# Patient Record
Sex: MALE | Race: WHITE | NOT HISPANIC OR LATINO | URBAN - METROPOLITAN AREA
[De-identification: names, ages, dates, MRNs, and addresses within clinical notes are randomized per-mention and may not be internally consistent; named-entity substitution may affect disease eponyms.]

---

## 2017-06-15 ENCOUNTER — OUTPATIENT (OUTPATIENT)
Dept: OUTPATIENT SERVICES | Facility: HOSPITAL | Age: 67
LOS: 1 days | Discharge: HOME | End: 2017-06-15

## 2017-06-28 DIAGNOSIS — Z79.01 LONG TERM (CURRENT) USE OF ANTICOAGULANTS: ICD-10-CM

## 2017-06-28 DIAGNOSIS — I48.91 UNSPECIFIED ATRIAL FIBRILLATION: ICD-10-CM

## 2017-07-06 ENCOUNTER — OUTPATIENT (OUTPATIENT)
Dept: OUTPATIENT SERVICES | Facility: HOSPITAL | Age: 67
LOS: 1 days | Discharge: HOME | End: 2017-07-06

## 2017-07-06 DIAGNOSIS — I48.91 UNSPECIFIED ATRIAL FIBRILLATION: ICD-10-CM

## 2017-07-06 DIAGNOSIS — Z79.01 LONG TERM (CURRENT) USE OF ANTICOAGULANTS: ICD-10-CM

## 2017-07-26 ENCOUNTER — OUTPATIENT (OUTPATIENT)
Dept: OUTPATIENT SERVICES | Facility: HOSPITAL | Age: 67
LOS: 1 days | Discharge: HOME | End: 2017-07-26

## 2017-07-26 DIAGNOSIS — K74.0 HEPATIC FIBROSIS: ICD-10-CM

## 2017-07-27 ENCOUNTER — OUTPATIENT (OUTPATIENT)
Dept: OUTPATIENT SERVICES | Facility: HOSPITAL | Age: 67
LOS: 1 days | Discharge: HOME | End: 2017-07-27

## 2017-07-27 DIAGNOSIS — Z79.01 LONG TERM (CURRENT) USE OF ANTICOAGULANTS: ICD-10-CM

## 2017-07-27 DIAGNOSIS — I48.91 UNSPECIFIED ATRIAL FIBRILLATION: ICD-10-CM

## 2017-08-11 ENCOUNTER — OUTPATIENT (OUTPATIENT)
Dept: OUTPATIENT SERVICES | Facility: HOSPITAL | Age: 67
LOS: 1 days | Discharge: HOME | End: 2017-08-11

## 2017-08-11 DIAGNOSIS — Z79.01 LONG TERM (CURRENT) USE OF ANTICOAGULANTS: ICD-10-CM

## 2017-08-11 DIAGNOSIS — I48.91 UNSPECIFIED ATRIAL FIBRILLATION: ICD-10-CM

## 2017-08-22 ENCOUNTER — OUTPATIENT (OUTPATIENT)
Dept: OUTPATIENT SERVICES | Facility: HOSPITAL | Age: 67
LOS: 1 days | Discharge: HOME | End: 2017-08-22

## 2017-08-22 DIAGNOSIS — Z79.01 LONG TERM (CURRENT) USE OF ANTICOAGULANTS: ICD-10-CM

## 2017-08-22 DIAGNOSIS — I48.91 UNSPECIFIED ATRIAL FIBRILLATION: ICD-10-CM

## 2017-08-31 ENCOUNTER — OUTPATIENT (OUTPATIENT)
Dept: OUTPATIENT SERVICES | Facility: HOSPITAL | Age: 67
LOS: 1 days | Discharge: HOME | End: 2017-08-31

## 2017-08-31 DIAGNOSIS — Z79.01 LONG TERM (CURRENT) USE OF ANTICOAGULANTS: ICD-10-CM

## 2017-08-31 DIAGNOSIS — I48.91 UNSPECIFIED ATRIAL FIBRILLATION: ICD-10-CM

## 2017-09-07 ENCOUNTER — OUTPATIENT (OUTPATIENT)
Dept: OUTPATIENT SERVICES | Facility: HOSPITAL | Age: 67
LOS: 1 days | Discharge: HOME | End: 2017-09-07

## 2017-09-07 DIAGNOSIS — I48.91 UNSPECIFIED ATRIAL FIBRILLATION: ICD-10-CM

## 2017-09-07 DIAGNOSIS — Z79.01 LONG TERM (CURRENT) USE OF ANTICOAGULANTS: ICD-10-CM

## 2017-09-11 ENCOUNTER — OUTPATIENT (OUTPATIENT)
Dept: OUTPATIENT SERVICES | Facility: HOSPITAL | Age: 67
LOS: 1 days | Discharge: HOME | End: 2017-09-11

## 2017-09-11 DIAGNOSIS — I48.91 UNSPECIFIED ATRIAL FIBRILLATION: ICD-10-CM

## 2017-09-11 DIAGNOSIS — Z79.01 LONG TERM (CURRENT) USE OF ANTICOAGULANTS: ICD-10-CM

## 2017-09-12 ENCOUNTER — OUTPATIENT (OUTPATIENT)
Dept: OUTPATIENT SERVICES | Facility: HOSPITAL | Age: 67
LOS: 1 days | Discharge: HOME | End: 2017-09-12

## 2017-09-14 DIAGNOSIS — Z12.11 ENCOUNTER FOR SCREENING FOR MALIGNANT NEOPLASM OF COLON: ICD-10-CM

## 2017-09-14 DIAGNOSIS — K76.6 PORTAL HYPERTENSION: ICD-10-CM

## 2017-09-14 DIAGNOSIS — K31.819 ANGIODYSPLASIA OF STOMACH AND DUODENUM WITHOUT BLEEDING: ICD-10-CM

## 2017-09-14 DIAGNOSIS — E78.00 PURE HYPERCHOLESTEROLEMIA, UNSPECIFIED: ICD-10-CM

## 2017-09-14 DIAGNOSIS — K64.8 OTHER HEMORRHOIDS: ICD-10-CM

## 2017-09-14 DIAGNOSIS — K29.70 GASTRITIS, UNSPECIFIED, WITHOUT BLEEDING: ICD-10-CM

## 2017-09-14 DIAGNOSIS — D12.0 BENIGN NEOPLASM OF CECUM: ICD-10-CM

## 2017-09-14 DIAGNOSIS — D12.8 BENIGN NEOPLASM OF RECTUM: ICD-10-CM

## 2017-09-14 DIAGNOSIS — K31.89 OTHER DISEASES OF STOMACH AND DUODENUM: ICD-10-CM

## 2017-09-14 DIAGNOSIS — K57.30 DIVERTICULOSIS OF LARGE INTESTINE WITHOUT PERFORATION OR ABSCESS WITHOUT BLEEDING: ICD-10-CM

## 2017-09-14 DIAGNOSIS — D12.5 BENIGN NEOPLASM OF SIGMOID COLON: ICD-10-CM

## 2017-09-25 ENCOUNTER — OUTPATIENT (OUTPATIENT)
Dept: OUTPATIENT SERVICES | Facility: HOSPITAL | Age: 67
LOS: 1 days | Discharge: HOME | End: 2017-09-25

## 2017-09-25 DIAGNOSIS — Z79.01 LONG TERM (CURRENT) USE OF ANTICOAGULANTS: ICD-10-CM

## 2017-09-25 DIAGNOSIS — I48.91 UNSPECIFIED ATRIAL FIBRILLATION: ICD-10-CM

## 2017-10-10 ENCOUNTER — OUTPATIENT (OUTPATIENT)
Dept: OUTPATIENT SERVICES | Facility: HOSPITAL | Age: 67
LOS: 1 days | Discharge: HOME | End: 2017-10-10

## 2017-10-10 DIAGNOSIS — I48.91 UNSPECIFIED ATRIAL FIBRILLATION: ICD-10-CM

## 2017-10-10 DIAGNOSIS — Z79.01 LONG TERM (CURRENT) USE OF ANTICOAGULANTS: ICD-10-CM

## 2017-10-16 ENCOUNTER — OUTPATIENT (OUTPATIENT)
Dept: OUTPATIENT SERVICES | Facility: HOSPITAL | Age: 67
LOS: 1 days | Discharge: HOME | End: 2017-10-16

## 2017-10-16 DIAGNOSIS — Z79.01 LONG TERM (CURRENT) USE OF ANTICOAGULANTS: ICD-10-CM

## 2017-10-16 DIAGNOSIS — I48.91 UNSPECIFIED ATRIAL FIBRILLATION: ICD-10-CM

## 2017-10-17 ENCOUNTER — EMERGENCY (EMERGENCY)
Facility: HOSPITAL | Age: 67
LOS: 0 days | Discharge: HOME | End: 2017-10-17

## 2017-10-17 DIAGNOSIS — I10 ESSENTIAL (PRIMARY) HYPERTENSION: ICD-10-CM

## 2017-10-17 DIAGNOSIS — Z79.01 LONG TERM (CURRENT) USE OF ANTICOAGULANTS: ICD-10-CM

## 2017-10-17 DIAGNOSIS — Z79.899 OTHER LONG TERM (CURRENT) DRUG THERAPY: ICD-10-CM

## 2017-10-17 DIAGNOSIS — K74.60 UNSPECIFIED CIRRHOSIS OF LIVER: ICD-10-CM

## 2017-10-17 DIAGNOSIS — N20.0 CALCULUS OF KIDNEY: ICD-10-CM

## 2017-10-17 DIAGNOSIS — E11.9 TYPE 2 DIABETES MELLITUS WITHOUT COMPLICATIONS: ICD-10-CM

## 2017-10-17 DIAGNOSIS — Z95.5 PRESENCE OF CORONARY ANGIOPLASTY IMPLANT AND GRAFT: ICD-10-CM

## 2017-10-17 DIAGNOSIS — R10.32 LEFT LOWER QUADRANT PAIN: ICD-10-CM

## 2017-10-27 ENCOUNTER — OUTPATIENT (OUTPATIENT)
Dept: OUTPATIENT SERVICES | Facility: HOSPITAL | Age: 67
LOS: 1 days | Discharge: HOME | End: 2017-10-27

## 2017-10-27 DIAGNOSIS — I48.91 UNSPECIFIED ATRIAL FIBRILLATION: ICD-10-CM

## 2017-10-27 DIAGNOSIS — Z79.01 LONG TERM (CURRENT) USE OF ANTICOAGULANTS: ICD-10-CM

## 2017-10-31 ENCOUNTER — OUTPATIENT (OUTPATIENT)
Dept: OUTPATIENT SERVICES | Facility: HOSPITAL | Age: 67
LOS: 1 days | Discharge: HOME | End: 2017-10-31

## 2017-10-31 DIAGNOSIS — D64.9 ANEMIA, UNSPECIFIED: ICD-10-CM

## 2017-10-31 DIAGNOSIS — D69.6 THROMBOCYTOPENIA, UNSPECIFIED: ICD-10-CM

## 2017-11-09 ENCOUNTER — OUTPATIENT (OUTPATIENT)
Dept: OUTPATIENT SERVICES | Facility: HOSPITAL | Age: 67
LOS: 1 days | Discharge: HOME | End: 2017-11-09

## 2017-11-09 DIAGNOSIS — I48.91 UNSPECIFIED ATRIAL FIBRILLATION: ICD-10-CM

## 2017-11-09 DIAGNOSIS — Z79.01 LONG TERM (CURRENT) USE OF ANTICOAGULANTS: ICD-10-CM

## 2017-11-16 ENCOUNTER — OUTPATIENT (OUTPATIENT)
Dept: OUTPATIENT SERVICES | Facility: HOSPITAL | Age: 67
LOS: 1 days | Discharge: HOME | End: 2017-11-16

## 2017-11-16 DIAGNOSIS — Z79.01 LONG TERM (CURRENT) USE OF ANTICOAGULANTS: ICD-10-CM

## 2017-11-16 DIAGNOSIS — I48.91 UNSPECIFIED ATRIAL FIBRILLATION: ICD-10-CM

## 2017-12-01 ENCOUNTER — OUTPATIENT (OUTPATIENT)
Dept: OUTPATIENT SERVICES | Facility: HOSPITAL | Age: 67
LOS: 1 days | Discharge: HOME | End: 2017-12-01

## 2017-12-01 DIAGNOSIS — I48.91 UNSPECIFIED ATRIAL FIBRILLATION: ICD-10-CM

## 2017-12-01 DIAGNOSIS — Z79.01 LONG TERM (CURRENT) USE OF ANTICOAGULANTS: ICD-10-CM

## 2017-12-19 ENCOUNTER — OUTPATIENT (OUTPATIENT)
Dept: OUTPATIENT SERVICES | Facility: HOSPITAL | Age: 67
LOS: 1 days | Discharge: HOME | End: 2017-12-19

## 2017-12-19 DIAGNOSIS — Z79.01 LONG TERM (CURRENT) USE OF ANTICOAGULANTS: ICD-10-CM

## 2017-12-19 DIAGNOSIS — I48.91 UNSPECIFIED ATRIAL FIBRILLATION: ICD-10-CM

## 2017-12-28 ENCOUNTER — OUTPATIENT (OUTPATIENT)
Dept: OUTPATIENT SERVICES | Facility: HOSPITAL | Age: 67
LOS: 1 days | Discharge: HOME | End: 2017-12-28

## 2017-12-28 DIAGNOSIS — I48.91 UNSPECIFIED ATRIAL FIBRILLATION: ICD-10-CM

## 2017-12-28 DIAGNOSIS — Z79.01 LONG TERM (CURRENT) USE OF ANTICOAGULANTS: ICD-10-CM

## 2018-01-04 ENCOUNTER — OUTPATIENT (OUTPATIENT)
Dept: OUTPATIENT SERVICES | Facility: HOSPITAL | Age: 68
LOS: 1 days | Discharge: HOME | End: 2018-01-04

## 2018-01-04 DIAGNOSIS — I48.91 UNSPECIFIED ATRIAL FIBRILLATION: ICD-10-CM

## 2018-01-04 DIAGNOSIS — Z79.01 LONG TERM (CURRENT) USE OF ANTICOAGULANTS: ICD-10-CM

## 2018-01-11 ENCOUNTER — OUTPATIENT (OUTPATIENT)
Dept: OUTPATIENT SERVICES | Facility: HOSPITAL | Age: 68
LOS: 1 days | Discharge: HOME | End: 2018-01-11

## 2018-01-11 DIAGNOSIS — I48.91 UNSPECIFIED ATRIAL FIBRILLATION: ICD-10-CM

## 2018-01-11 DIAGNOSIS — Z79.01 LONG TERM (CURRENT) USE OF ANTICOAGULANTS: ICD-10-CM

## 2018-01-18 ENCOUNTER — OUTPATIENT (OUTPATIENT)
Dept: OUTPATIENT SERVICES | Facility: HOSPITAL | Age: 68
LOS: 1 days | Discharge: HOME | End: 2018-01-18

## 2018-01-18 DIAGNOSIS — Z79.01 LONG TERM (CURRENT) USE OF ANTICOAGULANTS: ICD-10-CM

## 2018-01-18 DIAGNOSIS — I48.91 UNSPECIFIED ATRIAL FIBRILLATION: ICD-10-CM

## 2018-02-01 ENCOUNTER — OUTPATIENT (OUTPATIENT)
Dept: OUTPATIENT SERVICES | Facility: HOSPITAL | Age: 68
LOS: 1 days | Discharge: HOME | End: 2018-02-01

## 2018-02-01 DIAGNOSIS — Z79.01 LONG TERM (CURRENT) USE OF ANTICOAGULANTS: ICD-10-CM

## 2018-02-01 DIAGNOSIS — I48.91 UNSPECIFIED ATRIAL FIBRILLATION: ICD-10-CM

## 2018-03-12 ENCOUNTER — OUTPATIENT (OUTPATIENT)
Dept: OUTPATIENT SERVICES | Facility: HOSPITAL | Age: 68
LOS: 1 days | Discharge: HOME | End: 2018-03-12

## 2018-03-14 ENCOUNTER — OUTPATIENT (OUTPATIENT)
Dept: OUTPATIENT SERVICES | Facility: HOSPITAL | Age: 68
LOS: 1 days | Discharge: HOME | End: 2018-03-14

## 2018-03-14 DIAGNOSIS — Z79.01 LONG TERM (CURRENT) USE OF ANTICOAGULANTS: ICD-10-CM

## 2018-03-14 DIAGNOSIS — I48.91 UNSPECIFIED ATRIAL FIBRILLATION: ICD-10-CM

## 2018-03-19 ENCOUNTER — OUTPATIENT (OUTPATIENT)
Dept: OUTPATIENT SERVICES | Facility: HOSPITAL | Age: 68
LOS: 1 days | Discharge: HOME | End: 2018-03-19

## 2018-03-19 DIAGNOSIS — Z79.01 LONG TERM (CURRENT) USE OF ANTICOAGULANTS: ICD-10-CM

## 2018-03-19 DIAGNOSIS — I48.91 UNSPECIFIED ATRIAL FIBRILLATION: ICD-10-CM

## 2018-03-26 ENCOUNTER — OUTPATIENT (OUTPATIENT)
Dept: OUTPATIENT SERVICES | Facility: HOSPITAL | Age: 68
LOS: 1 days | Discharge: HOME | End: 2018-03-26

## 2018-03-26 DIAGNOSIS — Z79.01 LONG TERM (CURRENT) USE OF ANTICOAGULANTS: ICD-10-CM

## 2018-03-26 DIAGNOSIS — I48.91 UNSPECIFIED ATRIAL FIBRILLATION: ICD-10-CM

## 2018-04-13 ENCOUNTER — OUTPATIENT (OUTPATIENT)
Dept: OUTPATIENT SERVICES | Facility: HOSPITAL | Age: 68
LOS: 1 days | Discharge: HOME | End: 2018-04-13

## 2018-04-13 DIAGNOSIS — Z79.01 LONG TERM (CURRENT) USE OF ANTICOAGULANTS: ICD-10-CM

## 2018-04-13 DIAGNOSIS — I48.91 UNSPECIFIED ATRIAL FIBRILLATION: ICD-10-CM

## 2018-04-24 ENCOUNTER — OUTPATIENT (OUTPATIENT)
Dept: OUTPATIENT SERVICES | Facility: HOSPITAL | Age: 68
LOS: 1 days | Discharge: HOME | End: 2018-04-24

## 2018-04-24 VITALS
RESPIRATION RATE: 20 BRPM | HEART RATE: 85 BPM | SYSTOLIC BLOOD PRESSURE: 156 MMHG | DIASTOLIC BLOOD PRESSURE: 79 MMHG | OXYGEN SATURATION: 98 %

## 2018-04-24 VITALS
TEMPERATURE: 98 F | DIASTOLIC BLOOD PRESSURE: 92 MMHG | HEIGHT: 70 IN | WEIGHT: 235.01 LBS | HEART RATE: 61 BPM | SYSTOLIC BLOOD PRESSURE: 163 MMHG | RESPIRATION RATE: 20 BRPM

## 2018-04-24 DIAGNOSIS — Z96.653 PRESENCE OF ARTIFICIAL KNEE JOINT, BILATERAL: Chronic | ICD-10-CM

## 2018-04-24 DIAGNOSIS — Z12.11 ENCOUNTER FOR SCREENING FOR MALIGNANT NEOPLASM OF COLON: Chronic | ICD-10-CM

## 2018-04-24 DIAGNOSIS — Z95.9 PRESENCE OF CARDIAC AND VASCULAR IMPLANT AND GRAFT, UNSPECIFIED: Chronic | ICD-10-CM

## 2018-04-24 NOTE — PRE-ANESTHESIA EVALUATION ADULT - NSANTHAPLANRD_GEN_ALL_CORE
Elva CROW, RN, CHFN  [phone]  [facility]      
monitored anesthesia care (MAC)
monitored anesthesia care (MAC)

## 2018-04-24 NOTE — ASU PATIENT PROFILE, ADULT - PMH
Afib    Anemia    Diabetes    High cholesterol    HTN (hypertension) Afib    Anemia    Cirrhosis of liver    Diabetes    High cholesterol    HTN (hypertension)

## 2018-04-24 NOTE — ASU PATIENT PROFILE, ADULT - PSH
Encounter for screening colonoscopy  1 year ago  Presence of bilateral total knee joint prostheses    S/P angioplasty with stent  2 cardiac stents

## 2018-04-26 ENCOUNTER — OUTPATIENT (OUTPATIENT)
Dept: OUTPATIENT SERVICES | Facility: HOSPITAL | Age: 68
LOS: 1 days | Discharge: HOME | End: 2018-04-26

## 2018-04-26 DIAGNOSIS — I48.91 UNSPECIFIED ATRIAL FIBRILLATION: ICD-10-CM

## 2018-04-26 DIAGNOSIS — Z96.653 PRESENCE OF ARTIFICIAL KNEE JOINT, BILATERAL: Chronic | ICD-10-CM

## 2018-04-26 DIAGNOSIS — Z79.01 LONG TERM (CURRENT) USE OF ANTICOAGULANTS: ICD-10-CM

## 2018-04-26 DIAGNOSIS — Z95.9 PRESENCE OF CARDIAC AND VASCULAR IMPLANT AND GRAFT, UNSPECIFIED: Chronic | ICD-10-CM

## 2018-04-26 DIAGNOSIS — Z12.11 ENCOUNTER FOR SCREENING FOR MALIGNANT NEOPLASM OF COLON: Chronic | ICD-10-CM

## 2018-05-04 DIAGNOSIS — K31.819 ANGIODYSPLASIA OF STOMACH AND DUODENUM WITHOUT BLEEDING: ICD-10-CM

## 2018-05-04 DIAGNOSIS — E78.00 PURE HYPERCHOLESTEROLEMIA, UNSPECIFIED: ICD-10-CM

## 2018-05-04 DIAGNOSIS — E11.9 TYPE 2 DIABETES MELLITUS WITHOUT COMPLICATIONS: ICD-10-CM

## 2018-05-04 DIAGNOSIS — K76.6 PORTAL HYPERTENSION: ICD-10-CM

## 2018-05-04 DIAGNOSIS — K31.89 OTHER DISEASES OF STOMACH AND DUODENUM: ICD-10-CM

## 2018-05-11 ENCOUNTER — OUTPATIENT (OUTPATIENT)
Dept: OUTPATIENT SERVICES | Facility: HOSPITAL | Age: 68
LOS: 1 days | Discharge: HOME | End: 2018-05-11

## 2018-05-11 DIAGNOSIS — Z12.11 ENCOUNTER FOR SCREENING FOR MALIGNANT NEOPLASM OF COLON: Chronic | ICD-10-CM

## 2018-05-11 DIAGNOSIS — Z95.9 PRESENCE OF CARDIAC AND VASCULAR IMPLANT AND GRAFT, UNSPECIFIED: Chronic | ICD-10-CM

## 2018-05-11 DIAGNOSIS — Z79.01 LONG TERM (CURRENT) USE OF ANTICOAGULANTS: ICD-10-CM

## 2018-05-11 DIAGNOSIS — Z96.653 PRESENCE OF ARTIFICIAL KNEE JOINT, BILATERAL: Chronic | ICD-10-CM

## 2018-05-11 DIAGNOSIS — I48.91 UNSPECIFIED ATRIAL FIBRILLATION: ICD-10-CM

## 2018-05-21 ENCOUNTER — OUTPATIENT (OUTPATIENT)
Dept: OUTPATIENT SERVICES | Facility: HOSPITAL | Age: 68
LOS: 1 days | Discharge: HOME | End: 2018-05-21

## 2018-05-21 DIAGNOSIS — Z96.653 PRESENCE OF ARTIFICIAL KNEE JOINT, BILATERAL: Chronic | ICD-10-CM

## 2018-05-21 DIAGNOSIS — Z79.01 LONG TERM (CURRENT) USE OF ANTICOAGULANTS: ICD-10-CM

## 2018-05-21 DIAGNOSIS — I48.91 UNSPECIFIED ATRIAL FIBRILLATION: ICD-10-CM

## 2018-05-21 DIAGNOSIS — Z95.9 PRESENCE OF CARDIAC AND VASCULAR IMPLANT AND GRAFT, UNSPECIFIED: Chronic | ICD-10-CM

## 2018-05-21 DIAGNOSIS — Z12.11 ENCOUNTER FOR SCREENING FOR MALIGNANT NEOPLASM OF COLON: Chronic | ICD-10-CM

## 2018-06-29 ENCOUNTER — OUTPATIENT (OUTPATIENT)
Dept: OUTPATIENT SERVICES | Facility: HOSPITAL | Age: 68
LOS: 1 days | Discharge: HOME | End: 2018-06-29

## 2018-06-29 DIAGNOSIS — Z95.9 PRESENCE OF CARDIAC AND VASCULAR IMPLANT AND GRAFT, UNSPECIFIED: Chronic | ICD-10-CM

## 2018-06-29 DIAGNOSIS — Z79.01 LONG TERM (CURRENT) USE OF ANTICOAGULANTS: ICD-10-CM

## 2018-06-29 DIAGNOSIS — Z96.653 PRESENCE OF ARTIFICIAL KNEE JOINT, BILATERAL: Chronic | ICD-10-CM

## 2018-06-29 DIAGNOSIS — Z12.11 ENCOUNTER FOR SCREENING FOR MALIGNANT NEOPLASM OF COLON: Chronic | ICD-10-CM

## 2018-06-29 DIAGNOSIS — I48.91 UNSPECIFIED ATRIAL FIBRILLATION: ICD-10-CM

## 2018-07-16 ENCOUNTER — OUTPATIENT (OUTPATIENT)
Dept: OUTPATIENT SERVICES | Facility: HOSPITAL | Age: 68
LOS: 1 days | Discharge: HOME | End: 2018-07-16

## 2018-07-16 DIAGNOSIS — Z12.11 ENCOUNTER FOR SCREENING FOR MALIGNANT NEOPLASM OF COLON: Chronic | ICD-10-CM

## 2018-07-16 DIAGNOSIS — I48.91 UNSPECIFIED ATRIAL FIBRILLATION: ICD-10-CM

## 2018-07-16 DIAGNOSIS — Z95.9 PRESENCE OF CARDIAC AND VASCULAR IMPLANT AND GRAFT, UNSPECIFIED: Chronic | ICD-10-CM

## 2018-07-16 DIAGNOSIS — Z96.653 PRESENCE OF ARTIFICIAL KNEE JOINT, BILATERAL: Chronic | ICD-10-CM

## 2018-07-16 DIAGNOSIS — Z79.01 LONG TERM (CURRENT) USE OF ANTICOAGULANTS: ICD-10-CM

## 2018-07-23 ENCOUNTER — OUTPATIENT (OUTPATIENT)
Dept: OUTPATIENT SERVICES | Facility: HOSPITAL | Age: 68
LOS: 1 days | Discharge: HOME | End: 2018-07-23

## 2018-07-23 DIAGNOSIS — Z79.01 LONG TERM (CURRENT) USE OF ANTICOAGULANTS: ICD-10-CM

## 2018-07-23 DIAGNOSIS — Z95.9 PRESENCE OF CARDIAC AND VASCULAR IMPLANT AND GRAFT, UNSPECIFIED: Chronic | ICD-10-CM

## 2018-07-23 DIAGNOSIS — Z12.11 ENCOUNTER FOR SCREENING FOR MALIGNANT NEOPLASM OF COLON: Chronic | ICD-10-CM

## 2018-07-23 DIAGNOSIS — I48.91 UNSPECIFIED ATRIAL FIBRILLATION: ICD-10-CM

## 2018-07-23 DIAGNOSIS — Z96.653 PRESENCE OF ARTIFICIAL KNEE JOINT, BILATERAL: Chronic | ICD-10-CM

## 2018-07-23 PROBLEM — I10 ESSENTIAL (PRIMARY) HYPERTENSION: Chronic | Status: ACTIVE | Noted: 2018-04-24

## 2018-07-23 PROBLEM — D64.9 ANEMIA, UNSPECIFIED: Chronic | Status: ACTIVE | Noted: 2018-04-24

## 2018-07-23 PROBLEM — E78.00 PURE HYPERCHOLESTEROLEMIA, UNSPECIFIED: Chronic | Status: ACTIVE | Noted: 2018-04-24

## 2018-07-23 PROBLEM — E11.9 TYPE 2 DIABETES MELLITUS WITHOUT COMPLICATIONS: Chronic | Status: ACTIVE | Noted: 2018-04-24

## 2018-08-02 ENCOUNTER — OUTPATIENT (OUTPATIENT)
Dept: OUTPATIENT SERVICES | Facility: HOSPITAL | Age: 68
LOS: 1 days | Discharge: HOME | End: 2018-08-02

## 2018-08-02 DIAGNOSIS — Z96.653 PRESENCE OF ARTIFICIAL KNEE JOINT, BILATERAL: Chronic | ICD-10-CM

## 2018-08-02 DIAGNOSIS — I48.91 UNSPECIFIED ATRIAL FIBRILLATION: ICD-10-CM

## 2018-08-02 DIAGNOSIS — Z79.01 LONG TERM (CURRENT) USE OF ANTICOAGULANTS: ICD-10-CM

## 2018-08-02 DIAGNOSIS — Z12.11 ENCOUNTER FOR SCREENING FOR MALIGNANT NEOPLASM OF COLON: Chronic | ICD-10-CM

## 2018-08-02 DIAGNOSIS — Z95.9 PRESENCE OF CARDIAC AND VASCULAR IMPLANT AND GRAFT, UNSPECIFIED: Chronic | ICD-10-CM

## 2018-08-16 ENCOUNTER — OUTPATIENT (OUTPATIENT)
Dept: OUTPATIENT SERVICES | Facility: HOSPITAL | Age: 68
LOS: 1 days | Discharge: HOME | End: 2018-08-16

## 2018-08-16 DIAGNOSIS — Z96.653 PRESENCE OF ARTIFICIAL KNEE JOINT, BILATERAL: Chronic | ICD-10-CM

## 2018-08-16 DIAGNOSIS — Z12.11 ENCOUNTER FOR SCREENING FOR MALIGNANT NEOPLASM OF COLON: Chronic | ICD-10-CM

## 2018-08-16 DIAGNOSIS — Z79.01 LONG TERM (CURRENT) USE OF ANTICOAGULANTS: ICD-10-CM

## 2018-08-16 DIAGNOSIS — I48.91 UNSPECIFIED ATRIAL FIBRILLATION: ICD-10-CM

## 2018-08-16 DIAGNOSIS — Z95.9 PRESENCE OF CARDIAC AND VASCULAR IMPLANT AND GRAFT, UNSPECIFIED: Chronic | ICD-10-CM

## 2018-10-03 ENCOUNTER — OUTPATIENT (OUTPATIENT)
Dept: OUTPATIENT SERVICES | Facility: HOSPITAL | Age: 68
LOS: 1 days | Discharge: HOME | End: 2018-10-03

## 2018-10-03 DIAGNOSIS — I48.91 UNSPECIFIED ATRIAL FIBRILLATION: ICD-10-CM

## 2018-10-03 DIAGNOSIS — Z95.9 PRESENCE OF CARDIAC AND VASCULAR IMPLANT AND GRAFT, UNSPECIFIED: Chronic | ICD-10-CM

## 2018-10-03 DIAGNOSIS — Z79.01 LONG TERM (CURRENT) USE OF ANTICOAGULANTS: ICD-10-CM

## 2018-10-03 DIAGNOSIS — Z12.11 ENCOUNTER FOR SCREENING FOR MALIGNANT NEOPLASM OF COLON: Chronic | ICD-10-CM

## 2018-10-03 DIAGNOSIS — Z96.653 PRESENCE OF ARTIFICIAL KNEE JOINT, BILATERAL: Chronic | ICD-10-CM

## 2018-10-10 ENCOUNTER — OUTPATIENT (OUTPATIENT)
Dept: OUTPATIENT SERVICES | Facility: HOSPITAL | Age: 68
LOS: 1 days | Discharge: HOME | End: 2018-10-10

## 2018-10-10 DIAGNOSIS — Z79.01 LONG TERM (CURRENT) USE OF ANTICOAGULANTS: ICD-10-CM

## 2018-10-10 DIAGNOSIS — Z12.11 ENCOUNTER FOR SCREENING FOR MALIGNANT NEOPLASM OF COLON: Chronic | ICD-10-CM

## 2018-10-10 DIAGNOSIS — Z96.653 PRESENCE OF ARTIFICIAL KNEE JOINT, BILATERAL: Chronic | ICD-10-CM

## 2018-10-10 DIAGNOSIS — Z95.9 PRESENCE OF CARDIAC AND VASCULAR IMPLANT AND GRAFT, UNSPECIFIED: Chronic | ICD-10-CM

## 2018-10-10 DIAGNOSIS — I48.91 UNSPECIFIED ATRIAL FIBRILLATION: ICD-10-CM

## 2018-10-10 LAB
POCT INR: 2 RATIO — HIGH (ref 0.9–1.2)
POCT PT: 24.4 SEC — HIGH (ref 10–13.4)

## 2018-10-17 ENCOUNTER — OUTPATIENT (OUTPATIENT)
Dept: OUTPATIENT SERVICES | Facility: HOSPITAL | Age: 68
LOS: 1 days | Discharge: HOME | End: 2018-10-17

## 2018-10-17 DIAGNOSIS — Z79.01 LONG TERM (CURRENT) USE OF ANTICOAGULANTS: ICD-10-CM

## 2018-10-17 DIAGNOSIS — I48.91 UNSPECIFIED ATRIAL FIBRILLATION: ICD-10-CM

## 2018-10-17 DIAGNOSIS — Z12.11 ENCOUNTER FOR SCREENING FOR MALIGNANT NEOPLASM OF COLON: Chronic | ICD-10-CM

## 2018-10-17 DIAGNOSIS — Z95.9 PRESENCE OF CARDIAC AND VASCULAR IMPLANT AND GRAFT, UNSPECIFIED: Chronic | ICD-10-CM

## 2018-10-17 DIAGNOSIS — Z96.653 PRESENCE OF ARTIFICIAL KNEE JOINT, BILATERAL: Chronic | ICD-10-CM

## 2018-10-31 ENCOUNTER — OUTPATIENT (OUTPATIENT)
Dept: OUTPATIENT SERVICES | Facility: HOSPITAL | Age: 68
LOS: 1 days | Discharge: HOME | End: 2018-10-31

## 2018-10-31 DIAGNOSIS — Z12.11 ENCOUNTER FOR SCREENING FOR MALIGNANT NEOPLASM OF COLON: Chronic | ICD-10-CM

## 2018-10-31 DIAGNOSIS — I48.91 UNSPECIFIED ATRIAL FIBRILLATION: ICD-10-CM

## 2018-10-31 DIAGNOSIS — Z95.9 PRESENCE OF CARDIAC AND VASCULAR IMPLANT AND GRAFT, UNSPECIFIED: Chronic | ICD-10-CM

## 2018-10-31 DIAGNOSIS — Z79.01 LONG TERM (CURRENT) USE OF ANTICOAGULANTS: ICD-10-CM

## 2018-10-31 DIAGNOSIS — Z96.653 PRESENCE OF ARTIFICIAL KNEE JOINT, BILATERAL: Chronic | ICD-10-CM

## 2018-11-21 ENCOUNTER — EMERGENCY (EMERGENCY)
Facility: HOSPITAL | Age: 68
LOS: 0 days | Discharge: HOME | End: 2018-11-21
Attending: EMERGENCY MEDICINE | Admitting: EMERGENCY MEDICINE

## 2018-11-21 VITALS
HEART RATE: 86 BPM | RESPIRATION RATE: 20 BRPM | DIASTOLIC BLOOD PRESSURE: 77 MMHG | OXYGEN SATURATION: 100 % | SYSTOLIC BLOOD PRESSURE: 119 MMHG | TEMPERATURE: 97 F

## 2018-11-21 DIAGNOSIS — E78.00 PURE HYPERCHOLESTEROLEMIA, UNSPECIFIED: ICD-10-CM

## 2018-11-21 DIAGNOSIS — E11.9 TYPE 2 DIABETES MELLITUS WITHOUT COMPLICATIONS: ICD-10-CM

## 2018-11-21 DIAGNOSIS — R19.7 DIARRHEA, UNSPECIFIED: ICD-10-CM

## 2018-11-21 DIAGNOSIS — I25.10 ATHEROSCLEROTIC HEART DISEASE OF NATIVE CORONARY ARTERY WITHOUT ANGINA PECTORIS: ICD-10-CM

## 2018-11-21 DIAGNOSIS — Z95.9 PRESENCE OF CARDIAC AND VASCULAR IMPLANT AND GRAFT, UNSPECIFIED: Chronic | ICD-10-CM

## 2018-11-21 DIAGNOSIS — Z96.653 PRESENCE OF ARTIFICIAL KNEE JOINT, BILATERAL: Chronic | ICD-10-CM

## 2018-11-21 DIAGNOSIS — Z79.84 LONG TERM (CURRENT) USE OF ORAL HYPOGLYCEMIC DRUGS: ICD-10-CM

## 2018-11-21 DIAGNOSIS — Z12.11 ENCOUNTER FOR SCREENING FOR MALIGNANT NEOPLASM OF COLON: Chronic | ICD-10-CM

## 2018-11-21 DIAGNOSIS — Z96.653 PRESENCE OF ARTIFICIAL KNEE JOINT, BILATERAL: ICD-10-CM

## 2018-11-21 DIAGNOSIS — Z79.01 LONG TERM (CURRENT) USE OF ANTICOAGULANTS: ICD-10-CM

## 2018-11-21 DIAGNOSIS — I10 ESSENTIAL (PRIMARY) HYPERTENSION: ICD-10-CM

## 2018-11-21 DIAGNOSIS — D69.6 THROMBOCYTOPENIA, UNSPECIFIED: ICD-10-CM

## 2018-11-21 DIAGNOSIS — R00.2 PALPITATIONS: ICD-10-CM

## 2018-11-21 DIAGNOSIS — R79.89 OTHER SPECIFIED ABNORMAL FINDINGS OF BLOOD CHEMISTRY: ICD-10-CM

## 2018-11-21 DIAGNOSIS — Z95.5 PRESENCE OF CORONARY ANGIOPLASTY IMPLANT AND GRAFT: ICD-10-CM

## 2018-11-21 DIAGNOSIS — I48.91 UNSPECIFIED ATRIAL FIBRILLATION: ICD-10-CM

## 2018-11-21 LAB
ALBUMIN SERPL ELPH-MCNC: 2.6 G/DL — LOW (ref 3.5–5.2)
ALP SERPL-CCNC: 240 U/L — HIGH (ref 30–115)
ALT FLD-CCNC: 70 U/L — HIGH (ref 0–41)
ANION GAP SERPL CALC-SCNC: 12 MMOL/L — SIGNIFICANT CHANGE UP (ref 7–14)
APTT BLD: 50.6 SEC — HIGH (ref 27–39.2)
AST SERPL-CCNC: 222 U/L — HIGH (ref 0–41)
BASE EXCESS BLDV CALC-SCNC: -0.4 MMOL/L — SIGNIFICANT CHANGE UP (ref -2–2)
BASOPHILS # BLD AUTO: 0.04 K/UL — SIGNIFICANT CHANGE UP (ref 0–0.2)
BASOPHILS NFR BLD AUTO: 0.9 % — SIGNIFICANT CHANGE UP (ref 0–1)
BILIRUB DIRECT SERPL-MCNC: 0.5 MG/DL — HIGH (ref 0–0.2)
BILIRUB INDIRECT FLD-MCNC: 1.8 MG/DL — HIGH (ref 0.2–1.2)
BILIRUB SERPL-MCNC: 2.3 MG/DL — HIGH (ref 0.2–1.2)
BUN SERPL-MCNC: 15 MG/DL — SIGNIFICANT CHANGE UP (ref 10–20)
CA-I SERPL-SCNC: 1.18 MMOL/L — SIGNIFICANT CHANGE UP (ref 1.12–1.3)
CALCIUM SERPL-MCNC: 8.7 MG/DL — SIGNIFICANT CHANGE UP (ref 8.5–10.1)
CHLORIDE SERPL-SCNC: 105 MMOL/L — SIGNIFICANT CHANGE UP (ref 98–110)
CO2 SERPL-SCNC: 20 MMOL/L — SIGNIFICANT CHANGE UP (ref 17–32)
CREAT SERPL-MCNC: 1.2 MG/DL — SIGNIFICANT CHANGE UP (ref 0.7–1.5)
EOSINOPHIL # BLD AUTO: 0.15 K/UL — SIGNIFICANT CHANGE UP (ref 0–0.7)
EOSINOPHIL NFR BLD AUTO: 3.5 % — SIGNIFICANT CHANGE UP (ref 0–8)
GAS PNL BLDV: 141 MMOL/L — SIGNIFICANT CHANGE UP (ref 136–145)
GAS PNL BLDV: SIGNIFICANT CHANGE UP
GLUCOSE SERPL-MCNC: 143 MG/DL — HIGH (ref 70–99)
HCO3 BLDV-SCNC: 25 MMOL/L — SIGNIFICANT CHANGE UP (ref 22–29)
HCT VFR BLD CALC: 36.5 % — LOW (ref 42–52)
HCT VFR BLDA CALC: 40.8 % — SIGNIFICANT CHANGE UP (ref 34–44)
HGB BLD CALC-MCNC: 13.3 G/DL — LOW (ref 14–18)
HGB BLD-MCNC: 12.2 G/DL — LOW (ref 14–18)
IMM GRANULOCYTES NFR BLD AUTO: 0.2 % — SIGNIFICANT CHANGE UP (ref 0.1–0.3)
INR BLD: 3.8 RATIO — HIGH (ref 0.65–1.3)
LACTATE BLDV-MCNC: 1.9 MMOL/L — HIGH (ref 0.5–1.6)
LIDOCAIN IGE QN: 71 U/L — HIGH (ref 7–60)
LYMPHOCYTES # BLD AUTO: 0.82 K/UL — LOW (ref 1.2–3.4)
LYMPHOCYTES # BLD AUTO: 19 % — LOW (ref 20.5–51.1)
MCHC RBC-ENTMCNC: 30.9 PG — SIGNIFICANT CHANGE UP (ref 27–31)
MCHC RBC-ENTMCNC: 33.4 G/DL — SIGNIFICANT CHANGE UP (ref 32–37)
MCV RBC AUTO: 92.4 FL — SIGNIFICANT CHANGE UP (ref 80–94)
MONOCYTES # BLD AUTO: 0.55 K/UL — SIGNIFICANT CHANGE UP (ref 0.1–0.6)
MONOCYTES NFR BLD AUTO: 12.7 % — HIGH (ref 1.7–9.3)
NEUTROPHILS # BLD AUTO: 2.75 K/UL — SIGNIFICANT CHANGE UP (ref 1.4–6.5)
NEUTROPHILS NFR BLD AUTO: 63.7 % — SIGNIFICANT CHANGE UP (ref 42.2–75.2)
NRBC # BLD: 0 /100 WBCS — SIGNIFICANT CHANGE UP (ref 0–0)
NT-PROBNP SERPL-SCNC: 388 PG/ML — HIGH (ref 0–300)
PCO2 BLDV: 42 MMHG — SIGNIFICANT CHANGE UP (ref 41–51)
PH BLDV: 7.38 — SIGNIFICANT CHANGE UP (ref 7.26–7.43)
PLATELET # BLD AUTO: 86 K/UL — LOW (ref 130–400)
PO2 BLDV: 34 MMHG — SIGNIFICANT CHANGE UP (ref 20–40)
POTASSIUM BLDV-SCNC: 4.1 MMOL/L — SIGNIFICANT CHANGE UP (ref 3.3–5.6)
POTASSIUM SERPL-MCNC: 6.5 MMOL/L — CRITICAL HIGH (ref 3.5–5)
POTASSIUM SERPL-SCNC: 6.5 MMOL/L — CRITICAL HIGH (ref 3.5–5)
PROT SERPL-MCNC: 7.8 G/DL — SIGNIFICANT CHANGE UP (ref 6–8)
PROTHROM AB SERPL-ACNC: >40 SEC — HIGH (ref 9.95–12.87)
RBC # BLD: 3.95 M/UL — LOW (ref 4.7–6.1)
RBC # FLD: 18.8 % — HIGH (ref 11.5–14.5)
SAO2 % BLDV: 58 % — SIGNIFICANT CHANGE UP
SODIUM SERPL-SCNC: 137 MMOL/L — SIGNIFICANT CHANGE UP (ref 135–146)
TROPONIN T SERPL-MCNC: <0.01 NG/ML — SIGNIFICANT CHANGE UP
WBC # BLD: 4.32 K/UL — LOW (ref 4.8–10.8)
WBC # FLD AUTO: 4.32 K/UL — LOW (ref 4.8–10.8)

## 2018-11-21 RX ORDER — SODIUM CHLORIDE 9 MG/ML
500 INJECTION INTRAMUSCULAR; INTRAVENOUS; SUBCUTANEOUS ONCE
Qty: 0 | Refills: 0 | Status: COMPLETED | OUTPATIENT
Start: 2018-11-21 | End: 2018-11-21

## 2018-11-21 RX ADMIN — SODIUM CHLORIDE 500 MILLILITER(S): 9 INJECTION INTRAMUSCULAR; INTRAVENOUS; SUBCUTANEOUS at 15:32

## 2018-11-21 NOTE — ED PROVIDER NOTE - PHYSICAL EXAMINATION
GEN: Alert & Oriented x 3, No acute distress. Calm, appropriate.  Eyes: PERRL. No conjunctival injection. No scleral icterus.   RESP: Lungs clear to auscult bilat. no wheezes, rhonchi or rales. No retractions. Equal air entry.  CARDIO: regular rate and irregular rhythm, no murmurs, rubs or gallops. Normal S1, S2. Radial pulses 2+ bilaterally. 1+ pitting edema mid-shin.   ABD: Soft, Nondistended. No rebound tenderness/guarding. No pulsatile mass. No tenderness with light and deep palpation.  MS: Full ROM of extremities.  SKIN: no rashes/lesions, no petechiae, no ecchymosis.  NEURO: CN II-XII grossly intact. Speech and cognition normal. GEN: Alert & Oriented x 3, No acute distress. Calm, appropriate.  Eyes: PERRL. No conjunctival injection. No scleral icterus.   RESP: Lungs clear to auscult bilat. no wheezes, rhonchi or rales. No retractions. Equal air entry.  CARDIO: irregularly irregular rhythm. Radial pulses 2+ bilaterally. 1+ pitting edema mid-shin.   ABD: Soft, Nondistended. No rebound tenderness/guarding. No pulsatile mass. No tenderness with light and deep palpation.  MS: Full ROM of extremities.  SKIN: no rashes/lesions, no petechiae, no ecchymosis.  NEURO: CN II-XII grossly intact. Speech and cognition normal. GEN: Alert & Oriented x 3, No acute distress. Calm, appropriate.  Eyes: PERRL. No conjunctival injection. No scleral icterus.   RESP: Lungs clear to auscult bilat. no wheezes, rhonchi or rales. No retractions. Equal air entry.  CARDIO: irregularly irregular rhythm. Radial pulses 2+ bilaterally. 1+ pitting edema mid-shin.   ABD: Soft, Nondistended. No rebound tenderness/guarding. No pulsatile mass. No tenderness with light and deep palpation.  MS: Full ROM of extremities.  SKIN: no rashes/lesions, no petechiae, no ecchymosis.  NEURO: CN II-XII grossly intact. Speech and cognition normal.  Neck: supple, full rom

## 2018-11-21 NOTE — ED PROVIDER NOTE - ATTENDING CONTRIBUTION TO CARE
67 yo m with pmh of paroxysmal afib, on coumadin, presents with a few days of malaise, geronimo, generalized fatigue.  pt seen by pmd today who sent him to the ED for evaluation.  no chest pain, no back pain, no nausea, no vomiting, no fevers, no chills, no leg pain.  pt also reported mild diarrhea and chills.  pt had recent normal brain mri as per family  awake, alert.  neck supple.  abd soft, nontender.  LUngs clear.    p: labs, ekg, cxr, reassess. 69 yo m with pmh of paroxysmal afib, on coumadin, presents with a few days of malaise, feeling tired, generalized fatigue.  pt seen by pmd today who sent him to the ED for evaluation.  no chest pain, no back pain, no nausea, no vomiting, no fevers, no chills, no leg pain.  Pt denies any active sob, but states he just felt fatigued and tired over past few days with the chills.  pt also reported mild diarrhea and chills.  pt had recent normal brain mri as per family  awake, alert.  neck supple.  abd soft, nontender.  LUngs clear.    p: labs, ekg, cxr, reassess.

## 2018-11-21 NOTE — ED PROVIDER NOTE - OBJECTIVE STATEMENT
The patient is a 68y Male with PMH A-fib, DM, anemia and CAD s/p stent placement 20yrs ago presenting to ED for irregular heartbeat. Patient states over the last 2 days he has been tired with some chills and diarrhea. He presented to his PMD and was found to be in a-fib. Patient states he has been controlled on solatol and is currently on coumadin. Dr. De Souza was consulted by PMD and patient was told to come to ED. Patient denies any chest pain, fever, abdominal pain, increased leg swelling, urinary changes, melena or blood in stool, recent travel, and recent abx use.

## 2018-11-21 NOTE — ED PROVIDER NOTE - PROGRESS NOTE DETAILS
platelets slightly decreased from previous I agree with evaluation and treatment of this patient with BROOKLYNN Nunn. Patient feels better, will follow-up with Cardiologist DR De Souza on Monday. Will return for worsening symptoms. pt feeling better.  Patient feeling better.  Pt dc with outpatient follow up.  Pt understands importance of outpatient follow up.  Pt given strict return precautions.  pt had normal nuclear stress test 3 months ago.  no chest pain.  no abd pain.  pt likely with viral syndrome.  pt also reports uri symptoms.  no fevers.  pt nontowic, well appearing.  pt dc with outpatient follow up.  pt is seeing his cardiologist on MOnday.  pt given strict return precautions pt with mild elevation in LFTS.  no abd pain.  abd soft, nontender.  no vomiting.  pt nontoxic, well appearing.  LFTs also hemolyzed.  I called pt at home and informed him of mild elevated LFTs.  pt is aware.  pt understands to follow up with his doctor for this.  pt has appointment in 4 days with his doctor.  pt aware to get repeat labs incluidng lfts.  pt given strict return precuations and follow up instructions.  pt understands and will make sure to follow up for this. pt with mild elevation in LFTS.  no abd pain.  abd soft, nontender.  no vomiting.  pt nontoxic, well appearing.  LFTs also hemolyzed.  I called pt at home and informed him of mild elevated LFTs.  pt is aware.  pt understands to follow up with his doctor for this.  pt has appointment in 4 days with his doctor.  pt aware to get repeat labs incluidng lfts.  pt given strict return precuations and follow up instructions.  pt understands and will make sure to follow up for this.  as per previous chart, pt with elevated LFTS in 10/2017

## 2018-11-21 NOTE — ED ADULT TRIAGE NOTE - CHIEF COMPLAINT QUOTE
Pt c/o feeling tired, lethargic past couple of days, SOB when walking, saw MD today and sent in for afib

## 2018-11-21 NOTE — ED PROVIDER NOTE - CARE PROVIDER_API CALL
Sung De Souza), Cardiovascular Disease; Internal Medicine  [address] [place] [address]  Phone: [phone]  Fax: [phone]

## 2018-11-21 NOTE — ED PROVIDER NOTE - MEDICAL DECISION MAKING DETAILS
pt with afib.  pt already on coumadin.  pt with chills, mild diarrhea, malaise.  no chest pain.  work up negative for anything acute.  pt with recent normal nuclear stress test.  Patient feeling better.  Pt dc with outpatient follow up.  Pt understands importance of outpatient follow up.  Pt given strict return precautions. pt has appotinmtnet already with his cardiologist scheduled.  pt understands importance of follow up.  I also informed pt of decreased platelets.  pt given copy of results.  pt understands importance follow up with pmd for platelets and to have repeat testing and possible heme follow up

## 2018-11-21 NOTE — ED PROVIDER NOTE - NS ED ROS FT
GEN: (-) fever, (+) chills, (+) malaise  HEENT: (-) vision changes, (-) HA, (-) sore throat, (-) ear pain, (-) epistaxis , (-) tinnitus   CV: (-) chest pain, (-) palpitations, (-) edema  PULM: (-) cough, (-) wheezing, (-) dyspnea, (-) orthopnea, (-) hemoptysis   GI: (-) abdominal pain,(-) Nausea, (-) Vomiting, (-) Diarrhea, (-) Melena  NEURO: (-) weakness, (-) paresthesias, (-) syncope, (-) seizure  : (-) dysuria, (-) frequency, (-) urgency  MS: (-) back pain, (-) joint pain, (-)myalgias, (-) swelling  SKIN: (-) rashes, (-) new lesions, (-) pruritus, (-) jaundice  HEME: (-) bleeding, (-) ecchymosis

## 2018-11-21 NOTE — ED PROVIDER NOTE - NSFOLLOWUPINSTRUCTIONS_ED_ALL_ED_FT
Get rest.  Drink Plenty of fluids.  Return to ED for worsening symptoms- fever, chest pain, SOB, vomiting

## 2018-11-23 NOTE — ED POST DISCHARGE NOTE - ADDITIONAL DOCUMENTATION
I called pt to follow up.  pt reports he is doing well and is feeling good.  pt states he does not feel tired and is doing well.  pt will follow up with his doctor on MOnday.  pt understands importance of repeat blood work and follow up.

## 2018-11-24 ENCOUNTER — EMERGENCY (EMERGENCY)
Facility: HOSPITAL | Age: 68
LOS: 0 days | Discharge: LEFT BEFORE DISCHARGE | End: 2018-11-24
Attending: EMERGENCY MEDICINE

## 2018-11-24 VITALS
RESPIRATION RATE: 20 BRPM | DIASTOLIC BLOOD PRESSURE: 72 MMHG | SYSTOLIC BLOOD PRESSURE: 159 MMHG | HEART RATE: 58 BPM | OXYGEN SATURATION: 98 % | TEMPERATURE: 98 F

## 2018-11-24 VITALS
OXYGEN SATURATION: 98 % | DIASTOLIC BLOOD PRESSURE: 46 MMHG | HEART RATE: 54 BPM | SYSTOLIC BLOOD PRESSURE: 146 MMHG | RESPIRATION RATE: 18 BRPM

## 2018-11-24 DIAGNOSIS — Z12.11 ENCOUNTER FOR SCREENING FOR MALIGNANT NEOPLASM OF COLON: Chronic | ICD-10-CM

## 2018-11-24 DIAGNOSIS — Z02.9 ENCOUNTER FOR ADMINISTRATIVE EXAMINATIONS, UNSPECIFIED: ICD-10-CM

## 2018-11-24 DIAGNOSIS — Z96.653 PRESENCE OF ARTIFICIAL KNEE JOINT, BILATERAL: Chronic | ICD-10-CM

## 2018-11-24 DIAGNOSIS — Z95.9 PRESENCE OF CARDIAC AND VASCULAR IMPLANT AND GRAFT, UNSPECIFIED: Chronic | ICD-10-CM

## 2018-11-24 LAB
ALBUMIN SERPL ELPH-MCNC: 2.6 G/DL — LOW (ref 3.5–5.2)
ALP SERPL-CCNC: 282 U/L — HIGH (ref 30–115)
ALT FLD-CCNC: 116 U/L — HIGH (ref 0–41)
ANION GAP SERPL CALC-SCNC: 15 MMOL/L — HIGH (ref 7–14)
ANISOCYTOSIS BLD QL: SLIGHT — SIGNIFICANT CHANGE UP
APTT BLD: 59.9 SEC — HIGH (ref 27–39.2)
AST SERPL-CCNC: 323 U/L — HIGH (ref 0–41)
BASOPHILS # BLD AUTO: 0.07 K/UL — SIGNIFICANT CHANGE UP (ref 0–0.2)
BASOPHILS NFR BLD AUTO: 1.7 % — HIGH (ref 0–1)
BILIRUB SERPL-MCNC: 2.5 MG/DL — HIGH (ref 0.2–1.2)
BUN SERPL-MCNC: 16 MG/DL — SIGNIFICANT CHANGE UP (ref 10–20)
CALCIUM SERPL-MCNC: 8.7 MG/DL — SIGNIFICANT CHANGE UP (ref 8.5–10.1)
CHLORIDE SERPL-SCNC: 104 MMOL/L — SIGNIFICANT CHANGE UP (ref 98–110)
CO2 SERPL-SCNC: 21 MMOL/L — SIGNIFICANT CHANGE UP (ref 17–32)
CREAT SERPL-MCNC: 1 MG/DL — SIGNIFICANT CHANGE UP (ref 0.7–1.5)
EOSINOPHIL # BLD AUTO: 0.18 K/UL — SIGNIFICANT CHANGE UP (ref 0–0.7)
EOSINOPHIL NFR BLD AUTO: 4.4 % — SIGNIFICANT CHANGE UP (ref 0–8)
GAS PNL BLDV: SIGNIFICANT CHANGE UP
GIANT PLATELETS BLD QL SMEAR: PRESENT — SIGNIFICANT CHANGE UP
GLUCOSE SERPL-MCNC: 80 MG/DL — SIGNIFICANT CHANGE UP (ref 70–99)
HCT VFR BLD CALC: 36.7 % — LOW (ref 42–52)
HGB BLD-MCNC: 12.3 G/DL — LOW (ref 14–18)
INR BLD: 4.59 RATIO — HIGH (ref 0.65–1.3)
LIDOCAIN IGE QN: 79 U/L — HIGH (ref 7–60)
LYMPHOCYTES # BLD AUTO: 0.56 K/UL — LOW (ref 1.2–3.4)
LYMPHOCYTES # BLD AUTO: 14 % — LOW (ref 20.5–51.1)
MACROCYTES BLD QL: SLIGHT — SIGNIFICANT CHANGE UP
MANUAL SMEAR VERIFICATION: SIGNIFICANT CHANGE UP
MCHC RBC-ENTMCNC: 31.1 PG — HIGH (ref 27–31)
MCHC RBC-ENTMCNC: 33.5 G/DL — SIGNIFICANT CHANGE UP (ref 32–37)
MCV RBC AUTO: 92.7 FL — SIGNIFICANT CHANGE UP (ref 80–94)
MONOCYTES # BLD AUTO: 0.49 K/UL — SIGNIFICANT CHANGE UP (ref 0.1–0.6)
MONOCYTES NFR BLD AUTO: 12.3 % — HIGH (ref 1.7–9.3)
NEUTROPHILS # BLD AUTO: 2.7 K/UL — SIGNIFICANT CHANGE UP (ref 1.4–6.5)
NEUTROPHILS NFR BLD AUTO: 66.7 % — SIGNIFICANT CHANGE UP (ref 42.2–75.2)
NEUTS BAND # BLD: 0.9 % — SIGNIFICANT CHANGE UP (ref 0–6)
NRBC # BLD: 0 /100 WBCS — SIGNIFICANT CHANGE UP (ref 0–0)
NT-PROBNP SERPL-SCNC: 207 PG/ML — SIGNIFICANT CHANGE UP (ref 0–300)
PLAT MORPH BLD: NORMAL — SIGNIFICANT CHANGE UP
PLATELET # BLD AUTO: 62 K/UL — LOW (ref 130–400)
POLYCHROMASIA BLD QL SMEAR: SLIGHT — SIGNIFICANT CHANGE UP
POTASSIUM SERPL-MCNC: 4.5 MMOL/L — SIGNIFICANT CHANGE UP (ref 3.5–5)
POTASSIUM SERPL-SCNC: 4.5 MMOL/L — SIGNIFICANT CHANGE UP (ref 3.5–5)
PROT SERPL-MCNC: 7 G/DL — SIGNIFICANT CHANGE UP (ref 6–8)
PROTHROM AB SERPL-ACNC: >40 SEC — HIGH (ref 9.95–12.87)
RBC # BLD: 3.96 M/UL — LOW (ref 4.7–6.1)
RBC # FLD: 19.2 % — HIGH (ref 11.5–14.5)
RBC BLD AUTO: ABNORMAL
SMUDGE CELLS # BLD: PRESENT — SIGNIFICANT CHANGE UP
SODIUM SERPL-SCNC: 140 MMOL/L — SIGNIFICANT CHANGE UP (ref 135–146)
TROPONIN T SERPL-MCNC: <0.01 NG/ML — SIGNIFICANT CHANGE UP
WBC # BLD: 3.99 K/UL — LOW (ref 4.8–10.8)
WBC # FLD AUTO: 3.99 K/UL — LOW (ref 4.8–10.8)

## 2018-11-24 RX ORDER — WARFARIN SODIUM 2.5 MG/1
0 TABLET ORAL
Qty: 0 | Refills: 0 | COMMUNITY

## 2018-11-24 RX ORDER — SUCRALFATE 1 G
0 TABLET ORAL
Qty: 0 | Refills: 0 | COMMUNITY

## 2018-11-24 RX ORDER — IRON POLYSACCHARIDE COMPLEX 150 MG
1 CAPSULE ORAL
Qty: 0 | Refills: 0 | COMMUNITY

## 2018-11-24 RX ORDER — SOTALOL HCL 120 MG
0 TABLET ORAL
Qty: 0 | Refills: 0 | COMMUNITY

## 2018-11-24 RX ORDER — SITAGLIPTIN 50 MG/1
1 TABLET, FILM COATED ORAL
Qty: 0 | Refills: 0 | COMMUNITY

## 2018-11-24 NOTE — ED ADULT TRIAGE NOTE - CHIEF COMPLAINT QUOTE
feeling sob since yesterday. hx of afib was here on wednesday . denies chest pain. left leg weakness.

## 2018-11-24 NOTE — ED PROVIDER NOTE - NSFOLLOWUPINSTRUCTIONS_ED_ALL_ED_FT
Shortness of breath    Shortness of breath (dyspnea) means you have trouble breathing and could indicate a medical problem. Causes include lung disease, heart disease, low amount of red blood cells (anemia), poor physical fitness, being overweight, smoking, etc. Your health care provider today may not be able to find a cause for your shortness of breath after your exam. In this case, it is important to have a follow-up exam with your primary care physician as instructed. If medicines were prescribed, take them as directed for the full length of time directed. Refrain from tobacco products.    SEEK IMMEDIATE MEDICAL CARE IF YOU HAVE ANY OF THE FOLLOWING SYMPTOMS: worsening shortness of breath, chest pain, back pain, abdominal pain, fever, coughing up blood, lightheadedness/dizziness.    Please return for any complications as discussed.  Please follow up with your appt with your cardiologist on monday

## 2018-11-24 NOTE — ED PROVIDER NOTE - PHYSICAL EXAMINATION
Vital signs reviewed  GENERAL: Patient well appearing, NAD  HEAD: NCAT  EYES: Anicteric  ENT: MMM  NECK: Supple, non tender  RESPIRATORY: Normal respiratory effort. CTA B/L. No wheezing, rales, rhonchi  CARDIOVASCULAR: Regular rate and rhythm. Normal S1/S2. No murmurs, rubs or gallops  ABDOMEN: Soft. Nondistended. Nontender. No guarding or rebound  MUSCULOSKELETAL/EXTREMITIES: Brisk cap refill. 2+ radial pulses. 1+ pitting leg edema. No calf tenderness  SKIN:  Warm and dry  NEURO: AAOx3. No gross FND  PSYCHIATRIC: Cooperative. Affect appropriate

## 2018-11-24 NOTE — ED PROVIDER NOTE - PROGRESS NOTE DETAILS
I personally evaluated the patient. I reviewed the Resident’s note (as assigned above), and agree with the findings and plan except as documented in my note.   67 y/o M with PMH of CAD, stent x 1, AFIB on Coumadin, DM and high cholesterol, seen in ED on 11/21 for AFIB, was noted to have elevated LFTs which pt reports have been chronically elevated since OCT 2018 and has been following up with his PMD for this, strict return precautions were giving upon discharge as pt felt comfortable leaving that day, today returns with SOB on exertion x2 days. Pt has SOB when walking short distances but has no SOB at rest. No HX of EtOH abuse. No HX of hepatitis or cirrhosis. Also reports b/l LE weakness to b/l thighs when walking.  Last nuclear stress test was done 3 months ago and was normal. Last INR was 3.8. Not on a water pill. No fever, chills, n/v, cp,  pleuritic cp, palpitations, diaphoresis, cough, ha/lh/dizziness, numbness/tingling, neck pain/ stiffness, abd pain, diarrhea, constipation, melena/brbpr, urinary symptoms, trauma, edema, calf pain/swelling/erythema, sick contacts, recent travel or rash.On exam: Vital Signs: I have reviewed the initial vital signs. Constitutional: WDWN in nad. Sitting on stretcher speaking full sentences. Integumentary: No rash. ENT: MMM NECK: Supple, non-tender, no meningeal signs. Cardiovascular: RRR, radial pulses 2/4 b/l. No JVD. Respiratory: BS present b/l, ctabl, no wheezing or crackles, good resp effort and excursion, good air exchange,  no accessory muscle use, no stridor. Gastrointestinal: BS present throughout all 4 quadrants, soft, nd, nt, no rebound tenderness or guarding, no cvat. Musculoskeletal: FROM, (+) 2+ pitting edema, no calf pain/swelling/erythema. Neurologic: AAOx3, motor 5/5 and sensation intact throughout upper and lower ext, CN II-XII intact, No facial droop or slurring of speech. No focal deficits. A/P: Will get EKG, labs, CXR, monitor and reassess. I personally evaluated the patient. I reviewed the Resident’s note (as assigned above), and agree with the findings and plan except as documented in my note.   67 y/o M with PMH of CAD, stent x 1, AFIB on Coumadin, DM and high cholesterol, seen in ED on 11/21 for AFIB, was noted to have elevated LFTs which pt reports have been chronically elevated since OCT 2018 and has been following up with his PMD for this, strict return precautions were giving upon discharge as pt felt comfortable leaving that day, today returns with SOB on exertion x2 days. Pt has SOB when walking short distances but has no SOB at rest. No HX of EtOH abuse. No HX of hepatitis or cirrhosis. Also reports b/l LE weakness to b/l thighs when walking.  Last nuclear stress test was done 3 months ago and was normal. Last INR was 3.8 on previous visit to ED. Not on a water pill. PMD; Dr. Stover. Cardio: Dr. De Souza. No fever, chills, n/v, cp,  pleuritic cp, palpitations, diaphoresis, cough, ha/lh/dizziness, numbness/tingling, neck pain/ stiffness, abd pain, diarrhea, constipation, melena/brbpr, urinary symptoms, trauma, edema, calf pain/swelling/erythema, sick contacts, recent travel or rash.On exam: Vital Signs: I have reviewed the initial vital signs. Constitutional: WDWN in nad. Sitting on stretcher speaking full sentences. Integumentary: No rash. ENT: MMM NECK: Supple, non-tender, no meningeal signs. Cardiovascular: RRR, radial pulses 2/4 b/l. No JVD. Respiratory: BS present b/l, ctabl, no wheezing or crackles, good resp effort and excursion, no accessory muscle use, no stridor. Gastrointestinal: BS present throughout all 4 quadrants, soft, nd, nt, no rebound tenderness or guarding, no cvat. Musculoskeletal: FROM, (+) 2+ pitting edema, no calf pain/swelling/erythema. Neurologic: AAOx3, motor 5/5 and sensation intact throughout upper and lower ext, CN II-XII intact, No facial droop or slurring of speech. No focal deficits. A/P: Will get EKG, labs, CXR, monitor and reassess. sign out received from dr. lin. pt comfortable in bed.  pt walked and had bowel movement without no sob.  Recommendation was to stay.  Pt wants to AMA  Patient is awake/alert/interactive with normal mental status and normal neurologic function. Patient reports no SI/HI and demonstrates normal thought processes with no evidence of intoxication, delirium, delusions or hallucinations. Patient requesting to leave against medical advice at this time. Advised patient of potential risks of leaving AMA which include potential disability or death. Attempted to convince patient to stay and continue work up/treatment and patient refused. Patient has capacity to make medical decisions at this time and will be signed out AMA. Patient instructed to follow up with his primary care provider as an outpatient and is aware they can return to the emergency department at any time for evaluation. pt informed about INR-- advised to skip some dosages and told to follow with PCP.  pt will see cardiologist on monday. all labs given with discharge.  Patient is awake/alert/interactive with normal mental status and normal neurologic function. Patient reports no SI/HI and demonstrates normal thought processes with no evidence of intoxication, delirium, delusions or hallucinations. Patient requesting to leave against medical advice at this time. Advised patient of potential risks of leaving AMA which include potential disability or death. Attempted to convince patient to stay and continue work up/treatment and patient refused. Patient has capacity to make medical decisions at this time and will be signed out AMA. Patient instructed to follow up with his primary care provider as an outpatient and is aware they can return to the emergency department at any time for evaluation.

## 2018-11-24 NOTE — ED PROVIDER NOTE - OBJECTIVE STATEMENT
69yo M with PMHx Afib on coumadin, CAD/stents, DM, HTN, DLD, bilateral knee replacements, p/w SOB with exertion x2 days. Pt states cannot take more than 5-6 steps without becoming winded, though denies SOB at rest. Also c/o fatigue in his thighs with exertion but does not describe it as pain. Pt was seen in ED 3 days ago after seeing PMD and sent to ED for afib. Was not rapid at that time, pt states was ASx but did have recent URI symptoms; had bloodwork showing elevated INR (3.8) and elevated liver enzymes (chronic, also elevated 10/2017), subsequently discharged. Denies orthopnea. Has leg swelling at baseline which is not worse than normal. Denies fever, headache, lightheadedness, sore throat, CP, cough, palpitations, nausea, vomiting, diarrhea, abd pain, dysuria.

## 2018-11-24 NOTE — ED ADULT NURSE NOTE - OBJECTIVE STATEMENT
Pt with hx of afib on coumadin c/o SOB on exertion. Pt reports he was seen by PMD on Wednesday and had EKG which showed he was in a fib. Pt states "I can't even take a few steps without feeling SOB" No CP. Pt also c/o b/l LE weakness.

## 2018-11-24 NOTE — ED PROVIDER NOTE - MEDICAL DECISION MAKING DETAILS
pt with h/o afib on coumadin, cad/stents, in ER with c/o sob/geronimo x 2 days.  trop neg.  bnp 207.  inr 4.5.  recommended for pt to be admitted to tele for cardiac eval, however pt does not want to stay.  feeling much better now, wants to f/u with his card as outpt.  pt told that leaving could result in death or permanent disability, pt understands but still wants to go.  to s/o ama.  pt told he can return to the ER at any time to continue his evaluation.  pt told of inr level, to hold his coumadin and have level rechecked at coumadin clinic.

## 2018-11-24 NOTE — ED PROVIDER NOTE - NS ED ROS FT
Constitutional: No fever  Eyes:  No visual changes  ENMT:  No neck pain  Cardiac:  No chest pain  Respiratory:  No cough. +exertional SOB  GI:  No nausea, vomiting, diarrhea, abdominal pain  :  No dysuria  MS:  No back pain. +leg swelling (chronic). See HPI  Neuro:  No headache or lightheadedness  Skin:  No skin rash  Endocrine: No history of thyroid disease or diabetes  Except as documented in the HPI,  all other systems are negative

## 2018-11-27 ENCOUNTER — INPATIENT (INPATIENT)
Facility: HOSPITAL | Age: 68
LOS: 4 days | Discharge: HOME HEALTH | End: 2018-12-02
Attending: INTERNAL MEDICINE | Admitting: INTERNAL MEDICINE

## 2018-11-27 VITALS
RESPIRATION RATE: 20 BRPM | SYSTOLIC BLOOD PRESSURE: 115 MMHG | WEIGHT: 240.08 LBS | OXYGEN SATURATION: 96 % | HEART RATE: 102 BPM | TEMPERATURE: 98 F | DIASTOLIC BLOOD PRESSURE: 74 MMHG | HEIGHT: 70 IN

## 2018-11-27 DIAGNOSIS — E34.9 ENDOCRINE DISORDER, UNSPECIFIED: ICD-10-CM

## 2018-11-27 DIAGNOSIS — I48.91 UNSPECIFIED ATRIAL FIBRILLATION: ICD-10-CM

## 2018-11-27 DIAGNOSIS — I10 ESSENTIAL (PRIMARY) HYPERTENSION: ICD-10-CM

## 2018-11-27 DIAGNOSIS — Z12.11 ENCOUNTER FOR SCREENING FOR MALIGNANT NEOPLASM OF COLON: Chronic | ICD-10-CM

## 2018-11-27 DIAGNOSIS — Z96.653 PRESENCE OF ARTIFICIAL KNEE JOINT, BILATERAL: Chronic | ICD-10-CM

## 2018-11-27 DIAGNOSIS — R06.09 OTHER FORMS OF DYSPNEA: ICD-10-CM

## 2018-11-27 DIAGNOSIS — E11.49 TYPE 2 DIABETES MELLITUS WITH OTHER DIABETIC NEUROLOGICAL COMPLICATION: ICD-10-CM

## 2018-11-27 DIAGNOSIS — Z95.9 PRESENCE OF CARDIAC AND VASCULAR IMPLANT AND GRAFT, UNSPECIFIED: Chronic | ICD-10-CM

## 2018-11-27 DIAGNOSIS — E78.00 PURE HYPERCHOLESTEROLEMIA, UNSPECIFIED: ICD-10-CM

## 2018-11-27 LAB
ALBUMIN SERPL ELPH-MCNC: 2.6 G/DL — LOW (ref 3.5–5.2)
ALP SERPL-CCNC: 223 U/L — HIGH (ref 30–115)
ALT FLD-CCNC: 215 U/L — HIGH (ref 0–41)
ANION GAP SERPL CALC-SCNC: 11 MMOL/L — SIGNIFICANT CHANGE UP (ref 7–14)
APTT BLD: 45.6 SEC — HIGH (ref 27–39.2)
AST SERPL-CCNC: 662 U/L — HIGH (ref 0–41)
BASOPHILS # BLD AUTO: 0.04 K/UL — SIGNIFICANT CHANGE UP (ref 0–0.2)
BASOPHILS NFR BLD AUTO: 0.9 % — SIGNIFICANT CHANGE UP (ref 0–1)
BILIRUB SERPL-MCNC: 3.3 MG/DL — HIGH (ref 0.2–1.2)
BUN SERPL-MCNC: 19 MG/DL — SIGNIFICANT CHANGE UP (ref 10–20)
CALCIUM SERPL-MCNC: 8.7 MG/DL — SIGNIFICANT CHANGE UP (ref 8.5–10.1)
CHLORIDE SERPL-SCNC: 106 MMOL/L — SIGNIFICANT CHANGE UP (ref 98–110)
CO2 SERPL-SCNC: 24 MMOL/L — SIGNIFICANT CHANGE UP (ref 17–32)
CREAT SERPL-MCNC: 1.1 MG/DL — SIGNIFICANT CHANGE UP (ref 0.7–1.5)
EOSINOPHIL # BLD AUTO: 0.1 K/UL — SIGNIFICANT CHANGE UP (ref 0–0.7)
EOSINOPHIL NFR BLD AUTO: 2.1 % — SIGNIFICANT CHANGE UP (ref 0–8)
GLUCOSE BLDC GLUCOMTR-MCNC: 110 MG/DL — HIGH (ref 70–99)
GLUCOSE SERPL-MCNC: 95 MG/DL — SIGNIFICANT CHANGE UP (ref 70–99)
HCT VFR BLD CALC: 36.7 % — LOW (ref 42–52)
HGB BLD-MCNC: 12.1 G/DL — LOW (ref 14–18)
IMM GRANULOCYTES NFR BLD AUTO: 0.4 % — HIGH (ref 0.1–0.3)
INR BLD: 3.91 RATIO — HIGH (ref 0.65–1.3)
LYMPHOCYTES # BLD AUTO: 0.74 K/UL — LOW (ref 1.2–3.4)
LYMPHOCYTES # BLD AUTO: 15.8 % — LOW (ref 20.5–51.1)
MCHC RBC-ENTMCNC: 30.9 PG — SIGNIFICANT CHANGE UP (ref 27–31)
MCHC RBC-ENTMCNC: 33 G/DL — SIGNIFICANT CHANGE UP (ref 32–37)
MCV RBC AUTO: 93.9 FL — SIGNIFICANT CHANGE UP (ref 80–94)
MONOCYTES # BLD AUTO: 0.47 K/UL — SIGNIFICANT CHANGE UP (ref 0.1–0.6)
MONOCYTES NFR BLD AUTO: 10 % — HIGH (ref 1.7–9.3)
NEUTROPHILS # BLD AUTO: 3.32 K/UL — SIGNIFICANT CHANGE UP (ref 1.4–6.5)
NEUTROPHILS NFR BLD AUTO: 70.8 % — SIGNIFICANT CHANGE UP (ref 42.2–75.2)
NRBC # BLD: 0 /100 WBCS — SIGNIFICANT CHANGE UP (ref 0–0)
NT-PROBNP SERPL-SCNC: 1173 PG/ML — HIGH (ref 0–300)
PLATELET # BLD AUTO: 81 K/UL — LOW (ref 130–400)
POTASSIUM SERPL-MCNC: 4.4 MMOL/L — SIGNIFICANT CHANGE UP (ref 3.5–5)
POTASSIUM SERPL-SCNC: 4.4 MMOL/L — SIGNIFICANT CHANGE UP (ref 3.5–5)
PROT SERPL-MCNC: 7 G/DL — SIGNIFICANT CHANGE UP (ref 6–8)
PROTHROM AB SERPL-ACNC: >40 SEC — HIGH (ref 9.95–12.87)
RBC # BLD: 3.91 M/UL — LOW (ref 4.7–6.1)
RBC # FLD: 18.4 % — HIGH (ref 11.5–14.5)
SODIUM SERPL-SCNC: 141 MMOL/L — SIGNIFICANT CHANGE UP (ref 135–146)
TROPONIN T SERPL-MCNC: 0.02 NG/ML — HIGH
WBC # BLD: 4.69 K/UL — LOW (ref 4.8–10.8)
WBC # FLD AUTO: 4.69 K/UL — LOW (ref 4.8–10.8)

## 2018-11-27 RX ORDER — PANTOPRAZOLE SODIUM 20 MG/1
1 TABLET, DELAYED RELEASE ORAL
Qty: 0 | Refills: 0 | COMMUNITY

## 2018-11-27 RX ORDER — SUCRALFATE 1 G
1 TABLET ORAL
Qty: 0 | Refills: 0 | Status: DISCONTINUED | OUTPATIENT
Start: 2018-11-27 | End: 2018-12-02

## 2018-11-27 RX ORDER — SOTALOL HCL 120 MG
80 TABLET ORAL
Qty: 0 | Refills: 0 | Status: DISCONTINUED | OUTPATIENT
Start: 2018-11-27 | End: 2018-12-02

## 2018-11-27 RX ORDER — INFLUENZA VIRUS VACCINE 15; 15; 15; 15 UG/.5ML; UG/.5ML; UG/.5ML; UG/.5ML
0.5 SUSPENSION INTRAMUSCULAR ONCE
Qty: 0 | Refills: 0 | Status: COMPLETED | OUTPATIENT
Start: 2018-11-27 | End: 2018-12-02

## 2018-11-27 RX ORDER — TAMSULOSIN HYDROCHLORIDE 0.4 MG/1
0.4 CAPSULE ORAL DAILY
Qty: 0 | Refills: 0 | Status: DISCONTINUED | OUTPATIENT
Start: 2018-11-27 | End: 2018-12-02

## 2018-11-27 RX ORDER — ATORVASTATIN CALCIUM 80 MG/1
0 TABLET, FILM COATED ORAL
Qty: 0 | Refills: 0 | COMMUNITY

## 2018-11-27 RX ORDER — ATORVASTATIN CALCIUM 80 MG/1
40 TABLET, FILM COATED ORAL AT BEDTIME
Qty: 0 | Refills: 0 | Status: DISCONTINUED | OUTPATIENT
Start: 2018-11-27 | End: 2018-12-02

## 2018-11-27 RX ORDER — FUROSEMIDE 40 MG
40 TABLET ORAL EVERY 12 HOURS
Qty: 0 | Refills: 0 | Status: DISCONTINUED | OUTPATIENT
Start: 2018-11-28 | End: 2018-11-28

## 2018-11-27 RX ORDER — PANTOPRAZOLE SODIUM 20 MG/1
40 TABLET, DELAYED RELEASE ORAL
Qty: 0 | Refills: 0 | Status: DISCONTINUED | OUTPATIENT
Start: 2018-11-27 | End: 2018-12-02

## 2018-11-27 RX ORDER — FUROSEMIDE 40 MG
40 TABLET ORAL ONCE
Qty: 0 | Refills: 0 | Status: COMPLETED | OUTPATIENT
Start: 2018-11-27 | End: 2018-11-27

## 2018-11-27 RX ORDER — ATORVASTATIN CALCIUM 80 MG/1
1 TABLET, FILM COATED ORAL
Qty: 0 | Refills: 0 | COMMUNITY

## 2018-11-27 RX ORDER — WARFARIN SODIUM 2.5 MG/1
0 TABLET ORAL
Qty: 0 | Refills: 0 | COMMUNITY

## 2018-11-27 RX ADMIN — ATORVASTATIN CALCIUM 40 MILLIGRAM(S): 80 TABLET, FILM COATED ORAL at 21:33

## 2018-11-27 RX ADMIN — Medication 40 MILLIGRAM(S): at 21:36

## 2018-11-27 NOTE — H&P ADULT - PMH
Afib    Anemia    BPH (benign prostatic hyperplasia)    Cirrhosis of liver    Diabetes    Dyspnea on exertion    High cholesterol    HTN (hypertension)

## 2018-11-27 NOTE — PATIENT PROFILE ADULT - NSPROPTRIGHTBILLOFRIGHTS_GEN_A_NUR
Hydrocelectomy  02/27/2017    Active  KAREN  Diagnostic laparoscopy  02/27/2017    Active  VVBHAVNA patient

## 2018-11-27 NOTE — H&P ADULT - NSHPPHYSICALEXAM_GEN_ALL_CORE
PHYSICAL EXAM:    Vital Signs Last 24 Hrs    T(F): 97.7 (11-27-18 @ 14:22), Max: 97.7 (11-27-18 @ 14:22)  HR: 102 (11-27-18 @ 14:22) (102 - 102)  BP: 115/74 (11-27-18 @ 14:22)  RR: 20 (11-27-18 @ 14:22) (20 - 20)  SpO2: 96% (11-27-18 @ 14:22) (96% - 96%)    Constitutional: NAD, A&O x3    Eyes: PERRLA    Respiratory: +air entry, no rales, no rhonchi, no wheezes    Cardiovascular: +S1 and S2, irregular rate and rhythm no murmur    Gastrointestinal: +BS, soft, non-tender, not distended    Extremities:  ++ Pedal  edema, no calf tenderness    Vascular: +dorsal pedis and radial pulses, no extremity cyanosis    Neurological: sensation intact, ROM equal B/L, CN II-XII intact    Skin: no rashes, normal turgor

## 2018-11-27 NOTE — H&P ADULT - PROBLEM SELECTOR PLAN 6
due to DM, continue Lyrica due to DM,  ** Lyrica: SE:  swollen legs/arms/weight gain, this could be contributing to present pedal edema. Continue Rx for now

## 2018-11-27 NOTE — ED PROVIDER NOTE - NS ED ROS FT
Constitutional: no fever, chills, no recent weight loss, change in appetite or malaise  Cardiac: + mild geronimo and b/l peripheral edema. No chest pain  Respiratory: No cough or respiratory distress  GI: No nausea, vomiting, diarrhea or abdominal pain.  : No dysuria, frequency, urgency or hematuria  MS: no pain to back or extremities, no loss of ROM, no weakness  Extremity: + b/l pitting peripheral edema/ No calf tenderness  Neuro: No headache or weakness. No LOC.  Skin: No skin rash.  Endocrine: + hx of DM  Except as documented in the HPI, all other systems are negative.

## 2018-11-27 NOTE — H&P ADULT - HISTORY OF PRESENT ILLNESS
69 y/o male seen in ER on 11/24/18 with c/o Dyspnea, was to be admitted for evaluation but left AMA. He returns today with the same c/o of dyspnea and also leg weakness/heaviness. Above began Thanksgiving day and has been getting progressively worse.  Patient saw his cardiologist: Dr De Souaz at [facility]/[place]: EKG done/reportedly was OK, Tests were scheduled: ECHO, Carotid Doppler,and Nuclear Stress Test. All meds remained the same.  Admission labs remarkable for BMP 1177,  CXR increased interstitial markings, PE: ++ Pedal Edema bilaterally.

## 2018-11-27 NOTE — H&P ADULT - PROBLEM SELECTOR PLAN 1
IV Lasix, continue cardiac meds, ECHO to access LV function, consider cardiac consult, strict I/O's give IV Lasix 40mg X 1 dose, continue cardiac meds, ECHO to access LV function, consider cardiac consult, strict I/O's, call placed to PMD

## 2018-11-27 NOTE — ED PROVIDER NOTE - PHYSICAL EXAMINATION
CONSTITUTIONAL: Well-appearing; well-nourished; in no apparent distress.   NECK: Supple; non-tender; no cervical lymphadenopathy. No JVD.   CARDIOVASCULAR: Normal S1, S2; no murmurs, rubs, or gallops.   RESPIRATORY: Normal chest excursion with respiration; breath sounds clear and equal bilaterally; no wheezes, rhonchi, or rales.  GI/: Normal bowel sounds; non-distended; non-tender; no palpable organomegaly.   MS: No evidence of trauma or deformity. No calf tenderness. Pt moving all extremities. Strength equal b/l 5/5 throughout.   Extrem: + b/l peripheral pitting edema  SKIN: Normal for age and race; warm; dry; good turgor; no apparent lesions or exudate.   NEURO/PSYCH: A & O x 4; grossly unremarkable. mood and manner are appropriate. Grooming and personal hygiene are appropriate.

## 2018-11-27 NOTE — H&P ADULT - NSHPLABSRESULTS_GEN_ALL_CORE
12.1   4.69  )-----------( 81       ( 27 Nov 2018 14:50 )             36.7       11-27    141  |  106  |  19  ----------------------------<  95  4.4   |  24  |  1.1    Ca    8.7      27 Nov 2018 14:50    TPro  7.0  /  Alb  2.6<L>  /  TBili  3.3<H>  /  DBili  x   /  AST  662<H>  /  ALT  215<H>  /  AlkPhos  223<H>  11-27        PT/INR - ( 27 Nov 2018 14:50 )   PT: >40.00 sec;   INR: 3.91 ratio         PTT - ( 27 Nov 2018 14:50 )  PTT:45.6 sec        CARDIAC MARKERS ( 27 Nov 2018 14:50 )  x     / 0.02 ng/mL / x     / x     / x        POCT Blood Glucose.: 93 mg/dL (27 Nov 2018 14:21)    Xray Chest 1 View-PORTABLE IMMEDIATE (11.27.18 @ 15:04) >    Impression:      Mild prominence of the interstitial markings.

## 2018-11-27 NOTE — ED PROVIDER NOTE - OBJECTIVE STATEMENT
68 year old male with pmhx of a fib on coumadin, DM, liver cirrhosis, HLD, HTN, CAD s/p 2 cardiac stents c/o b/l lower extremity weakness x 1 week and some geronimo. Pt was seen in the [place] ED two days ago for similar symptoms. Pt AMA at that time. He followed up with his cardiologist yesterday Dr. Chaparro and is scheduled for an echo. PT has had worsening lower extremity swelling over the last 2-3 days.  Pt has had recent cold like symptoms.  Pt admits to lower back pain. Denies any leg/calf pain, chest pain, recent travel, hx of DVT, n/v, abdominal pain.

## 2018-11-27 NOTE — H&P ADULT - PROBLEM SELECTOR PLAN 2
INR 3.9 on Coumadin 2.5mg 6 days/week and 5mg on Thursday: no Coumadin tonight due , INR for AM, consider changing dosing to 2.5 mg daily hereafter.

## 2018-11-27 NOTE — ED PROVIDER NOTE - ATTENDING CONTRIBUTION TO CARE
68y male above PMH sent for further cardiac w/u after being seen in ED and signing out AMA, on further questioning patient denies exertional dyspnea, states he feels his legs feel like they will give out after 10-12 feet and that he has to bend forward while walking due to LBP, no trauma, no change in peripheral neuropathy, no calf pain, no color change to feet, no bowel/bladder symptoms, on exam vital signs appreciated, well appearing, conj pink nekc supple cor irreg lungs cta abd snt no back ttp, power 5/5, sensation baseline, no saddle anesthesia good tone, pulses equal, labs and studies reviewed, will admit for cardiac w/u, recommend neuro eval as well

## 2018-11-28 LAB
ALBUMIN SERPL ELPH-MCNC: 2.6 G/DL — LOW (ref 3.5–5.2)
ALP SERPL-CCNC: 214 U/L — HIGH (ref 30–115)
ALT FLD-CCNC: 258 U/L — HIGH (ref 0–41)
ANION GAP SERPL CALC-SCNC: 14 MMOL/L — SIGNIFICANT CHANGE UP (ref 7–14)
AST SERPL-CCNC: >700 U/L — HIGH (ref 0–41)
BILIRUB SERPL-MCNC: 3.6 MG/DL — HIGH (ref 0.2–1.2)
BUN SERPL-MCNC: 20 MG/DL — SIGNIFICANT CHANGE UP (ref 10–20)
CALCIUM SERPL-MCNC: 8.6 MG/DL — SIGNIFICANT CHANGE UP (ref 8.5–10.1)
CHLORIDE SERPL-SCNC: 103 MMOL/L — SIGNIFICANT CHANGE UP (ref 98–110)
CO2 SERPL-SCNC: 23 MMOL/L — SIGNIFICANT CHANGE UP (ref 17–32)
CREAT SERPL-MCNC: 1.2 MG/DL — SIGNIFICANT CHANGE UP (ref 0.7–1.5)
GLUCOSE BLDC GLUCOMTR-MCNC: 106 MG/DL — HIGH (ref 70–99)
GLUCOSE BLDC GLUCOMTR-MCNC: 130 MG/DL — HIGH (ref 70–99)
GLUCOSE BLDC GLUCOMTR-MCNC: 90 MG/DL — SIGNIFICANT CHANGE UP (ref 70–99)
GLUCOSE BLDC GLUCOMTR-MCNC: 91 MG/DL — SIGNIFICANT CHANGE UP (ref 70–99)
GLUCOSE SERPL-MCNC: 124 MG/DL — HIGH (ref 70–99)
INR BLD: 3.25 RATIO — HIGH (ref 0.65–1.3)
NT-PROBNP SERPL-SCNC: 1379 PG/ML — HIGH (ref 0–300)
POTASSIUM SERPL-MCNC: 4.1 MMOL/L — SIGNIFICANT CHANGE UP (ref 3.5–5)
POTASSIUM SERPL-SCNC: 4.1 MMOL/L — SIGNIFICANT CHANGE UP (ref 3.5–5)
PROT SERPL-MCNC: 6.8 G/DL — SIGNIFICANT CHANGE UP (ref 6–8)
PROTHROM AB SERPL-ACNC: 35.9 SEC — HIGH (ref 9.95–12.87)
SODIUM SERPL-SCNC: 140 MMOL/L — SIGNIFICANT CHANGE UP (ref 135–146)

## 2018-11-28 RX ORDER — FUROSEMIDE 40 MG
40 TABLET ORAL DAILY
Qty: 0 | Refills: 0 | Status: DISCONTINUED | OUTPATIENT
Start: 2018-11-28 | End: 2018-12-02

## 2018-11-28 RX ADMIN — Medication 1 GRAM(S): at 06:08

## 2018-11-28 RX ADMIN — Medication 80 MILLIGRAM(S): at 06:08

## 2018-11-28 RX ADMIN — Medication 1 GRAM(S): at 17:35

## 2018-11-28 RX ADMIN — Medication 1 GRAM(S): at 13:11

## 2018-11-28 RX ADMIN — Medication 50 MILLIGRAM(S): at 13:11

## 2018-11-28 RX ADMIN — Medication 1 GRAM(S): at 23:04

## 2018-11-28 RX ADMIN — PANTOPRAZOLE SODIUM 40 MILLIGRAM(S): 20 TABLET, DELAYED RELEASE ORAL at 06:08

## 2018-11-28 RX ADMIN — ATORVASTATIN CALCIUM 40 MILLIGRAM(S): 80 TABLET, FILM COATED ORAL at 21:12

## 2018-11-28 RX ADMIN — Medication 40 MILLIGRAM(S): at 06:07

## 2018-11-28 RX ADMIN — Medication 80 MILLIGRAM(S): at 17:35

## 2018-11-28 RX ADMIN — TAMSULOSIN HYDROCHLORIDE 0.4 MILLIGRAM(S): 0.4 CAPSULE ORAL at 13:11

## 2018-11-28 NOTE — PHYSICAL THERAPY INITIAL EVALUATION ADULT - GENERAL OBSERVATIONS, REHAB EVAL
8:47 - 9:07.  Chart reviewed. Patient available to be seen for physical therapy, confirmed with nurse. Patient encountered semi-reclined in bed.  Patient reports low back pain and "heaviness" in both legs.  Would like to walk with PT now.

## 2018-11-28 NOTE — PHYSICAL THERAPY INITIAL EVALUATION ADULT - GAIT DEVIATIONS NOTED, PT EVAL
decreased franci/decreased step length/increased time in double stance/decreased weight-shifting ability

## 2018-11-28 NOTE — PHYSICAL THERAPY INITIAL EVALUATION ADULT - IMPAIRMENTS FOUND, PT EVAL
aerobic capacity/endurance/gait, locomotion, and balance/posture/ergonomics and body mechanics/muscle strength

## 2018-11-29 LAB
GLUCOSE BLDC GLUCOMTR-MCNC: 102 MG/DL — HIGH (ref 70–99)
GLUCOSE BLDC GLUCOMTR-MCNC: 122 MG/DL — HIGH (ref 70–99)
GLUCOSE BLDC GLUCOMTR-MCNC: 86 MG/DL — SIGNIFICANT CHANGE UP (ref 70–99)
GLUCOSE BLDC GLUCOMTR-MCNC: 99 MG/DL — SIGNIFICANT CHANGE UP (ref 70–99)
INR BLD: 2.73 RATIO — HIGH (ref 0.65–1.3)
PROTHROM AB SERPL-ACNC: 30.1 SEC — HIGH (ref 9.95–12.87)

## 2018-11-29 RX ORDER — WARFARIN SODIUM 2.5 MG/1
2.5 TABLET ORAL ONCE
Qty: 0 | Refills: 0 | Status: COMPLETED | OUTPATIENT
Start: 2018-11-29 | End: 2018-11-30

## 2018-11-29 RX ADMIN — Medication 80 MILLIGRAM(S): at 17:32

## 2018-11-29 RX ADMIN — TAMSULOSIN HYDROCHLORIDE 0.4 MILLIGRAM(S): 0.4 CAPSULE ORAL at 13:38

## 2018-11-29 RX ADMIN — Medication 50 MILLIGRAM(S): at 13:35

## 2018-11-29 RX ADMIN — ATORVASTATIN CALCIUM 40 MILLIGRAM(S): 80 TABLET, FILM COATED ORAL at 21:27

## 2018-11-29 RX ADMIN — Medication 1 GRAM(S): at 21:30

## 2018-11-29 RX ADMIN — Medication 1 GRAM(S): at 17:32

## 2018-11-29 RX ADMIN — PANTOPRAZOLE SODIUM 40 MILLIGRAM(S): 20 TABLET, DELAYED RELEASE ORAL at 06:00

## 2018-11-29 RX ADMIN — Medication 80 MILLIGRAM(S): at 06:00

## 2018-11-29 RX ADMIN — Medication 1 GRAM(S): at 06:00

## 2018-11-29 RX ADMIN — Medication 2 MILLIGRAM(S): at 19:01

## 2018-11-29 RX ADMIN — Medication 1 GRAM(S): at 13:37

## 2018-11-29 NOTE — CONSULT NOTE ADULT - SUBJECTIVE AND OBJECTIVE BOX
Neurology Consultation note    Name  SOCORRO MANUEL    HPI:  67 y/o male seen in ER on 11/24/18 with c/o Dyspnea, was to be admitted for evaluation but left AMA. He returns today with the same c/o of dyspnea and also leg weakness/heaviness. Above began Thanksgiving day and has been getting progressively worse.  Patient saw his cardiologist: Dr De Souza at [facility]/[place]: EKG done/reportedly was OK, Tests were scheduled: ECHO, Carotid Doppler,and Nuclear Stress Test. All meds remained the same.  Admission labs remarkable for BMP 1177,  CXR increased interstitial markings, PE: ++ Pedal Edema bilaterally. (27 Nov 2018 17:07)    NEURO:  PATIENT IS A 67 YO MAN ADM WITH DYSPNEA AND LE EDEMA  NEUROLOGY CALLED TO EVAL LE WEAKNESS  PATIENT REPORTS THAT HIS PROXIMAL LE FELT HEAVY R> L  HE DENIES SENSORY LOSS, NO SPHINCTER DYSFUNCTION  NO UE COMLPLAINTS  HE DOES HAVE CHRONIC LBP      Vital Signs Last 24 Hrs  T(C): 35.7 (29 Nov 2018 06:22), Max: 36.3 (28 Nov 2018 22:00)  T(F): 96.3 (29 Nov 2018 06:22), Max: 97.4 (28 Nov 2018 22:00)  HR: 56 (29 Nov 2018 06:22) (56 - 57)  BP: 114/63 (29 Nov 2018 06:22) (108/57 - 114/63)  BP(mean): --  RR: 16 (29 Nov 2018 06:22) (16 - 16)  SpO2: --    Neurological Exam:   Mental status: Awake, alert and oriented x3.  Recent and remote memory intact.  Naming, repetition and comprehension intact.  Attention/concentration intact.  No dysarthria, no aphasia.  Fund of knowledge appropriate.    Cranial nerves: Pupils equally round and reactive to light, visual fields full, no nystagmus, extraocular muscles intact, V1 through V3 intact bilaterally and symmetric, face symmetric, hearing intact to finger rub, palate elevation symmetric, tongue was midline.  Motor:  MRC grading 5/5 EXCEPT 4+ R HIP FLEXION  Sensation: DECREASED VIBRATORY SENSE TO ANKLE BILATERALLY  Coordination: No dysmetria on finger-to-nose and heel-to-shin.  No dysdiadokinesia.  Reflexes: HYPOREFLEXIC X 4      Medications  atorvastatin 40 milliGRAM(s) Oral at bedtime  furosemide    Tablet 40 milliGRAM(s) Oral daily  influenza   Vaccine 0.5 milliLiter(s) IntraMuscular once  LORazepam   Injectable 2 milliGRAM(s) IV Push once PRN  pantoprazole    Tablet 40 milliGRAM(s) Oral before breakfast  pregabalin 50 milliGRAM(s) Oral daily  sitaGLIPtin 50 milliGRAM(s) Oral daily  sotalol 80 milliGRAM(s) Oral two times a day  sucralfate 1 Gram(s) Oral four times a day  tamsulosin 0.4 milliGRAM(s) Oral daily      Lab  11-28    140  |  103  |  20  ----------------------------<  124<H>  4.1   |  23  |  1.2    Ca    8.6      28 Nov 2018 09:33    TPro  6.8  /  Alb  2.6<L>  /  TBili  3.6<H>  /  DBili  x   /  AST  >700<H>  /  ALT  258<H>  /  AlkPhos  214<H>  11-28                          12.1   4.69  )-----------( 81       ( 27 Nov 2018 14:50 )             36.7     LIVER FUNCTIONS - ( 28 Nov 2018 09:33 )  Alb: 2.6 g/dL / Pro: 6.8 g/dL / ALK PHOS: 214 U/L / ALT: 258 U/L / AST: >700 U/L / GGT: x                   Radiology      Assessment: 67 YO MAN WITH BILATERAL LE WEAKNESS, R>L  MILD WEAKNESS ON EXAM NOW  POSSIBLE LS RADIC GIVEN FOCALITY +/- DIABETIC POLYNEUROPATHY (MILD NEUROPATHIC FINDINGS ON EXAM)    Plan:  MRI L/S SPINE  EMG/NCS AS OUTPATIENT  PT/REHAB

## 2018-11-29 NOTE — PROGRESS NOTE ADULT - SUBJECTIVE AND OBJECTIVE BOX
SOCORRO MANUEL  68y  Male    Patient is a 68y old  Male who presents with a chief complaint of Dyspnea while walking (27 Nov 2018 17:07)    ALLERGIES:  No Known Allergies      INTERVAL HPI/OVERNIGHT EVENTS:    VITALS:  T(F): 96.3 (11-29-18 @ 06:22), Max: 97.4 (11-28-18 @ 22:00)  HR: 56 (11-29-18 @ 06:22) (56 - 57)  BP: 114/63 (11-29-18 @ 06:22) (108/57 - 114/63)  RR: 16 (11-29-18 @ 06:22) (16 - 16)  SpO2: --    LABS:  11-28    140  |  103  |  20  ----------------------------<  124<H>  4.1   |  23  |  1.2    Ca    8.6      28 Nov 2018 09:33    TPro  6.8  /  Alb  2.6<L>  /  TBili  3.6<H>  /  DBili  x   /  AST  >700<H>  /  ALT  258<H>  /  AlkPhos  214<H>  11-28    MICROBIOLOGY:    MEDICATION:  atorvastatin 40 milliGRAM(s) Oral at bedtime  furosemide    Tablet 40 milliGRAM(s) Oral daily  influenza   Vaccine 0.5 milliLiter(s) IntraMuscular once  LORazepam   Injectable 2 milliGRAM(s) IV Push once PRN  pantoprazole    Tablet 40 milliGRAM(s) Oral before breakfast  pregabalin 50 milliGRAM(s) Oral daily  sitaGLIPtin 50 milliGRAM(s) Oral daily  sotalol 80 milliGRAM(s) Oral two times a day  sucralfate 1 Gram(s) Oral four times a day  tamsulosin 0.4 milliGRAM(s) Oral daily    RADIOLOGY & ADDITIONAL TESTS:

## 2018-11-30 LAB
ANION GAP SERPL CALC-SCNC: 12 MMOL/L — SIGNIFICANT CHANGE UP (ref 7–14)
BUN SERPL-MCNC: 21 MG/DL — HIGH (ref 10–20)
CALCIUM SERPL-MCNC: 8.4 MG/DL — LOW (ref 8.5–10.1)
CHLORIDE SERPL-SCNC: 104 MMOL/L — SIGNIFICANT CHANGE UP (ref 98–110)
CO2 SERPL-SCNC: 24 MMOL/L — SIGNIFICANT CHANGE UP (ref 17–32)
CREAT SERPL-MCNC: 1 MG/DL — SIGNIFICANT CHANGE UP (ref 0.7–1.5)
GLUCOSE BLDC GLUCOMTR-MCNC: 108 MG/DL — HIGH (ref 70–99)
GLUCOSE BLDC GLUCOMTR-MCNC: 117 MG/DL — HIGH (ref 70–99)
GLUCOSE BLDC GLUCOMTR-MCNC: 122 MG/DL — HIGH (ref 70–99)
GLUCOSE BLDC GLUCOMTR-MCNC: 95 MG/DL — SIGNIFICANT CHANGE UP (ref 70–99)
GLUCOSE SERPL-MCNC: 101 MG/DL — HIGH (ref 70–99)
HCT VFR BLD CALC: 34.3 % — LOW (ref 42–52)
HGB BLD-MCNC: 11.6 G/DL — LOW (ref 14–18)
INR BLD: 2.4 RATIO — HIGH (ref 0.65–1.3)
MCHC RBC-ENTMCNC: 31.9 PG — HIGH (ref 27–31)
MCHC RBC-ENTMCNC: 33.8 G/DL — SIGNIFICANT CHANGE UP (ref 32–37)
MCV RBC AUTO: 94.2 FL — HIGH (ref 80–94)
NRBC # BLD: 0 /100 WBCS — SIGNIFICANT CHANGE UP (ref 0–0)
PLATELET # BLD AUTO: 82 K/UL — LOW (ref 130–400)
POTASSIUM SERPL-MCNC: 4.3 MMOL/L — SIGNIFICANT CHANGE UP (ref 3.5–5)
POTASSIUM SERPL-SCNC: 4.3 MMOL/L — SIGNIFICANT CHANGE UP (ref 3.5–5)
PROTHROM AB SERPL-ACNC: 26.4 SEC — HIGH (ref 9.95–12.87)
RBC # BLD: 3.64 M/UL — LOW (ref 4.7–6.1)
RBC # FLD: 18.4 % — HIGH (ref 11.5–14.5)
SODIUM SERPL-SCNC: 140 MMOL/L — SIGNIFICANT CHANGE UP (ref 135–146)
WBC # BLD: 4.34 K/UL — LOW (ref 4.8–10.8)
WBC # FLD AUTO: 4.34 K/UL — LOW (ref 4.8–10.8)

## 2018-11-30 RX ORDER — WARFARIN SODIUM 2.5 MG/1
2.5 TABLET ORAL ONCE
Qty: 0 | Refills: 0 | Status: COMPLETED | OUTPATIENT
Start: 2018-11-30 | End: 2018-11-30

## 2018-11-30 RX ADMIN — ATORVASTATIN CALCIUM 40 MILLIGRAM(S): 80 TABLET, FILM COATED ORAL at 21:40

## 2018-11-30 RX ADMIN — WARFARIN SODIUM 2.5 MILLIGRAM(S): 2.5 TABLET ORAL at 21:40

## 2018-11-30 RX ADMIN — Medication 1 GRAM(S): at 17:28

## 2018-11-30 RX ADMIN — Medication 80 MILLIGRAM(S): at 05:35

## 2018-11-30 RX ADMIN — WARFARIN SODIUM 2.5 MILLIGRAM(S): 2.5 TABLET ORAL at 02:01

## 2018-11-30 RX ADMIN — Medication 40 MILLIGRAM(S): at 05:35

## 2018-11-30 RX ADMIN — Medication 80 MILLIGRAM(S): at 17:28

## 2018-11-30 RX ADMIN — PANTOPRAZOLE SODIUM 40 MILLIGRAM(S): 20 TABLET, DELAYED RELEASE ORAL at 05:35

## 2018-11-30 RX ADMIN — TAMSULOSIN HYDROCHLORIDE 0.4 MILLIGRAM(S): 0.4 CAPSULE ORAL at 11:30

## 2018-11-30 RX ADMIN — Medication 50 MILLIGRAM(S): at 11:30

## 2018-11-30 RX ADMIN — Medication 1 GRAM(S): at 11:31

## 2018-11-30 RX ADMIN — Medication 1 GRAM(S): at 05:35

## 2018-11-30 NOTE — PROGRESS NOTE ADULT - SUBJECTIVE AND OBJECTIVE BOX
Patient known to practice - seen by Dr. Pulido, neurology.  Has moderate spinal stenosis at L2-3 and L3-4.  Would treat conservatively with PT and pain management and follow up as outpatient. Patient known to practice - seen by Dr. Pulido, neurology.  Has moderate spinal stenosis at L2-3 and L3-4 and lateral recess stenosis at L5-S1.  Would treat conservatively with PT and pain management and follow up as outpatient.

## 2018-11-30 NOTE — PROGRESS NOTE ADULT - SUBJECTIVE AND OBJECTIVE BOX
SOCORRO MANUEL  68y  Male    Patient is a 68y old  Male who presents with a chief complaint of Dyspnea while walking (30 Nov 2018 09:56)    ALLERGIES:  No Known Allergies      INTERVAL HPI/OVERNIGHT EVENTS:    VITALS:  T(F): 96.3 (11-30-18 @ 06:11), Max: 97.8 (11-29-18 @ 21:00)  HR: 61 (11-30-18 @ 06:11) (55 - 61)  BP: 118/60 (11-30-18 @ 06:11) (118/60 - 139/73)  RR: 16 (11-30-18 @ 06:11) (16 - 20)  SpO2: --    LABS:  11-30    140  |  104  |  21<H>  ----------------------------<  101<H>  4.3   |  24  |  1.0    Ca    8.4<L>      30 Nov 2018 09:05      MICROBIOLOGY:    MEDICATION:  atorvastatin 40 milliGRAM(s) Oral at bedtime  furosemide    Tablet 40 milliGRAM(s) Oral daily  influenza   Vaccine 0.5 milliLiter(s) IntraMuscular once  pantoprazole    Tablet 40 milliGRAM(s) Oral before breakfast  pregabalin 50 milliGRAM(s) Oral daily  sitaGLIPtin 50 milliGRAM(s) Oral daily  sotalol 80 milliGRAM(s) Oral two times a day  sucralfate 1 Gram(s) Oral four times a day  tamsulosin 0.4 milliGRAM(s) Oral daily  warfarin 2.5 milliGRAM(s) Oral once    RADIOLOGY & ADDITIONAL TESTS:

## 2018-11-30 NOTE — PROGRESS NOTE ADULT - SUBJECTIVE AND OBJECTIVE BOX
Neurology Follow up note    Name  SOCORRO MANUEL    HPI:  67 y/o male seen in ER on 11/24/18 with c/o Dyspnea, was to be admitted for evaluation but left AMA. He returns today with the same c/o of dyspnea and also leg weakness/heaviness. Above began Thanksgiving day and has been getting progressively worse.  Patient saw his cardiologist: Dr De Souza at [facility]/[place]: EKG done/reportedly was OK, Tests were scheduled: ECHO, Carotid Doppler,and Nuclear Stress Test. All meds remained the same.  Admission labs remarkable for BMP 1177,  CXR increased interstitial markings, PE: ++ Pedal Edema bilaterally. (27 Nov 2018 17:07)    NEURO:  PATIENT IS STABLE  STILL FEELS SEVERE LOW BACK PAIN BUT WAS ABLE TO WALK TO [facility] YESTERDAY      Vital Signs Last 24 Hrs  T(C): 35.7 (30 Nov 2018 06:11), Max: 36.6 (29 Nov 2018 21:00)  T(F): 96.3 (30 Nov 2018 06:11), Max: 97.8 (29 Nov 2018 21:00)  HR: 61 (30 Nov 2018 06:11) (55 - 61)  BP: 118/60 (30 Nov 2018 06:11) (118/60 - 139/73)  BP(mean): --  RR: 16 (30 Nov 2018 06:11) (16 - 20)  SpO2: --    Neurological Exam:   Mental status: Awake, alert and oriented x3.  Recent and remote memory intact.  Naming, repetition and comprehension intact.  Attention/concentration intact.  No dysarthria, no aphasia.  Fund of knowledge appropriate.    Cranial nerves: Pupils equally round and reactive to light, visual fields full, no nystagmus, extraocular muscles intact, V1 through V3 intact bilaterally and symmetric, face symmetric, hearing intact to finger rub, palate elevation symmetric, tongue was midline.  Motor:  MRC grading 5/5 EXCEPT 4+ R HIP FLEXION  Sensation: DECREASED VIBRATORY SENSE TO ANKLE BILATERALLY  Coordination: No dysmetria on finger-to-nose and heel-to-shin.  No dysdiadokinesia.  Reflexes: HYPOREFLEXIC X 4      Medications  atorvastatin 40 milliGRAM(s) Oral at bedtime  furosemide    Tablet 40 milliGRAM(s) Oral daily  influenza   Vaccine 0.5 milliLiter(s) IntraMuscular once  pantoprazole    Tablet 40 milliGRAM(s) Oral before breakfast  pregabalin 50 milliGRAM(s) Oral daily  sitaGLIPtin 50 milliGRAM(s) Oral daily  sotalol 80 milliGRAM(s) Oral two times a day  sucralfate 1 Gram(s) Oral four times a day  tamsulosin 0.4 milliGRAM(s) Oral daily  warfarin 2.5 milliGRAM(s) Oral once      Lab                              11.6   4.34  )-----------( 82       ( 30 Nov 2018 09:05 )             34.3               Radiology  mri l spine  At T11-T12 small right foraminal disc protrusion is seen. There is no   significant spinal canal stenosis. There is moderate right foraminal   stenosis.    At T12-L1 there is a disc bulge with a superimposed central disc   protrusion resulting in mild spinal canal stenosis. Facet arthropathy is   seen bilaterally. No significant foraminal stenosis is seen.    At L1-L2 there is a disc bulge along with ligamentum flavum buckling and   facet arthropathy resulting in mild to moderate spinal canal stenosis.   There is moderate left and mild right foraminal stenosis.    At L2-L3 there is a disc bulge along with ligamentum flavum buckling   resulting in moderate spinal canal stenosis. Mild bilateral foraminal   stenosis.    At L3-L4 there is a disc bulge with a superimposed small central disc   protrusion. Ligamentum flavum buckling is seen. There is mild spinal   canal stenosis. Moderate left and mild right foraminal stenosis is seen.    At L4-L5 there is a disc bulge with a superimposed small central disc   protrusion. Ligamentum flavum buckling and facet arthropathy is seen.   There is mild spinal canal stenosis. Moderate to severe right and   moderate left foraminal stenosis is noted.    At L5-S1 there is a disc bulge along with facet arthropathy. No   significant spinal canal stenosis. Mild left foraminal stenosis.      Assessment:    Plan:  PATIENT WITH LBP AND LE WEAKNESS SECONDARY TO MULTILEVEL DJD AS ABOVE  PT/REHAB  PAIN MANAGEMENT EVAL  INCREASE LYRICA TO 50 TID IF NO CONTRAINDICATION  OUTPATIENT EVAL WITH NEUROSURGERY

## 2018-12-01 LAB
GLUCOSE BLDC GLUCOMTR-MCNC: 101 MG/DL — HIGH (ref 70–99)
GLUCOSE BLDC GLUCOMTR-MCNC: 106 MG/DL — HIGH (ref 70–99)
GLUCOSE BLDC GLUCOMTR-MCNC: 135 MG/DL — HIGH (ref 70–99)
GLUCOSE BLDC GLUCOMTR-MCNC: 88 MG/DL — SIGNIFICANT CHANGE UP (ref 70–99)
INR BLD: 2.38 RATIO — HIGH (ref 0.65–1.3)
PROTHROM AB SERPL-ACNC: 26.2 SEC — HIGH (ref 9.95–12.87)

## 2018-12-01 RX ORDER — WARFARIN SODIUM 2.5 MG/1
2.5 TABLET ORAL ONCE
Qty: 0 | Refills: 0 | Status: COMPLETED | OUTPATIENT
Start: 2018-12-01 | End: 2018-12-01

## 2018-12-01 RX ADMIN — Medication 80 MILLIGRAM(S): at 17:17

## 2018-12-01 RX ADMIN — Medication 1 GRAM(S): at 17:17

## 2018-12-01 RX ADMIN — TAMSULOSIN HYDROCHLORIDE 0.4 MILLIGRAM(S): 0.4 CAPSULE ORAL at 12:10

## 2018-12-01 RX ADMIN — ATORVASTATIN CALCIUM 40 MILLIGRAM(S): 80 TABLET, FILM COATED ORAL at 21:57

## 2018-12-01 RX ADMIN — PANTOPRAZOLE SODIUM 40 MILLIGRAM(S): 20 TABLET, DELAYED RELEASE ORAL at 06:26

## 2018-12-01 RX ADMIN — Medication 40 MILLIGRAM(S): at 06:26

## 2018-12-01 RX ADMIN — Medication 1 GRAM(S): at 12:10

## 2018-12-01 RX ADMIN — Medication 80 MILLIGRAM(S): at 06:26

## 2018-12-01 RX ADMIN — Medication 50 MILLIGRAM(S): at 12:10

## 2018-12-01 RX ADMIN — Medication 1 GRAM(S): at 00:01

## 2018-12-01 RX ADMIN — WARFARIN SODIUM 2.5 MILLIGRAM(S): 2.5 TABLET ORAL at 21:57

## 2018-12-01 RX ADMIN — Medication 1 GRAM(S): at 06:26

## 2018-12-01 RX ADMIN — Medication 1 GRAM(S): at 21:57

## 2018-12-01 NOTE — PROGRESS NOTE ADULT - SUBJECTIVE AND OBJECTIVE BOX
SOCORRO MANUEL  68y  Male    Patient is a 68y old  Male who presents with a chief complaint of Dyspnea while walking (30 Nov 2018 14:37)    ALLERGIES:  No Known Allergies      INTERVAL HPI/OVERNIGHT EVENTS:    VITALS:  T(F): 96.6 (12-01-18 @ 05:15), Max: 97.1 (11-30-18 @ 22:54)  HR: 91 (12-01-18 @ 05:15) (58 - 97)  BP: 121/71 (12-01-18 @ 05:15) (103/67 - 130/80)  RR: 16 (12-01-18 @ 05:15) (16 - 18)  SpO2: 96% (11-30-18 @ 21:41) (96% - 96%)    LABS:  11-30    140  |  104  |  21<H>  ----------------------------<  101<H>  4.3   |  24  |  1.0    Ca    8.4<L>      30 Nov 2018 09:05      MICROBIOLOGY:    MEDICATION:  atorvastatin 40 milliGRAM(s) Oral at bedtime  furosemide    Tablet 40 milliGRAM(s) Oral daily  influenza   Vaccine 0.5 milliLiter(s) IntraMuscular once  pantoprazole    Tablet 40 milliGRAM(s) Oral before breakfast  pregabalin 50 milliGRAM(s) Oral daily  sitaGLIPtin 50 milliGRAM(s) Oral daily  sotalol 80 milliGRAM(s) Oral two times a day  sucralfate 1 Gram(s) Oral four times a day  tamsulosin 0.4 milliGRAM(s) Oral daily    RADIOLOGY & ADDITIONAL TESTS:

## 2018-12-02 ENCOUNTER — TRANSCRIPTION ENCOUNTER (OUTPATIENT)
Age: 68
End: 2018-12-02

## 2018-12-02 VITALS
SYSTOLIC BLOOD PRESSURE: 115 MMHG | DIASTOLIC BLOOD PRESSURE: 72 MMHG | TEMPERATURE: 97 F | RESPIRATION RATE: 16 BRPM | HEART RATE: 60 BPM

## 2018-12-02 LAB
GLUCOSE BLDC GLUCOMTR-MCNC: 109 MG/DL — HIGH (ref 70–99)
GLUCOSE BLDC GLUCOMTR-MCNC: 87 MG/DL — SIGNIFICANT CHANGE UP (ref 70–99)
INR BLD: 2.38 RATIO — HIGH (ref 0.65–1.3)
PROTHROM AB SERPL-ACNC: 26.2 SEC — HIGH (ref 9.95–12.87)

## 2018-12-02 RX ADMIN — Medication 1 GRAM(S): at 13:49

## 2018-12-02 RX ADMIN — Medication 50 MILLIGRAM(S): at 13:49

## 2018-12-02 RX ADMIN — Medication 1 GRAM(S): at 05:48

## 2018-12-02 RX ADMIN — TAMSULOSIN HYDROCHLORIDE 0.4 MILLIGRAM(S): 0.4 CAPSULE ORAL at 13:49

## 2018-12-02 RX ADMIN — INFLUENZA VIRUS VACCINE 0.5 MILLILITER(S): 15; 15; 15; 15 SUSPENSION INTRAMUSCULAR at 14:26

## 2018-12-02 RX ADMIN — Medication 40 MILLIGRAM(S): at 05:48

## 2018-12-02 RX ADMIN — PANTOPRAZOLE SODIUM 40 MILLIGRAM(S): 20 TABLET, DELAYED RELEASE ORAL at 05:48

## 2018-12-02 NOTE — DISCHARGE NOTE ADULT - MEDICATION SUMMARY - MEDICATIONS TO TAKE
I will START or STAY ON the medications listed below when I get home from the hospital:    Flomax 0.4 mg oral capsule  -- 1 cap(s) by mouth once a day  -- Indication: For BPH (benign prostatic hyperplasia)    sotalol 80 mg oral tablet  -- 1 tab(s) by mouth 2 times a day  -- Indication: For Essential hypertension    Coumadin 2.5 mg oral tablet  -- 1 tab(s) by mouth 6 times a week, Mon, Tues, Weds, Fri, Sat, Sun  -- Indication: For Afib    Coumadin 5 mg oral tablet  -- 1 tab(s) by mouth once a week, on Thursday  -- Indication: For Afib    Lyrica 50 mg oral capsule  -- 1 tab(s) by mouth once a day  -- Indication: For Neuropathy associated with endocrine disorder    Januvia 50 mg oral tablet  -- 1 tab(s) by mouth once a day  -- Indication: For Type 2 diabetes mellitus with other neurologic complication    atorvastatin  -- 40 milligram(s) by mouth once a day (at bedtime)  -- Indication: For High cholesterol    ferrous sulfate 325 mg (65 mg elemental iron) oral tablet  -- 1 tab(s) by mouth 3 times a day  -- Indication: For Anemia    sucralfate 1 g oral tablet  -- 1 tab(s) by mouth 4 times a day (before meals and at bedtime)  -- Indication: For gerd    Fish Oil 1200 mg oral capsule  -- orally 2 times a day  -- Indication: For High cholesterol    pantoprazole 20 mg oral delayed release tablet  -- 1 tab(s) by mouth 2 times a day  -- Indication: For gerd    Vitamin D3 400 intl units oral tablet  -- 2 tab(s) by mouth once a day  -- Indication: For vitamin

## 2018-12-02 NOTE — PROGRESS NOTE ADULT - REASON FOR ADMISSION
Dyspnea while walking

## 2018-12-02 NOTE — PROGRESS NOTE ADULT - ASSESSMENT
#multilevel degenerative disc dis/ spinal stenosis    plan========neurosurgery consult  rehab pt __STR
await MRI of brain  and result of ECHO  continue current meds
discharge home today w home care PT
neurosurgery advised PT rehab and pain management    patient prefer to go to [facility] rehab     will reconsult rehab

## 2018-12-02 NOTE — DISCHARGE NOTE ADULT - PATIENT PORTAL LINK FT
You can access the [facility] Patient Portal, offered by [facility], by registering with the following website: [email or web address]

## 2018-12-02 NOTE — PROGRESS NOTE ADULT - SUBJECTIVE AND OBJECTIVE BOX
SOCORRO MANUEL  68y  Male    Patient is a 68y old  Male who presents with a chief complaint of Dyspnea while walking (01 Dec 2018 11:21)    ALLERGIES:  No Known Allergies      INTERVAL HPI/OVERNIGHT EVENTS:    VITALS:  T(F): 96.8 (12-02-18 @ 06:11), Max: 97.4 (12-01-18 @ 22:45)  HR: 62 (12-02-18 @ 06:11) (57 - 62)  BP: 108/53 (12-02-18 @ 06:11) (102/59 - 108/53)  RR: 16 (12-02-18 @ 06:11) (16 - 18)  SpO2: --    LABS:        MICROBIOLOGY:    MEDICATION:  atorvastatin 40 milliGRAM(s) Oral at bedtime  furosemide    Tablet 40 milliGRAM(s) Oral daily  influenza   Vaccine 0.5 milliLiter(s) IntraMuscular once  pantoprazole    Tablet 40 milliGRAM(s) Oral before breakfast  pregabalin 50 milliGRAM(s) Oral daily  sitaGLIPtin 50 milliGRAM(s) Oral daily  sotalol 80 milliGRAM(s) Oral two times a day  sucralfate 1 Gram(s) Oral four times a day  tamsulosin 0.4 milliGRAM(s) Oral daily    RADIOLOGY & ADDITIONAL TESTS:

## 2018-12-02 NOTE — DISCHARGE NOTE ADULT - HOSPITAL COURSE
tbd History of Present Illness:  Reason for Admission: Dyspnea while walking	  History of Present Illness: 	            67 y/o male seen in ER on 11/24/18 with c/o Dyspnea, was to be admitted for evaluation but left AMA. He returns today with the same c/o of dyspnea and also leg weakness/heaviness. Above began Thanksgiving day and has been getting progressively worse.  Patient saw his cardiologist: Dr De Souza at [facility]/[place]: EKG done/reportedly was OK, Tests were scheduled: ECHO, Carotid Doppler,and Nuclear Stress Test. All meds remained the same.  Admission labs remarkable for BMP 1177,  CXR increased interstitial markings, PE: ++ Pedal Edema bilaterally.    Allergies and Intolerances:        Allergies:  	No Known Allergies:  Home Medications:   * Patient Currently Takes Medications as of 27-Nov-2018 17:21 documented in Structured Notes  · 	Januvia 50 mg oral tablet: 1 tab(s) orally once a day, Last Dose Taken:    · 	Fish Oil 1200 mg oral capsule: orally 2 times a day, Last Dose Taken:    · 	sucralfate 1 g oral tablet: 1 tab(s) orally 4 times a day (before meals and at bedtime), Last Dose Taken:    · 	sotalol 80 mg oral tablet: 1 tab(s) orally 2 times a day, Last Dose Taken:    · 	atorvastatin: 40 milligram(s) orally once a day (at bedtime), Last Dose Taken:    · 	pantoprazole 20 mg oral delayed release tablet: 1 tab(s) orally 2 times a day, Last Dose Taken:    · 	Flomax 0.4 mg oral capsule: 1 cap(s) orally once a day, Last Dose Taken:    · 	Lyrica 50 mg oral capsule: 1 tab(s) orally once a day, Last Dose Taken:    · 	Coumadin 2.5 mg oral tablet: 1 tab(s) orally 6 times a week, Mon, Tues, Weds, Fri, Sat, Sun, Last Dose Taken:    · 	Coumadin 5 mg oral tablet: 1 tab(s) orally once a week, on Thursday, Last Dose Taken:    Patient History:   Past Medical History:  Afib    Anemia    BPH (benign prostatic hyperplasia)    Cirrhosis of liver    Diabetes    Dyspnea on exertion    High cholesterol    HTN (hypertension).  admitted w congestive heart failure --improved w Lasix -advised to do Echo as out pt

## 2018-12-02 NOTE — DISCHARGE NOTE ADULT - CARE PROVIDER_API CALL
Jag Portillo), Internal Medicine  [facility] [place] [address]  Phone: [phone]  Fax: [phone]

## 2018-12-06 DIAGNOSIS — R06.00 DYSPNEA, UNSPECIFIED: ICD-10-CM

## 2018-12-06 DIAGNOSIS — Z79.01 LONG TERM (CURRENT) USE OF ANTICOAGULANTS: ICD-10-CM

## 2018-12-06 DIAGNOSIS — I48.91 UNSPECIFIED ATRIAL FIBRILLATION: ICD-10-CM

## 2018-12-06 DIAGNOSIS — K21.9 GASTRO-ESOPHAGEAL REFLUX DISEASE WITHOUT ESOPHAGITIS: ICD-10-CM

## 2018-12-06 DIAGNOSIS — I25.10 ATHEROSCLEROTIC HEART DISEASE OF NATIVE CORONARY ARTERY WITHOUT ANGINA PECTORIS: ICD-10-CM

## 2018-12-06 DIAGNOSIS — K74.60 UNSPECIFIED CIRRHOSIS OF LIVER: ICD-10-CM

## 2018-12-06 DIAGNOSIS — M48.061 SPINAL STENOSIS, LUMBAR REGION WITHOUT NEUROGENIC CLAUDICATION: ICD-10-CM

## 2018-12-06 DIAGNOSIS — E11.42 TYPE 2 DIABETES MELLITUS WITH DIABETIC POLYNEUROPATHY: ICD-10-CM

## 2018-12-06 DIAGNOSIS — E78.5 HYPERLIPIDEMIA, UNSPECIFIED: ICD-10-CM

## 2018-12-06 DIAGNOSIS — N40.0 BENIGN PROSTATIC HYPERPLASIA WITHOUT LOWER URINARY TRACT SYMPTOMS: ICD-10-CM

## 2018-12-06 DIAGNOSIS — Z96.653 PRESENCE OF ARTIFICIAL KNEE JOINT, BILATERAL: ICD-10-CM

## 2018-12-06 DIAGNOSIS — M47.9 SPONDYLOSIS, UNSPECIFIED: ICD-10-CM

## 2018-12-06 DIAGNOSIS — Z95.5 PRESENCE OF CORONARY ANGIOPLASTY IMPLANT AND GRAFT: ICD-10-CM

## 2018-12-06 DIAGNOSIS — D64.9 ANEMIA, UNSPECIFIED: ICD-10-CM

## 2018-12-06 DIAGNOSIS — I10 ESSENTIAL (PRIMARY) HYPERTENSION: ICD-10-CM

## 2018-12-07 PROBLEM — N40.0 BENIGN PROSTATIC HYPERPLASIA WITHOUT LOWER URINARY TRACT SYMPTOMS: Chronic | Status: ACTIVE | Noted: 2018-11-27

## 2018-12-07 PROBLEM — R06.09 OTHER FORMS OF DYSPNEA: Chronic | Status: ACTIVE | Noted: 2018-11-27

## 2018-12-21 ENCOUNTER — OUTPATIENT (OUTPATIENT)
Dept: OUTPATIENT SERVICES | Facility: HOSPITAL | Age: 68
LOS: 1 days | Discharge: HOME | End: 2018-12-21

## 2018-12-21 DIAGNOSIS — Z95.9 PRESENCE OF CARDIAC AND VASCULAR IMPLANT AND GRAFT, UNSPECIFIED: Chronic | ICD-10-CM

## 2018-12-21 DIAGNOSIS — I48.91 UNSPECIFIED ATRIAL FIBRILLATION: ICD-10-CM

## 2018-12-21 DIAGNOSIS — Z12.11 ENCOUNTER FOR SCREENING FOR MALIGNANT NEOPLASM OF COLON: Chronic | ICD-10-CM

## 2018-12-21 DIAGNOSIS — Z79.01 LONG TERM (CURRENT) USE OF ANTICOAGULANTS: ICD-10-CM

## 2018-12-21 DIAGNOSIS — Z96.653 PRESENCE OF ARTIFICIAL KNEE JOINT, BILATERAL: Chronic | ICD-10-CM

## 2018-12-21 LAB
POCT INR: 4.8 RATIO — HIGH (ref 0.9–1.2)
POCT PT: 58.1 SEC — HIGH (ref 10–13.4)

## 2018-12-26 ENCOUNTER — OUTPATIENT (OUTPATIENT)
Dept: OUTPATIENT SERVICES | Facility: HOSPITAL | Age: 68
LOS: 1 days | Discharge: HOME | End: 2018-12-26

## 2018-12-26 DIAGNOSIS — Z12.11 ENCOUNTER FOR SCREENING FOR MALIGNANT NEOPLASM OF COLON: Chronic | ICD-10-CM

## 2018-12-26 DIAGNOSIS — Z79.01 LONG TERM (CURRENT) USE OF ANTICOAGULANTS: ICD-10-CM

## 2018-12-26 DIAGNOSIS — I48.91 UNSPECIFIED ATRIAL FIBRILLATION: ICD-10-CM

## 2018-12-26 DIAGNOSIS — Z96.653 PRESENCE OF ARTIFICIAL KNEE JOINT, BILATERAL: Chronic | ICD-10-CM

## 2018-12-26 DIAGNOSIS — Z95.9 PRESENCE OF CARDIAC AND VASCULAR IMPLANT AND GRAFT, UNSPECIFIED: Chronic | ICD-10-CM

## 2018-12-26 LAB
POCT INR: 2.2 RATIO — HIGH (ref 0.9–1.2)
POCT PT: 26.5 SEC — HIGH (ref 10–13.4)

## 2018-12-27 ENCOUNTER — OUTPATIENT (OUTPATIENT)
Dept: OUTPATIENT SERVICES | Facility: HOSPITAL | Age: 68
LOS: 1 days | Discharge: HOME | End: 2018-12-27

## 2018-12-27 DIAGNOSIS — Z96.653 PRESENCE OF ARTIFICIAL KNEE JOINT, BILATERAL: Chronic | ICD-10-CM

## 2018-12-27 DIAGNOSIS — I73.9 PERIPHERAL VASCULAR DISEASE, UNSPECIFIED: ICD-10-CM

## 2018-12-27 DIAGNOSIS — I48.91 UNSPECIFIED ATRIAL FIBRILLATION: ICD-10-CM

## 2018-12-27 DIAGNOSIS — Z12.11 ENCOUNTER FOR SCREENING FOR MALIGNANT NEOPLASM OF COLON: Chronic | ICD-10-CM

## 2018-12-27 DIAGNOSIS — I65.23 OCCLUSION AND STENOSIS OF BILATERAL CAROTID ARTERIES: ICD-10-CM

## 2018-12-27 DIAGNOSIS — Z95.9 PRESENCE OF CARDIAC AND VASCULAR IMPLANT AND GRAFT, UNSPECIFIED: Chronic | ICD-10-CM

## 2019-01-03 ENCOUNTER — OUTPATIENT (OUTPATIENT)
Dept: OUTPATIENT SERVICES | Facility: HOSPITAL | Age: 69
LOS: 1 days | Discharge: HOME | End: 2019-01-03

## 2019-01-03 DIAGNOSIS — Z79.01 LONG TERM (CURRENT) USE OF ANTICOAGULANTS: ICD-10-CM

## 2019-01-03 DIAGNOSIS — I48.91 UNSPECIFIED ATRIAL FIBRILLATION: ICD-10-CM

## 2019-01-03 DIAGNOSIS — Z12.11 ENCOUNTER FOR SCREENING FOR MALIGNANT NEOPLASM OF COLON: Chronic | ICD-10-CM

## 2019-01-03 DIAGNOSIS — Z96.653 PRESENCE OF ARTIFICIAL KNEE JOINT, BILATERAL: Chronic | ICD-10-CM

## 2019-01-03 DIAGNOSIS — Z95.9 PRESENCE OF CARDIAC AND VASCULAR IMPLANT AND GRAFT, UNSPECIFIED: Chronic | ICD-10-CM

## 2019-01-03 LAB
POCT INR: 3.6 RATIO — HIGH (ref 0.9–1.2)
POCT INR: 3.6 RATIO — HIGH (ref 0.9–1.2)
POCT PT: 43.6 SEC — HIGH (ref 10–13.4)
POCT PT: 43.6 SEC — HIGH (ref 10–13.4)

## 2019-01-11 ENCOUNTER — OUTPATIENT (OUTPATIENT)
Dept: OUTPATIENT SERVICES | Facility: HOSPITAL | Age: 69
LOS: 1 days | Discharge: HOME | End: 2019-01-11

## 2019-01-11 DIAGNOSIS — Z12.11 ENCOUNTER FOR SCREENING FOR MALIGNANT NEOPLASM OF COLON: Chronic | ICD-10-CM

## 2019-01-11 DIAGNOSIS — Z96.653 PRESENCE OF ARTIFICIAL KNEE JOINT, BILATERAL: Chronic | ICD-10-CM

## 2019-01-11 DIAGNOSIS — I48.91 UNSPECIFIED ATRIAL FIBRILLATION: ICD-10-CM

## 2019-01-11 DIAGNOSIS — Z95.9 PRESENCE OF CARDIAC AND VASCULAR IMPLANT AND GRAFT, UNSPECIFIED: Chronic | ICD-10-CM

## 2019-01-11 DIAGNOSIS — Z79.01 LONG TERM (CURRENT) USE OF ANTICOAGULANTS: ICD-10-CM

## 2019-01-11 LAB
POCT INR: 2.9 RATIO — HIGH (ref 0.9–1.2)
POCT PT: 34.6 SEC — HIGH (ref 10–13.4)

## 2019-01-18 ENCOUNTER — OUTPATIENT (OUTPATIENT)
Dept: OUTPATIENT SERVICES | Facility: HOSPITAL | Age: 69
LOS: 1 days | Discharge: HOME | End: 2019-01-18

## 2019-01-18 DIAGNOSIS — Z12.11 ENCOUNTER FOR SCREENING FOR MALIGNANT NEOPLASM OF COLON: Chronic | ICD-10-CM

## 2019-01-18 DIAGNOSIS — I48.91 UNSPECIFIED ATRIAL FIBRILLATION: ICD-10-CM

## 2019-01-18 DIAGNOSIS — Z96.653 PRESENCE OF ARTIFICIAL KNEE JOINT, BILATERAL: Chronic | ICD-10-CM

## 2019-01-18 DIAGNOSIS — Z95.9 PRESENCE OF CARDIAC AND VASCULAR IMPLANT AND GRAFT, UNSPECIFIED: Chronic | ICD-10-CM

## 2019-01-18 DIAGNOSIS — Z79.01 LONG TERM (CURRENT) USE OF ANTICOAGULANTS: ICD-10-CM

## 2019-01-18 LAB
POCT INR: 2.9 RATIO — HIGH (ref 0.9–1.2)
POCT PT: 35.1 SEC — HIGH (ref 10–13.4)

## 2019-01-25 ENCOUNTER — OUTPATIENT (OUTPATIENT)
Dept: OUTPATIENT SERVICES | Facility: HOSPITAL | Age: 69
LOS: 1 days | Discharge: HOME | End: 2019-01-25

## 2019-01-25 DIAGNOSIS — Z96.653 PRESENCE OF ARTIFICIAL KNEE JOINT, BILATERAL: Chronic | ICD-10-CM

## 2019-01-25 DIAGNOSIS — Z12.11 ENCOUNTER FOR SCREENING FOR MALIGNANT NEOPLASM OF COLON: Chronic | ICD-10-CM

## 2019-01-25 DIAGNOSIS — Z95.9 PRESENCE OF CARDIAC AND VASCULAR IMPLANT AND GRAFT, UNSPECIFIED: Chronic | ICD-10-CM

## 2019-01-25 DIAGNOSIS — Z79.01 LONG TERM (CURRENT) USE OF ANTICOAGULANTS: ICD-10-CM

## 2019-01-25 DIAGNOSIS — I48.91 UNSPECIFIED ATRIAL FIBRILLATION: ICD-10-CM

## 2019-01-25 LAB
POCT INR: 4.8 RATIO — HIGH (ref 0.9–1.2)
POCT PT: 58 SEC — HIGH (ref 10–13.4)

## 2019-02-01 ENCOUNTER — OUTPATIENT (OUTPATIENT)
Dept: OUTPATIENT SERVICES | Facility: HOSPITAL | Age: 69
LOS: 1 days | Discharge: HOME | End: 2019-02-01

## 2019-02-01 DIAGNOSIS — Z12.11 ENCOUNTER FOR SCREENING FOR MALIGNANT NEOPLASM OF COLON: Chronic | ICD-10-CM

## 2019-02-01 DIAGNOSIS — Z95.9 PRESENCE OF CARDIAC AND VASCULAR IMPLANT AND GRAFT, UNSPECIFIED: Chronic | ICD-10-CM

## 2019-02-01 DIAGNOSIS — Z96.653 PRESENCE OF ARTIFICIAL KNEE JOINT, BILATERAL: Chronic | ICD-10-CM

## 2019-02-01 DIAGNOSIS — Z79.01 LONG TERM (CURRENT) USE OF ANTICOAGULANTS: ICD-10-CM

## 2019-02-01 DIAGNOSIS — I48.91 UNSPECIFIED ATRIAL FIBRILLATION: ICD-10-CM

## 2019-02-01 LAB
POCT INR: 3 RATIO — HIGH (ref 0.9–1.2)
POCT PT: 36.4 SEC — HIGH (ref 10–13.4)

## 2019-02-04 ENCOUNTER — APPOINTMENT (OUTPATIENT)
Dept: OTOLARYNGOLOGY | Facility: CLINIC | Age: 69
End: 2019-02-04

## 2019-02-08 ENCOUNTER — OUTPATIENT (OUTPATIENT)
Dept: OUTPATIENT SERVICES | Facility: HOSPITAL | Age: 69
LOS: 1 days | Discharge: HOME | End: 2019-02-08

## 2019-02-08 ENCOUNTER — APPOINTMENT (OUTPATIENT)
Dept: OTOLARYNGOLOGY | Facility: CLINIC | Age: 69
End: 2019-02-08
Payer: MEDICARE

## 2019-02-08 VITALS — HEIGHT: 69 IN

## 2019-02-08 DIAGNOSIS — Z96.653 PRESENCE OF ARTIFICIAL KNEE JOINT, BILATERAL: Chronic | ICD-10-CM

## 2019-02-08 DIAGNOSIS — I48.91 UNSPECIFIED ATRIAL FIBRILLATION: ICD-10-CM

## 2019-02-08 DIAGNOSIS — E11.9 TYPE 2 DIABETES MELLITUS W/OUT COMPLICATIONS: ICD-10-CM

## 2019-02-08 DIAGNOSIS — Z79.01 LONG TERM (CURRENT) USE OF ANTICOAGULANTS: ICD-10-CM

## 2019-02-08 DIAGNOSIS — Z95.9 PRESENCE OF CARDIAC AND VASCULAR IMPLANT AND GRAFT, UNSPECIFIED: Chronic | ICD-10-CM

## 2019-02-08 DIAGNOSIS — Z78.9 OTHER SPECIFIED HEALTH STATUS: ICD-10-CM

## 2019-02-08 DIAGNOSIS — Z12.11 ENCOUNTER FOR SCREENING FOR MALIGNANT NEOPLASM OF COLON: Chronic | ICD-10-CM

## 2019-02-08 LAB
POCT INR: 3.5 RATIO — HIGH (ref 0.9–1.2)
POCT PT: 42.1 SEC — HIGH (ref 10–13.4)

## 2019-02-08 PROCEDURE — 99204 OFFICE O/P NEW MOD 45 MIN: CPT | Mod: 25

## 2019-02-08 PROCEDURE — 92570 ACOUSTIC IMMITANCE TESTING: CPT

## 2019-02-08 PROCEDURE — 92557 COMPREHENSIVE HEARING TEST: CPT

## 2019-02-08 NOTE — CONSULT LETTER
[Dear  ___] : Dear  [unfilled], [Consult Letter:] : I had the pleasure of evaluating your patient, [unfilled]. [Please see my note below.] : Please see my note below. [Consult Closing:] : Thank you very much for allowing me to participate in the care of this patient.  If you have any questions, please do not hesitate to contact me. [Sincerely,] : Sincerely, [FreeTextEntry2] : Dr Meño Stover  [FreeTextEntry3] : Apoorva Simpson MD\par Otolaryngology - Head & Neck Surgery\par

## 2019-02-08 NOTE — DATA REVIEWED
[de-identified] : 2/8/19: right: mod SNHL 250-8khz (mild at 1-2khz), left mod-severe SNHL 250-8khz (mod at 1-2khz). Type A tymps bilaterally

## 2019-02-08 NOTE — HISTORY OF PRESENT ILLNESS
[de-identified] : 68 year old patient is present today for clogged ears for the past month, is bilateral. He does wear hearing aid though hasn't used them in a while, suspects they need adjusting. Has been wearing hearing aids for 10 years. He has not had a hearing test recently. Denies hx ear surgeries/ear infections. Gradual onset of hearing loss initially.

## 2019-02-15 ENCOUNTER — OUTPATIENT (OUTPATIENT)
Dept: OUTPATIENT SERVICES | Facility: HOSPITAL | Age: 69
LOS: 1 days | Discharge: HOME | End: 2019-02-15

## 2019-02-15 DIAGNOSIS — Z79.01 LONG TERM (CURRENT) USE OF ANTICOAGULANTS: ICD-10-CM

## 2019-02-15 DIAGNOSIS — Z95.9 PRESENCE OF CARDIAC AND VASCULAR IMPLANT AND GRAFT, UNSPECIFIED: Chronic | ICD-10-CM

## 2019-02-15 DIAGNOSIS — Z12.11 ENCOUNTER FOR SCREENING FOR MALIGNANT NEOPLASM OF COLON: Chronic | ICD-10-CM

## 2019-02-15 DIAGNOSIS — I48.91 UNSPECIFIED ATRIAL FIBRILLATION: ICD-10-CM

## 2019-02-15 DIAGNOSIS — Z96.653 PRESENCE OF ARTIFICIAL KNEE JOINT, BILATERAL: Chronic | ICD-10-CM

## 2019-02-15 LAB
POCT INR: 4.2 RATIO — HIGH (ref 0.9–1.2)
POCT PT: 50 SEC — HIGH (ref 10–13.4)

## 2019-02-25 ENCOUNTER — OUTPATIENT (OUTPATIENT)
Dept: OUTPATIENT SERVICES | Facility: HOSPITAL | Age: 69
LOS: 1 days | Discharge: HOME | End: 2019-02-25

## 2019-02-25 DIAGNOSIS — Z79.01 LONG TERM (CURRENT) USE OF ANTICOAGULANTS: ICD-10-CM

## 2019-02-25 DIAGNOSIS — Z95.9 PRESENCE OF CARDIAC AND VASCULAR IMPLANT AND GRAFT, UNSPECIFIED: Chronic | ICD-10-CM

## 2019-02-25 DIAGNOSIS — Z12.11 ENCOUNTER FOR SCREENING FOR MALIGNANT NEOPLASM OF COLON: Chronic | ICD-10-CM

## 2019-02-25 DIAGNOSIS — Z96.653 PRESENCE OF ARTIFICIAL KNEE JOINT, BILATERAL: Chronic | ICD-10-CM

## 2019-02-25 DIAGNOSIS — I48.91 UNSPECIFIED ATRIAL FIBRILLATION: ICD-10-CM

## 2019-02-25 LAB
POCT INR: 3 RATIO — HIGH (ref 0.9–1.2)
POCT PT: 36.3 SEC — HIGH (ref 10–13.4)

## 2019-03-07 ENCOUNTER — OUTPATIENT (OUTPATIENT)
Dept: OUTPATIENT SERVICES | Facility: HOSPITAL | Age: 69
LOS: 1 days | Discharge: HOME | End: 2019-03-07

## 2019-03-07 DIAGNOSIS — Z95.9 PRESENCE OF CARDIAC AND VASCULAR IMPLANT AND GRAFT, UNSPECIFIED: Chronic | ICD-10-CM

## 2019-03-07 DIAGNOSIS — I48.91 UNSPECIFIED ATRIAL FIBRILLATION: ICD-10-CM

## 2019-03-07 DIAGNOSIS — Z96.653 PRESENCE OF ARTIFICIAL KNEE JOINT, BILATERAL: Chronic | ICD-10-CM

## 2019-03-07 DIAGNOSIS — Z79.01 LONG TERM (CURRENT) USE OF ANTICOAGULANTS: ICD-10-CM

## 2019-03-07 DIAGNOSIS — Z12.11 ENCOUNTER FOR SCREENING FOR MALIGNANT NEOPLASM OF COLON: Chronic | ICD-10-CM

## 2019-03-07 LAB
POCT INR: 2.8 RATIO — HIGH (ref 0.9–1.2)
POCT PT: 33.7 SEC — HIGH (ref 10–13.4)

## 2019-03-15 ENCOUNTER — OUTPATIENT (OUTPATIENT)
Dept: OUTPATIENT SERVICES | Facility: HOSPITAL | Age: 69
LOS: 1 days | Discharge: HOME | End: 2019-03-15

## 2019-03-15 DIAGNOSIS — Z79.01 LONG TERM (CURRENT) USE OF ANTICOAGULANTS: ICD-10-CM

## 2019-03-15 DIAGNOSIS — Z95.9 PRESENCE OF CARDIAC AND VASCULAR IMPLANT AND GRAFT, UNSPECIFIED: Chronic | ICD-10-CM

## 2019-03-15 DIAGNOSIS — Z12.11 ENCOUNTER FOR SCREENING FOR MALIGNANT NEOPLASM OF COLON: Chronic | ICD-10-CM

## 2019-03-15 DIAGNOSIS — I48.91 UNSPECIFIED ATRIAL FIBRILLATION: ICD-10-CM

## 2019-03-15 DIAGNOSIS — Z96.653 PRESENCE OF ARTIFICIAL KNEE JOINT, BILATERAL: Chronic | ICD-10-CM

## 2019-03-15 LAB
POCT INR: 1.9 RATIO — HIGH (ref 0.9–1.2)
POCT PT: 22.7 SEC — HIGH (ref 10–13.4)

## 2019-03-19 ENCOUNTER — OUTPATIENT (OUTPATIENT)
Dept: OUTPATIENT SERVICES | Facility: HOSPITAL | Age: 69
LOS: 1 days | Discharge: HOME | End: 2019-03-19

## 2019-03-19 DIAGNOSIS — Z79.01 LONG TERM (CURRENT) USE OF ANTICOAGULANTS: ICD-10-CM

## 2019-03-19 DIAGNOSIS — I48.91 UNSPECIFIED ATRIAL FIBRILLATION: ICD-10-CM

## 2019-03-19 DIAGNOSIS — Z95.9 PRESENCE OF CARDIAC AND VASCULAR IMPLANT AND GRAFT, UNSPECIFIED: Chronic | ICD-10-CM

## 2019-03-19 DIAGNOSIS — Z12.11 ENCOUNTER FOR SCREENING FOR MALIGNANT NEOPLASM OF COLON: Chronic | ICD-10-CM

## 2019-03-19 DIAGNOSIS — Z96.653 PRESENCE OF ARTIFICIAL KNEE JOINT, BILATERAL: Chronic | ICD-10-CM

## 2019-03-29 ENCOUNTER — INPATIENT (INPATIENT)
Facility: HOSPITAL | Age: 69
LOS: 4 days | Discharge: HOME | End: 2019-04-03
Attending: INTERNAL MEDICINE | Admitting: INTERNAL MEDICINE
Payer: MEDICARE

## 2019-03-29 VITALS
OXYGEN SATURATION: 97 % | DIASTOLIC BLOOD PRESSURE: 65 MMHG | RESPIRATION RATE: 20 BRPM | HEART RATE: 75 BPM | SYSTOLIC BLOOD PRESSURE: 135 MMHG | TEMPERATURE: 98 F

## 2019-03-29 DIAGNOSIS — Z12.11 ENCOUNTER FOR SCREENING FOR MALIGNANT NEOPLASM OF COLON: Chronic | ICD-10-CM

## 2019-03-29 DIAGNOSIS — Z96.653 PRESENCE OF ARTIFICIAL KNEE JOINT, BILATERAL: Chronic | ICD-10-CM

## 2019-03-29 DIAGNOSIS — Z95.9 PRESENCE OF CARDIAC AND VASCULAR IMPLANT AND GRAFT, UNSPECIFIED: Chronic | ICD-10-CM

## 2019-03-29 LAB
ALBUMIN SERPL ELPH-MCNC: 2.6 G/DL — LOW (ref 3.5–5.2)
ALP SERPL-CCNC: 155 U/L — HIGH (ref 30–115)
ALT FLD-CCNC: 31 U/L — SIGNIFICANT CHANGE UP (ref 0–41)
ANION GAP SERPL CALC-SCNC: 11 MMOL/L — SIGNIFICANT CHANGE UP (ref 7–14)
AST SERPL-CCNC: 56 U/L — HIGH (ref 0–41)
BILIRUB DIRECT SERPL-MCNC: 1 MG/DL — HIGH (ref 0–0.2)
BILIRUB INDIRECT FLD-MCNC: 1.6 MG/DL — HIGH (ref 0.2–1.2)
BILIRUB SERPL-MCNC: 2.6 MG/DL — HIGH (ref 0.2–1.2)
BUN SERPL-MCNC: 15 MG/DL — SIGNIFICANT CHANGE UP (ref 10–20)
CALCIUM SERPL-MCNC: 8.2 MG/DL — LOW (ref 8.5–10.1)
CHLORIDE SERPL-SCNC: 109 MMOL/L — SIGNIFICANT CHANGE UP (ref 98–110)
CO2 SERPL-SCNC: 21 MMOL/L — SIGNIFICANT CHANGE UP (ref 17–32)
CREAT SERPL-MCNC: 1 MG/DL — SIGNIFICANT CHANGE UP (ref 0.7–1.5)
GLUCOSE SERPL-MCNC: 87 MG/DL — SIGNIFICANT CHANGE UP (ref 70–99)
HCT VFR BLD CALC: 29.1 % — LOW (ref 42–52)
HGB BLD-MCNC: 9.7 G/DL — LOW (ref 14–18)
INR BLD: 1.69 RATIO — HIGH (ref 0.65–1.3)
LACTATE SERPL-SCNC: 2.1 MMOL/L — SIGNIFICANT CHANGE UP (ref 0.5–2.2)
LIDOCAIN IGE QN: 40 U/L — SIGNIFICANT CHANGE UP (ref 7–60)
MAGNESIUM SERPL-MCNC: 1.6 MG/DL — LOW (ref 1.8–2.4)
MCHC RBC-ENTMCNC: 32 PG — HIGH (ref 27–31)
MCHC RBC-ENTMCNC: 33.3 G/DL — SIGNIFICANT CHANGE UP (ref 32–37)
MCV RBC AUTO: 96 FL — HIGH (ref 80–94)
NRBC # BLD: 0 /100 WBCS — SIGNIFICANT CHANGE UP (ref 0–0)
NT-PROBNP SERPL-SCNC: 138 PG/ML — SIGNIFICANT CHANGE UP (ref 0–300)
PLATELET # BLD AUTO: 85 K/UL — LOW (ref 130–400)
POTASSIUM SERPL-MCNC: 3.8 MMOL/L — SIGNIFICANT CHANGE UP (ref 3.5–5)
POTASSIUM SERPL-SCNC: 3.8 MMOL/L — SIGNIFICANT CHANGE UP (ref 3.5–5)
PROT SERPL-MCNC: 6.6 G/DL — SIGNIFICANT CHANGE UP (ref 6–8)
PROTHROM AB SERPL-ACNC: 19.3 SEC — HIGH (ref 9.95–12.87)
RBC # BLD: 3.03 M/UL — LOW (ref 4.7–6.1)
RBC # FLD: 14.9 % — HIGH (ref 11.5–14.5)
SODIUM SERPL-SCNC: 141 MMOL/L — SIGNIFICANT CHANGE UP (ref 135–146)
TROPONIN T SERPL-MCNC: <0.01 NG/ML — SIGNIFICANT CHANGE UP
WBC # BLD: 3.62 K/UL — LOW (ref 4.8–10.8)
WBC # FLD AUTO: 3.62 K/UL — LOW (ref 4.8–10.8)

## 2019-03-29 PROCEDURE — 93010 ELECTROCARDIOGRAM REPORT: CPT | Mod: 76

## 2019-03-29 RX ORDER — SUCRALFATE 1 G
1 TABLET ORAL
Qty: 0 | Refills: 0 | Status: DISCONTINUED | OUTPATIENT
Start: 2019-03-29 | End: 2019-04-03

## 2019-03-29 RX ORDER — PANTOPRAZOLE SODIUM 20 MG/1
40 TABLET, DELAYED RELEASE ORAL
Qty: 0 | Refills: 0 | Status: DISCONTINUED | OUTPATIENT
Start: 2019-03-29 | End: 2019-04-02

## 2019-03-29 RX ORDER — FUROSEMIDE 40 MG
40 TABLET ORAL ONCE
Qty: 0 | Refills: 0 | Status: COMPLETED | OUTPATIENT
Start: 2019-03-29 | End: 2019-03-29

## 2019-03-29 RX ORDER — FLECAINIDE ACETATE 50 MG
50 TABLET ORAL EVERY 12 HOURS
Qty: 0 | Refills: 0 | Status: DISCONTINUED | OUTPATIENT
Start: 2019-03-29 | End: 2019-04-03

## 2019-03-29 RX ORDER — FUROSEMIDE 40 MG
20 TABLET ORAL ONCE
Qty: 0 | Refills: 0 | Status: COMPLETED | OUTPATIENT
Start: 2019-03-29 | End: 2019-03-29

## 2019-03-29 RX ORDER — TAMSULOSIN HYDROCHLORIDE 0.4 MG/1
0.4 CAPSULE ORAL AT BEDTIME
Qty: 0 | Refills: 0 | Status: DISCONTINUED | OUTPATIENT
Start: 2019-03-29 | End: 2019-04-03

## 2019-03-29 RX ORDER — FLECAINIDE ACETATE 50 MG
50 TABLET ORAL ONCE
Qty: 0 | Refills: 0 | Status: COMPLETED | OUTPATIENT
Start: 2019-03-29 | End: 2019-03-29

## 2019-03-29 RX ORDER — CHOLECALCIFEROL (VITAMIN D3) 125 MCG
400 CAPSULE ORAL DAILY
Qty: 0 | Refills: 0 | Status: DISCONTINUED | OUTPATIENT
Start: 2019-03-29 | End: 2019-03-29

## 2019-03-29 RX ORDER — FUROSEMIDE 40 MG
40 TABLET ORAL ONCE
Qty: 0 | Refills: 0 | Status: DISCONTINUED | OUTPATIENT
Start: 2019-03-29 | End: 2019-03-29

## 2019-03-29 RX ORDER — FERROUS SULFATE 325(65) MG
325 TABLET ORAL THREE TIMES A DAY
Qty: 0 | Refills: 0 | Status: DISCONTINUED | OUTPATIENT
Start: 2019-03-29 | End: 2019-04-03

## 2019-03-29 RX ORDER — MAGNESIUM SULFATE 500 MG/ML
2 VIAL (ML) INJECTION ONCE
Qty: 0 | Refills: 0 | Status: COMPLETED | OUTPATIENT
Start: 2019-03-29 | End: 2019-03-29

## 2019-03-29 RX ORDER — ATORVASTATIN CALCIUM 80 MG/1
40 TABLET, FILM COATED ORAL AT BEDTIME
Qty: 0 | Refills: 0 | Status: DISCONTINUED | OUTPATIENT
Start: 2019-03-29 | End: 2019-04-03

## 2019-03-29 RX ORDER — ENOXAPARIN SODIUM 100 MG/ML
40 INJECTION SUBCUTANEOUS AT BEDTIME
Qty: 0 | Refills: 0 | Status: DISCONTINUED | OUTPATIENT
Start: 2019-03-29 | End: 2019-03-30

## 2019-03-29 RX ORDER — SOTALOL HCL 120 MG
1 TABLET ORAL
Qty: 0 | Refills: 0 | COMMUNITY

## 2019-03-29 RX ORDER — DILTIAZEM HCL 120 MG
120 CAPSULE, EXT RELEASE 24 HR ORAL DAILY
Qty: 0 | Refills: 0 | Status: DISCONTINUED | OUTPATIENT
Start: 2019-03-29 | End: 2019-04-03

## 2019-03-29 RX ORDER — CHOLECALCIFEROL (VITAMIN D3) 125 MCG
1000 CAPSULE ORAL DAILY
Qty: 0 | Refills: 0 | Status: DISCONTINUED | OUTPATIENT
Start: 2019-03-29 | End: 2019-04-03

## 2019-03-29 RX ADMIN — Medication 50 MILLIGRAM(S): at 22:58

## 2019-03-29 RX ADMIN — Medication 50 MILLIGRAM(S): at 22:00

## 2019-03-29 RX ADMIN — ENOXAPARIN SODIUM 40 MILLIGRAM(S): 100 INJECTION SUBCUTANEOUS at 22:02

## 2019-03-29 RX ADMIN — Medication 325 MILLIGRAM(S): at 22:01

## 2019-03-29 RX ADMIN — ATORVASTATIN CALCIUM 40 MILLIGRAM(S): 80 TABLET, FILM COATED ORAL at 22:01

## 2019-03-29 RX ADMIN — Medication 50 MILLIGRAM(S): at 23:05

## 2019-03-29 RX ADMIN — TAMSULOSIN HYDROCHLORIDE 0.4 MILLIGRAM(S): 0.4 CAPSULE ORAL at 22:02

## 2019-03-29 RX ADMIN — Medication 20 MILLIGRAM(S): at 17:57

## 2019-03-29 RX ADMIN — Medication 50 GRAM(S): at 16:22

## 2019-03-29 RX ADMIN — Medication 40 MILLIGRAM(S): at 23:00

## 2019-03-29 NOTE — ED PROVIDER NOTE - CLINICAL SUMMARY MEDICAL DECISION MAKING FREE TEXT BOX
pt and family aware of all labs and imaging, discussion with gi, plan for admission as discussion with gi for therupetutic paracentesis and further evaluation and treatment, pt agrees.

## 2019-03-29 NOTE — H&P ADULT - HISTORY OF PRESENT ILLNESS
69 yo M with PMH of AFIB on Coumadin, HTN, anemia, hyperlipidemia, CHF, BPH, DM, varices 4 years ago follows with GI Dr. Sutherland, as per pt after varices diagnosed pt was found to have liver cirrhosis secondary to JIMNÉEZ, hospital admission in November with LE edema was given Lasix but was not prescribed Lasix for CHF and was not discharged on lasix.    Presents for eval of b/l LE edema, abdominal distention and SOB on exertion  x 1 month associated with epigastric discomfort.  Pt reports he gained 30 pounds over last 4 weeks. Pt reports he has never had a paracentesis. Denies any fever, n/v, chest pain, palpitations, diaphoresis, cough, dizziness, numbness/tingling, neck pain/ stiffness, abd pain, diarrhea, constipation, melena/brbpr, urinary symptoms, trauma, weakness, calf pain/erythema, sick contacts, recent travel or rash.

## 2019-03-29 NOTE — H&P ADULT - NSHPLABSRESULTS_GEN_ALL_CORE
Labs:                        9.7    3.62  )-----------( 85       ( 29 Mar 2019 15:18 )             29.1             03-29    141  |  109  |  15  ----------------------------<  87  3.8   |  21  |  1.0    Ca    8.2<L>      29 Mar 2019 15:18  Mg     1.6     03-29    TPro  6.6  /  Alb  2.6<L>  /  TBili  2.6<H>  /  DBili  1.0<H>  /  AST  56<H>  /  ALT  31  /  AlkPhos  155<H>  03-29    LIVER FUNCTIONS - ( 29 Mar 2019 15:18 )  Alb: 2.6 g/dL / Pro: 6.6 g/dL / ALK PHOS: 155 U/L / ALT: 31 U/L / AST: 56 U/L / GGT: x                 PT/INR - ( 29 Mar 2019 15:18 )   PT: 19.30 sec;   INR: 1.69 ratio           CARDIAC MARKERS ( 29 Mar 2019 15:18 )  x     / <0.01 ng/mL / x     / x     / x

## 2019-03-29 NOTE — ED ADULT NURSE NOTE - OBJECTIVE STATEMENT
moderate assist (50% patients effort) Pt c/o B/L LE swelling, abdominal distention, and shortness of breath x3 weeks. Pt also endorses 30 pound weight gain, denies taking any diuretics. Denies fevers, chills, nausea, vomiting, chest pain, dizziness, cough, headache, palpitations, back pain, or urinary symptoms.

## 2019-03-29 NOTE — ED PROVIDER NOTE - CARE PLAN
Principal Discharge DX:	Ascites Principal Discharge DX:	Ascites  Secondary Diagnosis:	Shortness of breath  Secondary Diagnosis:	Edema of lower extremity

## 2019-03-29 NOTE — ED PROVIDER NOTE - PHYSICAL EXAMINATION
CONSTITUTIONAL: Well-developed; well-nourished; in no acute distress.   SKIN: warm, dry  HEAD: Normocephalic; atraumatic.  EYES: PERRL, EOMI, no conjunctival erythema  ENT: No nasal discharge; airway clear.  NECK: Supple; non tender.  CARD: S1, S2 normal; no murmurs, gallops, or rubs. Regular rate and rhythm.   RESP: No wheezes, rales or rhonchi.  ABD: soft, distended, no peritoneal signs.  EXT: Normal ROM.  No clubbing, cyanosis. + B/L 2+ pitting edema.   LYMPH: No acute cervical adenopathy.  NEURO: Alert, oriented, grossly unremarkable  PSYCH: Cooperative, appropriate.

## 2019-03-29 NOTE — ED PROVIDER NOTE - NS ED ROS FT
Eyes:  No visual changes, eye pain or discharge.  ENMT:  No hearing changes, pain, no sore throat or runny nose, no difficulty swallowing  Cardiac:  No chest pain, +SOB with exertion - edema. No chest pain with exertion.  Respiratory:  No cough or respiratory distress. No hemoptysis. No history of asthma or RAD.  GI:  No nausea, vomiting, diarrhea or abdominal pain. +distension  :  No dysuria, frequency or burning.  MS:  No myalgia, muscle weakness, joint pain or back pain.  Neuro:  No headache or weakness.  No LOC.  Skin:  No skin rash.   Endocrine: No history of thyroid disease or diabetes.

## 2019-03-29 NOTE — ED PROVIDER NOTE - OBJECTIVE STATEMENT
68M with pmh of AFib on coumadin (De Souza), HTN, anemia, HLD, CHF, DM, varicies (Ena), JIMÉNEZ cirrhosis presents with B/L LE edema and abd distension and dyspnea on exertion for the past month. States that he gained over 30 lbs over the past month. Denies any fever, chills, n/v/d, abd pain, CP or SOB.

## 2019-03-29 NOTE — H&P ADULT - NSICDXPASTMEDICALHX_GEN_ALL_CORE_FT
PAST MEDICAL HISTORY:  Afib     Anemia     BPH (benign prostatic hyperplasia)     Cirrhosis of liver     Diabetes     Dyspnea on exertion     High cholesterol     HTN (hypertension)

## 2019-03-29 NOTE — H&P ADULT - ASSESSMENT
67 yo M with PMH of AFIB on Coumadin, HTN, anemia, hyperlipidemia, BPH, DM, varices 4 years ago, as per pt after varices diagnosed pt was found to have liver cirrhosis secondary to JIMÉNEZ, hospital admission in November with LE edema was given Lasix but was not prescribed Lasix for CHF and was not discharged on lasix.    Presents for eval of b/l LE edema, abdominal distention and SOB on exertion  x 1 month associated with epigastric discomfort.  Weight gain of 30 ibs over last 4 weeks    #SOB, b/l LE edema, weight gain (ascites 2/2 JIMÉNEZ/cirrhosis vs CHF)  -f/u EKG  -initial troponin negative  -f/u echo  -BP only 138  -CXR: left basilar atelectasis.  No pulmonary edema  -mildly elevated AST and ALP (but lower than during last admission in Nov 2018), recheck in am   -GI consult made, ER discussed with Dr. Sutherland (pt's GI doctor), advised therapeutic paracentesis tomorrow  -follow up morning INR, will hold warfarin tonight given procedure tomorrow  -systolic BP currently 150, but was 100 in ER, IV lasix stat 40mg (was given 20mg in ER), reassess BP in am if patient able to take further lasix    #AFib  -currently rate controlled  -hold warfarin for now, restart after paracentesis (on 2.5mg 6x/week, on 5mg once per week (Mondays))  -Dr. Payne recently stopped sotalol and started diltiazem and flecainide     #DM2  -on januvia at home - will hold in hospital  -f/u FS, if > 180 start lispro/glargine regimen  -lyrica for peripheral neuropathy      #DLD  on atorvastatin      enoxaparin for dvt ppx, stop when pt restarts warfarin  carb consistent diet, low sodium diet, 1500mL fluid restriction, daily weights  full code

## 2019-03-29 NOTE — H&P ADULT - NSHPPHYSICALEXAM_GEN_ALL_CORE
PHYSICAL EXAM:  GENERAL: NAD, speaks in full sentences, no signs of respiratory distress  HEAD:  Atraumatic, Normocephalic  EYES: EOMI, PERRLA, conjunctiva and sclera clear  NECK: Supple, No JVD  CHEST/LUNG: No wheeze; mild crackles at bases; No accessory muscles used  HEART: S1, S2, No murmurs  ABDOMEN: Soft, Nontender, abdomen distended, bowel sounds present; No guarding  EXTREMITIES:  2+ Peripheral Pulses, No cyanosis, extensive edema from feet to mid thigh bilaterally   PSYCH: AAOx3  NEUROLOGY: CN II - XII intact, strength 5/5 all four limbs, sensation intact all four limbs

## 2019-03-29 NOTE — ED PROVIDER NOTE - PROGRESS NOTE DETAILS
ATTENDING NOTE:   67 y/o M with PMH of AFIB on Coumadin, last I&R 2.8 2 weeks ago, cardiologist is Dr. De Souza, HTN, anemia, hyperlipidemia, CHF, BPH, DM, abdominal varices 4 years ago follows with GI Dr. Sutherland,  liver cirrhosis secondary to JIMÉNEZ, seen in ED in February was given Lasix but was not prescribed Lasix for swelling, presents for eval of b/l LE edema, abdominal distention and SOB on exertion  x 1 month associated with epigastric chest discomfort.  Pt reports he gained 30 pounds. Pt is not on a water pill. Last endoscopy was 6-8 months ago. Denies taking a water pill or being prescribe a water pill recently. No fever, chills, n/v, cp, pleuritic cp, palpitations, diaphoresis, cough, ha/lh/dizziness, numbness/tingling, neck pain/ stiffness, abd pain, diarrhea, constipation, melena/brbpr, urinary symptoms, trauma, weakness, calf pain/erythema, sick contacts, recent travel or rash. On exam: Vital Signs: I have reviewed the initial vital signs. Constitutional: WDWN in nad. Sitting on stretcher in nad. Skin: No rash. ENT: MMM NECK: Supple, non-tender, no meningeal signs. CARDIO: RRR, radial pulses 2/4 b/l. No JVD. RESP: BS present b/l, ctabl, no wheezing or crackles, good resp effort and excursion, good air exchange,  no accessory muscle use, no stridor. GI: BS present throughout all 4 quadrants, soft, (+) distention, nt, no rebound tenderness or guarding, no cvat. MSK: FROM, (+) 2+ pitting edema, no calf pain/swelling/erythema. Neurologic: AAOx3, motor 5/5 and sensation intact throughout UE and LE ext, CN II-XII intact, No facial droop or slurring of speech. No focal deficits. A/P: Will get labs, CXR, GI consult, monitor and reassess. Spoke with Dr. Sutherland who requests therapeutic paracentesis, low suspicion for SBP, but cultures requested. Will admit for paracentesis ATTENDING NOTE:   69 y/o M with PMH of AFIB on Coumadin, last INR 2.8 ~2 weeks ago, cardiologist is Dr. De Souza, HTN, anemia, hyperlipidemia, CHF, BPH, DM, abdominal varices 4 years ago follows with GI Dr. Sutherland,  liver cirrhosis secondary to JIMÉNEZ, seen in ED in February was given Lasix but was not prescribed Lasix for CHF, presents for eval of b/l LE edema, abdominal distention and SOB on exertion  x 1 month associated with epigastric discomfort.  Pt reports he gained 30 pounds over this past month. Pt is not on a water pill. Last endoscopy was 6-8 months ago. . No fever, chills, n/v, cp, pleuritic cp, palpitations, diaphoresis, cough, ha/lh/dizziness, numbness/tingling, neck pain/ stiffness, abd pain, diarrhea, constipation, melena/brbpr, urinary symptoms, trauma, weakness, calf pain/erythema, sick contacts, recent travel or rash. On exam: Vital Signs: I have reviewed the initial vital signs. Constitutional: WDWN in nad. Sitting on stretcher speaking full sentences. Skin: No rash. ENT: MMM NECK: Supple, non-tender, no meningeal signs. CARDIO: RRR, radial pulses 2/4 b/l. No JVD. RESP: BS present b/l, no0 wheezing , crackles to lung bases present, poor resp effort and excursion, poor air exchange,  no accessory muscle use, no stridor. GI: BS present throughout all 4 quadrants, soft, (+) distention, nt, no rebound tenderness or guarding, no cvat. MSK: FROM, (+) 2+ pitting edema, no calf pain/swelling/erythema. Neurologic: AAOx3, motor 5/5 and sensation intact throughout UE and LE ext, CN II-XII intact, No facial droop or slurring of speech. No focal deficits. A/P: Will get labs, CXR, GI consult, monitor and reassess. Spoke with Dr. Sutherland who requests therapeutic paracentesis during admisson, low suspicion for SBP, but cultures requested. Will admit for paracentesis ATTENDING NOTE:   67 y/o M with PMH of AFIB on Coumadin, last INR 2.8 ~2 weeks ago, cardiologist is Dr. De Souza, HTN, anemia, hyperlipidemia, CHF, BPH, DM, varices 4 years ago follows with GI Dr. Sutherland, liver cirrhosis secondary to JIMÉNEZ, admitted to Hospital in November was given Lasix but was not prescribed Lasix for CHF, presents for eval of b/l LE edema, abdominal distention and SOB on exertion  x 1 month associated with epigastric discomfort.  Pt reports he gained 30 pounds over this past month. Pt is not on a water pill. Last endoscopy was 6-8 months ago. Pt also reports he has never had a paracentesis. No fever, chills, n/v, cp, pleuritic cp, palpitations, diaphoresis, cough, ha/lh/dizziness, numbness/tingling, neck pain/ stiffness, abd pain, diarrhea, constipation, melena/brbpr, urinary symptoms, trauma, weakness, calf pain/erythema, sick contacts, recent travel or rash. On exam: Vital Signs: I have reviewed the initial vital signs. Constitutional: WDWN in nad. Sitting on stretcher speaking full sentences. Skin: No rash. ENT: MMM NECK: Supple, non-tender, no meningeal signs. CARDIO: RRR, radial pulses 2/4 b/l. No JVD. RESP: BS present b/l, no0 wheezing , crackles to lung bases present, poor resp effort and excursion, poor air exchange,  no accessory muscle use, no stridor. GI: BS present throughout all 4 quadrants, soft, (+) distention, nt, no rebound tenderness or guarding, no cvat. MSK: FROM, (+) 2+ pitting edema, no calf pain/swelling/erythema. Neurologic: AAOx3, motor 5/5 and sensation intact throughout UE and LE ext, CN II-XII intact, No facial droop or slurring of speech. No focal deficits. A/P: Will get labs, CXR, GI consult, monitor and reassess.

## 2019-03-29 NOTE — H&P ADULT - NSHPREVIEWOFSYSTEMS_GEN_ALL_CORE
REVIEW OF SYSTEMS:    CONSTITUTIONAL: No weakness or fever   EYES/ENT: No visual changes;  No vertigo or throat pain   NECK: No pain or stiffness  RESPIRATORY: No cough, wheezing, hemoptysis; +shortness of breath  CARDIOVASCULAR: No chest pain or palpitations  GASTROINTESTINAL: central abdominal discomfort since abdominal swelling started. No nausea, vomiting, or hematemesis; No diarrhea or constipation. No melena or hematochezia.  GENITOURINARY: No dysuria, frequency or hematuria  NEUROLOGICAL: No numbness or weakness  SKIN: No itching, rashes

## 2019-03-29 NOTE — H&P ADULT - NSICDXPASTSURGICALHX_GEN_ALL_CORE_FT
PAST SURGICAL HISTORY:  Encounter for screening colonoscopy 1 year ago    Presence of bilateral total knee joint prostheses     S/P angioplasty with stent 2 cardiac stents

## 2019-03-29 NOTE — H&P ADULT - NSHPSOCIALHISTORY_GEN_ALL_CORE
no smoking history  no alcohol intake  no illicit drug use    is an only child, has two healthy adult children

## 2019-03-30 LAB
ALBUMIN SERPL ELPH-MCNC: 2.2 G/DL — LOW (ref 3.5–5.2)
ALP SERPL-CCNC: 138 U/L — HIGH (ref 30–115)
ALT FLD-CCNC: 27 U/L — SIGNIFICANT CHANGE UP (ref 0–41)
ANION GAP SERPL CALC-SCNC: 10 MMOL/L — SIGNIFICANT CHANGE UP (ref 7–14)
AST SERPL-CCNC: 48 U/L — HIGH (ref 0–41)
BILIRUB SERPL-MCNC: 2.5 MG/DL — HIGH (ref 0.2–1.2)
BUN SERPL-MCNC: 16 MG/DL — SIGNIFICANT CHANGE UP (ref 10–20)
CALCIUM SERPL-MCNC: 7.9 MG/DL — LOW (ref 8.5–10.1)
CHLORIDE SERPL-SCNC: 109 MMOL/L — SIGNIFICANT CHANGE UP (ref 98–110)
CO2 SERPL-SCNC: 22 MMOL/L — SIGNIFICANT CHANGE UP (ref 17–32)
CREAT SERPL-MCNC: 1.2 MG/DL — SIGNIFICANT CHANGE UP (ref 0.7–1.5)
ESTIMATED AVERAGE GLUCOSE: 94 MG/DL — SIGNIFICANT CHANGE UP (ref 68–114)
GLUCOSE BLDC GLUCOMTR-MCNC: 114 MG/DL — HIGH (ref 70–99)
GLUCOSE BLDC GLUCOMTR-MCNC: 117 MG/DL — HIGH (ref 70–99)
GLUCOSE SERPL-MCNC: 94 MG/DL — SIGNIFICANT CHANGE UP (ref 70–99)
HBA1C BLD-MCNC: 4.9 % — SIGNIFICANT CHANGE UP (ref 4–5.6)
HCT VFR BLD CALC: 27.1 % — LOW (ref 42–52)
HCV AB S/CO SERPL IA: 0.2 S/CO — SIGNIFICANT CHANGE UP (ref 0–0.79)
HCV AB SERPL-IMP: SIGNIFICANT CHANGE UP
HGB BLD-MCNC: 8.9 G/DL — LOW (ref 14–18)
INR BLD: 1.94 RATIO — HIGH (ref 0.65–1.3)
MCHC RBC-ENTMCNC: 31.9 PG — HIGH (ref 27–31)
MCHC RBC-ENTMCNC: 32.8 G/DL — SIGNIFICANT CHANGE UP (ref 32–37)
MCV RBC AUTO: 97.1 FL — HIGH (ref 80–94)
NRBC # BLD: 0 /100 WBCS — SIGNIFICANT CHANGE UP (ref 0–0)
PLATELET # BLD AUTO: 77 K/UL — LOW (ref 130–400)
POTASSIUM SERPL-MCNC: 3.5 MMOL/L — SIGNIFICANT CHANGE UP (ref 3.5–5)
POTASSIUM SERPL-SCNC: 3.5 MMOL/L — SIGNIFICANT CHANGE UP (ref 3.5–5)
PROT SERPL-MCNC: 5.9 G/DL — LOW (ref 6–8)
PROTHROM AB SERPL-ACNC: 22.2 SEC — HIGH (ref 9.95–12.87)
RBC # BLD: 2.79 M/UL — LOW (ref 4.7–6.1)
RBC # FLD: 14.8 % — HIGH (ref 11.5–14.5)
SODIUM SERPL-SCNC: 141 MMOL/L — SIGNIFICANT CHANGE UP (ref 135–146)
TROPONIN T SERPL-MCNC: <0.01 NG/ML — SIGNIFICANT CHANGE UP
WBC # BLD: 2.97 K/UL — LOW (ref 4.8–10.8)
WBC # FLD AUTO: 2.97 K/UL — LOW (ref 4.8–10.8)

## 2019-03-30 RX ORDER — ENOXAPARIN SODIUM 100 MG/ML
60 INJECTION SUBCUTANEOUS ONCE
Qty: 0 | Refills: 0 | Status: COMPLETED | OUTPATIENT
Start: 2019-03-30 | End: 2019-03-30

## 2019-03-30 RX ORDER — ENOXAPARIN SODIUM 100 MG/ML
100 INJECTION SUBCUTANEOUS EVERY 12 HOURS
Qty: 0 | Refills: 0 | Status: DISCONTINUED | OUTPATIENT
Start: 2019-03-30 | End: 2019-04-01

## 2019-03-30 RX ORDER — MAGNESIUM SULFATE 500 MG/ML
2 VIAL (ML) INJECTION ONCE
Qty: 0 | Refills: 0 | Status: COMPLETED | OUTPATIENT
Start: 2019-03-30 | End: 2019-03-30

## 2019-03-30 RX ADMIN — Medication 120 MILLIGRAM(S): at 05:34

## 2019-03-30 RX ADMIN — Medication 1 GRAM(S): at 05:35

## 2019-03-30 RX ADMIN — ENOXAPARIN SODIUM 60 MILLIGRAM(S): 100 INJECTION SUBCUTANEOUS at 23:48

## 2019-03-30 RX ADMIN — Medication 50 MILLIGRAM(S): at 05:34

## 2019-03-30 RX ADMIN — ATORVASTATIN CALCIUM 40 MILLIGRAM(S): 80 TABLET, FILM COATED ORAL at 21:51

## 2019-03-30 RX ADMIN — Medication 50 MILLIGRAM(S): at 13:28

## 2019-03-30 RX ADMIN — Medication 325 MILLIGRAM(S): at 13:26

## 2019-03-30 RX ADMIN — ENOXAPARIN SODIUM 40 MILLIGRAM(S): 100 INJECTION SUBCUTANEOUS at 21:52

## 2019-03-30 RX ADMIN — Medication 50 MILLIGRAM(S): at 05:33

## 2019-03-30 RX ADMIN — TAMSULOSIN HYDROCHLORIDE 0.4 MILLIGRAM(S): 0.4 CAPSULE ORAL at 21:51

## 2019-03-30 RX ADMIN — Medication 1 GRAM(S): at 11:51

## 2019-03-30 RX ADMIN — Medication 1 GRAM(S): at 23:52

## 2019-03-30 RX ADMIN — Medication 1000 UNIT(S): at 12:18

## 2019-03-30 RX ADMIN — Medication 1 GRAM(S): at 17:38

## 2019-03-30 RX ADMIN — Medication 325 MILLIGRAM(S): at 21:51

## 2019-03-30 RX ADMIN — Medication 1 GRAM(S): at 00:17

## 2019-03-30 RX ADMIN — Medication 50 GRAM(S): at 12:58

## 2019-03-30 RX ADMIN — Medication 325 MILLIGRAM(S): at 05:34

## 2019-03-30 RX ADMIN — PANTOPRAZOLE SODIUM 40 MILLIGRAM(S): 20 TABLET, DELAYED RELEASE ORAL at 08:50

## 2019-03-30 RX ADMIN — Medication 50 MILLIGRAM(S): at 17:38

## 2019-03-30 RX ADMIN — Medication 50 MILLIGRAM(S): at 21:52

## 2019-03-30 NOTE — PROGRESS NOTE ADULT - ASSESSMENT
SUBJECTIVE:    Patient is a 68y old Male who presents with a chief complaint of SOB, KIM, bilateral lower extremity swelling (30 Mar 2019 13:52)    Currently admitted to medicine with the primary diagnosis of Ascites     Today is hospital day 1d. This morning he is resting comfortably in bed and reports no new issues or overnight events.   denies nausea,  vomiting.   PAST MEDICAL & SURGICAL HISTORY  BPH (benign prostatic hyperplasia)  Dyspnea on exertion  Cirrhosis of liver  Afib  Diabetes  Anemia  High cholesterol  HTN (hypertension)  Encounter for screening colonoscopy: 1 year ago  S/P angioplasty with stent: 2 cardiac stents  Presence of bilateral total knee joint prostheses    SOCIAL HISTORY:  Negative for smoking/alcohol/drug use.     ALLERGIES:  No Known Allergies    MEDICATIONS:  STANDING MEDICATIONS  atorvastatin 40 milliGRAM(s) Oral at bedtime  cholecalciferol 1000 Unit(s) Oral daily  diltiazem    milliGRAM(s) Oral daily  enoxaparin Injectable 40 milliGRAM(s) SubCutaneous at bedtime  ferrous    sulfate 325 milliGRAM(s) Oral three times a day  flecainide 50 milliGRAM(s) Oral every 12 hours  pantoprazole    Tablet 40 milliGRAM(s) Oral before breakfast  pregabalin 50 milliGRAM(s) Oral three times a day  sucralfate 1 Gram(s) Oral four times a day  tamsulosin 0.4 milliGRAM(s) Oral at bedtime    PRN MEDICATIONS    VITALS:   T(F): 97.8  HR: 70  BP: 128/57  RR: 16  SpO2: 97%    LABS:                        8.9    2.97  )-----------( 77       ( 30 Mar 2019 06:48 )             27.1     03-30    141  |  109  |  16  ----------------------------<  94  3.5   |  22  |  1.2    Ca    7.9<L>      30 Mar 2019 06:48  Mg     1.6     03-29    TPro  5.9<L>  /  Alb  2.2<L>  /  TBili  2.5<H>  /  DBili  x   /  AST  48<H>  /  ALT  27  /  AlkPhos  138<H>  03-30    PT/INR - ( 30 Mar 2019 06:48 )   PT: 22.20 sec;   INR: 1.94 ratio               Troponin T, Serum: <0.01 ng/mL (03-30-19 @ 06:48)      CARDIAC MARKERS ( 30 Mar 2019 06:48 )  x     / <0.01 ng/mL / x     / x     / x      CARDIAC MARKERS ( 29 Mar 2019 15:18 )  x     / <0.01 ng/mL / x     / x     / x          RADIOLOGY:    PHYSICAL EXAM:  GEN: No acute distress  LUNGS: Clear to auscultation bilaterally   HEART: Regular  ABD: Soft, non-tender, mildly distended.  EXT: No edema legs.    NEURO: AAOX3    67 yo M with PMH of AFIB on Coumadin, HTN, anemia, hyperlipidemia, BPH, DM, varices 4 years ago, as per pt after varices diagnosed pt was found to have liver cirrhosis secondary to JIMÉNEZ, hospital admission in November with LE edema was given Lasix but was not prescribed Lasix for CHF and was not discharged on lasix.    Presents for eval of b/l LE edema, abdominal distention and SOB on exertion  x 1 month associated with epigastric discomfort.  Weight gain of 30 ibs over last 4 weeks    #SOB, b/l LE edema, weight gain (ascites 2/2 JIMÉNEZ/cirrhosis vs CHF)  -f/u echo  -BP only 138  -CXR: left basilar atelectasis.  No pulmonary edema  -mildly elevated AST and ALP   -Dr. Sutherland (pt's GI doctor), advised therapeutic paracentesis. Consent in chart. Either by IR or bedside, will need to be done monday.   -follow up morning INR, will hold warfarin tonight given procedure likely Monday. Started Pt on Lovenox in the interim.     #AFib  -currently rate controlled  -hold warfarin for now, restart after paracentesis (on 2.5mg 6x/week, on 5mg once per week (Mondays))  -started lovenox in the interim.   -Dr. Payne recently stopped sotalol and started diltiazem and flecainide     #DM2  -on januvia at home - will hold in hospital  -f/u FS, if > 180 start lispro/glargine regimen  -lyrica for peripheral neuropathy      #DLD  on atorvastatin    carb consistent diet, low sodium diet, 1500mL fluid restriction, daily weights  full code

## 2019-03-31 LAB
ANION GAP SERPL CALC-SCNC: 9 MMOL/L — SIGNIFICANT CHANGE UP (ref 7–14)
APTT BLD: 44.1 SEC — HIGH (ref 27–39.2)
BASOPHILS # BLD AUTO: 0.04 K/UL — SIGNIFICANT CHANGE UP (ref 0–0.2)
BASOPHILS NFR BLD AUTO: 1.4 % — HIGH (ref 0–1)
BUN SERPL-MCNC: 19 MG/DL — SIGNIFICANT CHANGE UP (ref 10–20)
CALCIUM SERPL-MCNC: 7.8 MG/DL — LOW (ref 8.5–10.1)
CHLORIDE SERPL-SCNC: 107 MMOL/L — SIGNIFICANT CHANGE UP (ref 98–110)
CO2 SERPL-SCNC: 23 MMOL/L — SIGNIFICANT CHANGE UP (ref 17–32)
CREAT SERPL-MCNC: 1.2 MG/DL — SIGNIFICANT CHANGE UP (ref 0.7–1.5)
EOSINOPHIL # BLD AUTO: 0.15 K/UL — SIGNIFICANT CHANGE UP (ref 0–0.7)
EOSINOPHIL NFR BLD AUTO: 5.3 % — SIGNIFICANT CHANGE UP (ref 0–8)
GLUCOSE BLDC GLUCOMTR-MCNC: 104 MG/DL — HIGH (ref 70–99)
GLUCOSE BLDC GLUCOMTR-MCNC: 121 MG/DL — HIGH (ref 70–99)
GLUCOSE BLDC GLUCOMTR-MCNC: 95 MG/DL — SIGNIFICANT CHANGE UP (ref 70–99)
GLUCOSE SERPL-MCNC: 87 MG/DL — SIGNIFICANT CHANGE UP (ref 70–99)
HCT VFR BLD CALC: 25.2 % — LOW (ref 42–52)
HGB BLD-MCNC: 8.4 G/DL — LOW (ref 14–18)
IMM GRANULOCYTES NFR BLD AUTO: 0 % — LOW (ref 0.1–0.3)
INR BLD: 1.93 RATIO — HIGH (ref 0.65–1.3)
LYMPHOCYTES # BLD AUTO: 0.68 K/UL — LOW (ref 1.2–3.4)
LYMPHOCYTES # BLD AUTO: 23.9 % — SIGNIFICANT CHANGE UP (ref 20.5–51.1)
MAGNESIUM SERPL-MCNC: 1.7 MG/DL — LOW (ref 1.8–2.4)
MCHC RBC-ENTMCNC: 32.3 PG — HIGH (ref 27–31)
MCHC RBC-ENTMCNC: 33.3 G/DL — SIGNIFICANT CHANGE UP (ref 32–37)
MCV RBC AUTO: 96.9 FL — HIGH (ref 80–94)
MONOCYTES # BLD AUTO: 0.41 K/UL — SIGNIFICANT CHANGE UP (ref 0.1–0.6)
MONOCYTES NFR BLD AUTO: 14.4 % — HIGH (ref 1.7–9.3)
NEUTROPHILS # BLD AUTO: 1.56 K/UL — SIGNIFICANT CHANGE UP (ref 1.4–6.5)
NEUTROPHILS NFR BLD AUTO: 55 % — SIGNIFICANT CHANGE UP (ref 42.2–75.2)
NRBC # BLD: 0 /100 WBCS — SIGNIFICANT CHANGE UP (ref 0–0)
PLATELET # BLD AUTO: 71 K/UL — LOW (ref 130–400)
POTASSIUM SERPL-MCNC: 3.8 MMOL/L — SIGNIFICANT CHANGE UP (ref 3.5–5)
POTASSIUM SERPL-SCNC: 3.8 MMOL/L — SIGNIFICANT CHANGE UP (ref 3.5–5)
PROTHROM AB SERPL-ACNC: 22.1 SEC — HIGH (ref 9.95–12.87)
RBC # BLD: 2.6 M/UL — LOW (ref 4.7–6.1)
RBC # FLD: 14.8 % — HIGH (ref 11.5–14.5)
SODIUM SERPL-SCNC: 139 MMOL/L — SIGNIFICANT CHANGE UP (ref 135–146)
WBC # BLD: 2.84 K/UL — LOW (ref 4.8–10.8)
WBC # FLD AUTO: 2.84 K/UL — LOW (ref 4.8–10.8)

## 2019-03-31 RX ORDER — NYSTATIN CREAM 100000 [USP'U]/G
1 CREAM TOPICAL
Qty: 0 | Refills: 0 | Status: DISCONTINUED | OUTPATIENT
Start: 2019-03-31 | End: 2019-04-03

## 2019-03-31 RX ORDER — HYDROCORTISONE 1 %
1 OINTMENT (GRAM) TOPICAL
Qty: 0 | Refills: 0 | Status: DISCONTINUED | OUTPATIENT
Start: 2019-03-31 | End: 2019-04-03

## 2019-03-31 RX ADMIN — TAMSULOSIN HYDROCHLORIDE 0.4 MILLIGRAM(S): 0.4 CAPSULE ORAL at 21:51

## 2019-03-31 RX ADMIN — Medication 50 MILLIGRAM(S): at 06:09

## 2019-03-31 RX ADMIN — Medication 325 MILLIGRAM(S): at 06:08

## 2019-03-31 RX ADMIN — Medication 50 MILLIGRAM(S): at 17:34

## 2019-03-31 RX ADMIN — Medication 1 APPLICATION(S): at 17:57

## 2019-03-31 RX ADMIN — PANTOPRAZOLE SODIUM 40 MILLIGRAM(S): 20 TABLET, DELAYED RELEASE ORAL at 06:08

## 2019-03-31 RX ADMIN — NYSTATIN CREAM 1 APPLICATION(S): 100000 CREAM TOPICAL at 17:57

## 2019-03-31 RX ADMIN — Medication 1 GRAM(S): at 06:08

## 2019-03-31 RX ADMIN — Medication 50 MILLIGRAM(S): at 21:52

## 2019-03-31 RX ADMIN — Medication 325 MILLIGRAM(S): at 14:40

## 2019-03-31 RX ADMIN — Medication 50 MILLIGRAM(S): at 06:07

## 2019-03-31 RX ADMIN — Medication 1 GRAM(S): at 17:34

## 2019-03-31 RX ADMIN — Medication 50 MILLIGRAM(S): at 14:40

## 2019-03-31 RX ADMIN — ENOXAPARIN SODIUM 100 MILLIGRAM(S): 100 INJECTION SUBCUTANEOUS at 17:34

## 2019-03-31 RX ADMIN — ATORVASTATIN CALCIUM 40 MILLIGRAM(S): 80 TABLET, FILM COATED ORAL at 21:51

## 2019-03-31 RX ADMIN — Medication 325 MILLIGRAM(S): at 21:51

## 2019-03-31 RX ADMIN — Medication 120 MILLIGRAM(S): at 06:08

## 2019-03-31 RX ADMIN — Medication 1000 UNIT(S): at 11:54

## 2019-03-31 RX ADMIN — Medication 1 GRAM(S): at 11:54

## 2019-03-31 NOTE — CONSULT NOTE ADULT - ASSESSMENT
69 yo M with PMH of AFIB on Coumadin, currently coumadin on hold and bridging with lovenox , HTN, anemia, hyperlipidemia, BPH, DM, follows with GI Dr. Sutherland, as per pt  diagnosed to have liver cirrhosis secondary to JIMÉNEZ, GAVE s/p APC , portal hypertensive gastropathy , colon polyps     Presents for eval of b/l LE edema, abdominal distention and SOB on exertion  x 1 month associated with epigastric discomfort.  Weight gain of 30 ibs over last 4 weeks    #) New onset Ascites - symptomatic   rec diagnostic paracentesis , r/o SBP, check fluid cell count , SAAG, total protein, fluid c and s , LDH, glucose   If no SBP rec therapeutic paracentesis , if removed > 4 lit rec albumin supplementation   If SBP - Abx and IV albumin   low salt diet   IF ascites consistent with Portal HTN and no sbp   start on lasix 40 and aldactone 100 , monitor cr and lytes     # Decompensated Liver cirrhosis (reportedly JIMÉNEZ) MELD - 19  With Portal Hypertension features - thrombocytopenia , splenomegaly (on prior CT abd ), Ascites , no varices   + coagulopathy ( coumadin + liver disease )   NO encephalopathy   Sr. cr at base line     rec   usg abd , AFP   us every 6 months for hcc screening   check immune status hep a and b

## 2019-03-31 NOTE — PROGRESS NOTE ADULT - ASSESSMENT
#SOB, b/l LE edema, weight gain (ascites 2/2 JIMÉNEZ/cirrhosis vs CHF)    plan  going for paracentesis by IR    d/w daughter

## 2019-03-31 NOTE — CONSULT NOTE ADULT - SUBJECTIVE AND OBJECTIVE BOX
GI HPI:  Patient is a 68y old  Male who presents with a chief complaint of SOB, KIM, bilateral lower extremity swelling (31 Mar 2019 10:23)  67 yo M with PMH of AFIB on Coumadin, HTN, anemia, hyperlipidemia, CHF, BPH, DM, varices 4 years ago follows with GI Dr. Sutherland, as per pt after varices diagnosed pt was found to have liver cirrhosis secondary to JIMÉNEZ, hospital admission in November with LE edema was given Lasix but was not prescribed Lasix for CHF and was not discharged on lasix.    Presents for eval of b/l LE edema, abdominal distention and SOB on exertion  x 1 month associated with epigastric discomfort.  Pt reports he gained 30 pounds over last 4 weeks. Pt reports he has never had a paracentesis. Denies any fever, n/v, chest pain, palpitations, diaphoresis, cough, dizziness, numbness/tingling, neck pain/ stiffness, abd pain, diarrhea, constipation, melena/brbpr, urinary symptoms, trauma, weakness, calf pain/erythema, sick contacts, recent travel or rash. (29 Mar 2019 19:21)      PAST MEDICAL & SURGICAL HISTORY  BPH (benign prostatic hyperplasia)  Dyspnea on exertion  Cirrhosis of liver  Afib  Diabetes  Anemia  High cholesterol  HTN (hypertension)  Encounter for screening colonoscopy: 1 year ago  S/P angioplasty with stent: 2 cardiac stents  Presence of bilateral total knee joint prostheses      FAMILY HISTORY:  FAMILY HISTORY:  Family history of early CAD: mother      SOCIAL HISTORY:  smoker:   Alcohol:  Drug:    ALLERGIES:  No Known Allergies      MEDICATIONS:  MEDICATIONS  (STANDING):  atorvastatin 40 milliGRAM(s) Oral at bedtime  cholecalciferol 1000 Unit(s) Oral daily  diltiazem    milliGRAM(s) Oral daily  enoxaparin Injectable 100 milliGRAM(s) SubCutaneous every 12 hours  ferrous    sulfate 325 milliGRAM(s) Oral three times a day  flecainide 50 milliGRAM(s) Oral every 12 hours  hydrocortisone 2.5% Cream 1 Application(s) Topical two times a day  nystatin Cream 1 Application(s) Topical two times a day  pantoprazole    Tablet 40 milliGRAM(s) Oral before breakfast  pregabalin 50 milliGRAM(s) Oral three times a day  sucralfate 1 Gram(s) Oral four times a day  tamsulosin 0.4 milliGRAM(s) Oral at bedtime    MEDICATIONS  (PRN):      HOME MEDICATIONS:  Home Medications:  atorvastatin: 40 milligram(s) orally once a day (at bedtime) (29 Mar 2019 19:32)  Coumadin 2.5 mg oral tablet: 1 tab(s) orally 6 times a week, Tues, Weds, Thurs, Fri, Sat, Sun (29 Mar 2019 19:32)  Coumadin 5 mg oral tablet: 1 tab(s) orally once a week, on Monday (29 Mar 2019 19:32)  dilTIAZem 120 mg/24 hours oral capsule, extended release: 1 cap(s) orally once a day (29 Mar 2019 19:32)  ferrous sulfate 325 mg (65 mg elemental iron) oral tablet: 1 tab(s) orally 3 times a day (29 Mar 2019 19:32)  Fish Oil 1200 mg oral capsule: orally 2 times a day (29 Mar 2019 19:32)  flecainide 50 mg oral tablet: 1 tab(s) orally every 12 hours (29 Mar 2019 19:32)  Flomax 0.4 mg oral capsule: 1 cap(s) orally once a day (29 Mar 2019 19:32)  Januvia 50 mg oral tablet: 1 tab(s) orally once a day (29 Mar 2019 19:32)  Lyrica 50 mg oral capsule: 1 tab(s) orally once a day (29 Mar 2019 19:32)  pantoprazole 20 mg oral delayed release tablet: 1 tab(s) orally 2 times a day (29 Mar 2019 19:32)  pantoprazole 20 mg oral delayed release tablet: 1 tab(s) orally once a day (29 Mar 2019 19:32)  sucralfate 1 g oral tablet: 1 tab(s) orally 4 times a day (before meals and at bedtime) (29 Mar 2019 19:32)  Vitamin D3 400 intl units oral tablet: 2 tab(s) orally once a day (29 Mar 2019 19:32)      ROS:     REVIEW OF SYSTEMS  General:  No fevers  Eyes:  No reported pain   ENT:  No sore throat   NECK: No stiffness   CV:  No chest pain   Resp:  No shortness of breath  GI:  See HPI  :  No dysuria  Muscle:  No weakness  Neuro:  No tingling  Endocrine:  No polyuria  Heme:  No ecchymosis          VITALS:   T(F): 96.9 (03-31 @ 14:29), Max: 97.8 (03-30 @ 20:18)  HR: 112 (03-31 @ 14:29) (67 - 112)  BP: 127/78 (03-31 @ 14:29) (110/64 - 152/77)  BP(mean): --  RR: 18 (03-31 @ 14:29) (16 - 20)  SpO2: 96% (03-31 @ 06:04) (96% - 97%)    I&O's Summary    31 Mar 2019 07:01  -  31 Mar 2019 17:56  --------------------------------------------------------  IN: 0 mL / OUT: 600 mL / NET: -600 mL        PHYSICAL EXAM:  EYES: No scleral icterus   LUNG: Clear to auscultation bilaterally; No rales, rhonchi, wheezing, or rubs  HEART: RRR; No murmurs  ABDOMEN: Soft, +BS, Abdominal Tenderness, No guarding, No Boateng Sign   Rectal Exam:     LABS:                        8.4    2.84  )-----------( 71       ( 31 Mar 2019 08:10 )             25.2     PT/INR - ( 31 Mar 2019 08:10 )  INR: 1.93          PTT - ( 31 Mar 2019 08:10 )  PTT:44.1<H>  LIVER FUNCTIONS - ( 30 Mar 2019 06:48 )  Alb: 2.2 g/dL / Pro: 5.9 g/dL / ALK PHOS: 138 U/L / ALT: 27 U/L / AST: 48 U/L / GGT: x           03-31    139  |  107  |  19  ----------------------------<  87  3.8   |  23  |  1.2    Ca    7.8<L>      31 Mar 2019 08:10  Mg     1.7     03-31      CARDIAC MARKERS ( 30 Mar 2019 06:48 )  x     / <0.01 ng/mL / x     / x     / x              Previous EGD: 4/2018 ,  , no esophageal varices , PHTNG, GAVE s/p APC     Previous colonoscopy:9/ 2017 , fair prep , hemorrhoids, diverticulosis , 4 small polyps (path - one TA) rec to repeat colon in 3 years       RADIOLOGY:    < from: Xray Chest 1 View-PORTABLE IMMEDIATE (03.29.19 @ 14:47) >    Impression:      Left basilar atelectasis.    < end of copied text > GI HPI:  Patient is a 68y old  Male who presents with a chief complaint of SOB, KIM, bilateral lower extremity swelling (31 Mar 2019 10:23)  69 yo M with PMH of AFIB on Coumadin, HTN, anemia, hyperlipidemia, CHF, BPH, DM, follows with GI Dr. Sutherland, as per pt  diagnosed to have liver cirrhosis secondary to JIMÉNEZ, hospital admission in November with LE edema was given Lasix but was not prescribed Lasix for CHF and was not discharged on lasix.  Presents for eval of b/l LE edema, abdominal distention and SOB on exertion  x 1 month associated with epigastric discomfort.  Pt reports he gained 30 pounds over last 4 weeks. Pt reports he has never had a paracentesis. Denies any fever, n/v, chest pain, palpitations, diaphoresis, cough, dizziness, numbness/tingling, neck pain/ stiffness, abd pain, diarrhea, constipation, melena/brbpr, urinary symptoms, trauma, weakness, calf pain/erythema, sick contacts, recent travel or rash. (29 Mar 2019 19:21)      PAST MEDICAL & SURGICAL HISTORY  BPH (benign prostatic hyperplasia)  Dyspnea on exertion  Cirrhosis of liver  Afib  Diabetes  Anemia  High cholesterol  HTN (hypertension)  Encounter for screening colonoscopy: 1 year ago  S/P angioplasty with stent: 2 cardiac stents  Presence of bilateral total knee joint prostheses      FAMILY HISTORY:  FAMILY HISTORY:  Family history of early CAD: mother      SOCIAL HISTORY:  smoker:  denies   Alcohol: denies   Drug: denies     ALLERGIES:  No Known Allergies      MEDICATIONS:  MEDICATIONS  (STANDING):  atorvastatin 40 milliGRAM(s) Oral at bedtime  cholecalciferol 1000 Unit(s) Oral daily  diltiazem    milliGRAM(s) Oral daily  enoxaparin Injectable 100 milliGRAM(s) SubCutaneous every 12 hours  ferrous    sulfate 325 milliGRAM(s) Oral three times a day  flecainide 50 milliGRAM(s) Oral every 12 hours  hydrocortisone 2.5% Cream 1 Application(s) Topical two times a day  nystatin Cream 1 Application(s) Topical two times a day  pantoprazole    Tablet 40 milliGRAM(s) Oral before breakfast  pregabalin 50 milliGRAM(s) Oral three times a day  sucralfate 1 Gram(s) Oral four times a day  tamsulosin 0.4 milliGRAM(s) Oral at bedtime    MEDICATIONS  (PRN):      HOME MEDICATIONS:  Home Medications:  atorvastatin: 40 milligram(s) orally once a day (at bedtime) (29 Mar 2019 19:32)  Coumadin 2.5 mg oral tablet: 1 tab(s) orally 6 times a week, Tues, Weds, Thurs, Fri, Sat, Sun (29 Mar 2019 19:32)  Coumadin 5 mg oral tablet: 1 tab(s) orally once a week, on Monday (29 Mar 2019 19:32)  dilTIAZem 120 mg/24 hours oral capsule, extended release: 1 cap(s) orally once a day (29 Mar 2019 19:32)  ferrous sulfate 325 mg (65 mg elemental iron) oral tablet: 1 tab(s) orally 3 times a day (29 Mar 2019 19:32)  Fish Oil 1200 mg oral capsule: orally 2 times a day (29 Mar 2019 19:32)  flecainide 50 mg oral tablet: 1 tab(s) orally every 12 hours (29 Mar 2019 19:32)  Flomax 0.4 mg oral capsule: 1 cap(s) orally once a day (29 Mar 2019 19:32)  Januvia 50 mg oral tablet: 1 tab(s) orally once a day (29 Mar 2019 19:32)  Lyrica 50 mg oral capsule: 1 tab(s) orally once a day (29 Mar 2019 19:32)  pantoprazole 20 mg oral delayed release tablet: 1 tab(s) orally 2 times a day (29 Mar 2019 19:32)  pantoprazole 20 mg oral delayed release tablet: 1 tab(s) orally once a day (29 Mar 2019 19:32)  sucralfate 1 g oral tablet: 1 tab(s) orally 4 times a day (before meals and at bedtime) (29 Mar 2019 19:32)  Vitamin D3 400 intl units oral tablet: 2 tab(s) orally once a day (29 Mar 2019 19:32)      ROS:     REVIEW OF SYSTEMS  General:  No fevers  Eyes:  No reported pain   ENT:  No sore throat   NECK: No stiffness   CV:  No chest pain   Resp:  c/o  shortness of breath  GI:  See HPI  :  No dysuria  Muscle:  No weakness  Neuro:  No tingling  Endocrine:  No polyuria  Heme:  No ecchymosis          VITALS:   T(F): 96.9 (03-31 @ 14:29), Max: 97.8 (03-30 @ 20:18)  HR: 112 (03-31 @ 14:29) (67 - 112)  BP: 127/78 (03-31 @ 14:29) (110/64 - 152/77)  BP(mean): --  RR: 18 (03-31 @ 14:29) (16 - 20)  SpO2: 96% (03-31 @ 06:04) (96% - 97%)    I&O's Summary    31 Mar 2019 07:01  -  31 Mar 2019 17:56  --------------------------------------------------------  IN: 0 mL / OUT: 600 mL / NET: -600 mL        PHYSICAL EXAM:  Gen: Pt appears comfortable   EYES: No scleral icterus   LUNG: Clear to auscultation bilaterally;  HEART: RRR; s1 and s2 heard   ABDOMEN: Soft, +BS, + abd distension, no Abdominal Tenderness, No guarding, No Boateng Sign   Neuro: AAO x 3, no asterix     LABS:                        8.4    2.84  )-----------( 71       ( 31 Mar 2019 08:10 )             25.2     PT/INR - ( 31 Mar 2019 08:10 )  INR: 1.93          PTT - ( 31 Mar 2019 08:10 )  PTT:44.1<H>  LIVER FUNCTIONS - ( 30 Mar 2019 06:48 )  Alb: 2.2 g/dL / Pro: 5.9 g/dL / ALK PHOS: 138 U/L / ALT: 27 U/L / AST: 48 U/L / GGT: x           03-31    139  |  107  |  19  ----------------------------<  87  3.8   |  23  |  1.2    Ca    7.8<L>      31 Mar 2019 08:10  Mg     1.7     03-31      CARDIAC MARKERS ( 30 Mar 2019 06:48 )  x     / <0.01 ng/mL / x     / x     / x              Previous EGD: 4/2018 ,  , no esophageal varices , PHTNG, GAVE s/p APC     Previous colonoscopy:9/ 2017 , fair prep , hemorrhoids, diverticulosis , 4 small polyps (path - one TA) rec to repeat colon in 3 years       RADIOLOGY:    < from: Xray Chest 1 View-PORTABLE IMMEDIATE (03.29.19 @ 14:47) >    Impression:      Left basilar atelectasis.    < end of copied text >

## 2019-04-01 ENCOUNTER — RESULT REVIEW (OUTPATIENT)
Age: 69
End: 2019-04-01

## 2019-04-01 ENCOUNTER — APPOINTMENT (OUTPATIENT)
Dept: OTOLARYNGOLOGY | Facility: CLINIC | Age: 69
End: 2019-04-01

## 2019-04-01 LAB
AFP-TM SERPL-MCNC: 1.9 NG/ML — SIGNIFICANT CHANGE UP
ANION GAP SERPL CALC-SCNC: 10 MMOL/L — SIGNIFICANT CHANGE UP (ref 7–14)
APTT BLD: 39.6 SEC — HIGH (ref 27–39.2)
B PERT IGG+IGM PNL SER: ABNORMAL
BUN SERPL-MCNC: 18 MG/DL — SIGNIFICANT CHANGE UP (ref 10–20)
CALCIUM SERPL-MCNC: 8 MG/DL — LOW (ref 8.5–10.1)
CHLORIDE SERPL-SCNC: 107 MMOL/L — SIGNIFICANT CHANGE UP (ref 98–110)
CO2 SERPL-SCNC: 22 MMOL/L — SIGNIFICANT CHANGE UP (ref 17–32)
COLOR FLD: YELLOW — SIGNIFICANT CHANGE UP
CREAT SERPL-MCNC: 1.2 MG/DL — SIGNIFICANT CHANGE UP (ref 0.7–1.5)
FERRITIN SERPL-MCNC: 44 NG/ML — SIGNIFICANT CHANGE UP (ref 30–400)
FLUID INTAKE SUBSTANCE CLASS: SIGNIFICANT CHANGE UP
FLUID SEGMENTED GRANULOCYTES: 0 % — SIGNIFICANT CHANGE UP
FOLATE SERPL-MCNC: 11.4 NG/ML — SIGNIFICANT CHANGE UP
FOLATE+VIT B12 SERBLD-IMP: 80 % — SIGNIFICANT CHANGE UP
GLUCOSE BLDC GLUCOMTR-MCNC: 100 MG/DL — HIGH (ref 70–99)
GLUCOSE BLDC GLUCOMTR-MCNC: 115 MG/DL — HIGH (ref 70–99)
GLUCOSE BLDC GLUCOMTR-MCNC: 124 MG/DL — HIGH (ref 70–99)
GLUCOSE BLDC GLUCOMTR-MCNC: 97 MG/DL — SIGNIFICANT CHANGE UP (ref 70–99)
GLUCOSE SERPL-MCNC: 98 MG/DL — SIGNIFICANT CHANGE UP (ref 70–99)
HCT VFR BLD CALC: 28.6 % — LOW (ref 42–52)
HGB BLD-MCNC: 9.2 G/DL — LOW (ref 14–18)
INR BLD: 1.82 RATIO — HIGH (ref 0.65–1.3)
IRON SATN MFR SERPL: 128 UG/DL — SIGNIFICANT CHANGE UP (ref 35–150)
IRON SATN MFR SERPL: 52 % — HIGH (ref 15–50)
MCHC RBC-ENTMCNC: 31.6 PG — HIGH (ref 27–31)
MCHC RBC-ENTMCNC: 32.2 G/DL — SIGNIFICANT CHANGE UP (ref 32–37)
MCV RBC AUTO: 98.3 FL — HIGH (ref 80–94)
MESOTHL CELL # FLD: 5 % — SIGNIFICANT CHANGE UP
MONOS+MACROS # FLD: 15 % — SIGNIFICANT CHANGE UP
NRBC # BLD: 0 /100 WBCS — SIGNIFICANT CHANGE UP (ref 0–0)
PLATELET # BLD AUTO: 81 K/UL — LOW (ref 130–400)
POTASSIUM SERPL-MCNC: 4 MMOL/L — SIGNIFICANT CHANGE UP (ref 3.5–5)
POTASSIUM SERPL-SCNC: 4 MMOL/L — SIGNIFICANT CHANGE UP (ref 3.5–5)
PROTHROM AB SERPL-ACNC: 20.8 SEC — HIGH (ref 9.95–12.87)
RBC # BLD: 2.91 M/UL — LOW (ref 4.7–6.1)
RBC # BLD: 2.91 M/UL — LOW (ref 4.7–6.1)
RBC # FLD: 15 % — HIGH (ref 11.5–14.5)
RCV VOL RI: 270 /UL — HIGH (ref 0–5)
RETICS #: 91.4 K/UL — SIGNIFICANT CHANGE UP (ref 25–125)
RETICS/RBC NFR: 3.1 % — HIGH (ref 0.5–1.5)
SODIUM SERPL-SCNC: 139 MMOL/L — SIGNIFICANT CHANGE UP (ref 135–146)
SPECIMEN SOURCE FLD: SIGNIFICANT CHANGE UP
TIBC SERPL-MCNC: 244 UG/DL — SIGNIFICANT CHANGE UP (ref 220–430)
TOTAL NUCLEATED CELL COUNT, BODY FLUID: 117 /UL — HIGH (ref 0–5)
TUBE TYPE: SIGNIFICANT CHANGE UP
UIBC SERPL-MCNC: 116 UG/DL — SIGNIFICANT CHANGE UP (ref 110–370)
VIT B12 SERPL-MCNC: 1050 PG/ML — SIGNIFICANT CHANGE UP (ref 232–1245)
WBC # BLD: 2.71 K/UL — LOW (ref 4.8–10.8)
WBC # FLD AUTO: 2.71 K/UL — LOW (ref 4.8–10.8)

## 2019-04-01 PROCEDURE — 76705 ECHO EXAM OF ABDOMEN: CPT | Mod: 26

## 2019-04-01 PROCEDURE — 88112 CYTOPATH CELL ENHANCE TECH: CPT | Mod: 26

## 2019-04-01 PROCEDURE — 93306 TTE W/DOPPLER COMPLETE: CPT | Mod: 26

## 2019-04-01 PROCEDURE — 88305 TISSUE EXAM BY PATHOLOGIST: CPT | Mod: 26

## 2019-04-01 RX ORDER — SPIRONOLACTONE 25 MG/1
100 TABLET, FILM COATED ORAL DAILY
Qty: 0 | Refills: 0 | Status: DISCONTINUED | OUTPATIENT
Start: 2019-04-01 | End: 2019-04-03

## 2019-04-01 RX ORDER — WARFARIN SODIUM 2.5 MG/1
5 TABLET ORAL ONCE
Qty: 0 | Refills: 0 | Status: COMPLETED | OUTPATIENT
Start: 2019-04-01 | End: 2019-04-01

## 2019-04-01 RX ORDER — HYDROCHLOROTHIAZIDE 25 MG
50 TABLET ORAL
Qty: 0 | Refills: 0 | Status: DISCONTINUED | OUTPATIENT
Start: 2019-04-01 | End: 2019-04-03

## 2019-04-01 RX ORDER — ALBUMIN HUMAN 25 %
100 VIAL (ML) INTRAVENOUS ONCE
Qty: 0 | Refills: 0 | Status: DISCONTINUED | OUTPATIENT
Start: 2019-04-01 | End: 2019-04-01

## 2019-04-01 RX ADMIN — Medication 1000 UNIT(S): at 11:58

## 2019-04-01 RX ADMIN — Medication 50 MILLIGRAM(S): at 05:41

## 2019-04-01 RX ADMIN — Medication 50 MILLIGRAM(S): at 17:26

## 2019-04-01 RX ADMIN — Medication 1 GRAM(S): at 17:26

## 2019-04-01 RX ADMIN — PANTOPRAZOLE SODIUM 40 MILLIGRAM(S): 20 TABLET, DELAYED RELEASE ORAL at 05:39

## 2019-04-01 RX ADMIN — Medication 1 APPLICATION(S): at 06:16

## 2019-04-01 RX ADMIN — Medication 1 APPLICATION(S): at 17:27

## 2019-04-01 RX ADMIN — Medication 325 MILLIGRAM(S): at 12:00

## 2019-04-01 RX ADMIN — Medication 120 MILLIGRAM(S): at 05:39

## 2019-04-01 RX ADMIN — Medication 50 MILLIGRAM(S): at 05:40

## 2019-04-01 RX ADMIN — Medication 325 MILLIGRAM(S): at 05:39

## 2019-04-01 RX ADMIN — Medication 1 GRAM(S): at 05:39

## 2019-04-01 RX ADMIN — Medication 50 MILLIGRAM(S): at 12:00

## 2019-04-01 RX ADMIN — Medication 1 GRAM(S): at 11:58

## 2019-04-01 RX ADMIN — NYSTATIN CREAM 1 APPLICATION(S): 100000 CREAM TOPICAL at 17:27

## 2019-04-01 RX ADMIN — ENOXAPARIN SODIUM 100 MILLIGRAM(S): 100 INJECTION SUBCUTANEOUS at 17:26

## 2019-04-01 RX ADMIN — NYSTATIN CREAM 1 APPLICATION(S): 100000 CREAM TOPICAL at 06:16

## 2019-04-01 NOTE — CONSULT NOTE ADULT - ASSESSMENT
imp:  aSCITES AND LEG EDEMA DUE TO ty AND CIRRHOSIS      REC: 1. 2 Gm Na diet discussed with housestaff  2. large volume paracentesis - fluid to be sent for  Na, K, cytology volume, discussed with housestaff and IR called  3.  Weigh daily, abdominal girth q 3 days  4.  EGD tomorrow re h/o varices - NPO after midnight  5.  Spot urine Na and K  6  RUQ sono2.  After para centesis can start Aldactone 100 mg po daily  and HCTZ 50 mg po qod  7. Will follow

## 2019-04-01 NOTE — PROCEDURE NOTE - NSPROCDETAILS_GEN_ALL_CORE
sterile dressing applied/ultrasound utilization/location identified, sterile technique used, catheter introduced, fluid drained

## 2019-04-01 NOTE — CONSULT NOTE ADULT - SUBJECTIVE AND OBJECTIVE BOX
INTERVENTIONAL RADIOLOGY CONSULT:     Procedure Requested: paracentesis     HPI:  67 yo M with PMH of AFIB on Coumadin, HTN, anemia, hyperlipidemia, CHF, BPH, DM, varices 4 years ago follows with GI Dr. Sutherland, as per pt after varices diagnosed pt was found to have liver cirrhosis secondary to JIMÉNEZ, hospital admission in November with LE edema was given Lasix but was not prescribed Lasix for CHF and was not discharged on lasix.    Presents for eval of b/l LE edema, abdominal distention and SOB on exertion  x 1 month associated with epigastric discomfort.  Pt reports he gained 30 pounds over last 4 weeks. Pt reports he has never had a paracentesis. Denies any fever, n/v, chest pain, palpitations, diaphoresis, cough, dizziness, numbness/tingling, neck pain/ stiffness, abd pain, diarrhea, constipation, melena/brbpr, urinary symptoms, trauma, weakness, calf pain/erythema, sick contacts, recent travel or rash. (29 Mar 2019 19:21)      PAST MEDICAL & SURGICAL HISTORY:  BPH (benign prostatic hyperplasia)  Dyspnea on exertion  Cirrhosis of liver  Afib  Diabetes  Anemia  High cholesterol  HTN (hypertension)  Encounter for screening colonoscopy: 1 year ago  S/P angioplasty with stent: 2 cardiac stents  Presence of bilateral total knee joint prostheses      MEDICATIONS  (STANDING):  atorvastatin 40 milliGRAM(s) Oral at bedtime  cholecalciferol 1000 Unit(s) Oral daily  diltiazem    milliGRAM(s) Oral daily  enoxaparin Injectable 100 milliGRAM(s) SubCutaneous every 12 hours  ferrous    sulfate 325 milliGRAM(s) Oral three times a day  flecainide 50 milliGRAM(s) Oral every 12 hours  hydrocortisone 2.5% Cream 1 Application(s) Topical two times a day  nystatin Cream 1 Application(s) Topical two times a day  pantoprazole    Tablet 40 milliGRAM(s) Oral before breakfast  pregabalin 50 milliGRAM(s) Oral three times a day  sucralfate 1 Gram(s) Oral four times a day  tamsulosin 0.4 milliGRAM(s) Oral at bedtime    MEDICATIONS  (PRN):      Allergies    No Known Allergies    Intolerances      FAMILY HISTORY:  Family history of early CAD: mother      Physical Exam:   Vital Signs Last 24 Hrs  T(C): 36.5 (01 Apr 2019 05:19), Max: 36.7 (31 Mar 2019 19:57)  T(F): 97.7 (01 Apr 2019 05:19), Max: 98.1 (31 Mar 2019 19:57)  HR: 116 (01 Apr 2019 05:19) (110 - 116)  BP: 123/64 (01 Apr 2019 05:19) (123/64 - 127/78)  BP(mean): --  RR: 18 (01 Apr 2019 07:56) (16 - 18)  SpO2: 97% (01 Apr 2019 07:56) (96% - 97%)    Labs:                         9.2    2.71  )-----------( 81       ( 01 Apr 2019 08:40 )             28.6     03-31    139  |  107  |  19  ----------------------------<  87  3.8   |  23  |  1.2    Ca    7.8<L>      31 Mar 2019 08:10  Mg     1.7     03-31      PT/INR - ( 31 Mar 2019 08:10 )   PT: 22.10 sec;   INR: 1.93 ratio         PTT - ( 31 Mar 2019 08:10 )  PTT:44.1 sec    Pertinent labs:                      9.2    2.71  )-----------( 81       ( 01 Apr 2019 08:40 )             28.6       03-31    139  |  107  |  19  ----------------------------<  87  3.8   |  23  |  1.2    Ca    7.8<L>      31 Mar 2019 08:10  Mg     1.7     03-31        PT/INR - ( 31 Mar 2019 08:10 )   PT: 22.10 sec;   INR: 1.93 ratio         PTT - ( 31 Mar 2019 08:10 )  PTT:44.1 sec    Radiology & Additional Studies:     pending 4 quadrant U/S    Radiology imaging reviewed.       ASSESSMENT/ PLAN:   - consulted for image guided paracentesis  - patient presented with b/l LE edema, abdominal distention, SOB on exertion  x 1 month, epigastric discomfort, and 30 pound weight gain over last 4 weeks  - patient states he has never had a paracentesis before  - recommend 4 quadrant ultrasound to assess ascites  - paracentesis tentatively scheduled for tomorrow 4/2 pending U/S results  - please draw coags prior to procedure       Thank you for the courtesy of this consult, please call [phone] with any further questions.

## 2019-04-01 NOTE — PROCEDURE NOTE - NSANESTHESIA_GEN_A_CORE
Date: 4/16/2024    Patient Name: Deandre Boyd          To Whom it may concern:    Deandre Boyd was seen in my clinic today. He may return to work after 24 hours of eye drops.        Sincerely,    MARITZA Grove            
1% lidocaine

## 2019-04-01 NOTE — CONSULT NOTE ADULT - REASON FOR ADMISSION
SOB, KIM, bilateral lower extremity swelling

## 2019-04-01 NOTE — PROGRESS NOTE ADULT - ASSESSMENT
SUBJECTIVE:    Patient is a 68y old Male who presents with a chief complaint of SOB, KIM, bilateral lower extremity swelling (01 Apr 2019 11:15)    Currently admitted to medicine with the primary diagnosis of Ascites     Today is hospital day 3d. This morning he is resting comfortably in bed and reports no new issues or overnight events.     PAST MEDICAL & SURGICAL HISTORY  BPH (benign prostatic hyperplasia)  Dyspnea on exertion  Cirrhosis of liver  Afib  Diabetes  Anemia  High cholesterol  HTN (hypertension)  Encounter for screening colonoscopy: 1 year ago  S/P angioplasty with stent: 2 cardiac stents  Presence of bilateral total knee joint prostheses    SOCIAL HISTORY:  Negative for smoking/alcohol/drug use.     ALLERGIES:  No Known Allergies    MEDICATIONS:  STANDING MEDICATIONS  atorvastatin 40 milliGRAM(s) Oral at bedtime  cholecalciferol 1000 Unit(s) Oral daily  diltiazem    milliGRAM(s) Oral daily  enoxaparin Injectable 100 milliGRAM(s) SubCutaneous every 12 hours  ferrous    sulfate 325 milliGRAM(s) Oral three times a day  flecainide 50 milliGRAM(s) Oral every 12 hours  hydrocortisone 2.5% Cream 1 Application(s) Topical two times a day  nystatin Cream 1 Application(s) Topical two times a day  pantoprazole    Tablet 40 milliGRAM(s) Oral before breakfast  pregabalin 50 milliGRAM(s) Oral three times a day  sucralfate 1 Gram(s) Oral four times a day  tamsulosin 0.4 milliGRAM(s) Oral at bedtime  warfarin 5 milliGRAM(s) Oral once    PRN MEDICATIONS    VITALS:   T(F): 96.9  HR: 72  BP: 113/56  RR: 18  SpO2: 97%    LABS:                        9.2    2.71  )-----------( 81       ( 01 Apr 2019 08:40 )             28.6     04-01    139  |  107  |  18  ----------------------------<  98  4.0   |  22  |  1.2    Ca    8.0<L>      01 Apr 2019 08:40  Mg     1.7     03-31      PT/INR - ( 01 Apr 2019 08:40 )   PT: 20.80 sec;   INR: 1.82 ratio         PTT - ( 01 Apr 2019 08:40 )  PTT:39.6 sec              RADIOLOGY:  < from: Transthoracic Echocardiogram (04.01.19 @ 09:11) >    Summary:   1. Left ventricular ejection fraction, by visual estimation, is 65 to   70%.   2. Normal global left ventricular systolic function.    < end of copied text >  < from: US Abdomen Limited (04.01.19 @ 10:47) >  impression:    There is moderate abdominal ascites, new since July 26, 2017. Again seen   is cirrhotic liver with nodular contour.    < end of copied text >    PHYSICAL EXAM:  GEN: No acute distress  LUNGS: Clear to auscultation bilaterally   HEART: Regular  ABD: Soft, non-tender, non-distended.  EXT: No edema legs.   NEURO: AAOX3    67 yo M with PMH of AFIB on Coumadin, HTN, anemia, hyperlipidemia, BPH, DM, varices 4 years ago, as per pt after varices diagnosed pt was found to have liver cirrhosis secondary to JIMÉNEZ, hospital admission in November with LE edema was given Lasix but was not prescribed Lasix for CHF and was not discharged on lasix.    Presents for eval of b/l LE edema, abdominal distention and SOB on exertion  x 1 month associated with epigastric discomfort.  Weight gain of 30 ibs over last 4 weeks    #) new ascites 2/2 JIMÉNEZ/cirrhosis   -diagnositic para. if no SBP therapeutic.   -f/u fluid cell count , SAAG, total protein, fluid c and s , LDH, glucose   -DASH diet.  -Per GI, since ascites consistent with Portal HTN and no sbp , started on  Aldactone 100 mg po daily  and HCTZ 50 mg po every  other day , monitor cr and lytes  -f/u   -echo wnl  -CXR: left basilar atelectasis.  No pulmonary edema  -mildly elevated AST and ALP   -follow up morning INR, dose coumadin  -Weigh daily, abdominal girth q 3 days  -Spot urine Na and K    #) possible portal htn  -EGD tomorrow  -NPO after midnight      #) decompensated liver crrhosis- MELD 19  -With Portal Hypertension features - thrombocytopenia , splenomegaly (on prior CT abd ), Ascites , no varices   -baseline Cr around 1.2  -f/u RUQ US  -f/u AFP  -will need US every 6 months for HCC screening  -f/u Hep A and B       #) pancytopenia-stable, possibly 2/2 portal hypertension  -f/u CBC    #) Normocytic anemia  -baesline was 11 last november, now around 9  -MCV 96, RDW 14  -no iron deficiency , f./u anemia w/u    #)AFib  -currently rate controlled  -warfarin --on 2.5mg 6x/week, on 5mg once per week (Mondays)  -Dr. Payne recently stopped sotalol and started diltiazem and flecainide     #) DM2-controlled  -HbA1c 4.9  -on januvia at home - will hold in hospital  -f/u FS, if > 180 start lispro/glargine regimen  -lyrica for peripheral neuropathy      #DLD  on atorvastatin    carb consistent diet, low sodium diet, 1500mL fluid restriction, daily weights  full code

## 2019-04-01 NOTE — CONSULT NOTE ADULT - SUBJECTIVE AND OBJECTIVE BOX
Chief Complaint: Patient is a 68y old  Male who presents with a chief complaint of SOB, KIM, bilateral lower extremity swelling (2019 09:29)  Pt had an UGI bleed about 4 -5 years ago -found to have eophageal varices whi sounds like they were banded.  W/ u disclosed fatty liver and JIMÉNEZ      HPI:    Medications:  atorvastatin 40 milliGRAM(s) Oral at bedtime  cholecalciferol 1000 Unit(s) Oral daily  diltiazem    milliGRAM(s) Oral daily  enoxaparin Injectable 100 milliGRAM(s) SubCutaneous every 12 hours  ferrous    sulfate 325 milliGRAM(s) Oral three times a day  flecainide 50 milliGRAM(s) Oral every 12 hours  hydrocortisone 2.5% Cream 1 Application(s) Topical two times a day  nystatin Cream 1 Application(s) Topical two times a day  pantoprazole    Tablet 40 milliGRAM(s) Oral before breakfast  pregabalin 50 milliGRAM(s) Oral three times a day  sucralfate 1 Gram(s) Oral four times a day  tamsulosin 0.4 milliGRAM(s) Oral at bedtime      PMHX/PSHX:  BPH (benign prostatic hyperplasia)  Dyspnea on exertion  Cirrhosis of liver  Afib  Diabetes  Anemia  High cholesterol  HTN (hypertension)  Encounter for screening colonoscopy  S/P angioplasty with stent  Presence of bilateral total knee joint prostheses      Family history:  Family history of early CAD      Social History:     Allergies:  No Known Allergies        Review of Systems:  General:  + wt  gain 30 #, fevers, chills, night sweats, fatigue or pruritis.  Eyes:  Good vision, no reported pain or redness.  ENT:  No sore throat, pain, runny nose, or difficulty swallowing  CV:  No pain, palpitations, hypo/hypertension  Resp:  No dyspnea, cough, tachypnea, wheezing  GI:  No pain, nausea, vomiting, dysphagia, heartburn, diarrhea, constipation, or weight loss. , No rectal bleeding, tarry stools, or hematemesis.  :  No pain, bleeding/discharges, incontinence, nocturia  Musculoskeletal:  No pain, weakness or fasciculations.  Neuro:  No weakness, tingling, memory problems or paresthesias  Psych:  No fatigue, insomnia, mood problems, depression  Endocrine:  No polyuria, polydipsia, cold/heat intolerance  Heme:  No petechiae, ecchymosis, easy bruisability  Skin:  No rash, pruritis, tattoos, scars, or edema      PHYSICAL EXAM:   Vital Signs:  Vital Signs Last 24 Hrs  T(C): 36.5 (2019 05:19), Max: 36.7 (31 Mar 2019 19:57)  T(F): 97.7 (2019 05:19), Max: 98.1 (31 Mar 2019 19:57)  HR: 116 (2019 05:19) (110 - 116)  BP: 123/64 (2019 05:19) (123/64 - 127/78)  BP(mean): --  RR: 18 (2019 07:56) (16 - 18)  SpO2: 97% (2019 07:56) (96% - 97%)  Daily     Daily Weight in k.1 (2019 05:19)    T(C): 36.5 (19 @ 05:19), Max: 36.7 (19 @ 19:57)  HR: 116 (19 @ 05:19) (110 - 116)  BP: 123/64 (19 @ 05:19) (123/64 - 127/78)  RR: 18 (19 @ 07:56) (16 - 18)  SpO2: 97% (19 @ 07:56) (96% - 97%)    GENERAL:  Appears stated age, well-groomed, well-nourished, no distress  HEENT:  Conjunctivae clear and pink, no thyromegaly, nodules, adenopathy, no JVD, sclera -anicteric  CHEST:  Full & symmetric excursion, no increased effort, breath sounds clear  HEART:  Regular rhythm, S1, S2, no murmur/rub/S3/S4, no abdominal bruit, no edema  ABDOMEN:  Soft, non-tender, non-distended, normoactive bowel sounds,  no masses ,signs of chronic liver disease> large amount of ascites + shifting dullness  EXTEREMITIES:  no cyanosis,clubbing or3+ leg edema  SKIN:  No rash/erythema/ecchymoses/petechiae/wounds/abscess/warm/dry  NEURO:  Alert, oriented, no asterixis, no tremor, no encephalopathy    LABS:                        9.2    2.71  )-----------( 81       ( 2019 08:40 )             28.6     04-    139  |  107  |  18  ----------------------------<  98  4.0   |  22  |  1.2    Ca    8.0<L>      2019 08:40  Mg     1.7     03-31        PT/INR - ( 2019 08:40 )   PT: 20.80 sec;   INR: 1.82 ratio         PTT - ( 2019 08:40 )  PTT:39.6 sec        Imaging:      Assessment and Plan:

## 2019-04-02 ENCOUNTER — TRANSCRIPTION ENCOUNTER (OUTPATIENT)
Age: 69
End: 2019-04-02

## 2019-04-02 LAB
ALBUMIN FLD-MCNC: 0.5 G/DL — SIGNIFICANT CHANGE UP
ALBUMIN SERPL ELPH-MCNC: 2.5 G/DL — LOW (ref 3.5–5.2)
ALP SERPL-CCNC: 142 U/L — HIGH (ref 30–115)
ALT FLD-CCNC: 32 U/L — SIGNIFICANT CHANGE UP (ref 0–41)
ANION GAP SERPL CALC-SCNC: 10 MMOL/L — SIGNIFICANT CHANGE UP (ref 7–14)
APTT BLD: 41.1 SEC — HIGH (ref 27–39.2)
AST SERPL-CCNC: 58 U/L — HIGH (ref 0–41)
BASOPHILS # BLD AUTO: 0.05 K/UL — SIGNIFICANT CHANGE UP (ref 0–0.2)
BASOPHILS NFR BLD AUTO: 1.7 % — HIGH (ref 0–1)
BILIRUB DIRECT SERPL-MCNC: 0.9 MG/DL — HIGH (ref 0–0.2)
BILIRUB INDIRECT FLD-MCNC: 1 MG/DL — SIGNIFICANT CHANGE UP (ref 0.2–1.2)
BILIRUB SERPL-MCNC: 1.9 MG/DL — HIGH (ref 0.2–1.2)
BUN SERPL-MCNC: 16 MG/DL — SIGNIFICANT CHANGE UP (ref 10–20)
CALCIUM SERPL-MCNC: 8.2 MG/DL — LOW (ref 8.5–10.1)
CHLORIDE SERPL-SCNC: 108 MMOL/L — SIGNIFICANT CHANGE UP (ref 98–110)
CO2 SERPL-SCNC: 23 MMOL/L — SIGNIFICANT CHANGE UP (ref 17–32)
CREAT SERPL-MCNC: 1.1 MG/DL — SIGNIFICANT CHANGE UP (ref 0.7–1.5)
EOSINOPHIL # BLD AUTO: 0.19 K/UL — SIGNIFICANT CHANGE UP (ref 0–0.7)
EOSINOPHIL NFR BLD AUTO: 6.4 % — SIGNIFICANT CHANGE UP (ref 0–8)
GLUCOSE BLDC GLUCOMTR-MCNC: 82 MG/DL — SIGNIFICANT CHANGE UP (ref 70–99)
GLUCOSE BLDC GLUCOMTR-MCNC: 89 MG/DL — SIGNIFICANT CHANGE UP (ref 70–99)
GLUCOSE BLDC GLUCOMTR-MCNC: 93 MG/DL — SIGNIFICANT CHANGE UP (ref 70–99)
GLUCOSE FLD-MCNC: 138 MG/DL — SIGNIFICANT CHANGE UP
GLUCOSE SERPL-MCNC: 87 MG/DL — SIGNIFICANT CHANGE UP (ref 70–99)
GRAM STN FLD: SIGNIFICANT CHANGE UP
HAV IGM SER-ACNC: SIGNIFICANT CHANGE UP
HBV CORE IGM SER-ACNC: SIGNIFICANT CHANGE UP
HBV SURFACE AG SER-ACNC: SIGNIFICANT CHANGE UP
HCT VFR BLD CALC: 27.5 % — LOW (ref 42–52)
HCV AB S/CO SERPL IA: 0.19 S/CO — SIGNIFICANT CHANGE UP (ref 0–0.79)
HCV AB SERPL-IMP: SIGNIFICANT CHANGE UP
HGB BLD-MCNC: 9.1 G/DL — LOW (ref 14–18)
IMM GRANULOCYTES NFR BLD AUTO: 0.3 % — SIGNIFICANT CHANGE UP (ref 0.1–0.3)
INR BLD: 1.73 RATIO — HIGH (ref 0.65–1.3)
LYMPHOCYTES # BLD AUTO: 0.75 K/UL — LOW (ref 1.2–3.4)
LYMPHOCYTES # BLD AUTO: 25.1 % — SIGNIFICANT CHANGE UP (ref 20.5–51.1)
MAGNESIUM SERPL-MCNC: 1.6 MG/DL — LOW (ref 1.8–2.4)
MCHC RBC-ENTMCNC: 32.3 PG — HIGH (ref 27–31)
MCHC RBC-ENTMCNC: 33.1 G/DL — SIGNIFICANT CHANGE UP (ref 32–37)
MCV RBC AUTO: 97.5 FL — HIGH (ref 80–94)
MONOCYTES # BLD AUTO: 0.47 K/UL — SIGNIFICANT CHANGE UP (ref 0.1–0.6)
MONOCYTES NFR BLD AUTO: 15.7 % — HIGH (ref 1.7–9.3)
NEUTROPHILS # BLD AUTO: 1.52 K/UL — SIGNIFICANT CHANGE UP (ref 1.4–6.5)
NEUTROPHILS NFR BLD AUTO: 50.8 % — SIGNIFICANT CHANGE UP (ref 42.2–75.2)
NIGHT BLUE STAIN TISS: SIGNIFICANT CHANGE UP
NRBC # BLD: 0 /100 WBCS — SIGNIFICANT CHANGE UP (ref 0–0)
PLATELET # BLD AUTO: 94 K/UL — LOW (ref 130–400)
POTASSIUM SERPL-MCNC: 4.2 MMOL/L — SIGNIFICANT CHANGE UP (ref 3.5–5)
POTASSIUM SERPL-SCNC: 4.2 MMOL/L — SIGNIFICANT CHANGE UP (ref 3.5–5)
PROT FLD-MCNC: 1.1 G/DL — SIGNIFICANT CHANGE UP
PROT SERPL-MCNC: 6.1 G/DL — SIGNIFICANT CHANGE UP (ref 6–8)
PROTHROM AB SERPL-ACNC: 19.8 SEC — HIGH (ref 9.95–12.87)
RBC # BLD: 2.82 M/UL — LOW (ref 4.7–6.1)
RBC # FLD: 15.4 % — HIGH (ref 11.5–14.5)
SODIUM SERPL-SCNC: 141 MMOL/L — SIGNIFICANT CHANGE UP (ref 135–146)
SPECIMEN SOURCE: SIGNIFICANT CHANGE UP
SPECIMEN SOURCE: SIGNIFICANT CHANGE UP
WBC # BLD: 2.99 K/UL — LOW (ref 4.8–10.8)
WBC # FLD AUTO: 2.99 K/UL — LOW (ref 4.8–10.8)

## 2019-04-02 RX ORDER — WARFARIN SODIUM 2.5 MG/1
2.5 TABLET ORAL ONCE
Qty: 0 | Refills: 0 | Status: COMPLETED | OUTPATIENT
Start: 2019-04-02 | End: 2019-04-02

## 2019-04-02 RX ORDER — PANTOPRAZOLE SODIUM 20 MG/1
40 TABLET, DELAYED RELEASE ORAL
Qty: 0 | Refills: 0 | Status: DISCONTINUED | OUTPATIENT
Start: 2019-04-02 | End: 2019-04-03

## 2019-04-02 RX ORDER — WARFARIN SODIUM 2.5 MG/1
5 TABLET ORAL ONCE
Qty: 0 | Refills: 0 | Status: DISCONTINUED | OUTPATIENT
Start: 2019-04-02 | End: 2019-04-02

## 2019-04-02 RX ADMIN — Medication 1 APPLICATION(S): at 17:24

## 2019-04-02 RX ADMIN — Medication 50 MILLIGRAM(S): at 17:23

## 2019-04-02 RX ADMIN — Medication 1 GRAM(S): at 23:43

## 2019-04-02 RX ADMIN — Medication 1 GRAM(S): at 17:21

## 2019-04-02 RX ADMIN — PANTOPRAZOLE SODIUM 40 MILLIGRAM(S): 20 TABLET, DELAYED RELEASE ORAL at 06:53

## 2019-04-02 RX ADMIN — Medication 1 GRAM(S): at 00:13

## 2019-04-02 RX ADMIN — Medication 50 MILLIGRAM(S): at 06:54

## 2019-04-02 RX ADMIN — Medication 50 MILLIGRAM(S): at 17:27

## 2019-04-02 RX ADMIN — Medication 50 MILLIGRAM(S): at 06:51

## 2019-04-02 RX ADMIN — Medication 50 MILLIGRAM(S): at 06:53

## 2019-04-02 RX ADMIN — PANTOPRAZOLE SODIUM 40 MILLIGRAM(S): 20 TABLET, DELAYED RELEASE ORAL at 17:23

## 2019-04-02 RX ADMIN — Medication 325 MILLIGRAM(S): at 17:21

## 2019-04-02 RX ADMIN — Medication 50 MILLIGRAM(S): at 00:15

## 2019-04-02 RX ADMIN — TAMSULOSIN HYDROCHLORIDE 0.4 MILLIGRAM(S): 0.4 CAPSULE ORAL at 00:15

## 2019-04-02 RX ADMIN — Medication 1 APPLICATION(S): at 06:52

## 2019-04-02 RX ADMIN — Medication 1000 UNIT(S): at 17:21

## 2019-04-02 RX ADMIN — Medication 325 MILLIGRAM(S): at 00:15

## 2019-04-02 RX ADMIN — NYSTATIN CREAM 1 APPLICATION(S): 100000 CREAM TOPICAL at 17:24

## 2019-04-02 RX ADMIN — Medication 50 MILLIGRAM(S): at 21:57

## 2019-04-02 RX ADMIN — ATORVASTATIN CALCIUM 40 MILLIGRAM(S): 80 TABLET, FILM COATED ORAL at 00:15

## 2019-04-02 RX ADMIN — WARFARIN SODIUM 2.5 MILLIGRAM(S): 2.5 TABLET ORAL at 21:58

## 2019-04-02 RX ADMIN — SPIRONOLACTONE 100 MILLIGRAM(S): 25 TABLET, FILM COATED ORAL at 08:02

## 2019-04-02 RX ADMIN — Medication 325 MILLIGRAM(S): at 06:53

## 2019-04-02 RX ADMIN — Medication 120 MILLIGRAM(S): at 06:53

## 2019-04-02 RX ADMIN — NYSTATIN CREAM 1 APPLICATION(S): 100000 CREAM TOPICAL at 06:52

## 2019-04-02 RX ADMIN — Medication 1 GRAM(S): at 17:22

## 2019-04-02 RX ADMIN — ATORVASTATIN CALCIUM 40 MILLIGRAM(S): 80 TABLET, FILM COATED ORAL at 21:57

## 2019-04-02 RX ADMIN — TAMSULOSIN HYDROCHLORIDE 0.4 MILLIGRAM(S): 0.4 CAPSULE ORAL at 21:57

## 2019-04-02 RX ADMIN — WARFARIN SODIUM 5 MILLIGRAM(S): 2.5 TABLET ORAL at 00:15

## 2019-04-02 RX ADMIN — Medication 325 MILLIGRAM(S): at 21:57

## 2019-04-02 RX ADMIN — SPIRONOLACTONE 100 MILLIGRAM(S): 25 TABLET, FILM COATED ORAL at 06:53

## 2019-04-02 RX ADMIN — Medication 1 GRAM(S): at 06:54

## 2019-04-02 NOTE — PROGRESS NOTE ADULT - ASSESSMENT
69 yo M with PMH of AFIB on Coumadin, HTN, anemia, hyperlipidemia, BPH, DM, varices 4 years ago, as per pt after varices diagnosed pt was found to have liver cirrhosis secondary to JIMÉNEZ, hospital admission in November with LE edema was given Lasix but was not prescribed Lasix for CHF and was not discharged on lasix. Presents for eval of b/l LE edema, abdominal distention and SOB on exertion  x 1 month associated with epigastric discomfort.  Weight gain of 30 ibs over last 4 weeks    # New ascites due to JIMÉNEZ/cirrhosis   - s/p Therapeutic paracentesis 4/1  - SAAG 2.0 (likely portal HTN), total protein 1.1, glucose 138. No evidence of SBP  - Per GI, since ascites consistent with Portal HTN and no sbp , started on  Aldactone 100 mg po daily and HCTZ 50 mg po every other day, monitor cr and lytes  -f/u   - TTE WNL  - CXR: left basilar atelectasis.  No pulmonary edema  - Minimally elevated AST and ALP   - INR 1.73 --> Coumadin tonight  - Daily weights, abdominal girth q3 days    # Suspected portal HTN  - EGD today - f/u findings    # Decompensated liver cirrhosis - MELD 19  - Portal Hypertension pattern - thrombocytopenia, splenomegaly (on prior CT abd ), Ascites, SAAG 2.0  - baseline Cr around 1.2  - RUQ sono: cirrhosis and moderate ascites  - US every 6 months for HCC screening  - Hepatitis panel negative, AFP WNL    # Pancytopenia - stable  - Likely due to portal hypertension  - Monitor CBC    # Normocytic anemia  - baseline 11 last November, now around 9  - MCV 97, RDW 14  - Iron panel WNL  - B12 and Folate WNL    # AFib - rate controlled  - warfarin - on 2.5mg 6x/week, on 5mg once per week (Mondays)  - Dr. River recently stopped sotalol and started diltiazem and flecainide     # DM II - controlled  - HbA1c 4.9  - on Januvia at home - will hold in hospital  - Monitor FSG, if >180 start lispro/glargine regimen  - Lyrica for peripheral neuropathy      #DLD - c/w atorvastatin    DVT ppx - Coumadin  GI ppx - Protonix    Dispo: can d/c home after EGD

## 2019-04-03 ENCOUNTER — TRANSCRIPTION ENCOUNTER (OUTPATIENT)
Age: 69
End: 2019-04-03

## 2019-04-03 VITALS
TEMPERATURE: 97 F | DIASTOLIC BLOOD PRESSURE: 58 MMHG | HEART RATE: 60 BPM | RESPIRATION RATE: 18 BRPM | SYSTOLIC BLOOD PRESSURE: 116 MMHG

## 2019-04-03 LAB
ALBUMIN SERPL ELPH-MCNC: 2.3 G/DL — LOW (ref 3.5–5.2)
ALP SERPL-CCNC: 143 U/L — HIGH (ref 30–115)
ALT FLD-CCNC: 36 U/L — SIGNIFICANT CHANGE UP (ref 0–41)
ANION GAP SERPL CALC-SCNC: 10 MMOL/L — SIGNIFICANT CHANGE UP (ref 7–14)
APTT BLD: 35.7 SEC — SIGNIFICANT CHANGE UP (ref 27–39.2)
AST SERPL-CCNC: 69 U/L — HIGH (ref 0–41)
BASOPHILS # BLD AUTO: 0.05 K/UL — SIGNIFICANT CHANGE UP (ref 0–0.2)
BASOPHILS NFR BLD AUTO: 1.3 % — HIGH (ref 0–1)
BILIRUB DIRECT SERPL-MCNC: 1 MG/DL — HIGH (ref 0–0.2)
BILIRUB INDIRECT FLD-MCNC: 1.3 MG/DL — HIGH (ref 0.2–1.2)
BILIRUB SERPL-MCNC: 2.3 MG/DL — HIGH (ref 0.2–1.2)
BUN SERPL-MCNC: 16 MG/DL — SIGNIFICANT CHANGE UP (ref 10–20)
CALCIUM SERPL-MCNC: 8.2 MG/DL — LOW (ref 8.5–10.1)
CHLORIDE SERPL-SCNC: 104 MMOL/L — SIGNIFICANT CHANGE UP (ref 98–110)
CO2 SERPL-SCNC: 23 MMOL/L — SIGNIFICANT CHANGE UP (ref 17–32)
CREAT SERPL-MCNC: 1.1 MG/DL — SIGNIFICANT CHANGE UP (ref 0.7–1.5)
EOSINOPHIL # BLD AUTO: 0.17 K/UL — SIGNIFICANT CHANGE UP (ref 0–0.7)
EOSINOPHIL NFR BLD AUTO: 4.5 % — SIGNIFICANT CHANGE UP (ref 0–8)
GLUCOSE BLDC GLUCOMTR-MCNC: 103 MG/DL — HIGH (ref 70–99)
GLUCOSE BLDC GLUCOMTR-MCNC: 85 MG/DL — SIGNIFICANT CHANGE UP (ref 70–99)
GLUCOSE SERPL-MCNC: 107 MG/DL — HIGH (ref 70–99)
HCT VFR BLD CALC: 29.3 % — LOW (ref 42–52)
HGB BLD-MCNC: 9.6 G/DL — LOW (ref 14–18)
IMM GRANULOCYTES NFR BLD AUTO: 0.3 % — SIGNIFICANT CHANGE UP (ref 0.1–0.3)
INR BLD: 1.65 RATIO — HIGH (ref 0.65–1.3)
LYMPHOCYTES # BLD AUTO: 0.66 K/UL — LOW (ref 1.2–3.4)
LYMPHOCYTES # BLD AUTO: 17.3 % — LOW (ref 20.5–51.1)
MCHC RBC-ENTMCNC: 32.3 PG — HIGH (ref 27–31)
MCHC RBC-ENTMCNC: 32.8 G/DL — SIGNIFICANT CHANGE UP (ref 32–37)
MCV RBC AUTO: 98.7 FL — HIGH (ref 80–94)
MONOCYTES # BLD AUTO: 0.45 K/UL — SIGNIFICANT CHANGE UP (ref 0.1–0.6)
MONOCYTES NFR BLD AUTO: 11.8 % — HIGH (ref 1.7–9.3)
NEUTROPHILS # BLD AUTO: 2.47 K/UL — SIGNIFICANT CHANGE UP (ref 1.4–6.5)
NEUTROPHILS NFR BLD AUTO: 64.8 % — SIGNIFICANT CHANGE UP (ref 42.2–75.2)
NON-GYNECOLOGICAL CYTOLOGY STUDY: SIGNIFICANT CHANGE UP
NRBC # BLD: 0 /100 WBCS — SIGNIFICANT CHANGE UP (ref 0–0)
PLATELET # BLD AUTO: 104 K/UL — LOW (ref 130–400)
POTASSIUM SERPL-MCNC: 4 MMOL/L — SIGNIFICANT CHANGE UP (ref 3.5–5)
POTASSIUM SERPL-SCNC: 4 MMOL/L — SIGNIFICANT CHANGE UP (ref 3.5–5)
PROT SERPL-MCNC: 6.5 G/DL — SIGNIFICANT CHANGE UP (ref 6–8)
PROTHROM AB SERPL-ACNC: 18.9 SEC — HIGH (ref 9.95–12.87)
RBC # BLD: 2.97 M/UL — LOW (ref 4.7–6.1)
RBC # FLD: 15.7 % — HIGH (ref 11.5–14.5)
SODIUM SERPL-SCNC: 137 MMOL/L — SIGNIFICANT CHANGE UP (ref 135–146)
WBC # BLD: 3.81 K/UL — LOW (ref 4.8–10.8)
WBC # FLD AUTO: 3.81 K/UL — LOW (ref 4.8–10.8)

## 2019-04-03 RX ORDER — SPIRONOLACTONE 25 MG/1
1 TABLET, FILM COATED ORAL
Qty: 30 | Refills: 0 | OUTPATIENT
Start: 2019-04-03 | End: 2019-05-02

## 2019-04-03 RX ORDER — PANTOPRAZOLE SODIUM 20 MG/1
1 TABLET, DELAYED RELEASE ORAL
Qty: 0 | Refills: 0 | COMMUNITY

## 2019-04-03 RX ORDER — PANTOPRAZOLE SODIUM 20 MG/1
1 TABLET, DELAYED RELEASE ORAL
Qty: 60 | Refills: 0
Start: 2019-04-03 | End: 2019-05-02

## 2019-04-03 RX ORDER — NYSTATIN CREAM 100000 [USP'U]/G
1 CREAM TOPICAL
Qty: 30 | Refills: 0 | OUTPATIENT
Start: 2019-04-03 | End: 2019-05-02

## 2019-04-03 RX ORDER — HYDROCORTISONE 1 %
1 OINTMENT (GRAM) TOPICAL
Qty: 1 | Refills: 0 | OUTPATIENT
Start: 2019-04-03 | End: 2019-04-16

## 2019-04-03 RX ADMIN — Medication 50 MILLIGRAM(S): at 05:42

## 2019-04-03 RX ADMIN — Medication 1 GRAM(S): at 11:32

## 2019-04-03 RX ADMIN — NYSTATIN CREAM 1 APPLICATION(S): 100000 CREAM TOPICAL at 05:41

## 2019-04-03 RX ADMIN — Medication 1 APPLICATION(S): at 05:40

## 2019-04-03 RX ADMIN — Medication 325 MILLIGRAM(S): at 13:54

## 2019-04-03 RX ADMIN — Medication 50 MILLIGRAM(S): at 13:54

## 2019-04-03 RX ADMIN — Medication 1000 UNIT(S): at 11:32

## 2019-04-03 RX ADMIN — Medication 120 MILLIGRAM(S): at 05:42

## 2019-04-03 RX ADMIN — Medication 50 MILLIGRAM(S): at 05:41

## 2019-04-03 RX ADMIN — PANTOPRAZOLE SODIUM 40 MILLIGRAM(S): 20 TABLET, DELAYED RELEASE ORAL at 05:42

## 2019-04-03 RX ADMIN — Medication 1 GRAM(S): at 05:41

## 2019-04-03 RX ADMIN — Medication 325 MILLIGRAM(S): at 05:42

## 2019-04-03 RX ADMIN — SPIRONOLACTONE 100 MILLIGRAM(S): 25 TABLET, FILM COATED ORAL at 05:43

## 2019-04-03 NOTE — DISCHARGE NOTE NURSING/CASE MANAGEMENT/SOCIAL WORK - NSDCDPATPORTLINK_GEN_ALL_CORE
You can access the [facility] Patient Portal, offered by [facility], by registering with the following website: [email or web address]

## 2019-04-03 NOTE — PROGRESS NOTE ADULT - SUBJECTIVE AND OBJECTIVE BOX
SUBJECTIVE:    Patient is a 68y old Male who presents with a chief complaint of SOB, KIM, bilateral lower extremity swelling (01 Apr 2019 09:12)    Currently admitted to medicine with the primary diagnosis of Ascites     Today is hospital day 3d.     patient not in Room     PAST MEDICAL & SURGICAL HISTORY  BPH (benign prostatic hyperplasia)  Dyspnea on exertion  Cirrhosis of liver  Afib  Diabetes  Anemia  High cholesterol  HTN (hypertension)  Encounter for screening colonoscopy: 1 year ago  S/P angioplasty with stent: 2 cardiac stents  Presence of bilateral total knee joint prostheses    SOCIAL HISTORY:  Negative for smoking/alcohol/drug use.     ALLERGIES:  No Known Allergies    MEDICATIONS:  STANDING MEDICATIONS  atorvastatin 40 milliGRAM(s) Oral at bedtime  cholecalciferol 1000 Unit(s) Oral daily  diltiazem    milliGRAM(s) Oral daily  enoxaparin Injectable 100 milliGRAM(s) SubCutaneous every 12 hours  ferrous    sulfate 325 milliGRAM(s) Oral three times a day  flecainide 50 milliGRAM(s) Oral every 12 hours  hydrocortisone 2.5% Cream 1 Application(s) Topical two times a day  nystatin Cream 1 Application(s) Topical two times a day  pantoprazole    Tablet 40 milliGRAM(s) Oral before breakfast  pregabalin 50 milliGRAM(s) Oral three times a day  sucralfate 1 Gram(s) Oral four times a day  tamsulosin 0.4 milliGRAM(s) Oral at bedtime    PRN MEDICATIONS    VITALS:   T(F): 97.7  HR: 116  BP: 123/64  RR: 18  SpO2: 97%    LABS:                        9.2    2.71  )-----------( 81       ( 01 Apr 2019 08:40 )             28.6     03-31    139  |  107  |  19  ----------------------------<  87  3.8   |  23  |  1.2    Ca    7.8<L>      31 Mar 2019 08:10  Mg     1.7     03-31      PT/INR - ( 31 Mar 2019 08:10 )   PT: 22.10 sec;   INR: 1.93 ratio         PTT - ( 31 Mar 2019 08:10 )  PTT:44.1 sec              RADIOLOGY:    PHYSICAL EXAM:  GEN: No acute distress  LUNGS: Clear to auscultation bilaterally   HEART: Regular  ABD: Soft, non-tender, non-distended.  EXT: NC/NC/NE/2+PP/SANON/Skin Intact.   NEURO: AAOX3    Intravenous access:   NG tube:   Cano Catheter:
Patient was seen and examined. Spoke with RN. Chart reviewed.  No events overnight.  Vital Signs Last 24 Hrs  T(F): 97.9 (03 Apr 2019 05:22), Max: 97.9 (03 Apr 2019 05:22)  HR: 70 (03 Apr 2019 05:22) (63 - 93)  BP: 108/57 (03 Apr 2019 05:22) (106/57 - 130/65)  SpO2: 95% (03 Apr 2019 07:47) (95% - 97%)  MEDICATIONS  (STANDING):  atorvastatin 40 milliGRAM(s) Oral at bedtime  cholecalciferol 1000 Unit(s) Oral daily  diltiazem    milliGRAM(s) Oral daily  ferrous    sulfate 325 milliGRAM(s) Oral three times a day  flecainide 50 milliGRAM(s) Oral every 12 hours  hydrochlorothiazide 50 milliGRAM(s) Oral <User Schedule>  hydrocortisone 2.5% Cream 1 Application(s) Topical two times a day  nystatin Cream 1 Application(s) Topical two times a day  pantoprazole    Tablet 40 milliGRAM(s) Oral two times a day  pregabalin 50 milliGRAM(s) Oral three times a day  spironolactone 100 milliGRAM(s) Oral daily  sucralfate 1 Gram(s) Oral four times a day  tamsulosin 0.4 milliGRAM(s) Oral at bedtime    MEDICATIONS  (PRN):    Labs:                        9.6    3.81  )-----------( 104      ( 03 Apr 2019 07:40 )             29.3                         9.1    2.99  )-----------( 94       ( 02 Apr 2019 08:35 )             27.5     03 Apr 2019 07:40    137    |  104    |  16     ----------------------------<  107    4.0     |  23     |  1.1    02 Apr 2019 08:35    141    |  108    |  16     ----------------------------<  87     4.2     |  23     |  1.1      Ca    8.2        03 Apr 2019 07:40  Ca    8.2        02 Apr 2019 08:35  Mg     1.6       02 Apr 2019 08:35    TPro  6.5    /  Alb  2.3    /  TBili  2.3    /  DBili  1.0    /  AST  69     /  ALT  36     /  AlkPhos  143    03 Apr 2019 07:40  TPro  6.1    /  Alb  2.5    /  TBili  1.9    /  DBili  0.9    /  AST  58     /  ALT  32     /  AlkPhos  142    02 Apr 2019 08:35    PT/INR - ( 03 Apr 2019 07:40 )   PT: 18.90 sec;   INR: 1.65 ratio         PTT - ( 03 Apr 2019 07:40 )  PTT:35.7 sec      Culture - Fungal, Body Fluid (collected 01 Apr 2019 14:00)  Source: .Body Fluid Peritoneal Fluid  Preliminary Report (03 Apr 2019 07:48):    Testing in progress    Culture - Body Fluid with Gram Stain (collected 01 Apr 2019 14:00)  Source: .Body Fluid Peritoneal Fluid  Gram Stain (02 Apr 2019 06:10):    No polymorphonuclear cells seen    No organisms seen    by cytocentrifuge  Preliminary Report (03 Apr 2019 09:12):    No growth    Culture - Acid Fast - Body Fluid w/Smear (collected 01 Apr 2019 14:00)  Source: .Body Fluid Peritoneal Fluid      General: comfortable, NAD  Neurology: A&Ox3, nonfocal  Head:  Normocephalic, atraumatic  ENT:  Mucosa moist, no ulcerations  Neck:  Supple, no JVD,   Skin: no breakdowns (as per RN)  Resp: CTA B/L  CV: RRR, S1S2,   GI: Soft, NT, bowel sounds, distended  MS: No edema, + peripheral pulses, FROM all 4 extremity      A/P:  67 yo M with PMH of AFIB on Coumadin, HTN, anemia, hyperlipidemia, BPH, DM, varices 4 years ago, as per pt after varices diagnosed pt was found to have liver cirrhosis secondary to JIMÉNEZ,     now satble, asymptomatic    DC planning    outpt GI f/u  Decubitus prevention- all measures as per RN protocol  Please call or text me with any questions or updates
Patient was seen and examined. Spoke with RN. Chart reviewed.  No events overnight.  pt examined with resident in room  Pt siting in chair - had paracentesis done yesterday and feeling better  No CP/SOB      Vital Signs Last 24 Hrs  T(F): 97.6 (02 Apr 2019 05:08), Max: 97.6 (02 Apr 2019 05:08)  HR: 67 (02 Apr 2019 05:08) (67 - 76)  BP: 116/57 (02 Apr 2019 05:08) (113/56 - 131/67)  SpO2: 97% (01 Apr 2019 14:33) (97% - 97%)      MEDICATIONS  (STANDING):  atorvastatin 40 milliGRAM(s) Oral at bedtime  cholecalciferol 1000 Unit(s) Oral daily  diltiazem    milliGRAM(s) Oral daily  ferrous    sulfate 325 milliGRAM(s) Oral three times a day  flecainide 50 milliGRAM(s) Oral every 12 hours  hydrochlorothiazide 50 milliGRAM(s) Oral <User Schedule>  hydrocortisone 2.5% Cream 1 Application(s) Topical two times a day  nystatin Cream 1 Application(s) Topical two times a day  pantoprazole    Tablet 40 milliGRAM(s) Oral before breakfast  pregabalin 50 milliGRAM(s) Oral three times a day  spironolactone 100 milliGRAM(s) Oral daily  sucralfate 1 Gram(s) Oral four times a day  tamsulosin 0.4 milliGRAM(s) Oral at bedtime    MEDICATIONS  (PRN):    Labs:                        9.2    2.71  )-----------( 81       ( 01 Apr 2019 08:40 )             28.6     01 Apr 2019 08:40    139    |  107    |  18     ----------------------------<  98     4.0     |  22     |  1.2      Ca    8.0        01 Apr 2019 08:40      PT/INR - ( 01 Apr 2019 08:40 )   PT: 20.80 sec;   INR: 1.82 ratio         PTT - ( 01 Apr 2019 08:40 )  PTT:39.6 sec      Culture - Body Fluid with Gram Stain (collected 01 Apr 2019 14:00)  Source: .Body Fluid Peritoneal Fluid  Gram Stain (02 Apr 2019 06:10):    No polymorphonuclear cells seen    No organisms seen    by cytocentrifuge      General: comfortable, NAD  Neurology: A&Ox3, nonfocal  Head:  Normocephalic, atraumatic  ENT:  Mucosa moist, no ulcerations  Neck:  Supple, no JVD,   Skin: no breakdowns (as per RN)  Resp: CTA B/L  CV: RRR, S1S2,   GI: Soft, distended    MS: edema      A/P:    67 yo M with PMH of AFIB on Coumadin, HTN, anemia, hyperlipidemia, BPH, DM, varices 4 years ago, as per pt after varices diagnosed pt was found to have liver cirrhosis secondary to JIMÉNEZ, hospital admission in November with LE edema was given Lasix but was not prescribed Lasix for CHF and was not discharged on lasix.    Presents for eval of b/l LE edema, abdominal distention and SOB on exertion  x 1 month associated with epigastric discomfort.  Weight gain of 30 ibs over last 4 weeks    new ascites 2/2 JIMÉNEZ/cirrhosis   possible portal htn  decompensated liver crrhosis- MELD 19  pancytopenia-stable, possibly 2/2 portal hypertension  Norrmocytic anemia  AFib  DM2-controlled  DLD    reviewed cahr and notes from past 24 hours  s/p  large volume Paracentesis  egd today to rule out for Varices  Monitor fluid levels check weights  Monitor INR  Monitor sugars while he may be NPO  check labs daily                 carb consistent diet, low sodium diet, 1500mL fluid restriction, daily weights  full code             DVT prophylaxis  Decubitus prevention- all measures as per RN protocol  Please call or text me with any questions or updates
SUBJECTIVE:    Patient is a 68y old Male who presents with a chief complaint of SOB, KIM, bilateral lower extremity swelling (02 Apr 2019 08:40)    Currently admitted to medicine with the primary diagnosis of Ascites     Today is hospital day 4d. This morning he is resting comfortably in bed and reports no new issues or overnight events.     INTERVAL EVENTS: Improvement to symptoms s/p paracentesis yesterday with 3 Liters removed. Pending EGD today to r/o varices.    PAST MEDICAL & SURGICAL HISTORY  BPH (benign prostatic hyperplasia)  Dyspnea on exertion  Cirrhosis of liver  Afib  Diabetes  Anemia  High cholesterol  HTN (hypertension)  Encounter for screening colonoscopy: 1 year ago  S/P angioplasty with stent: 2 cardiac stents  Presence of bilateral total knee joint prostheses    ALLERGIES:  No Known Allergies    MEDICATIONS:  STANDING MEDICATIONS  atorvastatin 40 milliGRAM(s) Oral at bedtime  cholecalciferol 1000 Unit(s) Oral daily  diltiazem    milliGRAM(s) Oral daily  ferrous    sulfate 325 milliGRAM(s) Oral three times a day  flecainide 50 milliGRAM(s) Oral every 12 hours  hydrochlorothiazide 50 milliGRAM(s) Oral <User Schedule>  hydrocortisone 2.5% Cream 1 Application(s) Topical two times a day  nystatin Cream 1 Application(s) Topical two times a day  pantoprazole    Tablet 40 milliGRAM(s) Oral before breakfast  pregabalin 50 milliGRAM(s) Oral three times a day  spironolactone 100 milliGRAM(s) Oral daily  sucralfate 1 Gram(s) Oral four times a day  tamsulosin 0.4 milliGRAM(s) Oral at bedtime    PRN MEDICATIONS    VITALS:   T(F): 97.8  HR: 66  BP: 106/57  RR: 18  SpO2: 97%    LABS:                        9.1    2.99  )-----------( 94       ( 02 Apr 2019 08:35 )             27.5     04-02    141  |  108  |  16  ----------------------------<  87  4.2   |  23  |  1.1    Ca    8.2<L>      02 Apr 2019 08:35  Mg     1.6     04-02    TPro  6.1  /  Alb  2.5<L>  /  TBili  1.9<H>  /  DBili  0.9<H>  /  AST  58<H>  /  ALT  32  /  AlkPhos  142<H>  04-02    PT/INR - ( 02 Apr 2019 08:35 )   PT: 19.80 sec;   INR: 1.73 ratio         PTT - ( 02 Apr 2019 08:35 )  PTT:41.1 sec          Culture - Body Fluid with Gram Stain (collected 01 Apr 2019 14:00)  Source: .Body Fluid Peritoneal Fluid  Gram Stain (02 Apr 2019 06:10):    No polymorphonuclear cells seen    No organisms seen    by cytocentrifuge    RADIOLOGY:    PHYSICAL EXAM:  GEN: No acute distress  PULM/CHEST: Clear to auscultation bilaterally, no rales, rhonchi or wheezes   CVS: Irregularly irregular  ABD: Soft, non-tender, mildly distended, +BS. Paracentesis site clean, dry and intact  EXT: 1+ B/L LE edema  NEURO: AAOx3
SUBJECTIVE:    Patient is a 68y old Male who presents with a chief complaint of SOB, KIM, bilateral lower extremity swelling (30 Mar 2019 21:47)    Currently admitted to medicine with the primary diagnosis of Ascites     Today is hospital day 2d. This morning he is resting comfortably in bed and reports no new issues or overnight events.     PAST MEDICAL & SURGICAL HISTORY  BPH (benign prostatic hyperplasia)  Dyspnea on exertion  Cirrhosis of liver  Afib  Diabetes  Anemia  High cholesterol  HTN (hypertension)  Encounter for screening colonoscopy: 1 year ago  S/P angioplasty with stent: 2 cardiac stents  Presence of bilateral total knee joint prostheses    SOCIAL HISTORY:  Negative for smoking/alcohol/drug use.     ALLERGIES:  No Known Allergies    MEDICATIONS:  STANDING MEDICATIONS  atorvastatin 40 milliGRAM(s) Oral at bedtime  cholecalciferol 1000 Unit(s) Oral daily  diltiazem    milliGRAM(s) Oral daily  enoxaparin Injectable 100 milliGRAM(s) SubCutaneous every 12 hours  ferrous    sulfate 325 milliGRAM(s) Oral three times a day  flecainide 50 milliGRAM(s) Oral every 12 hours  pantoprazole    Tablet 40 milliGRAM(s) Oral before breakfast  pregabalin 50 milliGRAM(s) Oral three times a day  sucralfate 1 Gram(s) Oral four times a day  tamsulosin 0.4 milliGRAM(s) Oral at bedtime    PRN MEDICATIONS    VITALS:   T(F): 97.3  HR: 76  BP: 118/59  RR: 18  SpO2: 96%    LABS:                        8.4    2.84  )-----------( 71       ( 31 Mar 2019 08:10 )             25.2     03-31    139  |  107  |  19  ----------------------------<  87  3.8   |  23  |  1.2    Ca    7.8<L>      31 Mar 2019 08:10  Mg     1.7     03-31    TPro  5.9<L>  /  Alb  2.2<L>  /  TBili  2.5<H>  /  DBili  x   /  AST  48<H>  /  ALT  27  /  AlkPhos  138<H>  03-30    PT/INR - ( 31 Mar 2019 08:10 )   PT: 22.10 sec;   INR: 1.93 ratio         PTT - ( 31 Mar 2019 08:10 )  PTT:44.1 sec          CARDIAC MARKERS ( 30 Mar 2019 06:48 )  x     / <0.01 ng/mL / x     / x     / x      CARDIAC MARKERS ( 29 Mar 2019 15:18 )  x     / <0.01 ng/mL / x     / x     / x          RADIOLOGY:    PHYSICAL EXAM:  GEN: No acute distress  LUNGS: Clear to auscultation bilaterally   HEART: Regular  ABD: Soft, non-tender, mild  distended.  EXT: edema   NEURO: AAOX3    Intravenous access:   NG tube:   Cano Catheter:
SUBJECTIVE:    Patient is a 68y old Male who presents with a chief complaint of SOB, KIM, bilateral lower extremity swelling (03 Apr 2019 08:00)    Currently admitted to medicine with the primary diagnosis of Ascites     Today is hospital day 5d. This morning he is resting comfortably in bed and reports no new issues or overnight events.     INTERVAL EVENTS: EGD yesterday with severe portal HTN gastropathy. Feeling well this AM. D/C today    PAST MEDICAL & SURGICAL HISTORY  BPH (benign prostatic hyperplasia)  Dyspnea on exertion  Cirrhosis of liver  Afib  Diabetes  Anemia  High cholesterol  HTN (hypertension)  Encounter for screening colonoscopy: 1 year ago  S/P angioplasty with stent: 2 cardiac stents  Presence of bilateral total knee joint prostheses    ALLERGIES:  No Known Allergies    MEDICATIONS:  STANDING MEDICATIONS  atorvastatin 40 milliGRAM(s) Oral at bedtime  cholecalciferol 1000 Unit(s) Oral daily  diltiazem    milliGRAM(s) Oral daily  ferrous    sulfate 325 milliGRAM(s) Oral three times a day  flecainide 50 milliGRAM(s) Oral every 12 hours  hydrochlorothiazide 50 milliGRAM(s) Oral <User Schedule>  hydrocortisone 2.5% Cream 1 Application(s) Topical two times a day  nystatin Cream 1 Application(s) Topical two times a day  pantoprazole    Tablet 40 milliGRAM(s) Oral two times a day  pregabalin 50 milliGRAM(s) Oral three times a day  spironolactone 100 milliGRAM(s) Oral daily  sucralfate 1 Gram(s) Oral four times a day  tamsulosin 0.4 milliGRAM(s) Oral at bedtime    PRN MEDICATIONS    VITALS:   T(F): 97.9  HR: 70  BP: 108/57  RR: 18  SpO2: 95%    LABS:                        9.1    2.99  )-----------( 94       ( 02 Apr 2019 08:35 )             27.5     04-02    141  |  108  |  16  ----------------------------<  87  4.2   |  23  |  1.1    Ca    8.2<L>      02 Apr 2019 08:35  Mg     1.6     04-02    TPro  6.1  /  Alb  2.5<L>  /  TBili  1.9<H>  /  DBili  0.9<H>  /  AST  58<H>  /  ALT  32  /  AlkPhos  142<H>  04-02    PT/INR - ( 02 Apr 2019 08:35 )   PT: 19.80 sec;   INR: 1.73 ratio         PTT - ( 02 Apr 2019 08:35 )  PTT:41.1 sec          Culture - Fungal, Body Fluid (collected 01 Apr 2019 14:00)  Source: .Body Fluid Peritoneal Fluid  Preliminary Report (03 Apr 2019 07:48):    Testing in progress    Culture - Body Fluid with Gram Stain (collected 01 Apr 2019 14:00)  Source: .Body Fluid Peritoneal Fluid  Gram Stain (02 Apr 2019 06:10):    No polymorphonuclear cells seen    No organisms seen    by cytocentrifuge    Culture - Acid Fast - Body Fluid w/Smear (collected 01 Apr 2019 14:00)  Source: .Body Fluid Peritoneal Fluid    < from: EGD (04.02.19 @ 09:45) >  Impressions:    Normal mucosa in the whole esophagus.    There was evidence of diffuse severe portal hypertensive gastropathy. There was  no evidence of gastric varices.-.    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 APC was used for hemostasis.-.    Normal mucosa in the duodenal bulb and second part of the duodenum.     Plan: Resume Previous Diet  Follow-up office visit in 2-3 weeks  monitor H&H  PPI BID    < end of copied text >    RADIOLOGY:    PHYSICAL EXAM:  GEN: No acute distress  PULM/CHEST: Clear to auscultation bilaterally, no rales, rhonchi or wheezes   CVS: Irregularly irregular  ABD: Soft, non-tender, distended, +BS. Paracentesis site clean, dry and intact  EXT: 2+ B/L LE edema  NEURO: AAOx3
SUBJECTIVE:    Patient is a 68y old Male who presents with a chief complaint of SOB, KIM, bilateral lower extremity swelling (29 Mar 2019 19:21)    Currently admitted to medicine with the primary diagnosis of Ascites     Today is hospital day 1d.   feeling ok   d/w wife.     PAST MEDICAL & SURGICAL HISTORY  BPH (benign prostatic hyperplasia)  Dyspnea on exertion  Cirrhosis of liver  Afib  Diabetes  Anemia  High cholesterol  HTN (hypertension)  Encounter for screening colonoscopy: 1 year ago  S/P angioplasty with stent: 2 cardiac stents  Presence of bilateral total knee joint prostheses    SOCIAL HISTORY:  Negative for smoking/alcohol/drug use.     ALLERGIES:  No Known Allergies    MEDICATIONS:  STANDING MEDICATIONS  atorvastatin 40 milliGRAM(s) Oral at bedtime  cholecalciferol 1000 Unit(s) Oral daily  diltiazem    milliGRAM(s) Oral daily  enoxaparin Injectable 40 milliGRAM(s) SubCutaneous at bedtime  ferrous    sulfate 325 milliGRAM(s) Oral three times a day  flecainide 50 milliGRAM(s) Oral every 12 hours  pantoprazole    Tablet 40 milliGRAM(s) Oral before breakfast  pregabalin 50 milliGRAM(s) Oral three times a day  sucralfate 1 Gram(s) Oral four times a day  tamsulosin 0.4 milliGRAM(s) Oral at bedtime    PRN MEDICATIONS    VITALS:   T(F): 97.6  HR: 67  BP: 114/57  RR: 18  SpO2: 97%    LABS:                        8.9    2.97  )-----------( 77       ( 30 Mar 2019 06:48 )             27.1     03-30    141  |  109  |  16  ----------------------------<  94  3.5   |  22  |  1.2    Ca    7.9<L>      30 Mar 2019 06:48  Mg     1.6     03-29    TPro  5.9<L>  /  Alb  2.2<L>  /  TBili  2.5<H>  /  DBili  x   /  AST  48<H>  /  ALT  27  /  AlkPhos  138<H>  03-30    PT/INR - ( 30 Mar 2019 06:48 )   PT: 22.20 sec;   INR: 1.94 ratio               Troponin T, Serum: <0.01 ng/mL (03-30-19 @ 06:48)  Troponin T, Serum: <0.01 ng/mL (03-29-19 @ 15:18)  Lactate, Blood: 2.1 mmol/L (03-29-19 @ 15:18)      CARDIAC MARKERS ( 30 Mar 2019 06:48 )  x     / <0.01 ng/mL / x     / x     / x      CARDIAC MARKERS ( 29 Mar 2019 15:18 )  x     / <0.01 ng/mL / x     / x     / x          RADIOLOGY:    PHYSICAL EXAM:  GEN: No acute distress  LUNGS: ascites   HEART: Regular  ABD: Soft, non-tender, non-distended.  EXT: edema 2 plus  NEURO: AAOX3    Intravenous access:   NG tube:   Cano Catheter:

## 2019-04-03 NOTE — DISCHARGE NOTE PROVIDER - NSDCCPCAREPLAN_GEN_ALL_CORE_FT
PRINCIPAL DISCHARGE DIAGNOSIS  Diagnosis: Ascites of liver  Assessment and Plan of Treatment: You were admitted to the hospital for symptomatic ascites interfering with your breathing. Paracentesis was performed and more than 3 liters of ascitic fluid was removed. There was no sign of infection in the ascitic fluid. An endoscopy was performed and it showed gastropathy due to portal hypertension from the liver cirrhosis. Continue taking Spironolactone every day, Hydrochlorothiazide every other day. Follow up with Dr Sutherland in his office in about 2-3 weeks. Please call his office to make an appointment

## 2019-04-03 NOTE — DISCHARGE NOTE PROVIDER - CARE PROVIDER_API CALL
Holland Sutherland)  Gastroenterology  [address] [place] [address]  Phone: [phone]  Fax: [phone]  Follow Up Time: 2 weeks

## 2019-04-03 NOTE — PROGRESS NOTE ADULT - REASON FOR ADMISSION
SOB, KIM, bilateral lower extremity swelling

## 2019-04-03 NOTE — DISCHARGE NOTE NURSING/CASE MANAGEMENT/SOCIAL WORK - NSDCPEPTCOWAR_GEN_ALL_CORE
Coumadin/Warfarin - Compliance/Coumadin/Warfarin - Potential for adverse drug reactions and interactions/Coumadin/Warfarin - Follow up monitoring/Coumadin/Warfarin - Dietary Advice

## 2019-04-03 NOTE — DISCHARGE NOTE PROVIDER - HOSPITAL COURSE
HPI:    67 yo M with PMH of AFIB on Coumadin, HTN, anemia, hyperlipidemia, CHF, BPH, DM, varices 4 years ago follows with GI Dr. Sutherland, as per pt after varices diagnosed pt was found to have liver cirrhosis secondary to JIMÉNEZ, hospital admission in November with LE edema was given Lasix but was not prescribed Lasix for CHF and was not discharged on lasix.        Presents for eval of b/l LE edema, abdominal distention and SOB on exertion  x 1 month associated with epigastric discomfort.  Pt reports he gained 30 pounds over last 4 weeks. Pt reports he has never had a paracentesis. Denies any fever, n/v, chest pain, palpitations, diaphoresis, cough, dizziness, numbness/tingling, neck pain/ stiffness, abd pain, diarrhea, constipation, melena/brbpr, urinary symptoms, trauma, weakness, calf pain/erythema, sick contacts, recent travel or rash. (29 Mar 2019 19:21)        Paracentesis was performed on 4/1 removing >3 liters, no evidence of SBP, SAAG of 2 and improvement of symptoms. EGD on 4/2 showed severe portal HTN gastropathy.

## 2019-04-03 NOTE — PROGRESS NOTE ADULT - ASSESSMENT
69 yo M with PMH of AFIB on Coumadin, HTN, anemia, hyperlipidemia, BPH, DM, varices 4 years ago, as per pt after varices diagnosed pt was found to have liver cirrhosis secondary to JIMÉNEZ, hospital admission in November with LE edema was given Lasix but was not prescribed Lasix for CHF and was not discharged on lasix. Presents for eval of b/l LE edema, abdominal distention and SOB on exertion  x 1 month associated with epigastric discomfort.  Weight gain of 30 ibs over last 4 weeks    # New ascites due to JIMÉNEZ/cirrhosis   - s/p Therapeutic paracentesis 4/1  - SAAG 2.0 (pattern of portal HTN), total protein 1.1, glucose 138. No evidence of SBP  - Per GI, since ascites consistent with Portal HTN and not SBP, started on Aldactone 100 mg po daily and HCTZ 50 mg po every other day, monitor cr and lytes  - TTE WNL  - CXR: left basilar atelectasis.  No pulmonary edema  - Minimally elevated AST and ALP   - Limit Na to 2 gm/day  - INR 1.73 --> Coumadin tonight  - Daily weights, abdominal girth q3 days    # Decompensated liver cirrhosis - MELD 19  # Portal HTN due to liver cirrhosis  - Portal Hypertension pattern - thrombocytopenia, splenomegaly (on prior CT abd ), Ascites, SAAG 2.0  - EGD on 4/2: Diffuse severe portal hypertensive gastropathy with GAVE. No evidence of gastric varices. Esophageal mucosa normal  - baseline Cr around 1.2  - RUQ sono: cirrhosis and moderate ascites  - US every 6 months for HCC screening  - Hepatitis panel negative, AFP WNL    # Pancytopenia - stable  - Likely due to portal hypertension  - Monitor CBC    # Normocytic anemia  - baseline 11 last November, now around 9  - Iron panel, B12 and Folate - WNL    # AFib - rate controlled  - Warfarin - on 2.5 mg 6x/week; 5 mg on Mondays  - Dr. River recently stopped sotalol and started diltiazem and flecainide     # DM II - controlled  - HbA1c 4.9  - on Januvia at home - will hold in hospital  - Monitor FSG, if >180 start lispro/glargine regimen  - Lyrica for peripheral neuropathy      # DLD - c/w atorvastatin    DVT ppx - Coumadin  GI ppx - Protonix    Dispo: can d/c home after EGD 67 yo M with PMH of AFIB on Coumadin, HTN, anemia, hyperlipidemia, BPH, DM, varices 4 years ago, as per pt after varices diagnosed pt was found to have liver cirrhosis secondary to JIMÉNEZ, hospital admission in November with LE edema was given Lasix but was not prescribed Lasix for CHF and was not discharged on lasix. Presents for eval of b/l LE edema, abdominal distention and SOB on exertion  x 1 month associated with epigastric discomfort.  Weight gain of 30 ibs over last 4 weeks    # New ascites due to JIMÉNEZ/cirrhosis   - s/p Therapeutic paracentesis 4/1  - SAAG 2.0 (pattern of portal HTN), total protein 1.1, glucose 138. No evidence of SBP  - Per GI, since ascites consistent with Portal HTN and not SBP, started on Aldactone 100 mg po daily and HCTZ 50 mg po every other day, monitor cr and lytes  - TTE WNL  - CXR: left basilar atelectasis.  No pulmonary edema  - Minimally elevated AST and ALP   - Limit Na to 2 gm/day  - INR 1.73 --> Coumadin tonight  - Daily weights, abdominal girth q3 days    # Decompensated liver cirrhosis - MELD 19  # Portal HTN due to liver cirrhosis  - Portal Hypertension pattern - thrombocytopenia, splenomegaly (on prior CT abd ), Ascites, SAAG 2.0  - EGD on 4/2: Diffuse severe portal hypertensive gastropathy with GAVE. No evidence of gastric varices. Esophageal mucosa normal  - baseline Cr around 1.2  - RUQ sono: cirrhosis and moderate ascites  - US every 6 months for HCC screening  - Hepatitis panel negative, AFP WNL    # Pancytopenia - stable  - Likely due to portal hypertension  - Monitor CBC    # Normocytic anemia  - baseline 11 last November, now around 9  - Iron panel, B12 and Folate - WNL    # AFib - rate controlled  - Warfarin - on 2.5 mg 6x/week; 5 mg on Mondays  - c/w diltiazem and flecainide (Dr. River recently stopped sotalol)    # DM II - controlled  - HbA1c 4.9  - on Januvia at home - will hold in hospital  - Monitor FSG, if >180 start lispro/glargine regimen  - c/w Lyrica for peripheral neuropathy      # DLD - c/w atorvastatin    DVT ppx - Coumadin  GI ppx - Protonix    Dispo: can d/c home after EGD

## 2019-04-07 ENCOUNTER — INPATIENT (INPATIENT)
Facility: HOSPITAL | Age: 69
LOS: 3 days | Discharge: HOME | End: 2019-04-11
Attending: INTERNAL MEDICINE | Admitting: INTERNAL MEDICINE
Payer: MEDICARE

## 2019-04-07 VITALS
TEMPERATURE: 101 F | OXYGEN SATURATION: 99 % | SYSTOLIC BLOOD PRESSURE: 112 MMHG | HEART RATE: 90 BPM | RESPIRATION RATE: 18 BRPM | DIASTOLIC BLOOD PRESSURE: 67 MMHG

## 2019-04-07 DIAGNOSIS — Z96.653 PRESENCE OF ARTIFICIAL KNEE JOINT, BILATERAL: Chronic | ICD-10-CM

## 2019-04-07 DIAGNOSIS — Z95.9 PRESENCE OF CARDIAC AND VASCULAR IMPLANT AND GRAFT, UNSPECIFIED: Chronic | ICD-10-CM

## 2019-04-07 DIAGNOSIS — Z12.11 ENCOUNTER FOR SCREENING FOR MALIGNANT NEOPLASM OF COLON: Chronic | ICD-10-CM

## 2019-04-07 LAB
ALBUMIN SERPL ELPH-MCNC: 2.2 G/DL — LOW (ref 3.5–5.2)
ALP SERPL-CCNC: 144 U/L — HIGH (ref 30–115)
ALT FLD-CCNC: 32 U/L — SIGNIFICANT CHANGE UP (ref 0–41)
ANION GAP SERPL CALC-SCNC: 12 MMOL/L — SIGNIFICANT CHANGE UP (ref 7–14)
APPEARANCE UR: CLEAR — SIGNIFICANT CHANGE UP
APTT BLD: 35.5 SEC — SIGNIFICANT CHANGE UP (ref 27–39.2)
APTT BLD: 44.8 SEC — HIGH (ref 27–39.2)
AST SERPL-CCNC: 53 U/L — HIGH (ref 0–41)
BACTERIA # UR AUTO: ABNORMAL /HPF
BASE EXCESS BLDV CALC-SCNC: 0.9 MMOL/L — SIGNIFICANT CHANGE UP (ref -2–2)
BASOPHILS # BLD AUTO: 0.03 K/UL — SIGNIFICANT CHANGE UP (ref 0–0.2)
BASOPHILS NFR BLD AUTO: 0.6 % — SIGNIFICANT CHANGE UP (ref 0–1)
BILIRUB SERPL-MCNC: 2.6 MG/DL — HIGH (ref 0.2–1.2)
BILIRUB UR-MCNC: ABNORMAL
BUN SERPL-MCNC: 20 MG/DL — SIGNIFICANT CHANGE UP (ref 10–20)
CA-I SERPL-SCNC: 1.12 MMOL/L — SIGNIFICANT CHANGE UP (ref 1.12–1.3)
CALCIUM SERPL-MCNC: 7.8 MG/DL — LOW (ref 8.5–10.1)
CHLORIDE SERPL-SCNC: 104 MMOL/L — SIGNIFICANT CHANGE UP (ref 98–110)
CO2 SERPL-SCNC: 22 MMOL/L — SIGNIFICANT CHANGE UP (ref 17–32)
COLOR SPEC: ABNORMAL
CREAT SERPL-MCNC: 1.4 MG/DL — SIGNIFICANT CHANGE UP (ref 0.7–1.5)
CULTURE RESULTS: SIGNIFICANT CHANGE UP
DIFF PNL FLD: NEGATIVE — SIGNIFICANT CHANGE UP
EOSINOPHIL # BLD AUTO: 0.21 K/UL — SIGNIFICANT CHANGE UP (ref 0–0.7)
EOSINOPHIL NFR BLD AUTO: 4.1 % — SIGNIFICANT CHANGE UP (ref 0–8)
EPI CELLS # UR: ABNORMAL /HPF
GAS PNL BLDV: 137 MMOL/L — SIGNIFICANT CHANGE UP (ref 136–145)
GAS PNL BLDV: SIGNIFICANT CHANGE UP
GLUCOSE BLDC GLUCOMTR-MCNC: 100 MG/DL — HIGH (ref 70–99)
GLUCOSE BLDC GLUCOMTR-MCNC: 137 MG/DL — HIGH (ref 70–99)
GLUCOSE SERPL-MCNC: 88 MG/DL — SIGNIFICANT CHANGE UP (ref 70–99)
GLUCOSE UR QL: NEGATIVE — SIGNIFICANT CHANGE UP
HCO3 BLDV-SCNC: 24 MMOL/L — SIGNIFICANT CHANGE UP (ref 22–29)
HCT VFR BLD CALC: 29.6 % — LOW (ref 42–52)
HCT VFR BLDA CALC: 31.6 % — LOW (ref 34–44)
HGB BLD CALC-MCNC: 10.3 G/DL — LOW (ref 14–18)
HGB BLD-MCNC: 9.8 G/DL — LOW (ref 14–18)
IMM GRANULOCYTES NFR BLD AUTO: 0.4 % — HIGH (ref 0.1–0.3)
INR BLD: 1.91 RATIO — HIGH (ref 0.65–1.3)
INR BLD: 1.93 RATIO — HIGH (ref 0.65–1.3)
KETONES UR-MCNC: ABNORMAL
LACTATE BLDV-MCNC: 2.4 MMOL/L — HIGH (ref 0.5–1.6)
LEUKOCYTE ESTERASE UR-ACNC: ABNORMAL
LYMPHOCYTES # BLD AUTO: 0.4 K/UL — LOW (ref 1.2–3.4)
LYMPHOCYTES # BLD AUTO: 7.9 % — LOW (ref 20.5–51.1)
MCHC RBC-ENTMCNC: 32.3 PG — HIGH (ref 27–31)
MCHC RBC-ENTMCNC: 33.1 G/DL — SIGNIFICANT CHANGE UP (ref 32–37)
MCV RBC AUTO: 97.7 FL — HIGH (ref 80–94)
MONOCYTES # BLD AUTO: 0.42 K/UL — SIGNIFICANT CHANGE UP (ref 0.1–0.6)
MONOCYTES NFR BLD AUTO: 8.3 % — SIGNIFICANT CHANGE UP (ref 1.7–9.3)
NEUTROPHILS # BLD AUTO: 4.01 K/UL — SIGNIFICANT CHANGE UP (ref 1.4–6.5)
NEUTROPHILS NFR BLD AUTO: 78.7 % — HIGH (ref 42.2–75.2)
NITRITE UR-MCNC: NEGATIVE — SIGNIFICANT CHANGE UP
NRBC # BLD: 0 /100 WBCS — SIGNIFICANT CHANGE UP (ref 0–0)
PCO2 BLDV: 33 MMHG — LOW (ref 41–51)
PH BLDV: 7.47 — HIGH (ref 7.26–7.43)
PH UR: 5.5 — SIGNIFICANT CHANGE UP (ref 5–8)
PLATELET # BLD AUTO: 102 K/UL — LOW (ref 130–400)
PO2 BLDV: 35 MMHG — SIGNIFICANT CHANGE UP (ref 20–40)
POTASSIUM BLDV-SCNC: 4 MMOL/L — SIGNIFICANT CHANGE UP (ref 3.3–5.6)
POTASSIUM SERPL-MCNC: 4.4 MMOL/L — SIGNIFICANT CHANGE UP (ref 3.5–5)
POTASSIUM SERPL-SCNC: 4.4 MMOL/L — SIGNIFICANT CHANGE UP (ref 3.5–5)
PROT SERPL-MCNC: 5.9 G/DL — LOW (ref 6–8)
PROT UR-MCNC: ABNORMAL
PROTHROM AB SERPL-ACNC: 21.8 SEC — HIGH (ref 9.95–12.87)
PROTHROM AB SERPL-ACNC: 22.1 SEC — HIGH (ref 9.95–12.87)
RBC # BLD: 3.03 M/UL — LOW (ref 4.7–6.1)
RBC # FLD: 15.8 % — HIGH (ref 11.5–14.5)
RBC CASTS # UR COMP ASSIST: SIGNIFICANT CHANGE UP /HPF
SAO2 % BLDV: 64 % — SIGNIFICANT CHANGE UP
SODIUM SERPL-SCNC: 138 MMOL/L — SIGNIFICANT CHANGE UP (ref 135–146)
SP GR SPEC: >=1.03 — SIGNIFICANT CHANGE UP (ref 1.01–1.03)
SPECIMEN SOURCE: SIGNIFICANT CHANGE UP
TROPONIN T SERPL-MCNC: <0.01 NG/ML — SIGNIFICANT CHANGE UP
UROBILINOGEN FLD QL: 1 (ref 0.2–0.2)
WBC # BLD: 5.09 K/UL — SIGNIFICANT CHANGE UP (ref 4.8–10.8)
WBC # FLD AUTO: 5.09 K/UL — SIGNIFICANT CHANGE UP (ref 4.8–10.8)
WBC UR QL: SIGNIFICANT CHANGE UP /HPF

## 2019-04-07 PROCEDURE — 71045 X-RAY EXAM CHEST 1 VIEW: CPT | Mod: 26

## 2019-04-07 PROCEDURE — 93010 ELECTROCARDIOGRAM REPORT: CPT

## 2019-04-07 PROCEDURE — 70450 CT HEAD/BRAIN W/O DYE: CPT | Mod: 26

## 2019-04-07 PROCEDURE — 99285 EMERGENCY DEPT VISIT HI MDM: CPT | Mod: GC

## 2019-04-07 RX ORDER — CEFEPIME 1 G/1
2000 INJECTION, POWDER, FOR SOLUTION INTRAMUSCULAR; INTRAVENOUS ONCE
Qty: 0 | Refills: 0 | Status: COMPLETED | OUTPATIENT
Start: 2019-04-07 | End: 2019-04-07

## 2019-04-07 RX ORDER — INSULIN LISPRO 100/ML
VIAL (ML) SUBCUTANEOUS
Qty: 0 | Refills: 0 | Status: DISCONTINUED | OUTPATIENT
Start: 2019-04-07 | End: 2019-04-11

## 2019-04-07 RX ORDER — HYDROCHLOROTHIAZIDE 25 MG
50 TABLET ORAL
Qty: 0 | Refills: 0 | Status: DISCONTINUED | OUTPATIENT
Start: 2019-04-07 | End: 2019-04-09

## 2019-04-07 RX ORDER — ENOXAPARIN SODIUM 100 MG/ML
113 INJECTION SUBCUTANEOUS
Qty: 0 | Refills: 0 | Status: DISCONTINUED | OUTPATIENT
Start: 2019-04-07 | End: 2019-04-07

## 2019-04-07 RX ORDER — SPIRONOLACTONE 25 MG/1
100 TABLET, FILM COATED ORAL DAILY
Qty: 0 | Refills: 0 | Status: DISCONTINUED | OUTPATIENT
Start: 2019-04-07 | End: 2019-04-07

## 2019-04-07 RX ORDER — HEPARIN SODIUM 5000 [USP'U]/ML
9000 INJECTION INTRAVENOUS; SUBCUTANEOUS EVERY 6 HOURS
Qty: 0 | Refills: 0 | Status: DISCONTINUED | OUTPATIENT
Start: 2019-04-07 | End: 2019-04-07

## 2019-04-07 RX ORDER — CHOLECALCIFEROL (VITAMIN D3) 125 MCG
2 CAPSULE ORAL
Qty: 0 | Refills: 0 | COMMUNITY

## 2019-04-07 RX ORDER — SPIRONOLACTONE 25 MG/1
100 TABLET, FILM COATED ORAL
Qty: 0 | Refills: 0 | Status: DISCONTINUED | OUTPATIENT
Start: 2019-04-07 | End: 2019-04-10

## 2019-04-07 RX ORDER — ENOXAPARIN SODIUM 100 MG/ML
110 INJECTION SUBCUTANEOUS
Qty: 0 | Refills: 0 | Status: DISCONTINUED | OUTPATIENT
Start: 2019-04-07 | End: 2019-04-09

## 2019-04-07 RX ORDER — SOTALOL HCL 120 MG
80 TABLET ORAL
Qty: 0 | Refills: 0 | Status: DISCONTINUED | OUTPATIENT
Start: 2019-04-07 | End: 2019-04-10

## 2019-04-07 RX ORDER — CEFEPIME 1 G/1
2000 INJECTION, POWDER, FOR SOLUTION INTRAMUSCULAR; INTRAVENOUS EVERY 12 HOURS
Qty: 0 | Refills: 0 | Status: DISCONTINUED | OUTPATIENT
Start: 2019-04-07 | End: 2019-04-09

## 2019-04-07 RX ORDER — TAMSULOSIN HYDROCHLORIDE 0.4 MG/1
0.4 CAPSULE ORAL AT BEDTIME
Qty: 0 | Refills: 0 | Status: DISCONTINUED | OUTPATIENT
Start: 2019-04-07 | End: 2019-04-11

## 2019-04-07 RX ORDER — SODIUM CHLORIDE 9 MG/ML
1000 INJECTION, SOLUTION INTRAVENOUS
Qty: 0 | Refills: 0 | Status: DISCONTINUED | OUTPATIENT
Start: 2019-04-07 | End: 2019-04-11

## 2019-04-07 RX ORDER — VANCOMYCIN HCL 1 G
1500 VIAL (EA) INTRAVENOUS EVERY 12 HOURS
Qty: 0 | Refills: 0 | Status: DISCONTINUED | OUTPATIENT
Start: 2019-04-07 | End: 2019-04-08

## 2019-04-07 RX ORDER — OMEGA-3 ACID ETHYL ESTERS 1 G
2 CAPSULE ORAL
Qty: 0 | Refills: 0 | Status: DISCONTINUED | OUTPATIENT
Start: 2019-04-07 | End: 2019-04-11

## 2019-04-07 RX ORDER — HEPARIN SODIUM 5000 [USP'U]/ML
9000 INJECTION INTRAVENOUS; SUBCUTANEOUS ONCE
Qty: 0 | Refills: 0 | Status: DISCONTINUED | OUTPATIENT
Start: 2019-04-07 | End: 2019-04-07

## 2019-04-07 RX ORDER — PANTOPRAZOLE SODIUM 20 MG/1
40 TABLET, DELAYED RELEASE ORAL
Qty: 0 | Refills: 0 | Status: DISCONTINUED | OUTPATIENT
Start: 2019-04-07 | End: 2019-04-11

## 2019-04-07 RX ORDER — DILTIAZEM HCL 120 MG
1 CAPSULE, EXT RELEASE 24 HR ORAL
Qty: 0 | Refills: 0 | COMMUNITY

## 2019-04-07 RX ORDER — DEXTROSE 50 % IN WATER 50 %
25 SYRINGE (ML) INTRAVENOUS ONCE
Qty: 0 | Refills: 0 | Status: DISCONTINUED | OUTPATIENT
Start: 2019-04-07 | End: 2019-04-11

## 2019-04-07 RX ORDER — FLECAINIDE ACETATE 50 MG
50 TABLET ORAL EVERY 12 HOURS
Qty: 0 | Refills: 0 | Status: DISCONTINUED | OUTPATIENT
Start: 2019-04-07 | End: 2019-04-11

## 2019-04-07 RX ORDER — CHLORHEXIDINE GLUCONATE 213 G/1000ML
1 SOLUTION TOPICAL
Qty: 0 | Refills: 0 | Status: DISCONTINUED | OUTPATIENT
Start: 2019-04-07 | End: 2019-04-11

## 2019-04-07 RX ORDER — INSULIN LISPRO 100/ML
7 VIAL (ML) SUBCUTANEOUS
Qty: 0 | Refills: 0 | Status: DISCONTINUED | OUTPATIENT
Start: 2019-04-07 | End: 2019-04-09

## 2019-04-07 RX ORDER — GLUCAGON INJECTION, SOLUTION 0.5 MG/.1ML
1 INJECTION, SOLUTION SUBCUTANEOUS ONCE
Qty: 0 | Refills: 0 | Status: DISCONTINUED | OUTPATIENT
Start: 2019-04-07 | End: 2019-04-11

## 2019-04-07 RX ORDER — WARFARIN SODIUM 2.5 MG/1
2.5 TABLET ORAL AT BEDTIME
Qty: 0 | Refills: 0 | Status: DISCONTINUED | OUTPATIENT
Start: 2019-04-07 | End: 2019-04-07

## 2019-04-07 RX ORDER — FERROUS SULFATE 325(65) MG
325 TABLET ORAL DAILY
Qty: 0 | Refills: 0 | Status: DISCONTINUED | OUTPATIENT
Start: 2019-04-07 | End: 2019-04-11

## 2019-04-07 RX ORDER — HEPARIN SODIUM 5000 [USP'U]/ML
INJECTION INTRAVENOUS; SUBCUTANEOUS
Qty: 25000 | Refills: 0 | Status: DISCONTINUED | OUTPATIENT
Start: 2019-04-07 | End: 2019-04-07

## 2019-04-07 RX ORDER — DIAZEPAM 5 MG
5 TABLET ORAL DAILY
Qty: 0 | Refills: 0 | Status: DISCONTINUED | OUTPATIENT
Start: 2019-04-07 | End: 2019-04-10

## 2019-04-07 RX ORDER — CEFEPIME 1 G/1
INJECTION, POWDER, FOR SOLUTION INTRAMUSCULAR; INTRAVENOUS
Qty: 0 | Refills: 0 | Status: COMPLETED | OUTPATIENT
Start: 2019-04-07 | End: 2019-04-08

## 2019-04-07 RX ORDER — AZITHROMYCIN 500 MG/1
500 TABLET, FILM COATED ORAL ONCE
Qty: 0 | Refills: 0 | Status: COMPLETED | OUTPATIENT
Start: 2019-04-07 | End: 2019-04-07

## 2019-04-07 RX ORDER — SUCRALFATE 1 G
1 TABLET ORAL EVERY 6 HOURS
Qty: 0 | Refills: 0 | Status: DISCONTINUED | OUTPATIENT
Start: 2019-04-07 | End: 2019-04-11

## 2019-04-07 RX ORDER — PIPERACILLIN AND TAZOBACTAM 4; .5 G/20ML; G/20ML
3.38 INJECTION, POWDER, LYOPHILIZED, FOR SOLUTION INTRAVENOUS EVERY 6 HOURS
Qty: 0 | Refills: 0 | Status: DISCONTINUED | OUTPATIENT
Start: 2019-04-07 | End: 2019-04-07

## 2019-04-07 RX ORDER — INSULIN GLARGINE 100 [IU]/ML
21 INJECTION, SOLUTION SUBCUTANEOUS EVERY MORNING
Qty: 0 | Refills: 0 | Status: DISCONTINUED | OUTPATIENT
Start: 2019-04-07 | End: 2019-04-11

## 2019-04-07 RX ORDER — SODIUM CHLORIDE 9 MG/ML
1000 INJECTION, SOLUTION INTRAVENOUS ONCE
Qty: 0 | Refills: 0 | Status: COMPLETED | OUTPATIENT
Start: 2019-04-07 | End: 2019-04-07

## 2019-04-07 RX ORDER — HEPARIN SODIUM 5000 [USP'U]/ML
4500 INJECTION INTRAVENOUS; SUBCUTANEOUS EVERY 6 HOURS
Qty: 0 | Refills: 0 | Status: DISCONTINUED | OUTPATIENT
Start: 2019-04-07 | End: 2019-04-07

## 2019-04-07 RX ORDER — DEXTROSE 50 % IN WATER 50 %
12.5 SYRINGE (ML) INTRAVENOUS ONCE
Qty: 0 | Refills: 0 | Status: DISCONTINUED | OUTPATIENT
Start: 2019-04-07 | End: 2019-04-11

## 2019-04-07 RX ORDER — DEXTROSE 50 % IN WATER 50 %
15 SYRINGE (ML) INTRAVENOUS ONCE
Qty: 0 | Refills: 0 | Status: DISCONTINUED | OUTPATIENT
Start: 2019-04-07 | End: 2019-04-11

## 2019-04-07 RX ORDER — VANCOMYCIN HCL 1 G
2000 VIAL (EA) INTRAVENOUS ONCE
Qty: 0 | Refills: 0 | Status: DISCONTINUED | OUTPATIENT
Start: 2019-04-07 | End: 2019-04-07

## 2019-04-07 RX ORDER — ATORVASTATIN CALCIUM 80 MG/1
40 TABLET, FILM COATED ORAL AT BEDTIME
Qty: 0 | Refills: 0 | Status: DISCONTINUED | OUTPATIENT
Start: 2019-04-07 | End: 2019-04-11

## 2019-04-07 RX ORDER — ACETAMINOPHEN 500 MG
650 TABLET ORAL ONCE
Qty: 0 | Refills: 0 | Status: COMPLETED | OUTPATIENT
Start: 2019-04-07 | End: 2019-04-07

## 2019-04-07 RX ADMIN — ENOXAPARIN SODIUM 110 MILLIGRAM(S): 100 INJECTION SUBCUTANEOUS at 18:25

## 2019-04-07 RX ADMIN — Medication 1 GRAM(S): at 18:08

## 2019-04-07 RX ADMIN — SODIUM CHLORIDE 1000 MILLILITER(S): 9 INJECTION, SOLUTION INTRAVENOUS at 13:55

## 2019-04-07 RX ADMIN — Medication 300 MILLIGRAM(S): at 18:25

## 2019-04-07 RX ADMIN — AZITHROMYCIN 255 MILLIGRAM(S): 500 TABLET, FILM COATED ORAL at 16:01

## 2019-04-07 RX ADMIN — ATORVASTATIN CALCIUM 40 MILLIGRAM(S): 80 TABLET, FILM COATED ORAL at 21:22

## 2019-04-07 RX ADMIN — CEFEPIME 100 MILLIGRAM(S): 1 INJECTION, POWDER, FOR SOLUTION INTRAMUSCULAR; INTRAVENOUS at 15:00

## 2019-04-07 RX ADMIN — TAMSULOSIN HYDROCHLORIDE 0.4 MILLIGRAM(S): 0.4 CAPSULE ORAL at 21:21

## 2019-04-07 NOTE — ED PROVIDER NOTE - CLINICAL SUMMARY MEDICAL DECISION MAKING FREE TEXT BOX
68yM p/w fever Tmax 102 at home, recently admitted for cirrhosis w/ new ascites. Pt chronically ill appearing but not toxic. No current clinical concern for meningitis or AOM (despite apparent L TM perforation - possibly subacute/chronic?). No clear pharyngitis.  CXR w/o focal PNA.  No abd pain to suggest peritonitis and thus no paracentesis performed.  UA neg. Pt started on empiric abx and admitted to medicine for further monitoring and management.

## 2019-04-07 NOTE — ED PROVIDER NOTE - CARE PLAN
Principal Discharge DX:	Fever of unknown origin (FUO) Principal Discharge DX:	Fever of unknown origin (FUO)  Secondary Diagnosis:	Cirrhosis of liver  Secondary Diagnosis:	Tympanic membrane perforation, left

## 2019-04-07 NOTE — ED ADULT NURSE REASSESSMENT NOTE - NS ED NURSE REASSESS COMMENT FT1
04/07/19  1620: Pt remains NAD, Denies SOB or chest pain. during transfer of care. As per charge RN, Pt will be moved to room 15A. Report given to Lashon Pappas. Pt's wife at the bedside. Pt attached to cardiac monitor as well.

## 2019-04-07 NOTE — H&P ADULT - HISTORY OF PRESENT ILLNESS
68M pmh afib (coumadin), HTN, anemia, HLD, Diastolic CHF, BPH, DM, JIMÉNEZ, cirrhosis w/ varices p/w fever to 102F at home and generalized weakness and feeling of lousiness starting morning prior to presentation. As per patient, he even had trouble getting out of his chair and needed help from his wife. Of note, patient states he was recently discharged from the hospital for volume overload secondary to cirrhosis from JIMÉNEZ and had a paracentesis done with 3 liters removed. Since then he felt ok till today. Patient also states that when he was in the hospital, his roomate had pneumonia. Patient currently denies any respiratory symptoms such as shortness of breath, cough, or sputum production.     In ED- EKG was NSR at 59 BPM. Temp was 101.1 w/ HR of 90. UA, CXR, and CT Head was done, and was negative.

## 2019-04-07 NOTE — ED PROVIDER NOTE - NS ED ROS FT
Constitutional: NAD  Eyes: No visual changes, eye pain or discharge.  ENMT: No hearing changes, pain, discharge or infections. No neck pain or stiffness.  Cardiac: No chest pain, SOB or edema. No chest pain with exertion.  Respiratory: No cough or respiratory distress.   GI: No nausea, vomiting, diarrhea or abdominal pain.  : No dysuria, frequency or burning.  MS: No myalgia, muscle weakness, joint pain or back pain.  Neuro: No headache . + weakness. No LOC.  Skin: No skin rash.  Heme: No bruising

## 2019-04-07 NOTE — H&P ADULT - NSHPLABSRESULTS_GEN_ALL_CORE
9.8    5.09  )-----------( 102      ( 07 Apr 2019 13:15 )             29.6       04-07    138  |  104  |  20  ----------------------------<  88  4.4   |  22  |  1.4    Ca    7.8<L>      07 Apr 2019 13:15    TPro  5.9<L>  /  Alb  2.2<L>  /  TBili  2.6<H>  /  DBili  x   /  AST  53<H>  /  ALT  32  /  AlkPhos  144<H>  04-07      LIVER FUNCTIONS - ( 07 Apr 2019 13:15 )  Alb: 2.2 g/dL / Pro: 5.9 g/dL / ALK PHOS: 144 U/L / ALT: 32 U/L / AST: 53 U/L / GGT: x             PT/INR - ( 07 Apr 2019 13:15 )   PT: 21.80 sec;   INR: 1.91 ratio         PTT - ( 07 Apr 2019 13:15 )  PTT:35.5 sec    Urinalysis Basic - ( 07 Apr 2019 13:15 )    Color: Orange / Appearance: Clear / SG: >=1.030 / pH: x  Gluc: x / Ketone: Trace  / Bili: Small / Urobili: 1.0   Blood: x / Protein: Trace / Nitrite: Negative   Leuk Esterase: Trace / RBC: 1-2 /HPF / WBC 1-2 /HPF   Sq Epi: x / Non Sq Epi: Occasional /HPF / Bacteria: Few /HPF            CARDIAC MARKERS ( 07 Apr 2019 13:15 )  x     / <0.01 ng/mL / x     / x     / x          No CT evidence for acute intracranial pathology.      < end of copied text >    < from: Xray Chest 1 View-PORTABLE IMMEDIATE (04.07.19 @ 14:08) >    No radiographic evidence of acute cardiopulmonary disease.    < end of copied text >

## 2019-04-07 NOTE — H&P ADULT - NSHPPHYSICALEXAM_GEN_ALL_CORE
General: AOx4, Non toxic appearing, NAD, speaking in full sentences.   Skin: Warm and dry, no acute rash.   Head:  Normocephalic, atraumatic.   HEENT: PERRL/EOMI, conjunctiva and sclera clear. MM moist, no nasal discharge.    Neck: Supple nt, no meningeal signs.   Lymph: No acute cervical lymphadenopathy  Heart RRR s1s2 nl, no rub/murmur.   Lungs- No retractions, BS equal, scattered basilar rales  Abdomen: Soft, distended, nttp no r/g.   Extremities- 2+ pitting LE edema, as per patient and wife, is close to baseline  Neuro: Sensation wnl, CN 2-12 grossly intact. +5/5 muscle strength throughout.  Psych: Cooperative, appropriate

## 2019-04-07 NOTE — ED PROVIDER NOTE - PHYSICAL EXAMINATION
General: AOx4, Non toxic appearing, NAD, speaking in full sentences.   Skin: Warm and dry, no acute rash.   Head:  Normocephalic, atraumatic.   EENT: PERRL/EOMI, conjunctiva and sclera clear. MM moist, no nasal discharge.    Neck: Supple nt, no meningeal signs.   Lymph: No acute cervical lymphadenopathy  Heart RRR s1s2 nl, no rub/murmur.   Lungs- No retractions, BS equal, scattered basilar rales  Abdomen: Soft, distended, nttp no r/g.   Extremities- moves all, +equal distal pulses, brisk cap refill. +2 LE edema, calves nttp b/l.  Neuro: Sensation wnl, CN 2-12 grossly intact. +5/5 muscle strength throughout.  Psych: Cooperative, appropriate

## 2019-04-07 NOTE — ED ADULT NURSE NOTE - OBJECTIVE STATEMENT
Patient reports he was recently d/c 1 week ago for fluid overload and yesterday developed fevers and weakness. Highest today was 102

## 2019-04-07 NOTE — ED PROVIDER NOTE - ATTENDING CONTRIBUTION TO CARE
68yM afib on coumadin cirrhosis 2/2 JIMÉNEZ c/b varices recent admission for new ascites p/w fever x 1d, T102 at home.  No focal sx - no abd pain, v/d, URI sx, cough. +b/l ear pain/fullness with ?dec hearing. No sore throat.  No known sick contacts.  Was feeling very weak earlier prior to arrival.  C/o chronic pain at L lower ribs but no abd tenderness.    VS notable for T101.1 HR 90  CONSTITUTIONAL: well developed; well nourished; chronically appearing in no acute distress  HEAD: normocephalic; atraumatic  EYES: no conjunctival injection, no scleral icterus  ENT: R TM w/o bulging or purulence, L TM w/ ?perforation but w/o erythema, tenderness or discharge, no nasal discharge; airway clear.  NECK: supple; non tender. + full passive ROM in all directions  CARD: S1, S2 normal; no murmurs, gallops, or rubs. Regular rate and rhythm  RESP: no wheezes, rales or rhonchi. Good air movement bilaterally without significant accessory muscle use  ABD: soft; distended; non-tender. No rebound, no guarding, no pulsatile abdominal mass  EXT: moving all extremities spontaneously, normal ROM. No clubbing, cyanosis or edema  SKIN: warm and dry, no lesions noted, pale and sallow w/o jaundice  NEURO: alert, oriented, CN II-XII grossly intact, motor and sensory grossly intact, speech nonslurred, no focal deficits. GCS 15  PSYCH: calm, cooperative, appropriate, good eye contact, logical thought process, no apparent danger to self or others    labs  UA  CXR  EKG  IV LR  empiric abx given comorbidities  no abd tenderness to suggest peritonitis/require tap  no current clinical concern for meningitis or encephalitis  apparent L TM perforation w/o AOM

## 2019-04-07 NOTE — H&P ADULT - ASSESSMENT
68M pmh afib (coumadin), HTN, anemia, HLD, Diastolic CHF, BPH, DM, JIMÉNEZ, cirrhosis w/ varices p/w fever to 102F at home and generalized weakness and feeling of lousiness starting morning prior to presentation.    #) 102 F Fever at home  -UA, CXR, CT Head- negative  -patient denies any respiratory or urinary symptoms  -no leukocytosis  -BCx pending  -patient needs paracentesis to r/o spontaneous bacterial peritonitis    #) JIMÉNEZ w/ cirrhosis and varices  -c/w home meds (spirinolactone, sucralfate, protonix)    #) HTN  -c/w home med (HTZ, spirinolactone)    #) Afib  -c/w home meds (coumadin, sotalol, and flecainide)  -follows w/ Dr. De Souza from cardiology    #) DM  -stopped oral antihyperglycemics (januvia)  -started basal/ preprandial insulin regimen    #) BPH  -c/w home med (flomax)    #) DLD  -c/w home med (atorvastatin)    #) Code Status  -full code    #) Dispo  -from home 68M pmh afib (coumadin), HTN, anemia, HLD, Diastolic CHF, BPH, DM, JIMÉNEZ, cirrhosis w/ varices p/w fever to 102F at home and generalized weakness and feeling of lousiness starting morning prior to presentation.    #) 102 F Fever at home  -UA, CXR, CT Head- negative  -patient denies any respiratory or urinary symptoms  -no leukocytosis  -BCx pending  -patient needs paracentesis to r/o spontaneous bacterial peritonitis however is on coumadin, will hold coumadin, and start heparin drip    #) JIMÉNEZ w/ cirrhosis and varices  -c/w home meds (spirinolactone, sucralfate, protonix)    #) HTN  -c/w home med (HTZ, spirinolactone)    #) Afib  -c/w home meds (sotalol, and flecainide)  -follows w/ Dr. De Souza from cardiology  -will start heparin drip and hold coumadin for now because need to do paracentesis to rule out SBP    #) DM  -stopped oral antihyperglycemics (januvia)  -started basal/ preprandial insulin regimen    #) BPH  -c/w home med (flomax)    #) DLD  -c/w home med (atorvastatin)    #) Code Status  -full code    #) Dispo  -from home 68M pmh afib (coumadin), HTN, anemia, HLD, Diastolic CHF, BPH, DM, JIMÉNEZ, cirrhosis w/ varices p/w fever to 102F at home and generalized weakness and feeling of lousiness starting morning prior to presentation.    #) 102 F Fever at home  -UA, CXR, CT Head- negative  -patient denies any respiratory or urinary symptoms  -no leukocytosis  -BCx pending  -patient needs paracentesis to r/o spontaneous bacterial peritonitis however is on coumadin, will hold coumadin, and start therapeutic lovenox  -started empirically vancomycin and zosyn  -random vanc trough pending    #) JIMÉNEZ w/ cirrhosis and varices  -c/w home meds (spirinolactone, sucralfate, protonix)    #) HTN  -c/w home med (HTZ, spirinolactone)    #) Afib  -c/w home meds (sotalol, and flecainide)  -follows w/ Dr. De Souza from cardiology  -will start therapeutic lovenox and hold coumadin for now because need to do paracentesis to rule out SBP    #) DM  -stopped oral antihyperglycemics (januvia)  -started basal/ preprandial insulin regimen    #) BPH  -c/w home med (flomax)    #) DLD  -c/w home med (atorvastatin)    #) Code Status  -full code    #) Dispo  -from home 68M pmh afib (coumadin), HTN, anemia, HLD, Diastolic CHF, BPH, DM, JIMÉNEZ, cirrhosis w/ varices p/w fever to 102F at home and generalized weakness and feeling of lousiness starting morning prior to presentation.    #) 102 F Fever at home  -UA, CXR, CT Head- negative  -patient denies any respiratory or urinary symptoms  -no leukocytosis  -BCx pending  -patient needs paracentesis to r/o spontaneous bacterial peritonitis however is on coumadin, will hold coumadin, and start therapeutic lovenox  -started empirically vancomycin and cefepime  -random vanc trough pending    #) JIMÉNEZ w/ cirrhosis and varices  -c/w home meds (spirinolactone, sucralfate, protonix)    #) HTN  -c/w home med (HTZ, spirinolactone)    #) Afib  -c/w home meds (sotalol, and flecainide)  -follows w/ Dr. De Souza from cardiology  -will start therapeutic lovenox and hold coumadin for now because need to do paracentesis to rule out SBP    #) DM  -stopped oral antihyperglycemics (januvia)  -started basal/ preprandial insulin regimen    #) BPH  -c/w home med (flomax)    #) DLD  -c/w home med (atorvastatin)    #) DVT/ GI ppx  -therapeutic lovenox, protonix    #) Code Status  -full code    #) Dispo  -from home

## 2019-04-07 NOTE — H&P ADULT - ATTENDING COMMENTS
pt seen and examined independnetly, I have read and agree with above exam and poa    pt with incresing abd girth  fevers    family at bedside  discussion at length    iv abx started  ID eval  hold ac if ir guided paracentesis,

## 2019-04-07 NOTE — ED PROVIDER NOTE - OBJECTIVE STATEMENT
68M pmh afib (coumadin), HTN, anemia, HLD, CHF, BPH, DM, JIMÉNEZ, cirrhosis w/ varices p/w fever to 102F at home, weakness and intermittent mild confusion/slurred speech per family. Pt recently admitted for fluid overload and had 3L of ascitic fluid drained. Pt denies HA, myalgia, URI sx, CP, SOB, cough, n/v/d, abd pain, dysuria, rash.

## 2019-04-07 NOTE — ED ADULT NURSE NOTE - NSIMPLEMENTINTERV_GEN_ALL_ED
Implemented All Fall with Harm Risk Interventions:  [place] to call system. Call bell, personal items and telephone within reach. Instruct patient to call for assistance. Room bathroom lighting operational. Non-slip footwear when patient is off stretcher. Physically safe environment: no spills, clutter or unnecessary equipment. Stretcher in lowest position, wheels locked, appropriate side rails in place. Provide visual cue, wrist band, yellow gown, etc. Monitor gait and stability. Monitor for mental status changes and reorient to person, place, and time. Review medications for side effects contributing to fall risk. Reinforce activity limits and safety measures with patient and family. Provide visual clues: red socks.

## 2019-04-08 LAB
ALBUMIN SERPL ELPH-MCNC: 2.1 G/DL — LOW (ref 3.5–5.2)
ALP SERPL-CCNC: 126 U/L — HIGH (ref 30–115)
ALT FLD-CCNC: 28 U/L — SIGNIFICANT CHANGE UP (ref 0–41)
ANION GAP SERPL CALC-SCNC: 11 MMOL/L — SIGNIFICANT CHANGE UP (ref 7–14)
APTT BLD: 52.1 SEC — HIGH (ref 27–39.2)
AST SERPL-CCNC: 44 U/L — HIGH (ref 0–41)
BASOPHILS # BLD AUTO: 0.04 K/UL — SIGNIFICANT CHANGE UP (ref 0–0.2)
BASOPHILS NFR BLD AUTO: 0.8 % — SIGNIFICANT CHANGE UP (ref 0–1)
BILIRUB SERPL-MCNC: 2.9 MG/DL — HIGH (ref 0.2–1.2)
BUN SERPL-MCNC: 23 MG/DL — HIGH (ref 10–20)
CALCIUM SERPL-MCNC: 8 MG/DL — LOW (ref 8.5–10.1)
CHLORIDE SERPL-SCNC: 102 MMOL/L — SIGNIFICANT CHANGE UP (ref 98–110)
CO2 SERPL-SCNC: 20 MMOL/L — SIGNIFICANT CHANGE UP (ref 17–32)
CREAT SERPL-MCNC: 1.5 MG/DL — SIGNIFICANT CHANGE UP (ref 0.7–1.5)
CULTURE RESULTS: SIGNIFICANT CHANGE UP
EOSINOPHIL # BLD AUTO: 0.17 K/UL — SIGNIFICANT CHANGE UP (ref 0–0.7)
EOSINOPHIL NFR BLD AUTO: 3.6 % — SIGNIFICANT CHANGE UP (ref 0–8)
FLU A RESULT: NEGATIVE — SIGNIFICANT CHANGE UP
FLU A RESULT: NEGATIVE — SIGNIFICANT CHANGE UP
FLUAV AG NPH QL: NEGATIVE — SIGNIFICANT CHANGE UP
FLUBV AG NPH QL: NEGATIVE — SIGNIFICANT CHANGE UP
GLUCOSE BLDC GLUCOMTR-MCNC: 104 MG/DL — HIGH (ref 70–99)
GLUCOSE BLDC GLUCOMTR-MCNC: 106 MG/DL — HIGH (ref 70–99)
GLUCOSE BLDC GLUCOMTR-MCNC: 117 MG/DL — HIGH (ref 70–99)
GLUCOSE BLDC GLUCOMTR-MCNC: 62 MG/DL — LOW (ref 70–99)
GLUCOSE BLDC GLUCOMTR-MCNC: 97 MG/DL — SIGNIFICANT CHANGE UP (ref 70–99)
GLUCOSE SERPL-MCNC: 133 MG/DL — HIGH (ref 70–99)
HCT VFR BLD CALC: 26.8 % — LOW (ref 42–52)
HGB BLD-MCNC: 8.9 G/DL — LOW (ref 14–18)
IMM GRANULOCYTES NFR BLD AUTO: 0.4 % — HIGH (ref 0.1–0.3)
INR BLD: 2.35 RATIO — HIGH (ref 0.65–1.3)
LACTATE SERPL-SCNC: 2.5 MMOL/L — HIGH (ref 0.5–2.2)
LYMPHOCYTES # BLD AUTO: 0.78 K/UL — LOW (ref 1.2–3.4)
LYMPHOCYTES # BLD AUTO: 16.3 % — LOW (ref 20.5–51.1)
MAGNESIUM SERPL-MCNC: 1.5 MG/DL — LOW (ref 1.8–2.4)
MCHC RBC-ENTMCNC: 32.1 PG — HIGH (ref 27–31)
MCHC RBC-ENTMCNC: 33.2 G/DL — SIGNIFICANT CHANGE UP (ref 32–37)
MCV RBC AUTO: 96.8 FL — HIGH (ref 80–94)
MONOCYTES # BLD AUTO: 0.71 K/UL — HIGH (ref 0.1–0.6)
MONOCYTES NFR BLD AUTO: 14.9 % — HIGH (ref 1.7–9.3)
NEUTROPHILS # BLD AUTO: 3.06 K/UL — SIGNIFICANT CHANGE UP (ref 1.4–6.5)
NEUTROPHILS NFR BLD AUTO: 64 % — SIGNIFICANT CHANGE UP (ref 42.2–75.2)
NRBC # BLD: 0 /100 WBCS — SIGNIFICANT CHANGE UP (ref 0–0)
PLATELET # BLD AUTO: 91 K/UL — LOW (ref 130–400)
POTASSIUM SERPL-MCNC: 3.5 MMOL/L — SIGNIFICANT CHANGE UP (ref 3.5–5)
POTASSIUM SERPL-SCNC: 3.5 MMOL/L — SIGNIFICANT CHANGE UP (ref 3.5–5)
PROT SERPL-MCNC: 6.2 G/DL — SIGNIFICANT CHANGE UP (ref 6–8)
PROTHROM AB SERPL-ACNC: 26.8 SEC — HIGH (ref 9.95–12.87)
RBC # BLD: 2.77 M/UL — LOW (ref 4.7–6.1)
RBC # FLD: 15.7 % — HIGH (ref 11.5–14.5)
RSV RESULT: NEGATIVE — SIGNIFICANT CHANGE UP
RSV RNA RESP QL NAA+PROBE: NEGATIVE — SIGNIFICANT CHANGE UP
SODIUM SERPL-SCNC: 133 MMOL/L — LOW (ref 135–146)
SPECIMEN SOURCE: SIGNIFICANT CHANGE UP
VANCOMYCIN FLD-MCNC: 21.5 UG/ML — HIGH (ref 5–10)
WBC # BLD: 4.78 K/UL — LOW (ref 4.8–10.8)
WBC # FLD AUTO: 4.78 K/UL — LOW (ref 4.8–10.8)

## 2019-04-08 PROCEDURE — 93970 EXTREMITY STUDY: CPT | Mod: 26

## 2019-04-08 PROCEDURE — 76705 ECHO EXAM OF ABDOMEN: CPT | Mod: 26

## 2019-04-08 RX ORDER — MAGNESIUM SULFATE 500 MG/ML
2 VIAL (ML) INJECTION ONCE
Qty: 0 | Refills: 0 | Status: COMPLETED | OUTPATIENT
Start: 2019-04-08 | End: 2019-04-08

## 2019-04-08 RX ADMIN — INSULIN GLARGINE 21 UNIT(S): 100 INJECTION, SOLUTION SUBCUTANEOUS at 08:11

## 2019-04-08 RX ADMIN — Medication 300 MILLIGRAM(S): at 06:19

## 2019-04-08 RX ADMIN — Medication 50 MILLIGRAM(S): at 08:11

## 2019-04-08 RX ADMIN — Medication 50 MILLIGRAM(S): at 06:10

## 2019-04-08 RX ADMIN — TAMSULOSIN HYDROCHLORIDE 0.4 MILLIGRAM(S): 0.4 CAPSULE ORAL at 21:50

## 2019-04-08 RX ADMIN — Medication 1 GRAM(S): at 06:08

## 2019-04-08 RX ADMIN — ENOXAPARIN SODIUM 110 MILLIGRAM(S): 100 INJECTION SUBCUTANEOUS at 06:09

## 2019-04-08 RX ADMIN — ENOXAPARIN SODIUM 110 MILLIGRAM(S): 100 INJECTION SUBCUTANEOUS at 18:07

## 2019-04-08 RX ADMIN — Medication 80 MILLIGRAM(S): at 18:07

## 2019-04-08 RX ADMIN — Medication 80 MILLIGRAM(S): at 06:08

## 2019-04-08 RX ADMIN — CEFEPIME 100 MILLIGRAM(S): 1 INJECTION, POWDER, FOR SOLUTION INTRAMUSCULAR; INTRAVENOUS at 18:10

## 2019-04-08 RX ADMIN — Medication 2 GRAM(S): at 06:10

## 2019-04-08 RX ADMIN — Medication 7 UNIT(S): at 08:25

## 2019-04-08 RX ADMIN — CEFEPIME 100 MILLIGRAM(S): 1 INJECTION, POWDER, FOR SOLUTION INTRAMUSCULAR; INTRAVENOUS at 05:13

## 2019-04-08 RX ADMIN — Medication 325 MILLIGRAM(S): at 13:06

## 2019-04-08 RX ADMIN — PANTOPRAZOLE SODIUM 40 MILLIGRAM(S): 20 TABLET, DELAYED RELEASE ORAL at 06:08

## 2019-04-08 RX ADMIN — ATORVASTATIN CALCIUM 40 MILLIGRAM(S): 80 TABLET, FILM COATED ORAL at 21:50

## 2019-04-08 RX ADMIN — Medication 50 GRAM(S): at 16:32

## 2019-04-08 RX ADMIN — Medication 5 MILLIGRAM(S): at 13:06

## 2019-04-08 RX ADMIN — Medication 7 UNIT(S): at 12:57

## 2019-04-08 RX ADMIN — Medication 1 GRAM(S): at 00:33

## 2019-04-08 RX ADMIN — Medication 50 MILLIGRAM(S): at 18:07

## 2019-04-08 RX ADMIN — Medication 1 GRAM(S): at 18:08

## 2019-04-08 RX ADMIN — Medication 1 GRAM(S): at 13:06

## 2019-04-08 RX ADMIN — Medication 2 GRAM(S): at 18:08

## 2019-04-08 RX ADMIN — SPIRONOLACTONE 100 MILLIGRAM(S): 25 TABLET, FILM COATED ORAL at 08:10

## 2019-04-08 NOTE — MEDICAL STUDENT PROGRESS NOTE(EDUCATION) - NS MD HP STUD ASPLAN PLAN FT
#102 F Fever at home  -UA, CXR, CT Head- negative  -patient denies any respiratory or urinary symptoms  -no leukocytosis  -BCx pending  - pending flu swab   - will get lower extremity doppler to rule out DVT - acute thrombotic events can sometimes cause high fever   - Will discontinue vancomycine and continue with cefepime   - consult ID for proper abx regimen     # LUQ pain and abdominal distention  - Abdominal US of spleen showed splenomegaly and ascites   - Continue diuretics   - Fluid restriction     #JIMÉNEZ w/ cirrhosis and varices  -c/w home meds (spirinolactone, sucralfate, protonix)    #HTN  -c/w home med (HTZ, spirinolactone)    #Afib  -c/w home meds (sotalol, and flecainide)  -follows w/ Dr. De Souza from cardiology    #DM  -stopped oral antihyperglycemics (januvia)  -started basal/ preprandial insulin regimen    #BPH  -c/w home med (flomax)    #DLD  -c/w home med (atorvastatin)    #DVT/ GI ppx  -therapeutic lovenox, protonix    #Code Status  -full code    # Dispo  -from home #102 F Fever at home: DDX includes SBP vs ? DVT  -UA, CXR, CT Head- negative  -patient denies any respiratory or urinary symptoms  -no leukocytosis  -BCx pending  - FLU negative  - will get lower extremity doppler to rule out DVT - acute thrombotic events can sometimes cause high fever  - Will discontinue vancomycine and continue with cefepime;   - consult ID for proper abx regimen and need for repeat tap; however unreliable given that patient was already started on ABX  - monitor fevers    # LUQ pain and abdominal distention: ddx: SBP vs. splenomegaly secondary to portal hypertension  - Had Paracentesis on 4/1, 3L removed, SAAG 2, does not endorse diffuse abdominal pain, but is tenderness to LUQ  - Abdominal US of spleen showed splenomegaly and ascites   - Continue diuretics   - Fluid restriction   - FU ID regarding repeat paracentesis    #JIMÉNEZ w/ cirrhosis and varices  -c/w home meds (spirinolactone, sucralfate, protonix)    #HTN  -c/w home med (HTZ, spirinolactone)    #Afib  -c/w home meds (sotalol, and flecainide)  -follows w/ Dr. De Souza from cardiology  - holding coumadin in case patient needs possible tap    #DM  -stopped oral antihyperglycemics (januvia)  -started basal/ preprandial insulin regimen    #BPH  -c/w home med (flomax)    #DLD  -c/w home med (atorvastatin)    #DVT/ GI ppx  -therapeutic lovenox, protonix    #Code Status  -full code    # Dispo  -from home

## 2019-04-08 NOTE — MEDICAL STUDENT PROGRESS NOTE(EDUCATION) - NS MD HP STUD ASPLAN ASSES FT
69 y/o M who was recently hospitalized for ascites comes in 4 days after discharge with fever of unknown origin. 69 y/o M who was recently hospitalized for ascites comes in 4 days after discharge with fever of unknown origin

## 2019-04-08 NOTE — MEDICAL STUDENT PROGRESS NOTE(EDUCATION) - SUBJECTIVE AND OBJECTIVE BOX
S:  Patient is a 69 y/o M with a PMH of Afib (on coumadin), HTN, anemia, HLD, diastolic CHF, DM, and cirrhosis with varices secondary to non-alcoholic fatty liver disease. He was recently hospitalized from March 29 to April 3rd due to bilateral lower extremity edema, abdominal distention/discomfort, SOB, and a 30 pound weight gain within 1 month. Paracentesis was performed and 3 L of fluid was removed, SAAG was 2 and EGD showed diffuse severe portal hypertensive gastropathy. The 4 days after discharge the patient was feeling okay when suddenly yesterday (April 7) the patient was found to have a fever of 102 F at home along with weakness to the extent that he was unable to get out of his chair without his wife's assistance. This is unusual for him and the first time he has experienced something like this. He mentions that during his recent hospitalization that his roommate had pneumonia, but currently the patient denies any respiratory symptoms. In the ED patient was found to have a temp of 101.1 and HR of 90 with EKG showing sinus bradycardia (59).     Currently patient is admitted to medicine with a primary diagnosis of fever of unknown origin.     Today is hospital day 1. There were no acute issues overnight. Patient reports improvement in his chief complaint of weakness however mentions left upper quadrant pain that comes and goes. He says he told his GI doctor about this but it was not followed through. He is urinating and ambulating okay. His last bowel movement was yesterday.     O:  Vitals as of 4/8/19   T: 99.6 HR 63  /59 RR 18   Labs:  UA, CXR, and CT scan - all negative   Blood culture pending   U/S of spleen - splenomegaly with unchanged ascites     Physical Exam   General: obese male lying comfortably in bed upon approach   HEENT: no scleral icterus, no nasal discharge, moist mucous membranes   Neck: supple, no cervical lymphadenopathy  Heart RRR s1s2 normal, no rub/murmur appreciated   Lungs- symmetric chest excursion, clear to auscultation bilaterally  Abdomen: Soft, distended, abdominal tenderness upon deep palpation of the LUQ, no guarding or rebound tenderness,negative Boateng sign    Extremities- 2+ pitting LE edema extending from feet to shins, this is about the same from the pt's prior hospitalization, no numbness or tingling, no clubbing/cyanosis, no asterixis   Neuro: AAA x 3 S:  Patient is a 69 y/o M with a PMH of Afib (on coumadin), HTN, anemia, HLD, diastolic CHF, DM, and cirrhosis with varices secondary to non-alcoholic fatty liver disease. He was recently hospitalized from March 29 to April 3rd due to bilateral lower extremity edema, abdominal distention/discomfort, SOB, and a 30 pound weight gain within 1 month. Paracentesis was performed and 3 L of fluid was removed, SAAG was 2 and EGD showed diffuse severe portal hypertensive gastropathy. The 4 days after discharge the patient was feeling okay when suddenly yesterday (April 7) the patient was found to have a fever of 102 F at home along with weakness to the extent that he was unable to get out of his chair without his wife's assistance. This is unusual for him and the first time he has experienced something like this. He mentions that during his recent hospitalization that his roommate had pneumonia, but currently the patient denies any respiratory symptoms. In the ED patient was found to have a temp of 101.1 and HR of 90 with EKG showing sinus bradycardia (59).     Currently patient is admitted to medicine with a primary diagnosis of fever of unknown origin.     Today is hospital day 1. There were no acute issues overnight. Patient reports improvement in his chief complaint of weakness however mentions left upper quadrant pain that comes and goes, other than LUQ pain, he does not endorse diffuse abdominal pain. He says he told his GI doctor about the LUQ pain but it was not followed through. He is urinating and ambulating okay. His last bowel movement was yesterday.     O:  Vitals as of 4/8/19   T: 99.6 HR 63  /59 RR 18   Labs:  UA, CXR, and CT scan - all negative   Blood culture pending   U/S of spleen - splenomegaly with unchanged ascites     Physical Exam   General: obese male lying comfortably in bed upon approach   HEENT: no scleral icterus, no nasal discharge, moist mucous membranes   Neck: supple, no cervical lymphadenopathy  Heart RRR s1s2 normal, no rub/murmur appreciated   Lungs- symmetric chest excursion, clear to auscultation bilaterally  Abdomen: Soft, distended, abdominal tenderness upon deep palpation of the LUQ, no guarding or rebound tenderness,negative Boateng sign , + fluid wave  Extremities- 2+ pitting LE edema extending from feet to shins, this is about the same from the pt's prior hospitalization as per patient , no numbness or tingling, no clubbing/cyanosis, no asterixis   Neuro: AAA x 3

## 2019-04-09 DIAGNOSIS — R18.8 OTHER ASCITES: ICD-10-CM

## 2019-04-09 DIAGNOSIS — K74.69 OTHER CIRRHOSIS OF LIVER: ICD-10-CM

## 2019-04-09 DIAGNOSIS — D69.59 OTHER SECONDARY THROMBOCYTOPENIA: ICD-10-CM

## 2019-04-09 DIAGNOSIS — K75.81 NONALCOHOLIC STEATOHEPATITIS (NASH): ICD-10-CM

## 2019-04-09 DIAGNOSIS — Z95.5 PRESENCE OF CORONARY ANGIOPLASTY IMPLANT AND GRAFT: ICD-10-CM

## 2019-04-09 DIAGNOSIS — D64.9 ANEMIA, UNSPECIFIED: ICD-10-CM

## 2019-04-09 DIAGNOSIS — I48.91 UNSPECIFIED ATRIAL FIBRILLATION: ICD-10-CM

## 2019-04-09 DIAGNOSIS — Z79.4 LONG TERM (CURRENT) USE OF INSULIN: ICD-10-CM

## 2019-04-09 DIAGNOSIS — N40.0 BENIGN PROSTATIC HYPERPLASIA WITHOUT LOWER URINARY TRACT SYMPTOMS: ICD-10-CM

## 2019-04-09 DIAGNOSIS — R16.1 SPLENOMEGALY, NOT ELSEWHERE CLASSIFIED: ICD-10-CM

## 2019-04-09 DIAGNOSIS — Z96.653 PRESENCE OF ARTIFICIAL KNEE JOINT, BILATERAL: ICD-10-CM

## 2019-04-09 DIAGNOSIS — D61.818 OTHER PANCYTOPENIA: ICD-10-CM

## 2019-04-09 DIAGNOSIS — E78.5 HYPERLIPIDEMIA, UNSPECIFIED: ICD-10-CM

## 2019-04-09 DIAGNOSIS — Z82.49 FAMILY HISTORY OF ISCHEMIC HEART DISEASE AND OTHER DISEASES OF THE CIRCULATORY SYSTEM: ICD-10-CM

## 2019-04-09 DIAGNOSIS — I50.9 HEART FAILURE, UNSPECIFIED: ICD-10-CM

## 2019-04-09 DIAGNOSIS — E11.40 TYPE 2 DIABETES MELLITUS WITH DIABETIC NEUROPATHY, UNSPECIFIED: ICD-10-CM

## 2019-04-09 DIAGNOSIS — I11.0 HYPERTENSIVE HEART DISEASE WITH HEART FAILURE: ICD-10-CM

## 2019-04-09 DIAGNOSIS — K76.6 PORTAL HYPERTENSION: ICD-10-CM

## 2019-04-09 DIAGNOSIS — Z79.01 LONG TERM (CURRENT) USE OF ANTICOAGULANTS: ICD-10-CM

## 2019-04-09 LAB
ALBUMIN SERPL ELPH-MCNC: 1.8 G/DL — LOW (ref 3.5–5.2)
ALP SERPL-CCNC: 109 U/L — SIGNIFICANT CHANGE UP (ref 30–115)
ALT FLD-CCNC: 26 U/L — SIGNIFICANT CHANGE UP (ref 0–41)
AMYLASE P1 CFR SERPL: 21 U/L — LOW (ref 25–115)
ANION GAP SERPL CALC-SCNC: 10 MMOL/L — SIGNIFICANT CHANGE UP (ref 7–14)
APTT BLD: 49.8 SEC — HIGH (ref 27–39.2)
AST SERPL-CCNC: 40 U/L — SIGNIFICANT CHANGE UP (ref 0–41)
BASOPHILS # BLD AUTO: 0.03 K/UL — SIGNIFICANT CHANGE UP (ref 0–0.2)
BASOPHILS NFR BLD AUTO: 0.9 % — SIGNIFICANT CHANGE UP (ref 0–1)
BILIRUB SERPL-MCNC: 2.2 MG/DL — HIGH (ref 0.2–1.2)
BLD GP AB SCN SERPL QL: SIGNIFICANT CHANGE UP
BUN SERPL-MCNC: 20 MG/DL — SIGNIFICANT CHANGE UP (ref 10–20)
CALCIUM SERPL-MCNC: 7.6 MG/DL — LOW (ref 8.5–10.1)
CHLORIDE SERPL-SCNC: 104 MMOL/L — SIGNIFICANT CHANGE UP (ref 98–110)
CO2 SERPL-SCNC: 22 MMOL/L — SIGNIFICANT CHANGE UP (ref 17–32)
CREAT SERPL-MCNC: 1.4 MG/DL — SIGNIFICANT CHANGE UP (ref 0.7–1.5)
EOSINOPHIL # BLD AUTO: 0.15 K/UL — SIGNIFICANT CHANGE UP (ref 0–0.7)
EOSINOPHIL NFR BLD AUTO: 4.6 % — SIGNIFICANT CHANGE UP (ref 0–8)
GLUCOSE BLDC GLUCOMTR-MCNC: 100 MG/DL — HIGH (ref 70–99)
GLUCOSE BLDC GLUCOMTR-MCNC: 116 MG/DL — HIGH (ref 70–99)
GLUCOSE BLDC GLUCOMTR-MCNC: 126 MG/DL — HIGH (ref 70–99)
GLUCOSE BLDC GLUCOMTR-MCNC: 81 MG/DL — SIGNIFICANT CHANGE UP (ref 70–99)
GLUCOSE SERPL-MCNC: 81 MG/DL — SIGNIFICANT CHANGE UP (ref 70–99)
HCT VFR BLD CALC: 26 % — LOW (ref 42–52)
HGB BLD-MCNC: 8.5 G/DL — LOW (ref 14–18)
IMM GRANULOCYTES NFR BLD AUTO: 0.3 % — SIGNIFICANT CHANGE UP (ref 0.1–0.3)
INR BLD: 2.02 RATIO — HIGH (ref 0.65–1.3)
LIDOCAIN IGE QN: 37 U/L — SIGNIFICANT CHANGE UP (ref 7–60)
LYMPHOCYTES # BLD AUTO: 0.71 K/UL — LOW (ref 1.2–3.4)
LYMPHOCYTES # BLD AUTO: 21.8 % — SIGNIFICANT CHANGE UP (ref 20.5–51.1)
MCHC RBC-ENTMCNC: 31.8 PG — HIGH (ref 27–31)
MCHC RBC-ENTMCNC: 32.7 G/DL — SIGNIFICANT CHANGE UP (ref 32–37)
MCV RBC AUTO: 97.4 FL — HIGH (ref 80–94)
MONOCYTES # BLD AUTO: 0.52 K/UL — SIGNIFICANT CHANGE UP (ref 0.1–0.6)
MONOCYTES NFR BLD AUTO: 16 % — HIGH (ref 1.7–9.3)
NEUTROPHILS # BLD AUTO: 1.84 K/UL — SIGNIFICANT CHANGE UP (ref 1.4–6.5)
NEUTROPHILS NFR BLD AUTO: 56.4 % — SIGNIFICANT CHANGE UP (ref 42.2–75.2)
NRBC # BLD: 0 /100 WBCS — SIGNIFICANT CHANGE UP (ref 0–0)
PLATELET # BLD AUTO: 72 K/UL — LOW (ref 130–400)
POTASSIUM SERPL-MCNC: 3.9 MMOL/L — SIGNIFICANT CHANGE UP (ref 3.5–5)
POTASSIUM SERPL-SCNC: 3.9 MMOL/L — SIGNIFICANT CHANGE UP (ref 3.5–5)
PROT SERPL-MCNC: 5.5 G/DL — LOW (ref 6–8)
PROTHROM AB SERPL-ACNC: 23.1 SEC — HIGH (ref 9.95–12.87)
RAPID RVP RESULT: SIGNIFICANT CHANGE UP
RBC # BLD: 2.67 M/UL — LOW (ref 4.7–6.1)
RBC # FLD: 15.8 % — HIGH (ref 11.5–14.5)
SODIUM SERPL-SCNC: 136 MMOL/L — SIGNIFICANT CHANGE UP (ref 135–146)
TYPE + AB SCN PNL BLD: SIGNIFICANT CHANGE UP
WBC # BLD: 3.26 K/UL — LOW (ref 4.8–10.8)
WBC # FLD AUTO: 3.26 K/UL — LOW (ref 4.8–10.8)

## 2019-04-09 RX ORDER — INSULIN LISPRO 100/ML
6 VIAL (ML) SUBCUTANEOUS
Qty: 0 | Refills: 0 | Status: DISCONTINUED | OUTPATIENT
Start: 2019-04-09 | End: 2019-04-11

## 2019-04-09 RX ORDER — ENOXAPARIN SODIUM 100 MG/ML
120 INJECTION SUBCUTANEOUS DAILY
Qty: 0 | Refills: 0 | Status: DISCONTINUED | OUTPATIENT
Start: 2019-04-09 | End: 2019-04-09

## 2019-04-09 RX ORDER — CEFEPIME 1 G/1
2000 INJECTION, POWDER, FOR SOLUTION INTRAMUSCULAR; INTRAVENOUS DAILY
Qty: 0 | Refills: 0 | Status: DISCONTINUED | OUTPATIENT
Start: 2019-04-09 | End: 2019-04-09

## 2019-04-09 RX ORDER — PHYTONADIONE (VIT K1) 5 MG
2.5 TABLET ORAL ONCE
Qty: 0 | Refills: 0 | Status: COMPLETED | OUTPATIENT
Start: 2019-04-09 | End: 2019-04-09

## 2019-04-09 RX ORDER — FUROSEMIDE 40 MG
40 TABLET ORAL DAILY
Qty: 0 | Refills: 0 | Status: DISCONTINUED | OUTPATIENT
Start: 2019-04-09 | End: 2019-04-09

## 2019-04-09 RX ORDER — FUROSEMIDE 40 MG
20 TABLET ORAL ONCE
Qty: 0 | Refills: 0 | Status: DISCONTINUED | OUTPATIENT
Start: 2019-04-09 | End: 2019-04-11

## 2019-04-09 RX ADMIN — Medication 50 MILLIGRAM(S): at 17:29

## 2019-04-09 RX ADMIN — Medication 6 UNIT(S): at 17:28

## 2019-04-09 RX ADMIN — Medication 6 UNIT(S): at 12:32

## 2019-04-09 RX ADMIN — Medication 2 GRAM(S): at 06:40

## 2019-04-09 RX ADMIN — CEFEPIME 100 MILLIGRAM(S): 1 INJECTION, POWDER, FOR SOLUTION INTRAMUSCULAR; INTRAVENOUS at 06:34

## 2019-04-09 RX ADMIN — Medication 2 GRAM(S): at 17:30

## 2019-04-09 RX ADMIN — Medication 1 GRAM(S): at 17:30

## 2019-04-09 RX ADMIN — ATORVASTATIN CALCIUM 40 MILLIGRAM(S): 80 TABLET, FILM COATED ORAL at 21:08

## 2019-04-09 RX ADMIN — Medication 325 MILLIGRAM(S): at 11:18

## 2019-04-09 RX ADMIN — ENOXAPARIN SODIUM 110 MILLIGRAM(S): 100 INJECTION SUBCUTANEOUS at 06:42

## 2019-04-09 RX ADMIN — TAMSULOSIN HYDROCHLORIDE 0.4 MILLIGRAM(S): 0.4 CAPSULE ORAL at 21:08

## 2019-04-09 RX ADMIN — Medication 2.5 MILLIGRAM(S): at 17:30

## 2019-04-09 RX ADMIN — Medication 1 GRAM(S): at 06:34

## 2019-04-09 RX ADMIN — CEFEPIME 100 MILLIGRAM(S): 1 INJECTION, POWDER, FOR SOLUTION INTRAMUSCULAR; INTRAVENOUS at 11:18

## 2019-04-09 RX ADMIN — Medication 1 GRAM(S): at 11:18

## 2019-04-09 RX ADMIN — PANTOPRAZOLE SODIUM 40 MILLIGRAM(S): 20 TABLET, DELAYED RELEASE ORAL at 06:43

## 2019-04-09 RX ADMIN — Medication 50 MILLIGRAM(S): at 06:42

## 2019-04-09 RX ADMIN — Medication 80 MILLIGRAM(S): at 17:30

## 2019-04-09 RX ADMIN — Medication 5 MILLIGRAM(S): at 11:19

## 2019-04-09 NOTE — MEDICAL STUDENT PROGRESS NOTE(EDUCATION) - SUBJECTIVE AND OBJECTIVE BOX
S:  Patient is a 68 year old male who presented to the ED with a chief complaint of fever and generalized weakness 4 days after being discharged from the hospital.     Currently patient is admitted to medicine with a primary diagnosis of fever of unknown origin.     Today is hospital day 2. There were no acute issues overnight. Patient does not complain of fever, pain, or weakness. He is inquiring about work-up done so far as to etiology of the fever. It was explained to him that his chest x-ray, UA, blood cultures, and CT so far have been negative. He was also explained the possibility of having paracentesis performed again and its utility given that he was already started on antibiotics.    O:  Vitals (4/9/19)   T 98 HR 61 RR 18 BP 95/57  Flu swab and RVP negative   Van trough level 21.5   Lower extremity veins duplex: No evidence of deep venous thrombosis or superficial thrombophlebitis in   the bilateral lower extremities.    General: obese male lying comfortably in bed upon approach   HEENT: no scleral icterus, no nasal discharge, moist mucous membranes   Neck: supple, no cervical lymphadenopathy  Heart RRR s1s2 normal, no rub/murmur appreciated   Lungs- symmetric chest excursion, clear to auscultation bilaterally  Abdomen: Soft, distended, mild abdominal tenderness upon deep palpation of the LUQ, no guarding or rebound tenderness,negative Boateng sign , + fluid wave  Extremities- 2+ pitting LE edema extending from feet to shins, this is about the same from the pt's prior hospitalization as per patient , no numbness or tingling, no clubbing/cyanosis, no asterixis   Neuro: AAA x 3 S:  Patient is a 68 year old male who presented to the ED with a chief complaint of fever and generalized weakness 4 days after being discharged from the hospital.     Currently patient is admitted to medicine with a primary diagnosis of fever of unknown origin.     Today is hospital day 2. There were no acute issues overnight. Patient does not complain of fever, pain, or weakness. He is inquiring about work-up done so far as to etiology of the fever. It was explained to him that his chest x-ray, UA, blood cultures, and CT so far have been negative. He was also explained the possibility of having paracentesis performed again and its utility given that he was already started on antibiotics.    O:  Vitals (4/9/19)   T 98 HR 61 RR 18 BP 95/57  Flu swab and RVP negative   Van trough level 21.5   Lower extremity veins duplex: No evidence of deep venous thrombosis or superficial thrombophlebitis in   the bilateral lower extremities.    General: obese male lying comfortably in bed upon approach   HEENT: no scleral icterus, no nasal discharge, moist mucous membranes, upper extremities appear jaundiced   Neck: supple, no cervical lymphadenopathy  Heart RRR s1s2 normal, no rub/murmur appreciated   Lungs- symmetric chest excursion, clear to auscultation bilaterally  Abdomen: Soft, distended, mild abdominal tenderness upon deep palpation of the LUQ, no guarding or rebound tenderness,negative Boateng sign , + fluid wave  Extremities- 2+ pitting LE edema extending from feet to shins, this is about the same from the pt's prior hospitalization as per patient , no numbness or tingling, no clubbing/cyanosis, no asterixis   Neuro: AAA x 3 S:  Patient is a 68 year old male who presented to the ED with a chief complaint of fever and generalized weakness 4 days after being discharged from the hospital.     Currently patient is admitted to medicine with a primary diagnosis of fever of unknown origin.     Today is hospital day 2. There were no acute issues overnight. Patient does not complain of fever, pain, or weakness. He is inquiring about work-up done so far as to etiology of the fever. It was explained to him that his chest x-ray, UA, blood cultures, and CT so far have been negative. He was also explained the possibility of having paracentesis performed again and its utility given that he was already started on antibiotics.     O:  Vitals (4/9/19)   T 98 HR 61 RR 18 BP 95/57  Flu swab and RVP negative   Van trough level 21.5   Lower extremity veins duplex: No evidence of deep venous thrombosis or superficial thrombophlebitis in   the bilateral lower extremities.    General: obese male lying comfortably in bed upon approach   HEENT: no scleral icterus, no nasal discharge, moist mucous membranes, upper extremities appear jaundiced   Neck: supple, no cervical lymphadenopathy  Heart RRR s1s2 normal, no rub/murmur appreciated   Lungs- symmetric chest excursion, clear to auscultation bilaterally  Abdomen: Soft, distended, mild abdominal tenderness upon deep palpation of the LUQ, no guarding or rebound tenderness,negative Boateng sign , + fluid wave  Extremities- 2+ pitting LE edema extending from feet to shins, this is about the same from the pt's prior hospitalization as per patient , no numbness or tingling, no clubbing/cyanosis, no asterixis   Neuro: AAA x 3

## 2019-04-09 NOTE — MEDICAL STUDENT PROGRESS NOTE(EDUCATION) - NS MD HP STUD ASPLAN ASSES FT
Patient is a 69 y/o M who presented with a single episode of fever of 101.1 and generalized weakness 4 days after being discharged from the hospital whose work up for fever so far has been inconclusive. He was recently hospitalized from March 29 to April 3rd  for worsening ascites and lower extremity edema.

## 2019-04-09 NOTE — MEDICAL STUDENT PROGRESS NOTE(EDUCATION) - NS MD HP STUD ASPLAN PLAN FT
#102 F Fever at home: DDX includes SBP vs ? DVT  -UA, CXR, CT Head- negative  -patient denies any respiratory or urinary symptoms  - Flu swab and RVp negative   -no leukocytosis  -BCx pending  - FLU negative  - will get lower extremity doppler to rule out DVT - acute thrombotic events can sometimes cause high fever  - Will discontinue vancomycine and continue with cefepime;   - consult ID for proper abx regimen and need for repeat tap; however unreliable given that patient was already started on ABX  - monitor fevers    # LUQ pain and abdominal distention: ddx: SBP vs. splenomegaly secondary to portal hypertension  - Had Paracentesis on 4/1, 3L removed, SAAG 2, does not endorse diffuse abdominal pain, but is tenderness to LUQ  - Abdominal US of spleen showed splenomegaly and ascites   - Continue diuretics   - Fluid restriction   - FU ID regarding repeat paracentesis    #JIMÉNEZ w/ cirrhosis and varices  -c/w home meds (spirinolactone, sucralfate, protonix)    #HTN  -c/w home med (HTZ, spirinolactone)    #Afib  -c/w home meds (sotalol, and flecainide)  -follows w/ Dr. De Souza from cardiology  - holding coumadin in case patient needs possible tap    #DM  -stopped oral antihyperglycemics (januvia)  -started basal/ preprandial insulin regimen    #BPH  -c/w home med (flomax)    #DLD  -c/w home med (atorvastatin)    #DVT/ GI ppx  -therapeutic lovenox, protonix    #Code Status  -full code    # Dispo  -from home #102 F Fever at home: DDX includes SBP vs ? DVT  -UA, CXR, CT Head- negative  -patient denies any respiratory or urinary symptoms  - Flu swab and RVP negative   -no leukocytosis  -Blood culture no growth to date   - Lower extremity doppler- showed no DVT   - Vancomycin trough: 21.5 however vancomycin was discontinued yesterday;  continue with cefepime;   - consult ID for proper abx regimen and need for repeat tap; however unreliable given that patient was already started on ABX  - Ask Dr. Castaneda if she would like to proceed with therapeutic paracentesis; in the meantime will hold Lovenox and coumadin; if proceeding with procedure will give FFP prior to the procedure   - monitor fevers    # LUQ pain and abdominal distention: ddx: SBP vs. splenomegaly secondary to portal hypertension  - Had Paracentesis on 4/1, 3L removed, SAAG 2, does not endorse diffuse abdominal pain, but is tenderness to LUQ  - Abdominal US of spleen showed splenomegaly and ascites   - Give lasix, hold HCTZ, continue with spironolactone and BB  - Fluid restriction, monitor Is and Os  - FU ID regarding repeat paracentesis    #JIMÉNEZ w/ cirrhosis and varices  -c/w home meds (spirinolactone, sucralfate, protonix)    #HTN  -c/w home med (HTZ, spirinolactone)    #Afib  -c/w home meds (sotalol, and flecainide)  -follows w/ Dr. De Souza from cardiology  - holding coumadin in case patient needs possible tap    #DM  -stopped oral antihyperglycemics (januvia)  -started basal/ preprandial insulin regimen    #BPH  -c/w home med (flomax)    #DLD  -c/w home med (atorvastatin)    #DVT/ GI ppx  -therapeutic lovenox, protonix    #Code Status  -full code    # Dispo  -from home #102 F Fever at home- resolving   DDX includes SBP vs ? DVT  -UA, CXR, CT Head- negative  -patient denies any respiratory or urinary symptoms  - Flu swab and RVP negative   -no leukocytosis  -Blood culture no growth to date   - Lower extremity doppler- showed no DVT   - Vancomycin trough: 21.5 however vancomycin was discontinued yesterday;  continue with cefepime;   - consult ID for proper abx regimen and need for repeat tap; however unreliable given that patient was already started on ABX  - Likely needs therapeutic paracentesis; in the meantime will hold Lovenox and coumadin; if proceeding with procedure will give FFP prior to the procedure   - monitor fevers    # LUQ pain and abdominal distention: ddx: SBP vs. splenomegaly secondary to portal hypertension  - Had Paracentesis on 4/1, 3L removed, SAAG 2, does not endorse diffuse abdominal pain, but is tenderness to LUQ  - Abdominal US of spleen showed splenomegaly and ascites   - Give lasix, hold HCTZ, continue with spironolactone and BB  - Fluid restriction, monitor Is and Os  - FU ID regarding repeat paracentesis, attending in agreement that patient needs therapeutic paracentesis     #JIMÉNEZ w/ cirrhosis and varices  -c/w home meds (spirinolactone, sucralfate, protonix)    #HTN  -c/w home med (HTZ, spirinolactone)    #Afib  -c/w home meds (sotalol, and flecainide)  -follows w/ Dr. De Souza from cardiology  - holding coumadin in case patient needs possible tap    #DM  -stopped oral antihyperglycemics (januvia)  -started basal/ preprandial insulin regimen    #BPH  -c/w home med (flomax)    #DLD  -c/w home med (atorvastatin)    #DVT/ GI ppx  -holding lovenox now for possible paracentesis tomorrow, protonix    #Code Status  -full code    # Dispo  -from home

## 2019-04-09 NOTE — PHARMACOTHERAPY INTERVENTION NOTE - COMMENTS
lovenox 110mg q12h     oc=[id number]. I recommended on profile review to change to 120mg q12h. dr brannon changed it
cefepime 2g q12h    gfr=47 I rcommended on profile review to change to 2g q24h. dr brannon changed it

## 2019-04-09 NOTE — PROGRESS NOTE ADULT - SUBJECTIVE AND OBJECTIVE BOX
SUBJECTIVE:    Patient is a 68y old Male who presents with a chief complaint of fever and weakness (09 Apr 2019 09:39)    Currently admitted to medicine with the primary diagnosis of Fever of unknown origin (FUO)     Today is hospital day 2d. This morning he is resting comfortably in bed  sleeping     PAST MEDICAL & SURGICAL HISTORY  BPH (benign prostatic hyperplasia)  Dyspnea on exertion  Cirrhosis of liver  Afib  Diabetes  Anemia  High cholesterol  HTN (hypertension)  Encounter for screening colonoscopy: 1 year ago  S/P angioplasty with stent: 2 cardiac stents  Presence of bilateral total knee joint prostheses    SOCIAL HISTORY:  Negative for smoking/alcohol/drug use.     ALLERGIES:  No Known Allergies    MEDICATIONS:  STANDING MEDICATIONS  atorvastatin 40 milliGRAM(s) Oral at bedtime  cefepime   IVPB 2000 milliGRAM(s) IV Intermittent daily  chlorhexidine 2% Cloths 1 Application(s) Topical <User Schedule>  dextrose 5%. 1000 milliLiter(s) IV Continuous <Continuous>  dextrose 50% Injectable 12.5 Gram(s) IV Push once  dextrose 50% Injectable 25 Gram(s) IV Push once  dextrose 50% Injectable 25 Gram(s) IV Push once  diazepam    Tablet 5 milliGRAM(s) Oral daily  ferrous    sulfate 325 milliGRAM(s) Oral daily  flecainide 50 milliGRAM(s) Oral every 12 hours  furosemide   Injectable 20 milliGRAM(s) IV Push once  insulin glargine Injectable (LANTUS) 21 Unit(s) SubCutaneous every morning  insulin lispro (HumaLOG) corrective regimen sliding scale   SubCutaneous three times a day before meals  insulin lispro Injectable (HumaLOG) 6 Unit(s) SubCutaneous three times a day before meals  omega-3-Acid Ethyl Esters 2 Gram(s) Oral two times a day  pantoprazole    Tablet 40 milliGRAM(s) Oral before breakfast  sotalol  Oral Tab/Cap - Peds 80 milliGRAM(s) Oral two times a day  spironolactone 100 milliGRAM(s) Oral <User Schedule>  sucralfate 1 Gram(s) Oral every 6 hours  tamsulosin 0.4 milliGRAM(s) Oral at bedtime    PRN MEDICATIONS  dextrose 40% Gel 15 Gram(s) Oral once PRN  glucagon  Injectable 1 milliGRAM(s) IntraMuscular once PRN    VITALS:   T(F): 98  HR: 58  BP: 95/51  RR: 18  SpO2: 95%    LABS:                        8.5    3.26  )-----------( 72       ( 09 Apr 2019 07:33 )             26.0     04-09    136  |  104  |  20  ----------------------------<  81  3.9   |  22  |  1.4    Ca    7.6<L>      09 Apr 2019 07:33  Mg     1.5     04-08    TPro  5.5<L>  /  Alb  1.8<L>  /  TBili  2.2<H>  /  DBili  x   /  AST  40  /  ALT  26  /  AlkPhos  109  04-09    PT/INR - ( 09 Apr 2019 07:33 )   PT: 23.10 sec;   INR: 2.02 ratio         PTT - ( 09 Apr 2019 07:33 )  PTT:49.8 sec  Urinalysis Basic - ( 07 Apr 2019 13:15 )    Color: Orange / Appearance: Clear / SG: >=1.030 / pH: x  Gluc: x / Ketone: Trace  / Bili: Small / Urobili: 1.0   Blood: x / Protein: Trace / Nitrite: Negative   Leuk Esterase: Trace / RBC: 1-2 /HPF / WBC 1-2 /HPF   Sq Epi: x / Non Sq Epi: Occasional /HPF / Bacteria: Few /HPF            Culture - Urine (collected 07 Apr 2019 13:15)  Source: .Urine Clean Catch (Midstream)  Final Report (08 Apr 2019 15:29):    <10,000 CFU/mL Normal Urogenital Mimi    Culture - Blood (collected 07 Apr 2019 13:15)  Source: .Blood Blood-Peripheral  Preliminary Report (08 Apr 2019 20:01):    No growth to date.    Culture - Blood (collected 07 Apr 2019 13:15)  Source: .Blood Blood-Peripheral  Preliminary Report (08 Apr 2019 20:01):    No growth to date.      CARDIAC MARKERS ( 07 Apr 2019 13:15 )  x     / <0.01 ng/mL / x     / x     / x          RADIOLOGY:    PHYSICAL EXAM:  GEN: No acute distress  LUNGS: Clear to auscultation bilaterally   HEART: Regular  ABD: Soft, non-tender, -distended.  EXT: NC/NC/NE/2+PP/SANON/Skin Intact.   NEURO: AAOX3    Intravenous access:   NG tube:   Cano Catheter:

## 2019-04-09 NOTE — CONSULT NOTE ADULT - SUBJECTIVE AND OBJECTIVE BOX
HPI:  68 y/oM pmh afib on coumadin, HTN, anemia, HLD, Diastolic CHF, BPH, DM, JIMÉNEZ, cirrhosis w/ varices p/w fever to 102F at home, generalized weakness, and lethargy starting morning prior to presentation. As per patient, he even had trouble getting out of his chair and needed help from his wife. Patient was recently discharged from [facility] 2 days previous for volume overload secondary to cirrhosis from JIMÉNEZ and had a paracentesis done with 3 liters removed. Since then he felt ok till today. Patient also states that when he was in the hospital, his roommate had pneumonia. Patient currently denies any respiratory symptoms such as shortness of breath, cough, or sputum production. Patient didn't experience any complications from his paracentesis. Denied fever/chills at home, denies dysuria, or recent travel. No sick contacts. No nausea/vomiting/diarrhea. Ambulatory at baseline and independent in ADL.     In ED- EKG was NSR at 59 BPM. Temp was 101.1 w/ HR of 90. UA, CXR, and CT Head was done, and was negative.     SUBJECTIVE:    Patient is a 68y old  Male who presents with a chief complaint of fever and weakness.    Currently admitted to medicine with the primary diagnosis of JIMÉNEZ with cirrhosis and varices      Today is hospital day 2. Patient was seen and examined at bedside. This morning he is resting comfortably in bed and reports no new issues or overnight events. Has been afebrile since admission. No leukocytosis. Complaining of mild left upper quadrant pain.     PAST MEDICAL & SURGICAL HISTORY  PAST MEDICAL & SURGICAL HISTORY:  BPH (benign prostatic hyperplasia)  Dyspnea on exertion  Cirrhosis of liver  Afib  Diabetes  Anemia  High cholesterol  HTN (hypertension)  Encounter for screening colonoscopy: 1 year ago  S/P angioplasty with stent: 2 cardiac stents  Presence of bilateral total knee joint prostheses    SOCIAL HISTORY:  Never smoker  Occasional alcohol use  Denies illicit drug use  Denies any recent travel    ALLERGIES:  No Known Allergies    MEDICATIONS:  STANDING MEDICATIONS  atorvastatin 40 milliGRAM(s) Oral at bedtime  cefepime   IVPB 2000 milliGRAM(s) IV Intermittent daily  chlorhexidine 2% Cloths 1 Application(s) Topical <User Schedule>  dextrose 5%. 1000 milliLiter(s) IV Continuous <Continuous>  dextrose 50% Injectable 12.5 Gram(s) IV Push once  dextrose 50% Injectable 25 Gram(s) IV Push once  dextrose 50% Injectable 25 Gram(s) IV Push once  diazepam    Tablet 5 milliGRAM(s) Oral daily  ferrous    sulfate 325 milliGRAM(s) Oral daily  flecainide 50 milliGRAM(s) Oral every 12 hours  furosemide   Injectable 20 milliGRAM(s) IV Push once  insulin glargine Injectable (LANTUS) 21 Unit(s) SubCutaneous every morning  insulin lispro (HumaLOG) corrective regimen sliding scale   SubCutaneous three times a day before meals  insulin lispro Injectable (HumaLOG) 6 Unit(s) SubCutaneous three times a day before meals  omega-3-Acid Ethyl Esters 2 Gram(s) Oral two times a day  pantoprazole    Tablet 40 milliGRAM(s) Oral before breakfast  sotalol  Oral Tab/Cap - Peds 80 milliGRAM(s) Oral two times a day  spironolactone 100 milliGRAM(s) Oral <User Schedule>  sucralfate 1 Gram(s) Oral every 6 hours  tamsulosin 0.4 milliGRAM(s) Oral at bedtime    PRN MEDICATIONS  dextrose 40% Gel 15 Gram(s) Oral once PRN  glucagon  Injectable 1 milliGRAM(s) IntraMuscular once PRN    VITALS:   T(F): 98  HR: 58  BP: 95/51  RR: 18  SpO2: 95%    LABS:                        8.5    3.26  )-----------( 72       ( 09 Apr 2019 07:33 )             26.0     04-09    136  |  104  |  20  ----------------------------<  81  3.9   |  22  |  1.4    Ca    7.6<L>      09 Apr 2019 07:33  Mg     1.5     04-08    TPro  5.5<L>  /  Alb  1.8<L>  /  TBili  2.2<H>  /  DBili  x   /  AST  40  /  ALT  26  /  AlkPhos  109  04-09    PT/INR - ( 09 Apr 2019 07:33 )   PT: 23.10 sec;   INR: 2.02 ratio         PTT - ( 09 Apr 2019 07:33 )  PTT:49.8 sec  Urinalysis Basic - ( 07 Apr 2019 13:15 )    Color: Orange / Appearance: Clear / SG: >=1.030 / pH: x  Gluc: x / Ketone: Trace  / Bili: Small / Urobili: 1.0   Blood: x / Protein: Trace / Nitrite: Negative   Leuk Esterase: Trace / RBC: 1-2 /HPF / WBC 1-2 /HPF   Sq Epi: x / Non Sq Epi: Occasional /HPF / Bacteria: Few /HPF            Culture - Urine (collected 07 Apr 2019 13:15)  Source: .Urine Clean Catch (Midstream)  Final Report (08 Apr 2019 15:29):    <10,000 CFU/mL Normal Urogenital Mimi    Culture - Blood (collected 07 Apr 2019 13:15)  Source: .Blood Blood-Peripheral  Preliminary Report (08 Apr 2019 20:01):    No growth to date.    Culture - Blood (collected 07 Apr 2019 13:15)  Source: .Blood Blood-Peripheral  Preliminary Report (08 Apr 2019 20:01):    No growth to date.      CARDIAC MARKERS ( 07 Apr 2019 13:15 )  x     / <0.01 ng/mL / x     / x     / x          RADIOLOGY:  < from: US Spleen (04.08.19 @ 10:33) >  Impression:    Splenomegaly. Ascites, unchanged.    < end of copied text >        PHYSICAL EXAM:  GENERAL: NAD, speaks in full sentences, no signs of respiratory distress  HEAD: Atraumatic, Normocephalic  NECK: Supple, No JVD  CHEST/LUNG: Clear to auscultation bilaterally; No wheeze or crackles  HEART: S1, S2; RRR; No murmurs, rubs, or gallops  ABDOMEN: BS+; Soft, Mildly tender to palpation in left upper quadrant, Very distended  EXTREMITIES:  2+ Peripheral Pulses, No clubbing, cyanosis, or edema  PSYCH: AAOx3  NEUROLOGY: non-focal  SKIN: No rashes or lesions

## 2019-04-10 LAB
ALBUMIN SERPL ELPH-MCNC: 2.2 G/DL — LOW (ref 3.5–5.2)
ALP SERPL-CCNC: 125 U/L — HIGH (ref 30–115)
ALT FLD-CCNC: 27 U/L — SIGNIFICANT CHANGE UP (ref 0–41)
ANION GAP SERPL CALC-SCNC: 8 MMOL/L — SIGNIFICANT CHANGE UP (ref 7–14)
APTT BLD: 39.9 SEC — HIGH (ref 27–39.2)
AST SERPL-CCNC: 46 U/L — HIGH (ref 0–41)
B PERT IGG+IGM PNL SER: ABNORMAL
BASOPHILS # BLD AUTO: 0.03 K/UL — SIGNIFICANT CHANGE UP (ref 0–0.2)
BASOPHILS NFR BLD AUTO: 0.9 % — SIGNIFICANT CHANGE UP (ref 0–1)
BILIRUB SERPL-MCNC: 1.7 MG/DL — HIGH (ref 0.2–1.2)
BUN SERPL-MCNC: 20 MG/DL — SIGNIFICANT CHANGE UP (ref 10–20)
CALCIUM SERPL-MCNC: 8 MG/DL — LOW (ref 8.5–10.1)
CHLORIDE SERPL-SCNC: 107 MMOL/L — SIGNIFICANT CHANGE UP (ref 98–110)
CO2 SERPL-SCNC: 24 MMOL/L — SIGNIFICANT CHANGE UP (ref 17–32)
COLOR FLD: YELLOW — SIGNIFICANT CHANGE UP
CREAT SERPL-MCNC: 1.2 MG/DL — SIGNIFICANT CHANGE UP (ref 0.7–1.5)
EOSINOPHIL # BLD AUTO: 0.15 K/UL — SIGNIFICANT CHANGE UP (ref 0–0.7)
EOSINOPHIL NFR BLD AUTO: 4.6 % — SIGNIFICANT CHANGE UP (ref 0–8)
FLUID INTAKE SUBSTANCE CLASS: SIGNIFICANT CHANGE UP
FLUID SEGMENTED GRANULOCYTES: 0 % — SIGNIFICANT CHANGE UP
GLUCOSE BLDC GLUCOMTR-MCNC: 111 MG/DL — HIGH (ref 70–99)
GLUCOSE BLDC GLUCOMTR-MCNC: 153 MG/DL — HIGH (ref 70–99)
GLUCOSE BLDC GLUCOMTR-MCNC: 86 MG/DL — SIGNIFICANT CHANGE UP (ref 70–99)
GLUCOSE BLDC GLUCOMTR-MCNC: 94 MG/DL — SIGNIFICANT CHANGE UP (ref 70–99)
GLUCOSE SERPL-MCNC: 90 MG/DL — SIGNIFICANT CHANGE UP (ref 70–99)
HCT VFR BLD CALC: 26.2 % — LOW (ref 42–52)
HGB BLD-MCNC: 8.4 G/DL — LOW (ref 14–18)
IMM GRANULOCYTES NFR BLD AUTO: 0.6 % — HIGH (ref 0.1–0.3)
INR BLD: 1.55 RATIO — HIGH (ref 0.65–1.3)
LYMPHOCYTES # BLD AUTO: 0.55 K/UL — LOW (ref 1.2–3.4)
LYMPHOCYTES # BLD AUTO: 16.7 % — LOW (ref 20.5–51.1)
LYMPHOCYTES # FLD: SIGNIFICANT CHANGE UP
MAGNESIUM SERPL-MCNC: 1.9 MG/DL — SIGNIFICANT CHANGE UP (ref 1.8–2.4)
MCHC RBC-ENTMCNC: 31.6 PG — HIGH (ref 27–31)
MCHC RBC-ENTMCNC: 32.1 G/DL — SIGNIFICANT CHANGE UP (ref 32–37)
MCV RBC AUTO: 98.5 FL — HIGH (ref 80–94)
MESOTHL CELL # FLD: PRESENT % — SIGNIFICANT CHANGE UP
MONOCYTES # BLD AUTO: 0.54 K/UL — SIGNIFICANT CHANGE UP (ref 0.1–0.6)
MONOCYTES NFR BLD AUTO: 16.4 % — HIGH (ref 1.7–9.3)
MONOS+MACROS # FLD: PRESENT % — SIGNIFICANT CHANGE UP
NEUTROPHILS # BLD AUTO: 2 K/UL — SIGNIFICANT CHANGE UP (ref 1.4–6.5)
NEUTROPHILS NFR BLD AUTO: 60.8 % — SIGNIFICANT CHANGE UP (ref 42.2–75.2)
NRBC # BLD: 0 /100 WBCS — SIGNIFICANT CHANGE UP (ref 0–0)
PLATELET # BLD AUTO: 75 K/UL — LOW (ref 130–400)
POTASSIUM SERPL-MCNC: 4.6 MMOL/L — SIGNIFICANT CHANGE UP (ref 3.5–5)
POTASSIUM SERPL-SCNC: 4.6 MMOL/L — SIGNIFICANT CHANGE UP (ref 3.5–5)
POTASSIUM UR-SCNC: 17 MMOL/L — SIGNIFICANT CHANGE UP
PROT SERPL-MCNC: 5.7 G/DL — LOW (ref 6–8)
PROTHROM AB SERPL-ACNC: 17.7 SEC — HIGH (ref 9.95–12.87)
RBC # BLD: 2.66 M/UL — LOW (ref 4.7–6.1)
RBC # FLD: 15.9 % — HIGH (ref 11.5–14.5)
RCV VOL RI: 1000 /UL — HIGH (ref 0–5)
SODIUM SERPL-SCNC: 139 MMOL/L — SIGNIFICANT CHANGE UP (ref 135–146)
SODIUM UR-SCNC: 82 MMOL/L — SIGNIFICANT CHANGE UP
SPECIMEN SOURCE FLD: SIGNIFICANT CHANGE UP
TOTAL NUCLEATED CELL COUNT, BODY FLUID: 980 /UL — HIGH (ref 0–5)
TUBE TYPE: SIGNIFICANT CHANGE UP
WBC # BLD: 3.29 K/UL — LOW (ref 4.8–10.8)
WBC # FLD AUTO: 3.29 K/UL — LOW (ref 4.8–10.8)

## 2019-04-10 RX ORDER — DIAZEPAM 5 MG
5 TABLET ORAL DAILY
Qty: 0 | Refills: 0 | Status: DISCONTINUED | OUTPATIENT
Start: 2019-04-10 | End: 2019-04-11

## 2019-04-10 RX ORDER — SPIRONOLACTONE 25 MG/1
100 TABLET, FILM COATED ORAL
Qty: 0 | Refills: 0 | Status: DISCONTINUED | OUTPATIENT
Start: 2019-04-10 | End: 2019-04-11

## 2019-04-10 RX ORDER — SOTALOL HCL 120 MG
80 TABLET ORAL
Qty: 0 | Refills: 0 | Status: DISCONTINUED | OUTPATIENT
Start: 2019-04-10 | End: 2019-04-11

## 2019-04-10 RX ADMIN — INSULIN GLARGINE 21 UNIT(S): 100 INJECTION, SOLUTION SUBCUTANEOUS at 08:12

## 2019-04-10 RX ADMIN — Medication 1 GRAM(S): at 06:15

## 2019-04-10 RX ADMIN — Medication 2 GRAM(S): at 17:00

## 2019-04-10 RX ADMIN — TAMSULOSIN HYDROCHLORIDE 0.4 MILLIGRAM(S): 0.4 CAPSULE ORAL at 23:10

## 2019-04-10 RX ADMIN — Medication 6 UNIT(S): at 11:45

## 2019-04-10 RX ADMIN — Medication 5 MILLIGRAM(S): at 11:48

## 2019-04-10 RX ADMIN — Medication 2 GRAM(S): at 06:17

## 2019-04-10 RX ADMIN — Medication 1 GRAM(S): at 00:04

## 2019-04-10 RX ADMIN — Medication 6 UNIT(S): at 16:59

## 2019-04-10 RX ADMIN — Medication 50 MILLIGRAM(S): at 06:14

## 2019-04-10 RX ADMIN — Medication 1 GRAM(S): at 23:09

## 2019-04-10 RX ADMIN — ATORVASTATIN CALCIUM 40 MILLIGRAM(S): 80 TABLET, FILM COATED ORAL at 23:10

## 2019-04-10 RX ADMIN — Medication 1 GRAM(S): at 17:01

## 2019-04-10 RX ADMIN — PANTOPRAZOLE SODIUM 40 MILLIGRAM(S): 20 TABLET, DELAYED RELEASE ORAL at 06:15

## 2019-04-10 RX ADMIN — Medication 50 MILLIGRAM(S): at 17:01

## 2019-04-10 RX ADMIN — Medication 1 GRAM(S): at 11:46

## 2019-04-10 RX ADMIN — Medication 325 MILLIGRAM(S): at 11:46

## 2019-04-10 RX ADMIN — Medication 2: at 11:46

## 2019-04-10 NOTE — MEDICAL STUDENT PROGRESS NOTE(EDUCATION) - NS MD HP STUD ASPLAN PLAN FT
#Single Episode of 102 F Fever at home- resolving  -UA, CXR, CT Head- negative  -patient denies any respiratory or urinary symptoms  -Flu swab and RVP negative   -no leukocytosis  -Blood culture no growth to date   - Lower extremity doppler- showed no DVT   - As per ID: will d/c abx and preform diagnostic paracentesis today (4/10/19)  - INR today (4/10/19) was 1.55; will not require FFP prior to procedure    - monitor fevers    # LUQ pain and abdominal distention: ddx: SBP vs. splenomegaly secondary to portal hypertension  - Had Paracentesis on 4/1, 3L removed, SAAG 2, does not endorse diffuse abdominal pain, but is tenderness to LUQ  - Abdominal US of spleen showed splenomegaly and ascites   - Give lasix, hold HCTZ, continue with spironolactone and BB  - Fluid restriction, monitor Is and Os    #JIMÉNEZ w/ cirrhosis and varices  -c/w home meds (spirinolactone, sucralfate, protonix)    #HTN  -c/w home med (HTZ, spirinolactone)    #Afib  -c/w home meds (sotalol, and flecainide)  -follows w/ Dr. De Souza from cardiology  - holding coumadin in case patient needs possible tap    #DM  -stopped oral antihyperglycemics (januvia)  -started basal/ preprandial insulin regimen    #BPH  -c/w home med (flomax)    #DLD  -c/w home med (atorvastatin)    #DVT/ GI ppx  -holding lovenox now for possible paracentesis tomorrow, protonix    #Code Status  -full code    # Dispo  -from home #Single Episode of 102 F Fever at home- resolving  -UA, CXR, CT Head- negative  -patient denies any respiratory or urinary symptoms  -Flu swab and RVP negative   -no leukocytosis  -Blood culture no growth to date   - Lower extremity doppler- showed no DVT   - As per ID: will d/c abx and preform diagnostic paracentesis today (4/10/19)  - INR today (4/10/19) was 1.55; did not require FFP prior to procedure    - monitor fevers    # LUQ pain and abdominal distention: ddx: SBP vs. splenomegaly secondary to portal hypertension  - Had Paracentesis on 4/1, 3L removed, SAAG 2, does not endorse diffuse abdominal pain, but is tenderness to LUQ  - Abdominal US of spleen showed splenomegaly and ascites   - Give lasix, hold HCTZ, continue with spironolactone and BB (hold if systolic BP<100 and HR<60)  - Fluid restriction, monitor Is and Os    #JIMÉNEZ w/ cirrhosis and varices  -c/w home meds (spirinolactone, sucralfate, protonix)    #HTN  cw spironolactone and lasix, with hold parameters as above  holding home HCTZ    #Afib  -c/w home meds (sotalol, and flecainide)  -follows w/ Dr. De Souza from cardiology  - takes coumadin at home, holding here given paracentesis     #DM  -stopped oral antihyperglycemics (januvia)  -started basal/ preprandial insulin regimen    #BPH  -c/w home med (flomax)    #DLD  -c/w home med (atorvastatin)    #DVT/ GI ppx  -holding lovenox for para/ protonix    #Code Status  -full code    # Dispo  -from home  - possible d/c in 24 to 48 hours  - Will call Dr. Kaur / Brett's office to try and make earlier appointment

## 2019-04-10 NOTE — MEDICAL STUDENT PROGRESS NOTE(EDUCATION) - SUBJECTIVE AND OBJECTIVE BOX
S:  Patient is a 68 year old male who presented to the ED with a chief complaint of fever and generalized weakness 4 days after being discharged from the hospital.     Currently patient is admitted to medicine with a primary diagnosis of fever of unknown origin.     Today is hospital day 3. There were no acute issues overnight. Patient was informed of planned repeat diagnostic paracentesis today.     O:  Vitals (4/10/19)   T 98 HR 58 BP 91/52 RR18  Flu swab and RVP negative   Lower extremity veins duplex: No evidence of deep venous thrombosis or superficial thrombophlebitis in   the bilateral lower extremities.    Physical Exam:  General: obese male eating breakfast upon approach   HEENT: no scleral icterus, no nasal discharge, moist mucous membranes, upper extremities appear jaundiced   Neck: supple, no cervical lymphadenopathy  Heart RRR s1s2 normal, no rub/murmur appreciated   Lungs- symmetric chest excursion, clear to auscultation bilaterally  Abdomen: Soft, distended, mild abdominal tenderness upon deep palpation of the LUQ, no guarding or rebound tenderness,negative Boateng sign , + fluid wave  Extremities- 2+ pitting LE edema extending from feet to shins, this is about the same from the pt's prior hospitalization as per patient , no numbness or tingling, no clubbing/cyanosis, no asterixis   Neuro: AAA x 3 S:  Patient is a 68 year old male who presented to the ED with a chief complaint of fever and generalized weakness 4 days after being discharged from the hospital.     Currently patient is admitted to medicine with a primary diagnosis of fever of unknown origin.     Today is hospital day 3. There were no acute issues overnight. Patient was informed of planned repeat diagnostic paracentesis today. Continues to deny abdominal pain. Having regular BMs , ambulating independently.     O:  Vitals (4/10/19)   T 98 HR 58 BP 91/52 RR18  Flu swab and RVP negative   Lower extremity veins duplex: No evidence of deep venous thrombosis or superficial thrombophlebitis in   the bilateral lower extremities.    Physical Exam:  General: obese male eating breakfast upon approach   HEENT: no scleral icterus, no nasal discharge, moist mucous membranes, upper extremities appear jaundiced   Neck: supple, no cervical lymphadenopathy  Heart RRR s1s2 normal, no rub/murmur appreciated   Lungs- symmetric chest excursion, clear to auscultation bilaterally  Abdomen: Soft, distended, mild abdominal tenderness upon deep palpation of the LUQ, no guarding or rebound tenderness,negative Boateng sign , + fluid wave  Extremities- 2+ pitting LE edema extending from feet to shins, this is about the same from the pt's prior hospitalization as per patient , no numbness or tingling, no clubbing/cyanosis, no asterixis   Neuro: AAA x 3

## 2019-04-10 NOTE — MEDICAL STUDENT PROGRESS NOTE(EDUCATION) - NS MD HP STUD ASPLAN ASSES FT
Patient is a 67 y/o M who presented with a single episode of fever of 101.1 and generalized weakness 4 days after being discharged from the hospital whose work up for fever so far has been inconclusive. He was recently hospitalized from March 29 to April 3rd  for worsening ascites and lower extremity edema.

## 2019-04-10 NOTE — PROGRESS NOTE ADULT - SUBJECTIVE AND OBJECTIVE BOX
SOCORRO MANUEL  68y, Male      OVERNIGHT EVENTS:    no fevers, feels well, no abdominal pain  essentially has no complaints    VITALS:  T(F): 98, Max: 98 (04-10-19 @ 05:25)  HR: 55  BP: 102/59  RR: 18Vital Signs Last 24 Hrs  T(C): 36.7 (10 Apr 2019 05:25), Max: 36.7 (10 Apr 2019 05:25)  T(F): 98 (10 Apr 2019 05:25), Max: 98 (10 Apr 2019 05:25)  HR: 55 (10 Apr 2019 07:50) (55 - 85)  BP: 102/59 (10 Apr 2019 07:50) (91/52 - 104/63)  BP(mean): --  RR: 18 (10 Apr 2019 05:25) (18 - 18)  SpO2: 95% (10 Apr 2019 07:50) (92% - 95%)    TESTS & MEASUREMENTS:                        8.4    3.29  )-----------( 75       ( 10 Apr 2019 07:45 )             26.2     04-09    136  |  104  |  20  ----------------------------<  81  3.9   |  22  |  1.4    Ca    7.6<L>      09 Apr 2019 07:33  Mg     1.5     04-08    TPro  5.5<L>  /  Alb  1.8<L>  /  TBili  2.2<H>  /  DBili  x   /  AST  40  /  ALT  26  /  AlkPhos  109  04-09    LIVER FUNCTIONS - ( 09 Apr 2019 07:33 )  Alb: 1.8 g/dL / Pro: 5.5 g/dL / ALK PHOS: 109 U/L / ALT: 26 U/L / AST: 40 U/L / GGT: x             Culture - Blood (collected 04-08-19 @ 09:17)  Source: .Blood None  Preliminary Report (04-09-19 @ 19:01):    No growth to date.    Culture - Urine (collected 04-07-19 @ 13:15)  Source: .Urine Clean Catch (Midstream)  Final Report (04-08-19 @ 15:29):    <10,000 CFU/mL Normal Urogenital Mimi    Culture - Blood (collected 04-07-19 @ 13:15)  Source: .Blood Blood-Peripheral  Preliminary Report (04-08-19 @ 20:01):    No growth to date.    Culture - Blood (collected 04-07-19 @ 13:15)  Source: .Blood Blood-Peripheral  Preliminary Report (04-08-19 @ 20:01):    No growth to date.            RADIOLOGY & ADDITIONAL TESTS:    ANTIBIOTICS:

## 2019-04-10 NOTE — PROGRESS NOTE ADULT - SUBJECTIVE AND OBJECTIVE BOX
Patient was seen and examined. Spoke with RN. Chart reviewed.    No events overnight.  Vital Signs Last 24 Hrs  T(F): 98 (10 Apr 2019 05:25), Max: 98 (10 Apr 2019 05:25)  HR: 55 (10 Apr 2019 07:50) (55 - 85)  BP: 102/59 (10 Apr 2019 07:50) (91/52 - 104/63)  SpO2: 95% (10 Apr 2019 07:50) (92% - 95%)  MEDICATIONS  (STANDING):  atorvastatin 40 milliGRAM(s) Oral at bedtime  chlorhexidine 2% Cloths 1 Application(s) Topical <User Schedule>  dextrose 5%. 1000 milliLiter(s) (50 mL/Hr) IV Continuous <Continuous>  dextrose 50% Injectable 12.5 Gram(s) IV Push once  dextrose 50% Injectable 25 Gram(s) IV Push once  dextrose 50% Injectable 25 Gram(s) IV Push once  diazepam    Tablet 5 milliGRAM(s) Oral daily  ferrous    sulfate 325 milliGRAM(s) Oral daily  flecainide 50 milliGRAM(s) Oral every 12 hours  furosemide   Injectable 20 milliGRAM(s) IV Push once  insulin glargine Injectable (LANTUS) 21 Unit(s) SubCutaneous every morning  insulin lispro (HumaLOG) corrective regimen sliding scale   SubCutaneous three times a day before meals  insulin lispro Injectable (HumaLOG) 6 Unit(s) SubCutaneous three times a day before meals  omega-3-Acid Ethyl Esters 2 Gram(s) Oral two times a day  pantoprazole    Tablet 40 milliGRAM(s) Oral before breakfast  sotalol 80 milliGRAM(s) Oral two times a day  spironolactone 100 milliGRAM(s) Oral <User Schedule>  sucralfate 1 Gram(s) Oral every 6 hours  tamsulosin 0.4 milliGRAM(s) Oral at bedtime    MEDICATIONS  (PRN):  dextrose 40% Gel 15 Gram(s) Oral once PRN Blood Glucose LESS THAN 70 milliGRAM(s)/deciliter  glucagon  Injectable 1 milliGRAM(s) IntraMuscular once PRN Glucose LESS THAN 70 milligrams/deciliter    Labs:                        8.4    3.29  )-----------( 75       ( 10 Apr 2019 07:45 )             26.2                         8.5    3.26  )-----------( 72       ( 09 Apr 2019 07:33 )             26.0     10 Apr 2019 07:45    139    |  107    |  20     ----------------------------<  90     4.6     |  24     |  1.2    09 Apr 2019 07:33    136    |  104    |  20     ----------------------------<  81     3.9     |  22     |  1.4      Ca    8.0        10 Apr 2019 07:45  Ca    7.6        09 Apr 2019 07:33  Mg     1.9       10 Apr 2019 07:45    TPro  5.7    /  Alb  2.2    /  TBili  1.7    /  DBili  x      /  AST  46     /  ALT  27     /  AlkPhos  125    10 Apr 2019 07:45  TPro  5.5    /  Alb  1.8    /  TBili  2.2    /  DBili  x      /  AST  40     /  ALT  26     /  AlkPhos  109    09 Apr 2019 07:33    PT/INR - ( 10 Apr 2019 07:45 )   PT: 17.70 sec;   INR: 1.55 ratio         PTT - ( 10 Apr 2019 07:45 )  PTT:39.9 sec      Culture - Blood (collected 08 Apr 2019 09:17)  Source: .Blood None  Preliminary Report (09 Apr 2019 19:01):    No growth to date.        Radiology:    General: comfortable, NAD  Neurology: A&Ox3, nonfocal  Head:  Normocephalic, atraumatic  ENT:  Mucosa moist, no ulcerations  Neck:  Supple, no JVD,   Skin: no breakdown  Resp: CTA B/L  CV: RRR, S1S2,   GI: Soft, NT, bowel sounds dist  MS:  edema, + peripheral pulses, FROM all 4 extremity

## 2019-04-11 ENCOUNTER — TRANSCRIPTION ENCOUNTER (OUTPATIENT)
Age: 69
End: 2019-04-11

## 2019-04-11 VITALS — DIASTOLIC BLOOD PRESSURE: 67 MMHG | HEART RATE: 55 BPM | WEIGHT: 248.24 LBS | SYSTOLIC BLOOD PRESSURE: 129 MMHG

## 2019-04-11 LAB
ALBUMIN FLD-MCNC: 0.4 G/DL — SIGNIFICANT CHANGE UP
ALBUMIN SERPL ELPH-MCNC: 2.2 G/DL — LOW (ref 3.5–5.2)
ALP SERPL-CCNC: 149 U/L — HIGH (ref 30–115)
ALT FLD-CCNC: 34 U/L — SIGNIFICANT CHANGE UP (ref 0–41)
ANION GAP SERPL CALC-SCNC: 6 MMOL/L — LOW (ref 7–14)
AST SERPL-CCNC: 59 U/L — HIGH (ref 0–41)
BASOPHILS # BLD AUTO: 0.04 K/UL — SIGNIFICANT CHANGE UP (ref 0–0.2)
BASOPHILS NFR BLD AUTO: 1.3 % — HIGH (ref 0–1)
BILIRUB SERPL-MCNC: 1.7 MG/DL — HIGH (ref 0.2–1.2)
BUN SERPL-MCNC: 18 MG/DL — SIGNIFICANT CHANGE UP (ref 10–20)
CALCIUM SERPL-MCNC: 8.2 MG/DL — LOW (ref 8.5–10.1)
CHLORIDE SERPL-SCNC: 107 MMOL/L — SIGNIFICANT CHANGE UP (ref 98–110)
CO2 SERPL-SCNC: 25 MMOL/L — SIGNIFICANT CHANGE UP (ref 17–32)
CREAT SERPL-MCNC: 1.1 MG/DL — SIGNIFICANT CHANGE UP (ref 0.7–1.5)
EOSINOPHIL # BLD AUTO: 0.17 K/UL — SIGNIFICANT CHANGE UP (ref 0–0.7)
EOSINOPHIL NFR BLD AUTO: 5.6 % — SIGNIFICANT CHANGE UP (ref 0–8)
GLUCOSE BLDC GLUCOMTR-MCNC: 83 MG/DL — SIGNIFICANT CHANGE UP (ref 70–99)
GLUCOSE BLDC GLUCOMTR-MCNC: 98 MG/DL — SIGNIFICANT CHANGE UP (ref 70–99)
GLUCOSE FLD-MCNC: 137 MG/DL — SIGNIFICANT CHANGE UP
GLUCOSE SERPL-MCNC: 90 MG/DL — SIGNIFICANT CHANGE UP (ref 70–99)
GRAM STN FLD: SIGNIFICANT CHANGE UP
HCT VFR BLD CALC: 28.1 % — LOW (ref 42–52)
HGB BLD-MCNC: 8.9 G/DL — LOW (ref 14–18)
IMM GRANULOCYTES NFR BLD AUTO: 0.7 % — HIGH (ref 0.1–0.3)
LDH SERPL L TO P-CCNC: 99 U/L — SIGNIFICANT CHANGE UP
LYMPHOCYTES # BLD AUTO: 0.59 K/UL — LOW (ref 1.2–3.4)
LYMPHOCYTES # BLD AUTO: 19.6 % — LOW (ref 20.5–51.1)
MAGNESIUM SERPL-MCNC: 1.7 MG/DL — LOW (ref 1.8–2.4)
MCHC RBC-ENTMCNC: 31.6 PG — HIGH (ref 27–31)
MCHC RBC-ENTMCNC: 31.7 G/DL — LOW (ref 32–37)
MCV RBC AUTO: 99.6 FL — HIGH (ref 80–94)
MONOCYTES # BLD AUTO: 0.4 K/UL — SIGNIFICANT CHANGE UP (ref 0.1–0.6)
MONOCYTES NFR BLD AUTO: 13.3 % — HIGH (ref 1.7–9.3)
NEUTROPHILS # BLD AUTO: 1.79 K/UL — SIGNIFICANT CHANGE UP (ref 1.4–6.5)
NEUTROPHILS NFR BLD AUTO: 59.5 % — SIGNIFICANT CHANGE UP (ref 42.2–75.2)
NIGHT BLUE STAIN TISS: SIGNIFICANT CHANGE UP
NRBC # BLD: 0 /100 WBCS — SIGNIFICANT CHANGE UP (ref 0–0)
PLATELET # BLD AUTO: 87 K/UL — LOW (ref 130–400)
POTASSIUM SERPL-MCNC: 4.5 MMOL/L — SIGNIFICANT CHANGE UP (ref 3.5–5)
POTASSIUM SERPL-SCNC: 4.5 MMOL/L — SIGNIFICANT CHANGE UP (ref 3.5–5)
PROT FLD-MCNC: 1.4 G/DL — SIGNIFICANT CHANGE UP
PROT SERPL-MCNC: 6.2 G/DL — SIGNIFICANT CHANGE UP (ref 6–8)
RBC # BLD: 2.82 M/UL — LOW (ref 4.7–6.1)
RBC # FLD: 15.9 % — HIGH (ref 11.5–14.5)
SODIUM SERPL-SCNC: 138 MMOL/L — SIGNIFICANT CHANGE UP (ref 135–146)
SPECIMEN SOURCE: SIGNIFICANT CHANGE UP
SPECIMEN SOURCE: SIGNIFICANT CHANGE UP
WBC # BLD: 3.01 K/UL — LOW (ref 4.8–10.8)
WBC # FLD AUTO: 3.01 K/UL — LOW (ref 4.8–10.8)

## 2019-04-11 RX ORDER — ALBUMIN HUMAN 25 %
50 VIAL (ML) INTRAVENOUS
Qty: 0 | Refills: 0 | Status: COMPLETED | OUTPATIENT
Start: 2019-04-11 | End: 2019-04-11

## 2019-04-11 RX ORDER — ALBUMIN HUMAN 25 %
50 VIAL (ML) INTRAVENOUS ONCE
Qty: 0 | Refills: 0 | Status: COMPLETED | OUTPATIENT
Start: 2019-04-11 | End: 2019-04-11

## 2019-04-11 RX ORDER — FUROSEMIDE 40 MG
1 TABLET ORAL
Qty: 30 | Refills: 0 | OUTPATIENT
Start: 2019-04-11 | End: 2019-05-10

## 2019-04-11 RX ORDER — MAGNESIUM SULFATE 500 MG/ML
2 VIAL (ML) INJECTION ONCE
Qty: 0 | Refills: 0 | Status: COMPLETED | OUTPATIENT
Start: 2019-04-11 | End: 2019-04-11

## 2019-04-11 RX ORDER — SPIRONOLACTONE 25 MG/1
4 TABLET, FILM COATED ORAL
Qty: 0 | Refills: 0 | DISCHARGE
Start: 2019-04-11

## 2019-04-11 RX ADMIN — Medication 50 GRAM(S): at 13:01

## 2019-04-11 RX ADMIN — Medication 80 MILLIGRAM(S): at 06:39

## 2019-04-11 RX ADMIN — Medication 1 GRAM(S): at 12:11

## 2019-04-11 RX ADMIN — Medication 50 MILLILITER(S): at 12:11

## 2019-04-11 RX ADMIN — Medication 325 MILLIGRAM(S): at 12:11

## 2019-04-11 RX ADMIN — INSULIN GLARGINE 21 UNIT(S): 100 INJECTION, SOLUTION SUBCUTANEOUS at 08:09

## 2019-04-11 RX ADMIN — Medication 2 GRAM(S): at 06:38

## 2019-04-11 RX ADMIN — Medication 5 MILLIGRAM(S): at 12:13

## 2019-04-11 RX ADMIN — Medication 50 MILLILITER(S): at 13:01

## 2019-04-11 RX ADMIN — Medication 50 MILLILITER(S): at 12:08

## 2019-04-11 RX ADMIN — PANTOPRAZOLE SODIUM 40 MILLIGRAM(S): 20 TABLET, DELAYED RELEASE ORAL at 06:38

## 2019-04-11 RX ADMIN — Medication 50 MILLIGRAM(S): at 06:38

## 2019-04-11 RX ADMIN — Medication 6 UNIT(S): at 08:09

## 2019-04-11 RX ADMIN — Medication 1 GRAM(S): at 06:38

## 2019-04-11 NOTE — PROGRESS NOTE ADULT - SUBJECTIVE AND OBJECTIVE BOX
SOCORRO MANUEL  68y, Male      OVERNIGHT EVENTS:    no fevers, no abdominal pain  has no complaints    VITALS:  T(F): 97.6, Max: 98.1 (04-10-19 @ 14:14)  HR: 53  BP: 114/65  RR: 18Vital Signs Last 24 Hrs  T(C): 36.4 (11 Apr 2019 05:22), Max: 36.7 (10 Apr 2019 14:14)  T(F): 97.6 (11 Apr 2019 05:22), Max: 98.1 (10 Apr 2019 14:14)  HR: 53 (11 Apr 2019 08:14) (53 - 63)  BP: 114/65 (11 Apr 2019 08:14) (96/53 - 114/65)  BP(mean): --  RR: 18 (11 Apr 2019 05:22) (18 - 20)  SpO2: 96% (11 Apr 2019 07:14) (95% - 96%)    TESTS & MEASUREMENTS:                        8.9    3.01  )-----------( 87       ( 11 Apr 2019 08:27 )             28.1     04-11    138  |  107  |  18  ----------------------------<  90  4.5   |  25  |  1.1    Ca    8.2<L>      11 Apr 2019 08:27  Mg     1.7     04-11    TPro  6.2  /  Alb  2.2<L>  /  TBili  1.7<H>  /  DBili  x   /  AST  59<H>  /  ALT  34  /  AlkPhos  149<H>  04-11    LIVER FUNCTIONS - ( 11 Apr 2019 08:27 )  Alb: 2.2 g/dL / Pro: 6.2 g/dL / ALK PHOS: 149 U/L / ALT: 34 U/L / AST: 59 U/L / GGT: x             Culture - Fungal, Body Fluid (collected 04-10-19 @ 12:36)  Source: .Body Fluid Peritoneal Fluid  Preliminary Report (04-11-19 @ 09:34):    Testing in progress    Culture - Body Fluid with Gram Stain (collected 04-10-19 @ 12:36)  Source: .Body Fluid Peritoneal Fluid  Gram Stain (04-11-19 @ 02:13):    polymorphonuclear leukocytes seen    No organisms seen    by cytocentrifuge    Culture - Blood (collected 04-08-19 @ 09:17)  Source: .Blood None  Preliminary Report (04-09-19 @ 19:01):    No growth to date.    Culture - Urine (collected 04-07-19 @ 13:15)  Source: .Urine Clean Catch (Midstream)  Final Report (04-08-19 @ 15:29):    <10,000 CFU/mL Normal Urogenital Mimi    Culture - Blood (collected 04-07-19 @ 13:15)  Source: .Blood Blood-Peripheral  Preliminary Report (04-08-19 @ 20:01):    No growth to date.    Culture - Blood (collected 04-07-19 @ 13:15)  Source: .Blood Blood-Peripheral  Preliminary Report (04-08-19 @ 20:01):    No growth to date.            RADIOLOGY & ADDITIONAL TESTS:    ANTIBIOTICS:

## 2019-04-11 NOTE — DISCHARGE NOTE PROVIDER - NSDCCPCAREPLAN_GEN_ALL_CORE_FT
PRINCIPAL DISCHARGE DIAGNOSIS  Diagnosis: Cirrhosis of liver  Assessment and Plan of Treatment: take all medication as prescribed  follow up with primary care doctor and GI, Dr. brizuela within 2-3 days of discharge.   return to ER if symptoms persist or worsen.      SECONDARY DISCHARGE DIAGNOSES  Diagnosis: Diastolic CHF, chronic  Assessment and Plan of Treatment: take all medication as prescribed  follow up with primary care doctor within 2-3 days of discharge.   return to ER if symptoms persist or worsen.

## 2019-04-11 NOTE — MEDICAL STUDENT PROGRESS NOTE(EDUCATION) - SUBJECTIVE AND OBJECTIVE BOX
S:  Patient is a 68 year old male who presented to the ED with a chief complaint of fever and generalized weakness 4 days after being discharged from the hospital.     Currently patient is admitted to medicine with a primary diagnosis of fever of unknown origin.     Today is hospital day 4. There were no acute issues overnight.  Continues to deny abdominal pain. Having regular BMs , ambulating independently.     Vitals (4/10/19)   T 97.6 HR 63 BP 96/53  RR18  Flu swab and RVP negative   Lower extremity veins duplex: No evidence of deep venous thrombosis or superficial thrombophlebitis in   the bilateral lower extremities.    General: obese male sleeping upon approach   HEENT: no scleral icterus, no nasal discharge, moist mucous membranes, upper extremities appear jaundiced   Neck: supple, no cervical lymphadenopathy  Heart RRR s1s2 normal, no rub/murmur appreciated   Lungs- symmetric chest excursion, clear to auscultation bilaterally  Abdomen: Soft, distended, mild abdominal tenderness upon deep palpation of the LUQ, no guarding or rebound tenderness,negative Boateng sign , + fluid wave  Extremities- 2+ pitting LE edema extending from feet to shins, this is about the same from the pt's prior hospitalization as per patient , no numbness or tingling, no clubbing/cyanosis, no asterixis   Neuro: AAA x 3

## 2019-04-11 NOTE — DISCHARGE NOTE NURSING/CASE MANAGEMENT/SOCIAL WORK - NSDCDPATPORTLINK_GEN_ALL_CORE
You can access the [facility] Patient Portal, offered by [facility], by registering with the following website: [email or web address]

## 2019-04-11 NOTE — PROGRESS NOTE ADULT - ASSESSMENT
SOCORRO MANUEL 68y Male  MRN#: [id number]       SUBJECTIVE  Patient is a 68y old Male who presents with a chief complaint of fever and weakness (07 Apr 2019 16:03)  Currently admitted to medicine with the primary diagnosis of Fever of unknown origin (FUO)    Today is hospital day 1. There were no acute issues overnight. Patient reports improvement in his chief complaint of weakness however mentions left upper quadrant pain that comes and goes, other than LUQ pain, he does not endorse diffuse abdominal pain. He says he told his GI doctor about the LUQ pain but it was not followed through. He is urinating and ambulating okay. His last bowel movement was yesterday.     OBJECTIVE  Home Medications:  atorvastatin: 40 milligram(s) orally once a day (at bedtime) (29 Mar 2019 19:32)  Coumadin 2.5 mg oral tablet: 1 tab(s) orally 6 times a week, Tues, Weds, Thurs, Fri, Sat, Sun (29 Mar 2019 19:32)  Coumadin 5 mg oral tablet: 1 tab(s) orally once a week, on Monday (29 Mar 2019 19:32)  diazePAM 5 mg oral tablet: 1 tab(s) orally once a day (07 Apr 2019 16:15)  ferrous sulfate 325 mg (65 mg elemental iron) oral tablet: 1 tab(s) orally 3 times a day (29 Mar 2019 19:32)  Fish Oil 1200 mg oral capsule: orally 2 times a day (29 Mar 2019 19:32)  flecainide 50 mg oral tablet: 1 tab(s) orally every 12 hours (29 Mar 2019 19:32)  Flomax 0.4 mg oral capsule: 1 cap(s) orally once a day (29 Mar 2019 19:32)  Januvia 50 mg oral tablet: 1 tab(s) orally once a day (29 Mar 2019 19:32)  sotalol AF 80 mg oral tablet: 1 tab(s) orally 2 times a day (07 Apr 2019 16:15)  sucralfate 1 g oral tablet: 1 tab(s) orally 4 times a day (before meals and at bedtime) (29 Mar 2019 19:32)    MEDICATIONS:  STANDING MEDICATIONS  atorvastatin 40 milliGRAM(s) Oral at bedtime  cefepime   IVPB 2000 milliGRAM(s) IV Intermittent every 12 hours  chlorhexidine 2% Cloths 1 Application(s) Topical <User Schedule>  dextrose 5%. 1000 milliLiter(s) IV Continuous <Continuous>  dextrose 50% Injectable 12.5 Gram(s) IV Push once  dextrose 50% Injectable 25 Gram(s) IV Push once  dextrose 50% Injectable 25 Gram(s) IV Push once  diazepam    Tablet 5 milliGRAM(s) Oral daily  enoxaparin Injectable 110 milliGRAM(s) SubCutaneous two times a day  ferrous    sulfate 325 milliGRAM(s) Oral daily  flecainide 50 milliGRAM(s) Oral every 12 hours  hydrochlorothiazide 50 milliGRAM(s) Oral <User Schedule>  insulin glargine Injectable (LANTUS) 21 Unit(s) SubCutaneous every morning  insulin lispro (HumaLOG) corrective regimen sliding scale   SubCutaneous three times a day before meals  insulin lispro Injectable (HumaLOG) 7 Unit(s) SubCutaneous three times a day before meals  magnesium sulfate  IVPB 2 Gram(s) IV Intermittent once  omega-3-Acid Ethyl Esters 2 Gram(s) Oral two times a day  pantoprazole    Tablet 40 milliGRAM(s) Oral before breakfast  sotalol  Oral Tab/Cap - Peds 80 milliGRAM(s) Oral two times a day  spironolactone 100 milliGRAM(s) Oral <User Schedule>  sucralfate 1 Gram(s) Oral every 6 hours  tamsulosin 0.4 milliGRAM(s) Oral at bedtime    PRN MEDICATIONS  dextrose 40% Gel 15 Gram(s) Oral once PRN  glucagon  Injectable 1 milliGRAM(s) IntraMuscular once PRN      VITAL SIGNS: Last 24 Hours  T(C): 36.9 (08 Apr 2019 13:22), Max: 37.6 (08 Apr 2019 05:11)  T(F): 98.5 (08 Apr 2019 13:22), Max: 99.6 (08 Apr 2019 05:11)  HR: 61 (08 Apr 2019 13:22) (57 - 65)  BP: 110/59 (08 Apr 2019 13:22) (92/46 - 118/59)  BP(mean): --  RR: 18 (08 Apr 2019 13:22) (18 - 20)  SpO2: 97% (08 Apr 2019 07:55) (96% - 97%)    LABS:                        8.9    4.78  )-----------( 91       ( 08 Apr 2019 09:17 )             26.8     04-08    133<L>  |  102  |  23<H>  ----------------------------<  133<H>  3.5   |  20  |  1.5    Ca    8.0<L>      08 Apr 2019 09:17  Mg     1.5     04-08    TPro  6.2  /  Alb  2.1<L>  /  TBili  2.9<H>  /  DBili  x   /  AST  44<H>  /  ALT  28  /  AlkPhos  126<H>  04-08    LIVER FUNCTIONS - ( 08 Apr 2019 09:17 )  Alb: 2.1 g/dL / Pro: 6.2 g/dL / ALK PHOS: 126 U/L / ALT: 28 U/L / AST: 44 U/L / GGT: x           PT/INR - ( 08 Apr 2019 09:17 )   PT: 26.80 sec;   INR: 2.35 ratio         PTT - ( 08 Apr 2019 09:17 )  PTT:52.1 sec  Urinalysis Basic - ( 07 Apr 2019 13:15 )    Color: Orange / Appearance: Clear / SG: >=1.030 / pH: x  Gluc: x / Ketone: Trace  / Bili: Small / Urobili: 1.0   Blood: x / Protein: Trace / Nitrite: Negative   Leuk Esterase: Trace / RBC: 1-2 /HPF / WBC 1-2 /HPF   Sq Epi: x / Non Sq Epi: Occasional /HPF / Bacteria: Few /HPF        Lactate, Blood: 2.5 mmol/L <H> (04-08-19 @ 09:17)      CARDIAC MARKERS ( 07 Apr 2019 13:15 )  x     / <0.01 ng/mL / x     / x     / x          RADIOLOGY:  < from: US Spleen (04.08.19 @ 10:33) >  Impression:    Splenomegaly. Ascites, unchanged.      < end of copied text >      Physical Exam   General: obese male lying comfortably in bed upon approach   HEENT: no scleral icterus, no nasal discharge, moist mucous membranes   Neck: supple, no cervical lymphadenopathy  Heart RRR s1s2 normal, no rub/murmur appreciated   Lungs- symmetric chest excursion, clear to auscultation bilaterally  Abdomen: Soft, distended, abdominal tenderness upon deep palpation of the LUQ, no guarding or rebound tenderness,negative Boateng sign , + fluid wave  Extremities- 2+ pitting LE edema extending from feet to shins, this is about the same from the pt's prior hospitalization as per patient , no numbness or tingling, no clubbing/cyanosis, no asterixis   Neuro: AAA x 3        ADMISSION SUMMARY    Patient is a 67 y/o M with a PMH of Afib (on coumadin), HTN, anemia, HLD, diastolic CHF, DM, and cirrhosis with varices secondary to non-alcoholic fatty liver disease. He was recently hospitalized from March 29 to April 3rd  for worsening ascites and lower extremity edema     ASSESSMENT & PLAN  #102 F Fever at home: DDX includes SBP vs ? DVT  -UA, CXR, CT Head- negative  -patient denies any respiratory or urinary symptoms  -no leukocytosis  -BCx pending  - FLU negative  - will get lower extremity doppler to rule out DVT - acute thrombotic events can sometimes cause high fever  - Will discontinue vancomycine and continue with cefepime;   - consult ID for proper abx regimen and need for repeat tap; however unreliable given that patient was already started on ABX  - monitor fevers    # LUQ pain and abdominal distention: ddx: SBP vs. splenomegaly secondary to portal hypertension  - Had Paracentesis on 4/1, 3L removed, SAAG 2, does not endorse diffuse abdominal pain, but is tenderness to LUQ  - Abdominal US of spleen showed splenomegaly and ascites   - Continue diuretics   - Fluid restriction   - FU ID regarding repeat paracentesis    #JIMÉNEZ w/ cirrhosis and varices  -c/w home meds (spirinolactone, sucralfate, protonix)    #HTN  -c/w home med (HTZ, spirinolactone)    #Afib  -c/w home meds (sotalol, and flecainide)  -follows w/ Dr. De Souza from cardiology  - holding coumadin in case patient needs possible tap    #DM  -stopped oral antihyperglycemics (januvia)  -started basal/ preprandial insulin regimen    #BPH  -c/w home med (flomax)    #DLD  -c/w home med (atorvastatin)    #DVT/ GI ppx  -therapeutic lovenox, protonix    #Code Status  -full code    # Dispo  -from home
SOCORRO MANUEL 68y Male  MRN#: [id number]       SUBJECTIVE  S:  Patient is a 68 year old male who presented to the ED with a chief complaint of fever and generalized weakness 4 days after being discharged from the hospital.     Currently patient is admitted to medicine with a primary diagnosis of fever of unknown origin.     Today is hospital day 3. There were no acute issues overnight. Patient was informed of planned repeat diagnostic paracentesis today. Continues to deny abdominal pain. Having regular BMs , ambulating independently.     OBJECTIVE  Home Medications:  atorvastatin: 40 milligram(s) orally once a day (at bedtime) (29 Mar 2019 19:32)  Coumadin 2.5 mg oral tablet: 1 tab(s) orally 6 times a week, Tues, Weds, Thurs, Fri, Sat, Sun (29 Mar 2019 19:32)  Coumadin 5 mg oral tablet: 1 tab(s) orally once a week, on Monday (29 Mar 2019 19:32)  diazePAM 5 mg oral tablet: 1 tab(s) orally once a day (07 Apr 2019 16:15)  ferrous sulfate 325 mg (65 mg elemental iron) oral tablet: 1 tab(s) orally 3 times a day (29 Mar 2019 19:32)  Fish Oil 1200 mg oral capsule: orally 2 times a day (29 Mar 2019 19:32)  flecainide 50 mg oral tablet: 1 tab(s) orally every 12 hours (29 Mar 2019 19:32)  Flomax 0.4 mg oral capsule: 1 cap(s) orally once a day (29 Mar 2019 19:32)  Januvia 50 mg oral tablet: 1 tab(s) orally once a day (29 Mar 2019 19:32)  sotalol AF 80 mg oral tablet: 1 tab(s) orally 2 times a day (07 Apr 2019 16:15)  sucralfate 1 g oral tablet: 1 tab(s) orally 4 times a day (before meals and at bedtime) (29 Mar 2019 19:32)    MEDICATIONS:  STANDING MEDICATIONS  atorvastatin 40 milliGRAM(s) Oral at bedtime  chlorhexidine 2% Cloths 1 Application(s) Topical <User Schedule>  dextrose 5%. 1000 milliLiter(s) IV Continuous <Continuous>  dextrose 50% Injectable 12.5 Gram(s) IV Push once  dextrose 50% Injectable 25 Gram(s) IV Push once  dextrose 50% Injectable 25 Gram(s) IV Push once  diazepam    Tablet 5 milliGRAM(s) Oral daily  ferrous    sulfate 325 milliGRAM(s) Oral daily  flecainide 50 milliGRAM(s) Oral every 12 hours  furosemide   Injectable 20 milliGRAM(s) IV Push once  insulin glargine Injectable (LANTUS) 21 Unit(s) SubCutaneous every morning  insulin lispro (HumaLOG) corrective regimen sliding scale   SubCutaneous three times a day before meals  insulin lispro Injectable (HumaLOG) 6 Unit(s) SubCutaneous three times a day before meals  omega-3-Acid Ethyl Esters 2 Gram(s) Oral two times a day  pantoprazole    Tablet 40 milliGRAM(s) Oral before breakfast  sotalol 80 milliGRAM(s) Oral two times a day  spironolactone 100 milliGRAM(s) Oral <User Schedule>  sucralfate 1 Gram(s) Oral every 6 hours  tamsulosin 0.4 milliGRAM(s) Oral at bedtime    PRN MEDICATIONS  dextrose 40% Gel 15 Gram(s) Oral once PRN  glucagon  Injectable 1 milliGRAM(s) IntraMuscular once PRN      VITAL SIGNS: Last 24 Hours  T(C): 36.7 (10 Apr 2019 14:14), Max: 36.7 (10 Apr 2019 05:25)  T(F): 98.1 (10 Apr 2019 14:14), Max: 98.1 (10 Apr 2019 14:14)  HR: 55 (10 Apr 2019 14:14) (55 - 85)  BP: 108/56 (10 Apr 2019 14:14) (91/52 - 108/56)  BP(mean): --  RR: 18 (10 Apr 2019 14:14) (18 - 18)  SpO2: 95% (10 Apr 2019 14:14) (92% - 95%)    LABS:                        8.4    3.29  )-----------( 75       ( 10 Apr 2019 07:45 )             26.2     04-10    139  |  107  |  20  ----------------------------<  90  4.6   |  24  |  1.2    Ca    8.0<L>      10 Apr 2019 07:45  Mg     1.9     04-10    TPro  5.7<L>  /  Alb  2.2<L>  /  TBili  1.7<H>  /  DBili  x   /  AST  46<H>  /  ALT  27  /  AlkPhos  125<H>  04-10    LIVER FUNCTIONS - ( 10 Apr 2019 07:45 )  Alb: 2.2 g/dL / Pro: 5.7 g/dL / ALK PHOS: 125 U/L / ALT: 27 U/L / AST: 46 U/L / GGT: x           PT/INR - ( 10 Apr 2019 07:45 )   PT: 17.70 sec;   INR: 1.55 ratio         PTT - ( 10 Apr 2019 07:45 )  PTT:39.9 sec    Culture - Blood (collected 08 Apr 2019 09:17)  Source: .Blood None  Preliminary Report (09 Apr 2019 19:01):    No growth to date.          RADIOLOGY:   US Spleen (04.08.19 @ 10:33) >  Impression: Splenomegaly. Ascites, unchanged.    VA Duplex Lower Ext Vein Scan, Bilat (04.08.19 @ 21:09) >  Impression: No evidence of deep venous thrombosis or superficial thrombophlebitis in   the bilateral lower extremities.    PHYSICAL EXAM:  General: obese male eating breakfast upon approach   HEENT: no scleral icterus, no nasal discharge, moist mucous membranes, upper extremities appear jaundiced   Neck: supple, no cervical lymphadenopathy  Heart RRR s1s2 normal, no rub/murmur appreciated   Lungs- symmetric chest excursion, clear to auscultation bilaterally  Abdomen: Soft, distended, mild abdominal tenderness upon deep palpation of the LUQ, no guarding or rebound tenderness,negative Boateng sign , + fluid wave  Extremities- 2+ pitting LE edema extending from feet to shins, this is about the same from the pt's prior hospitalization as per patient , no numbness or tingling, no clubbing/cyanosis, no asterixis   Neuro: AAA x 3        ADMISSION SUMMARY  Patient is a 67 y/o M who presented with a single episode of fever of 101.1 and generalized weakness 4 days after being discharged from the hospital whose work up for fever so far has been inconclusive. He was recently hospitalized from March 29 to April 3rd  for worsening ascites and lower extremity edema    ASSESSMENT & PLAN  #Single Episode of 102 F Fever at home- resolving  -UA, CXR, CT Head- negative  -patient denies any respiratory or urinary symptoms  -Flu swab and RVP negative   -no leukocytosis  -Blood culture no growth to date   - Lower extremity doppler- showed no DVT   - INR today (4/10/19) was 1.55; did not require FFP prior to procedure    - diagnostic paracentesis performed today (4/10/19) - did not indicate SBP  - monitor fevers    # LUQ pain and abdominal distention: ddx: SBP vs. splenomegaly secondary to portal hypertension  - Had Paracentesis on 4/1, 3L removed, SAAG 2, does not endorse diffuse abdominal pain, but is tenderness to LUQ  - Abdominal US of spleen showed splenomegaly and ascites   - Give lasix, hold HCTZ, continue with spironolactone and BB (hold if systolic BP<100 and HR<60)  - Fluid restriction, monitor Is and Os    #JIMÉNEZ w/ cirrhosis and varices  -c/w home meds (spirinolactone, sucralfate, protonix)    #HTN  cw spironolactone and lasix, with hold parameters as above  holding home HCTZ    #Afib  -c/w home meds (sotalol, and flecainide)  -follows w/ Dr. De Souza from cardiology  - takes coumadin at home, holding here given paracentesis     #DM  -stopped oral antihyperglycemics (januvia)  -started basal/ preprandial insulin regimen    #BPH  -c/w home med (flomax)    #DLD  -c/w home med (atorvastatin)    #DVT/ GI ppx  -holding lovenox for para/ protonix    #Code Status  -full code    # Dispo  -from home  - possible d/c in 24 to 48 hours  - Will call Dr. Kaur / Brett's office to try and make earlier appointment
SOCORRO MANUEL 68y Male  MRN#: [id number]     S:  Patient is a 68 year old male who presented to the ED with a chief complaint of fever and generalized weakness 4 days after being discharged from the hospital.     Currently patient is admitted to medicine with a primary diagnosis of fever of unknown origin.     Today is hospital day 2. There were no acute issues overnight. Patient does not complain of fever, pain, or weakness. He is inquiring about work-up done so far as to etiology of the fever. It was explained to him that his chest x-ray, UA, blood cultures, and CT so far have been negative. He was also explained the possibility of having paracentesis performed again and its utility given that he was already started on antibiotics.     O:  Vitals (4/9/19)   T 98 HR 61 RR 18 BP 95/57  Flu swab and RVP negative   Van trough level 21.5   Lower extremity veins duplex: No evidence of deep venous thrombosis or superficial thrombophlebitis in   the bilateral lower extremities.    General: obese male lying comfortably in bed upon approach   HEENT: no scleral icterus, no nasal discharge, moist mucous membranes, upper extremities appear jaundiced   Neck: supple, no cervical lymphadenopathy  Heart RRR s1s2 normal, no rub/murmur appreciated   Lungs- symmetric chest excursion, clear to auscultation bilaterally  Abdomen: Soft, distended, mild abdominal tenderness upon deep palpation of the LUQ, no guarding or rebound tenderness,negative Boateng sign , + fluid wave  Extremities- 2+ pitting LE edema extending from feet to shins, this is about the same from the pt's prior hospitalization as per patient , no numbness or tingling, no clubbing/cyanosis, no asterixis   Neuro: AAA x 3    Assessment and Plan:   Assessment/Plan:  Patient is a 67 y/o M who presented with a single episode of fever of 101.1 and generalized weakness 4 days after being discharged from the hospital whose work up for fever so far has been inconclusive. He was recently hospitalized from March 29 to April 3rd  for worsening ascites and lower extremity edema.    #102 F Fever at home- resolving   DDX includes SBP vs ? DVT  -UA, CXR, CT Head- negative  -patient denies any respiratory or urinary symptoms  - Flu swab and RVP negative   -no leukocytosis  -Blood culture no growth to date   - Lower extremity doppler- showed no DVT   - Vancomycin trough: 21.5 however vancomycin was discontinued yesterday;  continue with cefepime;   - consult ID for proper abx regimen and need for repeat tap; however unreliable given that patient was already started on ABX  - Likely needs therapeutic paracentesis; in the meantime will hold Lovenox and coumadin; if proceeding with procedure will give FFP prior to the procedure   - monitor fevers    # LUQ pain and abdominal distention: ddx: SBP vs. splenomegaly secondary to portal hypertension  - Had Paracentesis on 4/1, 3L removed, SAAG 2, does not endorse diffuse abdominal pain, but is tenderness to LUQ  - Abdominal US of spleen showed splenomegaly and ascites   - Give lasix, hold HCTZ, continue with spironolactone and BB  - Fluid restriction, monitor Is and Os  - FU ID regarding repeat paracentesis, attending in agreement that patient needs therapeutic paracentesis     #JIMÉNEZ w/ cirrhosis and varices  -c/w home meds (spirinolactone, sucralfate, protonix)    #HTN  -c/w home med (HTZ, spirinolactone)    #Afib  -c/w home meds (sotalol, and flecainide)  -follows w/ Dr. De Souza from cardiology  - holding coumadin in case patient needs possible tap    #DM  -stopped oral antihyperglycemics (januvia)  -started basal/ preprandial insulin regimen    #BPH  -c/w home med (flomax)    #DLD  -c/w home med (atorvastatin)    #DVT/ GI ppx  -holding lovenox now for possible paracentesis tomorrow, protonix    #Code Status  -full code    # Dispo  -from home
ascites  ty with cirrhosis/varices    off abx  repaet tap  id apprec
cirrhosis of liver  presumed SBP    plan-------------     infectious dis consult noted advised to continue Iv antibiotic  no paracentesis    d/w resident
· Assessment		  ascites  ty with cirrhosis/varices    off abx  repaet tap  id apprec  dc fome fu cx as outpt
· Assessment		  68M pmh afib (coumadin), HTN, anemia, HLD, Diastolic CHF, BPH, DM, JIMÉNEZ, cirrhosis w/ varices p/w fever to 102F at home and generalized weakness and lethargy    IMPRESSION:  102 F Fever at home.   No fevers presently and clinically no ongoing infection  hx of JIMÉNEZ with cirrhosis and varices.   s/p therapeutic and diagnostic paracentesis on 4/1. SAAG 2. No evidence of SBP at that time.  Presently no SBP/ PNA/Pyelo/ Phlebitis  No ongoing sepsis  Clinically stable  BCx NTD  WBC 3.2    RECOMMENDATIONS:   Off ABx  Repeat diagnostic paracentesis
68M pmh afib (coumadin), HTN, anemia, HLD, Diastolic CHF, BPH, DM, JIMÉNEZ, cirrhosis w/ varices p/w fever to 102F at home and generalized weakness and lethargy    IMPRESSION:  JIMÉNEZ with cirrhosis and varices.   s/p therapeutic and diagnostic paracentesis on 4/1. SAAG 2. No evidence of SBP at that time.  Repeat paracentesis 4/10 with numbers not consistent with bacterial perotonitis  No evidence of peritoneal TB  Clinically stable  BCx NTD  WBC 3.0    RECOMMENDATIONS:   Off ABx  F/u final cultures

## 2019-04-11 NOTE — DISCHARGE NOTE PROVIDER - CARE PROVIDER_API CALL
Meño Stover (DO)  Infectious Disease; Internal Medicine  [address] [place] [address]  Phone: [phone]  Fax: [phone]  Follow Up Time:

## 2019-04-11 NOTE — MEDICAL STUDENT PROGRESS NOTE(EDUCATION) - NS MD HP STUD ASPLAN PLAN FT
#Single Episode of 102 F Fever at home- resolving  -UA, CXR, CT Head- negative  -patient denies any respiratory or urinary symptoms  -Flu swab and RVP negative   -no leukocytosis  -Blood culture no growth to date   - Lower extremity doppler- showed no DVT   - INR (4/10/19) was 1.55; did not require FFP prior to procedure    - diagnostic paracentesis performed today (4/10/19) - did not indicate SBP  - therapeutic paracentesis to be performed today  - monitor fevers    # LUQ pain and abdominal distention: ddx: SBP vs. splenomegaly secondary to portal hypertension  - Had Paracentesis on 4/1, 3L removed, SAAG 2, does not endorse diffuse abdominal pain, but is tenderness to LUQ  - Abdominal US of spleen showed splenomegaly and ascites   - Give lasix, hold HCTZ, continue with spironolactone and BB (hold if systolic BP<100 and HR<60)  - Fluid restriction, monitor Is and Os    #JIMÉNEZ w/ cirrhosis and varices  -c/w home meds (spirinolactone, sucralfate, protonix)  - Ask Dr. Castaneda if spironolactone dose should be increased from 100 mg    #HTN  cw spironolactone and lasix, with hold parameters as above  holding home HCTZ    #Afib  -c/w home meds (sotalol, and flecainide)  -follows w/ Dr. De Souza from cardiology  - takes coumadin at home, holding here given paracentesis     #DM  -stopped oral antihyperglycemics (januvia)  -started basal/ preprandial insulin regimen    #BPH  -c/w home med (flomax)    #DLD  -c/w home med (atorvastatin)    #DVT/ GI ppx  -holding lovenox for para/ protonix    #Code Status  -full code    # Dispo  -from home  - possible d/c in 24 to 48 hours  - Will call Dr. Kaur / Brett's office to try and make earlier appointment

## 2019-04-11 NOTE — PROCEDURE NOTE - NSPROCDETAILS_GEN_ALL_CORE
location identified, sterile technique used, catheter introduced, fluid drained
location identified, sterile technique used, catheter introduced, fluid drained/sterile dressing applied/ultrasound utilization

## 2019-04-11 NOTE — DISCHARGE NOTE NURSING/CASE MANAGEMENT/SOCIAL WORK - NSDCPEPT PROEDHF_GEN_ALL_CORE
Report signs and symptoms to primary care provider/Low salt diet/Monitor weight daily/Activities as tolerated/Call primary care provider for follow up after discharge

## 2019-04-11 NOTE — PROGRESS NOTE ADULT - REASON FOR ADMISSION
fever and weakness

## 2019-04-11 NOTE — DISCHARGE NOTE PROVIDER - HOSPITAL COURSE
[age]M pmh afib (coumadin), HTN, anemia, HLD, Diastolic CHF, BPH, DM, JIMÉNEZ, cirrhosis w/ varices p/w fever to 102F at home and generalized weakness and feeling of lousiness starting morning prior to presentation. As per patient, he even had trouble getting out of his chair and needed help from his wife. Of note, patient states he was recently discharged from the hospital for volume overload secondary to cirrhosis from JIMÉNEZ and had a paracentesis done with 3 liters removed. Since then he felt ok till today. Patient also states that when he was in the hospital, his roomate had pneumonia. Patient currently denies any respiratory symptoms such as shortness of breath, cough, or sputum production.         In ED- EKG was NSR at 59 BPM. Temp was 101.1 w/ HR of 90. UA, CXR, and CT Head was done, and was negative.         Patient was seen and evaluated by ID. no need for abx. underwent diagnostic paracentesis which did not show SBP. Patient underwent therapetic tap on 4/11 with 6.5 L removed. albumin given. bp stable. to be discharged with close outpatient follow up.

## 2019-04-11 NOTE — PROGRESS NOTE ADULT - SUBJECTIVE AND OBJECTIVE BOX
Patient was seen and examined. Spoke with RN. Chart reviewed.  paracentsis today  if feels well dc home,  discussed with ho,    No events overnight.  Vital Signs Last 24 Hrs  T(F): 97.6 (11 Apr 2019 05:22), Max: 98.1 (10 Apr 2019 14:14)  HR: 55 (11 Apr 2019 12:22) (53 - 63)  BP: 129/67 (11 Apr 2019 12:22) (96/53 - 129/67)  SpO2: 96% (11 Apr 2019 07:14) (95% - 96%)  MEDICATIONS  (STANDING):  atorvastatin 40 milliGRAM(s) Oral at bedtime  chlorhexidine 2% Cloths 1 Application(s) Topical <User Schedule>  dextrose 5%. 1000 milliLiter(s) (50 mL/Hr) IV Continuous <Continuous>  dextrose 50% Injectable 12.5 Gram(s) IV Push once  dextrose 50% Injectable 25 Gram(s) IV Push once  dextrose 50% Injectable 25 Gram(s) IV Push once  diazepam    Tablet 5 milliGRAM(s) Oral daily  ferrous    sulfate 325 milliGRAM(s) Oral daily  flecainide 50 milliGRAM(s) Oral every 12 hours  furosemide   Injectable 20 milliGRAM(s) IV Push once  insulin glargine Injectable (LANTUS) 21 Unit(s) SubCutaneous every morning  insulin lispro (HumaLOG) corrective regimen sliding scale   SubCutaneous three times a day before meals  insulin lispro Injectable (HumaLOG) 6 Unit(s) SubCutaneous three times a day before meals  omega-3-Acid Ethyl Esters 2 Gram(s) Oral two times a day  pantoprazole    Tablet 40 milliGRAM(s) Oral before breakfast  sotalol 80 milliGRAM(s) Oral two times a day  spironolactone 100 milliGRAM(s) Oral <User Schedule>  sucralfate 1 Gram(s) Oral every 6 hours  tamsulosin 0.4 milliGRAM(s) Oral at bedtime    MEDICATIONS  (PRN):  dextrose 40% Gel 15 Gram(s) Oral once PRN Blood Glucose LESS THAN 70 milliGRAM(s)/deciliter  glucagon  Injectable 1 milliGRAM(s) IntraMuscular once PRN Glucose LESS THAN 70 milligrams/deciliter    Labs:                        8.9    3.01  )-----------( 87       ( 11 Apr 2019 08:27 )             28.1                         8.4    3.29  )-----------( 75       ( 10 Apr 2019 07:45 )             26.2     11 Apr 2019 08:27    138    |  107    |  18     ----------------------------<  90     4.5     |  25     |  1.1    10 Apr 2019 07:45    139    |  107    |  20     ----------------------------<  90     4.6     |  24     |  1.2      Ca    8.2        11 Apr 2019 08:27  Ca    8.0        10 Apr 2019 07:45  Mg     1.7       11 Apr 2019 08:27  Mg     1.9       10 Apr 2019 07:45    TPro  6.2    /  Alb  2.2    /  TBili  1.7    /  DBili  x      /  AST  59     /  ALT  34     /  AlkPhos  149    11 Apr 2019 08:27  TPro  5.7    /  Alb  2.2    /  TBili  1.7    /  DBili  x      /  AST  46     /  ALT  27     /  AlkPhos  125    10 Apr 2019 07:45    PT/INR - ( 10 Apr 2019 07:45 )   PT: 17.70 sec;   INR: 1.55 ratio         PTT - ( 10 Apr 2019 07:45 )  PTT:39.9 sec      Culture - Fungal, Body Fluid (collected 10 Apr 2019 12:36)  Source: .Body Fluid Peritoneal Fluid  Preliminary Report (11 Apr 2019 09:34):    Testing in progress    Culture - Body Fluid with Gram Stain (collected 10 Apr 2019 12:36)  Source: .Body Fluid Peritoneal Fluid  Gram Stain (11 Apr 2019 02:13):    polymorphonuclear leukocytes seen    No organisms seen    by cytocentrifuge        Radiology:    General: comfortable, NAD  Neurology: A&Ox3, nonfocal  Head:  Normocephalic, atraumatic  ENT:  Mucosa moist, no ulcerations  Neck:  Supple, no JVD,  Resp: CTA B/L  CV: RRR, S1S2,   GI: Soft, NT, bowel sounds, distension,  MS: No edema, + peripheral pulses, FROM all 4 extremity

## 2019-04-12 LAB
CULTURE RESULTS: SIGNIFICANT CHANGE UP
CULTURE RESULTS: SIGNIFICANT CHANGE UP
SPECIMEN SOURCE: SIGNIFICANT CHANGE UP
SPECIMEN SOURCE: SIGNIFICANT CHANGE UP

## 2019-04-13 LAB
CULTURE RESULTS: SIGNIFICANT CHANGE UP
SPECIMEN SOURCE: SIGNIFICANT CHANGE UP

## 2019-04-14 LAB — ADENOSINE DEAMINASE, PERIT FLUID RESULT: 2.8 U/L — SIGNIFICANT CHANGE UP (ref 0–7.3)

## 2019-04-15 ENCOUNTER — INPATIENT (INPATIENT)
Facility: HOSPITAL | Age: 69
LOS: 0 days | Discharge: HOME | End: 2019-04-16
Attending: INTERNAL MEDICINE | Admitting: INTERNAL MEDICINE
Payer: MEDICARE

## 2019-04-15 ENCOUNTER — EMERGENCY (EMERGENCY)
Facility: HOSPITAL | Age: 69
LOS: 0 days | Discharge: HOME | End: 2019-04-15
Admitting: INTERNAL MEDICINE

## 2019-04-15 VITALS
SYSTOLIC BLOOD PRESSURE: 99 MMHG | OXYGEN SATURATION: 97 % | TEMPERATURE: 98 F | RESPIRATION RATE: 18 BRPM | DIASTOLIC BLOOD PRESSURE: 60 MMHG | HEART RATE: 76 BPM

## 2019-04-15 DIAGNOSIS — N17.9 ACUTE KIDNEY FAILURE, UNSPECIFIED: ICD-10-CM

## 2019-04-15 DIAGNOSIS — N40.0 BENIGN PROSTATIC HYPERPLASIA WITHOUT LOWER URINARY TRACT SYMPTOMS: ICD-10-CM

## 2019-04-15 DIAGNOSIS — R60.0 LOCALIZED EDEMA: ICD-10-CM

## 2019-04-15 DIAGNOSIS — K75.81 NONALCOHOLIC STEATOHEPATITIS (NASH): ICD-10-CM

## 2019-04-15 DIAGNOSIS — E11.9 TYPE 2 DIABETES MELLITUS WITHOUT COMPLICATIONS: ICD-10-CM

## 2019-04-15 DIAGNOSIS — E78.5 HYPERLIPIDEMIA, UNSPECIFIED: ICD-10-CM

## 2019-04-15 DIAGNOSIS — E66.01 MORBID (SEVERE) OBESITY DUE TO EXCESS CALORIES: ICD-10-CM

## 2019-04-15 DIAGNOSIS — Z96.653 PRESENCE OF ARTIFICIAL KNEE JOINT, BILATERAL: ICD-10-CM

## 2019-04-15 DIAGNOSIS — J10.1 INFLUENZA DUE TO OTHER IDENTIFIED INFLUENZA VIRUS WITH OTHER RESPIRATORY MANIFESTATIONS: ICD-10-CM

## 2019-04-15 DIAGNOSIS — I50.22 CHRONIC SYSTOLIC (CONGESTIVE) HEART FAILURE: ICD-10-CM

## 2019-04-15 DIAGNOSIS — K74.69 OTHER CIRRHOSIS OF LIVER: ICD-10-CM

## 2019-04-15 DIAGNOSIS — I11.0 HYPERTENSIVE HEART DISEASE WITH HEART FAILURE: ICD-10-CM

## 2019-04-15 DIAGNOSIS — Z95.9 PRESENCE OF CARDIAC AND VASCULAR IMPLANT AND GRAFT, UNSPECIFIED: Chronic | ICD-10-CM

## 2019-04-15 DIAGNOSIS — I50.9 HEART FAILURE, UNSPECIFIED: ICD-10-CM

## 2019-04-15 DIAGNOSIS — R53.1 WEAKNESS: ICD-10-CM

## 2019-04-15 DIAGNOSIS — R50.9 FEVER, UNSPECIFIED: ICD-10-CM

## 2019-04-15 DIAGNOSIS — Z95.5 PRESENCE OF CORONARY ANGIOPLASTY IMPLANT AND GRAFT: ICD-10-CM

## 2019-04-15 DIAGNOSIS — R18.8 OTHER ASCITES: ICD-10-CM

## 2019-04-15 DIAGNOSIS — Z96.653 PRESENCE OF ARTIFICIAL KNEE JOINT, BILATERAL: Chronic | ICD-10-CM

## 2019-04-15 DIAGNOSIS — I50.32 CHRONIC DIASTOLIC (CONGESTIVE) HEART FAILURE: ICD-10-CM

## 2019-04-15 DIAGNOSIS — Z79.01 LONG TERM (CURRENT) USE OF ANTICOAGULANTS: ICD-10-CM

## 2019-04-15 DIAGNOSIS — I10 ESSENTIAL (PRIMARY) HYPERTENSION: ICD-10-CM

## 2019-04-15 DIAGNOSIS — I48.91 UNSPECIFIED ATRIAL FIBRILLATION: ICD-10-CM

## 2019-04-15 DIAGNOSIS — E83.42 HYPOMAGNESEMIA: ICD-10-CM

## 2019-04-15 DIAGNOSIS — K21.9 GASTRO-ESOPHAGEAL REFLUX DISEASE WITHOUT ESOPHAGITIS: ICD-10-CM

## 2019-04-15 DIAGNOSIS — Z12.11 ENCOUNTER FOR SCREENING FOR MALIGNANT NEOPLASM OF COLON: Chronic | ICD-10-CM

## 2019-04-15 DIAGNOSIS — I25.10 ATHEROSCLEROTIC HEART DISEASE OF NATIVE CORONARY ARTERY WITHOUT ANGINA PECTORIS: ICD-10-CM

## 2019-04-15 DIAGNOSIS — D64.9 ANEMIA, UNSPECIFIED: ICD-10-CM

## 2019-04-15 DIAGNOSIS — H72.92 UNSPECIFIED PERFORATION OF TYMPANIC MEMBRANE, LEFT EAR: ICD-10-CM

## 2019-04-15 DIAGNOSIS — K74.60 UNSPECIFIED CIRRHOSIS OF LIVER: ICD-10-CM

## 2019-04-15 LAB
ALBUMIN SERPL ELPH-MCNC: 2.7 G/DL — LOW (ref 3.5–5.2)
ALP SERPL-CCNC: 146 U/L — HIGH (ref 30–115)
ALT FLD-CCNC: 43 U/L — HIGH (ref 0–41)
ANION GAP SERPL CALC-SCNC: 9 MMOL/L — SIGNIFICANT CHANGE UP (ref 7–14)
APTT BLD: 37.8 SEC — SIGNIFICANT CHANGE UP (ref 27–39.2)
AST SERPL-CCNC: 80 U/L — HIGH (ref 0–41)
BASE EXCESS BLDV CALC-SCNC: 0.2 MMOL/L — SIGNIFICANT CHANGE UP (ref -2–2)
BASOPHILS # BLD AUTO: 0.02 K/UL — SIGNIFICANT CHANGE UP (ref 0–0.2)
BASOPHILS NFR BLD AUTO: 0.6 % — SIGNIFICANT CHANGE UP (ref 0–1)
BILIRUB SERPL-MCNC: 2.2 MG/DL — HIGH (ref 0.2–1.2)
BUN SERPL-MCNC: 27 MG/DL — HIGH (ref 10–20)
CA-I SERPL-SCNC: 1.15 MMOL/L — SIGNIFICANT CHANGE UP (ref 1.12–1.3)
CALCIUM SERPL-MCNC: 8.3 MG/DL — LOW (ref 8.5–10.1)
CHLORIDE SERPL-SCNC: 105 MMOL/L — SIGNIFICANT CHANGE UP (ref 98–110)
CO2 SERPL-SCNC: 21 MMOL/L — SIGNIFICANT CHANGE UP (ref 17–32)
CREAT SERPL-MCNC: 1.7 MG/DL — HIGH (ref 0.7–1.5)
CULTURE RESULTS: SIGNIFICANT CHANGE UP
EOSINOPHIL # BLD AUTO: 0.06 K/UL — SIGNIFICANT CHANGE UP (ref 0–0.7)
EOSINOPHIL NFR BLD AUTO: 1.7 % — SIGNIFICANT CHANGE UP (ref 0–8)
FLU A RESULT: POSITIVE
FLU A RESULT: POSITIVE
FLUAV AG NPH QL: POSITIVE
FLUBV AG NPH QL: NEGATIVE — SIGNIFICANT CHANGE UP
GAS PNL BLDV: 137 MMOL/L — SIGNIFICANT CHANGE UP (ref 136–145)
GAS PNL BLDV: SIGNIFICANT CHANGE UP
GLUCOSE BLDC GLUCOMTR-MCNC: 90 MG/DL — SIGNIFICANT CHANGE UP (ref 70–99)
GLUCOSE SERPL-MCNC: 87 MG/DL — SIGNIFICANT CHANGE UP (ref 70–99)
HCO3 BLDV-SCNC: 24 MMOL/L — SIGNIFICANT CHANGE UP (ref 22–29)
HCT VFR BLD CALC: 27 % — LOW (ref 42–52)
HCT VFR BLDA CALC: 29.7 % — LOW (ref 34–44)
HGB BLD CALC-MCNC: 9.7 G/DL — LOW (ref 14–18)
HGB BLD-MCNC: 9 G/DL — LOW (ref 14–18)
IMM GRANULOCYTES NFR BLD AUTO: 0.6 % — HIGH (ref 0.1–0.3)
INR BLD: 1.8 RATIO — HIGH (ref 0.65–1.3)
LACTATE BLDV-MCNC: 2.1 MMOL/L — HIGH (ref 0.5–1.6)
LYMPHOCYTES # BLD AUTO: 0.28 K/UL — LOW (ref 1.2–3.4)
LYMPHOCYTES # BLD AUTO: 8 % — LOW (ref 20.5–51.1)
MAGNESIUM SERPL-MCNC: 1.5 MG/DL — LOW (ref 1.8–2.4)
MCHC RBC-ENTMCNC: 31.8 PG — HIGH (ref 27–31)
MCHC RBC-ENTMCNC: 33.3 G/DL — SIGNIFICANT CHANGE UP (ref 32–37)
MCV RBC AUTO: 95.4 FL — HIGH (ref 80–94)
MONOCYTES # BLD AUTO: 0.53 K/UL — SIGNIFICANT CHANGE UP (ref 0.1–0.6)
MONOCYTES NFR BLD AUTO: 15.1 % — HIGH (ref 1.7–9.3)
NEUTROPHILS # BLD AUTO: 2.61 K/UL — SIGNIFICANT CHANGE UP (ref 1.4–6.5)
NEUTROPHILS NFR BLD AUTO: 74 % — SIGNIFICANT CHANGE UP (ref 42.2–75.2)
NRBC # BLD: 0 /100 WBCS — SIGNIFICANT CHANGE UP (ref 0–0)
NT-PROBNP SERPL-SCNC: 647 PG/ML — HIGH (ref 0–300)
PCO2 BLDV: 34 MMHG — LOW (ref 41–51)
PH BLDV: 7.45 — HIGH (ref 7.26–7.43)
PLATELET # BLD AUTO: 99 K/UL — LOW (ref 130–400)
PO2 BLDV: 33 MMHG — SIGNIFICANT CHANGE UP (ref 20–40)
POTASSIUM BLDV-SCNC: 4.5 MMOL/L — SIGNIFICANT CHANGE UP (ref 3.3–5.6)
POTASSIUM SERPL-MCNC: 4.7 MMOL/L — SIGNIFICANT CHANGE UP (ref 3.5–5)
POTASSIUM SERPL-SCNC: 4.7 MMOL/L — SIGNIFICANT CHANGE UP (ref 3.5–5)
PROT SERPL-MCNC: 6.2 G/DL — SIGNIFICANT CHANGE UP (ref 6–8)
PROTHROM AB SERPL-ACNC: 20.6 SEC — HIGH (ref 9.95–12.87)
RBC # BLD: 2.83 M/UL — LOW (ref 4.7–6.1)
RBC # FLD: 15.4 % — HIGH (ref 11.5–14.5)
RSV RESULT: NEGATIVE — SIGNIFICANT CHANGE UP
RSV RNA RESP QL NAA+PROBE: NEGATIVE — SIGNIFICANT CHANGE UP
SAO2 % BLDV: 62 % — SIGNIFICANT CHANGE UP
SODIUM SERPL-SCNC: 135 MMOL/L — SIGNIFICANT CHANGE UP (ref 135–146)
SPECIMEN SOURCE: SIGNIFICANT CHANGE UP
TROPONIN T SERPL-MCNC: <0.01 NG/ML — SIGNIFICANT CHANGE UP
WBC # BLD: 3.52 K/UL — LOW (ref 4.8–10.8)
WBC # FLD AUTO: 3.52 K/UL — LOW (ref 4.8–10.8)

## 2019-04-15 PROCEDURE — 93010 ELECTROCARDIOGRAM REPORT: CPT

## 2019-04-15 PROCEDURE — 99285 EMERGENCY DEPT VISIT HI MDM: CPT | Mod: GC

## 2019-04-15 PROCEDURE — 71045 X-RAY EXAM CHEST 1 VIEW: CPT | Mod: 26

## 2019-04-15 RX ORDER — HEPARIN SODIUM 5000 [USP'U]/ML
5000 INJECTION INTRAVENOUS; SUBCUTANEOUS EVERY 8 HOURS
Qty: 0 | Refills: 0 | Status: DISCONTINUED | OUTPATIENT
Start: 2019-04-15 | End: 2019-04-15

## 2019-04-15 RX ORDER — SUCRALFATE 1 G
1 TABLET ORAL
Qty: 0 | Refills: 0 | Status: DISCONTINUED | OUTPATIENT
Start: 2019-04-15 | End: 2019-04-16

## 2019-04-15 RX ORDER — FERROUS SULFATE 325(65) MG
325 TABLET ORAL DAILY
Qty: 0 | Refills: 0 | Status: DISCONTINUED | OUTPATIENT
Start: 2019-04-15 | End: 2019-04-16

## 2019-04-15 RX ORDER — DIAZEPAM 5 MG
5 TABLET ORAL DAILY
Qty: 0 | Refills: 0 | Status: DISCONTINUED | OUTPATIENT
Start: 2019-04-15 | End: 2019-04-16

## 2019-04-15 RX ORDER — FUROSEMIDE 40 MG
40 TABLET ORAL DAILY
Qty: 0 | Refills: 0 | Status: DISCONTINUED | OUTPATIENT
Start: 2019-04-15 | End: 2019-04-15

## 2019-04-15 RX ORDER — KETOROLAC TROMETHAMINE 30 MG/ML
15 SYRINGE (ML) INJECTION ONCE
Qty: 0 | Refills: 0 | Status: DISCONTINUED | OUTPATIENT
Start: 2019-04-15 | End: 2019-04-15

## 2019-04-15 RX ORDER — ATORVASTATIN CALCIUM 80 MG/1
40 TABLET, FILM COATED ORAL AT BEDTIME
Qty: 0 | Refills: 0 | Status: DISCONTINUED | OUTPATIENT
Start: 2019-04-15 | End: 2019-04-16

## 2019-04-15 RX ORDER — FLECAINIDE ACETATE 50 MG
50 TABLET ORAL EVERY 12 HOURS
Qty: 0 | Refills: 0 | Status: DISCONTINUED | OUTPATIENT
Start: 2019-04-15 | End: 2019-04-16

## 2019-04-15 RX ORDER — TAMSULOSIN HYDROCHLORIDE 0.4 MG/1
0.4 CAPSULE ORAL AT BEDTIME
Qty: 0 | Refills: 0 | Status: DISCONTINUED | OUTPATIENT
Start: 2019-04-15 | End: 2019-04-16

## 2019-04-15 RX ORDER — PANTOPRAZOLE SODIUM 20 MG/1
40 TABLET, DELAYED RELEASE ORAL
Qty: 0 | Refills: 0 | Status: DISCONTINUED | OUTPATIENT
Start: 2019-04-15 | End: 2019-04-16

## 2019-04-15 RX ORDER — SPIRONOLACTONE 25 MG/1
100 TABLET, FILM COATED ORAL DAILY
Qty: 0 | Refills: 0 | Status: DISCONTINUED | OUTPATIENT
Start: 2019-04-16 | End: 2019-04-16

## 2019-04-15 RX ORDER — MAGNESIUM SULFATE 500 MG/ML
2 VIAL (ML) INJECTION ONCE
Qty: 0 | Refills: 0 | Status: COMPLETED | OUTPATIENT
Start: 2019-04-15 | End: 2019-04-15

## 2019-04-15 RX ORDER — SOTALOL HCL 120 MG
80 TABLET ORAL
Qty: 0 | Refills: 0 | Status: DISCONTINUED | OUTPATIENT
Start: 2019-04-15 | End: 2019-04-16

## 2019-04-15 RX ORDER — ACETAMINOPHEN 500 MG
650 TABLET ORAL EVERY 4 HOURS
Qty: 0 | Refills: 0 | Status: DISCONTINUED | OUTPATIENT
Start: 2019-04-15 | End: 2019-04-16

## 2019-04-15 RX ORDER — CHLORHEXIDINE GLUCONATE 213 G/1000ML
1 SOLUTION TOPICAL
Qty: 0 | Refills: 0 | Status: DISCONTINUED | OUTPATIENT
Start: 2019-04-15 | End: 2019-04-16

## 2019-04-15 RX ADMIN — Medication 75 MILLIGRAM(S): at 19:07

## 2019-04-15 RX ADMIN — Medication 50 GRAM(S): at 03:45

## 2019-04-15 RX ADMIN — Medication 2 GRAM(S): at 04:39

## 2019-04-15 RX ADMIN — Medication 325 MILLIGRAM(S): at 15:24

## 2019-04-15 RX ADMIN — Medication 50 MILLIGRAM(S): at 19:07

## 2019-04-15 RX ADMIN — Medication 75 MILLIGRAM(S): at 05:14

## 2019-04-15 RX ADMIN — Medication 15 MILLIGRAM(S): at 05:13

## 2019-04-15 RX ADMIN — Medication 1 GRAM(S): at 15:23

## 2019-04-15 RX ADMIN — ATORVASTATIN CALCIUM 40 MILLIGRAM(S): 80 TABLET, FILM COATED ORAL at 22:03

## 2019-04-15 RX ADMIN — Medication 80 MILLIGRAM(S): at 19:07

## 2019-04-15 RX ADMIN — Medication 1 GRAM(S): at 19:07

## 2019-04-15 RX ADMIN — Medication 650 MILLIGRAM(S): at 17:18

## 2019-04-15 RX ADMIN — TAMSULOSIN HYDROCHLORIDE 0.4 MILLIGRAM(S): 0.4 CAPSULE ORAL at 22:01

## 2019-04-15 NOTE — H&P ADULT - ATTENDING COMMENTS
pt seen and examined in ed, family at bedside, I have read and agree with above exam and poa,    pt recently dced,   now readmitted feveruri,cough    influenza A  chr syst chf  viktoriya  ty    hold lasix  tamiflu  continu spironolactone  fu lytes  coninue coumadin  oob  anticipate dc in am

## 2019-04-15 NOTE — ED PROVIDER NOTE - CLINICAL SUMMARY MEDICAL DECISION MAKING FREE TEXT BOX
Pt with pmhx as noted in ER with c/o 1 day h/o fever, cough, SOB, generalized weakness.  cultures sent.  cxr neg.  nasal swab + for influenza A.  pt given tamiflu, mag replaced.  pt admitted for continued evaluation and management.

## 2019-04-15 NOTE — ED PROVIDER NOTE - ATTENDING CONTRIBUTION TO CARE
69 y/o male with h/o htn, chf, JIMÉNEZ/liver cirrhosis, anemia, recent sepsis admission with paracentesis, in ER with c/o 1 day h/o not feeling well.  + fever, + cough, + generalized weakness. + sob.  No cp.  no abd pain, no n/v/d.  + chronic LE edema.  PE - nad, nc/at, eomi, perrl, op - clear, neck supple, no resp distress, b/l rhonchi, rrr, abd -soft, nt,  + fluid wave, + b/l le edema (chronic per pt), A&O x 3, no focal neuro deficits.  -check labs, cultures, cxr

## 2019-04-15 NOTE — H&P ADULT - HISTORY OF PRESENT ILLNESS
69 yo M with PMH of AFIB on Coumadin, HTN, HFpEF anemia, hyperlipidemia, CAD, BPH, DM, cirrhosis due to JIMÉNEZ with varices p/w chief complaint of weakness cough(non-productive), SOB and fevers (TMax 101F) x 1 day. patient denies any chest nausea, vomiting, diarrhea, dysuria, chest pain, palpitations, focal weakness.      interval history  patient recently admitted  4/7-4/11 with cc of weakness and fevers.  had a therapeutic paracentesis done (6.5L removed 4/11). seen by ID and was determined that he did not need antibiotics at that time.

## 2019-04-15 NOTE — ED PROVIDER NOTE - NS ED ROS FT
Constitutional:  See HPI.   Eyes:  No visual changes, eye pain or discharge.  ENMT:  No hearing changes, pain, discharge or infections. No neck pain or stiffness.  Cardiac:  No chest pain. No chest pain with exertion.  Respiratory:  No  respiratory distress. No hemoptysis.  GI:  No nausea, vomiting, diarrhea, abdominal pain.  :  No dysuria, frequency, hematuria  MS:  No joint pain or back pain.  Neuro:  No LOC. No headache    Skin:  No skin rash.  Except as in HPI, all other review of systems is negative

## 2019-04-15 NOTE — ED ADULT NURSE NOTE - OBJECTIVE STATEMENT
md HPI =  Objective Statement: 69yo M history HTN DL DM anemia CHF JIMÉNEZ cirrhosis varices recently dc-d 4/11 for sepsis, had paracentesis removing 6L fluids, presenting with cough, shortness of breath, generalized weakness since yesterday getting progressively worse to today- difficulty getting out of bed 2/2 weakness. Tm 101 2hrs ago, didn't take anything PTA. No chest pain, nausea/vomiting/diarrhea, abdominal pain, worsening LE swelling, numbness/focal weakness, back pain. Cough non productive. +KIM, no orthopnea.

## 2019-04-15 NOTE — H&P ADULT - NSHPLABSRESULTS_GEN_ALL_CORE
T(F): 98.6 (04-15-19 @ 10:49), Max: 100.4 (04-15-19 @ 07:23)  HR: 60 (04-15-19 @ 07:23) (60 - 76)  BP: 108/54 (04-15-19 @ 07:27) (62/43 - 114/53)  BP(mean): --  RR: 18 (04-15-19 @ 07:23) (18 - 18)  SpO2: 97% (04-15-19 @ 07:23) (97% - 98%)    04-15    135  |  105  |  27<H>  ----------------------------<  87  4.7   |  21  |  1.7<H>    Ca    8.3<L>      15 Apr 2019 02:40  Mg     1.5     04-15    TPro  6.2  /  Alb  2.7<L>  /  TBili  2.2<H>  /  DBili  x   /  AST  80<H>  /  ALT  43<H>  /  AlkPhos  146<H>  04-15                            9.0    3.52  )-----------( 99       ( 15 Apr 2019 02:40 )             27.0         Culture - Body Fluid with Gram Stain (collected 04-10-19 @ 12:36)  Source: .Body Fluid Peritoneal Fluid  Gram Stain (04-11-19 @ 02:13):    polymorphonuclear leukocytes seen    No organisms seen    by cytocentrifuge  Preliminary Report (04-11-19 @ 18:13):    No growth    Culture - Acid Fast - Body Fluid w/Smear (collected 04-10-19 @ 12:36)  Source: .Body Fluid Peritoneal Fluid  Preliminary Report (04-13-19 @ 15:04):    Culture is being performed.    Culture - Blood (collected 04-08-19 @ 09:17)  Source: .Blood None  Final Report (04-13-19 @ 19:01):    No growth at 5 days.    Culture - Urine (collected 04-07-19 @ 13:15)  Source: .Urine Clean Catch (Midstream)  Final Report (04-08-19 @ 15:29):    <10,000 CFU/mL Normal Urogenital Mimi    Culture - Blood (collected 04-07-19 @ 13:15)  Source: .Blood Blood-Peripheral  Final Report (04-12-19 @ 20:00):    No growth at 5 days.    Culture - Blood (collected 04-07-19 @ 13:15)  Source: .Blood Blood-Peripheral  Final Report (04-12-19 @ 20:00):    No growth at 5 days.    Culture - Body Fluid with Gram Stain (collected 04-01-19 @ 14:00)  Source: .Body Fluid Peritoneal Fluid  Gram Stain (04-02-19 @ 06:10):    No polymorphonuclear cells seen    No organisms seen    by cytocentrifuge  Final Report (04-07-19 @ 08:42):    No growth at 5 days    Culture - Acid Fast - Body Fluid w/Smear (collected 04-01-19 @ 14:00)  Source: .Body Fluid Peritoneal Fluid  Preliminary Report (04-10-19 @ 15:04):    No growth at 1 week.        PT/INR - ( 15 Apr 2019 02:40 )   PT: 20.60 sec;   INR: 1.80 ratio         PTT - ( 15 Apr 2019 02:40 )  PTT:37.8 sec    Lactate Trend      CARDIAC MARKERS ( 15 Apr 2019 02:40 )  x     / <0.01 ng/mL / x     / x     / x      FLU A B RSV Detection by PCR (04.15.19 @ 02:40)    Flu A Result: Positive: Negative results do not preclude influenza infection and  should not be used as the sole basis for treatment or  other patient management decisions.  A positive result may occur in the absence of viable virus.  By: [employer] Flu viral assay by Reverse Transcriptase  Polymerase Chain Reaction (RT-PCR).    Flu B Result: Negative    RSV Result: Negative

## 2019-04-15 NOTE — ED PROVIDER NOTE - CARE PLAN
Principal Discharge DX:	Influenza A  Secondary Diagnosis:	Weakness  Secondary Diagnosis:	CHESTER (acute kidney injury)

## 2019-04-15 NOTE — H&P ADULT - ASSESSMENT
#influenza A  tamiflu    #viktoriya  urine studies  hold lasix for today  monitor Scr    #atrial fibrillation  -monitor INR (1.8 today)  -c/w coumadin  -c/w sotalol, flecainide    #cirrhosis/htn  c/w spironolactone, hold lasix for today,     #BPH-flomax    #DM  monitor FS    #DLD  atorvastatin    #GI ppx    #hypomagnesemia-replete, monitored    #dispo patient requesting to go home today

## 2019-04-15 NOTE — ED PROVIDER NOTE - PHYSICAL EXAMINATION
Constitutional: mildly jaundiced, tachypneic   Eyes: PERRLA. Extraocular movements intact, no entrapment. Conjunctiva normal.   ENT: No nasal discharge. dry mucus membranes.  Neck: Supple, non tender, full range of motion.  CV: RRR no murmurs, rubs, or gallops. +S1S2.   Pulm: diminished breath sounds b/l bases otherwise Clear to auscultation bilaterally.    Abd: soft NT ND +BS. +fluid wave  Ext: Warm and well perfused x4, moving all extremities, 2+ pitting edema b/l. No overlying skin changes  Psy: Cooperative, appropriate.   Skin: Warm, dry, no rash  Neuro: CN2-12 grossly intact no sensory or motor deficits throughout,

## 2019-04-15 NOTE — PATIENT PROFILE ADULT - NSPROHMSYMPCOND_GEN_A_NUR
BPH (benign prostatic hyperplasia)  Dyspnea on exertion  Cirrhosis of liver  Afib  Diabetes  Anemia  High cholesterol  HTN (hypertension)

## 2019-04-16 ENCOUNTER — TRANSCRIPTION ENCOUNTER (OUTPATIENT)
Age: 69
End: 2019-04-16

## 2019-04-16 VITALS
SYSTOLIC BLOOD PRESSURE: 111 MMHG | TEMPERATURE: 99 F | HEART RATE: 45 BPM | RESPIRATION RATE: 20 BRPM | DIASTOLIC BLOOD PRESSURE: 61 MMHG

## 2019-04-16 LAB
ALBUMIN SERPL ELPH-MCNC: 2.5 G/DL — LOW (ref 3.5–5.2)
ALP SERPL-CCNC: 124 U/L — HIGH (ref 30–115)
ALT FLD-CCNC: 50 U/L — HIGH (ref 0–41)
ANION GAP SERPL CALC-SCNC: 11 MMOL/L — SIGNIFICANT CHANGE UP (ref 7–14)
APTT BLD: 36.6 SEC — SIGNIFICANT CHANGE UP (ref 27–39.2)
AST SERPL-CCNC: 104 U/L — HIGH (ref 0–41)
BASOPHILS # BLD AUTO: 0.03 K/UL — SIGNIFICANT CHANGE UP (ref 0–0.2)
BASOPHILS NFR BLD AUTO: 1.2 % — HIGH (ref 0–1)
BILIRUB SERPL-MCNC: 2 MG/DL — HIGH (ref 0.2–1.2)
BUN SERPL-MCNC: 31 MG/DL — HIGH (ref 10–20)
CALCIUM SERPL-MCNC: 8.1 MG/DL — LOW (ref 8.5–10.1)
CHLORIDE SERPL-SCNC: 105 MMOL/L — SIGNIFICANT CHANGE UP (ref 98–110)
CO2 SERPL-SCNC: 22 MMOL/L — SIGNIFICANT CHANGE UP (ref 17–32)
CREAT SERPL-MCNC: 1.7 MG/DL — HIGH (ref 0.7–1.5)
EOSINOPHIL # BLD AUTO: 0.11 K/UL — SIGNIFICANT CHANGE UP (ref 0–0.7)
EOSINOPHIL NFR BLD AUTO: 4.3 % — SIGNIFICANT CHANGE UP (ref 0–8)
GLUCOSE SERPL-MCNC: 103 MG/DL — HIGH (ref 70–99)
HCT VFR BLD CALC: 27.6 % — LOW (ref 42–52)
HGB BLD-MCNC: 8.9 G/DL — LOW (ref 14–18)
IMM GRANULOCYTES NFR BLD AUTO: 0.8 % — HIGH (ref 0.1–0.3)
INR BLD: 1.92 RATIO — HIGH (ref 0.65–1.3)
LYMPHOCYTES # BLD AUTO: 0.42 K/UL — LOW (ref 1.2–3.4)
LYMPHOCYTES # BLD AUTO: 16.4 % — LOW (ref 20.5–51.1)
MAGNESIUM SERPL-MCNC: 1.9 MG/DL — SIGNIFICANT CHANGE UP (ref 1.8–2.4)
MCHC RBC-ENTMCNC: 31.7 PG — HIGH (ref 27–31)
MCHC RBC-ENTMCNC: 32.2 G/DL — SIGNIFICANT CHANGE UP (ref 32–37)
MCV RBC AUTO: 98.2 FL — HIGH (ref 80–94)
MONOCYTES # BLD AUTO: 0.49 K/UL — SIGNIFICANT CHANGE UP (ref 0.1–0.6)
MONOCYTES NFR BLD AUTO: 19.1 % — HIGH (ref 1.7–9.3)
NEUTROPHILS # BLD AUTO: 1.49 K/UL — SIGNIFICANT CHANGE UP (ref 1.4–6.5)
NEUTROPHILS NFR BLD AUTO: 58.2 % — SIGNIFICANT CHANGE UP (ref 42.2–75.2)
NRBC # BLD: 0 /100 WBCS — SIGNIFICANT CHANGE UP (ref 0–0)
PLATELET # BLD AUTO: 82 K/UL — LOW (ref 130–400)
POTASSIUM SERPL-MCNC: 4.5 MMOL/L — SIGNIFICANT CHANGE UP (ref 3.5–5)
POTASSIUM SERPL-SCNC: 4.5 MMOL/L — SIGNIFICANT CHANGE UP (ref 3.5–5)
PROT SERPL-MCNC: 6.1 G/DL — SIGNIFICANT CHANGE UP (ref 6–8)
PROTHROM AB SERPL-ACNC: 21.9 SEC — HIGH (ref 9.95–12.87)
RBC # BLD: 2.81 M/UL — LOW (ref 4.7–6.1)
RBC # FLD: 15.8 % — HIGH (ref 11.5–14.5)
SODIUM SERPL-SCNC: 138 MMOL/L — SIGNIFICANT CHANGE UP (ref 135–146)
WBC # BLD: 2.56 K/UL — LOW (ref 4.8–10.8)
WBC # FLD AUTO: 2.56 K/UL — LOW (ref 4.8–10.8)

## 2019-04-16 RX ORDER — WARFARIN SODIUM 2.5 MG/1
5 TABLET ORAL ONCE
Qty: 0 | Refills: 0 | Status: DISCONTINUED | OUTPATIENT
Start: 2019-04-16 | End: 2019-04-16

## 2019-04-16 RX ADMIN — Medication 325 MILLIGRAM(S): at 11:32

## 2019-04-16 RX ADMIN — CHLORHEXIDINE GLUCONATE 1 APPLICATION(S): 213 SOLUTION TOPICAL at 06:07

## 2019-04-16 RX ADMIN — Medication 75 MILLIGRAM(S): at 06:10

## 2019-04-16 RX ADMIN — Medication 1 GRAM(S): at 06:11

## 2019-04-16 RX ADMIN — PANTOPRAZOLE SODIUM 40 MILLIGRAM(S): 20 TABLET, DELAYED RELEASE ORAL at 06:12

## 2019-04-16 RX ADMIN — SPIRONOLACTONE 100 MILLIGRAM(S): 25 TABLET, FILM COATED ORAL at 06:12

## 2019-04-16 RX ADMIN — Medication 80 MILLIGRAM(S): at 06:09

## 2019-04-16 RX ADMIN — Medication 1 GRAM(S): at 11:32

## 2019-04-16 RX ADMIN — Medication 50 MILLIGRAM(S): at 06:07

## 2019-04-16 NOTE — DISCHARGE NOTE NURSING/CASE MANAGEMENT/SOCIAL WORK - NSDCDPATPORTLINK_GEN_ALL_CORE
You can access the [facility] Patient Portal, offered by [facility], by registering with the following website: [email or web address]

## 2019-04-16 NOTE — DISCHARGE NOTE PROVIDER - CARE PROVIDER_API CALL
Meño Stover (DO)  Infectious Disease; Internal Medicine  [address] [place] [address]  Phone: [phone]  Fax: [phone]  Follow Up Time:     Holland Sutherland)  Gastroenterology  [address] [place] [address]  Phone: [phone]  Fax: [phone]  Follow Up Time:

## 2019-04-16 NOTE — DISCHARGE NOTE PROVIDER - NSDCFUADDINST_GEN_ALL_CORE_FT
Please hold lasix and restart after getting BMP checked on 4/18/2019 at Dr Stover's office  Please follow up with Dr Stover on 4/18/2019   Please follow up with Dr talley for cirrhosis follow up  Please complete the course of tamiflu

## 2019-04-16 NOTE — DISCHARGE NOTE PROVIDER - NSDCCPCAREPLAN_GEN_ALL_CORE_FT
PRINCIPAL DISCHARGE DIAGNOSIS  Diagnosis: Influenza A  Assessment and Plan of Treatment: -c/w tamiflu as prescribed      SECONDARY DISCHARGE DIAGNOSES  Diagnosis: Diabetes mellitus  Assessment and Plan of Treatment: -c/w medications as prescribde    Diagnosis: Dyslipidemia  Assessment and Plan of Treatment: -c/w statin    Diagnosis: BPH (benign prostatic hyperplasia)  Assessment and Plan of Treatment: -c/w finasteride and flomax    Diagnosis: Cirrhosis  Assessment and Plan of Treatment: -2/2 JIMÉNEZ   -outpt follow up with Dr Kaur    Diagnosis: Atrial fibrillation  Assessment and Plan of Treatment: -c/w coumadin, sotalol and flecainide  -regular INr checks with PMD    Diagnosis: CHESTER (acute kidney injury)  Assessment and Plan of Treatment: -Cr 1.7 at dsicharge  -lasix on hold  -As per attending, continue holding lasix, get BMP rechecked within 3-5 days and restart lasix as per PMD recommendations    Diagnosis: Weakness  Assessment and Plan of Treatment: -likely 2/2 flu  -c/w tamiflu  -Encourage ambulation

## 2019-04-16 NOTE — PROGRESS NOTE ADULT - SUBJECTIVE AND OBJECTIVE BOX
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                                            -----------------------------------------------------------  ALLERGIES:  No Known Allergies                                            ------------------------------------------------------------  MEDICATIONS:  STANDING MEDICATIONS  atorvastatin 40 milliGRAM(s) Oral at bedtime  chlorhexidine 4% Liquid 1 Application(s) Topical <User Schedule>  diazepam    Tablet 5 milliGRAM(s) Oral daily  ferrous    sulfate 325 milliGRAM(s) Oral daily  flecainide 50 milliGRAM(s) Oral every 12 hours  oseltamivir 75 milliGRAM(s) Oral two times a day  pantoprazole    Tablet 40 milliGRAM(s) Oral before breakfast  sotalol  Oral Tab/Cap - Peds 80 milliGRAM(s) Oral two times a day  spironolactone 100 milliGRAM(s) Oral daily  sucralfate 1 Gram(s) Oral four times a day  tamsulosin 0.4 milliGRAM(s) Oral at bedtime    PRN MEDICATIONS  acetaminophen   Tablet .. 650 milliGRAM(s) Oral every 4 hours PRN                                            ------------------------------------------------------------  VITAL SIGNS: Last 24 Hours  T(C): 36 (16 Apr 2019 05:21), Max: 38.4 (15 Apr 2019 17:57)  T(F): 96.8 (16 Apr 2019 05:21), Max: 101.1 (15 Apr 2019 17:57)  HR: 48 (16 Apr 2019 05:21) (48 - 62)  BP: 102/52 (16 Apr 2019 05:21) (93/53 - 107/53)  BP(mean): --  RR: 18 (16 Apr 2019 05:21) (18 - 18)  SpO2: 96% (15 Apr 2019 16:21) (96% - 96%)      04-15-19 @ 07:01  -  04-16-19 @ 07:00  --------------------------------------------------------  IN: 360 mL / OUT: 0 mL / NET: 360 mL                                             --------------------------------------------------------------  LABS:                        8.9    2.56  )-----------( 82       ( 16 Apr 2019 09:33 )             27.6     04-16    138  |  105  |  31<H>  ----------------------------<  103<H>  4.5   |  22  |  1.7<H>    Ca    8.1<L>      16 Apr 2019 09:33  Mg     1.9     04-16    TPro  6.1  /  Alb  2.5<L>  /  TBili  2.0<H>  /  DBili  x   /  AST  104<H>  /  ALT  50<H>  /  AlkPhos  124<H>  04-16    PT/INR - ( 16 Apr 2019 09:33 )   PT: 21.90 sec;   INR: 1.92 ratio         PTT - ( 16 Apr 2019 09:33 )  PTT:36.6 sec              CARDIAC MARKERS ( 15 Apr 2019 02:40 )  x     / <0.01 ng/mL / x     / x     / x                                                  -------------------------------------------------------------  RADIOLOGY:                                            --------------------------------------------------------------    PHYSICAL EXAM:    GENERAL: NAD, well-developed, AAOx3  HEENT:  Atraumatic, Normocephalic. EOMI, PERRLA, conjunctiva and sclera clear, No JVD  PULMONARY: Clear to auscultation bilaterally; No wheeze  CARDIOVASCULAR: Regular rate and rhythm; No murmurs, rubs, or gallops  GASTROINTESTINAL: Soft, Nontender, Nondistended; Bowel sounds present  MUSCULOSKELETAL:  2+ Peripheral Pulses, No clubbing, cyanosis, or edema  NEUROLOGY: non-focal  SKIN: No rashes or lesions                                           --------------------------------------------------------------    ASSESSMENT & PLAN    Past medical history and hospital course                                                                                                           ----------------------------------------------------  # DVT prophylaxis     # GI prophylaxis     # Diet     # Activity     # Code status     # Disposition                                                                              -------------------------------------------------------- SOCORRO MANUEL 68y Male  MRN#: [id number]   CODE STATUS: FULL      SUBJECTIVE  Overnight events - None    Subjective complaints - Pt was feeling cold this AM. No other complaints. Feels better and wanted to go home today.     OBJECTIVE  PAST MEDICAL & SURGICAL HISTORY  BPH (benign prostatic hyperplasia)  Dyspnea on exertion  Cirrhosis of liver  Afib  Diabetes  Anemia  High cholesterol  HTN (hypertension)  Encounter for screening colonoscopy: 1 year ago  S/P angioplasty with stent: 2 cardiac stents  Presence of bilateral total knee joint prostheses                                            -----------------------------------------------------------  ALLERGIES:  No Known Allergies                                            ------------------------------------------------------------  MEDICATIONS:  STANDING MEDICATIONS  atorvastatin 40 milliGRAM(s) Oral at bedtime  chlorhexidine 4% Liquid 1 Application(s) Topical <User Schedule>  diazepam    Tablet 5 milliGRAM(s) Oral daily  ferrous    sulfate 325 milliGRAM(s) Oral daily  flecainide 50 milliGRAM(s) Oral every 12 hours  oseltamivir 75 milliGRAM(s) Oral two times a day  pantoprazole    Tablet 40 milliGRAM(s) Oral before breakfast  sotalol  Oral Tab/Cap - Peds 80 milliGRAM(s) Oral two times a day  spironolactone 100 milliGRAM(s) Oral daily  sucralfate 1 Gram(s) Oral four times a day  tamsulosin 0.4 milliGRAM(s) Oral at bedtime    PRN MEDICATIONS  acetaminophen   Tablet .. 650 milliGRAM(s) Oral every 4 hours PRN                                            ------------------------------------------------------------  VITAL SIGNS: Last 24 Hours  T(C): 36 (16 Apr 2019 05:21), Max: 38.4 (15 Apr 2019 17:57)  T(F): 96.8 (16 Apr 2019 05:21), Max: 101.1 (15 Apr 2019 17:57)  HR: 48 (16 Apr 2019 05:21) (48 - 62)  BP: 102/52 (16 Apr 2019 05:21) (93/53 - 107/53)  RR: 18 (16 Apr 2019 05:21) (18 - 18)  SpO2: 96% (15 Apr 2019 16:21) (96% - 96%)      04-15-19 @ 07:01  -  04-16-19 @ 07:00  --------------------------------------------------------  IN: 360 mL / OUT: 0 mL / NET: 360 mL                                             --------------------------------------------------------------  LABS:                        8.9    2.56  )-----------( 82       ( 16 Apr 2019 09:33 )             27.6     04-16    138  |  105  |  31<H>  ----------------------------<  103<H>  4.5   |  22  |  1.7<H>    Ca    8.1<L>      16 Apr 2019 09:33  Mg     1.9     04-16    TPro  6.1  /  Alb  2.5<L>  /  TBili  2.0<H>  /  DBili  x   /  AST  104<H>  /  ALT  50<H>  /  AlkPhos  124<H>  04-16    PT/INR - ( 16 Apr 2019 09:33 )   PT: 21.90 sec;   INR: 1.92 ratio         PTT - ( 16 Apr 2019 09:33 )  PTT:36.6 sec      CARDIAC MARKERS ( 15 Apr 2019 02:40 )  x     / <0.01 ng/mL / x     / x     / x                                                  -------------------------------------------------------------  RADIOLOGY:  < from: Xray Chest 1 View-PORTABLE IMMEDIATE (04.15.19 @ 03:27) >  Impression:      No radiographic evidence of acute cardiopulmonary disease.                                              --------------------------------------------------------------    PHYSICAL EXAM:    GENERAL: NAD, well-developed, AAOx3  HEENT:  Atraumatic, Normocephalic. EOMI, PERRLA, conjunctiva and sclera clear, No JVD  PULMONARY: Clear to auscultation bilaterally; No wheeze  CARDIOVASCULAR: Irregular rate and rhythm; No murmurs, rubs, or gallops  GASTROINTESTINAL: Soft, Nontender, Nondistended; Bowel sounds present  MUSCULOSKELETAL:  2+ Peripheral Pulses, Mild edema + b/l LE  NEUROLOGY: non-focal  SKIN: No rashes or lesions                                           --------------------------------------------------------------    ASSESSMENT & PLAN    67 yo M with PMH of Afib, DM, cirrhosis 2/2 JIMÉNEZ w/ varices s/p paracentesis 4/11, BPH, HTN, DLD admitted for weakness, fever and cough. Found to be Flu A positive.     #Influenza A  -c/w Tamiflu 75mg bid    #CHESTER  -Cr 1.7<--1.7<--1.0; Likely prerenal  -continue holding lasix; Will restart at discharge; Urine studies if no improvement    #Atrial fibrillation  -c/w coumadin; Monitor INR; 1.92 today  -c/w sotalol, flecainide    #Cirrhosis sec to JIMÉNEZ  -c/w spironolactone; Hold Lasix for today in the light of CHESTER  -Therapeutic paracentesis done on 4/11  -Outpt follow up with Dr Kaur    #BPH -c/w Flomax  #DM- monitor FS Ac/HS; insulin protocol prn  #DLD- C/w Atorvastatin  #Hypomagnesemia- resolved  #GERD- c/w carafate and PPI    #DVT ppx- coumadin  #GI ppx- PPI  #Diet- DASH carb consistent  #Activity- AAT  #Code- FULL  #Disposition- Possible d/c today

## 2019-04-16 NOTE — DISCHARGE NOTE PROVIDER - HOSPITAL COURSE
67 yo M with PMH of Afib, DM, cirrhosis 2/2 JIMÉNEZ w/ varices s/p paracentesis 4/11, BPH, HTN, DLD admitted for weakness, fever and cough. Found to be Flu A positive.         #Influenza A    -c/w Tamiflu 75mg bid        #CHESTER    -Cr 1.7<--1.7<--1.0; Likely prerenal    -continue holding lasix; Will restart at discharge; Urine studies if no improvement        #Atrial fibrillation    -c/w coumadin; Monitor INR; 1.92 today    -c/w sotalol, flecainide        #Cirrhosis sec to JIMÉNEZ    -c/w spironolactone; Hold Lasix for today in the light of CHESTER    -Therapeutic paracentesis done on 4/11    -Outpt follow up with Dr Kaur        #BPH -c/w Flomax    #DM- monitor FS Ac/HS; insulin protocol prn    #DLD- C/w Atorvastatin    #Hypomagnesemia- resolved    #GERD- c/w carafate and PPI 67 yo M with PMH of Afib, DM, cirrhosis 2/2 JIMÉNEZ w/ varices s/p paracentesis 4/11, BPH, HTN, DLD admitted for weakness, fever and cough. Found to be Flu A positive.         #Influenza A    -c/w Tamiflu 75mg bid        #CHESTER    -Cr 1.7<--1.7<--1.0; Likely prerenal    -continue holding lasix; ; Urine studies if no improvement; Lasix to be restarted outpt after the pt gets BMp checked        #Atrial fibrillation    -c/w coumadin; Monitor INR; 1.92 today    -c/w sotalol, flecainide        #Cirrhosis sec to JIMÉNEZ    -c/w spironolactone; Hold Lasix for today in the light of CHESTER    -Therapeutic paracentesis done on 4/11    -Outpt follow up with Dr Kaur        #BPH -c/w Flomax    #DM- monitor FS Ac/HS; insulin protocol prn    #DLD- C/w Atorvastatin    #Hypomagnesemia- resolved    #GERD- c/w carafate and PPI

## 2019-04-16 NOTE — PROGRESS NOTE ADULT - SUBJECTIVE AND OBJECTIVE BOX
Patient was seen and examined. Spoke with RN. Chart reviewed.  feels better  family at bedside  dc home'  fu with pmd at end of week for repeat blood draw,    No events overnight.  Vital Signs Last 24 Hrs  T(F): 98.7 (16 Apr 2019 12:34), Max: 101.1 (15 Apr 2019 17:57)  HR: 45 (16 Apr 2019 12:34) (45 - 62)  BP: 111/61 (16 Apr 2019 12:34) (93/53 - 111/61)  SpO2: 96% (15 Apr 2019 16:21) (96% - 96%)  MEDICATIONS  (STANDING):  atorvastatin 40 milliGRAM(s) Oral at bedtime  chlorhexidine 4% Liquid 1 Application(s) Topical <User Schedule>  diazepam    Tablet 5 milliGRAM(s) Oral daily  ferrous    sulfate 325 milliGRAM(s) Oral daily  flecainide 50 milliGRAM(s) Oral every 12 hours  oseltamivir 75 milliGRAM(s) Oral two times a day  pantoprazole    Tablet 40 milliGRAM(s) Oral before breakfast  sotalol  Oral Tab/Cap - Peds 80 milliGRAM(s) Oral two times a day  spironolactone 100 milliGRAM(s) Oral daily  sucralfate 1 Gram(s) Oral four times a day  tamsulosin 0.4 milliGRAM(s) Oral at bedtime  warfarin 5 milliGRAM(s) Oral once    MEDICATIONS  (PRN):  acetaminophen   Tablet .. 650 milliGRAM(s) Oral every 4 hours PRN Temp greater or equal to 38C (100.4F)    Labs:                        8.9    2.56  )-----------( 82       ( 16 Apr 2019 09:33 )             27.6                         9.0    3.52  )-----------( 99       ( 15 Apr 2019 02:40 )             27.0     16 Apr 2019 09:33    138    |  105    |  31     ----------------------------<  103    4.5     |  22     |  1.7    15 Apr 2019 02:40    135    |  105    |  27     ----------------------------<  87     4.7     |  21     |  1.7      Ca    8.1        16 Apr 2019 09:33  Ca    8.3        15 Apr 2019 02:40  Mg     1.9       16 Apr 2019 09:33  Mg     1.5       15 Apr 2019 02:40    TPro  6.1    /  Alb  2.5    /  TBili  2.0    /  DBili  x      /  AST  104    /  ALT  50     /  AlkPhos  124    16 Apr 2019 09:33  TPro  6.2    /  Alb  2.7    /  TBili  2.2    /  DBili  x      /  AST  80     /  ALT  43     /  AlkPhos  146    15 Apr 2019 02:40    PT/INR - ( 16 Apr 2019 09:33 )   PT: 21.90 sec;   INR: 1.92 ratio         PTT - ( 16 Apr 2019 09:33 )  PTT:36.6 sec      Culture - Blood (collected 15 Apr 2019 02:40)  Source: .Blood Blood  Preliminary Report (16 Apr 2019 14:01):    No growth to date.    Culture - Blood (collected 15 Apr 2019 02:40)  Source: .Blood Blood  Preliminary Report (16 Apr 2019 14:01):    No growth to date.        Radiology:    General: comfortable, NAD  Neurology: A&Ox3, nonfocal  Head:  Normocephalic, atraumatic  ENT:  Mucosa moist, no ulcerations  Neck:  Supple, no JVD,   Skin: no breakdowns (as per RN)  Resp: CTA B/L  CV: RRR, S1S2,   GI: Soft, NT, bowel sounds  MS: No edema, + peripheral pulses, FROM all 4 extremity

## 2019-04-29 ENCOUNTER — OUTPATIENT (OUTPATIENT)
Dept: OUTPATIENT SERVICES | Facility: HOSPITAL | Age: 69
LOS: 1 days | Discharge: HOME | End: 2019-04-29

## 2019-04-29 DIAGNOSIS — I48.91 UNSPECIFIED ATRIAL FIBRILLATION: ICD-10-CM

## 2019-04-29 DIAGNOSIS — Z96.653 PRESENCE OF ARTIFICIAL KNEE JOINT, BILATERAL: Chronic | ICD-10-CM

## 2019-04-29 DIAGNOSIS — Z95.9 PRESENCE OF CARDIAC AND VASCULAR IMPLANT AND GRAFT, UNSPECIFIED: Chronic | ICD-10-CM

## 2019-04-29 DIAGNOSIS — Z79.01 LONG TERM (CURRENT) USE OF ANTICOAGULANTS: ICD-10-CM

## 2019-04-29 DIAGNOSIS — Z12.11 ENCOUNTER FOR SCREENING FOR MALIGNANT NEOPLASM OF COLON: Chronic | ICD-10-CM

## 2019-04-29 LAB
POCT INR: 1.5 RATIO — HIGH (ref 0.9–1.2)
POCT PT: 17.9 SEC — HIGH (ref 10–13.4)

## 2019-05-01 LAB
CULTURE RESULTS: SIGNIFICANT CHANGE UP
SPECIMEN SOURCE: SIGNIFICANT CHANGE UP

## 2019-05-11 LAB
CULTURE RESULTS: SIGNIFICANT CHANGE UP
SPECIMEN SOURCE: SIGNIFICANT CHANGE UP

## 2019-05-15 ENCOUNTER — APPOINTMENT (OUTPATIENT)
Dept: CARDIOLOGY | Facility: CLINIC | Age: 69
End: 2019-05-15
Payer: MEDICARE

## 2019-05-15 PROCEDURE — 99214 OFFICE O/P EST MOD 30 MIN: CPT

## 2019-05-15 PROCEDURE — 93000 ELECTROCARDIOGRAM COMPLETE: CPT

## 2019-05-22 LAB
CULTURE RESULTS: SIGNIFICANT CHANGE UP
SPECIMEN SOURCE: SIGNIFICANT CHANGE UP

## 2019-05-24 ENCOUNTER — OUTPATIENT (OUTPATIENT)
Dept: OUTPATIENT SERVICES | Facility: HOSPITAL | Age: 69
LOS: 1 days | Discharge: HOME | End: 2019-05-24

## 2019-05-24 DIAGNOSIS — I48.91 UNSPECIFIED ATRIAL FIBRILLATION: ICD-10-CM

## 2019-05-24 DIAGNOSIS — Z96.653 PRESENCE OF ARTIFICIAL KNEE JOINT, BILATERAL: Chronic | ICD-10-CM

## 2019-05-24 DIAGNOSIS — Z95.9 PRESENCE OF CARDIAC AND VASCULAR IMPLANT AND GRAFT, UNSPECIFIED: Chronic | ICD-10-CM

## 2019-05-24 DIAGNOSIS — Z12.11 ENCOUNTER FOR SCREENING FOR MALIGNANT NEOPLASM OF COLON: Chronic | ICD-10-CM

## 2019-05-24 DIAGNOSIS — Z79.01 LONG TERM (CURRENT) USE OF ANTICOAGULANTS: ICD-10-CM

## 2019-05-24 LAB
POCT INR: 2.4 RATIO — HIGH (ref 0.9–1.2)
POCT PT: 28.9 SEC — HIGH (ref 10–13.4)

## 2019-06-01 LAB
CULTURE RESULTS: SIGNIFICANT CHANGE UP
SPECIMEN SOURCE: SIGNIFICANT CHANGE UP

## 2019-06-04 ENCOUNTER — APPOINTMENT (OUTPATIENT)
Dept: OTOLARYNGOLOGY | Facility: CLINIC | Age: 69
End: 2019-06-04
Payer: MEDICARE

## 2019-06-04 DIAGNOSIS — H90.5 UNSPECIFIED SENSORINEURAL HEARING LOSS: ICD-10-CM

## 2019-06-04 DIAGNOSIS — R09.81 NASAL CONGESTION: ICD-10-CM

## 2019-06-04 DIAGNOSIS — R49.0 DYSPHONIA: ICD-10-CM

## 2019-06-04 PROCEDURE — 69210 REMOVE IMPACTED EAR WAX UNI: CPT

## 2019-06-04 PROCEDURE — 99213 OFFICE O/P EST LOW 20 MIN: CPT | Mod: 25

## 2019-06-04 NOTE — DATA REVIEWED
[de-identified] : 6/4/19: type A tymp on left; could not obtain seal on right [de-identified] : relevant images and reports personally reviewed by me:\par 3/11/19: no evidence of acoustic schwannoma, looping of R AICA in right IAC, variant; chronic lacunar infarct in right centrum cemiovale; mild cortical volume loss

## 2019-06-04 NOTE — ASSESSMENT
[FreeTextEntry1] : - reassured patient of normal laryngeal exam\par - reviewed MRI report with patient, no IAC pathology\par - cleared for hearing aides\par - flonase for nasal congestion/eustacian tube dysfunction\par - f/up annually

## 2019-06-04 NOTE — HISTORY OF PRESENT ILLNESS
[de-identified] : 68 year old patient is present today for clogged ears for the past month, is bilateral. He does wear hearing aid though hasn't used them in a while, suspects they need adjusting. Has been wearing hearing aids for 10 years. He has not had a hearing test recently. Denies hx ear surgeries/ear infections. Gradual onset of hearing loss initially.  [FreeTextEntry1] : 6/4/19: Patient following up on hearing loss. Patient was sent for an MRI; \par Patient c/o clogged feeling in b/l ears. No pressure. Patient admits when he talks a lot he feels the clogged feeling in his head. Clogged sensation is intermittent. Also c/o raspy voice when he talks a lot, and when his ears are clogged, intermittent.

## 2019-06-04 NOTE — CONSULT LETTER
[Consult Letter:] : I had the pleasure of evaluating your patient, [unfilled]. [Dear  ___] : Dear  [unfilled], [Sincerely,] : Sincerely, [Please see my note below.] : Please see my note below. [Consult Closing:] : Thank you very much for allowing me to participate in the care of this patient.  If you have any questions, please do not hesitate to contact me. [FreeTextEntry2] : Dr Meño Stover [FreeTextEntry3] : Apoorva Simpson MD\par Otolaryngology - Head & Neck Surgery\par

## 2019-06-04 NOTE — PHYSICAL EXAM
[de-identified] : impacted cerumen [Midline] : trachea located in midline position [Normal] : no rashes

## 2019-06-05 ENCOUNTER — RECORD ABSTRACTING (OUTPATIENT)
Age: 69
End: 2019-06-05

## 2019-06-05 DIAGNOSIS — N40.0 BENIGN PROSTATIC HYPERPLASIA WITHOUT LOWER URINARY TRACT SYMPMS: ICD-10-CM

## 2019-06-05 DIAGNOSIS — Z86.718 PERSONAL HISTORY OF OTHER VENOUS THROMBOSIS AND EMBOLISM: ICD-10-CM

## 2019-06-05 DIAGNOSIS — Z87.19 PERSONAL HISTORY OF OTHER DISEASES OF THE DIGESTIVE SYSTEM: ICD-10-CM

## 2019-06-05 DIAGNOSIS — Z78.9 OTHER SPECIFIED HEALTH STATUS: ICD-10-CM

## 2019-06-05 DIAGNOSIS — Z87.448 PERSONAL HISTORY OF OTHER DISEASES OF URINARY SYSTEM: ICD-10-CM

## 2019-06-05 DIAGNOSIS — Z86.79 PERSONAL HISTORY OF OTHER DISEASES OF THE CIRCULATORY SYSTEM: ICD-10-CM

## 2019-06-07 ENCOUNTER — OUTPATIENT (OUTPATIENT)
Dept: OUTPATIENT SERVICES | Facility: HOSPITAL | Age: 69
LOS: 1 days | Discharge: HOME | End: 2019-06-07

## 2019-06-07 DIAGNOSIS — Z12.11 ENCOUNTER FOR SCREENING FOR MALIGNANT NEOPLASM OF COLON: Chronic | ICD-10-CM

## 2019-06-07 DIAGNOSIS — Z79.01 LONG TERM (CURRENT) USE OF ANTICOAGULANTS: ICD-10-CM

## 2019-06-07 DIAGNOSIS — Z96.653 PRESENCE OF ARTIFICIAL KNEE JOINT, BILATERAL: Chronic | ICD-10-CM

## 2019-06-07 DIAGNOSIS — I48.91 UNSPECIFIED ATRIAL FIBRILLATION: ICD-10-CM

## 2019-06-07 DIAGNOSIS — Z95.9 PRESENCE OF CARDIAC AND VASCULAR IMPLANT AND GRAFT, UNSPECIFIED: Chronic | ICD-10-CM

## 2019-06-07 LAB
POCT INR: 2.4 RATIO — HIGH (ref 0.9–1.2)
POCT PT: 29.1 SEC — HIGH (ref 10–13.4)

## 2019-06-10 ENCOUNTER — APPOINTMENT (OUTPATIENT)
Dept: CARDIOLOGY | Facility: CLINIC | Age: 69
End: 2019-06-10

## 2019-06-13 ENCOUNTER — OUTPATIENT (OUTPATIENT)
Dept: OUTPATIENT SERVICES | Facility: HOSPITAL | Age: 69
LOS: 1 days | Discharge: HOME | End: 2019-06-13

## 2019-06-13 DIAGNOSIS — H90.3 SENSORINEURAL HEARING LOSS, BILATERAL: ICD-10-CM

## 2019-06-13 DIAGNOSIS — Z12.11 ENCOUNTER FOR SCREENING FOR MALIGNANT NEOPLASM OF COLON: Chronic | ICD-10-CM

## 2019-06-13 DIAGNOSIS — Z95.9 PRESENCE OF CARDIAC AND VASCULAR IMPLANT AND GRAFT, UNSPECIFIED: Chronic | ICD-10-CM

## 2019-06-13 DIAGNOSIS — Z96.653 PRESENCE OF ARTIFICIAL KNEE JOINT, BILATERAL: Chronic | ICD-10-CM

## 2019-06-21 ENCOUNTER — OUTPATIENT (OUTPATIENT)
Dept: OUTPATIENT SERVICES | Facility: HOSPITAL | Age: 69
LOS: 1 days | Discharge: HOME | End: 2019-06-21

## 2019-06-21 DIAGNOSIS — Z12.11 ENCOUNTER FOR SCREENING FOR MALIGNANT NEOPLASM OF COLON: Chronic | ICD-10-CM

## 2019-06-21 DIAGNOSIS — I48.91 UNSPECIFIED ATRIAL FIBRILLATION: ICD-10-CM

## 2019-06-21 DIAGNOSIS — Z79.01 LONG TERM (CURRENT) USE OF ANTICOAGULANTS: ICD-10-CM

## 2019-06-21 DIAGNOSIS — Z96.653 PRESENCE OF ARTIFICIAL KNEE JOINT, BILATERAL: Chronic | ICD-10-CM

## 2019-06-21 DIAGNOSIS — Z95.9 PRESENCE OF CARDIAC AND VASCULAR IMPLANT AND GRAFT, UNSPECIFIED: Chronic | ICD-10-CM

## 2019-06-21 LAB
POCT INR: 2.1 RATIO — HIGH (ref 0.9–1.2)
POCT PT: 25.4 SEC — HIGH (ref 10–13.4)

## 2019-06-22 ENCOUNTER — INPATIENT (INPATIENT)
Facility: HOSPITAL | Age: 69
LOS: 2 days | Discharge: HOME | End: 2019-06-25
Attending: INTERNAL MEDICINE | Admitting: INTERNAL MEDICINE
Payer: MEDICARE

## 2019-06-22 VITALS
TEMPERATURE: 97 F | HEART RATE: 84 BPM | DIASTOLIC BLOOD PRESSURE: 62 MMHG | SYSTOLIC BLOOD PRESSURE: 128 MMHG | OXYGEN SATURATION: 96 % | RESPIRATION RATE: 18 BRPM

## 2019-06-22 DIAGNOSIS — Z12.11 ENCOUNTER FOR SCREENING FOR MALIGNANT NEOPLASM OF COLON: Chronic | ICD-10-CM

## 2019-06-22 DIAGNOSIS — Z96.653 PRESENCE OF ARTIFICIAL KNEE JOINT, BILATERAL: Chronic | ICD-10-CM

## 2019-06-22 DIAGNOSIS — Z95.9 PRESENCE OF CARDIAC AND VASCULAR IMPLANT AND GRAFT, UNSPECIFIED: Chronic | ICD-10-CM

## 2019-06-22 LAB
ALBUMIN SERPL ELPH-MCNC: 2.2 G/DL — LOW (ref 3.5–5.2)
ALP SERPL-CCNC: 142 U/L — HIGH (ref 30–115)
ALT FLD-CCNC: 19 U/L — SIGNIFICANT CHANGE UP (ref 0–41)
ANION GAP SERPL CALC-SCNC: 11 MMOL/L — SIGNIFICANT CHANGE UP (ref 7–14)
APTT BLD: 46.2 SEC — HIGH (ref 27–39.2)
AST SERPL-CCNC: 55 U/L — HIGH (ref 0–41)
BASOPHILS # BLD AUTO: 0.05 K/UL — SIGNIFICANT CHANGE UP (ref 0–0.2)
BASOPHILS NFR BLD AUTO: 1.7 % — HIGH (ref 0–1)
BILIRUB DIRECT SERPL-MCNC: 0.5 MG/DL — HIGH (ref 0–0.2)
BILIRUB INDIRECT FLD-MCNC: 1.2 MG/DL — SIGNIFICANT CHANGE UP (ref 0.2–1.2)
BILIRUB SERPL-MCNC: 1.7 MG/DL — HIGH (ref 0.2–1.2)
BUN SERPL-MCNC: 15 MG/DL — SIGNIFICANT CHANGE UP (ref 10–20)
CA-I SERPL-SCNC: SIGNIFICANT CHANGE UP MMOL/L (ref 1.12–1.3)
CALCIUM SERPL-MCNC: 8.1 MG/DL — LOW (ref 8.5–10.1)
CHLORIDE SERPL-SCNC: 109 MMOL/L — SIGNIFICANT CHANGE UP (ref 98–110)
CO2 SERPL-SCNC: 19 MMOL/L — SIGNIFICANT CHANGE UP (ref 17–32)
CREAT SERPL-MCNC: 1.2 MG/DL — SIGNIFICANT CHANGE UP (ref 0.7–1.5)
EOSINOPHIL # BLD AUTO: 0.14 K/UL — SIGNIFICANT CHANGE UP (ref 0–0.7)
EOSINOPHIL NFR BLD AUTO: 4.8 % — SIGNIFICANT CHANGE UP (ref 0–8)
GAS PNL BLDV: 140 MMOL/L — SIGNIFICANT CHANGE UP (ref 136–145)
GAS PNL BLDV: SIGNIFICANT CHANGE UP
GLUCOSE BLDC GLUCOMTR-MCNC: 97 MG/DL — SIGNIFICANT CHANGE UP (ref 70–99)
GLUCOSE SERPL-MCNC: 101 MG/DL — HIGH (ref 70–99)
HCO3 BLDV-SCNC: 24 MMOL/L — SIGNIFICANT CHANGE UP (ref 22–29)
HCT VFR BLD CALC: 27.5 % — LOW (ref 42–52)
HCT VFR BLDA CALC: 29.3 % — LOW (ref 34–44)
HGB BLD CALC-MCNC: 9.5 G/DL — LOW (ref 14–18)
HGB BLD-MCNC: 9 G/DL — LOW (ref 14–18)
IMM GRANULOCYTES NFR BLD AUTO: 0.3 % — SIGNIFICANT CHANGE UP (ref 0.1–0.3)
INR BLD: 3.26 RATIO — HIGH (ref 0.65–1.3)
LACTATE BLDV-MCNC: SIGNIFICANT CHANGE UP MMOL/L (ref 0.5–1.6)
LYMPHOCYTES # BLD AUTO: 0.63 K/UL — LOW (ref 1.2–3.4)
LYMPHOCYTES # BLD AUTO: 21.4 % — SIGNIFICANT CHANGE UP (ref 20.5–51.1)
MAGNESIUM SERPL-MCNC: 1.5 MG/DL — LOW (ref 1.8–2.4)
MCHC RBC-ENTMCNC: 30.5 PG — SIGNIFICANT CHANGE UP (ref 27–31)
MCHC RBC-ENTMCNC: 32.7 G/DL — SIGNIFICANT CHANGE UP (ref 32–37)
MCV RBC AUTO: 93.2 FL — SIGNIFICANT CHANGE UP (ref 80–94)
MONOCYTES # BLD AUTO: 0.51 K/UL — SIGNIFICANT CHANGE UP (ref 0.1–0.6)
MONOCYTES NFR BLD AUTO: 17.3 % — HIGH (ref 1.7–9.3)
NEUTROPHILS # BLD AUTO: 1.6 K/UL — SIGNIFICANT CHANGE UP (ref 1.4–6.5)
NEUTROPHILS NFR BLD AUTO: 54.5 % — SIGNIFICANT CHANGE UP (ref 42.2–75.2)
NRBC # BLD: 0 /100 WBCS — SIGNIFICANT CHANGE UP (ref 0–0)
NT-PROBNP SERPL-SCNC: 271 PG/ML — SIGNIFICANT CHANGE UP (ref 0–300)
PCO2 BLDV: 37 MMHG — LOW (ref 41–51)
PH BLDV: 7.43 — SIGNIFICANT CHANGE UP (ref 7.26–7.43)
PLATELET # BLD AUTO: 99 K/UL — LOW (ref 130–400)
PO2 BLDV: 26 MMHG — SIGNIFICANT CHANGE UP (ref 20–40)
POTASSIUM BLDV-SCNC: 3.6 MMOL/L — SIGNIFICANT CHANGE UP (ref 3.3–5.6)
POTASSIUM SERPL-MCNC: 4.6 MMOL/L — SIGNIFICANT CHANGE UP (ref 3.5–5)
POTASSIUM SERPL-SCNC: 4.6 MMOL/L — SIGNIFICANT CHANGE UP (ref 3.5–5)
PROT SERPL-MCNC: 6.5 G/DL — SIGNIFICANT CHANGE UP (ref 6–8)
PROTHROM AB SERPL-ACNC: 37 SEC — HIGH (ref 9.95–12.87)
RBC # BLD: 2.95 M/UL — LOW (ref 4.7–6.1)
RBC # FLD: 17.6 % — HIGH (ref 11.5–14.5)
SAO2 % BLDV: 44 % — SIGNIFICANT CHANGE UP
SODIUM SERPL-SCNC: 139 MMOL/L — SIGNIFICANT CHANGE UP (ref 135–146)
TROPONIN T SERPL-MCNC: <0.01 NG/ML — SIGNIFICANT CHANGE UP
WBC # BLD: 2.94 K/UL — LOW (ref 4.8–10.8)
WBC # FLD AUTO: 2.94 K/UL — LOW (ref 4.8–10.8)

## 2019-06-22 PROCEDURE — 93010 ELECTROCARDIOGRAM REPORT: CPT

## 2019-06-22 PROCEDURE — 99285 EMERGENCY DEPT VISIT HI MDM: CPT

## 2019-06-22 PROCEDURE — 71045 X-RAY EXAM CHEST 1 VIEW: CPT | Mod: 26

## 2019-06-22 RX ORDER — FERROUS SULFATE 325(65) MG
1 TABLET ORAL
Qty: 0 | Refills: 0 | DISCHARGE

## 2019-06-22 RX ORDER — DEXTROSE 50 % IN WATER 50 %
12.5 SYRINGE (ML) INTRAVENOUS ONCE
Refills: 0 | Status: DISCONTINUED | OUTPATIENT
Start: 2019-06-22 | End: 2019-06-25

## 2019-06-22 RX ORDER — SOTALOL HCL 120 MG
1 TABLET ORAL
Qty: 0 | Refills: 0 | DISCHARGE

## 2019-06-22 RX ORDER — FLECAINIDE ACETATE 50 MG
50 TABLET ORAL EVERY 12 HOURS
Refills: 0 | Status: DISCONTINUED | OUTPATIENT
Start: 2019-06-22 | End: 2019-06-25

## 2019-06-22 RX ORDER — OMEGA-3 ACID ETHYL ESTERS 1 G
2 CAPSULE ORAL
Refills: 0 | Status: DISCONTINUED | OUTPATIENT
Start: 2019-06-22 | End: 2019-06-25

## 2019-06-22 RX ORDER — GLUCAGON INJECTION, SOLUTION 0.5 MG/.1ML
1 INJECTION, SOLUTION SUBCUTANEOUS ONCE
Refills: 0 | Status: DISCONTINUED | OUTPATIENT
Start: 2019-06-22 | End: 2019-06-25

## 2019-06-22 RX ORDER — CHLORHEXIDINE GLUCONATE 213 G/1000ML
1 SOLUTION TOPICAL
Refills: 0 | Status: DISCONTINUED | OUTPATIENT
Start: 2019-06-22 | End: 2019-06-25

## 2019-06-22 RX ORDER — DEXTROSE 50 % IN WATER 50 %
25 SYRINGE (ML) INTRAVENOUS ONCE
Refills: 0 | Status: DISCONTINUED | OUTPATIENT
Start: 2019-06-22 | End: 2019-06-25

## 2019-06-22 RX ORDER — DIAZEPAM 5 MG
1 TABLET ORAL
Qty: 0 | Refills: 0 | DISCHARGE

## 2019-06-22 RX ORDER — SUCRALFATE 1 G
1 TABLET ORAL
Refills: 0 | Status: DISCONTINUED | OUTPATIENT
Start: 2019-06-22 | End: 2019-06-25

## 2019-06-22 RX ORDER — TAMSULOSIN HYDROCHLORIDE 0.4 MG/1
0.4 CAPSULE ORAL AT BEDTIME
Refills: 0 | Status: DISCONTINUED | OUTPATIENT
Start: 2019-06-22 | End: 2019-06-25

## 2019-06-22 RX ORDER — SODIUM CHLORIDE 9 MG/ML
1000 INJECTION, SOLUTION INTRAVENOUS
Refills: 0 | Status: DISCONTINUED | OUTPATIENT
Start: 2019-06-22 | End: 2019-06-25

## 2019-06-22 RX ORDER — ATORVASTATIN CALCIUM 80 MG/1
40 TABLET, FILM COATED ORAL AT BEDTIME
Refills: 0 | Status: DISCONTINUED | OUTPATIENT
Start: 2019-06-22 | End: 2019-06-25

## 2019-06-22 RX ORDER — FUROSEMIDE 40 MG
40 TABLET ORAL ONCE
Refills: 0 | Status: COMPLETED | OUTPATIENT
Start: 2019-06-22 | End: 2019-06-22

## 2019-06-22 RX ORDER — SPIRONOLACTONE 25 MG/1
100 TABLET, FILM COATED ORAL DAILY
Refills: 0 | Status: DISCONTINUED | OUTPATIENT
Start: 2019-06-22 | End: 2019-06-25

## 2019-06-22 RX ORDER — PANTOPRAZOLE SODIUM 20 MG/1
40 TABLET, DELAYED RELEASE ORAL
Refills: 0 | Status: DISCONTINUED | OUTPATIENT
Start: 2019-06-22 | End: 2019-06-25

## 2019-06-22 RX ORDER — MAGNESIUM SULFATE 500 MG/ML
2 VIAL (ML) INJECTION ONCE
Refills: 0 | Status: COMPLETED | OUTPATIENT
Start: 2019-06-22 | End: 2019-06-22

## 2019-06-22 RX ORDER — DEXTROSE 50 % IN WATER 50 %
15 SYRINGE (ML) INTRAVENOUS ONCE
Refills: 0 | Status: DISCONTINUED | OUTPATIENT
Start: 2019-06-22 | End: 2019-06-25

## 2019-06-22 RX ORDER — INSULIN LISPRO 100/ML
VIAL (ML) SUBCUTANEOUS
Refills: 0 | Status: DISCONTINUED | OUTPATIENT
Start: 2019-06-22 | End: 2019-06-25

## 2019-06-22 RX ADMIN — Medication 1 GRAM(S): at 23:17

## 2019-06-22 RX ADMIN — TAMSULOSIN HYDROCHLORIDE 0.4 MILLIGRAM(S): 0.4 CAPSULE ORAL at 21:09

## 2019-06-22 RX ADMIN — Medication 50 GRAM(S): at 15:34

## 2019-06-22 RX ADMIN — ATORVASTATIN CALCIUM 40 MILLIGRAM(S): 80 TABLET, FILM COATED ORAL at 21:09

## 2019-06-22 RX ADMIN — Medication 40 MILLIGRAM(S): at 20:45

## 2019-06-22 NOTE — ED PROVIDER NOTE - SECONDARY DIAGNOSIS.
Cirrhosis of liver with ascites, unspecified hepatic cirrhosis type Hypomagnesemia Anemia Supratherapeutic INR

## 2019-06-22 NOTE — ED PROVIDER NOTE - OBJECTIVE STATEMENT
69 year old male pmhx JIMÉNEZ cirrhosis with varices, HTN, HLD, DM, CHF, afib on coumadin presenting with a 3 day history of worsening dyspnea on exertion, abdominal and leg swelling bilaterally. States his symptoms feel the exact same as prior times during which he has been admitted for paracentesis - each time removing 6-7L from his abdomen. Patient otherwise denies pain and is asymptomatic at rest. Dyspnea is moderate, only worse with exertion and lying flat, relieved by rest. Otherwise denies fevers, headache, vision changes, weakness/numbness, confusion, URI symptoms, neck pain, chest pain, back pain, cough, palpitations, nausea, vomiting, abdominal pain, diarrhea, constipation, blood in stool/dark stools, urinary symptoms, penile discharge/testicular pain, rash, recent travel or sick contacts.

## 2019-06-22 NOTE — H&P ADULT - ATTENDING COMMENTS
68 yo Man with PMH of AFIB on Coumadin, HTN, hyperlipidemia, CAD s/p 2 stents years back, BPH, DM type II, JIMÉNEZ cirrhosis complicated by ascites (s/p tap *2), and thrombocytopenia presented to ED progressively worseing dyspnea    Now clinically improved- feeling much better    Chart reviewed- agree with above    Pt seen and examined- in good spirits    IV lasix    monitor Cr closely    GI eval    renal eval    to consider paracentesis    DVt proph

## 2019-06-22 NOTE — H&P ADULT - NSHPREVIEWOFSYSTEMS_GEN_ALL_CORE
General:  No fevers no chills  Eyes:  No reported pain   ENT:  No sore throat   NECK: No stiffness   CV:  No chest pain no orthopnea no palpitations   Resp:  see HPI  GI:  See HPI  :  No dysuria, no decrease urine output  Muscle:  No weakness  Neuro:  No tingling  Endocrine:  No polyuria  Heme:  No ecchymosis

## 2019-06-22 NOTE — ED ADULT NURSE NOTE - NSIMPLEMENTINTERV_GEN_ALL_ED
Implemented All Fall Risk Interventions:  [place] to call system. Call bell, personal items and telephone within reach. Instruct patient to call for assistance. Room bathroom lighting operational. Non-slip footwear when patient is off stretcher. Physically safe environment: no spills, clutter or unnecessary equipment. Stretcher in lowest position, wheels locked, appropriate side rails in place. Provide visual cue, wrist band, yellow gown, etc. Monitor gait and stability. Monitor for mental status changes and reorient to person, place, and time. Review medications for side effects contributing to fall risk. Reinforce activity limits and safety measures with patient and family.

## 2019-06-22 NOTE — H&P ADULT - NSICDXPASTSURGICALHX_GEN_ALL_CORE_FT
PAST SURGICAL HISTORY:  Encounter for screening colonoscopy 1 year ago    Presence of bilateral total knee joint prostheses 15 years ago    S/P angioplasty with stent 2 cardiac stents > 10 years back

## 2019-06-22 NOTE — ED PROVIDER NOTE - CLINICAL SUMMARY MEDICAL DECISION MAKING FREE TEXT BOX
Patient presented with fluid overload similar to prior times during which he has required paracentesis, along with dyspnea on exertion 2/2 this. Patient otherwise afebrile, HD stable. Abd non-tender, and no concern for SBP based on history or exam. Labs remarkable for therapeutic INR, but otherwise no significantly elevated WBC count, hb at baseline, normal lactate. CXR negative as well. Patient on spironolactone at home and states lasix has not worked well for him in the past and therefore lasix withheld until GI evaluation as inpatient. Patient agreeable with plan for admission.

## 2019-06-22 NOTE — ED PROVIDER NOTE - PHYSICAL EXAMINATION
Vital Signs: I have reviewed the initial vital signs.  Constitutional: NAD, well-nourished, appears stated age, no acute distress.  HEENT: Airway patent, moist MM, no erythema/swelling/deformity of oral structures. EOMI, PERRLA.  CV: regular rate, regular rhythm, well-perfused extremities, 2+ b/l DP and radial pulses equal. (+) 2+ bilateral pitting edema  Lungs: BCTA, no increased WOB.  ABD: Non-tender, no guarding or rebound, no pulsatile mass, no hernias.  (+) significantly distended with (+) fluid wave  MSK: Neck supple, nontender, nl ROM, no stepoff. Chest nontender. Back nontender in TLS spine or to b/l bony structures or flanks. Ext nontender, nl rom, no deformity.   INTEG: Skin warm, dry, no rash.  NEURO: A&Ox3, normal strength, nl sensation throughout, normal speech.   PSYCH: Calm, cooperative, normal affect and interaction.

## 2019-06-22 NOTE — H&P ADULT - HISTORY OF PRESENT ILLNESS
68 yo Man with PMH of AFIB on Coumadin, HTN, hyperlipidemia, CAD s/p 2 stents years back, BPH, DM type II, JIMÉNEZ cirrhosis complicated by ascites (s/p tap *2), hx of varices and thrombocytopenia presented to ED for SOB on exertion for couple of days, progressive, occurring even when he is going to the bathroom and now it is minimal at rest (has to stop talking to take a breath). it is associated with dry cough, lower extremities swelling u to knees, abdominal distension. the patient has cirrhosis and ascites, he had 2 paracentesis in the past last one in april when he was admitted for FLU. at that time he had CHESTER and lasix was stopped and he was discharged only on spironolactone. the patient is on salt restricted diet at home, he does not drink more than 1-2L per day and is compliant with medications.  he denies fever, chills, nausea, vomiting, diarrhea, melena, hematemesis,  dysuria, chest pain, palpitations     in the ED, MI=862/62 P=84 T=97.2 sat=96%  INR=3.26 tap was not done 70 yo Man with PMH of AFIB on Coumadin, HTN, hyperlipidemia, CAD s/p 2 stents years back, BPH, DM type II, JIMÉNEZ cirrhosis complicated by ascites (s/p tap *2), and thrombocytopenia presented to ED for SOB on exertion for couple of days, progressive, occurring even when he is going to the bathroom and now it is minimal at rest (has to stop talking to take a breath). it is associated with dry cough, lower extremities swelling u to knees, abdominal distension. the patient has cirrhosis and ascites, he had 2 paracentesis in the past last one in april when he was admitted for FLU. at that time he had CHESTER and lasix was stopped and he was discharged only on spironolactone. the patient is on salt restricted diet at home, he does not drink more than 1-2L per day and is compliant with medications.  he denies fever, chills, nausea, vomiting, diarrhea, melena, hematemesis,  dysuria, chest pain, palpitations     in the ED, PF=225/62 P=84 T=97.2 sat=96%  INR=3.26 tap was not done

## 2019-06-22 NOTE — ED PROVIDER NOTE - CARE PLAN
Principal Discharge DX:	Fluid overload  Secondary Diagnosis:	Cirrhosis of liver with ascites, unspecified hepatic cirrhosis type  Secondary Diagnosis:	Hypomagnesemia  Secondary Diagnosis:	Anemia  Secondary Diagnosis:	Supratherapeutic INR

## 2019-06-22 NOTE — H&P ADULT - NSICDXPASTMEDICALHX_GEN_ALL_CORE_FT
PAST MEDICAL HISTORY:  Afib     Anemia     BPH (benign prostatic hyperplasia)     Cirrhosis of liver JIMÉNEZ    Diabetes     Dyspnea on exertion     High cholesterol     HTN (hypertension)

## 2019-06-22 NOTE — H&P ADULT - NSHPSOCIALHISTORY_GEN_ALL_CORE
never a smoker  no alcohol or IV drug use    no siblings, mother  in 80s of ovarian ca. does not know his father. has one daughter and one son both healthy

## 2019-06-22 NOTE — ED ADULT NURSE NOTE - AS SC BRADEN FRICTION
Problem: Safety  Goal: Will remain free from injury  Outcome: PROGRESSING AS EXPECTED  Patient instructed to use call light for assistance. Patient demonstrates the and uses the call light appropriately.     Problem: Infection  Goal: Will remain free from infection  Outcome: PROGRESSING AS EXPECTED  Assess and monitor Right PICC insertion site for signs and symptoms of infection. Educated the patient on signs and symptoms of infection. Patient verbalized understanding.        (2) potential problem

## 2019-06-22 NOTE — H&P ADULT - NSHPLABSRESULTS_GEN_ALL_CORE
Labs:                        9.0    2.94  )-----------( 99       ( 22 Jun 2019 14:20 )             27.5             06-22    139  |  109  |  15  ----------------------------<  101<H>  4.6   |  19  |  1.2    Ca    8.1<L>      22 Jun 2019 14:20  Mg     1.5     06-22    TPro  6.5  /  Alb  2.2<L>  /  TBili  1.7<H>  /  DBili  0.5<H>  /  AST  55<H>  /  ALT  19  /  AlkPhos  142<H>  06-22    LIVER FUNCTIONS - ( 22 Jun 2019 14:20 )  Alb: 2.2 g/dL / Pro: 6.5 g/dL / ALK PHOS: 142 U/L / ALT: 19 U/L / AST: 55 U/L / GGT: x                 PT/INR - ( 22 Jun 2019 14:20 )   PT: 37.00 sec;   INR: 3.26 ratio         PTT - ( 22 Jun 2019 14:20 )  PTT:46.2 sec  CARDIAC MARKERS ( 22 Jun 2019 14:20 )  x     / <0.01 ng/mL / x     / x     / x          Imaging:    < from: Transthoracic Echocardiogram (04.01.19 @ 09:11) >     1. Left ventricular ejection fraction, by visual estimation, is 65 to   70%.   2. Normal global left ventricular systolic function.    < end of copied text >    CXR: Labs:                        9.0    2.94  )-----------( 99       ( 22 Jun 2019 14:20 )             27.5             06-22    139  |  109  |  15  ----------------------------<  101<H>  4.6   |  19  |  1.2    Ca    8.1<L>      22 Jun 2019 14:20  Mg     1.5     06-22    TPro  6.5  /  Alb  2.2<L>  /  TBili  1.7<H>  /  DBili  0.5<H>  /  AST  55<H>  /  ALT  19  /  AlkPhos  142<H>  06-22    LIVER FUNCTIONS - ( 22 Jun 2019 14:20 )  Alb: 2.2 g/dL / Pro: 6.5 g/dL / ALK PHOS: 142 U/L / ALT: 19 U/L / AST: 55 U/L / GGT: x                 PT/INR - ( 22 Jun 2019 14:20 )   PT: 37.00 sec;   INR: 3.26 ratio         PTT - ( 22 Jun 2019 14:20 )  PTT:46.2 sec  CARDIAC MARKERS ( 22 Jun 2019 14:20 )  x     / <0.01 ng/mL / x     / x     / x        Serum Pro-Brain Natriuretic Peptide (06.22.19 @ 14:20)    Serum Pro-Brain Natriuretic Peptide: 271 pg/mL    Imaging:    < from: Transthoracic Echocardiogram (04.01.19 @ 09:11) >     1. Left ventricular ejection fraction, by visual estimation, is 65 to   70%.   2. Normal global left ventricular systolic function.    < end of copied text >    CXR: no effusion no congestion no infiltrates (no official reading)

## 2019-06-22 NOTE — ED ADULT NURSE NOTE - NEURO ASSESSMENT
Detail Level: Detailed Add 71163 Cpt? (Important Note: In 2017 The Use Of 29969 Is Being Tracked By Cms To Determine Future Global Period Reimbursement For Global Periods): yes WDL

## 2019-06-22 NOTE — H&P ADULT - ASSESSMENT
68 yo Man with PMH of AFIB on Coumadin, HTN, hyperlipidemia, CAD s/p 2 stents years back, BPH, DM type II, JIMÉNEZ cirrhosis complicated by ascites (s/p tap *2), and thrombocytopenia presented to ED for SOB on exertion for couple of days, progressive, occurring even when he is going to the bathroom and now it is minimal at rest (has to stop talking to take a breath).    *Volume overload 2/2 cirrhosis and hypoalbuminemia  -CXR noted  -US abdomen  -therapeutic paracentesis in am?  -low sodium diet   -fluid restriction  -accurate in/out  -daily weight   -the patient is only on spironolactone 100mg, will give one dose of lasix now and we will assess in am  -cautious diuresis, had CHESTER in april, cr=1.2 at baseline    *Liver cirrhosis   -MELD Na=23 and CTP C but the INR is high because the patient is on coumadin!  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  -Ascites please see above  -no evidence of HCC, will check US and AFP  the patient is currently seeing dr Durant for GI follow up and they are doing the workup to see if he is eligible for transplant (one of his children will be the donor)  -GI evaluation    *Afib  -rate control  -rhythm c/w flecainide --> flecainide serum level to be checked / liver cirrhosis,  please follow up with cardiology after result is back  -CHADSVASC=4, on coumadin, daily INR    *DM type II  -carb consistent diet  -check FS  -add insulin if FS>200    *DLD c/w statin  *CAD s/p stents, no antiplatelets, on coumadin, c/w statin. consider BB.    *BPH c/w flomax  *Normocytic anemia; ferritin 44 in april, was on iron supplement, consider resuming    *DVT ppx: coumadin  *GI ppx: protonic and sucralfate  *Disposition: home

## 2019-06-23 LAB
ANION GAP SERPL CALC-SCNC: 10 MMOL/L — SIGNIFICANT CHANGE UP (ref 7–14)
BASOPHILS # BLD AUTO: 0.02 K/UL — SIGNIFICANT CHANGE UP (ref 0–0.2)
BASOPHILS NFR BLD AUTO: 0.9 % — SIGNIFICANT CHANGE UP (ref 0–1)
BUN SERPL-MCNC: 14 MG/DL — SIGNIFICANT CHANGE UP (ref 10–20)
CALCIUM SERPL-MCNC: 7.9 MG/DL — LOW (ref 8.5–10.1)
CHLORIDE SERPL-SCNC: 106 MMOL/L — SIGNIFICANT CHANGE UP (ref 98–110)
CO2 SERPL-SCNC: 22 MMOL/L — SIGNIFICANT CHANGE UP (ref 17–32)
CREAT SERPL-MCNC: 1.2 MG/DL — SIGNIFICANT CHANGE UP (ref 0.7–1.5)
EOSINOPHIL # BLD AUTO: 0.14 K/UL — SIGNIFICANT CHANGE UP (ref 0–0.7)
EOSINOPHIL NFR BLD AUTO: 6.5 % — SIGNIFICANT CHANGE UP (ref 0–8)
GLUCOSE BLDC GLUCOMTR-MCNC: 102 MG/DL — HIGH (ref 70–99)
GLUCOSE BLDC GLUCOMTR-MCNC: 122 MG/DL — HIGH (ref 70–99)
GLUCOSE BLDC GLUCOMTR-MCNC: 85 MG/DL — SIGNIFICANT CHANGE UP (ref 70–99)
GLUCOSE BLDC GLUCOMTR-MCNC: 97 MG/DL — SIGNIFICANT CHANGE UP (ref 70–99)
GLUCOSE SERPL-MCNC: 117 MG/DL — HIGH (ref 70–99)
HCT VFR BLD CALC: 25 % — LOW (ref 42–52)
HGB BLD-MCNC: 8.3 G/DL — LOW (ref 14–18)
IMM GRANULOCYTES NFR BLD AUTO: 0.5 % — HIGH (ref 0.1–0.3)
INR BLD: 3.52 RATIO — HIGH (ref 0.65–1.3)
LYMPHOCYTES # BLD AUTO: 0.5 K/UL — LOW (ref 1.2–3.4)
LYMPHOCYTES # BLD AUTO: 23.3 % — SIGNIFICANT CHANGE UP (ref 20.5–51.1)
MAGNESIUM SERPL-MCNC: 1.6 MG/DL — LOW (ref 1.8–2.4)
MCHC RBC-ENTMCNC: 30.7 PG — SIGNIFICANT CHANGE UP (ref 27–31)
MCHC RBC-ENTMCNC: 33.2 G/DL — SIGNIFICANT CHANGE UP (ref 32–37)
MCV RBC AUTO: 92.6 FL — SIGNIFICANT CHANGE UP (ref 80–94)
MONOCYTES # BLD AUTO: 0.28 K/UL — SIGNIFICANT CHANGE UP (ref 0.1–0.6)
MONOCYTES NFR BLD AUTO: 13 % — HIGH (ref 1.7–9.3)
NEUTROPHILS # BLD AUTO: 1.2 K/UL — LOW (ref 1.4–6.5)
NEUTROPHILS NFR BLD AUTO: 55.8 % — SIGNIFICANT CHANGE UP (ref 42.2–75.2)
NRBC # BLD: 0 /100 WBCS — SIGNIFICANT CHANGE UP (ref 0–0)
PLATELET # BLD AUTO: 78 K/UL — LOW (ref 130–400)
POTASSIUM SERPL-MCNC: 3.9 MMOL/L — SIGNIFICANT CHANGE UP (ref 3.5–5)
POTASSIUM SERPL-SCNC: 3.9 MMOL/L — SIGNIFICANT CHANGE UP (ref 3.5–5)
PROTHROM AB SERPL-ACNC: 40 SEC — HIGH (ref 9.95–12.87)
RBC # BLD: 2.7 M/UL — LOW (ref 4.7–6.1)
RBC # FLD: 17.7 % — HIGH (ref 11.5–14.5)
SODIUM SERPL-SCNC: 138 MMOL/L — SIGNIFICANT CHANGE UP (ref 135–146)
WBC # BLD: 2.15 K/UL — LOW (ref 4.8–10.8)
WBC # FLD AUTO: 2.15 K/UL — LOW (ref 4.8–10.8)

## 2019-06-23 PROCEDURE — 76705 ECHO EXAM OF ABDOMEN: CPT | Mod: 26

## 2019-06-23 PROCEDURE — 93970 EXTREMITY STUDY: CPT | Mod: 26

## 2019-06-23 PROCEDURE — 99222 1ST HOSP IP/OBS MODERATE 55: CPT

## 2019-06-23 RX ADMIN — TAMSULOSIN HYDROCHLORIDE 0.4 MILLIGRAM(S): 0.4 CAPSULE ORAL at 21:45

## 2019-06-23 RX ADMIN — Medication 1 GRAM(S): at 23:37

## 2019-06-23 RX ADMIN — Medication 2 GRAM(S): at 05:22

## 2019-06-23 RX ADMIN — PANTOPRAZOLE SODIUM 40 MILLIGRAM(S): 20 TABLET, DELAYED RELEASE ORAL at 11:03

## 2019-06-23 RX ADMIN — Medication 1 GRAM(S): at 11:03

## 2019-06-23 RX ADMIN — Medication 50 MILLIGRAM(S): at 05:22

## 2019-06-23 RX ADMIN — SPIRONOLACTONE 100 MILLIGRAM(S): 25 TABLET, FILM COATED ORAL at 05:21

## 2019-06-23 RX ADMIN — ATORVASTATIN CALCIUM 40 MILLIGRAM(S): 80 TABLET, FILM COATED ORAL at 21:45

## 2019-06-23 RX ADMIN — Medication 2 GRAM(S): at 17:42

## 2019-06-23 RX ADMIN — Medication 50 MILLIGRAM(S): at 17:39

## 2019-06-23 RX ADMIN — Medication 1 GRAM(S): at 17:38

## 2019-06-23 RX ADMIN — Medication 1 GRAM(S): at 05:22

## 2019-06-23 RX ADMIN — CHLORHEXIDINE GLUCONATE 1 APPLICATION(S): 213 SOLUTION TOPICAL at 05:28

## 2019-06-23 NOTE — CONSULT NOTE ADULT - ATTENDING COMMENTS
Pt admitted with SOB and found to have significant ascites.  Pt seen and examined with Dr Deras and he has significant shifting dullness.  He also has a history of renal insufficiency.  Currently his BUN and Cr are 15 and 1.2.  I recommend 4 liter large volume paracentesis and the addition of lasix 40 mg each day with BMP every day to ensure that he does not develop renal insufficiency.

## 2019-06-23 NOTE — CONSULT NOTE ADULT - ASSESSMENT
68 yo Man with PMH of AFIB on Coumadin, HTN, hyperlipidemia, CAD s/p 2 stents years back, BPH, DM type II, JIMÉNEZ cirrhosis complicated by ascites p/w worsening sob on exertion. Patient follows with dr Durant as o/p and had an appointment to see him in few days. Patient was recently admitted for similar symptoms, lasic held at that time for viktoriya. He admits to compliance to meds and low Na diet.      1- Worsening sob with subjective worsening of ascites   in the setting of JIMÉNEZ cirrhosis   MELD Na 23 on 6/22/19  + ascites (s/p 2 paracentesis last in 4/2019 SAAG 1.8, TP 1.4, no EV on egd 4/2019, no hepatic encephalopathy)   complete [facility] work up with mirella, ama, asma, iron studies, ceruloplasmin  check us abdomen   paracentesis if ascites   low Na diet, i/o, daily weight   daily LFTs and INR   lasix held in the past for viktoriya, currently crea at baseline, s/p 1 x lasix on 6/22  o/p referral for liver transplant center

## 2019-06-23 NOTE — CONSULT NOTE ADULT - SUBJECTIVE AND OBJECTIVE BOX
Patient is a 69y old  Male who presents with a chief complaint of shortness of breath on exertion       HPI:  70 yo Man with PMH of AFIB on Coumadin, HTN, hyperlipidemia, CAD s/p 2 stents years back, BPH, DM type II, JIMÉNEZ cirrhosis complicated by ascites p/w worsening sob on exertion. Patient follows with dr serra as o/p and had an appointment to see him in few days. Patient was recently admitted for similar symptoms, lasic held at that time for viktoriya. He admits to compliance to meds and low Na diet.      egd 4/2/2019 PHG, GAVE improving s/p apc       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: 15 years ago      Home Medications:  atorvastatin: 40 milligram(s) orally once a day (at bedtime) (15 Apr 2019 11:22)  Coumadin 2.5 mg oral tablet: 1 tab(s) orally 6 times a week, Tues, Weds, Thurs, Fri, Sat, Sun (15 Apr 2019 11:22)  Coumadin 5 mg oral tablet: 1 tab(s) orally once a week, on Monday (15 Apr 2019 11:22)  Fish Oil 1200 mg oral capsule: orally 2 times a day (15 Apr 2019 11:22)  flecainide 50 mg oral tablet: 1 tab(s) orally every 12 hours (15 Apr 2019 11:22)  Flomax 0.4 mg oral capsule: 1 cap(s) orally once a day (15 Apr 2019 11:22)  Januvia 50 mg oral tablet: 1 tab(s) orally once a day (15 Apr 2019 11:22)  spironolactone 25 mg oral tablet: 4 tab(s) orally  (15 Apr 2019 11:22)  sucralfate 1 g oral tablet: 1 tab(s) orally 4 times a day (before meals and at bedtime) (15 Apr 2019 11:22)      MEDICATIONS  (STANDING):  atorvastatin 40 milliGRAM(s) Oral at bedtime  chlorhexidine 4% Liquid 1 Application(s) Topical <User Schedule>  dextrose 5%. 1000 milliLiter(s) (50 mL/Hr) IV Continuous <Continuous>  dextrose 50% Injectable 12.5 Gram(s) IV Push once  dextrose 50% Injectable 25 Gram(s) IV Push once  dextrose 50% Injectable 25 Gram(s) IV Push once  flecainide 50 milliGRAM(s) Oral every 12 hours  insulin lispro (HumaLOG) corrective regimen sliding scale   SubCutaneous three times a day before meals  omega-3-Acid Ethyl Esters 2 Gram(s) Oral two times a day  pantoprazole    Tablet 40 milliGRAM(s) Oral before breakfast  spironolactone 100 milliGRAM(s) Oral daily  sucralfate 1 Gram(s) Oral four times a day  tamsulosin 0.4 milliGRAM(s) Oral at bedtime    MEDICATIONS  (PRN):  dextrose 40% Gel 15 Gram(s) Oral once PRN Blood Glucose LESS THAN 70 milliGRAM(s)/deciliter  glucagon  Injectable 1 milliGRAM(s) IntraMuscular once PRN Glucose LESS THAN 70 milligrams/deciliter      Allergies    No Known Allergies    Intolerances        FAMILY HISTORY:  FH: ovarian cancer: mother  Family history of early CAD: mother      SOCIAL    REVIEW OF SYSTEMS  General: mild fatigue   Skin: no new changes   Ophtalmologic: no new visual changes   Respiratory: no new shortness of breath   Cardiovascular: no new chest pain   Gastrointestinal: as per H&P   Genitourinary: no new dysuria   Neurological: no new weakness   otherwise as described above     Vital Signs Last 24 Hrs  T(C): 36.7 (23 Jun 2019 05:22), Max: 36.7 (23 Jun 2019 05:22)  T(F): 98.1 (23 Jun 2019 05:22), Max: 98.1 (23 Jun 2019 05:22)  HR: 75 (23 Jun 2019 05:22) (74 - 94)  BP: 119/60 (23 Jun 2019 05:22) (119/60 - 138/68)  BP(mean): --  RR: 18 (23 Jun 2019 05:22) (18 - 18)  SpO2: 98% (22 Jun 2019 15:58) (96% - 98%)    GENERAL:  no distress  SKIN: no new changes   HEENT:  NC/AT,  anicteric  CHEST:   no new increased effort, breath sounds clear  HEART:  Regular rhythm  ABDOMEN:  Soft, non-tender, + ascites   EXTREMITIES:  no new cyanosis  PSYCHIATRIC: normal affect       CBC Full  -  ( 22 Jun 2019 14:20 )  WBC Count : 2.94 K/uL  RBC Count : 2.95 M/uL  Hemoglobin : 9.0 g/dL  Hematocrit : 27.5 %  Platelet Count - Automated : 99 K/uL  Mean Cell Volume : 93.2 fL  Mean Cell Hemoglobin : 30.5 pg  Mean Cell Hemoglobin Concentration : 32.7 g/dL  Auto Neutrophil # : 1.60 K/uL  Auto Lymphocyte # : 0.63 K/uL  Auto Monocyte # : 0.51 K/uL  Auto Eosinophil # : 0.14 K/uL  Auto Basophil # : 0.05 K/uL  Auto Neutrophil % : 54.5 %  Auto Lymphocyte % : 21.4 %  Auto Monocyte % : 17.3 %  Auto Eosinophil % : 4.8 %  Auto Basophil % : 1.7 %      INR: 3.52 ratio (06-23-19 @ 08:24)  Hemoglobin: 9.0 g/dL (06-22-19 @ 14:20)  Alanine Aminotransferase (ALT/SGPT): 19 U/L (06-22-19 @ 14:20)  Alkaline Phosphatase, Serum: 142 U/L (06-22-19 @ 14:20)  Bilirubin Direct, Serum: 0.5 mg/dL (06-22-19 @ 14:20)  Aspartate Aminotransferase (AST/SGOT): 55 U/L (06-22-19 @ 14:20)  Bilirubin Total, Serum: 1.7 mg/dL (06-22-19 @ 14:20)  INR: 3.26 ratio (06-22-19 @ 14:20)      PT/INR - ( 23 Jun 2019 08:24 )   PT: 40.00 sec;   INR: 3.52 ratio         PTT - ( 22 Jun 2019 14:20 )  PTT:46.2 sec    06-22    139  |  109  |  15  ----------------------------<  101<H>  4.6   |  19  |  1.2    Ca    8.1<L>      22 Jun 2019 14:20  Mg     1.5     06-22    TPro  6.5  /  Alb  2.2<L>  /  TBili  1.7<H>  /  DBili  0.5<H>  /  AST  55<H>  /  ALT  19  /  AlkPhos  142<H>  06-22              RADIOLOGY

## 2019-06-24 DIAGNOSIS — Z02.9 ENCOUNTER FOR ADMINISTRATIVE EXAMINATIONS, UNSPECIFIED: ICD-10-CM

## 2019-06-24 LAB
AFP-TM SERPL-MCNC: <1.8 NG/ML — SIGNIFICANT CHANGE UP
ANION GAP SERPL CALC-SCNC: 10 MMOL/L — SIGNIFICANT CHANGE UP (ref 7–14)
BUN SERPL-MCNC: 13 MG/DL — SIGNIFICANT CHANGE UP (ref 10–20)
CALCIUM SERPL-MCNC: 8.2 MG/DL — LOW (ref 8.5–10.1)
CERULOPLASMIN SERPL-MCNC: 22 MG/DL — SIGNIFICANT CHANGE UP (ref 15–30)
CHLORIDE SERPL-SCNC: 110 MMOL/L — SIGNIFICANT CHANGE UP (ref 98–110)
CO2 SERPL-SCNC: 22 MMOL/L — SIGNIFICANT CHANGE UP (ref 17–32)
CREAT SERPL-MCNC: 1.2 MG/DL — SIGNIFICANT CHANGE UP (ref 0.7–1.5)
ESTIMATED AVERAGE GLUCOSE: 105 MG/DL — SIGNIFICANT CHANGE UP (ref 68–114)
FERRITIN SERPL-MCNC: 35 NG/ML — SIGNIFICANT CHANGE UP (ref 30–400)
GLUCOSE BLDC GLUCOMTR-MCNC: 100 MG/DL — HIGH (ref 70–99)
GLUCOSE BLDC GLUCOMTR-MCNC: 114 MG/DL — HIGH (ref 70–99)
GLUCOSE BLDC GLUCOMTR-MCNC: 126 MG/DL — HIGH (ref 70–99)
GLUCOSE BLDC GLUCOMTR-MCNC: 77 MG/DL — SIGNIFICANT CHANGE UP (ref 70–99)
GLUCOSE SERPL-MCNC: 79 MG/DL — SIGNIFICANT CHANGE UP (ref 70–99)
HBA1C BLD-MCNC: 5.3 % — SIGNIFICANT CHANGE UP (ref 4–5.6)
HCT VFR BLD CALC: 27 % — LOW (ref 42–52)
HGB BLD-MCNC: 8.8 G/DL — LOW (ref 14–18)
IRON SATN MFR SERPL: 15 % — SIGNIFICANT CHANGE UP (ref 15–50)
IRON SATN MFR SERPL: 33 UG/DL — LOW (ref 35–150)
MAGNESIUM SERPL-MCNC: 1.6 MG/DL — LOW (ref 1.8–2.4)
MCHC RBC-ENTMCNC: 30.3 PG — SIGNIFICANT CHANGE UP (ref 27–31)
MCHC RBC-ENTMCNC: 32.6 G/DL — SIGNIFICANT CHANGE UP (ref 32–37)
MCV RBC AUTO: 93.1 FL — SIGNIFICANT CHANGE UP (ref 80–94)
NRBC # BLD: 0 /100 WBCS — SIGNIFICANT CHANGE UP (ref 0–0)
PHOSPHATE SERPL-MCNC: 2.4 MG/DL — SIGNIFICANT CHANGE UP (ref 2.1–4.9)
PLATELET # BLD AUTO: 84 K/UL — LOW (ref 130–400)
POTASSIUM SERPL-MCNC: 4 MMOL/L — SIGNIFICANT CHANGE UP (ref 3.5–5)
POTASSIUM SERPL-SCNC: 4 MMOL/L — SIGNIFICANT CHANGE UP (ref 3.5–5)
RBC # BLD: 2.9 M/UL — LOW (ref 4.7–6.1)
RBC # FLD: 17.8 % — HIGH (ref 11.5–14.5)
SODIUM SERPL-SCNC: 142 MMOL/L — SIGNIFICANT CHANGE UP (ref 135–146)
TIBC SERPL-MCNC: 220 UG/DL — SIGNIFICANT CHANGE UP (ref 220–430)
TRANSFERRIN SERPL-MCNC: 197 MG/DL — LOW (ref 200–360)
UIBC SERPL-MCNC: 187 UG/DL — SIGNIFICANT CHANGE UP (ref 110–370)
WBC # BLD: 2.3 K/UL — LOW (ref 4.8–10.8)
WBC # FLD AUTO: 2.3 K/UL — LOW (ref 4.8–10.8)

## 2019-06-24 PROCEDURE — 99233 SBSQ HOSP IP/OBS HIGH 50: CPT

## 2019-06-24 RX ORDER — FUROSEMIDE 40 MG
40 TABLET ORAL DAILY
Refills: 0 | Status: DISCONTINUED | OUTPATIENT
Start: 2019-06-24 | End: 2019-06-25

## 2019-06-24 RX ADMIN — Medication 2 GRAM(S): at 05:57

## 2019-06-24 RX ADMIN — Medication 1 GRAM(S): at 23:57

## 2019-06-24 RX ADMIN — TAMSULOSIN HYDROCHLORIDE 0.4 MILLIGRAM(S): 0.4 CAPSULE ORAL at 21:48

## 2019-06-24 RX ADMIN — SPIRONOLACTONE 100 MILLIGRAM(S): 25 TABLET, FILM COATED ORAL at 05:16

## 2019-06-24 RX ADMIN — Medication 2 GRAM(S): at 18:13

## 2019-06-24 RX ADMIN — Medication 50 MILLIGRAM(S): at 18:13

## 2019-06-24 RX ADMIN — Medication 1 GRAM(S): at 17:29

## 2019-06-24 RX ADMIN — ATORVASTATIN CALCIUM 40 MILLIGRAM(S): 80 TABLET, FILM COATED ORAL at 21:48

## 2019-06-24 RX ADMIN — CHLORHEXIDINE GLUCONATE 1 APPLICATION(S): 213 SOLUTION TOPICAL at 05:16

## 2019-06-24 RX ADMIN — Medication 40 MILLIGRAM(S): at 11:35

## 2019-06-24 RX ADMIN — Medication 1 GRAM(S): at 11:34

## 2019-06-24 RX ADMIN — Medication 1 GRAM(S): at 05:16

## 2019-06-24 RX ADMIN — Medication 50 MILLIGRAM(S): at 05:16

## 2019-06-24 RX ADMIN — PANTOPRAZOLE SODIUM 40 MILLIGRAM(S): 20 TABLET, DELAYED RELEASE ORAL at 08:08

## 2019-06-24 NOTE — PROGRESS NOTE ADULT - ASSESSMENT
68 yo Man with PMH of AFIB on Coumadin, HTN, hyperlipidemia, CAD s/p 2 stents years back, BPH, DM type II, JIMÉNEZ cirrhosis complicated by ascites p/w worsening sob on exertion. Patient follows with dr Durant as o/p and had an appointment to see him in few days. Patient was recently admitted for similar symptoms, lasic held at that time for viktoriya. He admits to compliance to meds and low Na diet.      1- Worsening sob with subjective worsening of ascites   in the setting of JIMÉNEZ cirrhosis   MELD Na 23 on 6/22/19  + ascites (s/p 2 paracentesis last in 4/2019 SAAG 1.8, TP 1.4, no EV on egd 4/2019, no hepatic encephalopathy)   complete [facility] work up with mirella, ama, asma, iron studies, ceruloplasmin  us abdomen: mod ascites    low Na diet, i/o, daily weight   daily LFTs and INR   lasix held in the past for viktoriya, currently crea at baseline, s/p 1 x lasix on 6/22 and started on daily 40 mg qd on 6/23/19  paracentesis held in the setting of elevated inr (2/2 warfarin in setting of cirrhosis)   o/p referral for liver transplant center

## 2019-06-25 ENCOUNTER — TRANSCRIPTION ENCOUNTER (OUTPATIENT)
Age: 69
End: 2019-06-25

## 2019-06-25 VITALS
HEART RATE: 73 BPM | SYSTOLIC BLOOD PRESSURE: 121 MMHG | RESPIRATION RATE: 20 BRPM | TEMPERATURE: 98 F | DIASTOLIC BLOOD PRESSURE: 60 MMHG

## 2019-06-25 LAB
ALBUMIN SERPL ELPH-MCNC: 2.3 G/DL — LOW (ref 3.5–5.2)
ALP SERPL-CCNC: 131 U/L — HIGH (ref 30–115)
ALT FLD-CCNC: 20 U/L — SIGNIFICANT CHANGE UP (ref 0–41)
ANION GAP SERPL CALC-SCNC: 11 MMOL/L — SIGNIFICANT CHANGE UP (ref 7–14)
ANION GAP SERPL CALC-SCNC: 9 MMOL/L — SIGNIFICANT CHANGE UP (ref 7–14)
APTT BLD: 41.4 SEC — HIGH (ref 27–39.2)
AST SERPL-CCNC: 43 U/L — HIGH (ref 0–41)
BILIRUB SERPL-MCNC: 2.1 MG/DL — HIGH (ref 0.2–1.2)
BUN SERPL-MCNC: 13 MG/DL — SIGNIFICANT CHANGE UP (ref 10–20)
BUN SERPL-MCNC: 13 MG/DL — SIGNIFICANT CHANGE UP (ref 10–20)
CALCIUM SERPL-MCNC: 8.1 MG/DL — LOW (ref 8.5–10.1)
CALCIUM SERPL-MCNC: 8.6 MG/DL — SIGNIFICANT CHANGE UP (ref 8.5–10.1)
CHLORIDE SERPL-SCNC: 104 MMOL/L — SIGNIFICANT CHANGE UP (ref 98–110)
CHLORIDE SERPL-SCNC: 105 MMOL/L — SIGNIFICANT CHANGE UP (ref 98–110)
CO2 SERPL-SCNC: 21 MMOL/L — SIGNIFICANT CHANGE UP (ref 17–32)
CO2 SERPL-SCNC: 24 MMOL/L — SIGNIFICANT CHANGE UP (ref 17–32)
CREAT SERPL-MCNC: 1.2 MG/DL — SIGNIFICANT CHANGE UP (ref 0.7–1.5)
CREAT SERPL-MCNC: 1.2 MG/DL — SIGNIFICANT CHANGE UP (ref 0.7–1.5)
GLUCOSE BLDC GLUCOMTR-MCNC: 109 MG/DL — HIGH (ref 70–99)
GLUCOSE BLDC GLUCOMTR-MCNC: 81 MG/DL — SIGNIFICANT CHANGE UP (ref 70–99)
GLUCOSE SERPL-MCNC: 81 MG/DL — SIGNIFICANT CHANGE UP (ref 70–99)
GLUCOSE SERPL-MCNC: 91 MG/DL — SIGNIFICANT CHANGE UP (ref 70–99)
HCT VFR BLD CALC: 27.4 % — LOW (ref 42–52)
HCT VFR BLD CALC: 28.6 % — LOW (ref 42–52)
HGB BLD-MCNC: 9 G/DL — LOW (ref 14–18)
HGB BLD-MCNC: 9.4 G/DL — LOW (ref 14–18)
INR BLD: 2.81 RATIO — HIGH (ref 0.65–1.3)
MAGNESIUM SERPL-MCNC: 1.8 MG/DL — SIGNIFICANT CHANGE UP (ref 1.8–2.4)
MCHC RBC-ENTMCNC: 30.4 PG — SIGNIFICANT CHANGE UP (ref 27–31)
MCHC RBC-ENTMCNC: 30.5 PG — SIGNIFICANT CHANGE UP (ref 27–31)
MCHC RBC-ENTMCNC: 32.8 G/DL — SIGNIFICANT CHANGE UP (ref 32–37)
MCHC RBC-ENTMCNC: 32.9 G/DL — SIGNIFICANT CHANGE UP (ref 32–37)
MCV RBC AUTO: 92.6 FL — SIGNIFICANT CHANGE UP (ref 80–94)
MCV RBC AUTO: 92.9 FL — SIGNIFICANT CHANGE UP (ref 80–94)
NRBC # BLD: 0 /100 WBCS — SIGNIFICANT CHANGE UP (ref 0–0)
NRBC # BLD: 0 /100 WBCS — SIGNIFICANT CHANGE UP (ref 0–0)
PHOSPHATE SERPL-MCNC: 2.6 MG/DL — SIGNIFICANT CHANGE UP (ref 2.1–4.9)
PLATELET # BLD AUTO: 100 K/UL — LOW (ref 130–400)
PLATELET # BLD AUTO: 93 K/UL — LOW (ref 130–400)
POTASSIUM SERPL-MCNC: 4 MMOL/L — SIGNIFICANT CHANGE UP (ref 3.5–5)
POTASSIUM SERPL-MCNC: 4.4 MMOL/L — SIGNIFICANT CHANGE UP (ref 3.5–5)
POTASSIUM SERPL-SCNC: 4 MMOL/L — SIGNIFICANT CHANGE UP (ref 3.5–5)
POTASSIUM SERPL-SCNC: 4.4 MMOL/L — SIGNIFICANT CHANGE UP (ref 3.5–5)
PROT SERPL-MCNC: 6.8 G/DL — SIGNIFICANT CHANGE UP (ref 6–8)
PROTHROM AB SERPL-ACNC: 32 SEC — HIGH (ref 9.95–12.87)
RBC # BLD: 2.96 M/UL — LOW (ref 4.7–6.1)
RBC # BLD: 3.08 M/UL — LOW (ref 4.7–6.1)
RBC # FLD: 17.8 % — HIGH (ref 11.5–14.5)
RBC # FLD: 17.8 % — HIGH (ref 11.5–14.5)
SODIUM SERPL-SCNC: 137 MMOL/L — SIGNIFICANT CHANGE UP (ref 135–146)
SODIUM SERPL-SCNC: 137 MMOL/L — SIGNIFICANT CHANGE UP (ref 135–146)
WBC # BLD: 2.83 K/UL — LOW (ref 4.8–10.8)
WBC # BLD: 3.18 K/UL — LOW (ref 4.8–10.8)
WBC # FLD AUTO: 2.83 K/UL — LOW (ref 4.8–10.8)
WBC # FLD AUTO: 3.18 K/UL — LOW (ref 4.8–10.8)

## 2019-06-25 PROCEDURE — 99233 SBSQ HOSP IP/OBS HIGH 50: CPT

## 2019-06-25 RX ORDER — WARFARIN SODIUM 2.5 MG/1
1 TABLET ORAL
Qty: 0 | Refills: 0 | DISCHARGE

## 2019-06-25 RX ORDER — FUROSEMIDE 40 MG
1 TABLET ORAL
Qty: 30 | Refills: 0
Start: 2019-06-25 | End: 2019-07-24

## 2019-06-25 RX ORDER — FUROSEMIDE 40 MG
1 TABLET ORAL
Qty: 0 | Refills: 0 | DISCHARGE
Start: 2019-06-25

## 2019-06-25 RX ADMIN — Medication 2 GRAM(S): at 05:20

## 2019-06-25 RX ADMIN — Medication 40 MILLIGRAM(S): at 05:19

## 2019-06-25 RX ADMIN — Medication 1 GRAM(S): at 12:18

## 2019-06-25 RX ADMIN — Medication 1 GRAM(S): at 05:17

## 2019-06-25 RX ADMIN — PANTOPRAZOLE SODIUM 40 MILLIGRAM(S): 20 TABLET, DELAYED RELEASE ORAL at 08:23

## 2019-06-25 RX ADMIN — Medication 50 MILLIGRAM(S): at 05:18

## 2019-06-25 RX ADMIN — SPIRONOLACTONE 100 MILLIGRAM(S): 25 TABLET, FILM COATED ORAL at 05:19

## 2019-06-25 NOTE — DISCHARGE NOTE PROVIDER - NSDCFUADDINST_GEN_ALL_CORE_FT
Please follow up with the coumadin clinic within 1-2 days after discharge. Do not take your coumadin until you see your doctor there.

## 2019-06-25 NOTE — PROGRESS NOTE ADULT - ASSESSMENT
70 yo Man with PMH of AFIB on Coumadin, HTN, hyperlipidemia, CAD s/p 2 stents years back, BPH, DM type II, JIMÉNEZ cirrhosis complicated by ascites p/w worsening sob on exertion. Patient follows with dr Durant as o/p and had an appointment to see him in few days. Patient was recently admitted for similar symptoms, lasic held at that time for viktoriya. He admits to compliance to meds and low Na diet.      1- Worsening sob with subjective worsening of ascites   in the setting of JIMÉNEZ cirrhosis   MELD Na 23 on 6/22/19  + ascites (s/p 2 paracentesis last in 4/2019 SAAG 1.8, TP 1.4, no EV on egd 4/2019, no hepatic encephalopathy)   complete [facility] work up with mirella, ama, asma, iron studies, ceruloplasmin  us abdomen: mod ascites    low Na diet, i/o, daily weight   daily LFTs and INR   lasix held in the past for viktoriya, currently crea at baseline, s/p 1 x lasix on 6/22 and started on daily 40 mg qd on 6/23/19  negative balance noted   c/w lasix and aldactone, monitor BMP and adjust accordingly    paracentesis held in the setting of elevated inr (2/2 warfarin in setting of cirrhosis)   o/p referral for liver transplant center 68 yo Man with PMH of AFIB on Coumadin, HTN, hyperlipidemia, CAD s/p 2 stents years back, BPH, DM type II, JIMÉNEZ cirrhosis complicated by ascites p/w worsening sob on exertion. Patient follows with dr Durant as o/p and had an appointment to see him in few days. Patient was recently admitted for similar symptoms, lasic held at that time for viktoriya. He admits to compliance to meds and low Na diet.      IMpression:- Worsening sob with subjective worsening of ascites   in the setting of JIMÉNEZ cirrhosis   MELD Na 23 on 6/22/19  + ascites (s/p 2 paracentesis last in 4/2019 SAAG 1.8, TP 1.4, no EV on egd 4/2019, no hepatic encephalopathy)   complete [facility] work up with mirella, ama, asma, iron studies, ceruloplasmin  us abdomen: mod ascites    low Na diet, i/o, daily weight   daily LFTs and INR   lasix held in the past for viktoriya, currently crea at baseline, s/p 1 x lasix on 6/22 and started on daily 40 mg qd on 6/23/19  negative balance noted   c/w lasix and aldactone, monitor BMP and adjust accordingly    paracentesis held in the setting of elevated inr (2/2 warfarin in setting of cirrhosis)   o/p referral for liver transplant center (Pt has an appointment with Dr Ross)

## 2019-06-25 NOTE — DISCHARGE NOTE PROVIDER - PROVIDER TOKENS
FREE:[LAST:[Ap],FIRST:[Yoni],PHONE:[phone]],FAX:[phone]],ADDRESS:[ [address] [place] [address]],FOLLOWUP:[1 week]] FREE:[LAST:[De Souza],FIRST:[Yoni],PHONE:[phone]:[phone]],ADDRESS:[[address] [place] [address]],FOLLOWUP:[1 week]],FREE:[LAST:[Clinic],FIRST:[Coumadin],PHONE:[(   )    -],FAX:[(   [phone]    -],ADDRESS:[[address] [place] [address]],FOLLOWUP:[1-3 days]]

## 2019-06-25 NOTE — DISCHARGE NOTE PROVIDER - CARE PROVIDER_API CALL
Yoni De Souza  [address] [place] [address]  Phone: [phone]  Fax: [phone]  Follow Up Time: 1 week Yoni De Souza  [address] [address]  Phone: [phone]  Fax: [phone]  Follow Up Time: 1 week    Clinic, Coumadin  [address] [place] [address]  Phone: (   )    -  Fax: (   )    -  Follow Up Time: 1-3 days

## 2019-06-25 NOTE — DISCHARGE NOTE NURSING/CASE MANAGEMENT/SOCIAL WORK - NSDCDPATPORTLINK_GEN_ALL_CORE
You can access the [facility], offered by [facility], by registering with the following website: [email or web address]

## 2019-06-25 NOTE — PROGRESS NOTE ADULT - SUBJECTIVE AND OBJECTIVE BOX
SOCORRO MANUEL 69y Male  MRN#: [id number]   CODE STATUS: Full     SUBJECTIVE  Patient is a 69y old Male who presents with a chief complaint of shortness of breath on exertion (24 Jun 2019 15:51)  Currently admitted to medicine with the primary diagnosis of Fluid overload      Today is hospital day 2d, and this morning he is doing well, and reports no difficulty breathing.       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 1000 milliLiter(s) IV Continuous <Continuous>  dextrose 50% Injectable 12.5 Gram(s) IV Push once  dextrose 50% Injectable 25 Gram(s) IV Push once  dextrose 50% Injectable 25 Gram(s) IV Push once  flecainide 50 milliGRAM(s) Oral every 12 hours  furosemide    Tablet 40 milliGRAM(s) Oral daily  insulin lispro (HumaLOG) corrective regimen sliding scale   SubCutaneous three times a day before meals  omega-3-Acid Ethyl Esters 2 Gram(s) Oral two times a day  pantoprazole    Tablet 40 milliGRAM(s) Oral before breakfast  spironolactone 100 milliGRAM(s) Oral daily  sucralfate 1 Gram(s) Oral four times a day  tamsulosin 0.4 milliGRAM(s) Oral at bedtime    PRN MEDICATIONS  dextrose 40% Gel 15 Gram(s) Oral once PRN  glucagon  Injectable 1 milliGRAM(s) IntraMuscular once PRN      VITAL SIGNS: Last 24 Hours  T(C): 36.5 (24 Jun 2019 12:05), Max: 37 (23 Jun 2019 20:12)  T(F): 97.7 (24 Jun 2019 12:05), Max: 98.6 (23 Jun 2019 20:12)  HR: 72 (24 Jun 2019 12:05) (72 - 77)  BP: 118/56 (24 Jun 2019 12:05) (113/56 - 127/61)  BP(mean): --  RR: 18 (24 Jun 2019 12:05) (18 - 18)  SpO2: --    LABS:                        8.8    2.30  )-----------( 84       ( 24 Jun 2019 07:27 )             27.0     06-24    142  |  110  |  13  ----------------------------<  79  4.0   |  22  |  1.2    Ca    8.2<L>      24 Jun 2019 07:27  Phos  2.4     06-24  Mg     1.6     06-24      PT/INR - ( 23 Jun 2019 08:24 )   PT: 40.00 sec;   INR: 3.52 ratio                       RADIOLOGY:      PHYSICAL EXAM:    GENERAL: NAD, well-developed, AAOx3  HEENT:  Atraumatic, Normocephalic. EOMI, PERRLA, conjunctiva and sclera clear, No JVD  PULMONARY: Clear to auscultation bilaterally; No wheeze  CARDIOVASCULAR: Regular rate and rhythm; No murmurs, rubs, or gallops  GASTROINTESTINAL: Soft, slightly distended Bowel sounds present  MUSCULOSKELETAL:  2+ Peripheral Pulses, edema of bilateral feet.   NEUROLOGY: non-focal  SKIN: erythema around left ankle     ADMISSION SUMMARY  Patient is a 69y old Male who presents with a chief complaint of shortness of breath on exertion (24 Jun 2019 15:51)  Currently admitted to medicine with the primary diagnosis of Fluid overload    ASSESSMENT & PLAN  *Volume overload 2/2 cirrhosis and hypoalbuminemia  -CXR noted  -US abdomen  -low sodium diet   -fluid restriction  -accurate in/out  -daily weight   -the patient is on spironolactone and lasix   -edema in legs (normal as per pt)   -cautious diuresis, had CHESTER in april, cr=1.2 at baseline    *Liver cirrhosis   -MELD Na=23 and CTP C but the INR is high because the patient is on coumadin!  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  -Ascites please see above  -no evidence of HCC, will check US and AFP  the patient is currently seeing dr Durant for GI follow up and they are doing the workup to see if he is eligible for transplant (one of his children will be the donor)  -GI recs: complete CLD work up with mirella, ama, asma, iron studies, ceruloplasmin, f/u labs   -GI said they will continue to monitor with diuresis, and if patient is feeling better tomorrow, d/c     *Afib  -rate control  -rhythm c/w flecainide --> flecainide serum level to be checked / liver cirrhosis,  please follow up with cardiology after result is back  -CHADSVASC=4, on coumadin, daily INR    *DM type II  -carb consistent diet  -check FS  -add insulin if FS>200    *DLD c/w statin  *CAD s/p stents, no antiplatelets, on coumadin, c/w statin. consider BB.    *BPH c/w flomax  *Normocytic anemia; ferritin 44 in april, was on iron supplement, consider resuming pending iron studies     *DVT ppx: coumadin  *GI ppx: protonic and sucralfate  *Disposition: home
INTERVAL HPI/OVERNIGHT EVENTS:    68 yo Man with PMH of AFIB on Coumadin, HTN, hyperlipidemia, CAD s/p 2 stents years back, BPH, DM type II, JIMÉNEZ cirrhosis complicated by ascites p/w worsening sob on exertion. Patient follows with dr serra as o/p and had an appointment to see him in few days. Patient was recently admitted for similar symptoms, lasic held at that time for viktoriya. He admits to compliance to meds and low Na diet.      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: 15 years ago      Home Medications:  atorvastatin: 40 milligram(s) orally once a day (at bedtime) (15 Apr 2019 11:22)  Coumadin 2.5 mg oral tablet: 1 tab(s) orally 6 times a week, Tues, , Thurs, Fri, Sat, Sun (15 Apr 2019 11:22)  Coumadin 5 mg oral tablet: 1 tab(s) orally once a week, on Monday (15 Apr 2019 11:22)  Fish Oil 1200 mg oral capsule: orally 2 times a day (15 Apr 2019 11:22)  flecainide 50 mg oral tablet: 1 tab(s) orally every 12 hours (15 Apr 2019 11:22)  Flomax 0.4 mg oral capsule: 1 cap(s) orally once a day (15 Apr 2019 11:22)  Januvia 50 mg oral tablet: 1 tab(s) orally once a day (15 Apr 2019 11:22)  spironolactone 25 mg oral tablet: 4 tab(s) orally  (15 Apr 2019 11:22)  sucralfate 1 g oral tablet: 1 tab(s) orally 4 times a day (before meals and at bedtime) (15 Apr 2019 11:22)      MEDICATIONS  (STANDING):  atorvastatin 40 milliGRAM(s) Oral at bedtime  chlorhexidine 4% Liquid 1 Application(s) Topical <User Schedule>  dextrose 5%. 1000 milliLiter(s) (50 mL/Hr) IV Continuous <Continuous>  dextrose 50% Injectable 12.5 Gram(s) IV Push once  dextrose 50% Injectable 25 Gram(s) IV Push once  dextrose 50% Injectable 25 Gram(s) IV Push once  flecainide 50 milliGRAM(s) Oral every 12 hours  furosemide    Tablet 40 milliGRAM(s) Oral daily  insulin lispro (HumaLOG) corrective regimen sliding scale   SubCutaneous three times a day before meals  omega-3-Acid Ethyl Esters 2 Gram(s) Oral two times a day  pantoprazole    Tablet 40 milliGRAM(s) Oral before breakfast  spironolactone 100 milliGRAM(s) Oral daily  sucralfate 1 Gram(s) Oral four times a day  tamsulosin 0.4 milliGRAM(s) Oral at bedtime    MEDICATIONS  (PRN):  dextrose 40% Gel 15 Gram(s) Oral once PRN Blood Glucose LESS THAN 70 milliGRAM(s)/deciliter  glucagon  Injectable 1 milliGRAM(s) IntraMuscular once PRN Glucose LESS THAN 70 milligrams/deciliter      Allergies    No Known Allergies    Intolerances        Review of systems  General: mild fatigue   Skin: no new rash   Ophtalmologic: no new visual changes   Respiratory: no new shortness of breath   Cardiovascular: no new chest pain   Gastrointestinal: as per H&P   Genitourinary: no new dysuria   Neurological: no new weakness   otherwise as described above     PHYSICAL EXAM:   Vital Signs:  Vital Signs Last 24 Hrs  T(C): 36.5 (2019 12:05), Max: 37 (2019 20:12)  T(F): 97.7 (2019 12:05), Max: 98.6 (2019 20:12)  HR: 72 (2019 12:05) (72 - 77)  BP: 118/56 (2019 12:05) (113/56 - 127/61)  BP(mean): --  RR: 18 (2019 12:05) (18 - 18)  SpO2: --  Daily     Daily Weight in k (2019 04:59)    GENERAL:  no distress  SKIN: no new changes   HEENT:  NC/AT,  anicteric  CHEST:   no new increased effort, breath sounds clear  HEART:  Regular rhythm  ABDOMEN:  Soft, non-tender, + ascites   EXTREMITIES:  no new cyanosis  PSYCHIATRIC: normal affect      LABS:                        8.8    2.30  )-----------( 84       ( 2019 07:27 )             27.0       Hemoglobin: 8.8 g/dL (19 @ 07:27)  Hemoglobin: 8.3 g/dL (19 @ 08:24)  Hemoglobin: 9.0 g/dL (19 @ 14:20)          142  |  110  |  13  ----------------------------<  79  4.0   |  22  |  1.2    Ca    8.2<L>      2019 07:27  Phos  2.4       Mg     1.6             INR: 3.52 ratio (19 @ 08:24)  INR: 3.26 ratio (19 @ 14:20)      Alanine Aminotransferase (ALT/SGPT): 19 U/L (19 @ 14:20)  Alkaline Phosphatase, Serum: 142 U/L (19 @ 14:20)  Bilirubin Direct, Serum: 0.5 mg/dL (19 @ 14:20)  Aspartate Aminotransferase (AST/SGOT): 55 U/L (19 @ 14:20)            RADIOLOGY & ADDITIONAL TESTS:
INTERVAL HPI/OVERNIGHT EVENTS:    70 yo Man with PMH of AFIB on Coumadin, HTN, hyperlipidemia, CAD s/p 2 stents years back, BPH, DM type II, JIMÉNEZ cirrhosis complicated by ascites p/w worsening sob on exertion. Patient follows with dr serra as o/p and had an appointment to see him in few days. Patient was recently admitted for similar symptoms, lasic held at that time for viktoriya. He admits to compliance to meds and low Na diet.      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: 15 years ago      Home Medications:  atorvastatin: 40 milligram(s) orally once a day (at bedtime) (15 Apr 2019 11:22)  Fish Oil 1200 mg oral capsule: orally 2 times a day (15 Apr 2019 11:22)  flecainide 50 mg oral tablet: 1 tab(s) orally every 12 hours (15 Apr 2019 11:22)  Flomax 0.4 mg oral capsule: 1 cap(s) orally once a day (15 Apr 2019 11:22)  furosemide 40 mg oral tablet: 1 tab(s) orally once a day (2019 10:51)  Januvia 50 mg oral tablet: 1 tab(s) orally once a day (15 Apr 2019 11:22)  spironolactone 25 mg oral tablet: 4 tab(s) orally  (15 Apr 2019 11:22)  sucralfate 1 g oral tablet: 1 tab(s) orally 4 times a day (before meals and at bedtime) (15 Apr 2019 11:22)      MEDICATIONS  (STANDING):  atorvastatin 40 milliGRAM(s) Oral at bedtime  chlorhexidine 4% Liquid 1 Application(s) Topical <User Schedule>  dextrose 5%. 1000 milliLiter(s) (50 mL/Hr) IV Continuous <Continuous>  dextrose 50% Injectable 12.5 Gram(s) IV Push once  dextrose 50% Injectable 25 Gram(s) IV Push once  dextrose 50% Injectable 25 Gram(s) IV Push once  flecainide 50 milliGRAM(s) Oral every 12 hours  furosemide    Tablet 40 milliGRAM(s) Oral daily  insulin lispro (HumaLOG) corrective regimen sliding scale   SubCutaneous three times a day before meals  omega-3-Acid Ethyl Esters 2 Gram(s) Oral two times a day  pantoprazole    Tablet 40 milliGRAM(s) Oral before breakfast  spironolactone 100 milliGRAM(s) Oral daily  sucralfate 1 Gram(s) Oral four times a day  tamsulosin 0.4 milliGRAM(s) Oral at bedtime    MEDICATIONS  (PRN):  dextrose 40% Gel 15 Gram(s) Oral once PRN Blood Glucose LESS THAN 70 milliGRAM(s)/deciliter  glucagon  Injectable 1 milliGRAM(s) IntraMuscular once PRN Glucose LESS THAN 70 milligrams/deciliter      Allergies    No Known Allergies    Intolerances        Review of systems  General: mild fatigue   Skin: no new rash   Ophtalmologic: no new visual changes   Respiratory: no new shortness of breath   Cardiovascular: no new chest pain   Gastrointestinal: as per H&P   Genitourinary: no new dysuria   Neurological: no new weakness   otherwise as described above     PHYSICAL EXAM:   Vital Signs:  Vital Signs Last 24 Hrs  T(C): 36.4 (2019 13:25), Max: 36.8 (2019 20:50)  T(F): 97.6 (2019 13:25), Max: 98.3 (2019 20:50)  HR: 73 (2019 13:25) (68 - 74)  BP: 121/60 (2019 13:25) (108/62 - 121/60)  BP(mean): --  RR: 20 (2019 13:25) (18 - 20)  SpO2: 94% (2019 20:46) (94% - 94%)  Daily     Daily Weight in k (2019 05:06)    GENERAL:  no distress  SKIN: no new changes   HEENT:  NC/AT,  anicteric  CHEST:   no new increased effort, breath sounds clear  HEART:  Regular rhythm  ABDOMEN:  Soft, non-tender, + ascites   EXTREMITIES:  no new cyanosis, + LLE   PSYCHIATRIC: normal affect      LABS:                        9.4    3.18  )-----------( 100      ( 2019 11:15 )             28.6       Hemoglobin: 9.4 g/dL (19 @ 11:15)  Hemoglobin: 9.0 g/dL (19 @ 06:46)  Hemoglobin: 8.8 g/dL (19 @ 07:27)  Hemoglobin: 8.3 g/dL (19 @ 08:24)          137  |  104  |  13  ----------------------------<  91  4.4   |  24  |  1.2    Ca    8.6      2019 11:15  Phos  2.6       Mg     1.8         TPro  6.8  /  Alb  2.3<L>  /  TBili  2.1<H>  /  DBili  x   /  AST  43<H>  /  ALT  20  /  AlkPhos  131<H>        INR: 2.81 ratio (19 @ 11:15)  INR: 3.52 ratio (19 @ 08:24)      Aspartate Aminotransferase (AST/SGOT): 43 U/L (19 @ 11:15)  Alanine Aminotransferase (ALT/SGPT): 20 U/L (19 @ 11:15)  Alkaline Phosphatase, Serum: 131 U/L (19 @ 11:15)            RADIOLOGY & ADDITIONAL TESTS:
Patient was seen and examined. Spoke with RN. Chart reviewed.  No events overnight.  Vital Signs Last 24 Hrs  T(F): 97.3 (25 Jun 2019 05:06), Max: 98.3 (24 Jun 2019 20:50)  HR: 68 (25 Jun 2019 05:06) (68 - 74)  BP: 114/58 (25 Jun 2019 05:06) (108/62 - 118/56)  SpO2: 94% (24 Jun 2019 20:46) (94% - 94%)  MEDICATIONS  (STANDING):  atorvastatin 40 milliGRAM(s) Oral at bedtime  chlorhexidine 4% Liquid 1 Application(s) Topical <User Schedule>  dextrose 5%. 1000 milliLiter(s) (50 mL/Hr) IV Continuous <Continuous>  dextrose 50% Injectable 12.5 Gram(s) IV Push once  dextrose 50% Injectable 25 Gram(s) IV Push once  dextrose 50% Injectable 25 Gram(s) IV Push once  flecainide 50 milliGRAM(s) Oral every 12 hours  furosemide    Tablet 40 milliGRAM(s) Oral daily  insulin lispro (HumaLOG) corrective regimen sliding scale   SubCutaneous three times a day before meals  omega-3-Acid Ethyl Esters 2 Gram(s) Oral two times a day  pantoprazole    Tablet 40 milliGRAM(s) Oral before breakfast  spironolactone 100 milliGRAM(s) Oral daily  sucralfate 1 Gram(s) Oral four times a day  tamsulosin 0.4 milliGRAM(s) Oral at bedtime    MEDICATIONS  (PRN):  dextrose 40% Gel 15 Gram(s) Oral once PRN Blood Glucose LESS THAN 70 milliGRAM(s)/deciliter  glucagon  Injectable 1 milliGRAM(s) IntraMuscular once PRN Glucose LESS THAN 70 milligrams/deciliter    Labs:                        9.0    2.83  )-----------( 93       ( 25 Jun 2019 06:46 )             27.4                         8.8    2.30  )-----------( 84       ( 24 Jun 2019 07:27 )             27.0     25 Jun 2019 06:46    137    |  105    |  13     ----------------------------<  81     4.0     |  21     |  1.2    24 Jun 2019 07:27    142    |  110    |  13     ----------------------------<  79     4.0     |  22     |  1.2      Ca    8.1        25 Jun 2019 06:46  Ca    8.2        24 Jun 2019 07:27  Phos  2.6       25 Jun 2019 06:46  Phos  2.4       24 Jun 2019 07:27  Mg     1.8       25 Jun 2019 06:46  Mg     1.6       24 Jun 2019 07:27            General: comfortable, NAD  Neurology: A&Ox3, nonfocal  Head:  Normocephalic, atraumatic  ENT:  Mucosa moist, no ulcerations  Neck:  Supple, no JVD,   Skin: no breakdowns (as per RN)  Resp: CTA B/L  CV: RRR, S1S2,   GI: Soft, NT, distended  MS: B/L edema, + peripheral pulses, FROM all 4 extremity      A/P:  70 yo Man with PMH of AFIB on Coumadin, HTN, hyperlipidemia, CAD s/p 2 stents years back, BPH, DM type II, JIMÉNEZ cirrhosis complicated by recurrent ascites , and thrombocytopenia presented to ED progressively worsening dyspnea- which is now resolved    Now clinically improved- feeling much better, no dyspnea lying flat on RA    in good spirits    PO lasix    monitor Cr closely    renal eval if Cr worsens with lasix    GI did not tap     OOB to chair, ambulate    DC when cleared by GI  DVT prophylaxis  Decubitus prevention- all measures as per RN protocol  Please call or text me with any questions or updates
Patient was seen and examined. Spoke with RN. Chart reviewed.  No events overnight.  Vital Signs Last 24 Hrs  T(F): 97.7 (24 Jun 2019 04:59), Max: 98.6 (23 Jun 2019 20:12)  HR: 77 (24 Jun 2019 04:59) (73 - 77)  BP: 127/61 (24 Jun 2019 04:59) (100/57 - 127/61)  SpO2: --  MEDICATIONS  (STANDING):  atorvastatin 40 milliGRAM(s) Oral at bedtime  chlorhexidine 4% Liquid 1 Application(s) Topical <User Schedule>  dextrose 5%. 1000 milliLiter(s) (50 mL/Hr) IV Continuous <Continuous>  dextrose 50% Injectable 12.5 Gram(s) IV Push once  dextrose 50% Injectable 25 Gram(s) IV Push once  dextrose 50% Injectable 25 Gram(s) IV Push once  flecainide 50 milliGRAM(s) Oral every 12 hours  insulin lispro (HumaLOG) corrective regimen sliding scale   SubCutaneous three times a day before meals  omega-3-Acid Ethyl Esters 2 Gram(s) Oral two times a day  pantoprazole    Tablet 40 milliGRAM(s) Oral before breakfast  spironolactone 100 milliGRAM(s) Oral daily  sucralfate 1 Gram(s) Oral four times a day  tamsulosin 0.4 milliGRAM(s) Oral at bedtime    MEDICATIONS  (PRN):  dextrose 40% Gel 15 Gram(s) Oral once PRN Blood Glucose LESS THAN 70 milliGRAM(s)/deciliter  glucagon  Injectable 1 milliGRAM(s) IntraMuscular once PRN Glucose LESS THAN 70 milligrams/deciliter    Labs:                        8.8    2.30  )-----------( 84       ( 24 Jun 2019 07:27 )             27.0                         8.3    2.15  )-----------( 78       ( 23 Jun 2019 08:24 )             25.0     23 Jun 2019 08:24    138    |  106    |  14     ----------------------------<  117    3.9     |  22     |  1.2    22 Jun 2019 14:20    139    |  109    |  15     ----------------------------<  101    4.6     |  19     |  1.2      Ca    7.9        23 Jun 2019 08:24  Ca    8.1        22 Jun 2019 14:20  Mg     1.6       23 Jun 2019 08:24  Mg     1.5       22 Jun 2019 14:20    TPro  6.5    /  Alb  2.2    /  TBili  1.7    /  DBili  0.5    /  AST  55     /  ALT  19     /  AlkPhos  142    22 Jun 2019 14:20    PT/INR - ( 23 Jun 2019 08:24 )   PT: 40.00 sec;   INR: 3.52 ratio         PTT - ( 22 Jun 2019 14:20 )  PTT:46.2 sec      General: comfortable, NAD- in good spirits  Neurology: A&Ox3, nonfocal  Head:  Normocephalic, atraumatic  ENT:  Mucosa moist, no ulcerations  Neck:  Supple, no JVD,   Skin: no breakdowns (as per RN)  Resp: CTA B/L  CV: RRR, S1S2,   GI: Soft, NT, bowel sounds, distended  MS: B/L edema, + peripheral pulses,       A/P:  70 yo Man with PMH of AFIB on Coumadin, HTN, hyperlipidemia, CAD s/p 2 stents years back, BPH, DM type II, JIMÉNEZ cirrhosis complicated by recurrent ascites , and thrombocytopenia presented to ED progressively worsening dyspnea- which is now resolved    Now clinically improved- feeling much better, no dyspnea lying flat on RA    in good spirits    IV lasix as per GI    monitor Cr closely    GI eval noted    renal eval if Cr worsens with lasix    GI to perform paracentesis today as per consult- please ensure follow up    OOB to chair, ambulate    DC when cleared by GI    DVT prophylaxis  Decubitus prevention- all measures as per RN protocol  Please call or text me with any questions or updates
SOCORRO MANUEL 69y Male  MRN#: [id number]   CODE STATUS: Full     SUBJECTIVE  Patient is a 69y old Male who presents with a chief complaint of shortness of breath on exertion (23 Jun 2019 10:21)  Currently admitted to medicine with the primary diagnosis of Fluid overload    Today is hospital day 1d, and this morning he is feeling the same as admission, but with less shortness of breath.    History of Present Illness: 	  70 yo Man with PMH of AFIB on Coumadin, HTN, hyperlipidemia, CAD s/p 2 stents years back, BPH, DM type II, JIMÉNEZ cirrhosis complicated by ascites (s/p tap *2), and thrombocytopenia presented to ED for SOB on exertion for couple of days, progressive, occurring even when he is going to the bathroom and now it is minimal at rest (has to stop talking to take a breath). it is associated with dry cough, lower extremities swelling u to knees, abdominal distension. the patient has cirrhosis and ascites, he had 2 paracentesis in the past last one in april when he was admitted for FLU. at that time he had CHESTER and lasix was stopped and he was discharged only on spironolactone. the patient is on salt restricted diet at home, he does not drink more than 1-2L per day and is compliant with medications.  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 1000 milliLiter(s) IV Continuous <Continuous>  dextrose 50% Injectable 12.5 Gram(s) IV Push once  dextrose 50% Injectable 25 Gram(s) IV Push once  dextrose 50% Injectable 25 Gram(s) IV Push once  flecainide 50 milliGRAM(s) Oral every 12 hours  insulin lispro (HumaLOG) corrective regimen sliding scale   SubCutaneous three times a day before meals  omega-3-Acid Ethyl Esters 2 Gram(s) Oral two times a day  pantoprazole    Tablet 40 milliGRAM(s) Oral before breakfast  spironolactone 100 milliGRAM(s) Oral daily  sucralfate 1 Gram(s) Oral four times a day  tamsulosin 0.4 milliGRAM(s) Oral at bedtime    PRN MEDICATIONS  dextrose 40% Gel 15 Gram(s) Oral once PRN  glucagon  Injectable 1 milliGRAM(s) IntraMuscular once PRN      VITAL SIGNS: Last 24 Hours  T(C): 35 (23 Jun 2019 12:36), Max: 36.7 (23 Jun 2019 05:22)  T(F): 95 (23 Jun 2019 12:36), Max: 98.1 (23 Jun 2019 05:22)  HR: 73 (23 Jun 2019 12:36) (73 - 94)  BP: 100/57 (23 Jun 2019 12:36) (100/57 - 138/68)  BP(mean): --  RR: 18 (23 Jun 2019 12:36) (18 - 18)  SpO2: 98% (22 Jun 2019 15:58) (96% - 98%)    LABS:                        8.3    2.15  )-----------( 78       ( 23 Jun 2019 08:24 )             25.0     06-23    138  |  106  |  14  ----------------------------<  117<H>  3.9   |  22  |  1.2    Ca    7.9<L>      23 Jun 2019 08:24  Mg     1.6     06-23    TPro  6.5  /  Alb  2.2<L>  /  TBili  1.7<H>  /  DBili  0.5<H>  /  AST  55<H>  /  ALT  19  /  AlkPhos  142<H>  06-22    PT/INR - ( 23 Jun 2019 08:24 )   PT: 40.00 sec;   INR: 3.52 ratio         PTT - ( 22 Jun 2019 14:20 )  PTT:46.2 sec      Troponin T, Serum: <0.01 ng/mL (06-22-19 @ 14:20)      CARDIAC MARKERS ( 22 Jun 2019 14:20 )  x     / <0.01 ng/mL / x     / x     / x          RADIOLOGY:  CXR 6/22    Impression:      Left lower lobe linear atelectasis      PHYSICAL EXAM:    GENERAL: NAD, well-developed, AAOx3  HEENT:  Atraumatic, Normocephalic. EOMI, PERRLA, conjunctiva and sclera clear, No JVD  PULMONARY: Clear to auscultation bilaterally; No wheeze  CARDIOVASCULAR: Regular rate and rhythm; No murmurs, rubs, or gallops  GASTROINTESTINAL: Soft, nontender, distended Bowel sounds present  MUSCULOSKELETAL:  2+ Peripheral Pulses, edema in legs bilaterally (unchanged from normal as per patient)   NEUROLOGY: non-focal  SKIN: No rashes or lesions      ADMISSION SUMMARY  Patient is a 69y old Male who presents with a chief complaint of shortness of breath on exertion (23 Jun 2019 10:21)  Currently admitted to medicine with the primary diagnosis of Fluid overload      ASSESSMENT & PLAN    *Volume overload 2/2 cirrhosis and hypoalbuminemia  -CXR noted  -US abdomen  -therapeutic paracentesis in am?  -low sodium diet   -fluid restriction  -accurate in/out  -daily weight   -the patient is only on spironolactone 100mg, gave one dose of lasix now   -edema in legs (normal as per pt)   -cautious diuresis, had CHESTER in april, cr=1.2 at baseline    *Liver cirrhosis   -MELD Na=23 and CTP C but the INR is high because the patient is on coumadin!  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  -Ascites please see above  -no evidence of HCC, will check US and AFP  the patient is currently seeing dr Durant for GI follow up and they are doing the workup to see if he is eligible for transplant (one of his children will be the donor)  -GI recs: complete CLD work up with mirella, ama, asma, iron studies, ceruloplasmin, f/u labs     *Afib  -rate control  -rhythm c/w flecainide --> flecainide serum level to be checked / liver cirrhosis,  please follow up with cardiology after result is back  -CHADSVASC=4, on coumadin, daily INR    *DM type II  -carb consistent diet  -check FS  -add insulin if FS>200    *DLD c/w statin  *CAD s/p stents, no antiplatelets, on coumadin, c/w statin. consider BB.    *BPH c/w flomax  *Normocytic anemia; ferritin 44 in april, was on iron supplement, consider resuming pending iron studies     *DVT ppx: coumadin  *GI ppx: protonic and sucralfate  *Disposition: home

## 2019-06-25 NOTE — DISCHARGE NOTE PROVIDER - HOSPITAL COURSE
70 yo Man with PMH of AFIB on Coumadin, HTN, hyperlipidemia, CAD s/p 2 stents years back, BPH, DM type II, JIMÉNEZ cirrhosis complicated by ascites (s/p tap *2), and thrombocytopenia presented to ED for SOB on exertion for couple of days, progressive, occurring even when he is going to the bathroom and now it is minimal at rest (has to stop talking to take a breath). it is associated with dry cough, lower extremities swelling u to knees, abdominal distension. the patient has cirrhosis and ascites, he had 2 paracentesis in the past last one in April when he was admitted for FLU. at that time he had CHESTER and lasix was stopped and he was discharged only on spironolactone. the patient is on salt restricted diet at home, he does not drink more than 1-2L per day and is compliant with medications.        He denies fever, chills, nausea, vomiting, diarrhea, melena, hematemesis,  dysuria, chest pain, palpitations.        In the ED, IE=429/62 P=84 T=97.2 sat=96%    INR=3.26 tap was not done. Patient was put on spironolactone and lasix with relief of his shortness of breath. GI recommended continuation of diuretics and discharge if his symptoms stay improved. 68 yo Man with PMH of AFIB on Coumadin, HTN, hyperlipidemia, CAD s/p 2 stents years back, BPH, DM type II, JIMÉNEZ cirrhosis complicated by ascites (s/p tap *2), and thrombocytopenia presented to ED for SOB on exertion for couple of days, progressive, occurring even when he is going to the bathroom and now it is minimal at rest (has to stop talking to take a breath). it is associated with dry cough, lower extremities swelling u to knees, abdominal distension. the patient has cirrhosis and ascites, he had 2 paracentesis in the past last one in April when he was admitted for FLU. at that time he had CHESTER and lasix was stopped and he was discharged only on spironolactone. the patient is on salt restricted diet at home, he does not drink more than 1-2L per day and is compliant with medications.        He denies fever, chills, nausea, vomiting, diarrhea, melena, hematemesis,  dysuria, chest pain, palpitations.        In the ED, ZZ=112/62 P=84 T=97.2 sat=96%    INR=3.26 tap was not done. Patient was put on spironolactone and lasix with relief of his shortness of breath. GI recommended continuation of diuretics and discharge if his symptoms stay improved. Patient has been doing well for the past two days. 68 yo Man with PMH of AFIB on Coumadin, HTN, hyperlipidemia, CAD s/p 2 stents years back, BPH, DM type II, JIMÉNEZ cirrhosis complicated by ascites (s/p tap *2), and thrombocytopenia presented to ED for SOB on exertion for couple of days, progressive, occurring even when he is going to the bathroom and now it is minimal at rest (has to stop talking to take a breath). it is associated with dry cough, lower extremities swelling u to knees, abdominal distension. the patient has cirrhosis and ascites, he had 2 paracentesis in the past last one in April when he was admitted for FLU. at that time he had CHESTER and lasix was stopped and he was discharged only on spironolactone. The patient is on salt restricted diet at home, he does not drink more than 1-2L per day and is compliant with medications.        He denies fever, chills, nausea, vomiting, diarrhea, melena, hematemesis,  dysuria, chest pain, palpitations.        In the ED, SO=889/62 P=84 T=97.2 sat=96%    INR=3.26 tap was not done. Patient was put on spironolactone and lasix with relief of his shortness of breath. GI recommended continuation of diuretics and discharge if his symptoms stay improved. Patient has been doing well for the past two days.

## 2019-06-25 NOTE — DISCHARGE NOTE PROVIDER - NSDCCPCAREPLAN_GEN_ALL_CORE_FT
PRINCIPAL DISCHARGE DIAGNOSIS  Diagnosis: Fluid overload  Assessment and Plan of Treatment: You were admitted in the hospital for fluid overload because of your cirrhosis. You did not have a TAP done because your INR was high so there was a risk of bleeding. Instead you were started on diuretics to help get the fluid off. You had relief of your symptoms; you were not short of breath after the diuretics so we are going to continue you on those medications.   You have an appointment at [facility] with Dr. De Souza in early July to follow up about your cirrhosis.      SECONDARY DISCHARGE DIAGNOSES  Diagnosis: Supratherapeutic INR  Assessment and Plan of Treatment: Please do not take your coumadin and follow up with    Diagnosis: Anemia  Assessment and Plan of Treatment:     Diagnosis: Hypomagnesemia  Assessment and Plan of Treatment:     Diagnosis: Cirrhosis of liver with ascites, unspecified hepatic cirrhosis type  Assessment and Plan of Treatment: PRINCIPAL DISCHARGE DIAGNOSIS  Diagnosis: Fluid overload  Assessment and Plan of Treatment: You were admitted in the hospital for fluid overload because of your cirrhosis. You did not have a TAP done because your INR was high so there was a risk of bleeding. Instead you were started on diuretics to help get the fluid off. You had relief of your symptoms; you were not short of breath after the diuretics so we are going to continue you on those medications.   You have an appointment at [facility] with Dr. De Souza in early July to follow up about your cirrhosis.      SECONDARY DISCHARGE DIAGNOSES  Diagnosis: Supratherapeutic INR  Assessment and Plan of Treatment: Please do not take your coumadin and follow up with    Diagnosis: Anemia  Assessment and Plan of Treatment:     Diagnosis: Hypomagnesemia  Assessment and Plan of Treatment:     Diagnosis: Cirrhosis of liver with ascites, unspecified hepatic cirrhosis type  Assessment and Plan of Treatment: PRINCIPAL DISCHARGE DIAGNOSIS  Diagnosis: Fluid overload  Assessment and Plan of Treatment: You were admitted in the hospital for fluid overload because of your cirrhosis. You did not have a TAP done because your INR was high so there was a risk of bleeding. Instead you were started on diuretics to help get the fluid off. You had relief of your symptoms; you were not short of breath after the diuretics so we are going to continue you on those medications.   You have an appointment at [facility] with Dr. De Souza in early July to follow up about your cirrhosis.      SECONDARY DISCHARGE DIAGNOSES  Diagnosis: Supratherapeutic INR  Assessment and Plan of Treatment: Please do not take your coumadin and follow up with your doctor at the coumadin clinic.    Diagnosis: Anemia  Assessment and Plan of Treatment: Your hemoglobin was low but it has been steadily increasing.    Diagnosis: Hypomagnesemia  Assessment and Plan of Treatment: Your magnesium level was low but it is now normal.    Diagnosis: Cirrhosis of liver with ascites, unspecified hepatic cirrhosis type  Assessment and Plan of Treatment: You have an appointment to follow up with the GI specialist, Dr. De Souza.

## 2019-06-25 NOTE — PROGRESS NOTE ADULT - REASON FOR ADMISSION
shortness of breath on exertion

## 2019-06-26 ENCOUNTER — APPOINTMENT (OUTPATIENT)
Dept: CARDIOLOGY | Facility: CLINIC | Age: 69
End: 2019-06-26

## 2019-06-26 LAB — ANA TITR SER: NEGATIVE — SIGNIFICANT CHANGE UP

## 2019-06-28 ENCOUNTER — OUTPATIENT (OUTPATIENT)
Dept: OUTPATIENT SERVICES | Facility: HOSPITAL | Age: 69
LOS: 1 days | Discharge: HOME | End: 2019-06-28

## 2019-06-28 ENCOUNTER — APPOINTMENT (OUTPATIENT)
Dept: GASTROENTEROLOGY | Facility: CLINIC | Age: 69
End: 2019-06-28
Payer: MEDICARE

## 2019-06-28 VITALS
HEART RATE: 82 BPM | WEIGHT: 237 LBS | DIASTOLIC BLOOD PRESSURE: 76 MMHG | HEIGHT: 70 IN | BODY MASS INDEX: 33.93 KG/M2 | SYSTOLIC BLOOD PRESSURE: 141 MMHG

## 2019-06-28 DIAGNOSIS — Z86.79 PERSONAL HISTORY OF OTHER DISEASES OF THE CIRCULATORY SYSTEM: ICD-10-CM

## 2019-06-28 DIAGNOSIS — I48.91 UNSPECIFIED ATRIAL FIBRILLATION: ICD-10-CM

## 2019-06-28 DIAGNOSIS — Z96.653 PRESENCE OF ARTIFICIAL KNEE JOINT, BILATERAL: Chronic | ICD-10-CM

## 2019-06-28 DIAGNOSIS — Z86.39 PERSONAL HISTORY OF OTHER ENDOCRINE, NUTRITIONAL AND METABOLIC DISEASE: ICD-10-CM

## 2019-06-28 DIAGNOSIS — Z86.2 PERSONAL HISTORY OF DISEASES OF THE BLOOD AND BLOOD-FORMING ORGANS AND CERTAIN DISORDERS INVOLVING THE IMMUNE MECHANISM: ICD-10-CM

## 2019-06-28 DIAGNOSIS — Z95.9 PRESENCE OF CARDIAC AND VASCULAR IMPLANT AND GRAFT, UNSPECIFIED: Chronic | ICD-10-CM

## 2019-06-28 DIAGNOSIS — R74.8 ABNORMAL LEVELS OF OTHER SERUM ENZYMES: ICD-10-CM

## 2019-06-28 DIAGNOSIS — Z79.01 LONG TERM (CURRENT) USE OF ANTICOAGULANTS: ICD-10-CM

## 2019-06-28 DIAGNOSIS — Z12.11 ENCOUNTER FOR SCREENING FOR MALIGNANT NEOPLASM OF COLON: Chronic | ICD-10-CM

## 2019-06-28 PROBLEM — K74.60 UNSPECIFIED CIRRHOSIS OF LIVER: Chronic | Status: ACTIVE | Noted: 2018-04-24

## 2019-06-28 LAB
FLECAINIDE SERPL-MCNC: 0.12 MCG/ML — LOW (ref 0.2–0.99)
MITOCHONDRIA AB SER-ACNC: SIGNIFICANT CHANGE UP
POCT INR: 1.8 RATIO — HIGH (ref 0.9–1.2)
POCT PT: 22 SEC — HIGH (ref 10–13.4)
SMOOTH MUSCLE AB SER-ACNC: ABNORMAL

## 2019-06-28 PROCEDURE — 99214 OFFICE O/P EST MOD 30 MIN: CPT

## 2019-07-01 DIAGNOSIS — I48.91 UNSPECIFIED ATRIAL FIBRILLATION: ICD-10-CM

## 2019-07-01 DIAGNOSIS — E11.9 TYPE 2 DIABETES MELLITUS WITHOUT COMPLICATIONS: ICD-10-CM

## 2019-07-01 DIAGNOSIS — E88.09 OTHER DISORDERS OF PLASMA-PROTEIN METABOLISM, NOT ELSEWHERE CLASSIFIED: ICD-10-CM

## 2019-07-01 DIAGNOSIS — K74.60 UNSPECIFIED CIRRHOSIS OF LIVER: ICD-10-CM

## 2019-07-01 DIAGNOSIS — E87.70 FLUID OVERLOAD, UNSPECIFIED: ICD-10-CM

## 2019-07-01 DIAGNOSIS — R18.8 OTHER ASCITES: ICD-10-CM

## 2019-07-01 DIAGNOSIS — D64.9 ANEMIA, UNSPECIFIED: ICD-10-CM

## 2019-07-01 DIAGNOSIS — N40.0 BENIGN PROSTATIC HYPERPLASIA WITHOUT LOWER URINARY TRACT SYMPTOMS: ICD-10-CM

## 2019-07-01 DIAGNOSIS — I10 ESSENTIAL (PRIMARY) HYPERTENSION: ICD-10-CM

## 2019-07-01 DIAGNOSIS — E78.5 HYPERLIPIDEMIA, UNSPECIFIED: ICD-10-CM

## 2019-07-01 DIAGNOSIS — E83.42 HYPOMAGNESEMIA: ICD-10-CM

## 2019-07-01 DIAGNOSIS — K75.81 NONALCOHOLIC STEATOHEPATITIS (NASH): ICD-10-CM

## 2019-07-01 DIAGNOSIS — R16.1 SPLENOMEGALY, NOT ELSEWHERE CLASSIFIED: ICD-10-CM

## 2019-07-02 ENCOUNTER — OUTPATIENT (OUTPATIENT)
Dept: OUTPATIENT SERVICES | Facility: HOSPITAL | Age: 69
LOS: 1 days | Discharge: HOME | End: 2019-07-02

## 2019-07-02 DIAGNOSIS — Z96.653 PRESENCE OF ARTIFICIAL KNEE JOINT, BILATERAL: Chronic | ICD-10-CM

## 2019-07-02 DIAGNOSIS — Z12.11 ENCOUNTER FOR SCREENING FOR MALIGNANT NEOPLASM OF COLON: Chronic | ICD-10-CM

## 2019-07-02 DIAGNOSIS — I48.91 UNSPECIFIED ATRIAL FIBRILLATION: ICD-10-CM

## 2019-07-02 DIAGNOSIS — Z79.01 LONG TERM (CURRENT) USE OF ANTICOAGULANTS: ICD-10-CM

## 2019-07-02 DIAGNOSIS — Z95.9 PRESENCE OF CARDIAC AND VASCULAR IMPLANT AND GRAFT, UNSPECIFIED: Chronic | ICD-10-CM

## 2019-07-02 LAB
POCT INR: 2.1 RATIO — HIGH (ref 0.9–1.2)
POCT PT: 25.1 SEC — HIGH (ref 10–13.4)

## 2019-07-03 ENCOUNTER — APPOINTMENT (OUTPATIENT)
Dept: CARDIOLOGY | Facility: CLINIC | Age: 69
End: 2019-07-03
Payer: MEDICARE

## 2019-07-03 PROCEDURE — 99214 OFFICE O/P EST MOD 30 MIN: CPT

## 2019-07-03 PROCEDURE — 93000 ELECTROCARDIOGRAM COMPLETE: CPT

## 2019-07-08 ENCOUNTER — OUTPATIENT (OUTPATIENT)
Dept: OUTPATIENT SERVICES | Facility: HOSPITAL | Age: 69
LOS: 1 days | Discharge: HOME | End: 2019-07-08

## 2019-07-08 ENCOUNTER — APPOINTMENT (OUTPATIENT)
Dept: CARDIOLOGY | Facility: CLINIC | Age: 69
End: 2019-07-08
Payer: MEDICARE

## 2019-07-08 DIAGNOSIS — I48.91 UNSPECIFIED ATRIAL FIBRILLATION: ICD-10-CM

## 2019-07-08 DIAGNOSIS — Z96.653 PRESENCE OF ARTIFICIAL KNEE JOINT, BILATERAL: Chronic | ICD-10-CM

## 2019-07-08 DIAGNOSIS — Z79.01 LONG TERM (CURRENT) USE OF ANTICOAGULANTS: ICD-10-CM

## 2019-07-08 DIAGNOSIS — Z95.9 PRESENCE OF CARDIAC AND VASCULAR IMPLANT AND GRAFT, UNSPECIFIED: Chronic | ICD-10-CM

## 2019-07-08 DIAGNOSIS — Z12.11 ENCOUNTER FOR SCREENING FOR MALIGNANT NEOPLASM OF COLON: Chronic | ICD-10-CM

## 2019-07-08 LAB
POCT INR: 2.4 RATIO — HIGH (ref 0.9–1.2)
POCT PT: 28.8 SEC — HIGH (ref 10–13.4)

## 2019-07-08 PROCEDURE — 93306 TTE W/DOPPLER COMPLETE: CPT

## 2019-07-11 ENCOUNTER — OUTPATIENT (OUTPATIENT)
Dept: OUTPATIENT SERVICES | Facility: HOSPITAL | Age: 69
LOS: 1 days | Discharge: HOME | End: 2019-07-11

## 2019-07-11 DIAGNOSIS — Z96.653 PRESENCE OF ARTIFICIAL KNEE JOINT, BILATERAL: Chronic | ICD-10-CM

## 2019-07-11 DIAGNOSIS — Z12.11 ENCOUNTER FOR SCREENING FOR MALIGNANT NEOPLASM OF COLON: Chronic | ICD-10-CM

## 2019-07-11 DIAGNOSIS — H91.90 UNSPECIFIED HEARING LOSS, UNSPECIFIED EAR: ICD-10-CM

## 2019-07-11 DIAGNOSIS — Z95.9 PRESENCE OF CARDIAC AND VASCULAR IMPLANT AND GRAFT, UNSPECIFIED: Chronic | ICD-10-CM

## 2019-07-12 NOTE — PHYSICAL EXAM
[General Appearance - Alert] : alert [Sclera] : the sclera and conjunctiva were normal [Outer Ear] : the ears and nose were normal in appearance [Bowel Sounds] : normal bowel sounds [Abdomen Soft] : soft [Abdomen Tenderness] : non-tender [Oriented To Time, Place, And Person] : oriented to person, place, and time [FreeTextEntry1] : Mildly distended ?Ascites

## 2019-07-12 NOTE — REVIEW OF SYSTEMS
[Negative] : Respiratory [Recent Weight Gain (___ Lbs)] : no recent weight gain [FreeTextEntry7] : Ascites

## 2019-07-12 NOTE — ASSESSMENT
[FreeTextEntry1] : lab reportswhite blood cell  2.7. Hemoglobin 9.1. [id number]  Platelet count 87. BUN  15 creatinine 1.1sodium 141 potassium 3. chloride 112 carbon dioxide 24  calcium 8.1 total protein 6 total bilirubin 2.1 alkaline phosphatase 117 AST 37 ALT 17 INR\par Sonogramdone on Raissa 3, 201 impression no gallstones no dilated ducts new moderate abodminal ascites hepatomegaly with a nodular contour c/w cirrhosis, no focal hepatic lesion, splenomegaly .

## 2019-07-12 NOTE — HISTORY OF PRESENT ILLNESS
[de-identified] : Patient is a 69 year old male with history of hypertension diabetes atrial fibrillation on Coumadin history of cardiac stents 15 years ago who now presents with cirrhosis most likely due to JIMÉNEZ. He complains of swelling of the abdomen and swelling of the feet denies any mental confusion and has not had any episodes of hematemesis. He was recently in the hospital during which time he had an upper endoscopy. He is being evaluated by  at [facility] for a possible liver transplant

## 2019-07-16 ENCOUNTER — INPATIENT (INPATIENT)
Facility: HOSPITAL | Age: 69
LOS: 2 days | Discharge: HOME | End: 2019-07-19
Attending: INTERNAL MEDICINE | Admitting: INTERNAL MEDICINE
Payer: MEDICARE

## 2019-07-16 VITALS
DIASTOLIC BLOOD PRESSURE: 58 MMHG | TEMPERATURE: 97 F | RESPIRATION RATE: 20 BRPM | SYSTOLIC BLOOD PRESSURE: 121 MMHG | OXYGEN SATURATION: 98 % | HEART RATE: 76 BPM

## 2019-07-16 DIAGNOSIS — Z96.653 PRESENCE OF ARTIFICIAL KNEE JOINT, BILATERAL: Chronic | ICD-10-CM

## 2019-07-16 DIAGNOSIS — Z95.9 PRESENCE OF CARDIAC AND VASCULAR IMPLANT AND GRAFT, UNSPECIFIED: Chronic | ICD-10-CM

## 2019-07-16 DIAGNOSIS — Z12.11 ENCOUNTER FOR SCREENING FOR MALIGNANT NEOPLASM OF COLON: Chronic | ICD-10-CM

## 2019-07-16 LAB
ALBUMIN SERPL ELPH-MCNC: 2.5 G/DL — LOW (ref 3.5–5.2)
ALP SERPL-CCNC: 116 U/L — HIGH (ref 30–115)
ALT FLD-CCNC: 32 U/L — SIGNIFICANT CHANGE UP (ref 0–41)
AMMONIA BLD-MCNC: 94 UMOL/L — HIGH (ref 11–55)
ANION GAP SERPL CALC-SCNC: 13 MMOL/L — SIGNIFICANT CHANGE UP (ref 7–14)
APPEARANCE UR: CLEAR — SIGNIFICANT CHANGE UP
APTT BLD: 45.1 SEC — HIGH (ref 27–39.2)
AST SERPL-CCNC: 66 U/L — HIGH (ref 0–41)
BASOPHILS # BLD AUTO: 0.04 K/UL — SIGNIFICANT CHANGE UP (ref 0–0.2)
BASOPHILS NFR BLD AUTO: 1.2 % — HIGH (ref 0–1)
BILIRUB SERPL-MCNC: 2.7 MG/DL — HIGH (ref 0.2–1.2)
BILIRUB UR-MCNC: NEGATIVE — SIGNIFICANT CHANGE UP
BLD GP AB SCN SERPL QL: SIGNIFICANT CHANGE UP
BUN SERPL-MCNC: 22 MG/DL — HIGH (ref 10–20)
CALCIUM SERPL-MCNC: 8.8 MG/DL — SIGNIFICANT CHANGE UP (ref 8.5–10.1)
CHLORIDE SERPL-SCNC: 106 MMOL/L — SIGNIFICANT CHANGE UP (ref 98–110)
CO2 SERPL-SCNC: 21 MMOL/L — SIGNIFICANT CHANGE UP (ref 17–32)
COLOR SPEC: YELLOW — SIGNIFICANT CHANGE UP
CREAT SERPL-MCNC: 1.6 MG/DL — HIGH (ref 0.7–1.5)
DIFF PNL FLD: NEGATIVE — SIGNIFICANT CHANGE UP
EOSINOPHIL # BLD AUTO: 0.14 K/UL — SIGNIFICANT CHANGE UP (ref 0–0.7)
EOSINOPHIL NFR BLD AUTO: 4.1 % — SIGNIFICANT CHANGE UP (ref 0–8)
GLUCOSE BLDC GLUCOMTR-MCNC: 173 MG/DL — HIGH (ref 70–99)
GLUCOSE SERPL-MCNC: 96 MG/DL — SIGNIFICANT CHANGE UP (ref 70–99)
GLUCOSE UR QL: NEGATIVE — SIGNIFICANT CHANGE UP
HCT VFR BLD CALC: 28.4 % — LOW (ref 42–52)
HGB BLD-MCNC: 9.4 G/DL — LOW (ref 14–18)
IMM GRANULOCYTES NFR BLD AUTO: 0.3 % — SIGNIFICANT CHANGE UP (ref 0.1–0.3)
INR BLD: 3.94 RATIO — HIGH (ref 0.65–1.3)
KETONES UR-MCNC: NEGATIVE — SIGNIFICANT CHANGE UP
LACTATE SERPL-SCNC: 2.7 MMOL/L — HIGH (ref 0.5–2.2)
LEUKOCYTE ESTERASE UR-ACNC: NEGATIVE — SIGNIFICANT CHANGE UP
LYMPHOCYTES # BLD AUTO: 0.68 K/UL — LOW (ref 1.2–3.4)
LYMPHOCYTES # BLD AUTO: 20.1 % — LOW (ref 20.5–51.1)
MCHC RBC-ENTMCNC: 30.1 PG — SIGNIFICANT CHANGE UP (ref 27–31)
MCHC RBC-ENTMCNC: 33.1 G/DL — SIGNIFICANT CHANGE UP (ref 32–37)
MCV RBC AUTO: 91 FL — SIGNIFICANT CHANGE UP (ref 80–94)
MONOCYTES # BLD AUTO: 0.56 K/UL — SIGNIFICANT CHANGE UP (ref 0.1–0.6)
MONOCYTES NFR BLD AUTO: 16.6 % — HIGH (ref 1.7–9.3)
NEUTROPHILS # BLD AUTO: 1.95 K/UL — SIGNIFICANT CHANGE UP (ref 1.4–6.5)
NEUTROPHILS NFR BLD AUTO: 57.7 % — SIGNIFICANT CHANGE UP (ref 42.2–75.2)
NITRITE UR-MCNC: NEGATIVE — SIGNIFICANT CHANGE UP
NRBC # BLD: 0 /100 WBCS — SIGNIFICANT CHANGE UP (ref 0–0)
NT-PROBNP SERPL-SCNC: 183 PG/ML — SIGNIFICANT CHANGE UP (ref 0–300)
PH UR: 5.5 — SIGNIFICANT CHANGE UP (ref 5–8)
PLATELET # BLD AUTO: 91 K/UL — LOW (ref 130–400)
POTASSIUM SERPL-MCNC: 3.8 MMOL/L — SIGNIFICANT CHANGE UP (ref 3.5–5)
POTASSIUM SERPL-SCNC: 3.8 MMOL/L — SIGNIFICANT CHANGE UP (ref 3.5–5)
PROT SERPL-MCNC: 7.4 G/DL — SIGNIFICANT CHANGE UP (ref 6–8)
PROT UR-MCNC: NEGATIVE — SIGNIFICANT CHANGE UP
PROTHROM AB SERPL-ACNC: >40 SEC — HIGH (ref 9.95–12.87)
RBC # BLD: 3.12 M/UL — LOW (ref 4.7–6.1)
RBC # FLD: 17.4 % — HIGH (ref 11.5–14.5)
SODIUM SERPL-SCNC: 140 MMOL/L — SIGNIFICANT CHANGE UP (ref 135–146)
SP GR SPEC: 1.01 — SIGNIFICANT CHANGE UP (ref 1.01–1.02)
TROPONIN T SERPL-MCNC: <0.01 NG/ML — SIGNIFICANT CHANGE UP
UROBILINOGEN FLD QL: SIGNIFICANT CHANGE UP
WBC # BLD: 3.38 K/UL — LOW (ref 4.8–10.8)
WBC # FLD AUTO: 3.38 K/UL — LOW (ref 4.8–10.8)

## 2019-07-16 PROCEDURE — 70450 CT HEAD/BRAIN W/O DYE: CPT | Mod: 26

## 2019-07-16 PROCEDURE — 99284 EMERGENCY DEPT VISIT MOD MDM: CPT

## 2019-07-16 PROCEDURE — 71045 X-RAY EXAM CHEST 1 VIEW: CPT | Mod: 26

## 2019-07-16 PROCEDURE — 93010 ELECTROCARDIOGRAM REPORT: CPT

## 2019-07-16 RX ORDER — SODIUM CHLORIDE 9 MG/ML
500 INJECTION INTRAMUSCULAR; INTRAVENOUS; SUBCUTANEOUS ONCE
Refills: 0 | Status: COMPLETED | OUTPATIENT
Start: 2019-07-16 | End: 2019-07-16

## 2019-07-16 RX ORDER — SUCRALFATE 1 G
1 TABLET ORAL
Qty: 0 | Refills: 0 | DISCHARGE

## 2019-07-16 RX ORDER — CEFTRIAXONE 500 MG/1
2 INJECTION, POWDER, FOR SOLUTION INTRAMUSCULAR; INTRAVENOUS EVERY 24 HOURS
Refills: 0 | Status: DISCONTINUED | OUTPATIENT
Start: 2019-07-16 | End: 2019-07-18

## 2019-07-16 RX ORDER — FLECAINIDE ACETATE 50 MG
1 TABLET ORAL
Qty: 0 | Refills: 0 | DISCHARGE

## 2019-07-16 RX ORDER — PANTOPRAZOLE SODIUM 20 MG/1
40 TABLET, DELAYED RELEASE ORAL
Refills: 0 | Status: DISCONTINUED | OUTPATIENT
Start: 2019-07-16 | End: 2019-07-19

## 2019-07-16 RX ORDER — LACTULOSE 10 G/15ML
30 SOLUTION ORAL ONCE
Refills: 0 | Status: COMPLETED | OUTPATIENT
Start: 2019-07-16 | End: 2019-07-16

## 2019-07-16 RX ORDER — OMEGA-3 ACID ETHYL ESTERS 1 G
2 CAPSULE ORAL
Refills: 0 | Status: DISCONTINUED | OUTPATIENT
Start: 2019-07-16 | End: 2019-07-16

## 2019-07-16 RX ORDER — ATORVASTATIN CALCIUM 80 MG/1
40 TABLET, FILM COATED ORAL AT BEDTIME
Refills: 0 | Status: DISCONTINUED | OUTPATIENT
Start: 2019-07-16 | End: 2019-07-17

## 2019-07-16 RX ORDER — LACTULOSE 10 G/15ML
30 SOLUTION ORAL EVERY 4 HOURS
Refills: 0 | Status: DISCONTINUED | OUTPATIENT
Start: 2019-07-16 | End: 2019-07-18

## 2019-07-16 RX ADMIN — LACTULOSE 30 GRAM(S): 10 SOLUTION ORAL at 23:25

## 2019-07-16 RX ADMIN — SODIUM CHLORIDE 500 MILLILITER(S): 9 INJECTION INTRAMUSCULAR; INTRAVENOUS; SUBCUTANEOUS at 16:54

## 2019-07-16 RX ADMIN — SODIUM CHLORIDE 500 MILLILITER(S): 9 INJECTION INTRAMUSCULAR; INTRAVENOUS; SUBCUTANEOUS at 16:03

## 2019-07-16 NOTE — H&P ADULT - HISTORY OF PRESENT ILLNESS
68 yo Man with PMH of AFIB on Coumadin, HTN, hyperlipidemia, CAD s/p 2 stents years back, BPH, DM type II, JIMÉNEZ cirrhosis complicated by ascites p/w worsening mental status -2 days duration     as per family patient started to have unsteady gait - 70 yo Man with PMH of AFIB on Coumadin, HTN, hyperlipidemia, CAD s/p 2 stents years back, BPH, DM type II, JIMÉNEZ cirrhosis complicated by ascites p/w worsening mental status -2 days duration     as per family patient started to have unsteady gait for last couple of days - with forgetfulness episodes of short term events - however no disorientation episode he is still oriented to time - place and person   no recent fever - chills - URTI - no abdominal pain or worsening distention or increased abdominal girth - to note patient is not on lactulose at home     in ED vitals were stable and he was admitted for treatment of AMS in setting of JIMÉNEZ   when examined patient was awake and alert AAO*3 but he would forget why he was admitted so waxing and waning mental status

## 2019-07-16 NOTE — ED PROVIDER NOTE - PHYSICAL EXAMINATION
pt in NAD,   AAOx3,   head NC/AT,   CN II-XII intact,   lungs CTA B/L,   CV S1S2 regular,   abdomen soft/NT/ND/(+)BS,   ext (-) edema,  motor 5/5x4,   sensation intact

## 2019-07-16 NOTE — H&P ADULT - ASSESSMENT
68 yo Man with PMH of AFIB on Coumadin, HTN, hyperlipidemia, CAD s/p 2 stents years back, BPH, DM type II, JIMÉNEZ cirrhosis complicated by ascites p/w worsening confusion and mental status     ** worsening mental status in setting of known cirrhosis from JIMÉNEZ   likely from disease progression especially that not on lactulose - INR is elevated but likely from coumadin   ammonia 94   started on lactulose - titrate as needed to target 3 bm per day   fu GI consult   MELD Na 30 on 7/16/19 with 27-32% mortality risk   low Na diet, i/o, daily weight   daily LFTs and INR   no signs of infection - however will start ceftriaxone- stop if Cx -ve   fu US abdomen and do abdominal tap to r/o SBP   o/p referral for liver transplant center (Pt follows with  Dr Ross)    ** CHESTER Cr1.6 baseline 1.2  will hold lasix and aldactone   keep strict intake output  fu FE urea and urine studies   fu US kidney   fu Cr in am     ** Afib   now in sinus   not on HR control med   INR >3.9 - hold coumadin now   fu coumadin in am and start DVT px if drops     *** CAD s/p PCI   last echo nl EF  confirmed home meds not on aspirin   on statin     **chroinc thrombocytopenia   secondary to cirrhosis   transfuse if < 01812     ** asymmetric LE swelling   fu LE duplex --less likely DVT     DVT px INR 3.9 (likely from coumadin if does not correct in coming days - likely progression of  liver disease - so start DVT px)   DIet low Na 70 yo Man with PMH of AFIB on Coumadin, HTN, hyperlipidemia, CAD s/p 2 stents years back, BPH, DM type II, JIMÉNEZ cirrhosis complicated by ascites p/w worsening confusion and mental status     ** worsening mental status in setting of known cirrhosis from JIMÉNEZ   likely from disease progression especially that not on lactulose -   hepatic encephalopathy -ammonia 94   started on lactulose - titrate as needed to target 3 bm per day   fu GI consult   MELD Na 30 on 7/16/19 with 27-32% mortality risk at 90 days ( though INR likely elevated from coumadin )   low Na diet, i/o, daily weight   daily LFTs and INR   started on ceftriaxone --fu US abdomen and do abdominal tap to r/o SBP   o/p referral for liver transplant center (Pt follows with  Dr Ross)    ** CHESTER Cr1.6 baseline 1.2  will hold lasix and aldactone   keep strict intake output  fu FE urea and urine studies   fu US kidney   fu Cr in am     ** Afib   now in sinus   not on HR control med - stopped Flecainide   INR >3.9 - hold coumadin now   fu coumadin in am and start DVT px if drops     *** CAD s/p PCI   last echo nl EF  confirmed home meds not on aspirin   on statin   ?? NOT ON ASPIRIN - checked with daughter and patient confirmed no aspirin     **chroinc thrombocytopenia   secondary to cirrhosis   transfuse if < 60374     ** asymmetric LE swelling   fu LE duplex --less likely DVT     DVT px INR 3.9 (likely from coumadin if does not correct in coming days - likely progression of  liver disease - so start DVT px)   DIet low Na   full code - from home 68 yo Man with PMH of AFIB on Coumadin, HTN, hyperlipidemia, CAD s/p 2 stents years back, BPH, DM type II, JIMÉNEZ cirrhosis complicated by ascites p/w worsening confusion and mental status     ** worsening mental status in setting of known cirrhosis from JIMÉNEZ   likely from disease progression especially that not on lactulose -   hepatic encephalopathy -ammonia 94   started on lactulose - titrate as needed to target 3 bm per day   fu GI consult   MELD Na 30 on 7/16/19 with 27-32% mortality risk at 90 days ( though INR likely elevated from coumadin )   low Na diet, i/o, daily weight   daily LFTs and INR   started on ceftriaxone --fu US abdomen and do abdominal tap to r/o SBP   o/p referral for liver transplant center (Pt follows with  Dr Ross)    ** CHESTER Cr1.6 baseline 1.2  will hold lasix and aldactone   keep strict intake output  fu FE urea and urine studies   fu US kidney   fu Cr in am     ** Afib   now in sinus   not on HR control med - stopped Flecainide   INR >3.9 - hold coumadin now   fu coumadin in am and start DVT px if drops     *** CAD s/p PCI   last echo nl EF  confirmed home meds not on aspirin   on statin - held for decompensated liver cirrhosis   ?? NOT ON ASPIRIN - checked with daughter and patient confirmed no aspirin     **chroinc thrombocytopenia   secondary to cirrhosis   transfuse if < 25778     ** asymmetric LE swelling   fu LE duplex --less likely DVT     DVT px INR 3.9 (likely from coumadin if does not correct in coming days - likely progression of  liver disease - so start DVT px)   DIet low Na   full code - from home 68 yo Man with PMH of AFIB on Coumadin, HTN, hyperlipidemia, CAD s/p 2 stents years back, BPH, DM type II, JIMÉNEZ cirrhosis complicated by ascites p/w worsening confusion and mental status     ** worsening mental status in setting of known cirrhosis from JIMÉNEZ   likely from disease progression especially that not on lactulose -   hepatic encephalopathy -ammonia 94   started on lactulose - titrate as needed to target 3 bm per day   fu GI consult   MELD Na 30 on 7/16/19 with 27-32% mortality risk at 90 days ( though INR likely elevated from coumadin )   low Na diet, i/o, daily weight   daily LFTs and INR --if INR does not correct than likely disease progression - not coumadin induced and vitamin K shld be given   started on ceftriaxone --fu US abdomen and do abdominal tap to r/o SBP   o/p referral for liver transplant center (Pt follows with  Dr Ross)    ** CHESTER Cr1.6 baseline 1.2  likely pre-renal  will hold lasix and aldactone   keep strict intake output  fu FE urea and urine studies   fu US kidney   fu Cr in am     ** Afib   now in sinus   not on HR control med - not on Flecainide as per patient and family  INR >3.9 - hold coumadin now   fu coumadin in am and start DVT px if drops and if it doesn't drop then likely from disease progression and shld be placed on DVT px regardless of INR     *** CAD s/p PCI (old PCI )  last echo nl EF  confirmed home meds not on aspirin   on statin - held for decompensated liver cirrhosis   ? Not on aspirin- checked with daughter and patient confirmed no aspirin     **chroinc thrombocytopenia   secondary to cirrhosis   transfuse if < 35751     ** asymmetric LE swelling   fu LE duplex --less likely DVT     DVT px INR 3.9 (likely from coumadin if does not correct in coming days - likely progression of  liver disease - so start DVT px)   DIet low Na   full code - from home 68 yo Man with PMH of AFIB on Coumadin, HTN, hyperlipidemia, CAD s/p 2 stents years back, BPH, DM type II, JIMÉNEZ cirrhosis complicated by ascites p/w worsening confusion and mental status     ** worsening mental status in setting of known cirrhosis from JIMÉNEZ   likely from disease progression especially that not on lactulose -   hepatic encephalopathy -ammonia 94   started on lactulose - titrate as needed to target 3 bm per day + rifaximin 550 mg BID  fu GI consult   MELD Na 30 on 7/16/19 with 27-32% mortality risk at 90 days ( though INR likely elevated from coumadin )   low Na diet, i/o, daily weight   daily LFTs and INR --if INR does not correct than likely disease progression - not coumadin induced and vitamin K shld be given   started on ceftriaxone --fu US abdomen and do abdominal tap to r/o SBP   o/p referral for liver transplant center (Pt follows with  Dr Ross)    ** elevated INR   multifactorial -- progression of liver disease / VItamin K deficiency / coumadin induced   would watch the trend - if still worsening would give Vitamin K   and start DVT px regardless of INR as these patients are pro-coagulant (and then it is not the coumadin effect)    ** CHESTER Cr1.6 baseline 1.2  likely pre-renal  will hold lasix and aldactone   keep strict intake output  fu FE urea and urine studies   fu US kidney   fu Cr in am     ** Afib   now in sinus   not on HR control med - not on Flecainide as per patient and family  INR >3.9 - hold coumadin now   fu coumadin in am and start DVT px if drops and if it doesn't drop then likely from disease progression and shld be placed on DVT px regardless of INR     *** CAD s/p PCI (old PCI )  last echo nl EF  confirmed home meds not on aspirin   on statin - held for decompensated liver cirrhosis   ? Not on aspirin- checked with daughter and patient confirmed no aspirin --- s    **chroinc thrombocytopenia   secondary to cirrhosis   transfuse if < 14164     ** asymmetric LE swelling   fu LE duplex --less likely DVT     DVT px INR 3.9 (likely from coumadin if does not correct in coming days - likely progression of  liver disease - so start DVT px)   DIet low Na   full code - from home

## 2019-07-16 NOTE — H&P ADULT - ATTENDING COMMENTS
pt seen and examined independently, I have read and agree with above exam and poa,    pt well know to us    sent for evaluation of confusion, lethargy,  vss noted  ms back to baseline axo x3  pa distended soft    hepatic encephalopathy  r/o sbp  h/o JIMÉNEZ  cad  hyperlipidemia  afib  thrombocytopenia  coagulopathy    iv rocephin  paracentesis  id eval  gi eval  continue lactulose  if para negative dc pt in am

## 2019-07-16 NOTE — ED ADULT TRIAGE NOTE - CHIEF COMPLAINT QUOTE
Pt with hx of liver cirrhosis, sent by specialist for increasing confusion and possible elevated amnioma level. As per family, symptoms started Friday and has been worsening since. Family also reports lethargy and loss of appetite.

## 2019-07-16 NOTE — H&P ADULT - NSHPLABSRESULTS_GEN_ALL_CORE
9.4    3.38  )-----------( 91       ( 16 Jul 2019 14:55 )             28.4   07-16    140  |  106  |  22<H>  ----------------------------<  96  3.8   |  21  |  1.6<H>    Ca    8.8      16 Jul 2019 14:55    TPro  7.4  /  Alb  2.5<L>  /  TBili  2.7<H>  /  DBili  x   /  AST  66<H>  /  ALT  32  /  AlkPhos  116<H>  07-16    PT/INR - ( 16 Jul 2019 14:55 )   PT: >40.00 sec;   INR: 3.94 ratio         PTT - ( 16 Jul 2019 14:55 )  PTT:45.1 sec

## 2019-07-16 NOTE — ED ADULT NURSE NOTE - PMH
Afib    Anemia    BPH (benign prostatic hyperplasia)    Cirrhosis of liver  JIMÉNEZ  Diabetes    Dyspnea on exertion    High cholesterol    HTN (hypertension)

## 2019-07-16 NOTE — ED PROVIDER NOTE - OBJECTIVE STATEMENT
69 year old male PMH JIMÉNEZ cirrhosis with varices, HTN, HLD, DM, CHF, afib on coumadin BIB family for evaluation of episodes of confusion, memory loss, urinating on himself since Thursday (5days). Pt is at baseline now. No fever/chills, HA, n/v/c/d, CP/SOB, abdominal pain. No leg swelling.

## 2019-07-16 NOTE — H&P ADULT - NSHPPHYSICALEXAM_GEN_ALL_CORE
GENERAL: NAD, well-developed, AAOx3 knows president - knows his medications   HEENT:  Atraumatic, Normocephalic. EOMI, PERRLA, conjunctiva and sclera clear, No JVD  PULMONARY: Clear to auscultation bilaterally; No wheeze  CARDIOVASCULAR: Regular rate and rhythm; No murmurs, rubs, or gallops  GASTROINTESTINAL: Soft, Nontender, distented ; Bowel sounds present  MUSCULOSKELETAL:  2+ Peripheral Pulses, LE edema mainly in LLE   NEUROLOGY: non-focal  SKIN: No rashes or lesions

## 2019-07-17 ENCOUNTER — APPOINTMENT (OUTPATIENT)
Dept: CARDIOLOGY | Facility: CLINIC | Age: 69
End: 2019-07-17

## 2019-07-17 LAB
ALBUMIN SERPL ELPH-MCNC: 2.2 G/DL — LOW (ref 3.5–5.2)
ALP SERPL-CCNC: 98 U/L — SIGNIFICANT CHANGE UP (ref 30–115)
ALT FLD-CCNC: 26 U/L — SIGNIFICANT CHANGE UP (ref 0–41)
ANION GAP SERPL CALC-SCNC: 12 MMOL/L — SIGNIFICANT CHANGE UP (ref 7–14)
APTT BLD: 43.7 SEC — HIGH (ref 27–39.2)
AST SERPL-CCNC: 55 U/L — HIGH (ref 0–41)
BASOPHILS # BLD AUTO: 0.03 K/UL — SIGNIFICANT CHANGE UP (ref 0–0.2)
BASOPHILS NFR BLD AUTO: 1.3 % — HIGH (ref 0–1)
BILIRUB SERPL-MCNC: 2.7 MG/DL — HIGH (ref 0.2–1.2)
BUN SERPL-MCNC: 21 MG/DL — HIGH (ref 10–20)
CALCIUM SERPL-MCNC: 8.3 MG/DL — LOW (ref 8.5–10.1)
CHLORIDE SERPL-SCNC: 108 MMOL/L — SIGNIFICANT CHANGE UP (ref 98–110)
CO2 SERPL-SCNC: 21 MMOL/L — SIGNIFICANT CHANGE UP (ref 17–32)
CREAT ?TM UR-MCNC: 212 MG/DL — SIGNIFICANT CHANGE UP
CREAT SERPL-MCNC: 1.4 MG/DL — SIGNIFICANT CHANGE UP (ref 0.7–1.5)
EOSINOPHIL # BLD AUTO: 0.08 K/UL — SIGNIFICANT CHANGE UP (ref 0–0.7)
EOSINOPHIL NFR BLD AUTO: 3.5 % — SIGNIFICANT CHANGE UP (ref 0–8)
GLUCOSE BLDC GLUCOMTR-MCNC: 100 MG/DL — HIGH (ref 70–99)
GLUCOSE BLDC GLUCOMTR-MCNC: 104 MG/DL — HIGH (ref 70–99)
GLUCOSE BLDC GLUCOMTR-MCNC: 123 MG/DL — HIGH (ref 70–99)
GLUCOSE BLDC GLUCOMTR-MCNC: 87 MG/DL — SIGNIFICANT CHANGE UP (ref 70–99)
GLUCOSE SERPL-MCNC: 82 MG/DL — SIGNIFICANT CHANGE UP (ref 70–99)
HCT VFR BLD CALC: 25 % — LOW (ref 42–52)
HGB BLD-MCNC: 8.2 G/DL — LOW (ref 14–18)
IMM GRANULOCYTES NFR BLD AUTO: 0.4 % — HIGH (ref 0.1–0.3)
INR BLD: 4.11 RATIO — HIGH (ref 0.65–1.3)
LYMPHOCYTES # BLD AUTO: 0.55 K/UL — LOW (ref 1.2–3.4)
LYMPHOCYTES # BLD AUTO: 23.9 % — SIGNIFICANT CHANGE UP (ref 20.5–51.1)
MAGNESIUM SERPL-MCNC: 1.5 MG/DL — LOW (ref 1.8–2.4)
MCHC RBC-ENTMCNC: 30.6 PG — SIGNIFICANT CHANGE UP (ref 27–31)
MCHC RBC-ENTMCNC: 32.8 G/DL — SIGNIFICANT CHANGE UP (ref 32–37)
MCV RBC AUTO: 93.3 FL — SIGNIFICANT CHANGE UP (ref 80–94)
MONOCYTES # BLD AUTO: 0.32 K/UL — SIGNIFICANT CHANGE UP (ref 0.1–0.6)
MONOCYTES NFR BLD AUTO: 13.9 % — HIGH (ref 1.7–9.3)
NEUTROPHILS # BLD AUTO: 1.31 K/UL — LOW (ref 1.4–6.5)
NEUTROPHILS NFR BLD AUTO: 57 % — SIGNIFICANT CHANGE UP (ref 42.2–75.2)
NRBC # BLD: 0 /100 WBCS — SIGNIFICANT CHANGE UP (ref 0–0)
PLATELET # BLD AUTO: 79 K/UL — LOW (ref 130–400)
POTASSIUM SERPL-MCNC: 3.5 MMOL/L — SIGNIFICANT CHANGE UP (ref 3.5–5)
POTASSIUM SERPL-SCNC: 3.5 MMOL/L — SIGNIFICANT CHANGE UP (ref 3.5–5)
PROT SERPL-MCNC: 6.7 G/DL — SIGNIFICANT CHANGE UP (ref 6–8)
PROTHROM AB SERPL-ACNC: >40 SEC — HIGH (ref 9.95–12.87)
RBC # BLD: 2.68 M/UL — LOW (ref 4.7–6.1)
RBC # FLD: 17.6 % — HIGH (ref 11.5–14.5)
SODIUM SERPL-SCNC: 141 MMOL/L — SIGNIFICANT CHANGE UP (ref 135–146)
SODIUM UR-SCNC: 108 MMOL/L — SIGNIFICANT CHANGE UP
UUN UR-MCNC: 1191 MG/DL — SIGNIFICANT CHANGE UP
WBC # BLD: 2.3 K/UL — LOW (ref 4.8–10.8)
WBC # FLD AUTO: 2.3 K/UL — LOW (ref 4.8–10.8)

## 2019-07-17 PROCEDURE — 76770 US EXAM ABDO BACK WALL COMP: CPT | Mod: 26

## 2019-07-17 PROCEDURE — 71045 X-RAY EXAM CHEST 1 VIEW: CPT | Mod: 26

## 2019-07-17 PROCEDURE — 76705 ECHO EXAM OF ABDOMEN: CPT | Mod: 26

## 2019-07-17 RX ADMIN — LACTULOSE 30 GRAM(S): 10 SOLUTION ORAL at 13:33

## 2019-07-17 RX ADMIN — PANTOPRAZOLE SODIUM 40 MILLIGRAM(S): 20 TABLET, DELAYED RELEASE ORAL at 05:19

## 2019-07-17 RX ADMIN — LACTULOSE 30 GRAM(S): 10 SOLUTION ORAL at 09:05

## 2019-07-17 RX ADMIN — LACTULOSE 30 GRAM(S): 10 SOLUTION ORAL at 05:19

## 2019-07-17 RX ADMIN — CEFTRIAXONE 100 MILLIGRAM(S): 500 INJECTION, POWDER, FOR SOLUTION INTRAMUSCULAR; INTRAVENOUS at 05:19

## 2019-07-17 RX ADMIN — LACTULOSE 30 GRAM(S): 10 SOLUTION ORAL at 22:21

## 2019-07-17 RX ADMIN — LACTULOSE 30 GRAM(S): 10 SOLUTION ORAL at 17:33

## 2019-07-17 NOTE — PROGRESS NOTE ADULT - ASSESSMENT
# AMS  patient is KTH JIMÉNEZ cirrhosis. most likely HE amonia 94  started on lactulose. doing much better today although he did not have any bowel movement yet  labs showed no leukocytosis  U/S abdomen pending  Possible paracentesis to R/O SBP. the patient denies any abdominal pain and PE showed no tenderness  F/U GI   F/U blood cultures  receiving ceftriaxone  ct head normal  monitor I/O    #CHESTER # AMS  patient is KTH JIMÉNEZ cirrhosis. most likely HE amonia 94  started on lactulose. doing much better today although he did not have any bowel movement yet  labs showed no leukocytosis  U/S abdomen pending  Possible paracentesis to R/O SBP. the patient denies any abdominal pain and PE showed no tenderness  F/U GI   F/U blood cultures  receiving ceftriaxone  ct head normal  monitor I/O    #CHESTER  likely prerenal  hold lasix and aldactone  f/u crea u/s kidneys  patient has resumed eating. can give iv fluids if it continues to rise.    #Afib  INR today 4.11  hold comadin today  f/u INR    # LLE  no pain, hotness or redness.  less likely DVT  f/u u/s     # Thrombocytopenia and anemia  most likely due to cirrhosis  transfuse if hb<8 and plat<40200

## 2019-07-17 NOTE — PROGRESS NOTE ADULT - SUBJECTIVE AND OBJECTIVE BOX
NEPHROLOGY CONSULTATION NOTE    10 yo Man with PMH of AFIB on Coumadin, HTN, hyperlipidemia, CAD s/p 2 stents years back, BPH, DM type II, JIMÉNEZ cirrhosis complicated by ascites p/w worsening mental status -2 days duration     as per family patient started to have unsteady gait for last couple of days - with forgetfulness episodes of short term events - however no disorientation episode he is still oriented to time - place and person   no recent fever - chills - URTI - no abdominal pain or worsening distention or increased abdominal girth - to note patient is not on lactulose at home     in ED vitals were stable and he was admitted for treatment of AMS in setting of JIMÉNEZ   when examined patient was awake and alert AAO*3 but he would forget why he was admitted so waxing and waning mental status        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  GASTROINTESTINAL: No abdominal or epigastric pain. No nausea, vomiting, or hematemesis; No diarrhea or constipation. No melena or hematochezia.  GENITOURINARY: No dysuria, frequency, foamy urine, urinary urgency, incontinence or hematuria  NEUROLOGICAL: No numbness or weakness  SKIN: No itching, burning, rashes, or lesions   VASCULAR: No bilateral lower extremity edema.   All other review of systems is negative unless indicated above.    PHYSICAL EXAM:  NAD  awake and alert  moist mm  no jvd   cta bl  rrr  soft, nt  no cvat  mild distention  no edema  no rash    Hospital Medications:   MEDICATIONS  (STANDING):  cefTRIAXone   IVPB 2 milliGRAM(s) IV Intermittent every 24 hours  lactulose Syrup 30 Gram(s) Oral every 4 hours  pantoprazole    Tablet 40 milliGRAM(s) Oral before breakfast  rifaximin 550 milliGRAM(s) Oral two times a day        VITALS:  T(F): 96.5 (19 @ 13:34), Max: 98.1 (19 @ 05:27)  HR: 75 (19 @ 13:34)  BP: 116/62 (19 @ 13:34)  RR: 18 (19 @ 13:34)  SpO2: 95% (19 @ 20:53)  Wt(kg): --    Height (cm): 177.8 ( @ 20:53)  Weight (kg): 94.3 ( @ 20:53)  BMI (kg/m2): 29.8 ( @ 20:53)  BSA (m2): 2.12 ( @ 20:53)    LABS:      141  |  108  |  21<H>  ----------------------------<  82  3.5   |  21  |  1.4    Ca    8.3<L>      2019 08:18  Mg     1.5         TPro  6.7  /  Alb  2.2<L>  /  TBili  2.7<H>  /  DBili      /  AST  55<H>  /  ALT  26  /  AlkPhos  98                            8.2    2.30  )-----------( 79       ( 2019 08:18 )             25.0       Urine Studies:  Urinalysis Basic - ( 2019 15:00 )    Color: Yellow / Appearance: Clear / S.013 / pH:   Gluc:  / Ketone: Negative  / Bili: Negative / Urobili: <2 mg/dL   Blood:  / Protein: Negative / Nitrite: Negative   Leuk Esterase: Negative / RBC:  / WBC    Sq Epi:  / Non Sq Epi:  / Bacteria:       Sodium, Random Urine: 108.0 mmoL/L ( @ 06:00)  Creatinine, Random Urine: 212 mg/dL ( @ 06:00)      RADIOLOGY & ADDITIONAL STUDIES:

## 2019-07-17 NOTE — PROGRESS NOTE ADULT - SUBJECTIVE AND OBJECTIVE BOX
SUBJECTIVE:    Patient is a 69y old Male who presents with a chief complaint of confusion (2019 22:48)    Overnight Events: the patient was admitted for decrease mental status described as forgetfulness.   today the patient is doing well. He is oriented and aware.  No major complaints.     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     ALLERGIES:  No Known Allergies    MEDICATIONS:  STANDING MEDICATIONS  cefTRIAXone   IVPB 2 milliGRAM(s) IV Intermittent every 24 hours  lactulose Syrup 30 Gram(s) Oral every 4 hours  pantoprazole    Tablet 40 milliGRAM(s) Oral before breakfast    PRN MEDICATIONS    VITALS:   T(F): 98.1, Max: 98.1 (19 @ 05:27)  HR: 64 (64 - 85)  BP: 110/64 (93/55 - 121/58)  RR: 18 (18 - 20)  SpO2: 95% (95% - 98%)    LABS:                        9.4    3.38  )-----------( 91       ( 2019 14:55 )             28.4         140  |  106  |  22<H>  ----------------------------<  96  3.8   |  21  |  1.6<H>    Ca    8.8      2019 14:55    TPro  7.4  /  Alb  2.5<L>  /  TBili  2.7<H>  /  DBili  x   /  AST  66<H>  /  ALT  32  /  AlkPhos  116<H>      PT/INR - ( 2019 14:55 )   PT: >40.00 sec;   INR: 3.94 ratio         PTT - ( 2019 14:55 )  PTT:45.1 sec  Urinalysis Basic - ( 2019 15:00 )    Color: Yellow / Appearance: Clear / S.013 / pH: x  Gluc: x / Ketone: Negative  / Bili: Negative / Urobili: <2 mg/dL   Blood: x / Protein: Negative / Nitrite: Negative   Leuk Esterase: Negative / RBC: x / WBC x   Sq Epi: x / Non Sq Epi: x / Bacteria: x        Troponin T, Serum: <0.01 ng/mL (19 @ 14:55)  Lactate, Blood: 2.7 mmol/L <H> (19 @ 14:55)      CARDIAC MARKERS ( 2019 14:55 )  x     / <0.01 ng/mL / x     / x     / x                  IMAGING/EKG:    PHYSICAL EXAM:  GEN: NAD, comfortable  LUNGS: CTAB, no w/r/r  HEART: RRR, s1 and s2 appreciated, no m/r/g  ABD: soft, distended. no tenderness  EXT: no edema, PP b/l  NEURO: AAOX3 SUBJECTIVE:    Patient is a 69y old Male who presents with a chief complaint of confusion (2019 22:48)    Overnight Events: the patient was admitted for decrease mental status described as forgetfulness.   today the patient is doing well. He is oriented and aware. He was having breakfast by himself.  No major complaints.     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     ALLERGIES:  No Known Allergies    MEDICATIONS:  STANDING MEDICATIONS  cefTRIAXone   IVPB 2 milliGRAM(s) IV Intermittent every 24 hours  lactulose Syrup 30 Gram(s) Oral every 4 hours  pantoprazole    Tablet 40 milliGRAM(s) Oral before breakfast    PRN MEDICATIONS    VITALS:   T(F): 98.1, Max: 98.1 (19 @ 05:27)  HR: 64 (64 - 85)  BP: 110/64 (93/55 - 121/58)  RR: 18 (18 - 20)  SpO2: 95% (95% - 98%)    LABS:                        9.4    3.38  )-----------( 91       ( 2019 14:55 )             28.4         140  |  106  |  22<H>  ----------------------------<  96  3.8   |  21  |  1.6<H>    Ca    8.8      2019 14:55    TPro  7.4  /  Alb  2.5<L>  /  TBili  2.7<H>  /  DBili  x   /  AST  66<H>  /  ALT  32  /  AlkPhos  116<H>      PT/INR - ( 2019 14:55 )   PT: >40.00 sec;   INR: 3.94 ratio         PTT - ( 2019 14:55 )  PTT:45.1 sec  Urinalysis Basic - ( 2019 15:00 )    Color: Yellow / Appearance: Clear / S.013 / pH: x  Gluc: x / Ketone: Negative  / Bili: Negative / Urobili: <2 mg/dL   Blood: x / Protein: Negative / Nitrite: Negative   Leuk Esterase: Negative / RBC: x / WBC x   Sq Epi: x / Non Sq Epi: x / Bacteria: x        Troponin T, Serum: <0.01 ng/mL (19 @ 14:55)  Lactate, Blood: 2.7 mmol/L <H> (19 @ 14:55)      CARDIAC MARKERS ( 2019 14:55 )  x     / <0.01 ng/mL / x     / x     / x                  IMAGING/EKG:    PHYSICAL EXAM:  GEN: NAD, comfortable  LUNGS: CTAB, no w/r/r  HEART: RRR, s1 and s2 appreciated, no m/r/g  ABD: soft, distended. no tenderness  EXT: no edema, PP b/l  NEURO: AAOX3

## 2019-07-17 NOTE — PROGRESS NOTE ADULT - ASSESSMENT
CHESTER   - improved  CKD 3  hypomagnesemia  hepatic encephalopathy  cirrhosis  JIMÉNEZ  moderate ascites  pancytopenia / splenomegaly  DM2  CAD / PCI / Afib on coumadin  BPH    plan:    NS 500cc bolus given  lasix and aldactone on hold  replete Mg++  lactulose / rifaximin  dc abx if no sign of infection  trend renal function  will follow

## 2019-07-18 LAB
ALBUMIN SERPL ELPH-MCNC: 2.4 G/DL — LOW (ref 3.5–5.2)
ALP SERPL-CCNC: 139 U/L — HIGH (ref 30–115)
ALT FLD-CCNC: 34 U/L — SIGNIFICANT CHANGE UP (ref 0–41)
ANION GAP SERPL CALC-SCNC: 10 MMOL/L — SIGNIFICANT CHANGE UP (ref 7–14)
AST SERPL-CCNC: 68 U/L — HIGH (ref 0–41)
BASOPHILS # BLD AUTO: 0.04 K/UL — SIGNIFICANT CHANGE UP (ref 0–0.2)
BASOPHILS NFR BLD AUTO: 1.1 % — HIGH (ref 0–1)
BILIRUB SERPL-MCNC: 1.9 MG/DL — HIGH (ref 0.2–1.2)
BUN SERPL-MCNC: 18 MG/DL — SIGNIFICANT CHANGE UP (ref 10–20)
CALCIUM SERPL-MCNC: 9 MG/DL — SIGNIFICANT CHANGE UP (ref 8.5–10.1)
CHLORIDE SERPL-SCNC: 108 MMOL/L — SIGNIFICANT CHANGE UP (ref 98–110)
CO2 SERPL-SCNC: 23 MMOL/L — SIGNIFICANT CHANGE UP (ref 17–32)
CREAT SERPL-MCNC: 1.3 MG/DL — SIGNIFICANT CHANGE UP (ref 0.7–1.5)
EOSINOPHIL # BLD AUTO: 0.2 K/UL — SIGNIFICANT CHANGE UP (ref 0–0.7)
EOSINOPHIL NFR BLD AUTO: 5.6 % — SIGNIFICANT CHANGE UP (ref 0–8)
GLUCOSE BLDC GLUCOMTR-MCNC: 104 MG/DL — HIGH (ref 70–99)
GLUCOSE BLDC GLUCOMTR-MCNC: 111 MG/DL — HIGH (ref 70–99)
GLUCOSE BLDC GLUCOMTR-MCNC: 79 MG/DL — SIGNIFICANT CHANGE UP (ref 70–99)
GLUCOSE BLDC GLUCOMTR-MCNC: 93 MG/DL — SIGNIFICANT CHANGE UP (ref 70–99)
GLUCOSE SERPL-MCNC: 144 MG/DL — HIGH (ref 70–99)
HCT VFR BLD CALC: 28.6 % — LOW (ref 42–52)
HGB BLD-MCNC: 9.2 G/DL — LOW (ref 14–18)
IMM GRANULOCYTES NFR BLD AUTO: 0.8 % — HIGH (ref 0.1–0.3)
INR BLD: 3.79 RATIO — HIGH (ref 0.65–1.3)
LYMPHOCYTES # BLD AUTO: 0.63 K/UL — LOW (ref 1.2–3.4)
LYMPHOCYTES # BLD AUTO: 17.7 % — LOW (ref 20.5–51.1)
MCHC RBC-ENTMCNC: 30.7 PG — SIGNIFICANT CHANGE UP (ref 27–31)
MCHC RBC-ENTMCNC: 32.2 G/DL — SIGNIFICANT CHANGE UP (ref 32–37)
MCV RBC AUTO: 95.3 FL — HIGH (ref 80–94)
MONOCYTES # BLD AUTO: 0.4 K/UL — SIGNIFICANT CHANGE UP (ref 0.1–0.6)
MONOCYTES NFR BLD AUTO: 11.3 % — HIGH (ref 1.7–9.3)
NEUTROPHILS # BLD AUTO: 2.25 K/UL — SIGNIFICANT CHANGE UP (ref 1.4–6.5)
NEUTROPHILS NFR BLD AUTO: 63.5 % — SIGNIFICANT CHANGE UP (ref 42.2–75.2)
NRBC # BLD: 0 /100 WBCS — SIGNIFICANT CHANGE UP (ref 0–0)
PLATELET # BLD AUTO: 96 K/UL — LOW (ref 130–400)
POTASSIUM SERPL-MCNC: 4 MMOL/L — SIGNIFICANT CHANGE UP (ref 3.5–5)
POTASSIUM SERPL-SCNC: 4 MMOL/L — SIGNIFICANT CHANGE UP (ref 3.5–5)
PROT SERPL-MCNC: 7.5 G/DL — SIGNIFICANT CHANGE UP (ref 6–8)
PROTHROM AB SERPL-ACNC: >40 SEC — HIGH (ref 9.95–12.87)
RBC # BLD: 3 M/UL — LOW (ref 4.7–6.1)
RBC # FLD: 18 % — HIGH (ref 11.5–14.5)
SODIUM SERPL-SCNC: 141 MMOL/L — SIGNIFICANT CHANGE UP (ref 135–146)
WBC # BLD: 3.55 K/UL — LOW (ref 4.8–10.8)
WBC # FLD AUTO: 3.55 K/UL — LOW (ref 4.8–10.8)

## 2019-07-18 PROCEDURE — 99222 1ST HOSP IP/OBS MODERATE 55: CPT

## 2019-07-18 PROCEDURE — 93970 EXTREMITY STUDY: CPT | Mod: 26

## 2019-07-18 RX ORDER — FUROSEMIDE 40 MG
20 TABLET ORAL DAILY
Refills: 0 | Status: DISCONTINUED | OUTPATIENT
Start: 2019-07-18 | End: 2019-07-19

## 2019-07-18 RX ORDER — SPIRONOLACTONE 25 MG/1
50 TABLET, FILM COATED ORAL DAILY
Refills: 0 | Status: DISCONTINUED | OUTPATIENT
Start: 2019-07-18 | End: 2019-07-19

## 2019-07-18 RX ORDER — LACTULOSE 10 G/15ML
30 SOLUTION ORAL EVERY 12 HOURS
Refills: 0 | Status: DISCONTINUED | OUTPATIENT
Start: 2019-07-18 | End: 2019-07-19

## 2019-07-18 RX ADMIN — CEFTRIAXONE 100 MILLIGRAM(S): 500 INJECTION, POWDER, FOR SOLUTION INTRAMUSCULAR; INTRAVENOUS at 06:43

## 2019-07-18 RX ADMIN — LACTULOSE 30 GRAM(S): 10 SOLUTION ORAL at 06:44

## 2019-07-18 RX ADMIN — LACTULOSE 30 GRAM(S): 10 SOLUTION ORAL at 17:00

## 2019-07-18 RX ADMIN — SPIRONOLACTONE 50 MILLIGRAM(S): 25 TABLET, FILM COATED ORAL at 14:47

## 2019-07-18 RX ADMIN — Medication 20 MILLIGRAM(S): at 14:47

## 2019-07-18 RX ADMIN — PANTOPRAZOLE SODIUM 40 MILLIGRAM(S): 20 TABLET, DELAYED RELEASE ORAL at 06:43

## 2019-07-18 NOTE — PROGRESS NOTE ADULT - SUBJECTIVE AND OBJECTIVE BOX
Patient was seen and examined. Spoke with RN. Chart reviewed.  sitting comf oob,  12 bms  decrease lactulose,  fu duplex  fu inr, coumadin on hold  dc in am  d/w ho    No events overnight.  Vital Signs Last 24 Hrs  T(F): 96.6 (18 Jul 2019 13:51), Max: 98 (17 Jul 2019 20:25)  HR: 63 (18 Jul 2019 13:51) (63 - 74)  BP: 121/65 (18 Jul 2019 13:51) (115/67 - 121/65)  SpO2: 98% (18 Jul 2019 07:55) (98% - 98%)  MEDICATIONS  (STANDING):  furosemide    Tablet 20 milliGRAM(s) Oral daily  lactulose Syrup 30 Gram(s) Oral every 12 hours  pantoprazole    Tablet 40 milliGRAM(s) Oral before breakfast  rifaximin 550 milliGRAM(s) Oral two times a day  spironolactone 50 milliGRAM(s) Oral daily    MEDICATIONS  (PRN):    Labs:                        9.2    3.55  )-----------( 96       ( 18 Jul 2019 09:04 )             28.6                         8.2    2.30  )-----------( 79       ( 17 Jul 2019 08:18 )             25.0     18 Jul 2019 09:04    141    |  108    |  18     ----------------------------<  144    4.0     |  23     |  1.3    17 Jul 2019 08:18    141    |  108    |  21     ----------------------------<  82     3.5     |  21     |  1.4      Ca    9.0        18 Jul 2019 09:04  Ca    8.3        17 Jul 2019 08:18  Mg     1.5       17 Jul 2019 08:18    TPro  7.5    /  Alb  2.4    /  TBili  1.9    /  DBili  x      /  AST  68     /  ALT  34     /  AlkPhos  139    18 Jul 2019 09:04  TPro  6.7    /  Alb  2.2    /  TBili  2.7    /  DBili  x      /  AST  55     /  ALT  26     /  AlkPhos  98     17 Jul 2019 08:18    PT/INR - ( 18 Jul 2019 11:17 )   PT: >40.00 sec;   INR: 3.79 ratio         PTT - ( 17 Jul 2019 08:18 )  PTT:43.7 sec        Radiology:    General: comfortable, NAD  Neurology: A&Ox3, nonfocal  Head:  Normocephalic, atraumatic  ENT:  Mucosa moist, no ulcerations  Neck:  Supple, no JVD,   Skin: no breakdowns (as per RN)  Resp: CTA B/L  CV: RRR, S1S2,   GI: Soft, NT, bowel sounds dist  MS:  edema, + peripheral pulses, FROM all 4 extremity

## 2019-07-18 NOTE — PROGRESS NOTE ADULT - ASSESSMENT
# AMS  patient is KTH JIMÉNEZ cirrhosis. most likely HE  started on lactulose and rafiximin. doing much better today. had 10 BM yesterday. lactulose dose decreased  labs showed no leukocytosis  U/S abdomen:: moderate ascites. no paracentesis at this time  GI c/s, continue same treatment  F/U blood cultures  ceftriaxone stopped as there is no signs of SBP  ct head normal  monitor I/O    #CHESTER  likely prerenal  creatinine 1.3 today  u/s kidneys normal  resumed low dose furosemide and aldactone  f/u crea u/s kidneys    #Afib  INR today 3.9  hold comadin today  f/u INR    # LLE  no pain, hotness or redness.  less likely DVT  f/u u/s     # Thrombocytopenia and anemia  most likely due to cirrhosis  transfuse if hb<8 and [id number]    # prepare for discharge tomorrow

## 2019-07-18 NOTE — PROGRESS NOTE ADULT - ASSESSMENT
hepatic encephalopathy  r/o sbp  h/o JIMÉNEZ  cad  hyperlipidemia  afib  thrombocytopenia  coagulopathy    decrease lactulose  fu inr  fu duplex  anticipate dc in am

## 2019-07-18 NOTE — CONSULT NOTE ADULT - SUBJECTIVE AND OBJECTIVE BOX
Please contact me with any questions on my pager [phone].  Hospital Day # 2d     HPI:   70 yo Man with PMH of AFIB on Coumadin, HTN, hyperlipidemia, CAD s/p 2 stents years back, BPH, DM type II, JIMÉNEZ cirrhosis complicated by ascites p/w worsening mental status -2 days duration. As per family patient started to have unsteady gait for last couple of days - with forgetfulness episodes of short term events - however no disorientation episode he is still oriented to time - place and person. No recent fever - chills - URTI - no abdominal pain or worsening distention or increased abdominal girth - to note patient is not on lactulose at home. In ED vitals were stable and he was admitted for treatment of AMS in setting of JIMÉNEZ   when examined patient was awake and alert AAO*3 but he would forget why he was admitted so waxing and waning mental status.      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      FAMILY HISTORY:  Mother early CAD and ovarian cancer    SOCIAL HISTORY:  smoker: Denies  Alcohol: Denies    ALLERGIES:  No Known Allergies      MEDICATIONS:  MEDICATIONS  (STANDING):  cefTRIAXone   IVPB 2 milliGRAM(s) IV Intermittent every 24 hours  lactulose Syrup 30 Gram(s) Oral every 4 hours  pantoprazole    Tablet 40 milliGRAM(s) Oral before breakfast  rifaximin 550 milliGRAM(s) Oral two times a day    MEDICATIONS  (PRN):      HOME MEDICATIONS:  Home Medications:  atorvastatin: 40 milligram(s) orally once a day (at bedtime) (15 Apr 2019 11:22)  Coumadin 5 mg oral tablet: 1 tab(s) orally once a day on monday then 2.5 mg next days of week   (16 Jul 2019 22:56)  Fish Oil 1200 mg oral capsule: orally 2 times a day (15 Apr 2019 11:22)  Flomax 0.4 mg oral capsule: 1 cap(s) orally once a day (15 Apr 2019 11:22)  furosemide 40 mg oral tablet: 1 tab(s) orally once a day (25 Jun 2019 10:51)  Januvia 50 mg oral tablet: 1 tab(s) orally once a day (15 Apr 2019 11:22)  spironolactone 25 mg oral tablet: 4 tab(s) orally  (15 Apr 2019 11:22)      REVIEW OF SYSTEMS:    CONSTITUTIONAL: No fever  EYES/ENT: No scleral icterus  RESPIRATORY: No shortness of breath  CARDIOVASCULAR: No chest pain   GASTROINTESTINAL: abdominal distension  GENITOURINARY: No dysuria  NEUROLOGICAL: confusion  SKIN: No cyanosis      VITALS:   T(F): 98 (07-18 @ 05:41), Max: 98.1 (07-17 @ 05:27)  HR: 70 (07-18 @ 05:41) (64 - 85)  BP: 118/64 (07-18 @ 05:41) (93/55 - 121/58)  BP(mean): --  RR: 18 (07-18 @ 05:41) (18 - 20)  SpO2: 95% (07-16 @ 20:53) (95% - 98%)    I&O's Summary      Physical Exam:  GENERAL: NAD, well-developed  HEAD:  Atraumatic, Normocephalic  EYES: EOMI, PERRLA, conjunctiva and sclera clear  NECK: No thyromegaly  CHEST/LUNG: No accessory use of muscle  HEART: S1S2 Normal  ABDOMEN: Soft, Nontender, Bowel sounds present, no guarding or rigidity, moderate distension, ascites+, no hepatic flap  EXTREMITIES:  2+ Peripheral Pulses, No cyanosis  PSYCH: AAOx3  NEUROLOGY: non-focal.      LABS:                        8.2    2.30  )-----------( 79       ( 17 Jul 2019 08:18 )             25.0       PT/INR - ( 17 Jul 2019 08:18 )  INR: 4.11          PTT - ( 17 Jul 2019 08:18 )  PTT:43.7<H>  LIVER FUNCTIONS - ( 17 Jul 2019 08:18 )  Alb: 2.2 g/dL / Pro: 6.7 g/dL / ALK PHOS: 98 U/L / ALT: 26 U/L / AST: 55 U/L / GGT: x           LIVER FUNCTIONS - ( 17 Jul 2019 08:18 )  Alb: 2.2 [3.5 - 5.2] / Pro: 6.7 [6.0 - 8.0] / ALK PHOS: 98 [30 - 115] / ALT: 26 [0 - 41] / AST: 55 [0 - 41] / GGT: x     LIVER FUNCTIONS - ( 16 Jul 2019 14:55 )  Alb: 2.5 [3.5 - 5.2] / Pro: 7.4 [6.0 - 8.0] / ALK PHOS: 116 [30 - 115] / ALT: 32 [0 - 41] / AST: 66 [0 - 41] / GGT: x       07-17    141  |  108  |  21<H>  ----------------------------<  82  3.5   |  21  |  1.4    Ca    8.3<L>      17 Jul 2019 08:18  Mg     1.5     07-17      CARDIAC MARKERS ( 16 Jul 2019 14:55 )  x     / <0.01 ng/mL / x     / x     / x              Previous EGD 3/2019: 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    Previous colonoscopy 9/2017: Grade 2 hemorrhoids, diverticulosis in sigmoid, 3 polyps (tubular adenoma in 1 polyp and other were hyperplastic) found and recommended repeat in 3years.      RADIOLOGY:    USG Abdomen: moderate ascites.

## 2019-07-18 NOTE — CONSULT NOTE ADULT - ASSESSMENT
68 yo Man with PMH of AFIB on Coumadin, HTN, hyperlipidemia, CAD s/p 2 stents,  BPH, DM type II, JIMÉNEZ cirrhosis complicated by ascites p/w worsening confusion and mental status.    # JIMÉNEZ cirrhosis, decompensated with moderate/ascites/hepatic encephalopathy/coagulopathy, no evidence of varices.  -MELD score 7/18/2019: 29 points.  -c/w rifaximin and lactulose for 3-4 BM daily. 68 yo Man with PMH of AFIB on Coumadin, HTN, hyperlipidemia, CAD s/p 2 stents,  BPH, DM type II, JIMÉNEZ cirrhosis complicated by ascites p/w worsening confusion and mental status.    # JIMÉNEZ cirrhosis, decompensated with moderate/ascites/hepatic encephalopathy/coagulopathy, no evidence of varices.  -MELD score 7/18/2019: 29 points.  -c/w rifaximin and lactulose for 3-4 BM daily.  -c/w lasix 20 mg daily and aldactone 50mg daily according to nephrology (half the home doses)  - repeat inr, trending down, likely 2/2 coumadin.  - Please do a diagnostic tap if possible on bedside US.  - Repeat LFTS.  - Patient is already on Rocephin, if not tapped can stop the Abx if no signs of infection.  - US abdomen and AFP q6 months as outpatient  -Please follow with your Liver Transplant specialist.  -will follow with the patient tomorrow.

## 2019-07-18 NOTE — PROGRESS NOTE ADULT - ASSESSMENT
CHESTER   - improved  CKD 3  hypomagnesemia  hepatic encephalopathy  cirrhosis  JIMÉNEZ  moderate ascites  pancytopenia / splenomegaly  DM2  CAD / PCI / Afib on coumadin  BPH    plan:    would resume lower dose diuretic management for ascites:  lasix 20mg po qd and aldactone 50mg po qd  replete Mg++  lactulose / rifaximin  dc abx if no sign of infection  trend renal function and lytes  OOB  GI f/u  full code  will follow

## 2019-07-18 NOTE — PROGRESS NOTE ADULT - SUBJECTIVE AND OBJECTIVE BOX
SUBJECTIVE:    Patient is a 69y old Male who presents with a chief complaint of confusion (2019 11:34)    Overnight Events: patient is doing well today. no complaints.  his mental status has improved.    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     ALLERGIES:  No Known Allergies    MEDICATIONS:  STANDING MEDICATIONS  furosemide    Tablet 20 milliGRAM(s) Oral daily  lactulose Syrup 30 Gram(s) Oral every 12 hours  pantoprazole    Tablet 40 milliGRAM(s) Oral before breakfast  rifaximin 550 milliGRAM(s) Oral two times a day  spironolactone 50 milliGRAM(s) Oral daily    PRN MEDICATIONS    VITALS:   T(F): 96.6, Max: 98 (- @ 20:25)  HR: 63 (63 - 74)  BP: 121/65 (115/67 - 121/65)  RR: 18 (18 - 18)  SpO2: 98% (98% - 98%)    LABS:                        9.2    3.55  )-----------( 96       ( 2019 09:04 )             28.6     -18    141  |  108  |  18  ----------------------------<  144<H>  4.0   |  23  |  1.3    Ca    9.0      2019 09:04  Mg     1.5         TPro  7.5  /  Alb  2.4<L>  /  TBili  1.9<H>  /  DBili  x   /  AST  68<H>  /  ALT  34  /  AlkPhos  139<H>  07-18    PT/INR - ( 2019 11:17 )   PT: >40.00 sec;   INR: 3.79 ratio         PTT - ( 2019 08:18 )  PTT:43.7 sec  Urinalysis Basic - ( 2019 15:00 )    Color: Yellow / Appearance: Clear / S.013 / pH: x  Gluc: x / Ketone: Negative  / Bili: Negative / Urobili: <2 mg/dL   Blood: x / Protein: Negative / Nitrite: Negative   Leuk Esterase: Negative / RBC: x / WBC x   Sq Epi: x / Non Sq Epi: x / Bacteria: x            CARDIAC MARKERS ( 2019 14:55 )  x     / <0.01 ng/mL / x     / x     / x                  IMAGING/EKG:    PHYSICAL EXAM:  GEN: NAD, comfortable  LUNGS: CTAB,  HEART: RRR, s1 and s2 appreciated, no m/r/g  ABD: soft,distended, NT  EXT: no edema  NEURO: AAOX3

## 2019-07-18 NOTE — PROGRESS NOTE ADULT - SUBJECTIVE AND OBJECTIVE BOX
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  GASTROINTESTINAL: No abdominal or epigastric pain. No nausea, vomiting, or hematemesis; No diarrhea or constipation. No melena or hematochezia.  GENITOURINARY: No dysuria, frequency, foamy urine, urinary urgency, incontinence or hematuria  NEUROLOGICAL: No numbness or weakness  SKIN: No itching, burning, rashes, or lesions   VASCULAR: No bilateral lower extremity edema.   All other review of systems is negative unless indicated above.    advance care planning and advance care directives reviewed and discussed in detail    PHYSICAL EXAM:  NAD  awake and alert  moist mm  no jvd   cta bl  rrr  soft, nt  no cvat  mild distention  no edema  no rash    Hospital Medications:   MEDICATIONS  (STANDING):  furosemide    Tablet 20 milliGRAM(s) Oral daily  lactulose Syrup 30 Gram(s) Oral every 12 hours  pantoprazole    Tablet 40 milliGRAM(s) Oral before breakfast  rifaximin 550 milliGRAM(s) Oral two times a day  spironolactone 50 milliGRAM(s) Oral daily        VITALS:  T(F): 98 (19 @ 05:41), Max: 98 (19 @ 20:25)  HR: 70 (19 @ 05:41)  BP: 118/64 (19 @ 05:41)  RR: 18 (19 @ 05:41)  SpO2: 98% (19 @ 07:55)  Wt(kg): --        LABS:      141  |  108  |  18  ----------------------------<  144<H>  4.0   |  23  |  1.3    Ca    9.0      2019 09:04  Mg     1.5         TPro  7.5  /  Alb  2.4<L>  /  TBili  1.9<H>  /  DBili      /  AST  68<H>  /  ALT  34  /  AlkPhos  139<H>                            9.2    3.55  )-----------( 96       ( 2019 09:04 )             28.6       Urine Studies:  Urinalysis Basic - ( 2019 15:00 )    Color: Yellow / Appearance: Clear / S.013 / pH:   Gluc:  / Ketone: Negative  / Bili: Negative / Urobili: <2 mg/dL   Blood:  / Protein: Negative / Nitrite: Negative   Leuk Esterase: Negative / RBC:  / WBC    Sq Epi:  / Non Sq Epi:  / Bacteria:       Sodium, Random Urine: 108.0 mmoL/L ( @ 06:00)  Creatinine, Random Urine: 212 mg/dL ( @ 06:00)      RADIOLOGY & ADDITIONAL STUDIES:

## 2019-07-19 ENCOUNTER — TRANSCRIPTION ENCOUNTER (OUTPATIENT)
Age: 69
End: 2019-07-19

## 2019-07-19 VITALS
RESPIRATION RATE: 18 BRPM | TEMPERATURE: 98 F | SYSTOLIC BLOOD PRESSURE: 110 MMHG | HEART RATE: 67 BPM | DIASTOLIC BLOOD PRESSURE: 63 MMHG

## 2019-07-19 LAB
ALBUMIN SERPL ELPH-MCNC: 2.3 G/DL — LOW (ref 3.5–5.2)
ALP SERPL-CCNC: 119 U/L — HIGH (ref 30–115)
ALT FLD-CCNC: 32 U/L — SIGNIFICANT CHANGE UP (ref 0–41)
ANION GAP SERPL CALC-SCNC: 11 MMOL/L — SIGNIFICANT CHANGE UP (ref 7–14)
APTT BLD: 49 SEC — HIGH (ref 27–39.2)
AST SERPL-CCNC: 57 U/L — HIGH (ref 0–41)
BASOPHILS # BLD AUTO: 0.06 K/UL — SIGNIFICANT CHANGE UP (ref 0–0.2)
BASOPHILS NFR BLD AUTO: 1.5 % — HIGH (ref 0–1)
BILIRUB SERPL-MCNC: 2.3 MG/DL — HIGH (ref 0.2–1.2)
BUN SERPL-MCNC: 17 MG/DL — SIGNIFICANT CHANGE UP (ref 10–20)
CALCIUM SERPL-MCNC: 8.7 MG/DL — SIGNIFICANT CHANGE UP (ref 8.5–10.1)
CHLORIDE SERPL-SCNC: 108 MMOL/L — SIGNIFICANT CHANGE UP (ref 98–110)
CO2 SERPL-SCNC: 21 MMOL/L — SIGNIFICANT CHANGE UP (ref 17–32)
CREAT SERPL-MCNC: 1.3 MG/DL — SIGNIFICANT CHANGE UP (ref 0.7–1.5)
EOSINOPHIL # BLD AUTO: 0.28 K/UL — SIGNIFICANT CHANGE UP (ref 0–0.7)
EOSINOPHIL NFR BLD AUTO: 7.1 % — SIGNIFICANT CHANGE UP (ref 0–8)
GLUCOSE BLDC GLUCOMTR-MCNC: 105 MG/DL — HIGH (ref 70–99)
GLUCOSE BLDC GLUCOMTR-MCNC: 81 MG/DL — SIGNIFICANT CHANGE UP (ref 70–99)
GLUCOSE SERPL-MCNC: 81 MG/DL — SIGNIFICANT CHANGE UP (ref 70–99)
HCT VFR BLD CALC: 29.7 % — LOW (ref 42–52)
HGB BLD-MCNC: 9.6 G/DL — LOW (ref 14–18)
IMM GRANULOCYTES NFR BLD AUTO: 0.3 % — SIGNIFICANT CHANGE UP (ref 0.1–0.3)
INR BLD: 3.58 RATIO — HIGH (ref 0.65–1.3)
LYMPHOCYTES # BLD AUTO: 0.87 K/UL — LOW (ref 1.2–3.4)
LYMPHOCYTES # BLD AUTO: 22.2 % — SIGNIFICANT CHANGE UP (ref 20.5–51.1)
MCHC RBC-ENTMCNC: 30.7 PG — SIGNIFICANT CHANGE UP (ref 27–31)
MCHC RBC-ENTMCNC: 32.3 G/DL — SIGNIFICANT CHANGE UP (ref 32–37)
MCV RBC AUTO: 94.9 FL — HIGH (ref 80–94)
MONOCYTES # BLD AUTO: 0.52 K/UL — SIGNIFICANT CHANGE UP (ref 0.1–0.6)
MONOCYTES NFR BLD AUTO: 13.3 % — HIGH (ref 1.7–9.3)
NEUTROPHILS # BLD AUTO: 2.18 K/UL — SIGNIFICANT CHANGE UP (ref 1.4–6.5)
NEUTROPHILS NFR BLD AUTO: 55.6 % — SIGNIFICANT CHANGE UP (ref 42.2–75.2)
NRBC # BLD: 0 /100 WBCS — SIGNIFICANT CHANGE UP (ref 0–0)
PLATELET # BLD AUTO: 103 K/UL — LOW (ref 130–400)
POTASSIUM SERPL-MCNC: 4.4 MMOL/L — SIGNIFICANT CHANGE UP (ref 3.5–5)
POTASSIUM SERPL-SCNC: 4.4 MMOL/L — SIGNIFICANT CHANGE UP (ref 3.5–5)
PROT SERPL-MCNC: 7.2 G/DL — SIGNIFICANT CHANGE UP (ref 6–8)
PROTHROM AB SERPL-ACNC: >40 SEC — HIGH (ref 9.95–12.87)
RBC # BLD: 3.13 M/UL — LOW (ref 4.7–6.1)
RBC # FLD: 17.8 % — HIGH (ref 11.5–14.5)
SODIUM SERPL-SCNC: 140 MMOL/L — SIGNIFICANT CHANGE UP (ref 135–146)
WBC # BLD: 3.92 K/UL — LOW (ref 4.8–10.8)
WBC # FLD AUTO: 3.92 K/UL — LOW (ref 4.8–10.8)

## 2019-07-19 RX ORDER — SPIRONOLACTONE 25 MG/1
2 TABLET, FILM COATED ORAL
Qty: 0 | Refills: 0 | DISCHARGE
Start: 2019-07-19

## 2019-07-19 RX ORDER — LACTULOSE 10 G/15ML
30 SOLUTION ORAL
Qty: 0 | Refills: 0 | DISCHARGE
Start: 2019-07-19 | End: 2019-08-17

## 2019-07-19 RX ORDER — WARFARIN SODIUM 2.5 MG/1
1 TABLET ORAL
Qty: 0 | Refills: 0 | DISCHARGE

## 2019-07-19 RX ORDER — LACTULOSE 10 G/15ML
45 SOLUTION ORAL
Qty: 0 | Refills: 0 | DISCHARGE
Start: 2019-07-19

## 2019-07-19 RX ORDER — LACTULOSE 10 G/15ML
45 SOLUTION ORAL
Qty: 2700 | Refills: 0
Start: 2019-07-19 | End: 2019-08-17

## 2019-07-19 RX ORDER — FUROSEMIDE 40 MG
1 TABLET ORAL
Qty: 0 | Refills: 0 | DISCHARGE
Start: 2019-07-19

## 2019-07-19 RX ADMIN — PANTOPRAZOLE SODIUM 40 MILLIGRAM(S): 20 TABLET, DELAYED RELEASE ORAL at 06:00

## 2019-07-19 RX ADMIN — LACTULOSE 30 GRAM(S): 10 SOLUTION ORAL at 05:06

## 2019-07-19 RX ADMIN — SPIRONOLACTONE 50 MILLIGRAM(S): 25 TABLET, FILM COATED ORAL at 05:06

## 2019-07-19 RX ADMIN — Medication 20 MILLIGRAM(S): at 05:06

## 2019-07-19 NOTE — PROGRESS NOTE ADULT - SUBJECTIVE AND OBJECTIVE BOX
Patient was seen and examined. Spoke with RN. Chart reviewed.    No events overnight.  Vital Signs Last 24 Hrs  T(F): 97 (19 Jul 2019 05:16), Max: 99.5 (18 Jul 2019 21:25)  HR: 62 (19 Jul 2019 05:16) (62 - 65)  BP: 102/57 (19 Jul 2019 05:16) (102/57 - 121/65)  SpO2: 97% (18 Jul 2019 19:36) (97% - 97%)  MEDICATIONS  (STANDING):  furosemide    Tablet 20 milliGRAM(s) Oral daily  lactulose Syrup 30 Gram(s) Oral every 12 hours  pantoprazole    Tablet 40 milliGRAM(s) Oral before breakfast  rifaximin 550 milliGRAM(s) Oral two times a day  spironolactone 50 milliGRAM(s) Oral daily    MEDICATIONS  (PRN):    Labs:                        9.6    3.92  )-----------( 103      ( 19 Jul 2019 08:01 )             29.7                         9.2    3.55  )-----------( 96       ( 18 Jul 2019 09:04 )             28.6     19 Jul 2019 08:01    140    |  108    |  17     ----------------------------<  81     4.4     |  21     |  1.3    18 Jul 2019 09:04    141    |  108    |  18     ----------------------------<  144    4.0     |  23     |  1.3      Ca    8.7        19 Jul 2019 08:01  Ca    9.0        18 Jul 2019 09:04    TPro  7.2    /  Alb  2.3    /  TBili  2.3    /  DBili  x      /  AST  57     /  ALT  32     /  AlkPhos  119    19 Jul 2019 08:01  TPro  7.5    /  Alb  2.4    /  TBili  1.9    /  DBili  x      /  AST  68     /  ALT  34     /  AlkPhos  139    18 Jul 2019 09:04    PT/INR - ( 19 Jul 2019 08:01 )   PT: >40.00 sec;   INR: 3.58 ratio         PTT - ( 19 Jul 2019 08:01 )  PTT:49.0 sec      Culture - Blood (collected 17 Jul 2019 08:18)  Source: .Blood None  Preliminary Report (18 Jul 2019 18:01):    No growth to date.        Radiology:    General: comfortable, NAD  Neurology: A&Ox3, nonfocal  Head:  Normocephalic, atraumatic  ENT:  Mucosa moist, no ulcerations  Neck:  Supple, no JVD,   Skin: no breakdowns   Resp: CTA B/L  CV: RRR, S1S2,   GI: Soft, NT, bowel sounds dist  MS: No edema, + peripheral pulses, FROM all 4 extremity

## 2019-07-19 NOTE — DISCHARGE NOTE PROVIDER - NSFOLLOWUPCLINICS_GEN_ALL_ED_FT
[facility]  Coumadin  [address] [place] [address]  Phone: [phone]  Fax:   Follow Up Time: 1-3 Days

## 2019-07-19 NOTE — DISCHARGE NOTE NURSING/CASE MANAGEMENT/SOCIAL WORK - NSDCDPATPORTLINK_GEN_ALL_CORE
You can access the [facility] Patient Portal, offered by [facility], by registering with the following website: [email or web address]

## 2019-07-19 NOTE — DISCHARGE NOTE PROVIDER - CARE PROVIDERS DIRECT ADDRESSES
,[email or web address],DirectAddress_Unknown ,[email or web address],DirectAddress_Unknown,DirectAddress_Unknown

## 2019-07-19 NOTE — PROGRESS NOTE ADULT - ASSESSMENT
CHESTER   - improved  CKD 3  hypomagnesemia  hepatic encephalopathy  cirrhosis  JIMÉNEZ  moderate ascites  pancytopenia / splenomegaly  DM2  CAD / PCI / Afib on coumadin  BPH    plan:    cont:  lasix 20mg po qd and aldactone 50mg po qd  replete Mg++ prn  lactulose / rifaximin  off abx  trend renal function and lytes  OOB  dc planning  coumadin per primary team

## 2019-07-19 NOTE — DISCHARGE NOTE PROVIDER - NSDCCPCAREPLAN_GEN_ALL_CORE_FT
PRINCIPAL DISCHARGE DIAGNOSIS  Diagnosis: Hepatic encephalopathy  Assessment and Plan of Treatment: you presented with altered mental staus and forgetfulness due to increase ammonia in your blood. you received treatment and your mental status improved. you need to continue lactulose, the laxative medication, to target 2 bowel movements per day. adjust the dose accordingly, if you have more than 2 decrease the dose. if 1 bowel movement increase the dose. lasix and aldactone dose have both been decreased for half because of elevated creatinine. follow up with dr Durant in clinics in 2 weeks and with the liver specialist as well      SECONDARY DISCHARGE DIAGNOSES  Diagnosis: Afib  Assessment and Plan of Treatment: the anticoagulation medication for afib was stopped during your hospital stay because of too much blood thinning. stop taking coumadin for this weekend and present on moday to the coumadin clinic for follow up and for adjusting your dose.    Diagnosis: Cirrhosis  Assessment and Plan of Treatment: keep taking your medications and follow up with Dr Durant and the liver specialist at [facility].

## 2019-07-19 NOTE — DISCHARGE NOTE PROVIDER - CARE PROVIDER_API CALL
Chano Durant)  Gastroenterology; Internal Medicine  [address] [place] [address]  Phone: [phone]  Fax: [phone]  Follow Up Time: 2 weeks    Meño Stover ()  Infectious Disease; Internal Medicine  [address] [place] [address]  Phone: [phone]  Fax: [phone]  Follow Up Time: 2 weeks Chano Durant)  Gastroenterology; Internal Medicine  [address] [place] [address]  Phone: [phone]  Fax: [phone]  Follow Up Time: 2 weeks    Meño Stover ()  Infectious Disease; Internal Medicine  [address] [place] [address]  Phone: [phone]  Fax: [phone]  Follow Up Time: 2 weeks    crys herrera  [address] [place] [address]  Phone: [phone]  Fax: (   )    -  Follow Up Time: 2 weeks

## 2019-07-19 NOTE — DISCHARGE NOTE PROVIDER - HOSPITAL COURSE
70 yo Man with PMH of AFIB on Coumadin, HTN, hyperlipidemia, CAD s/p 2 stents years back, BPH, DM type II, JIMÉNEZ cirrhosis complicated by ascites presented for worsening mental status of 2 days duration described by the family as unsteady gait and forgetfulness.    patient's ammonia level was high on presentation. hepatic encephalopathy was diagnosed and he was treated with lactulose and rifaximin.    u/s abdomen showed moderate ascites. however paracentesis was not performed as the patient did no complain of discomfort, nor there was a sign of SBP.    patient's mental status improved with treatment.    anticoagulation was withheld during his hospital stay because of prolongation of the INR.    he will follow up with GI and liver transplant specialist in clinics 68 yo Man with PMH of AFIB on Coumadin, HTN, hyperlipidemia, CAD s/p 2 stents years back, BPH, DM type II, JIMÉNEZ cirrhosis complicated by ascites presented for worsening mental status of 2 days duration described by the family as unsteady gait and forgetfulness.    patient's ammonia level was high on presentation. hepatic encephalopathy was diagnosed and he was treated with lactulose and rifaximin.    u/s abdomen showed moderate ascites. however paracentesis was not performed as the patient did no complain of discomfort, nor there was a sign of SBP.    patient's mental status improved with treatment.    anticoagulation was withheld during his hospital stay because of prolongation of the INR. he will be off anticoagulation for 2 days then follow up at the coumadin clinic on Monday.    he will follow up with GI in weeks and a liver transplant specialist in clinics.

## 2019-07-19 NOTE — DISCHARGE NOTE PROVIDER - PROVIDER TOKENS
PROVIDER:[TOKEN:[[id number]],FOLLOWUP:[2 weeks]],PROVIDER:[TOKEN:[[id number]],FOLLOWUP:[2 weeks]] PROVIDER:[TOKEN:[[id number]],FOLLOWUP:[2 weeks]],PROVIDER:[TOKEN:[[id number]],FOLLOWUP:[2 weeks]],FREE:[LAST:[javier],FIRST:[crys],PHONE:[phone]],FAX:[(   )    -],ADDRESS:[[address] [place] [address]],FOLLOWUP:[2 weeks]]

## 2019-07-19 NOTE — PROGRESS NOTE ADULT - SUBJECTIVE AND OBJECTIVE BOX
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  GASTROINTESTINAL: No abdominal or epigastric pain. No nausea, vomiting, or hematemesis; No diarrhea or constipation. No melena or hematochezia.  GENITOURINARY: No dysuria, frequency, foamy urine, urinary urgency, incontinence or hematuria  NEUROLOGICAL: No numbness or weakness  SKIN: No itching, burning, rashes, or lesions   VASCULAR: No bilateral lower extremity edema.   All other review of systems is negative unless indicated above.    PHYSICAL EXAM:  NAD  awake and alert  moist mm  no jvd   cta bl  rrr  soft, nt  no cvat  mild distention  no edema  no rash    Hospital Medications:   MEDICATIONS  (STANDING):  furosemide    Tablet 20 milliGRAM(s) Oral daily  lactulose Syrup 30 Gram(s) Oral every 12 hours  pantoprazole    Tablet 40 milliGRAM(s) Oral before breakfast  rifaximin 550 milliGRAM(s) Oral two times a day  spironolactone 50 milliGRAM(s) Oral daily        VITALS:  T(F): 97.5 (19 @ 14:20), Max: 99.5 (19 @ 21:25)  HR: 67 (19 @ 14:20)  BP: 110/63 (19 @ 14:20)  RR: 18 (19 @ 14:20)  SpO2: 97% (19 @ 19:36)  Wt(kg): --        LABS:      140  |  108  |  17  ----------------------------<  81  4.4   |  21  |  1.3    Ca    8.7      2019 08:01    TPro  7.2  /  Alb  2.3<L>  /  TBili  2.3<H>  /  DBili      /  AST  57<H>  /  ALT  32  /  AlkPhos  119<H>                            9.6    3.92  )-----------( 103      ( 2019 08:01 )             29.7       Urine Studies:  Urinalysis Basic - ( 2019 15:00 )    Color: Yellow / Appearance: Clear / S.013 / pH:   Gluc:  / Ketone: Negative  / Bili: Negative / Urobili: <2 mg/dL   Blood:  / Protein: Negative / Nitrite: Negative   Leuk Esterase: Negative / RBC:  / WBC    Sq Epi:  / Non Sq Epi:  / Bacteria:       Sodium, Random Urine: 108.0 mmoL/L ( @ 06:00)  Creatinine, Random Urine: 212 mg/dL ( @ 06:00)      RADIOLOGY & ADDITIONAL STUDIES:

## 2019-07-22 ENCOUNTER — OUTPATIENT (OUTPATIENT)
Dept: OUTPATIENT SERVICES | Facility: HOSPITAL | Age: 69
LOS: 1 days | Discharge: HOME | End: 2019-07-22

## 2019-07-22 DIAGNOSIS — Z95.9 PRESENCE OF CARDIAC AND VASCULAR IMPLANT AND GRAFT, UNSPECIFIED: Chronic | ICD-10-CM

## 2019-07-22 DIAGNOSIS — Z79.01 LONG TERM (CURRENT) USE OF ANTICOAGULANTS: ICD-10-CM

## 2019-07-22 DIAGNOSIS — Z12.11 ENCOUNTER FOR SCREENING FOR MALIGNANT NEOPLASM OF COLON: Chronic | ICD-10-CM

## 2019-07-22 DIAGNOSIS — Z96.653 PRESENCE OF ARTIFICIAL KNEE JOINT, BILATERAL: Chronic | ICD-10-CM

## 2019-07-22 DIAGNOSIS — I48.91 UNSPECIFIED ATRIAL FIBRILLATION: ICD-10-CM

## 2019-07-22 LAB
CULTURE RESULTS: SIGNIFICANT CHANGE UP
POCT INR: 1.7 RATIO — HIGH (ref 0.9–1.2)
POCT PT: 20.9 SEC — HIGH (ref 10–13.4)
SPECIMEN SOURCE: SIGNIFICANT CHANGE UP

## 2019-07-25 ENCOUNTER — OUTPATIENT (OUTPATIENT)
Dept: OUTPATIENT SERVICES | Facility: HOSPITAL | Age: 69
LOS: 1 days | Discharge: HOME | End: 2019-07-25

## 2019-07-25 DIAGNOSIS — N40.0 BENIGN PROSTATIC HYPERPLASIA WITHOUT LOWER URINARY TRACT SYMPTOMS: ICD-10-CM

## 2019-07-25 DIAGNOSIS — K72.90 HEPATIC FAILURE, UNSPECIFIED WITHOUT COMA: ICD-10-CM

## 2019-07-25 DIAGNOSIS — Z96.653 PRESENCE OF ARTIFICIAL KNEE JOINT, BILATERAL: Chronic | ICD-10-CM

## 2019-07-25 DIAGNOSIS — N17.9 ACUTE KIDNEY FAILURE, UNSPECIFIED: ICD-10-CM

## 2019-07-25 DIAGNOSIS — E78.5 HYPERLIPIDEMIA, UNSPECIFIED: ICD-10-CM

## 2019-07-25 DIAGNOSIS — N18.3 CHRONIC KIDNEY DISEASE, STAGE 3 (MODERATE): ICD-10-CM

## 2019-07-25 DIAGNOSIS — Z79.01 LONG TERM (CURRENT) USE OF ANTICOAGULANTS: ICD-10-CM

## 2019-07-25 DIAGNOSIS — I13.0 HYPERTENSIVE HEART AND CHRONIC KIDNEY DISEASE WITH HEART FAILURE AND STAGE 1 THROUGH STAGE 4 CHRONIC KIDNEY DISEASE, OR UNSPECIFIED CHRONIC KIDNEY DISEASE: ICD-10-CM

## 2019-07-25 DIAGNOSIS — K75.81 NONALCOHOLIC STEATOHEPATITIS (NASH): ICD-10-CM

## 2019-07-25 DIAGNOSIS — H90.3 SENSORINEURAL HEARING LOSS, BILATERAL: ICD-10-CM

## 2019-07-25 DIAGNOSIS — R18.8 OTHER ASCITES: ICD-10-CM

## 2019-07-25 DIAGNOSIS — I25.10 ATHEROSCLEROTIC HEART DISEASE OF NATIVE CORONARY ARTERY WITHOUT ANGINA PECTORIS: ICD-10-CM

## 2019-07-25 DIAGNOSIS — E11.22 TYPE 2 DIABETES MELLITUS WITH DIABETIC CHRONIC KIDNEY DISEASE: ICD-10-CM

## 2019-07-25 DIAGNOSIS — I50.9 HEART FAILURE, UNSPECIFIED: ICD-10-CM

## 2019-07-25 DIAGNOSIS — D69.6 THROMBOCYTOPENIA, UNSPECIFIED: ICD-10-CM

## 2019-07-25 DIAGNOSIS — I48.91 UNSPECIFIED ATRIAL FIBRILLATION: ICD-10-CM

## 2019-07-25 DIAGNOSIS — Z12.11 ENCOUNTER FOR SCREENING FOR MALIGNANT NEOPLASM OF COLON: Chronic | ICD-10-CM

## 2019-07-25 DIAGNOSIS — Z95.9 PRESENCE OF CARDIAC AND VASCULAR IMPLANT AND GRAFT, UNSPECIFIED: Chronic | ICD-10-CM

## 2019-07-25 DIAGNOSIS — D68.9 COAGULATION DEFECT, UNSPECIFIED: ICD-10-CM

## 2019-07-25 DIAGNOSIS — D61.818 OTHER PANCYTOPENIA: ICD-10-CM

## 2019-07-25 DIAGNOSIS — E83.42 HYPOMAGNESEMIA: ICD-10-CM

## 2019-07-29 ENCOUNTER — OUTPATIENT (OUTPATIENT)
Dept: OUTPATIENT SERVICES | Facility: HOSPITAL | Age: 69
LOS: 1 days | Discharge: HOME | End: 2019-07-29

## 2019-07-29 DIAGNOSIS — Z96.653 PRESENCE OF ARTIFICIAL KNEE JOINT, BILATERAL: Chronic | ICD-10-CM

## 2019-07-29 DIAGNOSIS — Z79.01 LONG TERM (CURRENT) USE OF ANTICOAGULANTS: ICD-10-CM

## 2019-07-29 DIAGNOSIS — Z12.11 ENCOUNTER FOR SCREENING FOR MALIGNANT NEOPLASM OF COLON: Chronic | ICD-10-CM

## 2019-07-29 DIAGNOSIS — I48.91 UNSPECIFIED ATRIAL FIBRILLATION: ICD-10-CM

## 2019-07-29 DIAGNOSIS — Z95.9 PRESENCE OF CARDIAC AND VASCULAR IMPLANT AND GRAFT, UNSPECIFIED: Chronic | ICD-10-CM

## 2019-07-29 LAB
POCT INR: 1.4 RATIO — HIGH (ref 0.9–1.2)
POCT PT: 17 SEC — HIGH (ref 10–13.4)

## 2019-08-02 ENCOUNTER — OUTPATIENT (OUTPATIENT)
Dept: OUTPATIENT SERVICES | Facility: HOSPITAL | Age: 69
LOS: 1 days | Discharge: HOME | End: 2019-08-02

## 2019-08-02 DIAGNOSIS — Z12.11 ENCOUNTER FOR SCREENING FOR MALIGNANT NEOPLASM OF COLON: Chronic | ICD-10-CM

## 2019-08-02 DIAGNOSIS — I48.91 UNSPECIFIED ATRIAL FIBRILLATION: ICD-10-CM

## 2019-08-02 DIAGNOSIS — Z96.653 PRESENCE OF ARTIFICIAL KNEE JOINT, BILATERAL: Chronic | ICD-10-CM

## 2019-08-02 DIAGNOSIS — Z79.01 LONG TERM (CURRENT) USE OF ANTICOAGULANTS: ICD-10-CM

## 2019-08-02 DIAGNOSIS — Z95.9 PRESENCE OF CARDIAC AND VASCULAR IMPLANT AND GRAFT, UNSPECIFIED: Chronic | ICD-10-CM

## 2019-08-02 LAB
POCT INR: 1.4 RATIO — HIGH (ref 0.9–1.2)
POCT PT: 16.7 SEC — HIGH (ref 10–13.4)

## 2019-08-06 ENCOUNTER — OUTPATIENT (OUTPATIENT)
Dept: OUTPATIENT SERVICES | Facility: HOSPITAL | Age: 69
LOS: 1 days | Discharge: HOME | End: 2019-08-06

## 2019-08-06 DIAGNOSIS — I48.91 UNSPECIFIED ATRIAL FIBRILLATION: ICD-10-CM

## 2019-08-06 DIAGNOSIS — Z96.653 PRESENCE OF ARTIFICIAL KNEE JOINT, BILATERAL: Chronic | ICD-10-CM

## 2019-08-06 DIAGNOSIS — Z12.11 ENCOUNTER FOR SCREENING FOR MALIGNANT NEOPLASM OF COLON: Chronic | ICD-10-CM

## 2019-08-06 DIAGNOSIS — Z79.01 LONG TERM (CURRENT) USE OF ANTICOAGULANTS: ICD-10-CM

## 2019-08-06 DIAGNOSIS — Z95.9 PRESENCE OF CARDIAC AND VASCULAR IMPLANT AND GRAFT, UNSPECIFIED: Chronic | ICD-10-CM

## 2019-08-06 LAB
POCT INR: 1.4 RATIO — HIGH (ref 0.9–1.2)
POCT PT: 16.4 SEC — HIGH (ref 10–13.4)

## 2019-08-09 ENCOUNTER — APPOINTMENT (OUTPATIENT)
Dept: GASTROENTEROLOGY | Facility: CLINIC | Age: 69
End: 2019-08-09
Payer: MEDICARE

## 2019-08-09 VITALS
BODY MASS INDEX: 28.92 KG/M2 | SYSTOLIC BLOOD PRESSURE: 106 MMHG | HEIGHT: 70 IN | WEIGHT: 202 LBS | HEART RATE: 58 BPM | DIASTOLIC BLOOD PRESSURE: 60 MMHG

## 2019-08-09 DIAGNOSIS — R18.8 OTHER ASCITES: ICD-10-CM

## 2019-08-09 PROCEDURE — 99214 OFFICE O/P EST MOD 30 MIN: CPT

## 2019-08-09 NOTE — PHYSICAL EXAM
[General Appearance - Alert] : alert [Outer Ear] : the ears and nose were normal in appearance [Neck Appearance] : the appearance of the neck was normal [] : no respiratory distress [Auscultation Breath Sounds / Voice Sounds] : lungs were clear to auscultation bilaterally [Apical Impulse] : the apical impulse was normal [Heart Sounds] : normal S1 and S2 [Murmurs] : no murmurs [Bowel Sounds] : normal bowel sounds [Abdomen Tenderness] : non-tender [Abdomen Mass (___ Cm)] : no abdominal mass palpated [Abnormal Walk] : normal gait [FreeTextEntry1] : Bilateral Pedal Edema [Oriented To Time, Place, And Person] : oriented to person, place, and time [Affect] : the affect was normal

## 2019-08-09 NOTE — HISTORY OF PRESENT ILLNESS
[de-identified] : Patient is a 69 year old male with history of hypertension diabetes atrial fibrillation on Coumadin history of cardiac stents 15 years ago who now presents with cirrhosis most likely due to JIMÉNEZ. \par He was recently in the hospital during which time he had an upper endoscopy. He is being evaluated by  at [facility] for a possible liver transplant. \par Today he feels well, has no mentall confusion. Denies hematemesis or fatigue. His apetite is good and has normal BM.\par

## 2019-08-09 NOTE — ASSESSMENT
[FreeTextEntry1] : Problem one cirrhosis of the liver due to Patel. Patient is undergoing evaluation for liver transplant at [facility]. Patient has appointment next Tuesday.\par \par Problem 2 ascites. Patient still has mild pedal edema and mild abdominal distention. As per patient he is taking Spiranolactone 100 mg once a day and furosemide 40 mg once a day.(Discharge document indicates 25 mg and 20 mg). Will check electrolytes today and then consider increasing medication.\par \par Problem 3. Hepatic encephalopathy. Patient is mentally clear today; he is taking lactulose 15 mL twice a day and Rifaximin 550 mg twice a day. We'll continue both medications for the present.\par \par Problem 4 : Mildly Jaundiced : Will Check LFT.

## 2019-08-13 ENCOUNTER — OUTPATIENT (OUTPATIENT)
Dept: OUTPATIENT SERVICES | Facility: HOSPITAL | Age: 69
LOS: 1 days | Discharge: HOME | End: 2019-08-13

## 2019-08-13 DIAGNOSIS — Z95.9 PRESENCE OF CARDIAC AND VASCULAR IMPLANT AND GRAFT, UNSPECIFIED: Chronic | ICD-10-CM

## 2019-08-13 DIAGNOSIS — Z96.653 PRESENCE OF ARTIFICIAL KNEE JOINT, BILATERAL: Chronic | ICD-10-CM

## 2019-08-13 DIAGNOSIS — I48.91 UNSPECIFIED ATRIAL FIBRILLATION: ICD-10-CM

## 2019-08-13 DIAGNOSIS — Z79.01 LONG TERM (CURRENT) USE OF ANTICOAGULANTS: ICD-10-CM

## 2019-08-13 DIAGNOSIS — Z12.11 ENCOUNTER FOR SCREENING FOR MALIGNANT NEOPLASM OF COLON: Chronic | ICD-10-CM

## 2019-08-15 ENCOUNTER — APPOINTMENT (OUTPATIENT)
Dept: CARDIOLOGY | Facility: CLINIC | Age: 69
End: 2019-08-15
Payer: MEDICARE

## 2019-08-15 PROCEDURE — 93000 ELECTROCARDIOGRAM COMPLETE: CPT

## 2019-08-15 PROCEDURE — 99214 OFFICE O/P EST MOD 30 MIN: CPT | Mod: 25

## 2019-08-20 ENCOUNTER — OUTPATIENT (OUTPATIENT)
Dept: OUTPATIENT SERVICES | Facility: HOSPITAL | Age: 69
LOS: 1 days | Discharge: HOME | End: 2019-08-20

## 2019-08-20 DIAGNOSIS — Z95.9 PRESENCE OF CARDIAC AND VASCULAR IMPLANT AND GRAFT, UNSPECIFIED: Chronic | ICD-10-CM

## 2019-08-20 DIAGNOSIS — Z12.11 ENCOUNTER FOR SCREENING FOR MALIGNANT NEOPLASM OF COLON: Chronic | ICD-10-CM

## 2019-08-20 DIAGNOSIS — I48.91 UNSPECIFIED ATRIAL FIBRILLATION: ICD-10-CM

## 2019-08-20 DIAGNOSIS — Z96.653 PRESENCE OF ARTIFICIAL KNEE JOINT, BILATERAL: Chronic | ICD-10-CM

## 2019-08-20 DIAGNOSIS — Z79.01 LONG TERM (CURRENT) USE OF ANTICOAGULANTS: ICD-10-CM

## 2019-08-20 LAB
POCT INR: 1.6 RATIO — HIGH (ref 0.9–1.2)
POCT PT: 19.4 SEC — HIGH (ref 10–13.4)

## 2019-08-27 ENCOUNTER — OUTPATIENT (OUTPATIENT)
Dept: OUTPATIENT SERVICES | Facility: HOSPITAL | Age: 69
LOS: 1 days | Discharge: HOME | End: 2019-08-27

## 2019-08-27 DIAGNOSIS — Z79.01 LONG TERM (CURRENT) USE OF ANTICOAGULANTS: ICD-10-CM

## 2019-08-27 DIAGNOSIS — Z96.653 PRESENCE OF ARTIFICIAL KNEE JOINT, BILATERAL: Chronic | ICD-10-CM

## 2019-08-27 DIAGNOSIS — Z95.9 PRESENCE OF CARDIAC AND VASCULAR IMPLANT AND GRAFT, UNSPECIFIED: Chronic | ICD-10-CM

## 2019-08-27 DIAGNOSIS — Z12.11 ENCOUNTER FOR SCREENING FOR MALIGNANT NEOPLASM OF COLON: Chronic | ICD-10-CM

## 2019-08-27 DIAGNOSIS — I48.91 UNSPECIFIED ATRIAL FIBRILLATION: ICD-10-CM

## 2019-08-28 ENCOUNTER — INPATIENT (INPATIENT)
Facility: HOSPITAL | Age: 69
LOS: 3 days | Discharge: HOME | End: 2019-09-01
Attending: INTERNAL MEDICINE | Admitting: INTERNAL MEDICINE
Payer: MEDICARE

## 2019-08-28 VITALS
OXYGEN SATURATION: 100 % | TEMPERATURE: 97 F | DIASTOLIC BLOOD PRESSURE: 68 MMHG | SYSTOLIC BLOOD PRESSURE: 124 MMHG | RESPIRATION RATE: 16 BRPM | HEART RATE: 88 BPM

## 2019-08-28 DIAGNOSIS — Z96.653 PRESENCE OF ARTIFICIAL KNEE JOINT, BILATERAL: Chronic | ICD-10-CM

## 2019-08-28 DIAGNOSIS — Z12.11 ENCOUNTER FOR SCREENING FOR MALIGNANT NEOPLASM OF COLON: Chronic | ICD-10-CM

## 2019-08-28 DIAGNOSIS — Z95.9 PRESENCE OF CARDIAC AND VASCULAR IMPLANT AND GRAFT, UNSPECIFIED: Chronic | ICD-10-CM

## 2019-08-28 LAB
ALBUMIN SERPL ELPH-MCNC: 2.5 G/DL — LOW (ref 3.5–5.2)
ALP SERPL-CCNC: 173 U/L — HIGH (ref 30–115)
ALT FLD-CCNC: 46 U/L — HIGH (ref 0–41)
AMMONIA BLD-MCNC: 96 UMOL/L — HIGH (ref 11–55)
ANION GAP SERPL CALC-SCNC: 14 MMOL/L — SIGNIFICANT CHANGE UP (ref 7–14)
APAP SERPL-MCNC: <5 UG/ML — LOW (ref 10–30)
APTT BLD: 33.5 SEC — SIGNIFICANT CHANGE UP (ref 27–39.2)
AST SERPL-CCNC: 69 U/L — HIGH (ref 0–41)
BASE EXCESS BLDV CALC-SCNC: -3.5 MMOL/L — LOW (ref -2–2)
BASOPHILS # BLD AUTO: 0.01 K/UL — SIGNIFICANT CHANGE UP (ref 0–0.2)
BASOPHILS NFR BLD AUTO: 0.4 % — SIGNIFICANT CHANGE UP (ref 0–1)
BILIRUB SERPL-MCNC: 1.5 MG/DL — HIGH (ref 0.2–1.2)
BUN SERPL-MCNC: 35 MG/DL — HIGH (ref 10–20)
CA-I SERPL-SCNC: 1.15 MMOL/L — SIGNIFICANT CHANGE UP (ref 1.12–1.3)
CALCIUM SERPL-MCNC: 8.5 MG/DL — SIGNIFICANT CHANGE UP (ref 8.5–10.1)
CHLORIDE SERPL-SCNC: 104 MMOL/L — SIGNIFICANT CHANGE UP (ref 98–110)
CK SERPL-CCNC: 84 U/L — SIGNIFICANT CHANGE UP (ref 0–225)
CO2 SERPL-SCNC: 18 MMOL/L — SIGNIFICANT CHANGE UP (ref 17–32)
CREAT SERPL-MCNC: 1.6 MG/DL — HIGH (ref 0.7–1.5)
EOSINOPHIL # BLD AUTO: 0.03 K/UL — SIGNIFICANT CHANGE UP (ref 0–0.7)
EOSINOPHIL NFR BLD AUTO: 1.2 % — SIGNIFICANT CHANGE UP (ref 0–8)
GAS PNL BLDV: 136 MMOL/L — SIGNIFICANT CHANGE UP (ref 136–145)
GAS PNL BLDV: SIGNIFICANT CHANGE UP
GLUCOSE BLDC GLUCOMTR-MCNC: 106 MG/DL — HIGH (ref 70–99)
GLUCOSE BLDC GLUCOMTR-MCNC: 121 MG/DL — HIGH (ref 70–99)
GLUCOSE BLDC GLUCOMTR-MCNC: 135 MG/DL — HIGH (ref 70–99)
GLUCOSE SERPL-MCNC: 110 MG/DL — HIGH (ref 70–99)
HCO3 BLDV-SCNC: 20 MMOL/L — LOW (ref 22–29)
HCT VFR BLD CALC: 25.7 % — LOW (ref 42–52)
HCT VFR BLDA CALC: 24.9 % — LOW (ref 34–44)
HGB BLD CALC-MCNC: 8.1 G/DL — LOW (ref 14–18)
HGB BLD-MCNC: 8.4 G/DL — LOW (ref 14–18)
IMM GRANULOCYTES NFR BLD AUTO: 0.4 % — HIGH (ref 0.1–0.3)
INR BLD: 1.97 RATIO — HIGH (ref 0.65–1.3)
LACTATE BLDV-MCNC: 3.4 MMOL/L — HIGH (ref 0.5–1.6)
LIDOCAIN IGE QN: 88 U/L — HIGH (ref 7–60)
LYMPHOCYTES # BLD AUTO: 0.36 K/UL — LOW (ref 1.2–3.4)
LYMPHOCYTES # BLD AUTO: 14 % — LOW (ref 20.5–51.1)
MAGNESIUM SERPL-MCNC: 1.7 MG/DL — LOW (ref 1.8–2.4)
MCHC RBC-ENTMCNC: 29.8 PG — SIGNIFICANT CHANGE UP (ref 27–31)
MCHC RBC-ENTMCNC: 32.7 G/DL — SIGNIFICANT CHANGE UP (ref 32–37)
MCV RBC AUTO: 91.1 FL — SIGNIFICANT CHANGE UP (ref 80–94)
MONOCYTES # BLD AUTO: 0.4 K/UL — SIGNIFICANT CHANGE UP (ref 0.1–0.6)
MONOCYTES NFR BLD AUTO: 15.5 % — HIGH (ref 1.7–9.3)
NEUTROPHILS # BLD AUTO: 1.77 K/UL — SIGNIFICANT CHANGE UP (ref 1.4–6.5)
NEUTROPHILS NFR BLD AUTO: 68.5 % — SIGNIFICANT CHANGE UP (ref 42.2–75.2)
NRBC # BLD: 0 /100 WBCS — SIGNIFICANT CHANGE UP (ref 0–0)
PCO2 BLDV: 29 MMHG — LOW (ref 41–51)
PH BLDV: 7.44 — HIGH (ref 7.26–7.43)
PLATELET # BLD AUTO: 73 K/UL — LOW (ref 130–400)
PO2 BLDV: 45 MMHG — HIGH (ref 20–40)
POTASSIUM BLDV-SCNC: 4.3 MMOL/L — SIGNIFICANT CHANGE UP (ref 3.3–5.6)
POTASSIUM SERPL-MCNC: 4.7 MMOL/L — SIGNIFICANT CHANGE UP (ref 3.5–5)
POTASSIUM SERPL-SCNC: 4.7 MMOL/L — SIGNIFICANT CHANGE UP (ref 3.5–5)
PROT SERPL-MCNC: 7.2 G/DL — SIGNIFICANT CHANGE UP (ref 6–8)
PROTHROM AB SERPL-ACNC: 22.5 SEC — HIGH (ref 9.95–12.87)
RBC # BLD: 2.82 M/UL — LOW (ref 4.7–6.1)
RBC # FLD: 17.2 % — HIGH (ref 11.5–14.5)
SALICYLATES SERPL-MCNC: <0.3 MG/DL — LOW (ref 4–30)
SAO2 % BLDV: 79 % — SIGNIFICANT CHANGE UP
SODIUM SERPL-SCNC: 136 MMOL/L — SIGNIFICANT CHANGE UP (ref 135–146)
WBC # BLD: 2.58 K/UL — LOW (ref 4.8–10.8)
WBC # FLD AUTO: 2.58 K/UL — LOW (ref 4.8–10.8)

## 2019-08-28 PROCEDURE — 99285 EMERGENCY DEPT VISIT HI MDM: CPT

## 2019-08-28 RX ORDER — WARFARIN SODIUM 2.5 MG/1
2.5 TABLET ORAL ONCE
Refills: 0 | Status: COMPLETED | OUTPATIENT
Start: 2019-08-28 | End: 2019-08-28

## 2019-08-28 RX ORDER — LACTULOSE 10 G/15ML
45 SOLUTION ORAL
Refills: 0 | Status: DISCONTINUED | OUTPATIENT
Start: 2019-08-28 | End: 2019-08-28

## 2019-08-28 RX ORDER — PANTOPRAZOLE SODIUM 20 MG/1
40 TABLET, DELAYED RELEASE ORAL
Refills: 0 | Status: DISCONTINUED | OUTPATIENT
Start: 2019-08-28 | End: 2019-09-01

## 2019-08-28 RX ORDER — CHLORHEXIDINE GLUCONATE 213 G/1000ML
1 SOLUTION TOPICAL DAILY
Refills: 0 | Status: DISCONTINUED | OUTPATIENT
Start: 2019-08-28 | End: 2019-09-01

## 2019-08-28 RX ORDER — ATORVASTATIN CALCIUM 80 MG/1
40 TABLET, FILM COATED ORAL AT BEDTIME
Refills: 0 | Status: DISCONTINUED | OUTPATIENT
Start: 2019-08-28 | End: 2019-09-01

## 2019-08-28 RX ORDER — FUROSEMIDE 40 MG
20 TABLET ORAL DAILY
Refills: 0 | Status: DISCONTINUED | OUTPATIENT
Start: 2019-08-28 | End: 2019-08-28

## 2019-08-28 RX ORDER — LACTULOSE 10 G/15ML
35 SOLUTION ORAL THREE TIMES A DAY
Refills: 0 | Status: DISCONTINUED | OUTPATIENT
Start: 2019-08-28 | End: 2019-08-29

## 2019-08-28 RX ORDER — TAMSULOSIN HYDROCHLORIDE 0.4 MG/1
0.4 CAPSULE ORAL AT BEDTIME
Refills: 0 | Status: DISCONTINUED | OUTPATIENT
Start: 2019-08-28 | End: 2019-09-01

## 2019-08-28 RX ORDER — SPIRONOLACTONE 25 MG/1
50 TABLET, FILM COATED ORAL DAILY
Refills: 0 | Status: DISCONTINUED | OUTPATIENT
Start: 2019-08-28 | End: 2019-08-28

## 2019-08-28 RX ADMIN — ATORVASTATIN CALCIUM 40 MILLIGRAM(S): 80 TABLET, FILM COATED ORAL at 21:50

## 2019-08-28 RX ADMIN — Medication 75 MILLIGRAM(S): at 21:48

## 2019-08-28 RX ADMIN — TAMSULOSIN HYDROCHLORIDE 0.4 MILLIGRAM(S): 0.4 CAPSULE ORAL at 21:50

## 2019-08-28 RX ADMIN — WARFARIN SODIUM 2.5 MILLIGRAM(S): 2.5 TABLET ORAL at 21:50

## 2019-08-28 NOTE — H&P ADULT - HISTORY OF PRESENT ILLNESS
68 yo Man with PMH of AFIB on Coumadin, HTN, hyperlipidemia, CAD s/p 2 stents years back, BPH, DM type II, JIMÉNEZ cirrhosis complicated by ascites p/w worsening mental status -3-4 days duration     as per family patient was admitted previously with the same presentation and was treated for AMS in setting of JIMÉNEZ. He is compliant with all of his home medications (including lactulose and rifaximin) after discharge and was doing good and making 2 bowel movements every day but started to unrecognize his family for last couple of days - with forgetfulness episodes of short term events - He is oriented to time and place but not person.   no recent fever - chills - URTI - no abdominal pain or worsening distention or increased abdominal girth. Pt is on liver transplant but needed to be cleared by cardiologist. Cardiac monitor has placed 2 weeks ago to monitor rhythm.     in ED vitals were stable and he was admitted for treatment of AMS in setting of JIMÉNEZ   when examined patient was sleepy and AAOx2  and would forget why he was admitted so waxing and waning mental status

## 2019-08-28 NOTE — PATIENT PROFILE ADULT - VISION (WITH CORRECTIVE LENSES IF THE PATIENT USUALLY WEARS THEM):
1-pair of glasses/Partially impaired: cannot see medication labels or newsprint, but can see obstacles in path, and the surrounding layout; can count fingers at arm's length

## 2019-08-28 NOTE — H&P ADULT - NSHPLABSRESULTS_GEN_ALL_CORE
LENGTH OF HOSPITAL STAY:     CHIEF COMPLAINT:   Patient is a 69y old  Male who presents with a chief complaint of     Overnight events:    No acute events overnight    ALLERGIES:  No Known Allergies    MEDICATIONS:  STANDING MEDICATIONS  atorvastatin 40 milliGRAM(s) Oral at bedtime  chlorhexidine 4% Liquid 1 Application(s) Topical daily  furosemide    Tablet 20 milliGRAM(s) Oral daily  lactulose Syrup 45 Gram(s) Oral two times a day  pantoprazole    Tablet 40 milliGRAM(s) Oral before breakfast  pregabalin 75 milliGRAM(s) Oral three times a day  rifaximin 550 milliGRAM(s) Oral two times a day  spironolactone 50 milliGRAM(s) Oral daily  tamsulosin 0.4 milliGRAM(s) Oral at bedtime    PRN MEDICATIONS    VITALS:   T(F): 96.4  HR: 88  BP: 116/57  RR: 18  SpO2: 99%    LABS:                        8.4    2.58  )-----------( 73       ( 28 Aug 2019 13:24 )             25.7     08-28    136  |  104  |  35<H>  ----------------------------<  110<H>  4.7   |  18  |  1.6<H>    Ca    8.5      28 Aug 2019 13:24  Mg     1.7     08-28    TPro  7.2  /  Alb  2.5<L>  /  TBili  1.5<H>  /  DBili  x   /  AST  69<H>  /  ALT  46<H>  /  AlkPhos  173<H>  08-28    PT/INR - ( 28 Aug 2019 13:24 )   PT: 22.50 sec;   INR: 1.97 ratio         PTT - ( 28 Aug 2019 13:24 )  PTT:33.5 sec      Creatine Kinase, Serum: 84 U/L (08-28-19 @ 13:24)      CARDIAC MARKERS ( 28 Aug 2019 13:24 )  x     / x     / 84 U/L / x     / x

## 2019-08-28 NOTE — ED ADULT NURSE NOTE - OBJECTIVE STATEMENT
pt c.o. generalized weakness. family states "pt not at baseline a/ox1-2. pt also "normally walks at baseline" hx liver cirrhosis

## 2019-08-28 NOTE — PATIENT PROFILE ADULT - JOB HELP
Patient: Julieth Connelly    Procedure(s):   - Wound Class: II-Clean Contaminated    Diagnosis: Pegnant  Diagnosis Additional Information: No value filed.    Anesthesia Type:   No value filed.     Note:  Airway :Room Air  Patient transferred to:PACU  Comments: Comfortable, stable, report to pacu, rn      Vitals: (Last set prior to Anesthesia Care Transfer)    CRNA VITALS  6/24/2017 1912 - 6/24/2017 1948      6/24/2017             NIBP: 94/71    Pulse: 107    NIBP Mean: 78    Ht Rate: 79    SpO2: 100 %    Resp Rate (set): 10                Electronically Signed By: MURALI Griffith CRNA  June 24, 2017  7:48 PM  
no

## 2019-08-28 NOTE — ED PROVIDER NOTE - PHYSICAL EXAMINATION
Vital Signs: I have reviewed the initial vital signs.  Constitutional: well-nourished, appears stated age, no acute distress  Cardiovascular: regular rate, regular rhythm, well-perfused extremities, DP/Radial pulses 2+ b/l  Respiratory: unlabored respiratory effort, clear to auscultation bilaterally  Gastrointestinal: soft, non-tender abdomen, no pulsatile mass, no ascites.  Musculoskeletal: supple neck, no lower extremity edema, no calf tenderness  Integumentary: warm, dry, no rash  Neurologic: pt is drowsy, slowly following commands, disoriented  Psychiatric: calm

## 2019-08-28 NOTE — PATIENT PROFILE ADULT - NSPROIMPLANTSMEDDEV_GEN_A_NUR
Heart Moniter (Holter) attached to Left CW outside skin. Will be removed 8/29/19 and mailed back to cardiologist/None

## 2019-08-28 NOTE — H&P ADULT - ATTENDING COMMENTS
I agree with the above history ,physical and plan which I Have reviewed and examined independently  written notes separately

## 2019-08-28 NOTE — PATIENT PROFILE ADULT - DO YOU FEEL LIKE HURTING OTHERS?
Past Medical History:   Diagnosis Date   • Acute, but ill-defined, cerebrovascular disease 2/97    Stroke (old), mid-brain cerebellar, patent foramen ovale   • Benign neoplasm of colon     Colon Polyp followed by Dr. 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   • Type II or unspecified type diabetes mellitus without mention of complication, not stated as uncontrolled 12/2011   • Unspecified sleep apnea     CPAP       
REVIEWED TECHNICIAN'S NOTE AND I CONCUR.  IN ADDITION, THE PATIENT REPORTS THE FOLLOWING.    CHIEF COMPLAINT:  FLASHING LIGHTS OS    HISTORY OF PRESENT ILLNESS:  THE PATIENT REPORTS A CURVED FLASH OF LIGHT IN LET EYE.  DISTANT TEMPORAL PERIPHERY.  COMES AND GOES.  HAPPENED 2 OR 3 TIME FOR 3-4 D THEN HAS BEEN LESS NOTICEABLE LATELY, THOUGH LAST NIGHT SAW IT AGAIN.  VISION STABLE.  DENIES NEW FLOATERS        External Eye Exam:  Normal  Extraocular Motility:  Normal ou  Fields:  Full to confrontation ou  Awake, alert and oriented X 3.      Slit Lamp Exam:       Lids and Lashes:  Normal // normal       Conjunctivae:  Normal // normal       Cornea:  Normal // normal       Tear Film:  Normal // normal       Anterior Chamber:  Deep and quiet // deep and quiet       Iris:  Round and regular // Round and regular       Lens:  2+ NS OU      FUNDI:       Macula:  Normal ou       Vessels:  Normal ou       Midperiphery:  Normal ou       Periphery:  Normal ou    Disc:  Pink and sharp ou  Cup Disc Ratio:  0.6 OU    Assessment and Plan:    1.  PHOTOPSIA OS:  NO RETINAL BREAKS.  DISCUSSED SYMPTOMS OF BREAK/RD.  PT TO RETURN FO UCH.      
no

## 2019-08-28 NOTE — PATIENT PROFILE ADULT - ABILITY TO HEAR (WITH HEARING AID OR HEARING APPLIANCE IF NORMALLY USED):
Hearing Aides at home/Mildly to Moderately Impaired: difficulty hearing in some environments or speaker may need to increase volume or speak distinctly

## 2019-08-28 NOTE — H&P ADULT - NSHPPHYSICALEXAM_GEN_ALL_CORE
PHYSICAL EXAM:  GENERAL: Sleepy, lying in bed comfortably  HEAD:  Atraumatic, Normocephalic  EYES: EOMI, PERRLA, conjunctiva and sclera clear  ENT: Moist mucous membranes  NECK: Supple, No JVD  CHEST/LUNG: Clear to auscultation bilaterally; No rales, rhonchi, wheezing, or rubs. Unlabored respirations  HEART: Regular rate and rhythm; No murmurs, rubs, or gallops  ABDOMEN: Bowel sounds present; Soft, Nontender, Nondistended.  EXTREMITIES:  2+ Peripheral Pulses, brisk capillary refill. No clubbing, cyanosis, or edema  NERVOUS SYSTEM:  Alert & Oriented X2, speech clear. No deficits   SKIN: No rashes or lesions

## 2019-08-28 NOTE — H&P ADULT - ASSESSMENT
63 yo male came to ED with readmission of AMS    # AMS  patient is KTH JIMÉNEZ cirrhosis. most likely HE amonia 96  c/w lactulose. He was taking lactulose at home and was making 2 BM every day even when he was confused from couple of days  labs showed leuckopenia  U/S abdomen pending  the patient denies any abdominal pain and PE showed no tenderness  F/U GI   ?blood cultures  ?receiving ceftriaxone  ?ct head normal  ?monitor I/O    #CHESTER  Cr is 1.6. at baseline it was 1.3 1 month ago  likely prerenal  ? hold lasix and aldactone  f/u crea u/s kidneys  ?can give iv fluids if it continues to rise.    #Afib  INR today 1.97  ? hold comadin today  f/u INR    # LLE  no pain, hotness or redness.  less likely DVT  f/u u/s     # Thrombocytopenia and anemia  most likely due to cirrhosis  transfuse if hb<8 and plat<[id number]    Activity as tolerated  Gl Px - on protonix  DVT Px - on coumadin  Dispo - needed to be evaluated  CODE ? 65 yo male came to ED with readmission of AMS    # AMS  patient is KTH JIMÉNEZ cirrhosis. most likely HE amonia 96  increased lactulose from 45 twice a day to 35 x3 times a day.. He was taking lactulose at home and was making 2 BM every day even when he was confused from couple of days. Goal is 3-4 BM  labs showed leuckopenia  U/S abdomen pending  the patient denies any abdominal pain and PE showed no tenderness  F/U GI   monitor I/O and titrate for 3-4 BM     #CHESTER  Cr is 1.6. at baseline it was 1.3 1 month ago  likely prerenal  hold lasix and aldactone  f/u crea u/s kidneys  can give iv fluids if it continues to rise.    #Afib  INR today 1.97  c/w comadin 2.5 daily today  f/u INR    # LLE  no pain, hotness or redness.  less likely DVT  f/u u/s     # Thrombocytopenia and anemia  most likely due to cirrhosis  transfuse if hb<8 and plat<[id number]    Activity as tolerated  Gl Px - on protonix  DVT Px - on coumadin  Dispo - needed to be evaluated  Full CODE

## 2019-08-28 NOTE — ED PROVIDER NOTE - OBJECTIVE STATEMENT
70 y/o M with PMH of cirrhosis secondary to JIMÉNEZ presents with mild confusion that started yesterday and is worse today. No head trauma, fever, or vomiting. Pt is compliant with lactulose daily. Pt is on the liver transplant list.

## 2019-08-28 NOTE — ED PROVIDER NOTE - NS ED ROS FT
Constitutional: (-) fever  Eyes/ENT: (-) blurry vision, (-) epistaxis  Cardiovascular: (-) chest pain, (-) syncope  Respiratory: (-) cough, (-) shortness of breath  Gastrointestinal: (-) vomiting, (-) diarrhea  Musculoskeletal: (-) neck pain, (-) back pain, (-) joint pain  Integumentary: (-) rash, (-) edema  Neurological: (-) headache, (-) altered mental status, (+) confusion  Psychiatric: (-) hallucinations  Allergic/Immunologic: (-) pruritus

## 2019-08-29 ENCOUNTER — APPOINTMENT (OUTPATIENT)
Dept: CARDIOLOGY | Facility: CLINIC | Age: 69
End: 2019-08-29

## 2019-08-29 DIAGNOSIS — Z02.9 ENCOUNTER FOR ADMINISTRATIVE EXAMINATIONS, UNSPECIFIED: ICD-10-CM

## 2019-08-29 DIAGNOSIS — K72.90 HEPATIC FAILURE, UNSPECIFIED WITHOUT COMA: ICD-10-CM

## 2019-08-29 LAB
ALBUMIN SERPL ELPH-MCNC: 2.3 G/DL — LOW (ref 3.5–5.2)
ALP SERPL-CCNC: 134 U/L — HIGH (ref 30–115)
ALT FLD-CCNC: 38 U/L — SIGNIFICANT CHANGE UP (ref 0–41)
AMMONIA BLD-MCNC: 74 UMOL/L — HIGH (ref 11–55)
ANION GAP SERPL CALC-SCNC: 10 MMOL/L — SIGNIFICANT CHANGE UP (ref 7–14)
APTT BLD: 35.7 SEC — SIGNIFICANT CHANGE UP (ref 27–39.2)
APTT BLD: 36.4 SEC — SIGNIFICANT CHANGE UP (ref 27–39.2)
AST SERPL-CCNC: 54 U/L — HIGH (ref 0–41)
BASOPHILS # BLD AUTO: 0.02 K/UL — SIGNIFICANT CHANGE UP (ref 0–0.2)
BASOPHILS # BLD AUTO: 0.03 K/UL — SIGNIFICANT CHANGE UP (ref 0–0.2)
BASOPHILS NFR BLD AUTO: 1 % — SIGNIFICANT CHANGE UP (ref 0–1)
BASOPHILS NFR BLD AUTO: 1.1 % — HIGH (ref 0–1)
BILIRUB SERPL-MCNC: 1.3 MG/DL — HIGH (ref 0.2–1.2)
BLD GP AB SCN SERPL QL: SIGNIFICANT CHANGE UP
BUN SERPL-MCNC: 34 MG/DL — HIGH (ref 10–20)
CALCIUM SERPL-MCNC: 8.2 MG/DL — LOW (ref 8.5–10.1)
CHLORIDE SERPL-SCNC: 107 MMOL/L — SIGNIFICANT CHANGE UP (ref 98–110)
CO2 SERPL-SCNC: 21 MMOL/L — SIGNIFICANT CHANGE UP (ref 17–32)
CREAT SERPL-MCNC: 1.5 MG/DL — SIGNIFICANT CHANGE UP (ref 0.7–1.5)
EOSINOPHIL # BLD AUTO: 0.07 K/UL — SIGNIFICANT CHANGE UP (ref 0–0.7)
EOSINOPHIL # BLD AUTO: 0.08 K/UL — SIGNIFICANT CHANGE UP (ref 0–0.7)
EOSINOPHIL NFR BLD AUTO: 2.7 % — SIGNIFICANT CHANGE UP (ref 0–8)
EOSINOPHIL NFR BLD AUTO: 3.8 % — SIGNIFICANT CHANGE UP (ref 0–8)
GLUCOSE BLDC GLUCOMTR-MCNC: 108 MG/DL — HIGH (ref 70–99)
GLUCOSE BLDC GLUCOMTR-MCNC: 129 MG/DL — HIGH (ref 70–99)
GLUCOSE BLDC GLUCOMTR-MCNC: 140 MG/DL — HIGH (ref 70–99)
GLUCOSE BLDC GLUCOMTR-MCNC: 95 MG/DL — SIGNIFICANT CHANGE UP (ref 70–99)
GLUCOSE SERPL-MCNC: 92 MG/DL — SIGNIFICANT CHANGE UP (ref 70–99)
HCT VFR BLD CALC: 20.4 % — LOW (ref 42–52)
HCT VFR BLD CALC: 22.4 % — LOW (ref 42–52)
HGB BLD-MCNC: 6.8 G/DL — CRITICAL LOW (ref 14–18)
HGB BLD-MCNC: 7.4 G/DL — LOW (ref 14–18)
IMM GRANULOCYTES NFR BLD AUTO: 0.3 % — SIGNIFICANT CHANGE UP (ref 0.1–0.3)
IMM GRANULOCYTES NFR BLD AUTO: 0.5 % — HIGH (ref 0.1–0.3)
INR BLD: 2.01 RATIO — HIGH (ref 0.65–1.3)
INR BLD: 2.16 RATIO — HIGH (ref 0.65–1.3)
LYMPHOCYTES # BLD AUTO: 0.5 K/UL — LOW (ref 1.2–3.4)
LYMPHOCYTES # BLD AUTO: 0.65 K/UL — LOW (ref 1.2–3.4)
LYMPHOCYTES # BLD AUTO: 22 % — SIGNIFICANT CHANGE UP (ref 20.5–51.1)
LYMPHOCYTES # BLD AUTO: 26.9 % — SIGNIFICANT CHANGE UP (ref 20.5–51.1)
MAGNESIUM SERPL-MCNC: 1.8 MG/DL — SIGNIFICANT CHANGE UP (ref 1.8–2.4)
MCHC RBC-ENTMCNC: 29.6 PG — SIGNIFICANT CHANGE UP (ref 27–31)
MCHC RBC-ENTMCNC: 30.2 PG — SIGNIFICANT CHANGE UP (ref 27–31)
MCHC RBC-ENTMCNC: 33 G/DL — SIGNIFICANT CHANGE UP (ref 32–37)
MCHC RBC-ENTMCNC: 33.3 G/DL — SIGNIFICANT CHANGE UP (ref 32–37)
MCV RBC AUTO: 89.6 FL — SIGNIFICANT CHANGE UP (ref 80–94)
MCV RBC AUTO: 90.7 FL — SIGNIFICANT CHANGE UP (ref 80–94)
MONOCYTES # BLD AUTO: 0.37 K/UL — SIGNIFICANT CHANGE UP (ref 0.1–0.6)
MONOCYTES # BLD AUTO: 0.52 K/UL — SIGNIFICANT CHANGE UP (ref 0.1–0.6)
MONOCYTES NFR BLD AUTO: 17.6 % — HIGH (ref 1.7–9.3)
MONOCYTES NFR BLD AUTO: 19.9 % — HIGH (ref 1.7–9.3)
NEUTROPHILS # BLD AUTO: 0.89 K/UL — LOW (ref 1.4–6.5)
NEUTROPHILS # BLD AUTO: 1.66 K/UL — SIGNIFICANT CHANGE UP (ref 1.4–6.5)
NEUTROPHILS NFR BLD AUTO: 47.8 % — SIGNIFICANT CHANGE UP (ref 42.2–75.2)
NEUTROPHILS NFR BLD AUTO: 56.4 % — SIGNIFICANT CHANGE UP (ref 42.2–75.2)
NRBC # BLD: 0 /100 WBCS — SIGNIFICANT CHANGE UP (ref 0–0)
NRBC # BLD: 0 /100 WBCS — SIGNIFICANT CHANGE UP (ref 0–0)
PLATELET # BLD AUTO: 62 K/UL — LOW (ref 130–400)
PLATELET # BLD AUTO: 65 K/UL — LOW (ref 130–400)
POTASSIUM SERPL-MCNC: 3.9 MMOL/L — SIGNIFICANT CHANGE UP (ref 3.5–5)
POTASSIUM SERPL-SCNC: 3.9 MMOL/L — SIGNIFICANT CHANGE UP (ref 3.5–5)
PROT SERPL-MCNC: 6.2 G/DL — SIGNIFICANT CHANGE UP (ref 6–8)
PROTHROM AB SERPL-ACNC: 22.9 SEC — HIGH (ref 9.95–12.87)
PROTHROM AB SERPL-ACNC: 24.7 SEC — HIGH (ref 9.95–12.87)
RBC # BLD: 2.25 M/UL — LOW (ref 4.7–6.1)
RBC # BLD: 2.5 M/UL — LOW (ref 4.7–6.1)
RBC # FLD: 17.3 % — HIGH (ref 11.5–14.5)
RBC # FLD: 17.5 % — HIGH (ref 11.5–14.5)
SODIUM SERPL-SCNC: 138 MMOL/L — SIGNIFICANT CHANGE UP (ref 135–146)
WBC # BLD: 1.86 K/UL — LOW (ref 4.8–10.8)
WBC # BLD: 2.95 K/UL — LOW (ref 4.8–10.8)
WBC # FLD AUTO: 1.86 K/UL — LOW (ref 4.8–10.8)
WBC # FLD AUTO: 2.95 K/UL — LOW (ref 4.8–10.8)

## 2019-08-29 PROCEDURE — 76705 ECHO EXAM OF ABDOMEN: CPT | Mod: 26

## 2019-08-29 RX ORDER — WARFARIN SODIUM 2.5 MG/1
2.5 TABLET ORAL AT BEDTIME
Refills: 0 | Status: COMPLETED | OUTPATIENT
Start: 2019-08-29 | End: 2019-08-29

## 2019-08-29 RX ORDER — LACTULOSE 10 G/15ML
10 SOLUTION ORAL THREE TIMES A DAY
Refills: 0 | Status: DISCONTINUED | OUTPATIENT
Start: 2019-08-29 | End: 2019-08-29

## 2019-08-29 RX ORDER — LACTULOSE 10 G/15ML
15 SOLUTION ORAL THREE TIMES A DAY
Refills: 0 | Status: DISCONTINUED | OUTPATIENT
Start: 2019-08-29 | End: 2019-08-29

## 2019-08-29 RX ORDER — LACTULOSE 10 G/15ML
30 SOLUTION ORAL EVERY 6 HOURS
Refills: 0 | Status: DISCONTINUED | OUTPATIENT
Start: 2019-08-29 | End: 2019-09-01

## 2019-08-29 RX ORDER — LACTULOSE 10 G/15ML
30 SOLUTION ORAL EVERY 8 HOURS
Refills: 0 | Status: DISCONTINUED | OUTPATIENT
Start: 2019-08-29 | End: 2019-08-29

## 2019-08-29 RX ADMIN — LACTULOSE 30 GRAM(S): 10 SOLUTION ORAL at 23:25

## 2019-08-29 RX ADMIN — TAMSULOSIN HYDROCHLORIDE 0.4 MILLIGRAM(S): 0.4 CAPSULE ORAL at 21:39

## 2019-08-29 RX ADMIN — Medication 75 MILLIGRAM(S): at 21:39

## 2019-08-29 RX ADMIN — Medication 75 MILLIGRAM(S): at 05:41

## 2019-08-29 RX ADMIN — WARFARIN SODIUM 2.5 MILLIGRAM(S): 2.5 TABLET ORAL at 21:39

## 2019-08-29 RX ADMIN — PANTOPRAZOLE SODIUM 40 MILLIGRAM(S): 20 TABLET, DELAYED RELEASE ORAL at 05:40

## 2019-08-29 RX ADMIN — LACTULOSE 15 GRAM(S): 10 SOLUTION ORAL at 11:38

## 2019-08-29 RX ADMIN — LACTULOSE 30 GRAM(S): 10 SOLUTION ORAL at 19:11

## 2019-08-29 RX ADMIN — Medication 75 MILLIGRAM(S): at 13:51

## 2019-08-29 RX ADMIN — ATORVASTATIN CALCIUM 40 MILLIGRAM(S): 80 TABLET, FILM COATED ORAL at 21:39

## 2019-08-29 RX ADMIN — LACTULOSE 30 GRAM(S): 10 SOLUTION ORAL at 13:48

## 2019-08-29 NOTE — PROGRESS NOTE ADULT - ASSESSMENT
SOCORRO MANUEL 69y Male  MRN#: [id number]   CODE STATUS: full code       SUBJECTIVE  Patient is a 69y old Male who presents with a chief complaint of Altered mental status (28 Aug 2019 15:03)  Currently admitted to medicine with the primary diagnosis of Hepatic encephalopathy  Today is hospital day 1d, and this morning he is resting comfortably in bed and reports no overnight events.     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    ALLERGIES:  No Known Allergies    MEDICATIONS:  STANDING MEDICATIONS  atorvastatin 40 milliGRAM(s) Oral at bedtime  chlorhexidine 4% Liquid 1 Application(s) Topical daily  pantoprazole    Tablet 40 milliGRAM(s) Oral before breakfast  pregabalin 75 milliGRAM(s) Oral three times a day  rifaximin 550 milliGRAM(s) Oral two times a day  tamsulosin 0.4 milliGRAM(s) Oral at bedtime    PRN MEDICATIONS  lactulose Syrup 10 Gram(s) Oral three times a day PRN      VITAL SIGNS: Last 24 Hours  T(C): 36.2 (29 Aug 2019 05:00), Max: 36.2 (29 Aug 2019 05:00)  T(F): 97.1 (29 Aug 2019 05:00), Max: 97.1 (29 Aug 2019 05:00)  HR: 78 (29 Aug 2019 05:00) (78 - 94)  BP: 112/58 (29 Aug 2019 05:00) (112/58 - 124/68)  BP(mean): --  RR: 16 (29 Aug 2019 05:00) (16 - 18)  SpO2: 98% (28 Aug 2019 16:40) (98% - 100%)    LABS:                        6.8    1.86  )-----------( 62       ( 29 Aug 2019 06:58 )             20.4     08-29    138  |  107  |  34<H>  ----------------------------<  92  3.9   |  21  |  1.5    Ca    8.2<L>      29 Aug 2019 06:58  Mg     1.8     08-29    TPro  6.2  /  Alb  2.3<L>  /  TBili  1.3<H>  /  DBili  x   /  AST  54<H>  /  ALT  38  /  AlkPhos  134<H>  08-29    PT/INR - ( 29 Aug 2019 06:58 )   PT: 24.70 sec;   INR: 2.16 ratio         PTT - ( 29 Aug 2019 06:58 )  PTT:35.7 sec      Creatine Kinase, Serum: 84 U/L (08-28-19 @ 13:24)      CARDIAC MARKERS ( 28 Aug 2019 13:24 )  x     / x     / 84 U/L / x     / x          PHYSICAL EXAM:    	GENERAL: Sleepy, lying in bed comfortably  	HEAD:  Atraumatic, Normocephalic  	EYES: EOMI, PERRLA, conjunctiva and sclera clear  	ENT: Moist mucous membranes  	NECK: Supple, No JVD  	CHEST/LUNG: Clear to auscultation bilaterally; No rales, rhonchi, wheezing, or rubs. Unlabored respirations  	HEART: Regular rate and rhythm; No murmurs, rubs, or gallops  	ABDOMEN: Bowel sounds present; Soft, Nontender, Nondistended.  	EXTREMITIES:  2+ Peripheral Pulses, brisk capillary refill. No clubbing, cyanosis, or edema  	NERVOUS SYSTEM:  Alert & Oriented X2, speech clear. No deficits               SKIN: No rashes or lesions    ASSESSMENT & PLAN  65 yo male came to ED with readmission of AMS    # AMS due to hepatic encephalopathy.   JIMÉNEZ cirrhosis. ammonia 96  lactulose target BM   U/S abdomen pending  the patient denies any abdominal pain and PE showed no tenderness, ascites in PE, para today  F/U GI   monitor I/O and titrate for 3-4 BM     #CHESTER  likely prerenal  hold lasix and aldactone  f/u crea     #Afib, rate controlled   c/w coumadin 2.5 daily   f/u INR    # LLE  no pain, hotness or redness.  less likely DVT  duplex    # Thrombocytopenia and normocytic anemia   transfuse if hb<8 and plat<[id number]    Activity as tolerated  Gl Px - on protonix  DVT Px - on coumadin  Dispo - needed to be evaluated  Full CODE

## 2019-08-30 ENCOUNTER — TRANSCRIPTION ENCOUNTER (OUTPATIENT)
Age: 69
End: 2019-08-30

## 2019-08-30 DIAGNOSIS — K74.60 UNSPECIFIED CIRRHOSIS OF LIVER: ICD-10-CM

## 2019-08-30 LAB
ALBUMIN SERPL ELPH-MCNC: 2.4 G/DL — LOW (ref 3.5–5.2)
ALP SERPL-CCNC: 135 U/L — HIGH (ref 30–115)
ALT FLD-CCNC: 45 U/L — HIGH (ref 0–41)
AMMONIA BLD-MCNC: 107 UMOL/L — HIGH (ref 11–55)
ANION GAP SERPL CALC-SCNC: 10 MMOL/L — SIGNIFICANT CHANGE UP (ref 7–14)
APTT BLD: 35.3 SEC — SIGNIFICANT CHANGE UP (ref 27–39.2)
AST SERPL-CCNC: 67 U/L — HIGH (ref 0–41)
BASOPHILS # BLD AUTO: 0.03 K/UL — SIGNIFICANT CHANGE UP (ref 0–0.2)
BASOPHILS NFR BLD AUTO: 1.3 % — HIGH (ref 0–1)
BILIRUB SERPL-MCNC: 1.3 MG/DL — HIGH (ref 0.2–1.2)
BUN SERPL-MCNC: 31 MG/DL — HIGH (ref 10–20)
CALCIUM SERPL-MCNC: 8.3 MG/DL — LOW (ref 8.5–10.1)
CHLORIDE SERPL-SCNC: 105 MMOL/L — SIGNIFICANT CHANGE UP (ref 98–110)
CO2 SERPL-SCNC: 20 MMOL/L — SIGNIFICANT CHANGE UP (ref 17–32)
CREAT SERPL-MCNC: 1.6 MG/DL — HIGH (ref 0.7–1.5)
EOSINOPHIL # BLD AUTO: 0.08 K/UL — SIGNIFICANT CHANGE UP (ref 0–0.7)
EOSINOPHIL NFR BLD AUTO: 3.3 % — SIGNIFICANT CHANGE UP (ref 0–8)
GLUCOSE BLDC GLUCOMTR-MCNC: 100 MG/DL — HIGH (ref 70–99)
GLUCOSE BLDC GLUCOMTR-MCNC: 101 MG/DL — HIGH (ref 70–99)
GLUCOSE SERPL-MCNC: 101 MG/DL — HIGH (ref 70–99)
HCT VFR BLD CALC: 22.8 % — LOW (ref 42–52)
HGB BLD-MCNC: 7.5 G/DL — LOW (ref 14–18)
IMM GRANULOCYTES NFR BLD AUTO: 0.4 % — HIGH (ref 0.1–0.3)
INR BLD: 2.31 RATIO — HIGH (ref 0.65–1.3)
LYMPHOCYTES # BLD AUTO: 0.51 K/UL — LOW (ref 1.2–3.4)
LYMPHOCYTES # BLD AUTO: 21.3 % — SIGNIFICANT CHANGE UP (ref 20.5–51.1)
MCHC RBC-ENTMCNC: 29.4 PG — SIGNIFICANT CHANGE UP (ref 27–31)
MCHC RBC-ENTMCNC: 32.9 G/DL — SIGNIFICANT CHANGE UP (ref 32–37)
MCV RBC AUTO: 89.4 FL — SIGNIFICANT CHANGE UP (ref 80–94)
MONOCYTES # BLD AUTO: 0.49 K/UL — SIGNIFICANT CHANGE UP (ref 0.1–0.6)
MONOCYTES NFR BLD AUTO: 20.5 % — HIGH (ref 1.7–9.3)
NEUTROPHILS # BLD AUTO: 1.27 K/UL — LOW (ref 1.4–6.5)
NEUTROPHILS NFR BLD AUTO: 53.2 % — SIGNIFICANT CHANGE UP (ref 42.2–75.2)
NRBC # BLD: 0 /100 WBCS — SIGNIFICANT CHANGE UP (ref 0–0)
PLATELET # BLD AUTO: 67 K/UL — LOW (ref 130–400)
POTASSIUM SERPL-MCNC: 4.3 MMOL/L — SIGNIFICANT CHANGE UP (ref 3.5–5)
POTASSIUM SERPL-SCNC: 4.3 MMOL/L — SIGNIFICANT CHANGE UP (ref 3.5–5)
PROT SERPL-MCNC: 6.6 G/DL — SIGNIFICANT CHANGE UP (ref 6–8)
PROTHROM AB SERPL-ACNC: 26.3 SEC — HIGH (ref 9.95–12.87)
RBC # BLD: 2.55 M/UL — LOW (ref 4.7–6.1)
RBC # FLD: 17.3 % — HIGH (ref 11.5–14.5)
SODIUM SERPL-SCNC: 135 MMOL/L — SIGNIFICANT CHANGE UP (ref 135–146)
WBC # BLD: 2.39 K/UL — LOW (ref 4.8–10.8)
WBC # FLD AUTO: 2.39 K/UL — LOW (ref 4.8–10.8)

## 2019-08-30 PROCEDURE — 99223 1ST HOSP IP/OBS HIGH 75: CPT

## 2019-08-30 RX ORDER — TAMSULOSIN HYDROCHLORIDE 0.4 MG/1
1 CAPSULE ORAL
Qty: 0 | Refills: 0 | DISCHARGE
Start: 2019-08-30

## 2019-08-30 RX ORDER — TAMSULOSIN HYDROCHLORIDE 0.4 MG/1
1 CAPSULE ORAL
Qty: 0 | Refills: 0 | DISCHARGE

## 2019-08-30 RX ORDER — ATORVASTATIN CALCIUM 80 MG/1
1 TABLET, FILM COATED ORAL
Qty: 0 | Refills: 0 | DISCHARGE
Start: 2019-08-30

## 2019-08-30 RX ORDER — WARFARIN SODIUM 2.5 MG/1
2.5 TABLET ORAL AT BEDTIME
Refills: 0 | Status: COMPLETED | OUTPATIENT
Start: 2019-08-30 | End: 2019-08-30

## 2019-08-30 RX ORDER — PANTOPRAZOLE SODIUM 20 MG/1
1 TABLET, DELAYED RELEASE ORAL
Qty: 0 | Refills: 0 | DISCHARGE
Start: 2019-08-30

## 2019-08-30 RX ORDER — ATORVASTATIN CALCIUM 80 MG/1
40 TABLET, FILM COATED ORAL
Qty: 0 | Refills: 0 | DISCHARGE

## 2019-08-30 RX ADMIN — PANTOPRAZOLE SODIUM 40 MILLIGRAM(S): 20 TABLET, DELAYED RELEASE ORAL at 05:24

## 2019-08-30 RX ADMIN — CHLORHEXIDINE GLUCONATE 1 APPLICATION(S): 213 SOLUTION TOPICAL at 11:44

## 2019-08-30 RX ADMIN — Medication 75 MILLIGRAM(S): at 13:03

## 2019-08-30 RX ADMIN — LACTULOSE 30 GRAM(S): 10 SOLUTION ORAL at 23:33

## 2019-08-30 RX ADMIN — LACTULOSE 30 GRAM(S): 10 SOLUTION ORAL at 05:24

## 2019-08-30 RX ADMIN — LACTULOSE 30 GRAM(S): 10 SOLUTION ORAL at 17:36

## 2019-08-30 RX ADMIN — Medication 75 MILLIGRAM(S): at 22:40

## 2019-08-30 RX ADMIN — LACTULOSE 30 GRAM(S): 10 SOLUTION ORAL at 12:58

## 2019-08-30 RX ADMIN — TAMSULOSIN HYDROCHLORIDE 0.4 MILLIGRAM(S): 0.4 CAPSULE ORAL at 22:37

## 2019-08-30 RX ADMIN — Medication 75 MILLIGRAM(S): at 05:26

## 2019-08-30 RX ADMIN — WARFARIN SODIUM 2.5 MILLIGRAM(S): 2.5 TABLET ORAL at 22:36

## 2019-08-30 RX ADMIN — ATORVASTATIN CALCIUM 40 MILLIGRAM(S): 80 TABLET, FILM COATED ORAL at 22:36

## 2019-08-30 NOTE — CONSULT NOTE ADULT - SUBJECTIVE AND OBJECTIVE BOX
Patient is a 68 y/o  with PMHx of AFIB ( on Coumadin) , HTN, hyperlipidemia, CAD s/p 2 stents years back, BPH, DM type II, Cirrhosis ( secondary to JIMÉNEZ- followed by Dr. uHmphries at [facility]- aware that patient is here, pre transplant, on list) that presents from home with confusion ( Family including wife and daughter present to supplement history). It was noted that around two days prior patient became increasingly confused and un-coordinated. He notes that he was only having 1-2 bowel movements prior to hospital arrival. His family brought him in as he was not recognizing them. Patient without change in medications, and no recent infectious symptoms at home, denies fever, chills, blood in stool, abdominal pain, increase in abdominal girth, or newly added medication. Patient awaiting stress testing to complete [pre transplant testing.       PAST MEDICAL & SURGICAL HISTORY:  BPH (benign prostatic hyperplasia)  Cirrhosis of liver: JIMÉNEZ-- Followed at [facility]  CAD ( S/P PCI with stent x 2 )  Afib  Diabetes  Anemia  High cholesterol  HTN (hypertension)    Family Hx:  Father: Non Contributory   Mother: Non Contributory    Social History:  Denies Current ETOH use  Denies Current Illicit Drug use     MEDICATIONS  (STANDING):  atorvastatin 40 milliGRAM(s) Oral at bedtime  chlorhexidine 4% Liquid 1 Application(s) Topical daily  lactulose Syrup 30 Gram(s) Oral every 6 hours  pantoprazole    Tablet 40 milliGRAM(s) Oral before breakfast  pregabalin 75 milliGRAM(s) Oral three times a day  rifaximin 550 milliGRAM(s) Oral two times a day  tamsulosin 0.4 milliGRAM(s) Oral at bedtime    MEDICATIONS  (PRN):      Allergies:  No Known Allergies    Review of Systems  General:  See HPI  HEENT: Denies Trouble Swallowing ,Denies  Sore Throat , Denies Change in hearing/vision/speech ,Denies Dizziness    Cardio: Denies  Chest Pain , Palpitations    Respiratory: Denies worsening of SOB, Denies Cough  Abdomen: See detailed HPI  Neuro: See HPI  MSK: Denies pain in Bones/Joints/Muscles   Psych: Patient denies depression, denies suicidal or homicidal ideations  Integ: Patient Denies rash, or new skin lesions     Vital Signs :  T(F): 96.7   HR: 74   BP: 123/61   RR: 18  Physical Exam  Gen: NAD  HEENT: NC/AT, Mucosal Membranes Dry  Cardio: S1/S2   Resp: Anterior CTA B/L  Abdomen: Soft, ND/NT  Neuro: AAOx3, mild asterixis   Extremities: FROM x 4  Skin: No appreciated rash                             7.5    2.39  )-----------( 67       ( 30 Aug 2019 07:02 )             22.8   08-30    135  |  105  |  31<H>  ----------------------------<  101<H>  4.3   |  20  |  1.6<H>    Ca    8.3<L>      30 Aug 2019 07:02  Mg     1.8     08-29    TPro  6.6  /  Alb  2.4<L>  /  TBili  1.3<H>  /  DBili  x   /  AST  67<H>  /  ALT  45<H>  /  AlkPhos  135<H>  08-30    INR : 2.31      RADIOLOGY & ADDITIONAL STUDIES:    US Abdomen Limited 08.29.19  FINDINGS  IMPRESSION:    Mild to moderate right abdominal ascites.

## 2019-08-30 NOTE — DISCHARGE NOTE PROVIDER - NSDCFUSCHEDAPPT_GEN_ALL_CORE_FT
SOCORRO MANUEL ; 09/19/2019 ; [facility] Card[facility] [address]  SOCORRO MANUEL ; 09/27/2019 ; [facility] [address] [facility] SOCORRO MANUEL ; 09/19/2019 ; [facility] Card[facility] [address]  SOCORRO MANUEL ; 09/27/2019 ; [facility] [address] [facility] SOCORRO MANUEL ; 09/19/2019 ; N[facility] Cardio [address]  SOCORRO MANUEL ; 09/27/2019 ; [facility] [address] [facility]

## 2019-08-30 NOTE — DISCHARGE NOTE PROVIDER - NSDCCPCAREPLAN_GEN_ALL_CORE_FT
PRINCIPAL DISCHARGE DIAGNOSIS  Diagnosis: Hepatic encephalopathy  Assessment and Plan of Treatment: please follow up with your hepatologist in one week, continue with lactulose to have 2 bowel movements daily.   continue low protien diet.

## 2019-08-30 NOTE — PROGRESS NOTE ADULT - ASSESSMENT
SOCORRO MANUEL 69y Male  MRN#: [id number]   CODE STATUS:full code       SUBJECTIVE  Patient is a 69y old Male who presents with a chief complaint of Altered mental status (30 Aug 2019 10:44)  Currently admitted to medicine with the primary diagnosis of Hepatic encephalopathy  Today is hospital day 2d, and this morning he is resting comfortably in bed and reports no overnight events.       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    ALLERGIES:  No Known Allergies    MEDICATIONS:  STANDING MEDICATIONS  atorvastatin 40 milliGRAM(s) Oral at bedtime  chlorhexidine 4% Liquid 1 Application(s) Topical daily  lactulose Syrup 30 Gram(s) Oral every 6 hours  pantoprazole    Tablet 40 milliGRAM(s) Oral before breakfast  pregabalin 75 milliGRAM(s) Oral three times a day  rifaximin 550 milliGRAM(s) Oral two times a day  tamsulosin 0.4 milliGRAM(s) Oral at bedtime  warfarin 2.5 milliGRAM(s) Oral at bedtime    PRN MEDICATIONS      VITAL SIGNS: Last 24 Hours  T(C): 35.9 (30 Aug 2019 05:30), Max: 37 (29 Aug 2019 14:30)  T(F): 96.7 (30 Aug 2019 05:30), Max: 98.6 (29 Aug 2019 14:30)  HR: 74 (30 Aug 2019 05:30) (72 - 80)  BP: 123/61 (30 Aug 2019 05:30) (101/48 - 123/61)  BP(mean): --  RR: 18 (30 Aug 2019 05:30) (18 - 18)  SpO2: --    LABS:                        7.5    2.39  )-----------( 67       ( 30 Aug 2019 07:02 )             22.8     08-30    135  |  105  |  31<H>  ----------------------------<  101<H>  4.3   |  20  |  1.6<H>    Ca    8.3<L>      30 Aug 2019 07:02  Mg     1.8     08-29    TPro  6.6  /  Alb  2.4<L>  /  TBili  1.3<H>  /  DBili  x   /  AST  67<H>  /  ALT  45<H>  /  AlkPhos  135<H>  08-30    PT/INR - ( 30 Aug 2019 07:02 )   PT: 26.30 sec;   INR: 2.31 ratio         PTT - ( 30 Aug 2019 07:02 )  PTT:35.3 sec          CARDIAC MARKERS ( 28 Aug 2019 13:24 )  x     / x     / 84 U/L / x     / x          RADIOLOGY:      PHYSICAL EXAM:    	GENERAL: Sleepy, lying in bed comfortably  	HEAD:  Atraumatic, Normocephalic  	EYES: EOMI, PERRLA, conjunctiva and sclera clear  	ENT: Moist mucous membranes  	NECK: Supple, No JVD  	CHEST/LUNG: Clear to auscultation bilaterally; No rales, rhonchi, wheezing, or rubs. Unlabored respirations  	HEART: Regular rate and rhythm; No murmurs, rubs, or gallops  	ABDOMEN: Bowel sounds present; Soft, Nontender, Nondistended.  	EXTREMITIES:  2+ Peripheral Pulses, brisk capillary refill. No clubbing, cyanosis, or edema  	NERVOUS SYSTEM:  Alert & Oriented X2, speech clear. No deficits               SKIN: No rashes or lesions    ASSESSMENT & PLAN  63 yo male came to ED with readmission of AMS    # AMS due to hepatic encephalopathy.   JIMÉNEZ cirrhosis.   lactulose target 3-4 BM   U/S abdomen mild to moderate cirrhosis   the patient denies any abdominal pain and PE showed no tenderness, ascites in PE, para today  GI evaluation today   monitor I/O and titrate for 3-4 BM     #CHESTER  likely prerenal  hold lasix and aldactone  f/u crea     #Afib, rate controlled   c/w coumadin 2.5 daily   f/u INR    # LLE  no pain, hotness or redness.  less likely DVT  duplex    # Thrombocytopenia and normocytic anemia   transfuse if hb<8 and plat<[id number]    Activity as tolerated  Gl Px - on protonix  DVT Px - on coumadin  Dispo - needed to be evaluated  Full CODE SOCORRO MANUEL 69y Male  MRN#: [id number]   CODE STATUS:full code       SUBJECTIVE  Patient is a 69y old Male who presents with a chief complaint of Altered mental status (30 Aug 2019 10:44)  Currently admitted to medicine with the primary diagnosis of Hepatic encephalopathy  Today is hospital day 2d, and this morning he is resting comfortably in bed and reports no overnight events.       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    ALLERGIES:  No Known Allergies    MEDICATIONS:  STANDING MEDICATIONS  atorvastatin 40 milliGRAM(s) Oral at bedtime  chlorhexidine 4% Liquid 1 Application(s) Topical daily  lactulose Syrup 30 Gram(s) Oral every 6 hours  pantoprazole    Tablet 40 milliGRAM(s) Oral before breakfast  pregabalin 75 milliGRAM(s) Oral three times a day  rifaximin 550 milliGRAM(s) Oral two times a day  tamsulosin 0.4 milliGRAM(s) Oral at bedtime  warfarin 2.5 milliGRAM(s) Oral at bedtime    PRN MEDICATIONS      VITAL SIGNS: Last 24 Hours  T(C): 35.9 (30 Aug 2019 05:30), Max: 37 (29 Aug 2019 14:30)  T(F): 96.7 (30 Aug 2019 05:30), Max: 98.6 (29 Aug 2019 14:30)  HR: 74 (30 Aug 2019 05:30) (72 - 80)  BP: 123/61 (30 Aug 2019 05:30) (101/48 - 123/61)  BP(mean): --  RR: 18 (30 Aug 2019 05:30) (18 - 18)  SpO2: --    LABS:                        7.5    2.39  )-----------( 67       ( 30 Aug 2019 07:02 )             22.8     08-30    135  |  105  |  31<H>  ----------------------------<  101<H>  4.3   |  20  |  1.6<H>    Ca    8.3<L>      30 Aug 2019 07:02  Mg     1.8     08-29    TPro  6.6  /  Alb  2.4<L>  /  TBili  1.3<H>  /  DBili  x   /  AST  67<H>  /  ALT  45<H>  /  AlkPhos  135<H>  08-30    PT/INR - ( 30 Aug 2019 07:02 )   PT: 26.30 sec;   INR: 2.31 ratio         PTT - ( 30 Aug 2019 07:02 )  PTT:35.3 sec          CARDIAC MARKERS ( 28 Aug 2019 13:24 )  x     / x     / 84 U/L / x     / x          RADIOLOGY:      PHYSICAL EXAM:    	GENERAL: Sleepy, lying in bed comfortably  	HEAD:  Atraumatic, Normocephalic  	EYES: EOMI, PERRLA, conjunctiva and sclera clear  	ENT: Moist mucous membranes  	NECK: Supple, No JVD  	CHEST/LUNG: Clear to auscultation bilaterally; No rales, rhonchi, wheezing, or rubs. Unlabored respirations  	HEART: Regular rate and rhythm; No murmurs, rubs, or gallops  	ABDOMEN: Bowel sounds present; Soft, Nontender, Nondistended.  	EXTREMITIES:  2+ Peripheral Pulses, brisk capillary refill. No clubbing, cyanosis, or edema  	NERVOUS SYSTEM:  Alert & Oriented X2, speech clear. No deficits               SKIN: No rashes or lesions    ASSESSMENT & PLAN  63 yo male came to ED with readmission of AMS    # AMS due to hepatic encephalopathy.   JIMÉNEZ cirrhosis.   lactulose target 3-4 BM   U/S abdomen mild to moderate cirrhosis   the patient denies any abdominal pain and PE showed no tenderness, ascites in PE, para today  GI evaluation; paracentesis by IR,   monitor I/O and titrate for 3-4 BM     #CHESTER  likely prerenal  hold lasix and aldactone  f/u crea     #Afib, rate controlled   c/w coumadin 2.5 daily   f/u INR    # LLE  no pain, hotness or redness.  less likely DVT  duplex    # Thrombocytopenia and normocytic anemia   transfuse if hb<8 and plat<[id number]    Activity as tolerated  Gl Px - on protonix  DVT Px - on coumadin  Dispo - needed to be evaluated  Full CODE

## 2019-08-30 NOTE — CONSULT NOTE ADULT - ASSESSMENT
Patient is a 68 y/o  with PMHx of AFIB ( on Coumadin) , HTN, hyperlipidemia, CAD s/p 2 stents years back, BPH, DM type II, Cirrhosis ( secondary to JIMÉNEZ- followed by Dr. Humphries at [facility]- aware that patient is here, pre transplant, on list) that presents from home with confusion ( Family including wife and daughter present to supplement history). It was noted that around two days prior patient became increasingly confused and un-coordinated. He notes that he was only having 1-2 bowel movements prior to hospital arrival. His family brought him in as he was not recognizing them. Patient without change in medications, and no recent infectious symptoms at home, denies fever, chills, blood in stool, abdominal pain, increase in abdominal girth, or newly added medication. Clinically he is better than on initial presentation and had two bowel movements already today. Current MELD is 21 and patient is stable appearing. I expect that in the next twenty four hours mentation should probably return. No visualized deficit to suggest a central cause ( would always consider this given A-Fib Hx). Regarding Ascites on U/S would hold off on tap for now as he does not appear infectious.     Cirrhosis from JIMÉNEZ/ Encephalopathy/ Four hospitalizations this year for decompensation ( 2 for SBP, 2 for Encephalopathy)  - Dr Humphries at [facility] aware of current status  - Continue lactulose and titrate to achieve 3-4 bowel movements daily  - Xifaxin 550 BID on board  - Consider starting Lasix and Aldactone if renal function improved, or at least start with half the dose  - No need to repeat ammonia levels  - Hold of on Tap for now as abdomen is not tense and soft, would not want to tap as can introduce infection unless suspicion for SBP or large ascites is present   - Current plan imaging, and serologies discussed with family   - Will follow Patient is a 70 y/o  with PMHx of AFIB ( on Coumadin) , HTN, hyperlipidemia, CAD s/p 2 stents years back, BPH, DM type II, Cirrhosis ( secondary to JIMÉNEZ- followed by Dr. Humphries at [facility]- aware that patient is here, pre transplant, on list) that presents from home with confusion ( Family including wife and daughter present to supplement history). It was noted that around two days prior patient became increasingly confused and un-coordinated. He notes that he was only having 1-2 bowel movements prior to hospital arrival. His family brought him in as he was not recognizing them. Patient without change in medications, and no recent infectious symptoms at home, denies fever, chills, blood in stool, abdominal pain, increase in abdominal girth, or newly added medication. Clinically he is better than on initial presentation and had two bowel movements already today. Current MELD is 21 and patient is stable appearing. I expect that in the next twenty four hours mentation should probably return. No visualized deficit to suggest a central cause ( would always consider this given A-Fib Hx). Regarding Ascites on U/S would hold off on tap for now as he does not appear infectious.     Cirrhosis from JIMÉNEZ/ Encephalopathy/ Four hospitalizations this year for decompensation ( 2 for SBP, 2 for Encephalopathy)  - Dr Humphries at [facility] aware of current status  - Continue lactulose and titrate to achieve 3-4 bowel movements daily  - Xifaxin 550 BID on board  - ascitic fluid tap by IR to r/o SBP as underlying precipitating factor for his HE  - Consider starting Lasix and Aldactone if renal function improved, or at least start with half the dose  - No need to repeat ammonia levels  - Need SBP prophylaxis for life (cipro 750 mg weekly)  - uptodate on HCC screening   - Current plan imaging, and serologies discussed with family   - Will follow

## 2019-08-30 NOTE — DISCHARGE NOTE PROVIDER - CARE PROVIDER_API CALL
Chano Durant)  Gastroenterology; Internal Medicine  [address] [place] [address]  Phone: [phone]  Fax: [phone]  Follow Up Time: 1 week    Meño Stover ()  Infectious Disease; Internal Medicine  [address] [place] [address]  Phone: [phone]  Fax: [phone]  Follow Up Time: 1 week

## 2019-08-30 NOTE — DISCHARGE NOTE PROVIDER - PROVIDER TOKENS
PROVIDER:[TOKEN:[[id number]],FOLLOWUP:[1 week]],PROVIDER:[TOKEN:[[id number]],FOLLOWUP:[1 week]]

## 2019-08-30 NOTE — DISCHARGE NOTE PROVIDER - HOSPITAL COURSE
68 yo Man with PMH of AFIB on Coumadin, HTN, hyperlipidemia, CAD s/p 2 stents years back, BPH, DM type II, JIMÉNEZ cirrhosis complicated by ascites p/w worsening mental status -3-4 days duration     as per family patient was admitted previously with the same presentation and was treated for AMS in setting of JIMÉNEZ. He is compliant with all of his home medications (including lactulose and rifaximin) after discharge and was doing good and making 2 bowel movements every day but started to unrecognize his family for last couple of days - with forgetfulness episodes of short term events - He is oriented to time and place but not person.     no recent fever - chills - URTI - no abdominal pain or worsening distention or increased abdominal girth. Pt is on liver transplant but needed to be cleared by cardiologist. Cardiac monitor has placed 2 weeks ago to monitor rhythm.     in ED vitals were stable and he was admitted for treatment of AMS in setting of JIMÉNEZ     when examined patient was sleepy and AAOx2  and would forget why he was admitted so waxing and waning mental status,     patient started on lactulose, mental status improved significantly.     no signs of peritonitis on exam, no enough fluids to be taped, no paracentesis was done     GI evaluation was done, patient was seen by  70 yo Man with PMH of AFIB on Coumadin, HTN, hyperlipidemia, CAD s/p 2 stents years back, BPH, DM type II, JIMÉNEZ cirrhosis complicated by ascites p/w worsening mental status -3-4 days duration     as per family patient was admitted previously with the same presentation and was treated for AMS in setting of JIMÉNEZ. He is compliant with all of his home medications (including lactulose and rifaximin) after discharge and was doing good and making 2 bowel movements every day but started to unrecognize his family for last couple of days - with forgetfulness episodes of short term events - He is oriented to time and place but not person.     no recent fever - chills - URTI - no abdominal pain or worsening distention or increased abdominal girth. Pt is on liver transplant but needed to be cleared by cardiologist. Cardiac monitor has placed 2 weeks ago to monitor rhythm.     in ED vitals were stable and he was admitted for treatment of AMS in setting of JIMÉNEZ     when examined patient was sleepy and AAOx2  and would forget why he was admitted so waxing and waning mental status,     patient started on lactulose, mental status improved significantly.     no signs of peritonitis on exam, no enough fluids to be taped, no paracentesis was done . Plan to do it as outpatient .    GI evaluation was done, patient was seen by Dr Knight.

## 2019-08-31 LAB
AMMONIA BLD-MCNC: 58 UMOL/L — HIGH (ref 11–55)
ANION GAP SERPL CALC-SCNC: 11 MMOL/L — SIGNIFICANT CHANGE UP (ref 7–14)
APTT BLD: 38.6 SEC — SIGNIFICANT CHANGE UP (ref 27–39.2)
BASOPHILS # BLD AUTO: 0.03 K/UL — SIGNIFICANT CHANGE UP (ref 0–0.2)
BASOPHILS NFR BLD AUTO: 1.2 % — HIGH (ref 0–1)
BUN SERPL-MCNC: 29 MG/DL — HIGH (ref 10–20)
CALCIUM SERPL-MCNC: 8.4 MG/DL — LOW (ref 8.5–10.1)
CHLORIDE SERPL-SCNC: 108 MMOL/L — SIGNIFICANT CHANGE UP (ref 98–110)
CO2 SERPL-SCNC: 19 MMOL/L — SIGNIFICANT CHANGE UP (ref 17–32)
CREAT SERPL-MCNC: 1.5 MG/DL — SIGNIFICANT CHANGE UP (ref 0.7–1.5)
EOSINOPHIL # BLD AUTO: 0.09 K/UL — SIGNIFICANT CHANGE UP (ref 0–0.7)
EOSINOPHIL NFR BLD AUTO: 3.6 % — SIGNIFICANT CHANGE UP (ref 0–8)
GLUCOSE SERPL-MCNC: 106 MG/DL — HIGH (ref 70–99)
HCT VFR BLD CALC: 24.1 % — LOW (ref 42–52)
HGB BLD-MCNC: 7.9 G/DL — LOW (ref 14–18)
IMM GRANULOCYTES NFR BLD AUTO: 0.8 % — HIGH (ref 0.1–0.3)
INR BLD: 2.48 RATIO — HIGH (ref 0.65–1.3)
LYMPHOCYTES # BLD AUTO: 0.64 K/UL — LOW (ref 1.2–3.4)
LYMPHOCYTES # BLD AUTO: 25.5 % — SIGNIFICANT CHANGE UP (ref 20.5–51.1)
MCHC RBC-ENTMCNC: 29.3 PG — SIGNIFICANT CHANGE UP (ref 27–31)
MCHC RBC-ENTMCNC: 32.8 G/DL — SIGNIFICANT CHANGE UP (ref 32–37)
MCV RBC AUTO: 89.3 FL — SIGNIFICANT CHANGE UP (ref 80–94)
MONOCYTES # BLD AUTO: 0.45 K/UL — SIGNIFICANT CHANGE UP (ref 0.1–0.6)
MONOCYTES NFR BLD AUTO: 17.9 % — HIGH (ref 1.7–9.3)
NEUTROPHILS # BLD AUTO: 1.28 K/UL — LOW (ref 1.4–6.5)
NEUTROPHILS NFR BLD AUTO: 51 % — SIGNIFICANT CHANGE UP (ref 42.2–75.2)
NRBC # BLD: 0 /100 WBCS — SIGNIFICANT CHANGE UP (ref 0–0)
PLATELET # BLD AUTO: 72 K/UL — LOW (ref 130–400)
POTASSIUM SERPL-MCNC: 4.3 MMOL/L — SIGNIFICANT CHANGE UP (ref 3.5–5)
POTASSIUM SERPL-SCNC: 4.3 MMOL/L — SIGNIFICANT CHANGE UP (ref 3.5–5)
PROTHROM AB SERPL-ACNC: 28.3 SEC — HIGH (ref 9.95–12.87)
RBC # BLD: 2.7 M/UL — LOW (ref 4.7–6.1)
RBC # FLD: 17.1 % — HIGH (ref 11.5–14.5)
SODIUM SERPL-SCNC: 138 MMOL/L — SIGNIFICANT CHANGE UP (ref 135–146)
WBC # BLD: 2.51 K/UL — LOW (ref 4.8–10.8)
WBC # FLD AUTO: 2.51 K/UL — LOW (ref 4.8–10.8)

## 2019-08-31 RX ORDER — SPIRONOLACTONE 25 MG/1
12.5 TABLET, FILM COATED ORAL DAILY
Refills: 0 | Status: DISCONTINUED | OUTPATIENT
Start: 2019-08-31 | End: 2019-08-31

## 2019-08-31 RX ORDER — FUROSEMIDE 40 MG
10 TABLET ORAL DAILY
Refills: 0 | Status: DISCONTINUED | OUTPATIENT
Start: 2019-08-31 | End: 2019-08-31

## 2019-08-31 RX ORDER — WARFARIN SODIUM 2.5 MG/1
2.5 TABLET ORAL ONCE
Refills: 0 | Status: COMPLETED | OUTPATIENT
Start: 2019-08-31 | End: 2019-08-31

## 2019-08-31 RX ORDER — LANOLIN ALCOHOL/MO/W.PET/CERES
5 CREAM (GRAM) TOPICAL AT BEDTIME
Refills: 0 | Status: DISCONTINUED | OUTPATIENT
Start: 2019-08-31 | End: 2019-09-01

## 2019-08-31 RX ADMIN — WARFARIN SODIUM 2.5 MILLIGRAM(S): 2.5 TABLET ORAL at 21:43

## 2019-08-31 RX ADMIN — LACTULOSE 30 GRAM(S): 10 SOLUTION ORAL at 05:17

## 2019-08-31 RX ADMIN — ATORVASTATIN CALCIUM 40 MILLIGRAM(S): 80 TABLET, FILM COATED ORAL at 21:43

## 2019-08-31 RX ADMIN — Medication 5 MILLIGRAM(S): at 21:43

## 2019-08-31 RX ADMIN — Medication 75 MILLIGRAM(S): at 14:09

## 2019-08-31 RX ADMIN — Medication 75 MILLIGRAM(S): at 21:42

## 2019-08-31 RX ADMIN — Medication 75 MILLIGRAM(S): at 05:15

## 2019-08-31 RX ADMIN — LACTULOSE 30 GRAM(S): 10 SOLUTION ORAL at 12:15

## 2019-08-31 RX ADMIN — LACTULOSE 30 GRAM(S): 10 SOLUTION ORAL at 17:40

## 2019-08-31 RX ADMIN — PANTOPRAZOLE SODIUM 40 MILLIGRAM(S): 20 TABLET, DELAYED RELEASE ORAL at 05:13

## 2019-08-31 RX ADMIN — TAMSULOSIN HYDROCHLORIDE 0.4 MILLIGRAM(S): 0.4 CAPSULE ORAL at 21:43

## 2019-09-01 ENCOUNTER — TRANSCRIPTION ENCOUNTER (OUTPATIENT)
Age: 69
End: 2019-09-01

## 2019-09-01 VITALS
TEMPERATURE: 97 F | SYSTOLIC BLOOD PRESSURE: 118 MMHG | DIASTOLIC BLOOD PRESSURE: 56 MMHG | RESPIRATION RATE: 18 BRPM | HEART RATE: 71 BPM

## 2019-09-01 LAB
ALBUMIN SERPL ELPH-MCNC: 2.5 G/DL — LOW (ref 3.5–5.2)
ALP SERPL-CCNC: 163 U/L — HIGH (ref 30–115)
ALT FLD-CCNC: 55 U/L — HIGH (ref 0–41)
ANION GAP SERPL CALC-SCNC: 12 MMOL/L — SIGNIFICANT CHANGE UP (ref 7–14)
APTT BLD: 38.3 SEC — SIGNIFICANT CHANGE UP (ref 27–39.2)
AST SERPL-CCNC: 75 U/L — HIGH (ref 0–41)
BILIRUB SERPL-MCNC: 1.2 MG/DL — SIGNIFICANT CHANGE UP (ref 0.2–1.2)
BUN SERPL-MCNC: 31 MG/DL — HIGH (ref 10–20)
CALCIUM SERPL-MCNC: 8.7 MG/DL — SIGNIFICANT CHANGE UP (ref 8.5–10.1)
CHLORIDE SERPL-SCNC: 107 MMOL/L — SIGNIFICANT CHANGE UP (ref 98–110)
CO2 SERPL-SCNC: 18 MMOL/L — SIGNIFICANT CHANGE UP (ref 17–32)
CREAT SERPL-MCNC: 1.7 MG/DL — HIGH (ref 0.7–1.5)
GLUCOSE SERPL-MCNC: 152 MG/DL — HIGH (ref 70–99)
HCT VFR BLD CALC: 25.9 % — LOW (ref 42–52)
HGB BLD-MCNC: 8.3 G/DL — LOW (ref 14–18)
INR BLD: 3.07 RATIO — HIGH (ref 0.65–1.3)
MCHC RBC-ENTMCNC: 29.7 PG — SIGNIFICANT CHANGE UP (ref 27–31)
MCHC RBC-ENTMCNC: 32 G/DL — SIGNIFICANT CHANGE UP (ref 32–37)
MCV RBC AUTO: 92.8 FL — SIGNIFICANT CHANGE UP (ref 80–94)
NRBC # BLD: 0 /100 WBCS — SIGNIFICANT CHANGE UP (ref 0–0)
PLATELET # BLD AUTO: 72 K/UL — LOW (ref 130–400)
POTASSIUM SERPL-MCNC: 4.5 MMOL/L — SIGNIFICANT CHANGE UP (ref 3.5–5)
POTASSIUM SERPL-SCNC: 4.5 MMOL/L — SIGNIFICANT CHANGE UP (ref 3.5–5)
PROT SERPL-MCNC: 6.9 G/DL — SIGNIFICANT CHANGE UP (ref 6–8)
PROTHROM AB SERPL-ACNC: 34.9 SEC — HIGH (ref 9.95–12.87)
RBC # BLD: 2.79 M/UL — LOW (ref 4.7–6.1)
RBC # FLD: 17.2 % — HIGH (ref 11.5–14.5)
SODIUM SERPL-SCNC: 137 MMOL/L — SIGNIFICANT CHANGE UP (ref 135–146)
WBC # BLD: 2.4 K/UL — LOW (ref 4.8–10.8)
WBC # FLD AUTO: 2.4 K/UL — LOW (ref 4.8–10.8)

## 2019-09-01 RX ADMIN — Medication 75 MILLIGRAM(S): at 14:24

## 2019-09-01 RX ADMIN — LACTULOSE 30 GRAM(S): 10 SOLUTION ORAL at 11:25

## 2019-09-01 RX ADMIN — LACTULOSE 30 GRAM(S): 10 SOLUTION ORAL at 05:28

## 2019-09-01 RX ADMIN — Medication 75 MILLIGRAM(S): at 05:28

## 2019-09-01 RX ADMIN — PANTOPRAZOLE SODIUM 40 MILLIGRAM(S): 20 TABLET, DELAYED RELEASE ORAL at 05:28

## 2019-09-01 RX ADMIN — LACTULOSE 30 GRAM(S): 10 SOLUTION ORAL at 00:51

## 2019-09-01 NOTE — PROGRESS NOTE ADULT - REASON FOR ADMISSION
Altered mental status

## 2019-09-01 NOTE — DISCHARGE NOTE NURSING/CASE MANAGEMENT/SOCIAL WORK - PATIENT PORTAL LINK FT
You can access the FollowMyHealth Patient Portal offered by [facility] by registering at the following website: [email or web address]. By joining [employer]’s FollowMyHealth portal, you will also be able to view your health information using other applications (apps) compatible with our system.

## 2019-09-01 NOTE — PROGRESS NOTE ADULT - PROBLEM SELECTOR PLAN 1
ammonia level now 58 improved   continue Lactulose  patient waiting for IR tap abdomen not distended unlikely SBP but family insisting to do tap  continue current home meds  Melatonin for sleep
1. improving l  2. ammonia level 107  3 continue lactulose  4. son want to do tap make sure no infection bed side no fluids need IR tap d/w resident  5. continue current meds  6 GI following
continue   rifaximin 550 milliGRAM(s) Oral two times a day  and increase lactulose  3 to 4 BM per day  continue monitor in hospital
increase lactulose   continue current meds  d/w family  mental status improved  GI consults

## 2019-09-03 ENCOUNTER — OUTPATIENT (OUTPATIENT)
Dept: OUTPATIENT SERVICES | Facility: HOSPITAL | Age: 69
LOS: 1 days | Discharge: HOME | End: 2019-09-03

## 2019-09-03 DIAGNOSIS — Z95.9 PRESENCE OF CARDIAC AND VASCULAR IMPLANT AND GRAFT, UNSPECIFIED: Chronic | ICD-10-CM

## 2019-09-03 DIAGNOSIS — Z96.653 PRESENCE OF ARTIFICIAL KNEE JOINT, BILATERAL: Chronic | ICD-10-CM

## 2019-09-03 DIAGNOSIS — Z79.01 LONG TERM (CURRENT) USE OF ANTICOAGULANTS: ICD-10-CM

## 2019-09-03 DIAGNOSIS — Z12.11 ENCOUNTER FOR SCREENING FOR MALIGNANT NEOPLASM OF COLON: Chronic | ICD-10-CM

## 2019-09-03 DIAGNOSIS — I48.91 UNSPECIFIED ATRIAL FIBRILLATION: ICD-10-CM

## 2019-09-03 LAB
POCT INR: 2.2 RATIO — HIGH (ref 0.9–1.2)
POCT PT: 26.1 SEC — HIGH (ref 10–13.4)

## 2019-09-04 DIAGNOSIS — K72.90 HEPATIC FAILURE, UNSPECIFIED WITHOUT COMA: ICD-10-CM

## 2019-09-04 DIAGNOSIS — I10 ESSENTIAL (PRIMARY) HYPERTENSION: ICD-10-CM

## 2019-09-04 DIAGNOSIS — K74.69 OTHER CIRRHOSIS OF LIVER: ICD-10-CM

## 2019-09-04 DIAGNOSIS — N17.9 ACUTE KIDNEY FAILURE, UNSPECIFIED: ICD-10-CM

## 2019-09-04 DIAGNOSIS — K75.81 NONALCOHOLIC STEATOHEPATITIS (NASH): ICD-10-CM

## 2019-09-04 DIAGNOSIS — D69.59 OTHER SECONDARY THROMBOCYTOPENIA: ICD-10-CM

## 2019-09-04 DIAGNOSIS — I48.91 UNSPECIFIED ATRIAL FIBRILLATION: ICD-10-CM

## 2019-09-04 DIAGNOSIS — D63.8 ANEMIA IN OTHER CHRONIC DISEASES CLASSIFIED ELSEWHERE: ICD-10-CM

## 2019-09-04 DIAGNOSIS — Z79.899 OTHER LONG TERM (CURRENT) DRUG THERAPY: ICD-10-CM

## 2019-09-04 DIAGNOSIS — N40.0 BENIGN PROSTATIC HYPERPLASIA WITHOUT LOWER URINARY TRACT SYMPTOMS: ICD-10-CM

## 2019-09-04 DIAGNOSIS — E78.00 PURE HYPERCHOLESTEROLEMIA, UNSPECIFIED: ICD-10-CM

## 2019-09-04 DIAGNOSIS — Z96.653 PRESENCE OF ARTIFICIAL KNEE JOINT, BILATERAL: ICD-10-CM

## 2019-09-04 DIAGNOSIS — Z95.5 PRESENCE OF CORONARY ANGIOPLASTY IMPLANT AND GRAFT: ICD-10-CM

## 2019-09-04 DIAGNOSIS — Z79.01 LONG TERM (CURRENT) USE OF ANTICOAGULANTS: ICD-10-CM

## 2019-09-04 DIAGNOSIS — R18.8 OTHER ASCITES: ICD-10-CM

## 2019-09-04 DIAGNOSIS — I25.10 ATHEROSCLEROTIC HEART DISEASE OF NATIVE CORONARY ARTERY WITHOUT ANGINA PECTORIS: ICD-10-CM

## 2019-09-05 ENCOUNTER — RX RENEWAL (OUTPATIENT)
Age: 69
End: 2019-09-05

## 2019-09-06 ENCOUNTER — OUTPATIENT (OUTPATIENT)
Dept: OUTPATIENT SERVICES | Facility: HOSPITAL | Age: 69
LOS: 1 days | Discharge: HOME | End: 2019-09-06

## 2019-09-06 DIAGNOSIS — I48.91 UNSPECIFIED ATRIAL FIBRILLATION: ICD-10-CM

## 2019-09-06 DIAGNOSIS — Z12.11 ENCOUNTER FOR SCREENING FOR MALIGNANT NEOPLASM OF COLON: Chronic | ICD-10-CM

## 2019-09-06 DIAGNOSIS — Z96.653 PRESENCE OF ARTIFICIAL KNEE JOINT, BILATERAL: Chronic | ICD-10-CM

## 2019-09-06 DIAGNOSIS — Z79.01 LONG TERM (CURRENT) USE OF ANTICOAGULANTS: ICD-10-CM

## 2019-09-06 DIAGNOSIS — Z95.9 PRESENCE OF CARDIAC AND VASCULAR IMPLANT AND GRAFT, UNSPECIFIED: Chronic | ICD-10-CM

## 2019-09-06 LAB
POCT INR: 2 RATIO — HIGH (ref 0.9–1.2)
POCT PT: 24 SEC — HIGH (ref 10–13.4)

## 2019-09-10 ENCOUNTER — OUTPATIENT (OUTPATIENT)
Dept: OUTPATIENT SERVICES | Facility: HOSPITAL | Age: 69
LOS: 1 days | Discharge: HOME | End: 2019-09-10
Payer: MEDICARE

## 2019-09-10 DIAGNOSIS — I65.23 OCCLUSION AND STENOSIS OF BILATERAL CAROTID ARTERIES: ICD-10-CM

## 2019-09-10 DIAGNOSIS — Z12.11 ENCOUNTER FOR SCREENING FOR MALIGNANT NEOPLASM OF COLON: Chronic | ICD-10-CM

## 2019-09-10 DIAGNOSIS — R06.02 SHORTNESS OF BREATH: ICD-10-CM

## 2019-09-10 DIAGNOSIS — Z96.653 PRESENCE OF ARTIFICIAL KNEE JOINT, BILATERAL: Chronic | ICD-10-CM

## 2019-09-10 DIAGNOSIS — Z95.9 PRESENCE OF CARDIAC AND VASCULAR IMPLANT AND GRAFT, UNSPECIFIED: Chronic | ICD-10-CM

## 2019-09-10 DIAGNOSIS — I73.9 PERIPHERAL VASCULAR DISEASE, UNSPECIFIED: ICD-10-CM

## 2019-09-10 DIAGNOSIS — K74.60 UNSPECIFIED CIRRHOSIS OF LIVER: ICD-10-CM

## 2019-09-10 PROCEDURE — 78452 HT MUSCLE IMAGE SPECT MULT: CPT | Mod: 26

## 2019-09-13 ENCOUNTER — OUTPATIENT (OUTPATIENT)
Dept: OUTPATIENT SERVICES | Facility: HOSPITAL | Age: 69
LOS: 1 days | Discharge: HOME | End: 2019-09-13

## 2019-09-13 ENCOUNTER — APPOINTMENT (OUTPATIENT)
Dept: GASTROENTEROLOGY | Facility: CLINIC | Age: 69
End: 2019-09-13
Payer: MEDICARE

## 2019-09-13 VITALS
HEART RATE: 69 BPM | SYSTOLIC BLOOD PRESSURE: 125 MMHG | DIASTOLIC BLOOD PRESSURE: 69 MMHG | WEIGHT: 211 LBS | BODY MASS INDEX: 30.21 KG/M2 | HEIGHT: 70 IN

## 2019-09-13 DIAGNOSIS — Z12.11 ENCOUNTER FOR SCREENING FOR MALIGNANT NEOPLASM OF COLON: Chronic | ICD-10-CM

## 2019-09-13 DIAGNOSIS — Z95.9 PRESENCE OF CARDIAC AND VASCULAR IMPLANT AND GRAFT, UNSPECIFIED: Chronic | ICD-10-CM

## 2019-09-13 DIAGNOSIS — K72.90 HEPATIC FAILURE, UNSPECIFIED W/OUT COMA: ICD-10-CM

## 2019-09-13 DIAGNOSIS — I48.91 UNSPECIFIED ATRIAL FIBRILLATION: ICD-10-CM

## 2019-09-13 DIAGNOSIS — Z96.653 PRESENCE OF ARTIFICIAL KNEE JOINT, BILATERAL: Chronic | ICD-10-CM

## 2019-09-13 DIAGNOSIS — Z79.01 LONG TERM (CURRENT) USE OF ANTICOAGULANTS: ICD-10-CM

## 2019-09-13 LAB
POCT INR: 2.3 RATIO — HIGH (ref 0.9–1.2)
POCT PT: 27.1 SEC — HIGH (ref 10–13.4)

## 2019-09-13 PROCEDURE — 99214 OFFICE O/P EST MOD 30 MIN: CPT

## 2019-09-13 RX ORDER — PREGABALIN 300 MG/1
CAPSULE ORAL
Refills: 0 | Status: DISCONTINUED | COMMUNITY
End: 2019-09-13

## 2019-09-13 RX ORDER — LACTULOSE 10 G/15ML
10 SOLUTION ORAL TWICE DAILY
Qty: 900 | Refills: 3 | Status: DISCONTINUED | COMMUNITY
Start: 2019-08-09 | End: 2019-09-13

## 2019-09-13 RX ORDER — LACTULOSE 10 G/15ML
10 SOLUTION ORAL
Qty: 900 | Refills: 2 | Status: DISCONTINUED | COMMUNITY
Start: 2019-09-05 | End: 2019-09-13

## 2019-09-13 NOTE — ASSESSMENT
[FreeTextEntry1] : Patient with cirrhosis due to JIMÉNEZ.\par 1) Hepatic Encepahalopathy:Patient is alert and oriented x 3. To decrease Lactulose to 30 ml x3 Continue Xaifaxin 550 mg po bid.\par 2) Ascites Increase Lasix to 60 mg po . Continue Aldactone at 100 mp od od.\par \par 3) Anemia: Consider iron supplements.\par \par Check CBC, CMP next week '\par F/u in 2-3 weeks

## 2019-09-13 NOTE — HISTORY OF PRESENT ILLNESS
[FreeTextEntry1] : Patient is a 69 year old male with history of hypertension diabetes atrial fibrillation on Coumadin history of cardiac stents 15 years ago being followed up for cirrhosis most likely due to JIMÉNEZ. \par  [de-identified] : Patient is a 69 year old male with history of hypertension diabetes atrial fibrillation on Coumadin history of cardiac stents 15 years ago who is being followed up for cirrhosis most likely due to JIMÉNEZ. \par He was recently admitted to hospital or an episode of hepatic encephalopathy.He indicates that he was taking his medications both lactulose and Xifaxan religiously he did not complain of any fever constipation or GI bleeding.\par Today the patient feels well he is not confused alert and oriented times endocrine no history of hematemesis appetite good bowel movements normal.\par \par

## 2019-09-13 NOTE — PHYSICAL EXAM
[Sclera] : the sclera and conjunctiva were normal [General Appearance - Alert] : alert [] : no respiratory distress [Apical Impulse] : the apical impulse was normal [Heart Sounds] : normal S1 and S2 [Murmurs] : no murmurs [Bowel Sounds] : normal bowel sounds [Abdomen Tenderness] : non-tender [Oriented To Time, Place, And Person] : oriented to person, place, and time [FreeTextEntry1] : Mild pedal edema

## 2019-09-19 ENCOUNTER — APPOINTMENT (OUTPATIENT)
Dept: CARDIOLOGY | Facility: CLINIC | Age: 69
End: 2019-09-19
Payer: MEDICARE

## 2019-09-19 PROCEDURE — 93000 ELECTROCARDIOGRAM COMPLETE: CPT

## 2019-09-19 PROCEDURE — 99214 OFFICE O/P EST MOD 30 MIN: CPT

## 2019-09-27 ENCOUNTER — APPOINTMENT (OUTPATIENT)
Dept: GASTROENTEROLOGY | Facility: CLINIC | Age: 69
End: 2019-09-27

## 2019-09-27 ENCOUNTER — OUTPATIENT (OUTPATIENT)
Dept: OUTPATIENT SERVICES | Facility: HOSPITAL | Age: 69
LOS: 1 days | Discharge: HOME | End: 2019-09-27

## 2019-09-27 DIAGNOSIS — Z12.11 ENCOUNTER FOR SCREENING FOR MALIGNANT NEOPLASM OF COLON: Chronic | ICD-10-CM

## 2019-09-27 DIAGNOSIS — Z79.01 LONG TERM (CURRENT) USE OF ANTICOAGULANTS: ICD-10-CM

## 2019-09-27 DIAGNOSIS — I48.91 UNSPECIFIED ATRIAL FIBRILLATION: ICD-10-CM

## 2019-09-27 DIAGNOSIS — Z96.653 PRESENCE OF ARTIFICIAL KNEE JOINT, BILATERAL: Chronic | ICD-10-CM

## 2019-09-27 DIAGNOSIS — Z95.9 PRESENCE OF CARDIAC AND VASCULAR IMPLANT AND GRAFT, UNSPECIFIED: Chronic | ICD-10-CM

## 2019-09-27 LAB
POCT INR: 2.9 RATIO — HIGH (ref 0.9–1.2)
POCT PT: 34.5 SEC — HIGH (ref 10–13.4)

## 2019-10-01 ENCOUNTER — INPATIENT (INPATIENT)
Facility: HOSPITAL | Age: 69
LOS: 3 days | Discharge: HOME HEALTH | End: 2019-10-05
Attending: HOSPITALIST | Admitting: HOSPITALIST
Payer: MEDICARE

## 2019-10-01 VITALS
DIASTOLIC BLOOD PRESSURE: 58 MMHG | RESPIRATION RATE: 16 BRPM | SYSTOLIC BLOOD PRESSURE: 108 MMHG | OXYGEN SATURATION: 98 % | TEMPERATURE: 99 F | HEART RATE: 70 BPM

## 2019-10-01 DIAGNOSIS — Z12.11 ENCOUNTER FOR SCREENING FOR MALIGNANT NEOPLASM OF COLON: Chronic | ICD-10-CM

## 2019-10-01 DIAGNOSIS — Z96.653 PRESENCE OF ARTIFICIAL KNEE JOINT, BILATERAL: Chronic | ICD-10-CM

## 2019-10-01 DIAGNOSIS — Z95.9 PRESENCE OF CARDIAC AND VASCULAR IMPLANT AND GRAFT, UNSPECIFIED: Chronic | ICD-10-CM

## 2019-10-01 LAB
ACANTHOCYTES BLD QL SMEAR: SLIGHT — SIGNIFICANT CHANGE UP
ALBUMIN SERPL ELPH-MCNC: 2.2 G/DL — LOW (ref 3.5–5.2)
ALP SERPL-CCNC: 124 U/L — HIGH (ref 30–115)
ALT FLD-CCNC: 174 U/L — HIGH (ref 0–41)
AMMONIA BLD-MCNC: 29 UMOL/L — SIGNIFICANT CHANGE UP (ref 11–55)
ANION GAP SERPL CALC-SCNC: 8 MMOL/L — SIGNIFICANT CHANGE UP (ref 7–14)
ANISOCYTOSIS BLD QL: SIGNIFICANT CHANGE UP
APTT BLD: 37.5 SEC — SIGNIFICANT CHANGE UP (ref 27–39.2)
AST SERPL-CCNC: 278 U/L — HIGH (ref 0–41)
BASOPHILS # BLD AUTO: 0.03 K/UL — SIGNIFICANT CHANGE UP (ref 0–0.2)
BASOPHILS NFR BLD AUTO: 0.8 % — SIGNIFICANT CHANGE UP (ref 0–1)
BILIRUB DIRECT SERPL-MCNC: 0.6 MG/DL — HIGH (ref 0–0.2)
BILIRUB INDIRECT FLD-MCNC: 1 MG/DL — SIGNIFICANT CHANGE UP (ref 0.2–1.2)
BILIRUB SERPL-MCNC: 1.6 MG/DL — HIGH (ref 0.2–1.2)
BUN SERPL-MCNC: 40 MG/DL — HIGH (ref 10–20)
CALCIUM SERPL-MCNC: 8.6 MG/DL — SIGNIFICANT CHANGE UP (ref 8.5–10.1)
CHLORIDE SERPL-SCNC: 107 MMOL/L — SIGNIFICANT CHANGE UP (ref 98–110)
CO2 SERPL-SCNC: 19 MMOL/L — SIGNIFICANT CHANGE UP (ref 17–32)
CREAT SERPL-MCNC: 1.8 MG/DL — HIGH (ref 0.7–1.5)
EOSINOPHIL # BLD AUTO: 0.03 K/UL — SIGNIFICANT CHANGE UP (ref 0–0.7)
EOSINOPHIL NFR BLD AUTO: 0.8 % — SIGNIFICANT CHANGE UP (ref 0–8)
GIANT PLATELETS BLD QL SMEAR: PRESENT — SIGNIFICANT CHANGE UP
GLUCOSE SERPL-MCNC: 93 MG/DL — SIGNIFICANT CHANGE UP (ref 70–99)
HCT VFR BLD CALC: 22.7 % — LOW (ref 42–52)
HGB BLD-MCNC: 7.5 G/DL — LOW (ref 14–18)
IMM GRANULOCYTES NFR BLD AUTO: 0.3 % — SIGNIFICANT CHANGE UP (ref 0.1–0.3)
INR BLD: 3.1 RATIO — HIGH (ref 0.65–1.3)
LACTATE SERPL-SCNC: 2.3 MMOL/L — HIGH (ref 0.5–2.2)
LIDOCAIN IGE QN: 84 U/L — HIGH (ref 7–60)
LYMPHOCYTES # BLD AUTO: 0.47 K/UL — LOW (ref 1.2–3.4)
LYMPHOCYTES # BLD AUTO: 12.7 % — LOW (ref 20.5–51.1)
MACROCYTES BLD QL: SLIGHT — SIGNIFICANT CHANGE UP
MAGNESIUM SERPL-MCNC: 2 MG/DL — SIGNIFICANT CHANGE UP (ref 1.8–2.4)
MANUAL SMEAR VERIFICATION: SIGNIFICANT CHANGE UP
MCHC RBC-ENTMCNC: 30.2 PG — SIGNIFICANT CHANGE UP (ref 27–31)
MCHC RBC-ENTMCNC: 33 G/DL — SIGNIFICANT CHANGE UP (ref 32–37)
MCV RBC AUTO: 91.5 FL — SIGNIFICANT CHANGE UP (ref 80–94)
METAMYELOCYTES # FLD: 0.9 % — HIGH (ref 0–0)
MICROCYTES BLD QL: SLIGHT — SIGNIFICANT CHANGE UP
MONOCYTES # BLD AUTO: 0.64 K/UL — HIGH (ref 0.1–0.6)
MONOCYTES NFR BLD AUTO: 17.3 % — HIGH (ref 1.7–9.3)
NEUTROPHILS # BLD AUTO: 2.53 K/UL — SIGNIFICANT CHANGE UP (ref 1.4–6.5)
NEUTROPHILS NFR BLD AUTO: 68.1 % — SIGNIFICANT CHANGE UP (ref 42.2–75.2)
NRBC # BLD: 0 /100 WBCS — SIGNIFICANT CHANGE UP (ref 0–0)
NT-PROBNP SERPL-SCNC: 518 PG/ML — HIGH (ref 0–300)
OVALOCYTES BLD QL SMEAR: SLIGHT — SIGNIFICANT CHANGE UP
PLAT MORPH BLD: NORMAL — SIGNIFICANT CHANGE UP
PLATELET # BLD AUTO: 82 K/UL — LOW (ref 130–400)
POTASSIUM SERPL-MCNC: 5.3 MMOL/L — HIGH (ref 3.5–5)
POTASSIUM SERPL-SCNC: 5.3 MMOL/L — HIGH (ref 3.5–5)
PROT SERPL-MCNC: 6 G/DL — SIGNIFICANT CHANGE UP (ref 6–8)
PROTHROM AB SERPL-ACNC: 35.3 SEC — HIGH (ref 9.95–12.87)
RBC # BLD: 2.48 M/UL — LOW (ref 4.7–6.1)
RBC # FLD: 20.6 % — HIGH (ref 11.5–14.5)
RBC BLD AUTO: NORMAL — SIGNIFICANT CHANGE UP
SODIUM SERPL-SCNC: 134 MMOL/L — LOW (ref 135–146)
VARIANT LYMPHS # BLD: 0.9 % — SIGNIFICANT CHANGE UP (ref 0–5)
WBC # BLD: 3.71 K/UL — LOW (ref 4.8–10.8)
WBC # FLD AUTO: 3.71 K/UL — LOW (ref 4.8–10.8)

## 2019-10-01 PROCEDURE — 93010 ELECTROCARDIOGRAM REPORT: CPT

## 2019-10-01 PROCEDURE — 71045 X-RAY EXAM CHEST 1 VIEW: CPT | Mod: 26

## 2019-10-01 PROCEDURE — 99285 EMERGENCY DEPT VISIT HI MDM: CPT

## 2019-10-01 RX ORDER — FUROSEMIDE 40 MG
40 TABLET ORAL ONCE
Refills: 0 | Status: COMPLETED | OUTPATIENT
Start: 2019-10-01 | End: 2019-10-01

## 2019-10-01 RX ORDER — OMEGA-3 ACID ETHYL ESTERS 1 G
2 CAPSULE ORAL
Refills: 0 | Status: DISCONTINUED | OUTPATIENT
Start: 2019-10-01 | End: 2019-10-05

## 2019-10-01 RX ORDER — LACTULOSE 10 G/15ML
20 SOLUTION ORAL THREE TIMES A DAY
Refills: 0 | Status: DISCONTINUED | OUTPATIENT
Start: 2019-10-01 | End: 2019-10-04

## 2019-10-01 RX ORDER — TAMSULOSIN HYDROCHLORIDE 0.4 MG/1
0.4 CAPSULE ORAL AT BEDTIME
Refills: 0 | Status: DISCONTINUED | OUTPATIENT
Start: 2019-10-01 | End: 2019-10-05

## 2019-10-01 RX ORDER — PANTOPRAZOLE SODIUM 20 MG/1
40 TABLET, DELAYED RELEASE ORAL
Refills: 0 | Status: DISCONTINUED | OUTPATIENT
Start: 2019-10-01 | End: 2019-10-05

## 2019-10-01 RX ORDER — FUROSEMIDE 40 MG
40 TABLET ORAL DAILY
Refills: 0 | Status: DISCONTINUED | OUTPATIENT
Start: 2019-10-01 | End: 2019-10-03

## 2019-10-01 RX ADMIN — Medication 40 MILLIGRAM(S): at 20:01

## 2019-10-01 NOTE — ED PROVIDER NOTE - CHPI ED SYMPTOMS NEG
no vomiting/no pain/no tingling/no weakness/no decreased eating/drinking/no fever/no dizziness/no nausea/no numbness/no chills

## 2019-10-01 NOTE — H&P ADULT - NSHPLABSRESULTS_GEN_ALL_CORE
7.5    3.71  )-----------( 82       ( 01 Oct 2019 17:30 )             22.7   10-01    134<L>  |  107  |  40<H>  ----------------------------<  93  5.3<H>   |  19  |  1.8<H>    Ca    8.6      01 Oct 2019 17:30  Mg     2.0     10-01    TPro  6.0  /  Alb  2.2<L>  /  TBili  1.6<H>  /  DBili  0.6<H>  /  AST  278<H>  /  ALT  174<H>  /  AlkPhos  124<H>  10-01

## 2019-10-01 NOTE — H&P ADULT - ASSESSMENT
#CHESTER  likely prerenal  hold lasix and aldactone  f/u crea     #Afib, rate controlled   c/w coumadin 2.5 daily   f/u INR    # LLE  no pain, hotness or redness.  less likely DVT  duplex    # Thrombocytopenia and normocytic anemia   transfuse if hb<8 and plat<29609    Activity as tolerated  Gl Px - on protonix  DVT Px - on coumadin  Dispo - needed to be evaluated  Full CODE 68 yo Man with PMH of AFIB on Coumadin, HTN, hyperlipidemia, CAD s/p 2 stents, BPH, DM type II, JIMÉNEZ cirrhosis complicated by ascites multiple admissions for AMS from encephalopathy, follows up at [place], presented to ED with worsening SOB and LE swelling. Per pt he was recently advised not to take his water pills given abnormalities in his renal function. He started to get shortness of breath and it progressively got worse. It is associated with increased LE swelling. His symptoms worsen with physical activity specially when he has to climb stairs.    #Shortness of breath most likely from ascites  - doubt CHF, with BNP of 518, previously had normal Echo and -ve stress test in Sep 19.  - s/p I/V lasix 40mg in ED, will give one more dose in am  - pt had paracentesis twice in the past, if symptoms not improving may need another tap.    # liver cirrhosis from JIMÉNEZ  - MELD score 28  - c/w lactulose and rifaximin for ppx  - no signs of encephalopathy  - f/u GI consult and repeat labs in am     #CHESTER vs progression of CKD  c/w diuresis for now  will check urine lytes, monitor output  will get retroperitoneal sono and nephro consult    # Hyperkalemia  - f/u repeat BMP    #Afib, rate controlled   hold coumadin supratherapuetic INR  f/u INR    # Thrombocytopenia and normocytic anemia   transfuse if hb<7 and plat<34445    Activity as tolerated  Gl Px - on protonix  DVT Px - supratherapeutic INR  Dispo - needed to be evaluated  Full CODE 70 yo Man with PMH of AFIB on Coumadin, HTN, hyperlipidemia, CAD s/p 2 stents, BPH, DM type II, JIMÉNEZ cirrhosis complicated by ascites multiple admissions for AMS from encephalopathy, follows up at [place], presented to ED with worsening SOB and LE swelling. Per pt he was recently advised not to take his water pills given abnormalities in his renal function. He started to get shortness of breath and it progressively got worse. It is associated with increased LE swelling. His symptoms worsen with physical activity specially when he has to climb stairs.    #Shortness of breath most likely from decompensated cirrhosis   - doubt CHF, with BNP of 518, previously had normal Echo and -ve stress test in Sep 19.  - s/p I/V lasix 40mg in ED, c/w I/V lasix 40mg for now  - pt had paracentesis twice in the past, if symptoms not improving may need another tap.    # liver cirrhosis from JIMÉNEZ  - MELD score 28  - c/w lactulose and rifaximin for ppx  - no signs of encephalopathy  - f/u GI consult and repeat labs in am     #CHESTER vs progression of CKD  c/w diuresis for now  will check urine lytes, monitor output  will get retroperitoneal sono and nephro consult    # Hyperkalemia  - f/u repeat BMP    #Afib, rate controlled   hold coumadin supratherapuetic INR  f/u INR    # Thrombocytopenia and normocytic anemia   transfuse if hb<7 and plat<98069    Activity as tolerated  Gl Px - on protonix  DVT Px - supratherapeutic INR  Dispo - from home  Full CODE 70 yo Man with PMH of AFIB on Coumadin, HTN, hyperlipidemia, CAD s/p 2 stents, BPH, DM type II, JIMÉNEZ cirrhosis complicated by ascites multiple admissions for AMS from encephalopathy, follows up at [place], presented to ED with worsening SOB and LE swelling. Per pt he was recently advised not to take his water pills given abnormalities in his renal function. He started to get shortness of breath and it progressively got worse. It is associated with increased LE swelling. His symptoms worsen with physical activity specially when he has to climb stairs.    #Shortness of breath most likely from decompensated cirrhosis   - doubt CHF, with BNP of 518, previously had normal Echo and -ve stress test in Sep 19.  - s/p I/V lasix 40mg in ED, c/w I/V lasix 40mg for now  - pt had paracentesis twice in the past, if symptoms not improving may need another tap.    # liver cirrhosis from JIMÉNEZ  - MELD score 28  - c/w lactulose and rifaximin for ppx  - no signs of encephalopathy  - f/u GI consult and repeat labs in am   - worsening transaminitis hold Lipitor for now.    #CHESTER vs progression of CKD  c/w diuresis for now  will check urine lytes, monitor output  will get retroperitoneal sono and nephro consult    # Hyperkalemia  - f/u repeat BMP    #Afib, rate controlled   hold coumadin supratherapuetic INR  f/u INR    # Thrombocytopenia and normocytic anemia   transfuse if hb<7 and plat<79301    Activity as tolerated  Gl Px - on protonix  DVT Px - supratherapeutic INR  Dispo - from home  Full CODE 68 yo Man with PMH of AFIB on Coumadin, HTN, hyperlipidemia, CAD s/p 2 stents, BPH, DM type II, JIMÉNEZ cirrhosis complicated by ascites multiple admissions for AMS from encephalopathy, follows up at [place], presented to ED with worsening SOB and LE swelling. Per pt he was recently advised not to take his water pills given abnormalities in his renal function. He started to get shortness of breath and it progressively got worse. It is associated with increased LE swelling. His symptoms worsen with physical activity specially when he has to climb stairs.    #Shortness of breath most likely from decompensated cirrhosis with some element of anemia  - doubt CHF, with BNP of 518, previously had normal Echo and -ve stress test in Sep 19.  - s/p I/V lasix 40mg in ED, c/w I/V lasix 40mg for now  - pt had paracentesis twice in the past, if symptoms not improving may need another tap.    # liver cirrhosis from JIMÉNEZ  - MELD score 28  - c/w lactulose and rifaximin for ppx  - no signs of encephalopathy  - f/u GI consult and repeat labs in am   - worsening transaminitis hold Lipitor for now.    #CHESTER vs progression of CKD  c/w diuresis for now  will check urine lytes, monitor output  will get retroperitoneal sono and nephro consult    # Hyperkalemia  - f/u repeat BMP    #Afib, rate controlled   hold coumadin supratherapuetic INR  f/u INR    # Thrombocytopenia and normocytic anemia   transfuse if hb<7 and plat<34252  f.u iron studies, folate and b12.    Activity as tolerated  Gl Px - on protonix  DVT Px - supratherapeutic INR  Dispo - from home  Full CODE

## 2019-10-01 NOTE — H&P ADULT - NSHPPHYSICALEXAM_GEN_ALL_CORE
PHYSICAL EXAM:  GENERAL: NAD, lying in bed comfortably  HEAD:  Atraumatic, Normocephalic  EYES: EOMI, PERRLA, conjunctiva and sclera clear  ENT: Moist mucous membranes  NECK: Supple, No JVD  CHEST/LUNG: Clear to auscultation bilaterally; No rales, rhonchi, wheezing, or rubs. Unlabored respirations  HEART: Regular rate and rhythm; No murmurs, rubs, or gallops  ABDOMEN: Bowel sounds present; Soft, Nontender, distended, shifting dullness positive  EXTREMITIES:  2+ Peripheral Pulses, brisk capillary refill. No clubbing, cyanosis, b/l LE edema  NERVOUS SYSTEM:  Alert & Oriented X3, speech clear. No deficits   MSK: FROM all 4 extremities, full and equal strength  SKIN: No rashes or lesions

## 2019-10-01 NOTE — ED PROVIDER NOTE - CLINICAL SUMMARY MEDICAL DECISION MAKING FREE TEXT BOX
Patient presented with fluid overload 2/2 liver cirrhosis and CKD, failed outpatient diuresis and sent in for admission. Otherwise afebrile, HD stable, asymptomatic at rest but unable to ambulate without significant dyspnea. Obtained EKG which showed non-specific changes but no STEMI. CXR negative for fluid build up, but labs showed elevated LFTs and Creatinine as expected. Patient tx in ED with IV lasix with only mild improvement. Will admit for further monitoring and management. Patient and family agreeable with plan. HD stable at time of admission.

## 2019-10-01 NOTE — ED ADULT TRIAGE NOTE - CHIEF COMPLAINT QUOTE
"My feet are swelling, I need a liver transplant and my doctor told me to come in." bilateral lower extremity swelling present.

## 2019-10-01 NOTE — ED PROVIDER NOTE - PHYSICAL EXAMINATION
Physical Exam    Vital Signs: I have reviewed the initial vital signs.  Constitutional: well-nourished, appears stated age, no acute distress  Eyes: Conjunctiva pink, Sclera clear  Cardiovascular: S1 and S2, regular rate, regular rhythm, well-perfused extremities, radial pulses equal and 2+  Respiratory: unlabored respiratory effort, clear to auscultation bilaterally no wheezing, rales and rhonchi  Gastrointestinal: soft, non-tender abdomen, no pulsatile mass, normal bowl sounds  Musculoskeletal: supple neck, 1+ pitting extremity edema, no midline tenderness  Integumentary: warm, dry, no rash  Neurologic: awake, alert, cranial nerves II-XII grossly intact, extremities’ motor and sensory functions grossly intact

## 2019-10-01 NOTE — ED PROVIDER NOTE - ATTENDING CONTRIBUTION TO CARE
69 year old male, pmhx afib, HTN, liver cirrhosis, HLD, anemia, presenting with worseing fluid overload - bilateral leg swelling, dyspnea on exertion, orthopnea, PND. Patient was on spironolactone and lasix as outpatient but these were not working so he was sent to the ED for likely admission. Patient otherwise denies fevers, headache, vision changes, weakness/numbness, confusion, URI symptoms, neck pain, chest pain, back pain, cough, palpitations, nausea, vomiting, abdominal pain, diarrhea, constipation, blood in stool/dark stools, urinary symptoms, penile discharge/testicular pain, rash, recent travel or sick contacts.    Vital Signs: I have reviewed the initial vital signs.  Constitutional: NAD, well-nourished, appears stated age, no acute distress.  HEENT: Airway patent, moist MM, no erythema/swelling/deformity of oral structures. EOMI, PERRLA.  CV: regular rate, regular rhythm, well-perfused extremities, 2+ b/l DP and radial pulses equal. (+) 2+ bilateral pitting edema  Lungs: BCTA, no increased WOB.  ABD: NTND, no guarding or rebound, no pulsatile mass, no hernias.  (+) ascites present but non-tender  MSK: Neck supple, nontender, nl ROM, no stepoff. Chest nontender. Back nontender in TLS spine or to b/l bony structures or flanks. Ext nontender, nl rom, no deformity.   INTEG: Skin warm, dry, no rash.  NEURO: A&Ox3, normal strength, nl sensation throughout, normal speech.   PSYCH: Calm, cooperative, normal affect and interaction.    Will obtain labs, CXR, EKG, diurese as necessary. Will require 69 year old male, pmhx afib, HTN, liver cirrhosis, HLD, anemia, presenting with worseing fluid overload - bilateral leg swelling, dyspnea on exertion, orthopnea, PND. Patient was on spironolactone and lasix as outpatient but these were not working so he was sent to the ED for likely admission. Patient otherwise denies fevers, headache, vision changes, weakness/numbness, confusion, URI symptoms, neck pain, chest pain, back pain, cough, palpitations, nausea, vomiting, abdominal pain, diarrhea, constipation, blood in stool/dark stools, urinary symptoms, penile discharge/testicular pain, rash, recent travel or sick contacts.    Vital Signs: I have reviewed the initial vital signs.  Constitutional: NAD, well-nourished, appears stated age, no acute distress.  HEENT: Airway patent, moist MM, no erythema/swelling/deformity of oral structures. EOMI, PERRLA.  CV: regular rate, regular rhythm, well-perfused extremities, 2+ b/l DP and radial pulses equal. (+) 2+ bilateral pitting edema  Lungs: BCTA, no increased WOB.  ABD: NTND, no guarding or rebound, no pulsatile mass, no hernias.  (+) ascites present but non-tender  MSK: Neck supple, nontender, nl ROM, no stepoff. Chest nontender. Back nontender in TLS spine or to b/l bony structures or flanks. Ext nontender, nl rom, no deformity.   INTEG: Skin warm, dry, no rash.  NEURO: A&Ox3, normal strength, nl sensation throughout, normal speech.   PSYCH: Calm, cooperative, normal affect and interaction.    Will obtain labs, CXR, EKG, diurese as necessary. Will require admission.

## 2019-10-01 NOTE — ED PROVIDER NOTE - NS ED ROS FT
Constitutional: (-) fever, (-) chills  Eyes: (-) visual changes  ENT: , (-) nasal congestions  Cardiovascular: (-) chest pain, (-) syncope  Respiratory: (-) cough, (-) shortness of breath, (+) dyspnea  Gastrointestinal: (-) vomiting, (-) diarrhea, (-)nausea  Musculoskeletal: (-) neck pain, (-) back pain, (-) joint pain,  Integumentary: (-) rash, (-) edema, (-) bruises, (-)color changes,   Neurological: (-) headache, , (-) dizziness, (-) tingling, (-)numbness, (-) tremors  Peripheral Vascular: (-) pain while walking, (+) leg swelling, (-) color changes  : (-)frequency, (-)polyuria, (-)dysuria,(-) hematuria  Allergic/Immunologic: (-) pruritus

## 2019-10-01 NOTE — ED PROVIDER NOTE - IMAGING STUDIES QUESTION 2 - PERFORMED INDEPENDENT VISUALIZATION
Radha from Crisis called reported that [facility] can not accept that patient and the patient would need to stay in the hospital until placement is secured. Radha stated that [facility] will be reviewing the case on Monday and [facility] does not currently have beds but would be another option.   Yes

## 2019-10-01 NOTE — ED ADULT NURSE REASSESSMENT NOTE - NS ED NURSE REASSESS COMMENT FT1
received pt from day shift RN pt no labored breathing this time , denies chest pain , denies abdominal pain , AO x4, family at bedside , denies abdominal pain , denies headache , denies dizziness

## 2019-10-01 NOTE — ED PROVIDER NOTE - FAMILY DETAILS FREE TEXT FOR MDM ADDL HISTORY OBTAINED FROM QUESTION
Per family patient not at baseline with dyspnea on exertion and orthopnea. Sent in by PMD for admission for diuresis.

## 2019-10-01 NOTE — ED PROVIDER NOTE - OBJECTIVE STATEMENT
68 yo male, pmh of afib, htn, liver cirrhosis, hld, anemia, presents to ed for b/l lef swelling and sob. Sob worse on exertion only, recently stopped diuretic meds per liver doc. Denies fever, chills, cp, abd pain, nvd, numbness, tingling, dizziness, ha, rash, dysuria.

## 2019-10-01 NOTE — ED ADULT NURSE NOTE - OBJECTIVE STATEMENT
daughter states " on liver transplant list stopped lasix kidney function not working" more sob since friday 9/20/19, has blle edema swelling. denies chest pain

## 2019-10-01 NOTE — H&P ADULT - HISTORY OF PRESENT ILLNESS
68 yo Man with PMH of AFIB on Coumadin, HTN, hyperlipidemia, CAD s/p 2 stents, BPH, DM type II, JIMÉNEZ cirrhosis complicated by ascites multiple admissions for AMS from encephalopathy, follows up at [place], presented to ED with worsening SOB and LE swelling. Per pt he was recently advised not to take his water pills given abnormalities in his renal function. He started to get shortness of breath and it progressively got worse. It is associated with increased LE swelling. 70 yo Man with PMH of AFIB on Coumadin, HTN, hyperlipidemia, CAD s/p 2 stents, BPH, DM type II, JIMÉNEZ cirrhosis complicated by ascites multiple admissions for AMS from encephalopathy, follows up at [place], presented to ED with worsening SOB and LE swelling. Per pt he was recently advised not to take his water pills given abnormalities in his renal function. He started to get shortness of breath and it progressively got worse. It is associated with increased LE swelling. His symptoms worsen with physical activity specially when he has to climb stairs. He denied any hx of fever, chills, nausea, vomiting, diarrhea, constipation, melena, pain in abdomen, chest pain, cough, increased urinary frequency, dysuria, headache, lightheadedness, dizziness, vertigo, localized weakness or numbness/tingling.

## 2019-10-01 NOTE — H&P ADULT - ATTENDING COMMENTS
Patient seen and examined independently of housestaff.    70yo M with Past Medical History Atrial Fibrillation on Coumadin, Hypertension, Hypercholesteremia, CAD status post PCI x2, BPH, DMII, and liver cirrhosis secondary to JIMÉNEZ with multiple admission for hepatic encephalopathy (followed by [facility]) admitted to [facility] for worsening dyspnea and lower extremity swelling secondary to decompensated liver cirrhosis.  The patient was recently instructed to hold his outpatient diuretics due to worsening renal function.  He is known to suffer from Stage III CKD at baseline.    Decompensated Liver Cirrhosis (secondary to JIMÉNEZ): the patient appears symptomatically improved following treatment with IV Lasix.  Patient was restarted on Lasix 40mg PO q24.  Aldactone was initially held due to hyperkalemia. GI and Nephrology recommendations were reviewed; repeat BMP in AM.  Continue Rifaximin and Lactulose    Hyperkalemia: resolved from admission, consider restarting Aldactone in 24 hours    Atrial Fibrillation on Coumadin: INR is within therapeutic range, continue Coumadin.    Hypercholesteremia: continue Lipitor    DMII: hold Januvia (NF).  Start Lantus/Lispro and monitor FS with meals.    GI/DVT prophylaxis

## 2019-10-01 NOTE — ED PROVIDER NOTE - CARE PLAN
Principal Discharge DX:	Fluid overload  Secondary Diagnosis:	Dyspnea on exertion Principal Discharge DX:	Fluid overload  Secondary Diagnosis:	Dyspnea on exertion  Secondary Diagnosis:	Cirrhosis of liver  Secondary Diagnosis:	Chronic kidney disease

## 2019-10-01 NOTE — ED PROVIDER NOTE - PSH
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

## 2019-10-02 LAB
ALBUMIN SERPL ELPH-MCNC: 2.2 G/DL — LOW (ref 3.5–5.2)
ALP SERPL-CCNC: 124 U/L — HIGH (ref 30–115)
ALT FLD-CCNC: 175 U/L — HIGH (ref 0–41)
ANION GAP SERPL CALC-SCNC: 11 MMOL/L — SIGNIFICANT CHANGE UP (ref 7–14)
APTT BLD: 38.2 SEC — SIGNIFICANT CHANGE UP (ref 27–39.2)
AST SERPL-CCNC: 255 U/L — HIGH (ref 0–41)
BASOPHILS # BLD AUTO: 0.02 K/UL — SIGNIFICANT CHANGE UP (ref 0–0.2)
BASOPHILS NFR BLD AUTO: 0.6 % — SIGNIFICANT CHANGE UP (ref 0–1)
BILIRUB DIRECT SERPL-MCNC: 0.6 MG/DL — HIGH (ref 0–0.2)
BILIRUB INDIRECT FLD-MCNC: 0.9 MG/DL — SIGNIFICANT CHANGE UP (ref 0.2–1.2)
BILIRUB SERPL-MCNC: 1.5 MG/DL — HIGH (ref 0.2–1.2)
BUN SERPL-MCNC: 37 MG/DL — HIGH (ref 10–20)
CALCIUM SERPL-MCNC: 8.6 MG/DL — SIGNIFICANT CHANGE UP (ref 8.5–10.1)
CHLORIDE SERPL-SCNC: 105 MMOL/L — SIGNIFICANT CHANGE UP (ref 98–110)
CO2 SERPL-SCNC: 19 MMOL/L — SIGNIFICANT CHANGE UP (ref 17–32)
CREAT SERPL-MCNC: 1.7 MG/DL — HIGH (ref 0.7–1.5)
EOSINOPHIL # BLD AUTO: 0.06 K/UL — SIGNIFICANT CHANGE UP (ref 0–0.7)
EOSINOPHIL NFR BLD AUTO: 1.7 % — SIGNIFICANT CHANGE UP (ref 0–8)
FERRITIN SERPL-MCNC: 56 NG/ML — SIGNIFICANT CHANGE UP (ref 30–400)
FOLATE SERPL-MCNC: 10.6 NG/ML — SIGNIFICANT CHANGE UP
GLUCOSE SERPL-MCNC: 95 MG/DL — SIGNIFICANT CHANGE UP (ref 70–99)
HCT VFR BLD CALC: 23.5 % — LOW (ref 42–52)
HGB BLD-MCNC: 7.7 G/DL — LOW (ref 14–18)
IMM GRANULOCYTES NFR BLD AUTO: 0.3 % — SIGNIFICANT CHANGE UP (ref 0.1–0.3)
INR BLD: 2.95 RATIO — HIGH (ref 0.65–1.3)
IRON SATN MFR SERPL: 16 % — SIGNIFICANT CHANGE UP (ref 15–50)
IRON SATN MFR SERPL: 42 UG/DL — SIGNIFICANT CHANGE UP (ref 35–150)
LACTATE SERPL-SCNC: 2.6 MMOL/L — HIGH (ref 0.5–2.2)
LYMPHOCYTES # BLD AUTO: 0.49 K/UL — LOW (ref 1.2–3.4)
LYMPHOCYTES # BLD AUTO: 14.3 % — LOW (ref 20.5–51.1)
MAGNESIUM SERPL-MCNC: 1.8 MG/DL — SIGNIFICANT CHANGE UP (ref 1.8–2.4)
MCHC RBC-ENTMCNC: 30.1 PG — SIGNIFICANT CHANGE UP (ref 27–31)
MCHC RBC-ENTMCNC: 32.8 G/DL — SIGNIFICANT CHANGE UP (ref 32–37)
MCV RBC AUTO: 91.8 FL — SIGNIFICANT CHANGE UP (ref 80–94)
MONOCYTES # BLD AUTO: 0.56 K/UL — SIGNIFICANT CHANGE UP (ref 0.1–0.6)
MONOCYTES NFR BLD AUTO: 16.3 % — HIGH (ref 1.7–9.3)
NEUTROPHILS # BLD AUTO: 2.29 K/UL — SIGNIFICANT CHANGE UP (ref 1.4–6.5)
NEUTROPHILS NFR BLD AUTO: 66.8 % — SIGNIFICANT CHANGE UP (ref 42.2–75.2)
NRBC # BLD: 0 /100 WBCS — SIGNIFICANT CHANGE UP (ref 0–0)
PLATELET # BLD AUTO: 89 K/UL — LOW (ref 130–400)
POTASSIUM SERPL-MCNC: 4.4 MMOL/L — SIGNIFICANT CHANGE UP (ref 3.5–5)
POTASSIUM SERPL-SCNC: 4.4 MMOL/L — SIGNIFICANT CHANGE UP (ref 3.5–5)
PROT SERPL-MCNC: 6 G/DL — SIGNIFICANT CHANGE UP (ref 6–8)
PROTHROM AB SERPL-ACNC: 33.6 SEC — HIGH (ref 9.95–12.87)
RBC # BLD: 2.56 M/UL — LOW (ref 4.7–6.1)
RBC # FLD: 20.8 % — HIGH (ref 11.5–14.5)
SODIUM SERPL-SCNC: 135 MMOL/L — SIGNIFICANT CHANGE UP (ref 135–146)
TIBC SERPL-MCNC: 256 UG/DL — SIGNIFICANT CHANGE UP (ref 220–430)
TRANSFERRIN SERPL-MCNC: 225 MG/DL — SIGNIFICANT CHANGE UP (ref 200–360)
UIBC SERPL-MCNC: 214 UG/DL — SIGNIFICANT CHANGE UP (ref 110–370)
VIT B12 SERPL-MCNC: 1451 PG/ML — HIGH (ref 232–1245)
WBC # BLD: 3.43 K/UL — LOW (ref 4.8–10.8)
WBC # FLD AUTO: 3.43 K/UL — LOW (ref 4.8–10.8)

## 2019-10-02 PROCEDURE — 99223 1ST HOSP IP/OBS HIGH 75: CPT

## 2019-10-02 PROCEDURE — 76770 US EXAM ABDO BACK WALL COMP: CPT | Mod: 26

## 2019-10-02 PROCEDURE — 99233 SBSQ HOSP IP/OBS HIGH 50: CPT

## 2019-10-02 RX ADMIN — Medication 40 MILLIGRAM(S): at 06:29

## 2019-10-02 RX ADMIN — PANTOPRAZOLE SODIUM 40 MILLIGRAM(S): 20 TABLET, DELAYED RELEASE ORAL at 06:31

## 2019-10-02 RX ADMIN — Medication 2 GRAM(S): at 06:31

## 2019-10-02 RX ADMIN — LACTULOSE 20 GRAM(S): 10 SOLUTION ORAL at 21:09

## 2019-10-02 RX ADMIN — LACTULOSE 20 GRAM(S): 10 SOLUTION ORAL at 14:17

## 2019-10-02 RX ADMIN — LACTULOSE 20 GRAM(S): 10 SOLUTION ORAL at 01:37

## 2019-10-02 RX ADMIN — LACTULOSE 20 GRAM(S): 10 SOLUTION ORAL at 06:30

## 2019-10-02 RX ADMIN — TAMSULOSIN HYDROCHLORIDE 0.4 MILLIGRAM(S): 0.4 CAPSULE ORAL at 21:09

## 2019-10-02 RX ADMIN — TAMSULOSIN HYDROCHLORIDE 0.4 MILLIGRAM(S): 0.4 CAPSULE ORAL at 01:37

## 2019-10-02 RX ADMIN — Medication 2 GRAM(S): at 17:34

## 2019-10-02 NOTE — CONSULT NOTE ADULT - SUBJECTIVE AND OBJECTIVE BOX
NEPHROLOGY CONSULTATION NOTE    68 yo Man with PMH of AFIB on Coumadin, HTN, hyperlipidemia, CAD s/p 2 stents, BPH, DM type II, JIMÉNEZ cirrhosis complicated by ascites and CKD multiple admissions for AMS from encephalopathy, follows up at [place], presented to ED with worsening SOB and LE swelling. He [id number] over past months has been having worsening kidney function (here has been 1.6-1.8) is listed for liver transplant and being , and being considered  for kidney. Cr on Friday 9/27 was 2.4  was  advised not to take his water pills (lasix + aldactone)  He started to get shortness of breath and it progressively got worse. It is associated with increased LE swelling. His symptoms worsen with physical activity specially when he has to climb stairs. He denied any hx of fever, chills, nausea, vomiting, diarrhea, constipation, melena, pain in abdomen, chest pain, cough, increased urinary frequency, dysuria, headache, lightheadedness, dizziness, vertigo, localized weakness or numbness/tingling.  presented 10/1 Cr 1.8 was given 40 Iv lasix    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: 15 years ago    Allergies:  No Known Allergies    Home Medications Reviewed  Hospital Medications:   MEDICATIONS  (STANDING):  furosemide   Injectable 40 milliGRAM(s) IV Push daily  lactulose Syrup 20 Gram(s) Oral three times a day  omega-3-Acid Ethyl Esters 2 Gram(s) Oral two times a day  pantoprazole    Tablet 40 milliGRAM(s) Oral before breakfast  rifaximin 550 milliGRAM(s) Oral two times a day  tamsulosin 0.4 milliGRAM(s) Oral at bedtime      SOCIAL HISTORY:  Denies ETOH,Smoking,   FAMILY HISTORY:  FH: ovarian cancer: mother  Family history of early CAD: mother        REVIEW OF SYSTEMS:  CONSTITUTIONAL: + weakness, no fevers or chills  EYES/ENT: No visual changes;  No vertigo or throat pain   NECK: No pain or stiffness  RESPIRATORY: No cough, wheezing, hemoptysis; + shortness of breath  CARDIOVASCULAR: No chest pain or palpitations.  GASTROINTESTINAL: No abdominal or epigastric pain. No nausea, vomiting, or hematemesis; No diarrhea or constipation. No melena or hematochezia.  GENITOURINARY: No dysuria, frequency, foamy urine, urinary urgency, incontinence or hematuria  NEUROLOGICAL: No numbness or weakness  SKIN: No itching, burning, rashes, or lesions   VASCULAR: +  bilateral lower extremity edema.   All other review of systems is negative unless indicated above.    VITALS:  T(F): 97.4 (10-02-19 @ 07:45), Max: 98.7 (10-01-19 @ 20:00)  HR: 118 (10-02-19 @ 07:45)  BP: 94/60 (10-02-19 @ 07:45)  RR: 18 (10-02-19 @ 07:45)  SpO2: 99% (10-02-19 @ 03:00)    10-02 @ 07:01  -  10-02 @ 12:23  --------------------------------------------------------  IN: 360 mL / OUT: 600 mL / NET: -240 mL      Height (cm): 175.3 (10-02 @ 05:16)  Weight (kg): 95.3 (10-02 @ 05:16)  BMI (kg/m2): 31 (10-02 @ 05:16)  BSA (m2): 2.11 (10-02 @ 05:16)      I&O's Detail    02 Oct 2019 07:01  -  02 Oct 2019 12:23  --------------------------------------------------------  IN:    Oral Fluid: 360 mL  Total IN: 360 mL    OUT:    Voided: 600 mL  Total OUT: 600 mL    Total NET: -240 mL            PHYSICAL EXAM:  Constitutional: NAD  HEENT: anicteric sclera, oropharynx clear, MMM  Neck: No JVD  Respiratory: CTAB, no wheezes, rales or rhonchi  Cardiovascular: S1, S2, RRR  Gastrointestinal: BS+, soft, NT/ND Ascites   Extremities: No cyanosis or clubbing. +  peripheral edema  Neurological: A/O x 3, no focal deficits  Psychiatric: Normal mood, normal affect  : No CVA tenderness. No mayen.   Skin: No rashes  Vascular Access:    LABS:  10-02    135  |  105  |  37<H>  ----------------------------<  95  4.4   |  19  |  1.7<H>    Ca    8.6      02 Oct 2019 08:29  Mg     1.8     10-02    TPro  6.0  /  Alb  2.2<L>  /  TBili  1.5<H>  /  DBili  0.6<H>  /  AST  255<H>  /  ALT  175<H>  /  AlkPhos  124<H>  10-02    Creatinine Trend: 1.7 <--, 1.8 <--                        7.7    3.43  )-----------( 89       ( 02 Oct 2019 08:29 )             23.5     Urine Studies:              RADIOLOGY & ADDITIONAL STUDIES:

## 2019-10-02 NOTE — CONSULT NOTE ADULT - SUBJECTIVE AND OBJECTIVE BOX
Please contact me with any questions on my pager [phone].  Hospital Day # 1d     HPI:   70 yo Man with PMH of AFIB on Coumadin, HTN, hyperlipidemia, CAD s/p 2 stents, BPH, DM type II, JIMÉNEZ cirrhosis complicated by ascites multiple admissions for AMS from encephalopathy, follows up at [place], presented to ED with worsening SOB and LE swelling. Per pt he was recently advised not to take his water pills given abnormalities in his renal function. He started to get shortness of breath and it progressively got worse. It is associated with increased LE swelling. His symptoms worsen with physical activity specially when he has to climb stairs. He denied any hx of fever, chills, nausea, vomiting, diarrhea, constipation, melena, pain in abdomen, chest pain, cough, increased urinary frequency, dysuria, headache, lightheadedness, dizziness, vertigo, localized weakness or numbness/tingling.      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      FAMILY HISTORY:      SOCIAL HISTORY:  smoker:   Alcohol:   Drug:     ALLERGIES:  No Known Allergies      MEDICATIONS:  MEDICATIONS  (STANDING):  furosemide   Injectable 40 milliGRAM(s) IV Push daily  lactulose Syrup 20 Gram(s) Oral three times a day  omega-3-Acid Ethyl Esters 2 Gram(s) Oral two times a day  pantoprazole    Tablet 40 milliGRAM(s) Oral before breakfast  rifaximin 550 milliGRAM(s) Oral two times a day  tamsulosin 0.4 milliGRAM(s) Oral at bedtime    MEDICATIONS  (PRN):      HOME MEDICATIONS:  Home Medications:  atorvastatin 40 mg oral tablet: 1 tab(s) orally once a day (at bedtime) (01 Oct 2019 21:51)  Constulose 10 g/15 mL oral syrup: 30 milliliter(s) orally 3 times a day (01 Oct 2019 21:51)  Fish Oil 1200 mg oral capsule: orally 2 times a day (01 Oct 2019 21:51)  Januvia 50 mg oral tablet: 1 tab(s) orally once a day (01 Oct 2019 21:51)  pantoprazole 40 mg oral delayed release tablet: 1 tab(s) orally once a day (before a meal) (01 Oct 2019 21:51)  rifAXIMin 550 mg oral tablet: 1 tab(s) orally 2 times a day (01 Oct 2019 21:51)  tamsulosin 0.4 mg oral capsule: 1 cap(s) orally once a day (at bedtime) (01 Oct 2019 21:51)  warfarin 2.5 mg oral tablet: 1 tab(s) orally once a day (01 Oct 2019 21:51)      REVIEW OF SYSTEMS:    CONSTITUTIONAL: No fever  EYES/ENT: No scleral icterus  RESPIRATORY: shortness of breath  CARDIOVASCULAR: No chest pain   GASTROINTESTINAL:   GENITOURINARY: No dysuria  NEUROLOGICAL: No dizziness  SKIN: No cyanosis      VITALS:   T(F): 97.4 (10-02 @ 07:45), Max: 98.7 (10-01 @ 20:00)  HR: 118 (10-02 @ 07:45) (70 - 119)  BP: 94/60 (10-02 @ 07:45) (94/60 - 115/62)  BP(mean): --  RR: 18 (10-02 @ 07:45) (16 - 18)  SpO2: 99% (10-02 @ 03:00) (98% - 99%)    I&O's Summary      Physical Exam:  GENERAL: NAD  HEAD:  Atraumatic  EYES:  conjunctiva and sclera clear  NECK: No thyromegaly  CHEST/LUNG: No accessory use of muscle  HEART: S1S2 Normal  ABDOMEN: Soft, Nontender, distended; Bowel sounds present, no guarding or rigidity.  EXTREMITIES:  No cyanosis  NEURO: AAOx3, No asterixis.        LABS:                        7.7    3.43  )-----------( 89       ( 02 Oct 2019 08:29 )             23.5       PT/INR - ( 01 Oct 2019 17:30 )  INR: 3.10          PTT - ( 02 Oct 2019 08:29 )  PTT:38.2   LIVER FUNCTIONS - ( 01 Oct 2019 17:30 )  Alb: 2.2 g/dL / Pro: 6.0 g/dL / ALK PHOS: 124 U/L / ALT: 174 U/L / AST: 278 U/L / GGT: x           LIVER FUNCTIONS - ( 01 Oct 2019 17:30 )  Alb: 2.2 [3.5 - 5.2] / Pro: 6.0 [6.0 - 8.0] / ALK PHOS: 124 [30 - 115] / ALT: 174 [0 - 41] / AST: 278 [0 - 41] / GGT: x       10-01    134<L>  |  107  |  40<H>  ----------------------------<  93  5.3<H>   |  19  |  1.8<H>    Ca    8.6      01 Oct 2019 17:30  Mg     2.0     10-01          Previous EGD 3/2019: 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    Previous colonoscopy 9/2017: Grade 2 hemorrhoids, diverticulosis in sigmoid, 3 polyps (tubular adenoma in 1 polyp and other were hyperplastic) found and recommended repeat in 3years.      RADIOLOGY:    [facility] Abdomen 6/2019:     Hepatic cirrhosis with moderate abdominal ascites Please contact me with any questions on my pager [phone].  Hospital Day # 1d     HPI:   68 yo Man with PMH of AFIB on Coumadin, HTN, hyperlipidemia, CAD s/p 2 stents, BPH, DM type II, JIMÉNEZ cirrhosis for last 5yrs ( Dr. Humphries at [facility]), complicated by HE on rifaximin and lasix,  large volume ascites s/p 2 taps (never SBP) not on diuretics (considering CHESTER on CKD on last 2 days), presented to ED with worsening SOB and LE swelling for last 2 days. His symptoms are very minimal and almost baseline worsen with physical activity specially when he has to climb stairs. He denied any hx of fever, chills, nausea, vomiting, diarrhea, constipation, melena, pain in abdomen, chest pain, cough, increased urinary frequency, dysuria, headache, lightheadedness, dizziness, vertigo, localized weakness or numbness/tingling.      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      FAMILY HISTORY:  No significant FH    SOCIAL HISTORY:  smoker: Denies  Alcohol: Denies  Drug: Denies    ALLERGIES:  No Known Allergies      MEDICATIONS:  MEDICATIONS  (STANDING):  furosemide   Injectable 40 milliGRAM(s) IV Push daily  lactulose Syrup 20 Gram(s) Oral three times a day  omega-3-Acid Ethyl Esters 2 Gram(s) Oral two times a day  pantoprazole    Tablet 40 milliGRAM(s) Oral before breakfast  rifaximin 550 milliGRAM(s) Oral two times a day  tamsulosin 0.4 milliGRAM(s) Oral at bedtime    MEDICATIONS  (PRN):      HOME MEDICATIONS:  Home Medications:  atorvastatin 40 mg oral tablet: 1 tab(s) orally once a day (at bedtime) (01 Oct 2019 21:51)  Constulose 10 g/15 mL oral syrup: 30 milliliter(s) orally 3 times a day (01 Oct 2019 21:51)  Fish Oil 1200 mg oral capsule: orally 2 times a day (01 Oct 2019 21:51)  Januvia 50 mg oral tablet: 1 tab(s) orally once a day (01 Oct 2019 21:51)  pantoprazole 40 mg oral delayed release tablet: 1 tab(s) orally once a day (before a meal) (01 Oct 2019 21:51)  rifAXIMin 550 mg oral tablet: 1 tab(s) orally 2 times a day (01 Oct 2019 21:51)  tamsulosin 0.4 mg oral capsule: 1 cap(s) orally once a day (at bedtime) (01 Oct 2019 21:51)  warfarin 2.5 mg oral tablet: 1 tab(s) orally once a day (01 Oct 2019 21:51)      REVIEW OF SYSTEMS:     CONSTITUTIONAL: No fever  EYES/ENT: No scleral icterus  RESPIRATORY: shortness of breath  CARDIOVASCULAR: No chest pain   GASTROINTESTINAL: abdominal distension  GENITOURINARY: No dysuria  NEUROLOGICAL: No dizziness  SKIN: No cyanosis      VITALS:   T(F): 97.4 (10-02 @ 07:45), Max: 98.7 (10-01 @ 20:00)  HR: 118 (10-02 @ 07:45) (70 - 119)  BP: 94/60 (10-02 @ 07:45) (94/60 - 115/62)  BP(mean): --  RR: 18 (10-02 @ 07:45) (16 - 18)  SpO2: 99% (10-02 @ 03:00) (98% - 99%)    I&O's Summary      Physical Exam:  GENERAL: NAD  HEAD:  Atraumatic  EYES:  conjunctiva and sclera clear  NECK: No thyromegaly  CHEST/LUNG: No accessory use of muscle  HEART: S1S2 Normal  ABDOMEN: Soft, Nontender, distended; Bowel sounds present, no guarding or rigidity.  EXTREMITIES:  No cyanosis  NEURO: AAOx3, No asterixis.        LABS:                        7.7    3.43  )-----------( 89       ( 02 Oct 2019 08:29 )             23.5       PT/INR - ( 01 Oct 2019 17:30 )  INR: 3.10          PTT - ( 02 Oct 2019 08:29 )  PTT:38.2   LIVER FUNCTIONS - ( 01 Oct 2019 17:30 )  Alb: 2.2 g/dL / Pro: 6.0 g/dL / ALK PHOS: 124 U/L / ALT: 174 U/L / AST: 278 U/L / GGT: x           LIVER FUNCTIONS - ( 01 Oct 2019 17:30 )  Alb: 2.2 [3.5 - 5.2] / Pro: 6.0 [6.0 - 8.0] / ALK PHOS: 124 [30 - 115] / ALT: 174 [0 - 41] / AST: 278 [0 - 41] / GGT: x       10-01    134<L>  |  107  |  40<H>  ----------------------------<  93  5.3<H>   |  19  |  1.8<H>    Ca    8.6      01 Oct 2019 17:30  Mg     2.0     10-01          Previous EGD 3/2019: 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    Previous colonoscopy 9/2017: Grade 2 hemorrhoids, diverticulosis in sigmoid, 3 polyps (tubular adenoma in 1 polyp and other were hyperplastic) found and recommended repeat in 3years.      RADIOLOGY:    [facility] Abdomen 6/2019:     Hepatic cirrhosis with moderate abdominal ascites

## 2019-10-02 NOTE — CONSULT NOTE ADULT - ASSESSMENT
69 year-old male with a PMH of cirrhosis secondary to JIMÉNEZ w/ multiple admissions for AMS secondary to metabolic encephalopathy and ascites s/p multiple paracenteses, HTN, A fib on Coumadin, CAD s/p 2 stents, DM, HLD, BPH, presenting for increasing shortness of breath since late last week, admitted for likely decompensated cirrhosis due to diuretic discontinuation.    # Shortness of breath, lower extremity edema likely secondary to decompensated liver cirrhosis     Cr currently stable and at baseline - Listed for liver possibly kidney at [facility]  ? HRS    sx improved w/ IV diuretics  start 40 PO lasix QD (will not be able to hold all diuretics for long)   check UA (in July had neg blood/prot as would expect with HRS)  seen by GI, consult pending
70 yo Man with PMH of AFIB on Coumadin, HTN, hyperlipidemia, CAD s/p 2 stents, BPH, DM type II, JIMÉNEZ cirrhosis for last 5yrs ( Dr. Humphries at [facility] on transplant list), complicated by HE on rifaximin and lasix,  large volume ascites s/p 2 taps (never SBP) not on diuretics (considering CHESTER on CKD on last 2 days), presented to ED with worsening SOB and LE swelling for last 2 days.       Decompensated liver cirrhosis secondary to JIMÉNEZ: On [facility] transplant list followed by Dr. Humphries.  No Esophageal Varices  INR high on coumadin    #Large volume ascites: s/p 2 Taps (Never had SBP) : Minimal SOB  c/w Lasix 40mg daily according to Nephrology  f/u UA and nephrology recommendation.  Monitor Cr currently at baseline likely CKD stage 3. No H/O HRS.      # Hepatic encephalopathy PPX:  c/w Rifaximin 550mg BID and lactulose 25ml every q for 2-3 BM which patient is currently having.  avoid sedatives and opioids     # HCC screening:  Please f/u as outpatient for US abdomen and AFP every 6 months.  Last US abdomen in July no HCC, AFP was neg.    # Lifestyle modifications  Calorie intake 25-40 Kcal/kg/day  Protein intake: 1.2-1.5 gm/kg/day  Avoid smoking, alcohol, NSAIDS.    #Vaccinations:  Please f/u in clinic for being uptodate with HAV/HBV/Influenza/Pneumococcal vaccines.

## 2019-10-02 NOTE — PROGRESS NOTE ADULT - SUBJECTIVE AND OBJECTIVE BOX
SOCORRO MANUEL 69y Male  MRN#: [id number]       SUBJECTIVE  Patient is a 69y old Male who presents with a chief complaint of dyspnea with worsening LE swelling (02 Oct 2019 09:03)  Currently admitted to medicine with the primary diagnosis of Fluid overload. In the AM the patient was resting comfortably, sitting up in the chair. Reports he is able to walk to bathroom w/o getting short of breath, since the Lasix was given yesterday; tolerating diet. Denies chest pain, abdominal pain, recent subjective fevers but reports always being cold.       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    ALLERGIES:  No Known Allergies    MEDICATIONS:  STANDING MEDICATIONS  furosemide   Injectable 40 milliGRAM(s) IV Push daily  lactulose Syrup 20 Gram(s) Oral three times a day  omega-3-Acid Ethyl Esters 2 Gram(s) Oral two times a day  pantoprazole    Tablet 40 milliGRAM(s) Oral before breakfast  rifaximin 550 milliGRAM(s) Oral two times a day  tamsulosin 0.4 milliGRAM(s) Oral at bedtime    PRN MEDICATIONS      VITAL SIGNS: Last 24 Hours  T(C): 36.3 (02 Oct 2019 07:45), Max: 37.1 (01 Oct 2019 20:00)  T(F): 97.4 (02 Oct 2019 07:45), Max: 98.7 (01 Oct 2019 20:00)  HR: 118 (02 Oct 2019 07:45) (70 - 119)  BP: 94/60 (02 Oct 2019 07:45) (94/60 - 115/62)  BP(mean): --  RR: 18 (02 Oct 2019 07:45) (16 - 18)  SpO2: 99% (02 Oct 2019 03:00) (98% - 99%)    LABS:                        7.7    3.43  )-----------( 89       ( 02 Oct 2019 08:29 )             23.5     10-02    135  |  105  |  37<H>  ----------------------------<  95  4.4   |  19  |  1.7<H>    Ca    8.6      02 Oct 2019 08:29  Mg     1.8     10-02    TPro  6.0  /  Alb  2.2<L>  /  TBili  1.5<H>  /  DBili  0.6<H>  /  AST  255<H>  /  ALT  175<H>  /  AlkPhos  124<H>  10-02    PT/INR - ( 02 Oct 2019 08:29 )   PT: 33.60 sec;   INR: 2.95 ratio         PTT - ( 02 Oct 2019 08:29 )  PTT:38.2 sec      Lactate, Blood: 2.6 mmol/L <H> (10-02-19 @ 08:29)  Lactate, Blood: 2.3 mmol/L <H> (10-01-19 @ 17:30)    RADIOLOGY:    < from: Xray Chest 1 View-PORTABLE IMMEDIATE (10.01.19 @ 17:52) >  Findings:    Support devices: None.    Cardiac/mediastinum/hilum: Unremarkable.    Lung parenchyma/Pleura: Within normal limits.    Skeleton/soft tissues: Unremarkable.    Impression:      No radiographic evidence of acute pulmonary disease.    PHYSICAL EXAM:    GENERAL: NAD, well-developed, AAOx4  HEENT:  Atraumatic, Normocephalic  PULMONARY: Clear to auscultation bilaterally; No wheeze  CARDIOVASCULAR: Regular rate and rhythm; No murmurs, rubs, or gallops  GASTROINTESTINAL: Soft, Nontender, slightly distended, tympanic; Bowel sounds present  MUSCULOSKELETAL:  2+ Peripheral Pulses, No clubbing, cyanosis; +3 pitting edema B/L lower extremities  NEUROLOGY: non-focal  SKIN: No rashes or lesions

## 2019-10-02 NOTE — PROGRESS NOTE ADULT - ASSESSMENT
The patient is a 69 year-old male with a PMH of cirrhosis secondary to JIMÉNEZ w/ multiple admissions for AMS secondary to metabolic encephalopathy and ascites s/p multiple paracenteses, HTN, A fib on Coumadin, CAD s/p 2 stents, DM, HLD, BPH, presenting for increasing shortness of breath since late last week, admitted for likely decompensated cirrhosis due to diuretic discontinuation.    # Shortness of breath, lower extremity edema likely secondary to decompensated liver cirrhosis likely secondary to diuretic discontinuation  Patient AAOx4 this AM  Patient reports his Lasix was recently held due to CHESTER  S/p Lasix 40 mg IV x 1 in ED  C/w Lasix 40 mg IV qD  C/w rifaximin 550 mg PO BID  C/w lactulose 20 g PO TID  F/u abdominal US for possible progression of ascites  Pending possible liver transplant at [facility]  Patient requesting records be faxed to [facility] ([phone]; include facesheet for Nicki Harley, Liver Transplant Coordinator)  Vitals per routine  F/u w/ GI for possible paracentesis (unlikely as symptomatically improving)  F/u AM CBC, BMP    # A fib on Coumadin at home  Holding Coumadin for possible paracentesis; if not recommended, can resume home dose  F/u AM PT/INR    # Thrombocytopenia and normocytic anemia   Transfuse if hb<7 and plat<[id number]  Serum iron 42, TIBC 256, % iron saturation 16, transferrin 225  F/u folate, B12    # BPH  C/w Flomax PO qHS    # CHG  # DVT ppx- on Coumadin for a fib; holding pending possible paracentesis  # GI ppx- c/w pantoprazole  # Diet- DASH  # Activity-

## 2019-10-03 ENCOUNTER — TRANSCRIPTION ENCOUNTER (OUTPATIENT)
Age: 69
End: 2019-10-03

## 2019-10-03 LAB
ANION GAP SERPL CALC-SCNC: 10 MMOL/L — SIGNIFICANT CHANGE UP (ref 7–14)
APPEARANCE UR: CLEAR — SIGNIFICANT CHANGE UP
BASOPHILS # BLD AUTO: 0.04 K/UL — SIGNIFICANT CHANGE UP (ref 0–0.2)
BASOPHILS NFR BLD AUTO: 1.3 % — HIGH (ref 0–1)
BILIRUB UR-MCNC: NEGATIVE — SIGNIFICANT CHANGE UP
BUN SERPL-MCNC: 38 MG/DL — HIGH (ref 10–20)
CALCIUM SERPL-MCNC: 8.7 MG/DL — SIGNIFICANT CHANGE UP (ref 8.5–10.1)
CHLORIDE SERPL-SCNC: 105 MMOL/L — SIGNIFICANT CHANGE UP (ref 98–110)
CO2 SERPL-SCNC: 21 MMOL/L — SIGNIFICANT CHANGE UP (ref 17–32)
COLOR SPEC: COLORLESS — SIGNIFICANT CHANGE UP
CREAT ?TM UR-MCNC: 26 MG/DL — SIGNIFICANT CHANGE UP
CREAT SERPL-MCNC: 1.7 MG/DL — HIGH (ref 0.7–1.5)
DIFF PNL FLD: NEGATIVE — SIGNIFICANT CHANGE UP
EOSINOPHIL # BLD AUTO: 0.04 K/UL — SIGNIFICANT CHANGE UP (ref 0–0.7)
EOSINOPHIL NFR BLD AUTO: 1.3 % — SIGNIFICANT CHANGE UP (ref 0–8)
GLUCOSE BLDC GLUCOMTR-MCNC: 133 MG/DL — HIGH (ref 70–99)
GLUCOSE BLDC GLUCOMTR-MCNC: 94 MG/DL — SIGNIFICANT CHANGE UP (ref 70–99)
GLUCOSE SERPL-MCNC: 117 MG/DL — HIGH (ref 70–99)
GLUCOSE UR QL: NEGATIVE — SIGNIFICANT CHANGE UP
HCT VFR BLD CALC: 23.6 % — LOW (ref 42–52)
HGB BLD-MCNC: 7.4 G/DL — LOW (ref 14–18)
IMM GRANULOCYTES NFR BLD AUTO: 0.6 % — HIGH (ref 0.1–0.3)
INR BLD: 3 RATIO — HIGH (ref 0.65–1.3)
KETONES UR-MCNC: NEGATIVE — SIGNIFICANT CHANGE UP
LEUKOCYTE ESTERASE UR-ACNC: NEGATIVE — SIGNIFICANT CHANGE UP
LYMPHOCYTES # BLD AUTO: 0.61 K/UL — LOW (ref 1.2–3.4)
LYMPHOCYTES # BLD AUTO: 19.7 % — LOW (ref 20.5–51.1)
MAGNESIUM SERPL-MCNC: 1.8 MG/DL — SIGNIFICANT CHANGE UP (ref 1.8–2.4)
MCHC RBC-ENTMCNC: 29.2 PG — SIGNIFICANT CHANGE UP (ref 27–31)
MCHC RBC-ENTMCNC: 31.4 G/DL — LOW (ref 32–37)
MCV RBC AUTO: 93.3 FL — SIGNIFICANT CHANGE UP (ref 80–94)
MONOCYTES # BLD AUTO: 0.37 K/UL — SIGNIFICANT CHANGE UP (ref 0.1–0.6)
MONOCYTES NFR BLD AUTO: 11.9 % — HIGH (ref 1.7–9.3)
NEUTROPHILS # BLD AUTO: 2.02 K/UL — SIGNIFICANT CHANGE UP (ref 1.4–6.5)
NEUTROPHILS NFR BLD AUTO: 65.2 % — SIGNIFICANT CHANGE UP (ref 42.2–75.2)
NITRITE UR-MCNC: NEGATIVE — SIGNIFICANT CHANGE UP
NRBC # BLD: 0 /100 WBCS — SIGNIFICANT CHANGE UP (ref 0–0)
OSMOLALITY UR: 344 MOS/KG — SIGNIFICANT CHANGE UP (ref 50–1400)
PH UR: 6 — SIGNIFICANT CHANGE UP (ref 5–8)
PLATELET # BLD AUTO: 84 K/UL — LOW (ref 130–400)
POTASSIUM SERPL-MCNC: 4.3 MMOL/L — SIGNIFICANT CHANGE UP (ref 3.5–5)
POTASSIUM SERPL-SCNC: 4.3 MMOL/L — SIGNIFICANT CHANGE UP (ref 3.5–5)
PROT ?TM UR-MCNC: <5 MG/DLG/24H — SIGNIFICANT CHANGE UP
PROT UR-MCNC: NEGATIVE — SIGNIFICANT CHANGE UP
PROT/CREAT UR-RTO: <0.2 RATIO — SIGNIFICANT CHANGE UP (ref 0–0.2)
PROTHROM AB SERPL-ACNC: 34.1 SEC — HIGH (ref 9.95–12.87)
RBC # BLD: 2.53 M/UL — LOW (ref 4.7–6.1)
RBC # FLD: 21.4 % — HIGH (ref 11.5–14.5)
SODIUM SERPL-SCNC: 136 MMOL/L — SIGNIFICANT CHANGE UP (ref 135–146)
SODIUM UR-SCNC: 117 MMOL/L — SIGNIFICANT CHANGE UP
SP GR SPEC: 1.01 — LOW (ref 1.01–1.02)
UROBILINOGEN FLD QL: SIGNIFICANT CHANGE UP
WBC # BLD: 3.1 K/UL — LOW (ref 4.8–10.8)
WBC # FLD AUTO: 3.1 K/UL — LOW (ref 4.8–10.8)

## 2019-10-03 PROCEDURE — 99233 SBSQ HOSP IP/OBS HIGH 50: CPT

## 2019-10-03 RX ORDER — FUROSEMIDE 40 MG
40 TABLET ORAL DAILY
Refills: 0 | Status: DISCONTINUED | OUTPATIENT
Start: 2019-10-04 | End: 2019-10-05

## 2019-10-03 RX ORDER — SPIRONOLACTONE 25 MG/1
25 TABLET, FILM COATED ORAL DAILY
Refills: 0 | Status: DISCONTINUED | OUTPATIENT
Start: 2019-10-03 | End: 2019-10-05

## 2019-10-03 RX ADMIN — TAMSULOSIN HYDROCHLORIDE 0.4 MILLIGRAM(S): 0.4 CAPSULE ORAL at 22:39

## 2019-10-03 RX ADMIN — Medication 40 MILLIGRAM(S): at 05:17

## 2019-10-03 RX ADMIN — PANTOPRAZOLE SODIUM 40 MILLIGRAM(S): 20 TABLET, DELAYED RELEASE ORAL at 05:17

## 2019-10-03 RX ADMIN — LACTULOSE 20 GRAM(S): 10 SOLUTION ORAL at 14:45

## 2019-10-03 RX ADMIN — SPIRONOLACTONE 25 MILLIGRAM(S): 25 TABLET, FILM COATED ORAL at 10:59

## 2019-10-03 RX ADMIN — Medication 2 GRAM(S): at 05:18

## 2019-10-03 RX ADMIN — LACTULOSE 20 GRAM(S): 10 SOLUTION ORAL at 05:18

## 2019-10-03 RX ADMIN — LACTULOSE 20 GRAM(S): 10 SOLUTION ORAL at 22:39

## 2019-10-03 RX ADMIN — Medication 2 GRAM(S): at 17:05

## 2019-10-03 NOTE — DISCHARGE NOTE NURSING/CASE MANAGEMENT/SOCIAL WORK - PATIENT PORTAL LINK FT
You can access the FollowMyHealth Patient Portal offered by [facility] by registering at the following website: [email or web address]. By joining [facility]’s FollowMyHealth portal, you will also be able to view your health information using other applications (apps) compatible with our system.

## 2019-10-03 NOTE — PHYSICAL THERAPY INITIAL EVALUATION ADULT - PERTINENT HX OF CURRENT PROBLEM, REHAB EVAL
Pt. presented to ED with worsening SOB and LE swelling. Per pt he was recently advised not to take his water pills given abnormalities in his renal function. He started to get shortness of breath and it progressively got worse. It is associated with increased LE swelling. His symptoms worsen with physical activity specially when he has to climb stairs.

## 2019-10-03 NOTE — PROGRESS NOTE ADULT - SUBJECTIVE AND OBJECTIVE BOX
SOCORRO MANUEL 69y Male  MRN#: [id number]       SUBJECTIVE  Patient is a 69y old Male who presents with a chief complaint of dyspnea with worsening LE swelling (02 Oct 2019 17:07)  Currently admitted to medicine with the primary diagnosis of fluid overload secondary to cirrhosis secondary to JIMÉNEZ. In the AM the patient reported his breathing was improving compared to yesterday but that he was still short of breath when ambulating past the bathroom. Tolerating diet;     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    ALLERGIES:  No Known Allergies    MEDICATIONS:  STANDING MEDICATIONS  lactulose Syrup 20 Gram(s) Oral three times a day  omega-3-Acid Ethyl Esters 2 Gram(s) Oral two times a day  pantoprazole    Tablet 40 milliGRAM(s) Oral before breakfast  rifaximin 550 milliGRAM(s) Oral two times a day  spironolactone 25 milliGRAM(s) Oral daily  tamsulosin 0.4 milliGRAM(s) Oral at bedtime    PRN MEDICATIONS      VITAL SIGNS: Last 24 Hours  T(C): 36.5 (03 Oct 2019 07:27), Max: 36.6 (03 Oct 2019 00:00)  T(F): 97.7 (03 Oct 2019 07:27), Max: 97.9 (03 Oct 2019 00:00)  HR: 74 (03 Oct 2019 07:27) (74 - 85)  BP: 94/50 (03 Oct 2019 07:27) (94/50 - 114/54)  BP(mean): --  RR: 18 (03 Oct 2019 08:00) (16 - 18)  SpO2: 97% (03 Oct 2019 08:00) (97% - 97%)    LABS:                        7.4    3.10  )-----------( 84       ( 03 Oct 2019 06:18 )             23.6     10-03    136  |  105  |  38<H>  ----------------------------<  117<H>  4.3   |  21  |  1.7<H>    Ca    8.7      03 Oct 2019 06:18  Mg     1.8     10-03    TPro  6.0  /  Alb  2.2<L>  /  TBili  1.5<H>  /  DBili  0.6<H>  /  AST  255<H>  /  ALT  175<H>  /  AlkPhos  124<H>  10-02    PT/INR - ( 03 Oct 2019 06:18 )   PT: 34.10 sec;   INR: 3.00 ratio         PTT - ( 02 Oct 2019 08:29 )  PTT:38.2 sec              RADIOLOGY:      PHYSICAL EXAM:    GENERAL: NAD, well-developed, AAOx3  HEENT:  Atraumatic, Normocephalic. EOMI, PERRLA, conjunctiva and sclera clear, No JVD  PULMONARY: Clear to auscultation bilaterally; No wheeze  CARDIOVASCULAR: Regular rate and rhythm; No murmurs, rubs, or gallops  GASTROINTESTINAL: Soft, Nontender, Nondistended; Bowel sounds present  MUSCULOSKELETAL:  2+ Peripheral Pulses, No clubbing, cyanosis, or edema  NEUROLOGY: non-focal  SKIN: No rashes or lesions SOCORRO MANUEL 69y Male  MRN#: [id number]       SUBJECTIVE  Patient is a 69y old Male who presents with a chief complaint of dyspnea with worsening LE swelling (02 Oct 2019 17:07)  Currently admitted to medicine with the primary diagnosis of fluid overload secondary to cirrhosis secondary to JIMÉNEZ. In the AM the patient reported his breathing was improving compared to yesterday but that he was still short of breath when ambulating past the bathroom. Tolerating diet; had 3 bowel movements yesterday.    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    ALLERGIES:  No Known Allergies    MEDICATIONS:  STANDING MEDICATIONS  lactulose Syrup 20 Gram(s) Oral three times a day  omega-3-Acid Ethyl Esters 2 Gram(s) Oral two times a day  pantoprazole    Tablet 40 milliGRAM(s) Oral before breakfast  rifaximin 550 milliGRAM(s) Oral two times a day  spironolactone 25 milliGRAM(s) Oral daily  tamsulosin 0.4 milliGRAM(s) Oral at bedtime    PRN MEDICATIONS      VITAL SIGNS: Last 24 Hours  T(C): 36.5 (03 Oct 2019 07:27), Max: 36.6 (03 Oct 2019 00:00)  T(F): 97.7 (03 Oct 2019 07:27), Max: 97.9 (03 Oct 2019 00:00)  HR: 74 (03 Oct 2019 07:27) (74 - 85)  BP: 94/50 (03 Oct 2019 07:27) (94/50 - 114/54)  BP(mean): --  RR: 18 (03 Oct 2019 08:00) (16 - 18)  SpO2: 97% (03 Oct 2019 08:00) (97% - 97%)    LABS:                        7.4    3.10  )-----------( 84       ( 03 Oct 2019 06:18 )             23.6     10-03    136  |  105  |  38<H>  ----------------------------<  117<H>  4.3   |  21  |  1.7<H>    Ca    8.7      03 Oct 2019 06:18  Mg     1.8     10-03    TPro  6.0  /  Alb  2.2<L>  /  TBili  1.5<H>  /  DBili  0.6<H>  /  AST  255<H>  /  ALT  175<H>  /  AlkPhos  124<H>  10-02    PT/INR - ( 03 Oct 2019 06:18 )   PT: 34.10 sec;   INR: 3.00 ratio         PTT - ( 02 Oct 2019 08:29 )  PTT:38.2 sec      RADIOLOGY:    < from: US Kidney and Bladder (10.02.19 @ 18:52) >  FINDINGS:    RIGHT KIDNEY: Normal in echogenicity, size measuring 11 cm in length. No   evidence of hydronephrosis, calculus or solid mass. Vascular flow is   demonstrated at the hilum.    LEFT KIDNEY: Normal in echogenicity, size measuring 12 cm in length. No   evidence of hydronephrosis,calculus or solid mass. Vascular flow is   demonstrated at the hilum.     URINARY BLADDER: Prevoid volume of approximately 128 cc.  No wall   thickening, debris or calculus is seen. Bilateral ureteral jets are   visualized. Postvoid volume was not assessed in this examination.    OTHER: Moderate volume ascites, unchanged. Prostate was not delineated.      IMPRESSION:    No evidence of hydronephrosis.    Moderate volume ascites.    PHYSICAL EXAM:    GENERAL: NAD, well-developed, AAOx3  HEENT:  Atraumatic, Normocephalic  PULMONARY: Clear to auscultation bilaterally; No wheeze  CARDIOVASCULAR: Regular rate and rhythm; No murmurs, rubs, or gallops  GASTROINTESTINAL: Soft, Nontender, Nondistended; Bowel sounds present  MUSCULOSKELETAL: No clubbing, cyanosis, or edema  NEUROLOGY: non-focal  SKIN: No rashes or lesions

## 2019-10-03 NOTE — PROGRESS NOTE ADULT - SUBJECTIVE AND OBJECTIVE BOX
SOCORRO MANUEL MRN-[id number]    Hospitalist Note  68yo M with Past Medical History Atrial Fibrillation on Coumadin, Hypertension, Hypercholesteremia, CAD status post PCI x2, BPH, DMII, and liver cirrhosis secondary to JIMÉNEZ with multiple admission for hepatic encephalopathy (followed by [facility]) admitted to [facility] for worsening dyspnea and lower extremity swelling secondary to decompensated liver cirrhosis.    Overnight events/Updates: The pt reports decreased lower extremity swelling.  He required assistance with ambulation.    Vital Signs Last 24 Hrs  T(C): 36.3 (03 Oct 2019 15:34), Max: 36.6 (03 Oct 2019 00:00)  T(F): 97.4 (03 Oct 2019 15:34), Max: 97.9 (03 Oct 2019 00:00)  HR: 90 (03 Oct 2019 15:34) (74 - 90)  BP: 104/53 (03 Oct 2019 15:34) (94/50 - 114/54)  BP(mean): --  RR: 18 (03 Oct 2019 15:34) (16 - 18)  SpO2: 97% (03 Oct 2019 08:00) (97% - 97%)    Physical Examination:  General: AAO x 3  HEENT: PERRLA, EOMI  CV= S1 & S2 appreciated  Lungs=CTA BL  Abdominal Examination= + BS, Soft, NT  Extremity Examination= mild peripheral edema    ROS: No chest pain, no shortness of breath.  All other systems reviewed and are within normal limits except for the complaints in the HPI.    MEDICATIONS  (STANDING):  lactulose Syrup 20 Gram(s) Oral three times a day  omega-3-Acid Ethyl Esters 2 Gram(s) Oral two times a day  pantoprazole    Tablet 40 milliGRAM(s) Oral before breakfast  rifaximin 550 milliGRAM(s) Oral two times a day  spironolactone 25 milliGRAM(s) Oral daily  tamsulosin 0.4 milliGRAM(s) Oral at bedtime    MEDICATIONS  (PRN):                            7.4    3.10  )-----------( 84       ( 03 Oct 2019 06:18 )             23.6     10-03    136  |  105  |  38<H>  ----------------------------<  117<H>  4.3   |  21  |  1.7<H>    Ca    8.7      03 Oct 2019 06:18  Mg     1.8     10-03    TPro  6.0  /  Alb  2.2<L>  /  TBili  1.5<H>  /  DBili  0.6<H>  /  AST  255<H>  /  ALT  175<H>  /  AlkPhos  124<H>  10-02      Case discussed with housestaff & family  BRYAN Finley 1300

## 2019-10-03 NOTE — CONSULT NOTE ADULT - ASSESSMENT
Patient Seen in: [facility] Emergency Department      History   Patient presents with:  Seizure Disorder (neurologic)    Stated Complaint: seizure    HPI    71-year-old male comes to the hospital after having had a seizure.   The patient is typically rhythm  Lungs: Diminished breath sounds on the right side with crackles noted on left  Abdomen: Soft nontender nondistended normal active bowel sounds without rebound, guarding or masses noted  Back nontender   Extremity no clubbing, cyanosis or edema note his EKG which was consistent with an anterior STEMI. Cardiology was immediately notified as well as the Cath Lab and I spoke promptly with Dr. Gulshan Arias.   The patient with his altered mental status will go directly to CT in the Cath Lab while the Cath Lab i 68M pmh afib (coumadin), HTN, anemia, HLD, Diastolic CHF, BPH, DM, JIMÉNEZ, cirrhosis w/ varices p/w fever to 102F at home and generalized weakness and lethargy    # 102 F Fever at home. hx of JIMÉNEZ with cirrhosis and varices  - s/p therapeutic and diagnostic paracentesis on 4/1. No evidence of SBP at that time. SAAG 2.   - UA, CXR, CT Head all negative  - patient denies any respiratory or urinary symptoms. No other site of infection noted.   - no leukocytosis  - F/u BCx   - It is vital that patients suspected of having SBP undergo abdominal paracentesis before receiving any antibiotics (a single dose of a broad-spectrum antibiotic can lead to no growth in 86 percent of patients after six hours). Due to the fact this window has passed, would not recommend performing repeat paracentesis for dx of SBP.     Attending recs to follow 68M pmh afib (coumadin), HTN, anemia, HLD, Diastolic CHF, BPH, DM, JIMÉNEZ, cirrhosis w/ varices p/w fever to 102F at home and generalized weakness and lethargy    # 102 F Fever at home. hx of JIMÉNEZ with cirrhosis and varices  - s/p therapeutic and diagnostic paracentesis on 4/1. SAAG 2. No evidence of SBP at that time.   - UA, CXR, CT Head all negative  - patient denies any respiratory or urinary symptoms. No other site of infection noted.   - no leukocytosis  - F/u BCx   - It is vital that patients suspected of having SBP undergo abdominal paracentesis before receiving any antibiotics (a single dose of a broad-spectrum antibiotic can lead to no growth in 86 percent of patients after six hours). Due to the fact this window has passed, would not recommend performing repeat paracentesis.  - Continue epimeric therapy for SBP.    Attending recs to follow 68M pmh afib (coumadin), HTN, anemia, HLD, Diastolic CHF, BPH, DM, JIMÉNEZ, cirrhosis w/ varices p/w fever to 102F at home and generalized weakness and lethargy    # 102 F Fever at home. hx of JIMÉNEZ with cirrhosis and varices. Possibly secondary to SBP  - s/p therapeutic and diagnostic paracentesis on 4/1. SAAG 2. No evidence of SBP at that time.   - UA, CXR, CT Head all negative  - patient denies any respiratory or urinary symptoms. No other site of infection noted.   - no leukocytosis  - F/u BCx   - Recommend performing repeat diagnostic paracentesis  - Continue epimeric therapy for SBP until paracentesis results available.     Attending recs to follow 68M pmh afib (coumadin), HTN, anemia, HLD, Diastolic CHF, BPH, DM, JIMÉNEZ, cirrhosis w/ varices p/w fever to 102F at home and generalized weakness and lethargy    IMPRESSION:  102 F Fever at home.   hx of JIMÉNEZ with cirrhosis and varices.   s/p therapeutic and diagnostic paracentesis on 4/1. SAAG 2. No evidence of SBP at that time.  Presently no SBP/ PNA/Pyelo/ Phlebitis  No ongoing sepsis  Clinically stable  BCx NTD    RECOMMENDATIONS:   Repeat diagnostic paracentesis  D/c ABx

## 2019-10-03 NOTE — PROGRESS NOTE ADULT - ASSESSMENT
The patient is a 69 year-old male with a PMH of cirrhosis secondary to JIMÉNEZ w/ multiple admissions for AMS secondary to metabolic encephalopathy and ascites s/p multiple paracenteses, HTN, A fib on Coumadin, CAD s/p 2 stents, DM, HLD, BPH, presenting for increasing shortness of breath since late last week, admitted for likely decompensated cirrhosis due to diuretic discontinuation.    # Shortness of breath, lower extremity edema likely secondary to decompensated liver cirrhosis secondary to diuretic discontinuation, improving  Patient AAOx4 this AM  Patient reports his Lasix was recently held due to CHESTER  S/p Lasix 40 mg IV x 1 in ED  Change Lasix to 40 mg PO qD  C/w rifaximin 550 mg PO BID, lactulose 20 g PO TID, titrate lactulose to 2-3 bowel movements/day  Abdominal US showed moderate abdominal ascites  Pending possible liver transplant at [facility]  Patient requesting records be faxed to [facility] ([phone]; include facesheet for Nicki Harley, Liver Transplant Coordinator)  Vitals per routine  F/u w/ GI; no need for therapeutic paracentesis at this time  F/u AM CBC, BMP    # A fib on Coumadin at home  Holding Coumadin as INR therapeutic  F/u AM PT/INR    # Thrombocytopenia and normocytic anemia   Transfuse if hb<7 and plat<09448  Serum iron 42, TIBC 256, % iron saturation 16, transferrin 225  Folate 10.6, B12 1451 (high); no need for supplementation    # BPH  C/w Flomax PO qHS    # CHG  # DVT ppx- on Coumadin for a fib; holding for therapeutic INR  # GI ppx- c/w pantoprazole  # Diet- DASH  # Activity- ambulate w/ assistance The patient is a 69 year-old male with a PMH of cirrhosis secondary to JIMÉNEZ w/ multiple admissions for AMS secondary to metabolic encephalopathy and ascites s/p multiple paracenteses, HTN, A fib on Coumadin, CAD s/p 2 stents, DM, HLD, BPH, presenting for increasing shortness of breath since late last week, admitted for likely decompensated cirrhosis due to diuretic discontinuation.    # Shortness of breath, lower extremity edema likely secondary to decompensated liver cirrhosis secondary to diuretic discontinuation, improving  Patient AAOx4 this AM  Patient reports his Lasix was recently held due to CHESTER  S/p Lasix 40 mg IV x 1 in ED  Change Lasix to 40 mg PO qD  C/w rifaximin 550 mg PO BID, lactulose 20 g PO TID, titrate lactulose to 2-3 bowel movements/day  Abdominal US showed moderate abdominal ascites  Pending possible liver transplant at [facility]  Patient requesting records be faxed to [facility] ([phone]; include facesheet for Nicki Harley, Liver Transplant Coordinator)  Vitals per routine  F/u w/ GI; no need for therapeutic paracentesis at this time  F/u AM CBC, BMP    # A fib on Coumadin at home  Holding Coumadin as INR therapeutic  F/u AM PT/INR    # Thrombocytopenia and normocytic anemia   Transfuse if hb<7 and plat<45450  Serum iron 42, TIBC 256, % iron saturation 16, transferrin 225  Folate 10.6, B12 1451 (high); no need for supplementation    # BPH  C/w Flomax PO qHS    # CHG  # DVT ppx- on Coumadin for a fib; holding for therapeutic INR  # GI ppx- c/w pantoprazole  # Diet- DASH  # Activity- ambulate w/ assistance  # Dispo- anticipated for tomorrow

## 2019-10-03 NOTE — PROGRESS NOTE ADULT - ASSESSMENT
70yo M with Past Medical History Atrial Fibrillation on Coumadin, Hypertension, Hypercholesteremia, CAD status post PCI x2, BPH, DMII, and liver cirrhosis secondary to JIMÉNEZ with multiple admission for hepatic encephalopathy (followed by [facility]) admitted to [facility] for worsening dyspnea and lower extremity swelling secondary to decompensated liver cirrhosis.    Decompensated Liver Cirrhosis (secondary to JIMÉNEZ): the patient reports symptomatic improvement following treatment with IV Lasix.  Restart Lasix 40mg PO q24 in AM.  Hyperkalemia has also normalized, restart Aldactone 25mg PO q24 beginning tomorrow.  Pt will need repeat HCC screening in 6 months.  Continue Rifaximin and Lactulose  Hyperkalemia: resolved from admission.  See above for management of aldactone.   Atrial Fibrillation on Coumadin: Continue Coumadinat bedtime  Hypercholesteremia: continue Lipitor  DMII: hold Januvia (NF).  Continue Lantus/Lispro and monitor FS with meals.  GI/DVT prophylaxis    #Progress Note Handoff    Pending:  Other: restart diuretics and repeat BMP    Future Disposition: Anticipate discharge home vs. SNF in AM

## 2019-10-04 ENCOUNTER — TRANSCRIPTION ENCOUNTER (OUTPATIENT)
Age: 69
End: 2019-10-04

## 2019-10-04 ENCOUNTER — APPOINTMENT (OUTPATIENT)
Dept: GASTROENTEROLOGY | Facility: CLINIC | Age: 69
End: 2019-10-04

## 2019-10-04 LAB
ALBUMIN SERPL ELPH-MCNC: 2.3 G/DL — LOW (ref 3.5–5.2)
ALP SERPL-CCNC: 125 U/L — HIGH (ref 30–115)
ALT FLD-CCNC: 147 U/L — HIGH (ref 0–41)
ANION GAP SERPL CALC-SCNC: 11 MMOL/L — SIGNIFICANT CHANGE UP (ref 7–14)
AST SERPL-CCNC: 170 U/L — HIGH (ref 0–41)
BASOPHILS # BLD AUTO: 0.02 K/UL — SIGNIFICANT CHANGE UP (ref 0–0.2)
BASOPHILS # BLD AUTO: 0.04 K/UL — SIGNIFICANT CHANGE UP (ref 0–0.2)
BASOPHILS NFR BLD AUTO: 0.7 % — SIGNIFICANT CHANGE UP (ref 0–1)
BASOPHILS NFR BLD AUTO: 1.3 % — HIGH (ref 0–1)
BILIRUB SERPL-MCNC: 1.5 MG/DL — HIGH (ref 0.2–1.2)
BLD GP AB SCN SERPL QL: SIGNIFICANT CHANGE UP
BUN SERPL-MCNC: 38 MG/DL — HIGH (ref 10–20)
CALCIUM SERPL-MCNC: 8.3 MG/DL — LOW (ref 8.5–10.1)
CHLORIDE SERPL-SCNC: 105 MMOL/L — SIGNIFICANT CHANGE UP (ref 98–110)
CO2 SERPL-SCNC: 20 MMOL/L — SIGNIFICANT CHANGE UP (ref 17–32)
CREAT SERPL-MCNC: 1.7 MG/DL — HIGH (ref 0.7–1.5)
EOSINOPHIL # BLD AUTO: 0.07 K/UL — SIGNIFICANT CHANGE UP (ref 0–0.7)
EOSINOPHIL # BLD AUTO: 0.08 K/UL — SIGNIFICANT CHANGE UP (ref 0–0.7)
EOSINOPHIL NFR BLD AUTO: 2.2 % — SIGNIFICANT CHANGE UP (ref 0–8)
EOSINOPHIL NFR BLD AUTO: 2.7 % — SIGNIFICANT CHANGE UP (ref 0–8)
GLUCOSE SERPL-MCNC: 92 MG/DL — SIGNIFICANT CHANGE UP (ref 70–99)
HCT VFR BLD CALC: 21.1 % — LOW (ref 42–52)
HCT VFR BLD CALC: 22.2 % — LOW (ref 42–52)
HGB BLD-MCNC: 6.9 G/DL — CRITICAL LOW (ref 14–18)
HGB BLD-MCNC: 7.1 G/DL — LOW (ref 14–18)
IMM GRANULOCYTES NFR BLD AUTO: 0.3 % — SIGNIFICANT CHANGE UP (ref 0.1–0.3)
IMM GRANULOCYTES NFR BLD AUTO: 0.3 % — SIGNIFICANT CHANGE UP (ref 0.1–0.3)
INR BLD: 2.76 RATIO — HIGH (ref 0.65–1.3)
LYMPHOCYTES # BLD AUTO: 0.52 K/UL — LOW (ref 1.2–3.4)
LYMPHOCYTES # BLD AUTO: 0.56 K/UL — LOW (ref 1.2–3.4)
LYMPHOCYTES # BLD AUTO: 16.5 % — LOW (ref 20.5–51.1)
LYMPHOCYTES # BLD AUTO: 19.1 % — LOW (ref 20.5–51.1)
MAGNESIUM SERPL-MCNC: 1.8 MG/DL — SIGNIFICANT CHANGE UP (ref 1.8–2.4)
MCHC RBC-ENTMCNC: 30 PG — SIGNIFICANT CHANGE UP (ref 27–31)
MCHC RBC-ENTMCNC: 30.4 PG — SIGNIFICANT CHANGE UP (ref 27–31)
MCHC RBC-ENTMCNC: 32 G/DL — SIGNIFICANT CHANGE UP (ref 32–37)
MCHC RBC-ENTMCNC: 32.7 G/DL — SIGNIFICANT CHANGE UP (ref 32–37)
MCV RBC AUTO: 93 FL — SIGNIFICANT CHANGE UP (ref 80–94)
MCV RBC AUTO: 93.7 FL — SIGNIFICANT CHANGE UP (ref 80–94)
MONOCYTES # BLD AUTO: 0.45 K/UL — SIGNIFICANT CHANGE UP (ref 0.1–0.6)
MONOCYTES # BLD AUTO: 0.49 K/UL — SIGNIFICANT CHANGE UP (ref 0.1–0.6)
MONOCYTES NFR BLD AUTO: 15.4 % — HIGH (ref 1.7–9.3)
MONOCYTES NFR BLD AUTO: 15.6 % — HIGH (ref 1.7–9.3)
NEUTROPHILS # BLD AUTO: 1.81 K/UL — SIGNIFICANT CHANGE UP (ref 1.4–6.5)
NEUTROPHILS # BLD AUTO: 2.02 K/UL — SIGNIFICANT CHANGE UP (ref 1.4–6.5)
NEUTROPHILS NFR BLD AUTO: 61.8 % — SIGNIFICANT CHANGE UP (ref 42.2–75.2)
NEUTROPHILS NFR BLD AUTO: 64.1 % — SIGNIFICANT CHANGE UP (ref 42.2–75.2)
NRBC # BLD: 0 /100 WBCS — SIGNIFICANT CHANGE UP (ref 0–0)
NRBC # BLD: 0 /100 WBCS — SIGNIFICANT CHANGE UP (ref 0–0)
PLATELET # BLD AUTO: 76 K/UL — LOW (ref 130–400)
PLATELET # BLD AUTO: 84 K/UL — LOW (ref 130–400)
POTASSIUM SERPL-MCNC: 4.5 MMOL/L — SIGNIFICANT CHANGE UP (ref 3.5–5)
POTASSIUM SERPL-SCNC: 4.5 MMOL/L — SIGNIFICANT CHANGE UP (ref 3.5–5)
PROT SERPL-MCNC: 5.5 G/DL — LOW (ref 6–8)
PROTHROM AB SERPL-ACNC: 31.4 SEC — HIGH (ref 9.95–12.87)
RBC # BLD: 2.27 M/UL — LOW (ref 4.7–6.1)
RBC # BLD: 2.37 M/UL — LOW (ref 4.7–6.1)
RBC # FLD: 21.3 % — HIGH (ref 11.5–14.5)
RBC # FLD: 21.6 % — HIGH (ref 11.5–14.5)
SODIUM SERPL-SCNC: 136 MMOL/L — SIGNIFICANT CHANGE UP (ref 135–146)
WBC # BLD: 2.93 K/UL — LOW (ref 4.8–10.8)
WBC # BLD: 3.15 K/UL — LOW (ref 4.8–10.8)
WBC # FLD AUTO: 2.93 K/UL — LOW (ref 4.8–10.8)
WBC # FLD AUTO: 3.15 K/UL — LOW (ref 4.8–10.8)

## 2019-10-04 PROCEDURE — 99233 SBSQ HOSP IP/OBS HIGH 50: CPT

## 2019-10-04 RX ORDER — WARFARIN SODIUM 2.5 MG/1
2.5 TABLET ORAL ONCE
Refills: 0 | Status: COMPLETED | OUTPATIENT
Start: 2019-10-04 | End: 2019-10-04

## 2019-10-04 RX ORDER — SPIRONOLACTONE 25 MG/1
1 TABLET, FILM COATED ORAL
Qty: 0 | Refills: 0 | DISCHARGE
Start: 2019-10-04

## 2019-10-04 RX ORDER — LACTULOSE 10 G/15ML
20 SOLUTION ORAL THREE TIMES A DAY
Refills: 0 | Status: DISCONTINUED | OUTPATIENT
Start: 2019-10-04 | End: 2019-10-05

## 2019-10-04 RX ORDER — FUROSEMIDE 40 MG
1 TABLET ORAL
Qty: 0 | Refills: 0 | DISCHARGE
Start: 2019-10-04

## 2019-10-04 RX ADMIN — Medication 2 GRAM(S): at 17:13

## 2019-10-04 RX ADMIN — PANTOPRAZOLE SODIUM 40 MILLIGRAM(S): 20 TABLET, DELAYED RELEASE ORAL at 05:16

## 2019-10-04 RX ADMIN — TAMSULOSIN HYDROCHLORIDE 0.4 MILLIGRAM(S): 0.4 CAPSULE ORAL at 21:05

## 2019-10-04 RX ADMIN — LACTULOSE 20 GRAM(S): 10 SOLUTION ORAL at 12:53

## 2019-10-04 RX ADMIN — LACTULOSE 20 GRAM(S): 10 SOLUTION ORAL at 05:19

## 2019-10-04 RX ADMIN — Medication 2 GRAM(S): at 05:16

## 2019-10-04 RX ADMIN — WARFARIN SODIUM 2.5 MILLIGRAM(S): 2.5 TABLET ORAL at 21:05

## 2019-10-04 RX ADMIN — LACTULOSE 20 GRAM(S): 10 SOLUTION ORAL at 21:05

## 2019-10-04 NOTE — PROGRESS NOTE ADULT - ASSESSMENT
68yo M with Past Medical History Atrial Fibrillation on Coumadin, Hypertension, Hypercholesteremia, CAD status post PCI x2, BPH, DMII, and liver cirrhosis secondary to JIMÉNEZ with multiple admission for hepatic encephalopathy (followed by [facility]) admitted to [facility] for worsening dyspnea and lower extremity swelling secondary to decompensated liver cirrhosis.    Decompensated Liver Cirrhosis (secondary to JIMÉNEZ): dyspnea and edema improved after restarting diuretics.  Continue Lasix 40mg PO q24 and Aldactone 25mg PO q24.  His wife reported worsening kidney function after Lasix 20mg was added to his evening medications.  Check BMP tomorrow and monitor for hyperkalemia.  Pt will need repeat HCC screening in 6 months.  Continue Rifaximin and Lactulose to prevent hepatic encephalopathy  Acute on chronic anemia: hemoglobin worsened to 7.1; will transfuse 1u PRBC prior to discharge.  Check CBC in AM  Atrial Fibrillation on Coumadin: Continue Coumadinat bedtime  Hypercholesteremia: continue Lipitor  DMII: hold Januvia (NF).  Continue Lantus/Lispro and monitor FS with meals.  GI/DVT prophylaxis    #Progress Note Handoff    Pending:  Tests: check CBC and BMP in AM    Future Disposition: Anticipate discharge home with PT in 24 hours

## 2019-10-04 NOTE — PROGRESS NOTE ADULT - ASSESSMENT
The patient is a 69 year-old male with a PMH of cirrhosis secondary to JIMÉNEZ w/ multiple admissions for AMS secondary to metabolic encephalopathy and ascites s/p multiple paracenteses, HTN, A fib on Coumadin, CAD s/p 2 stents, DM, HLD, BPH, presenting for increasing shortness of breath since late last week, admitted for likely decompensated cirrhosis due to diuretic discontinuation.    # Shortness of breath, lower extremity edema likely secondary to decompensated liver cirrhosis secondary to diuretic discontinuation, improving  Patient AAOx4 this AM  Patient reports his Lasix was recently held due to CHESTER  S/p Lasix 40 mg IV x 1 in ED  Changed Lasix to 40 mg PO qD  C/w rifaximin 550 mg PO BID, lactulose 20 g PO TID, titrate lactulose to 2-3 bowel movements/day  Abdominal US showed moderate abdominal ascites  Pending possible liver transplant at [facility]  Records for this admission faxed to [facility] ([phone]; include face sheet for Nicki Harley, Liver Transplant Coordinator)  Vitals per routine  F/u w/ GI; no need for therapeutic paracentesis at this time  F/u AM CBC, BMP    # Anemia likely secondary to liver cirrhosis secondary to JIMÉNEZ  Hb 6.9 <-- 7.4 <-- 7.7  Active Type/Screen  Transfuse 1 unit over 3 hours following Type/Screen  F/u AM CBC, stool guaiac  Vitals per routine    # A fib on Coumadin at home  Restart Coumadin 2.5 mg PO qHS  F/u AM PT/INR    # Thrombocytopenia and normocytic anemia   Transfuse if hb<7 and plat<33814  Serum iron 42, TIBC 256, % iron saturation 16, transferrin 225  Folate 10.6, B12 1451 (high); no need for supplementation    # BPH  C/w Flomax PO qHS    # CHG  # DVT ppx- Coumadin for a fib  # GI ppx- c/w pantoprazole  # Diet- DASH  # Activity- ambulate w/ assistance  # Dispo- acute d/t anemia; monitoring for further Hb drop/bleeding The patient is a 69 year-old male with a PMH of cirrhosis secondary to JIMÉNEZ w/ multiple admissions for AMS secondary to metabolic encephalopathy and ascites s/p multiple paracenteses, HTN, A fib on Coumadin, CAD s/p 2 stents, DM, HLD, BPH, presenting for increasing shortness of breath since late last week, admitted for likely decompensated cirrhosis due to diuretic discontinuation.    # Shortness of breath, lower extremity edema likely secondary to decompensated liver cirrhosis secondary to diuretic discontinuation, improving  Patient AAOx4 this AM  Patient reports his Lasix was recently held due to CHESTER  S/p Lasix 40 mg IV x 1 in ED  Changed Lasix to 40 mg PO qD  C/w rifaximin 550 mg PO BID, lactulose 20 g PO TID, titrate lactulose to 2-3 bowel movements/day  Abdominal US showed moderate abdominal ascites  Pending possible liver transplant at [facility]  Records for this admission faxed to [facility] ([phone]; include face sheet for Nicki Harley, Liver Transplant Coordinator)  Vitals per routine  F/u w/ GI; no need for therapeutic paracentesis at this time  F/u AM CBC, BMP    # Anemia likely of chronic disease secondary to liver cirrhosis secondary to JIMÉNEZ  Hb 6.9 <-- 7.4 <-- 7.7  Active Type/Screen  Transfuse 1 unit over 3 hours following Type/Screen  F/u AM CBC, stool guaiac  Vitals per routine    # A fib on Coumadin at home  Restart Coumadin 2.5 mg PO qHS  F/u AM PT/INR    # Thrombocytopenia and normocytic anemia   Transfuse if hb<7 and plat<35332  Serum iron 42, TIBC 256, % iron saturation 16, transferrin 225  Folate 10.6, B12 1451 (high); no need for supplementation    # BPH  C/w Flomax PO qHS    # CHG  # DVT ppx- Coumadin for a fib  # GI ppx- c/w pantoprazole  # Diet- DASH  # Activity- ambulate w/ assistance  # Dispo- acute d/t anemia; monitoring for further Hb drop/bleeding

## 2019-10-04 NOTE — DISCHARGE NOTE PROVIDER - NSDCFUSCHEDAPPT_GEN_ALL_CORE_FT
SOCORRO MANUEL ; 12/11/2019 ; NPP Card[facility] [address] SOCORRO Hodges ; 12/19/2019 ; NP Cardio [address] SOCORRO MANUEL ; 12/11/2019 ; NPP Card[facility] [address] SOCORRO Hodges ; 12/19/2019 ; NP Cardio [address] SOCORRO MANUEL ; 12/11/2019 ; NPP Card[facility] [address] SOCORRO Hodges ; 12/19/2019 ; NP Cardio [address]

## 2019-10-04 NOTE — PROGRESS NOTE ADULT - SUBJECTIVE AND OBJECTIVE BOX
SOCORRO MANUEL 69y Male  MRN#: [id number]       SUBJECTIVE  Patient is a 69y old Male who presents with a chief complaint of dyspnea with worsening LE swelling (03 Oct 2019 16:15)  Currently admitted to medicine with the primary diagnosis of fluid overload secondary to cirrhosis secondary to JIMÉNEZ. The patient reports feeling the same compared to yesterday; minimal SOB on ambulation; tolerating PO diet. Eager to be discharged for family event tomorrow.      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    ALLERGIES:  No Known Allergies    MEDICATIONS:  STANDING MEDICATIONS  furosemide    Tablet 40 milliGRAM(s) Oral daily  lactulose Syrup 20 Gram(s) Oral three times a day  omega-3-Acid Ethyl Esters 2 Gram(s) Oral two times a day  pantoprazole    Tablet 40 milliGRAM(s) Oral before breakfast  rifaximin 550 milliGRAM(s) Oral two times a day  spironolactone 25 milliGRAM(s) Oral daily  tamsulosin 0.4 milliGRAM(s) Oral at bedtime    PRN MEDICATIONS      VITAL SIGNS: Last 24 Hours  T(C): 36.6 (04 Oct 2019 07:30), Max: 36.6 (04 Oct 2019 07:30)  T(F): 97.8 (04 Oct 2019 07:30), Max: 97.8 (04 Oct 2019 07:30)  HR: 71 (04 Oct 2019 07:30) (71 - 90)  BP: 100/60 (04 Oct 2019 09:13) (84/69 - 104/53)  BP(mean): --  RR: 18 (04 Oct 2019 07:30) (18 - 18)  SpO2: --    LABS:                        6.9    2.93  )-----------( 76       ( 04 Oct 2019 06:28 )             21.1     10-04    136  |  105  |  38<H>  ----------------------------<  92  4.5   |  20  |  1.7<H>    Ca    8.3<L>      04 Oct 2019 06:28  Mg     1.8     10-04    TPro  5.5<L>  /  Alb  2.3<L>  /  TBili  1.5<H>  /  DBili  x   /  AST  170<H>  /  ALT  147<H>  /  AlkPhos  125<H>  10    PT/INR - ( 04 Oct 2019 06:28 )   PT: 31.40 sec;   INR: 2.76 ratio           Urinalysis Basic - ( 03 Oct 2019 15:01 )    Color: Colorless / Appearance: Clear / S.009 / pH: x  Gluc: x / Ketone: Negative  / Bili: Negative / Urobili: <2 mg/dL   Blood: x / Protein: Negative / Nitrite: Negative   Leuk Esterase: Negative / RBC: x / WBC x   Sq Epi: x / Non Sq Epi: x / Bacteria: x      PHYSICAL EXAM:    GENERAL: NAD, well-developed, AAOx3  HEENT:  Atraumatic, Normocephalic  PULMONARY: Clear to auscultation bilaterally; No wheeze  CARDIOVASCULAR: Regular rate and rhythm; No murmurs, rubs, or gallops  GASTROINTESTINAL: Soft, Nontender, slightly distended  MUSCULOSKELETAL: No clubbing, cyanosis; +3 pitting edema of B/L LE  NEUROLOGY: non-focal  SKIN: No rashes or lesions

## 2019-10-04 NOTE — DISCHARGE NOTE PROVIDER - NSDCCPCAREPLAN_GEN_ALL_CORE_FT
PRINCIPAL DISCHARGE DIAGNOSIS  Diagnosis: Decompensated hepatic cirrhosis  Assessment and Plan of Treatment: Please take your medications as prescribed and follow up with Dr. Durant and with the [facility] for further management. If you notice increasing shortness of breath, dizziness, chest pain or pressure, confusion, weakness or swelling in any of your limbs, please seek immediate medical attention.      SECONDARY DISCHARGE DIAGNOSES  Diagnosis: Atrial fibrillation  Assessment and Plan of Treatment: Please take your medications as prescribed and follow up with your primary outpatient physician and with the coumadin clinic for outpatient monitoring of your INR. If you notice any dizziness/lightheadedness, chest pain or pressure, weakness in any of your limbs, shortness of breath, or other symptoms that alarm you, please seek immediate medical attention.    Diagnosis: Chronic kidney disease  Assessment and Plan of Treatment: Please continue your medications as prescribed and follow up with your primary outpatient physician for further management. PRINCIPAL DISCHARGE DIAGNOSIS  Diagnosis: Decompensated hepatic cirrhosis  Assessment and Plan of Treatment: Please take your medications as prescribed and follow up with Dr. Durant and with the [facility] for further management. If you notice increasing shortness of breath, dizziness, chest pain or pressure, confusion, weakness or swelling in any of your limbs, please seek immediate medical attention.  Pt will be discharged home on Lasix 40mg PO q24 and Aldactone 25mg PO q24      SECONDARY DISCHARGE DIAGNOSES  Diagnosis: Atrial fibrillation  Assessment and Plan of Treatment: Please take your medications as prescribed and follow up with your primary outpatient physician and with the coumadin clinic for outpatient monitoring of your INR. If you notice any dizziness/lightheadedness, chest pain or pressure, weakness in any of your limbs, shortness of breath, or other symptoms that alarm you, please seek immediate medical attention.    Diagnosis: Chronic kidney disease  Assessment and Plan of Treatment: Please continue your medications as prescribed and follow up with your primary outpatient physician for further management.

## 2019-10-04 NOTE — PROGRESS NOTE ADULT - SUBJECTIVE AND OBJECTIVE BOX
SOCORRO MANUEL MRN-[id number]    Hospitalist Note  68yo M with Past Medical History Atrial Fibrillation on Coumadin, Hypertension, Hypercholesteremia, CAD status post PCI x2, BPH, DMII, and liver cirrhosis secondary to JIMÉNEZ with multiple admission for hepatic encephalopathy (followed by [facility]) admitted to [facility] for worsening dyspnea and lower extremity swelling secondary to decompensated liver cirrhosis.    Overnight events/Updates: The pt reports continued improvement in his lower extremity swelling and dyspnea.  Repeat CBC, however, revealed worsening anemia with hemoglobin decreasing to 7.1.  No hematochezia or melena reported.    Vital Signs Last 24 Hrs  T(C): 36.6 (04 Oct 2019 07:30), Max: 36.6 (04 Oct 2019 07:30)  T(F): 97.8 (04 Oct 2019 07:30), Max: 97.8 (04 Oct 2019 07:30)  HR: 74 (04 Oct 2019 14:12) (71 - 90)  BP: 102/54 (04 Oct 2019 14:12) (84/69 - 104/53)  BP(mean): --  RR: 18 (04 Oct 2019 07:30) (18 - 18)  SpO2: --    Physical Examination:  General: AAO x 3  HEENT: PERRLA, EOMI  CV= S1 & S2 appreciated  Lungs= CTA BL  Abdominal Examination= + BS, Soft, NT/ND  Extremity Examination= minimal peripheral edema    ROS: No chest pain, no shortness of breath.  All other systems reviewed and are within normal limits except for the complaints in the HPI.    MEDICATIONS  (STANDING):  furosemide    Tablet 40 milliGRAM(s) Oral daily  lactulose Syrup 20 Gram(s) Oral three times a day  omega-3-Acid Ethyl Esters 2 Gram(s) Oral two times a day  pantoprazole    Tablet 40 milliGRAM(s) Oral before breakfast  rifaximin 550 milliGRAM(s) Oral two times a day  spironolactone 25 milliGRAM(s) Oral daily  tamsulosin 0.4 milliGRAM(s) Oral at bedtime  warfarin 2.5 milliGRAM(s) Oral once    MEDICATIONS  (PRN):                            7.1    3.15  )-----------( 84       ( 04 Oct 2019 12:19 )             22.2     10-04    136  |  105  |  38<H>  ----------------------------<  92  4.5   |  20  |  1.7<H>    Ca    8.3<L>      04 Oct 2019 06:28  Mg     1.8     10-04    TPro  5.5<L>  /  Alb  2.3<L>  /  TBili  1.5<H>  /  DBili  x   /  AST  170<H>  /  ALT  147<H>  /  AlkPhos  125<H>  10-04      Case discussed with housesta & family  BRYAN Finley [id number]

## 2019-10-04 NOTE — DISCHARGE NOTE PROVIDER - CARE PROVIDER_API CALL
Chano Durant)  Gastroenterology; Internal Medicine  [address] [place] [address]  Phone: [phone]  Fax: [phone]  Follow Up Time: 1 week

## 2019-10-05 VITALS — DIASTOLIC BLOOD PRESSURE: 58 MMHG | HEART RATE: 66 BPM | SYSTOLIC BLOOD PRESSURE: 102 MMHG

## 2019-10-05 LAB
ANION GAP SERPL CALC-SCNC: 11 MMOL/L — SIGNIFICANT CHANGE UP (ref 7–14)
BASOPHILS # BLD AUTO: 0.03 K/UL — SIGNIFICANT CHANGE UP (ref 0–0.2)
BASOPHILS NFR BLD AUTO: 1.1 % — HIGH (ref 0–1)
BUN SERPL-MCNC: 37 MG/DL — HIGH (ref 10–20)
CALCIUM SERPL-MCNC: 8.2 MG/DL — LOW (ref 8.5–10.1)
CHLORIDE SERPL-SCNC: 106 MMOL/L — SIGNIFICANT CHANGE UP (ref 98–110)
CO2 SERPL-SCNC: 19 MMOL/L — SIGNIFICANT CHANGE UP (ref 17–32)
CREAT SERPL-MCNC: 1.6 MG/DL — HIGH (ref 0.7–1.5)
EOSINOPHIL # BLD AUTO: 0.06 K/UL — SIGNIFICANT CHANGE UP (ref 0–0.7)
EOSINOPHIL NFR BLD AUTO: 2.2 % — SIGNIFICANT CHANGE UP (ref 0–8)
GLUCOSE SERPL-MCNC: 85 MG/DL — SIGNIFICANT CHANGE UP (ref 70–99)
HCT VFR BLD CALC: 23.5 % — LOW (ref 42–52)
HGB BLD-MCNC: 7.5 G/DL — LOW (ref 14–18)
IMM GRANULOCYTES NFR BLD AUTO: 0.4 % — HIGH (ref 0.1–0.3)
INR BLD: 2.55 RATIO — HIGH (ref 0.65–1.3)
LYMPHOCYTES # BLD AUTO: 0.63 K/UL — LOW (ref 1.2–3.4)
LYMPHOCYTES # BLD AUTO: 23.5 % — SIGNIFICANT CHANGE UP (ref 20.5–51.1)
MAGNESIUM SERPL-MCNC: 1.7 MG/DL — LOW (ref 1.8–2.4)
MCHC RBC-ENTMCNC: 29.2 PG — SIGNIFICANT CHANGE UP (ref 27–31)
MCHC RBC-ENTMCNC: 31.9 G/DL — LOW (ref 32–37)
MCV RBC AUTO: 91.4 FL — SIGNIFICANT CHANGE UP (ref 80–94)
MONOCYTES # BLD AUTO: 0.4 K/UL — SIGNIFICANT CHANGE UP (ref 0.1–0.6)
MONOCYTES NFR BLD AUTO: 14.9 % — HIGH (ref 1.7–9.3)
NEUTROPHILS # BLD AUTO: 1.55 K/UL — SIGNIFICANT CHANGE UP (ref 1.4–6.5)
NEUTROPHILS NFR BLD AUTO: 57.9 % — SIGNIFICANT CHANGE UP (ref 42.2–75.2)
NRBC # BLD: 0 /100 WBCS — SIGNIFICANT CHANGE UP (ref 0–0)
PLATELET # BLD AUTO: 80 K/UL — LOW (ref 130–400)
POTASSIUM SERPL-MCNC: 4.4 MMOL/L — SIGNIFICANT CHANGE UP (ref 3.5–5)
POTASSIUM SERPL-SCNC: 4.4 MMOL/L — SIGNIFICANT CHANGE UP (ref 3.5–5)
PROTHROM AB SERPL-ACNC: 29.1 SEC — HIGH (ref 9.95–12.87)
RBC # BLD: 2.57 M/UL — LOW (ref 4.7–6.1)
RBC # FLD: 22.4 % — HIGH (ref 11.5–14.5)
SODIUM SERPL-SCNC: 136 MMOL/L — SIGNIFICANT CHANGE UP (ref 135–146)
WBC # BLD: 2.68 K/UL — LOW (ref 4.8–10.8)
WBC # FLD AUTO: 2.68 K/UL — LOW (ref 4.8–10.8)

## 2019-10-05 PROCEDURE — 99239 HOSP IP/OBS DSCHRG MGMT >30: CPT

## 2019-10-05 RX ORDER — MAGNESIUM SULFATE 500 MG/ML
2 VIAL (ML) INJECTION ONCE
Refills: 0 | Status: DISCONTINUED | OUTPATIENT
Start: 2019-10-05 | End: 2019-10-05

## 2019-10-05 RX ADMIN — Medication 2 GRAM(S): at 05:14

## 2019-10-05 RX ADMIN — Medication 40 MILLIGRAM(S): at 05:13

## 2019-10-05 RX ADMIN — SPIRONOLACTONE 25 MILLIGRAM(S): 25 TABLET, FILM COATED ORAL at 05:13

## 2019-10-05 RX ADMIN — LACTULOSE 20 GRAM(S): 10 SOLUTION ORAL at 05:13

## 2019-10-05 RX ADMIN — PANTOPRAZOLE SODIUM 40 MILLIGRAM(S): 20 TABLET, DELAYED RELEASE ORAL at 05:13

## 2019-10-05 NOTE — PROGRESS NOTE ADULT - ASSESSMENT
68yo M with Past Medical History Atrial Fibrillation on Coumadin, Hypertension, Hypercholesteremia, CAD status post PCI x2, BPH, DMII, and liver cirrhosis secondary to JIMÉNEZ with multiple admission for hepatic encephalopathy (followed by [facility]) admitted to [facility] for worsening dyspnea and lower extremity swelling secondary to decompensated liver cirrhosis.    Decompensated Liver Cirrhosis (secondary to JIMÉNEZ): dyspnea and edema has resolved from admission.  Continue Lasix 40mg PO q24 and Aldactone 25mg PO q24.  Potassium levels have remained stable after restarting Aldactone.  Will replete magnesium for hypomagnesemia.  Pt will need repeat HCC screening in 6 months.  Continue Rifaximin and Lactulose to prevent hepatic encephalopathy.  Follow-up with GI-Dr. Durant as outpatient  Acute on chronic anemia: hemoglobin recovered to 7.5 following transfusion.  Atrial Fibrillation on Coumadin: Continue Coumadin at bedtime.  INR is within therapeutic range at 2.6  Hypercholesteremia: continue Lipitor  DMII: hold Januvia (NF).  Continue Lantus/Lispro and monitor FS with meals.  GI/DVT prophylaxis    #Progress Note Handoff    Pending:    Future Disposition: Discharge home with homecare; time spent = 35 minutes

## 2019-10-05 NOTE — PROGRESS NOTE ADULT - SUBJECTIVE AND OBJECTIVE BOX
SOCORRO MANUEL MRN-[id number]    Hospitalist Note  68yo M with Past Medical History Atrial Fibrillation on Coumadin, Hypertension, Hypercholesteremia, CAD status post PCI x2, BPH, DMII, and liver cirrhosis secondary to JIMÉNEZ with multiple admission for hepatic encephalopathy (followed by [facility]) admitted to [facility] for worsening dyspnea and lower extremity swelling secondary to decompensated liver cirrhosis.    Overnight events/Updates: No further dyspnea reported by the patient.  His hemoglobin rebounded to 7.5 following 1u PRBC transfusion.      Vital Signs Last 24 Hrs  T(C): 36.2 (05 Oct 2019 07:30), Max: 36.8 (04 Oct 2019 15:00)  T(F): 97.1 (05 Oct 2019 07:30), Max: 98.2 (04 Oct 2019 15:00)  HR: 66 (05 Oct 2019 09:52) (63 - 74)  BP: 102/58 (05 Oct 2019 09:52) (92/53 - 112/56)  BP(mean): --  RR: 19 (05 Oct 2019 07:30) (18 - 19)  SpO2: 96% (05 Oct 2019 07:30) (96% - 96%)    Physical Examination:  General: AAO x 3  HEENT: PERRLA, EOMI  CV= S1 & S2 appreciated  Lungs=CTA BL  Abdominal Examination= + BS, Soft, NT/ND  Extremity Examination= mild lower extremity edema, No C/C    ROS: No chest pain, no shortness of breath.  All other systems reviewed and are within normal limits except for the complaints in the HPI.    MEDICATIONS  (STANDING):  furosemide    Tablet 40 milliGRAM(s) Oral daily  lactulose Syrup 20 Gram(s) Oral three times a day  magnesium sulfate  IVPB 2 Gram(s) IV Intermittent once  omega-3-Acid Ethyl Esters 2 Gram(s) Oral two times a day  pantoprazole    Tablet 40 milliGRAM(s) Oral before breakfast  rifaximin 550 milliGRAM(s) Oral two times a day  spironolactone 25 milliGRAM(s) Oral daily  tamsulosin 0.4 milliGRAM(s) Oral at bedtime    MEDICATIONS  (PRN):                            7.5    2.68  )-----------( 80       ( 05 Oct 2019 06:04 )             23.5     10-05    136  |  106  |  37<H>  ----------------------------<  85  4.4   |  19  |  1.6<H>    Ca    8.2<L>      05 Oct 2019 06:04  Mg     1.7     10-05    TPro  5.5<L>  /  Alb  2.3<L>  /  TBili  1.5<H>  /  DBili  x   /  AST  170<H>  /  ALT  147<H>  /  AlkPhos  125<H>  10-04      Case discussed with housesta & family  BRYAN Finley [id number]

## 2019-10-09 DIAGNOSIS — E78.5 HYPERLIPIDEMIA, UNSPECIFIED: ICD-10-CM

## 2019-10-09 DIAGNOSIS — Z95.5 PRESENCE OF CORONARY ANGIOPLASTY IMPLANT AND GRAFT: ICD-10-CM

## 2019-10-09 DIAGNOSIS — N17.9 ACUTE KIDNEY FAILURE, UNSPECIFIED: ICD-10-CM

## 2019-10-09 DIAGNOSIS — R60.0 LOCALIZED EDEMA: ICD-10-CM

## 2019-10-09 DIAGNOSIS — Z76.82 AWAITING ORGAN TRANSPLANT STATUS: ICD-10-CM

## 2019-10-09 DIAGNOSIS — I25.10 ATHEROSCLEROTIC HEART DISEASE OF NATIVE CORONARY ARTERY WITHOUT ANGINA PECTORIS: ICD-10-CM

## 2019-10-09 DIAGNOSIS — E11.22 TYPE 2 DIABETES MELLITUS WITH DIABETIC CHRONIC KIDNEY DISEASE: ICD-10-CM

## 2019-10-09 DIAGNOSIS — Z79.84 LONG TERM (CURRENT) USE OF ORAL HYPOGLYCEMIC DRUGS: ICD-10-CM

## 2019-10-09 DIAGNOSIS — R06.09 OTHER FORMS OF DYSPNEA: ICD-10-CM

## 2019-10-09 DIAGNOSIS — Z79.01 LONG TERM (CURRENT) USE OF ANTICOAGULANTS: ICD-10-CM

## 2019-10-09 DIAGNOSIS — K74.60 UNSPECIFIED CIRRHOSIS OF LIVER: ICD-10-CM

## 2019-10-09 DIAGNOSIS — N40.0 BENIGN PROSTATIC HYPERPLASIA WITHOUT LOWER URINARY TRACT SYMPTOMS: ICD-10-CM

## 2019-10-09 DIAGNOSIS — E83.42 HYPOMAGNESEMIA: ICD-10-CM

## 2019-10-09 DIAGNOSIS — E87.5 HYPERKALEMIA: ICD-10-CM

## 2019-10-09 DIAGNOSIS — I12.9 HYPERTENSIVE CHRONIC KIDNEY DISEASE WITH STAGE 1 THROUGH STAGE 4 CHRONIC KIDNEY DISEASE, OR UNSPECIFIED CHRONIC KIDNEY DISEASE: ICD-10-CM

## 2019-10-09 DIAGNOSIS — I48.91 UNSPECIFIED ATRIAL FIBRILLATION: ICD-10-CM

## 2019-10-09 DIAGNOSIS — D63.8 ANEMIA IN OTHER CHRONIC DISEASES CLASSIFIED ELSEWHERE: ICD-10-CM

## 2019-10-09 DIAGNOSIS — D69.6 THROMBOCYTOPENIA, UNSPECIFIED: ICD-10-CM

## 2019-10-09 DIAGNOSIS — N18.3 CHRONIC KIDNEY DISEASE, STAGE 3 (MODERATE): ICD-10-CM

## 2019-10-09 DIAGNOSIS — K75.81 NONALCOHOLIC STEATOHEPATITIS (NASH): ICD-10-CM

## 2019-10-11 ENCOUNTER — OUTPATIENT (OUTPATIENT)
Dept: OUTPATIENT SERVICES | Facility: HOSPITAL | Age: 69
LOS: 1 days | Discharge: HOME | End: 2019-10-11

## 2019-10-11 DIAGNOSIS — Z95.9 PRESENCE OF CARDIAC AND VASCULAR IMPLANT AND GRAFT, UNSPECIFIED: Chronic | ICD-10-CM

## 2019-10-11 DIAGNOSIS — Z12.11 ENCOUNTER FOR SCREENING FOR MALIGNANT NEOPLASM OF COLON: Chronic | ICD-10-CM

## 2019-10-11 DIAGNOSIS — Z96.653 PRESENCE OF ARTIFICIAL KNEE JOINT, BILATERAL: Chronic | ICD-10-CM

## 2019-10-17 DIAGNOSIS — H90.3 SENSORINEURAL HEARING LOSS, BILATERAL: ICD-10-CM

## 2019-10-23 ENCOUNTER — OUTPATIENT (OUTPATIENT)
Dept: OUTPATIENT SERVICES | Facility: HOSPITAL | Age: 69
LOS: 1 days | Discharge: HOME | End: 2019-10-23

## 2019-10-23 DIAGNOSIS — Z95.9 PRESENCE OF CARDIAC AND VASCULAR IMPLANT AND GRAFT, UNSPECIFIED: Chronic | ICD-10-CM

## 2019-10-23 DIAGNOSIS — Z12.11 ENCOUNTER FOR SCREENING FOR MALIGNANT NEOPLASM OF COLON: Chronic | ICD-10-CM

## 2019-10-23 DIAGNOSIS — I48.91 UNSPECIFIED ATRIAL FIBRILLATION: ICD-10-CM

## 2019-10-23 DIAGNOSIS — Z96.653 PRESENCE OF ARTIFICIAL KNEE JOINT, BILATERAL: Chronic | ICD-10-CM

## 2019-10-23 DIAGNOSIS — Z79.01 LONG TERM (CURRENT) USE OF ANTICOAGULANTS: ICD-10-CM

## 2019-10-23 LAB
POCT INR: 4.4 RATIO — HIGH (ref 0.9–1.2)
POCT PT: 52.2 SEC — HIGH (ref 10–13.4)

## 2019-10-28 ENCOUNTER — OUTPATIENT (OUTPATIENT)
Dept: OUTPATIENT SERVICES | Facility: HOSPITAL | Age: 69
LOS: 1 days | Discharge: HOME | End: 2019-10-28

## 2019-10-28 DIAGNOSIS — Z12.11 ENCOUNTER FOR SCREENING FOR MALIGNANT NEOPLASM OF COLON: Chronic | ICD-10-CM

## 2019-10-28 DIAGNOSIS — I48.91 UNSPECIFIED ATRIAL FIBRILLATION: ICD-10-CM

## 2019-10-28 DIAGNOSIS — Z96.653 PRESENCE OF ARTIFICIAL KNEE JOINT, BILATERAL: Chronic | ICD-10-CM

## 2019-10-28 DIAGNOSIS — Z79.01 LONG TERM (CURRENT) USE OF ANTICOAGULANTS: ICD-10-CM

## 2019-10-28 DIAGNOSIS — Z95.9 PRESENCE OF CARDIAC AND VASCULAR IMPLANT AND GRAFT, UNSPECIFIED: Chronic | ICD-10-CM

## 2019-10-28 LAB
POCT INR: 3.6 RATIO — HIGH (ref 0.9–1.2)
POCT PT: 43.7 SEC — HIGH (ref 10–13.4)

## 2019-11-04 ENCOUNTER — OUTPATIENT (OUTPATIENT)
Dept: OUTPATIENT SERVICES | Facility: HOSPITAL | Age: 69
LOS: 1 days | Discharge: HOME | End: 2019-11-04

## 2019-11-04 DIAGNOSIS — Z96.653 PRESENCE OF ARTIFICIAL KNEE JOINT, BILATERAL: Chronic | ICD-10-CM

## 2019-11-04 DIAGNOSIS — I48.91 UNSPECIFIED ATRIAL FIBRILLATION: ICD-10-CM

## 2019-11-04 DIAGNOSIS — Z12.11 ENCOUNTER FOR SCREENING FOR MALIGNANT NEOPLASM OF COLON: Chronic | ICD-10-CM

## 2019-11-04 DIAGNOSIS — Z95.9 PRESENCE OF CARDIAC AND VASCULAR IMPLANT AND GRAFT, UNSPECIFIED: Chronic | ICD-10-CM

## 2019-11-04 DIAGNOSIS — Z79.01 LONG TERM (CURRENT) USE OF ANTICOAGULANTS: ICD-10-CM

## 2019-11-04 LAB
POCT INR: 2.5 RATIO — HIGH (ref 0.9–1.2)
POCT PT: 29.5 SEC — HIGH (ref 10–13.4)

## 2019-11-18 ENCOUNTER — OUTPATIENT (OUTPATIENT)
Dept: OUTPATIENT SERVICES | Facility: HOSPITAL | Age: 69
LOS: 1 days | Discharge: HOME | End: 2019-11-18

## 2019-11-18 DIAGNOSIS — Z12.11 ENCOUNTER FOR SCREENING FOR MALIGNANT NEOPLASM OF COLON: Chronic | ICD-10-CM

## 2019-11-18 DIAGNOSIS — Z96.653 PRESENCE OF ARTIFICIAL KNEE JOINT, BILATERAL: Chronic | ICD-10-CM

## 2019-11-18 DIAGNOSIS — Z95.9 PRESENCE OF CARDIAC AND VASCULAR IMPLANT AND GRAFT, UNSPECIFIED: Chronic | ICD-10-CM

## 2019-11-18 DIAGNOSIS — Z79.01 LONG TERM (CURRENT) USE OF ANTICOAGULANTS: ICD-10-CM

## 2019-11-18 DIAGNOSIS — I48.91 UNSPECIFIED ATRIAL FIBRILLATION: ICD-10-CM

## 2019-11-18 LAB
POCT INR: 2.3 RATIO — HIGH (ref 0.9–1.2)
POCT PT: 27.4 SEC — HIGH (ref 10–13.4)

## 2019-11-21 ENCOUNTER — OUTPATIENT (OUTPATIENT)
Dept: OUTPATIENT SERVICES | Facility: HOSPITAL | Age: 69
LOS: 1 days | Discharge: HOME | End: 2019-11-21

## 2019-11-21 DIAGNOSIS — I48.91 UNSPECIFIED ATRIAL FIBRILLATION: ICD-10-CM

## 2019-11-21 DIAGNOSIS — Z96.653 PRESENCE OF ARTIFICIAL KNEE JOINT, BILATERAL: Chronic | ICD-10-CM

## 2019-11-21 DIAGNOSIS — Z79.01 LONG TERM (CURRENT) USE OF ANTICOAGULANTS: ICD-10-CM

## 2019-11-21 DIAGNOSIS — Z95.9 PRESENCE OF CARDIAC AND VASCULAR IMPLANT AND GRAFT, UNSPECIFIED: Chronic | ICD-10-CM

## 2019-11-21 DIAGNOSIS — Z12.11 ENCOUNTER FOR SCREENING FOR MALIGNANT NEOPLASM OF COLON: Chronic | ICD-10-CM

## 2019-11-21 LAB
POCT INR: 2.7 RATIO — HIGH (ref 0.9–1.2)
POCT PT: 32.2 SEC — HIGH (ref 10–13.4)

## 2019-11-27 ENCOUNTER — OUTPATIENT (OUTPATIENT)
Dept: OUTPATIENT SERVICES | Facility: HOSPITAL | Age: 69
LOS: 1 days | Discharge: HOME | End: 2019-11-27

## 2019-11-27 DIAGNOSIS — Z12.11 ENCOUNTER FOR SCREENING FOR MALIGNANT NEOPLASM OF COLON: Chronic | ICD-10-CM

## 2019-11-27 DIAGNOSIS — I48.91 UNSPECIFIED ATRIAL FIBRILLATION: ICD-10-CM

## 2019-11-27 DIAGNOSIS — Z79.01 LONG TERM (CURRENT) USE OF ANTICOAGULANTS: ICD-10-CM

## 2019-11-27 DIAGNOSIS — Z96.653 PRESENCE OF ARTIFICIAL KNEE JOINT, BILATERAL: Chronic | ICD-10-CM

## 2019-11-27 DIAGNOSIS — Z95.9 PRESENCE OF CARDIAC AND VASCULAR IMPLANT AND GRAFT, UNSPECIFIED: Chronic | ICD-10-CM

## 2019-11-27 LAB
POCT INR: 2.4 RATIO — HIGH (ref 0.9–1.2)
POCT PT: 28.8 SEC — HIGH (ref 10–13.4)

## 2019-12-03 ENCOUNTER — APPOINTMENT (OUTPATIENT)
Dept: CARDIOLOGY | Facility: CLINIC | Age: 69
End: 2019-12-03

## 2019-12-04 ENCOUNTER — APPOINTMENT (OUTPATIENT)
Dept: CARDIOLOGY | Facility: CLINIC | Age: 69
End: 2019-12-04

## 2019-12-07 ENCOUNTER — INPATIENT (INPATIENT)
Facility: HOSPITAL | Age: 69
LOS: 5 days | Discharge: ANOTHER HOSPITAL | End: 2019-12-13
Attending: INTERNAL MEDICINE | Admitting: INTERNAL MEDICINE
Payer: MEDICARE

## 2019-12-07 VITALS
SYSTOLIC BLOOD PRESSURE: 119 MMHG | HEART RATE: 88 BPM | DIASTOLIC BLOOD PRESSURE: 56 MMHG | RESPIRATION RATE: 18 BRPM | OXYGEN SATURATION: 100 % | TEMPERATURE: 97 F

## 2019-12-07 DIAGNOSIS — Z95.9 PRESENCE OF CARDIAC AND VASCULAR IMPLANT AND GRAFT, UNSPECIFIED: Chronic | ICD-10-CM

## 2019-12-07 DIAGNOSIS — Z96.653 PRESENCE OF ARTIFICIAL KNEE JOINT, BILATERAL: Chronic | ICD-10-CM

## 2019-12-07 DIAGNOSIS — Z12.11 ENCOUNTER FOR SCREENING FOR MALIGNANT NEOPLASM OF COLON: Chronic | ICD-10-CM

## 2019-12-07 LAB
ALBUMIN SERPL ELPH-MCNC: 2 G/DL — LOW (ref 3.5–5.2)
ALP SERPL-CCNC: 116 U/L — HIGH (ref 30–115)
ALT FLD-CCNC: 153 U/L — HIGH (ref 0–41)
ANION GAP SERPL CALC-SCNC: 15 MMOL/L — HIGH (ref 7–14)
APTT BLD: 37.8 SEC — SIGNIFICANT CHANGE UP (ref 27–39.2)
AST SERPL-CCNC: 266 U/L — HIGH (ref 0–41)
BASE EXCESS BLDV CALC-SCNC: -6.1 MMOL/L — LOW (ref -2–2)
BASOPHILS # BLD AUTO: 0.05 K/UL — SIGNIFICANT CHANGE UP (ref 0–0.2)
BASOPHILS NFR BLD AUTO: 0.8 % — SIGNIFICANT CHANGE UP (ref 0–1)
BILIRUB SERPL-MCNC: 1.5 MG/DL — HIGH (ref 0.2–1.2)
BUN SERPL-MCNC: 34 MG/DL — HIGH (ref 10–20)
CA-I SERPL-SCNC: 1.18 MMOL/L — SIGNIFICANT CHANGE UP (ref 1.12–1.3)
CALCIUM SERPL-MCNC: 8.5 MG/DL — SIGNIFICANT CHANGE UP (ref 8.5–10.1)
CHLORIDE SERPL-SCNC: 101 MMOL/L — SIGNIFICANT CHANGE UP (ref 98–110)
CO2 SERPL-SCNC: 15 MMOL/L — LOW (ref 17–32)
CREAT SERPL-MCNC: 2.2 MG/DL — HIGH (ref 0.7–1.5)
EOSINOPHIL # BLD AUTO: 0.27 K/UL — SIGNIFICANT CHANGE UP (ref 0–0.7)
EOSINOPHIL NFR BLD AUTO: 4.6 % — SIGNIFICANT CHANGE UP (ref 0–8)
GAS PNL BLDV: 131 MMOL/L — LOW (ref 136–145)
GAS PNL BLDV: SIGNIFICANT CHANGE UP
GLUCOSE SERPL-MCNC: 126 MG/DL — HIGH (ref 70–99)
HCO3 BLDV-SCNC: 19 MMOL/L — LOW (ref 22–29)
HCT VFR BLD CALC: 24.9 % — LOW (ref 42–52)
HCT VFR BLDA CALC: 27 % — LOW (ref 34–44)
HGB BLD CALC-MCNC: 8.8 G/DL — LOW (ref 14–18)
HGB BLD-MCNC: 8.3 G/DL — LOW (ref 14–18)
IMM GRANULOCYTES NFR BLD AUTO: 0.8 % — HIGH (ref 0.1–0.3)
INR BLD: 2.94 RATIO — HIGH (ref 0.65–1.3)
LACTATE BLDV-MCNC: 2.3 MMOL/L — HIGH (ref 0.5–1.6)
LYMPHOCYTES # BLD AUTO: 0.47 K/UL — LOW (ref 1.2–3.4)
LYMPHOCYTES # BLD AUTO: 8 % — LOW (ref 20.5–51.1)
MCHC RBC-ENTMCNC: 31.3 PG — HIGH (ref 27–31)
MCHC RBC-ENTMCNC: 33.3 G/DL — SIGNIFICANT CHANGE UP (ref 32–37)
MCV RBC AUTO: 94 FL — SIGNIFICANT CHANGE UP (ref 80–94)
MONOCYTES # BLD AUTO: 0.86 K/UL — HIGH (ref 0.1–0.6)
MONOCYTES NFR BLD AUTO: 14.6 % — HIGH (ref 1.7–9.3)
NEUTROPHILS # BLD AUTO: 4.21 K/UL — SIGNIFICANT CHANGE UP (ref 1.4–6.5)
NEUTROPHILS NFR BLD AUTO: 71.2 % — SIGNIFICANT CHANGE UP (ref 42.2–75.2)
NRBC # BLD: 0 /100 WBCS — SIGNIFICANT CHANGE UP (ref 0–0)
PCO2 BLDV: 37 MMHG — LOW (ref 41–51)
PH BLDV: 7.32 — SIGNIFICANT CHANGE UP (ref 7.26–7.43)
PLATELET # BLD AUTO: 88 K/UL — LOW (ref 130–400)
PO2 BLDV: 24 MMHG — SIGNIFICANT CHANGE UP (ref 20–40)
POTASSIUM BLDV-SCNC: 4.3 MMOL/L — SIGNIFICANT CHANGE UP (ref 3.3–5.6)
POTASSIUM SERPL-MCNC: 4.9 MMOL/L — SIGNIFICANT CHANGE UP (ref 3.5–5)
POTASSIUM SERPL-SCNC: 4.9 MMOL/L — SIGNIFICANT CHANGE UP (ref 3.5–5)
PROT SERPL-MCNC: 5.8 G/DL — LOW (ref 6–8)
PROTHROM AB SERPL-ACNC: 33.4 SEC — HIGH (ref 9.95–12.87)
RBC # BLD: 2.65 M/UL — LOW (ref 4.7–6.1)
RBC # FLD: 18 % — HIGH (ref 11.5–14.5)
SAO2 % BLDV: 33 % — SIGNIFICANT CHANGE UP
SODIUM SERPL-SCNC: 131 MMOL/L — LOW (ref 135–146)
WBC # BLD: 5.91 K/UL — SIGNIFICANT CHANGE UP (ref 4.8–10.8)
WBC # FLD AUTO: 5.91 K/UL — SIGNIFICANT CHANGE UP (ref 4.8–10.8)

## 2019-12-07 PROCEDURE — 93010 ELECTROCARDIOGRAM REPORT: CPT

## 2019-12-07 PROCEDURE — 99285 EMERGENCY DEPT VISIT HI MDM: CPT | Mod: GC

## 2019-12-07 RX ORDER — CEFEPIME 1 G/1
2000 INJECTION, POWDER, FOR SOLUTION INTRAMUSCULAR; INTRAVENOUS ONCE
Refills: 0 | Status: COMPLETED | OUTPATIENT
Start: 2019-12-07 | End: 2019-12-07

## 2019-12-07 RX ADMIN — CEFEPIME 100 MILLIGRAM(S): 1 INJECTION, POWDER, FOR SOLUTION INTRAMUSCULAR; INTRAVENOUS at 23:58

## 2019-12-07 NOTE — ED PROVIDER NOTE - CLINICAL SUMMARY MEDICAL DECISION MAKING FREE TEXT BOX
I have fully discussed the medical management and delivery of care with the patient. I have discussed any available labs, imaging and treatment options with the patient.  Pt admitted for further care & management.

## 2019-12-07 NOTE — ED PROVIDER NOTE - PR
Pt remains in the ICU this shift. Remains on trach collar. MD Aquino discussed POC with family and the option of comfort/Palliative care; family to discuss options. Grimaces at times, PRN pain medicine given. Labetalol given x1. No falls, injuries or new skin breakdown, precautions maintained for all.    184

## 2019-12-07 NOTE — ED ADULT NURSE NOTE - CHIEF COMPLAINT QUOTE
Patient complains of shortness of breath/weakness x 3 weeks. Patient speaking in full sentences in triage

## 2019-12-07 NOTE — ED PROVIDER NOTE - CARE PLAN
Principal Discharge DX:	Shortness of breath  Secondary Diagnosis:	Dyspnea  Secondary Diagnosis:	Edema

## 2019-12-07 NOTE — ED ADULT NURSE NOTE - NSIMPLEMENTINTERV_GEN_ALL_ED
Implemented All Fall with Harm Risk Interventions:  [place] to call system. Call bell, personal items and telephone within reach. Instruct patient to call for assistance. Room bathroom lighting operational. Non-slip footwear when patient is off stretcher. Physically safe environment: no spills, clutter or unnecessary equipment. Stretcher in lowest position, wheels locked, appropriate side rails in place. Provide visual cue, wrist band, yellow gown, etc. Monitor gait and stability. Monitor for mental status changes and reorient to person, place, and time. Review medications for side effects contributing to fall risk. Reinforce activity limits and safety measures with patient and family. Provide visual clues: red socks.

## 2019-12-07 NOTE — ED PROVIDER NOTE - OBJECTIVE STATEMENT
68 yo M with hx of cirrhosis, afib on coumadin, CKD, HTN, presents for evaluation of worsening shortness of breath, onset today, associated with chills. No fever, no chest pain, no back pain, no abdominal pain, no headache. Per family, pt had paracentesis by his doctor in [place] 4 days ago with no complications. Pt brought in today because of worsening dyspnea on exertion. No other symptoms reported. 70 yo M with hx of cirrhosis due JIMÉNEZ, on liver transplant [facility], afib on coumadin, CKD, HTN, presents for evaluation of worsening shortness of breath, onset today, associated with chills. No fever, no chest pain, no back pain, no abdominal pain, no headache. Per family, pt had paracentesis by his doctor in [place] 4 days ago with no complications. Pt brought in today because of worsening dyspnea on exertion. No other symptoms reported.

## 2019-12-07 NOTE — ED ADULT TRIAGE NOTE - CHIEF COMPLAINT QUOTE
Patient complains of shortness of breath/weakness x 3 weeks. Patient complains of shortness of breath/weakness x 3 weeks. Patient speaking in full sentences in triage

## 2019-12-07 NOTE — ED PROVIDER NOTE - PROGRESS NOTE DETAILS
Sepsis suspected at this time.   IVF bolus cannot be given due to: hepatic failure with fluid overload.

## 2019-12-08 LAB
ALBUMIN SERPL ELPH-MCNC: 1.9 G/DL — LOW (ref 3.5–5.2)
ALP SERPL-CCNC: 100 U/L — SIGNIFICANT CHANGE UP (ref 30–115)
ALT FLD-CCNC: 146 U/L — HIGH (ref 0–41)
ANION GAP SERPL CALC-SCNC: 13 MMOL/L — SIGNIFICANT CHANGE UP (ref 7–14)
APPEARANCE UR: CLEAR — SIGNIFICANT CHANGE UP
APTT BLD: 39.4 SEC — HIGH (ref 27–39.2)
AST SERPL-CCNC: 265 U/L — HIGH (ref 0–41)
BACTERIA # UR AUTO: NEGATIVE — SIGNIFICANT CHANGE UP
BASOPHILS # BLD AUTO: 0.06 K/UL — SIGNIFICANT CHANGE UP (ref 0–0.2)
BASOPHILS NFR BLD AUTO: 1 % — SIGNIFICANT CHANGE UP (ref 0–1)
BILIRUB SERPL-MCNC: 1.5 MG/DL — HIGH (ref 0.2–1.2)
BILIRUB UR-MCNC: NEGATIVE — SIGNIFICANT CHANGE UP
BUN SERPL-MCNC: 36 MG/DL — HIGH (ref 10–20)
CALCIUM SERPL-MCNC: 8 MG/DL — LOW (ref 8.5–10.1)
CHLORIDE SERPL-SCNC: 102 MMOL/L — SIGNIFICANT CHANGE UP (ref 98–110)
CO2 SERPL-SCNC: 15 MMOL/L — LOW (ref 17–32)
COLOR SPEC: YELLOW — SIGNIFICANT CHANGE UP
CREAT ?TM UR-MCNC: 194 MG/DL — SIGNIFICANT CHANGE UP
CREAT SERPL-MCNC: 2 MG/DL — HIGH (ref 0.7–1.5)
DIFF PNL FLD: ABNORMAL
EOSINOPHIL # BLD AUTO: 0.31 K/UL — SIGNIFICANT CHANGE UP (ref 0–0.7)
EOSINOPHIL NFR BLD AUTO: 5.3 % — SIGNIFICANT CHANGE UP (ref 0–8)
EPI CELLS # UR: 2 /HPF — SIGNIFICANT CHANGE UP (ref 0–5)
FLU A RESULT: NEGATIVE — SIGNIFICANT CHANGE UP
FLU A RESULT: NEGATIVE — SIGNIFICANT CHANGE UP
FLUAV AG NPH QL: NEGATIVE — SIGNIFICANT CHANGE UP
FLUBV AG NPH QL: NEGATIVE — SIGNIFICANT CHANGE UP
GLUCOSE BLDC GLUCOMTR-MCNC: 103 MG/DL — HIGH (ref 70–99)
GLUCOSE BLDC GLUCOMTR-MCNC: 124 MG/DL — HIGH (ref 70–99)
GLUCOSE BLDC GLUCOMTR-MCNC: 88 MG/DL — SIGNIFICANT CHANGE UP (ref 70–99)
GLUCOSE BLDC GLUCOMTR-MCNC: 99 MG/DL — SIGNIFICANT CHANGE UP (ref 70–99)
GLUCOSE SERPL-MCNC: 102 MG/DL — HIGH (ref 70–99)
GLUCOSE UR QL: NEGATIVE — SIGNIFICANT CHANGE UP
HCT VFR BLD CALC: 23.5 % — LOW (ref 42–52)
HGB BLD-MCNC: 7.9 G/DL — LOW (ref 14–18)
HYALINE CASTS # UR AUTO: 20 /LPF — HIGH (ref 0–7)
IMM GRANULOCYTES NFR BLD AUTO: 0.5 % — HIGH (ref 0.1–0.3)
INR BLD: 3.11 RATIO — HIGH (ref 0.65–1.3)
KETONES UR-MCNC: SIGNIFICANT CHANGE UP
LACTATE SERPL-SCNC: 1.6 MMOL/L — SIGNIFICANT CHANGE UP (ref 0.7–2)
LEUKOCYTE ESTERASE UR-ACNC: NEGATIVE — SIGNIFICANT CHANGE UP
LYMPHOCYTES # BLD AUTO: 0.58 K/UL — LOW (ref 1.2–3.4)
LYMPHOCYTES # BLD AUTO: 9.9 % — LOW (ref 20.5–51.1)
MAGNESIUM SERPL-MCNC: 1.8 MG/DL — SIGNIFICANT CHANGE UP (ref 1.8–2.4)
MCHC RBC-ENTMCNC: 31.5 PG — HIGH (ref 27–31)
MCHC RBC-ENTMCNC: 33.6 G/DL — SIGNIFICANT CHANGE UP (ref 32–37)
MCV RBC AUTO: 93.6 FL — SIGNIFICANT CHANGE UP (ref 80–94)
MONOCYTES # BLD AUTO: 0.88 K/UL — HIGH (ref 0.1–0.6)
MONOCYTES NFR BLD AUTO: 15 % — HIGH (ref 1.7–9.3)
MRSA PCR RESULT.: NEGATIVE — SIGNIFICANT CHANGE UP
NEUTROPHILS # BLD AUTO: 4.01 K/UL — SIGNIFICANT CHANGE UP (ref 1.4–6.5)
NEUTROPHILS NFR BLD AUTO: 68.3 % — SIGNIFICANT CHANGE UP (ref 42.2–75.2)
NITRITE UR-MCNC: NEGATIVE — SIGNIFICANT CHANGE UP
NRBC # BLD: 0 /100 WBCS — SIGNIFICANT CHANGE UP (ref 0–0)
NT-PROBNP SERPL-SCNC: 1065 PG/ML — HIGH (ref 0–300)
PH UR: 6 — SIGNIFICANT CHANGE UP (ref 5–8)
PLATELET # BLD AUTO: 86 K/UL — LOW (ref 130–400)
POTASSIUM SERPL-MCNC: 4.3 MMOL/L — SIGNIFICANT CHANGE UP (ref 3.5–5)
POTASSIUM SERPL-SCNC: 4.3 MMOL/L — SIGNIFICANT CHANGE UP (ref 3.5–5)
PROT ?TM UR-MCNC: 53 MG/DLG/24H — SIGNIFICANT CHANGE UP
PROT SERPL-MCNC: 5.5 G/DL — LOW (ref 6–8)
PROT UR-MCNC: ABNORMAL
PROT/CREAT UR-RTO: 0.3 RATIO — HIGH (ref 0–0.2)
PROTHROM AB SERPL-ACNC: 35.4 SEC — HIGH (ref 9.95–12.87)
RBC # BLD: 2.51 M/UL — LOW (ref 4.7–6.1)
RBC # FLD: 17.8 % — HIGH (ref 11.5–14.5)
RBC CASTS # UR COMP ASSIST: 2 /HPF — SIGNIFICANT CHANGE UP (ref 0–4)
RSV RESULT: NEGATIVE — SIGNIFICANT CHANGE UP
RSV RNA RESP QL NAA+PROBE: NEGATIVE — SIGNIFICANT CHANGE UP
SODIUM SERPL-SCNC: 130 MMOL/L — LOW (ref 135–146)
SODIUM UR-SCNC: 38 MMOL/L — SIGNIFICANT CHANGE UP
SP GR SPEC: 1.02 — SIGNIFICANT CHANGE UP (ref 1.01–1.02)
UROBILINOGEN FLD QL: SIGNIFICANT CHANGE UP
WBC # BLD: 5.87 K/UL — SIGNIFICANT CHANGE UP (ref 4.8–10.8)
WBC # FLD AUTO: 5.87 K/UL — SIGNIFICANT CHANGE UP (ref 4.8–10.8)
WBC UR QL: 4 /HPF — SIGNIFICANT CHANGE UP (ref 0–5)

## 2019-12-08 PROCEDURE — 71045 X-RAY EXAM CHEST 1 VIEW: CPT | Mod: 26,59

## 2019-12-08 PROCEDURE — 71046 X-RAY EXAM CHEST 2 VIEWS: CPT | Mod: 26

## 2019-12-08 PROCEDURE — 76770 US EXAM ABDO BACK WALL COMP: CPT | Mod: 26

## 2019-12-08 PROCEDURE — 93306 TTE W/DOPPLER COMPLETE: CPT | Mod: 26

## 2019-12-08 RX ORDER — SUCRALFATE 1 G
1 TABLET ORAL
Refills: 0 | Status: DISCONTINUED | OUTPATIENT
Start: 2019-12-08 | End: 2019-12-13

## 2019-12-08 RX ORDER — ATORVASTATIN CALCIUM 80 MG/1
40 TABLET, FILM COATED ORAL AT BEDTIME
Refills: 0 | Status: DISCONTINUED | OUTPATIENT
Start: 2019-12-08 | End: 2019-12-13

## 2019-12-08 RX ORDER — VANCOMYCIN HCL 1 G
1500 VIAL (EA) INTRAVENOUS ONCE
Refills: 0 | Status: COMPLETED | OUTPATIENT
Start: 2019-12-08 | End: 2019-12-08

## 2019-12-08 RX ORDER — TAMSULOSIN HYDROCHLORIDE 0.4 MG/1
0.4 CAPSULE ORAL AT BEDTIME
Refills: 0 | Status: DISCONTINUED | OUTPATIENT
Start: 2019-12-08 | End: 2019-12-11

## 2019-12-08 RX ORDER — SODIUM CHLORIDE 9 MG/ML
250 INJECTION INTRAMUSCULAR; INTRAVENOUS; SUBCUTANEOUS ONCE
Refills: 0 | Status: COMPLETED | OUTPATIENT
Start: 2019-12-08 | End: 2019-12-08

## 2019-12-08 RX ORDER — TAMSULOSIN HYDROCHLORIDE 0.4 MG/1
1 CAPSULE ORAL
Qty: 0 | Refills: 0 | DISCHARGE

## 2019-12-08 RX ORDER — URSODIOL 250 MG/1
500 TABLET, FILM COATED ORAL
Refills: 0 | Status: DISCONTINUED | OUTPATIENT
Start: 2019-12-08 | End: 2019-12-09

## 2019-12-08 RX ORDER — LACTULOSE 10 G/15ML
10 SOLUTION ORAL
Refills: 0 | Status: DISCONTINUED | OUTPATIENT
Start: 2019-12-08 | End: 2019-12-09

## 2019-12-08 RX ORDER — PANTOPRAZOLE SODIUM 20 MG/1
40 TABLET, DELAYED RELEASE ORAL
Refills: 0 | Status: DISCONTINUED | OUTPATIENT
Start: 2019-12-08 | End: 2019-12-13

## 2019-12-08 RX ORDER — CEFEPIME 1 G/1
2000 INJECTION, POWDER, FOR SOLUTION INTRAMUSCULAR; INTRAVENOUS EVERY 12 HOURS
Refills: 0 | Status: DISCONTINUED | OUTPATIENT
Start: 2019-12-08 | End: 2019-12-11

## 2019-12-08 RX ORDER — SODIUM CHLORIDE 9 MG/ML
1000 INJECTION, SOLUTION INTRAVENOUS ONCE
Refills: 0 | Status: COMPLETED | OUTPATIENT
Start: 2019-12-08 | End: 2019-12-08

## 2019-12-08 RX ORDER — SODIUM CHLORIDE 9 MG/ML
1000 INJECTION INTRAMUSCULAR; INTRAVENOUS; SUBCUTANEOUS
Refills: 0 | Status: DISCONTINUED | OUTPATIENT
Start: 2019-12-08 | End: 2019-12-09

## 2019-12-08 RX ORDER — HYDROXYZINE HCL 10 MG
25 TABLET ORAL THREE TIMES A DAY
Refills: 0 | Status: DISCONTINUED | OUTPATIENT
Start: 2019-12-08 | End: 2019-12-13

## 2019-12-08 RX ADMIN — CEFEPIME 100 MILLIGRAM(S): 1 INJECTION, POWDER, FOR SOLUTION INTRAMUSCULAR; INTRAVENOUS at 17:21

## 2019-12-08 RX ADMIN — ATORVASTATIN CALCIUM 40 MILLIGRAM(S): 80 TABLET, FILM COATED ORAL at 22:35

## 2019-12-08 RX ADMIN — Medication 300 MILLIGRAM(S): at 13:53

## 2019-12-08 RX ADMIN — LACTULOSE 10 GRAM(S): 10 SOLUTION ORAL at 17:22

## 2019-12-08 RX ADMIN — PANTOPRAZOLE SODIUM 40 MILLIGRAM(S): 20 TABLET, DELAYED RELEASE ORAL at 06:18

## 2019-12-08 RX ADMIN — CEFEPIME 100 MILLIGRAM(S): 1 INJECTION, POWDER, FOR SOLUTION INTRAMUSCULAR; INTRAVENOUS at 06:18

## 2019-12-08 RX ADMIN — SODIUM CHLORIDE 1500 MILLILITER(S): 9 INJECTION INTRAMUSCULAR; INTRAVENOUS; SUBCUTANEOUS at 08:59

## 2019-12-08 RX ADMIN — SODIUM CHLORIDE 1000 MILLILITER(S): 9 INJECTION, SOLUTION INTRAVENOUS at 01:06

## 2019-12-08 RX ADMIN — Medication 110 MILLIGRAM(S): at 18:08

## 2019-12-08 RX ADMIN — Medication 1 GRAM(S): at 17:22

## 2019-12-08 RX ADMIN — URSODIOL 500 MILLIGRAM(S): 250 TABLET, FILM COATED ORAL at 17:22

## 2019-12-08 RX ADMIN — Medication 1 GRAM(S): at 13:11

## 2019-12-08 RX ADMIN — Medication 25 MILLIGRAM(S): at 13:11

## 2019-12-08 RX ADMIN — Medication 110 MILLIGRAM(S): at 07:57

## 2019-12-08 RX ADMIN — Medication 25 MILLIGRAM(S): at 22:35

## 2019-12-08 RX ADMIN — LACTULOSE 10 GRAM(S): 10 SOLUTION ORAL at 06:18

## 2019-12-08 NOTE — H&P ADULT - NSHPLABSRESULTS_GEN_ALL_CORE
8.3    5.91  )-----------( 88       ( 07 Dec 2019 22:30 )             24.9                 131<L>  |  101  |  34<H>  ----------------------------<  126<H>  4.9   |  15<L>  |  2.2<H>    Ca    8.5      07 Dec 2019 22:30    TPro  5.8<L>  /  Alb  2.0<L>  /  TBili  1.5<H>  /  DBili  x   /  AST  266<H>  /  ALT  153<H>  /  AlkPhos  116<H>      LIVER FUNCTIONS - ( 07 Dec 2019 22:30 )  Alb: 2.0 g/dL / Pro: 5.8 g/dL / ALK PHOS: 116 U/L / ALT: 153 U/L / AST: 266 U/L / GGT: x                 PT/INR - ( 07 Dec 2019 22:30 )   PT: 33.40 sec;   INR: 2.94 ratio         PTT - ( 07 Dec 2019 22:30 )  PTT:37.8 sec        Urinalysis Basic - ( 07 Dec 2019 22:18 )    Color: Yellow / Appearance: Clear / S.022 / pH: x  Gluc: x / Ketone: Trace  / Bili: Negative / Urobili: <2 mg/dL   Blood: x / Protein: 30 mg/dL / Nitrite: Negative   Leuk Esterase: Negative / RBC: x / WBC x   Sq Epi: x / Non Sq Epi: x / Bacteria: x        Serum Pro-Brain Natriuretic Peptide: 1065 pg/mL (19 @ 00:30)

## 2019-12-08 NOTE — H&P ADULT - NSHPREVIEWOFSYSTEMS_GEN_ALL_CORE
REVIEW OF SYSTEMS:    CONSTITUTIONAL: No weakness, fevers or chills  EYES/ENT: No visual changes;  No vertigo or throat pain   NECK: No pain or stiffness  RESPIRATORY: No cough, wheezing, hemoptysis; + SOB.   CARDIOVASCULAR: No chest pain or palpitations  GASTROINTESTINAL: No abdominal or epigastric pain. No nausea, vomiting, or hematemesis; No diarrhea or constipation. No melena or hematochezia.  GENITOURINARY: No dysuria, frequency or hematuria  NEUROLOGICAL: No numbness or weakness  SKIN: No itching, rashes REVIEW OF SYSTEMS:    CONSTITUTIONAL: No weakness, fevers. Chills.   EYES/ENT: No visual changes;  No vertigo or throat pain   NECK: No pain or stiffness  RESPIRATORY: No wheezing, hemoptysis; + SOB. + productive cough.   CARDIOVASCULAR: No chest pain or palpitations  GASTROINTESTINAL: No abdominal or epigastric pain. No nausea, vomiting, or hematemesis; No diarrhea or constipation. No melena or hematochezia.  GENITOURINARY: No dysuria, frequency or hematuria  NEUROLOGICAL: No numbness or weakness  SKIN: No itching, rashes

## 2019-12-08 NOTE — H&P ADULT - ASSESSMENT
70 yo M with hx of cirrhosis due JIMÉNEZ (on liver transplant [facility]), A.fib o(n coumadin), Anemia, CKD, HTN, DM II and HLD presents for evaluation of worsening shortness of breath. 70 yo M with hx of cirrhosis due JIMÉNEZ (on liver transplant [facility]), A.fib o(n coumadin), Anemia, CKD, HTN, DM II and HLD presents for evaluation of worsening shortness of breath.       Worsening SOB   - CXR: consolidation on Left lung. Pending official report.   - Echo April 2019: LVEF 65%.   - start on Vanco and Cefepime  - f/u BCx and sputum culture  - check Flu swab  - admit to medicine     CHESTER on CKD  - Serum Cr 2.2 (baseline ~ 1.8)  - start on gentle hydration IVF LR @ 60cc/hr   - f/u Renal US     Cirrhosis 2/2 JIMÉNEZ  - LFT at baseline  - check US abdomen     Thrombocytopenia likely 2/2 cirrhosis  - at baseline   - monitor CBC     A.fib   - monitor INR and dose coumadin accordingly.     CAD s/p stents  - c/w home meds    HTN  - holding meds due to patient's low BP    DM II  - monitor FS    HLD  - c/w statin      DVT ppx: Heparin SC  GI ppx: PPI  Activity: Increase as tolerated.   Diet: Low Na carb consistent diet Worsening SOB with productive cough   - CXR: Consolidation on Left lung. Pending official report.   - Would treat for PNA as patient has low grade temp (99.6), low BP, with lactate of 2.3.   - start on Vanco and Cefepime  - f/u BCx and sputum culture  - check Flu swab  - c/w O2 supplement   - admit to medicine     b/l LE swelling likely 2/2 decompensated cirrhosis   - Echo April 2019: LVEF 65%.   - repeat 2d Echo.   - would hold off on lasix for now as pt is having CHESTER and has low BP.     CHESTER on CKD  - Serum Cr 2.2 (baseline ~ 1.8)  - pt already received 1L LR bolus in ED.    - monitor BMP and electrolytes.   - f/u Renal US     Cirrhosis 2/2 JIMÉNEZ  - LFT around baseline.   - check US abdomen     Thrombocytopenia likely 2/2 cirrhosis  - at baseline   - monitor CBC     A.fib   - monitor INR and dose coumadin accordingly.     CAD s/p 2 stents  - c/w home meds    HTN  - holding meds due to patient's low BP    DM II  - monitor FS    HLD  - c/w statin      DVT ppx: Heparin SC  GI ppx: PPI  Activity: Increase as tolerated.   Diet: Low Na carb consistent diet 70 yo M with hx of Cirrhosis due to JIMÉNEZ (on liver transplant at [facility]), Hx of multiple hepatic encephalopathy, Ascites (hx of multiple paracentesis), Thrombocytopenia, CAD s/p 2 stents, A.fib (on coumadin), Anemia, CKD, HTN, DM II, BPH and HLD presents for evaluation of worsening shortness of breath. Patient was recently admitted in Oct 2019 for SOB secondary to decompensating cirrhosis. Patient reports that he was doing fine since discharge until about a week ago when he started feel very tired and b/l LE weakness, SOB and productive cough. Patient reports that his grandchild had cold 2 weeks ago. Denied fever, recent illness, rhinorrhea, sore throat, headache, chest pain, palpitation, abdominal pain, change in bowel movement, change in urination, dysuria. Patient recently had paracentesis at [facility] about 4 days ago w/o complications.       Worsening SOB with productive cough   - CXR: appreciates consolidation on Left lung. Pending official report.   - Would treat for PNA as patient has low grade temp (99.6), low BP, with lactate of 2.3.   - s/p 1 dose of Vancomycin 1500mg in ED.   - c/w Cefepime 2g q12h (renally dosed), Doxycycline (pt has hx of prolong QT)  - check MRSA   - f/u BCx and sputum culture  - check Flu swab  - check Urine legionella and strep ag   - repeat CXR PA/LA  - c/w O2 supplement   - admit to medicine     b/l LE swelling likely 2/2 decompensated cirrhosis   - Echo April 2019: LVEF 65%.   - repeat 2d Echo  - would hold off on lasix for now as pt is having CHESTER and has low BP.     CHESTER on CKD  - Serum Cr 2.2 (baseline ~ 1.8)  - pt already received 1L LR bolus in ED.    - monitor BMP and electrolytes.   - check Urine electrolytes, Urine Pro/Cr ratio, Urine Cr, Urine BUN  - strict I & O  - f/u Renal US     Cirrhosis 2/2 JIMÉNEZ  - LFT around baseline.   - check US abdomen     Thrombocytopenia likely 2/2 cirrhosis  - at baseline   - monitor CBC     A.fib   - monitor INR and dose coumadin accordingly.     CAD s/p 2 stents  - c/w home meds    HTN  - holding meds due to patient's low BP    DM II  - monitor FS  - start on insulin regimen if FS persistently > 180    HLD  - c/w statin      Medication reconciliation  - resume home med as per previous discharge. Patient will have family to bring in the list in AM. Please follow up with family member and complete the med rec.       DVT ppx: Heparin SC  GI ppx: PPI  Activity: Increase as tolerated.   Diet: Low Na carb consistent diet

## 2019-12-08 NOTE — H&P ADULT - ATTENDING COMMENTS
70 yo M with hx of Cirrhosis due to JIMÉNEZ (on liver transplant at [facility], with recent paracentesis there), Hx of multiple hepatic encephalopathy, Ascites, Thrombocytopenia, CAD s/p 2 stents, A.fib (on coumadin), Anemia, CKD, HTN, DM II, BPH and HLD who was recently admitted in Oct 2019 for SOB secondary to decompensating cirrhosis  . Patient says he was very tired with b/l LE weakness, SOB and productive cough for 1 week. Denies fever, recent illness, rhinorrhea, sore throat, headache, chest pain, palpitation, abdominal pain, change in bowel movement, change in urination, dysuria.     Pt seen and examined in ER- comfortable; spoke with RN- no events overnight    Chart reviewed- agree with above    empiric abx for suspected pneumonia- r/o GNR, MDR    ID Dr Linn    f/u cultures, labs    possible DC 24-48 if stable    GI eval if has longer hospital stay

## 2019-12-08 NOTE — H&P ADULT - HISTORY OF PRESENT ILLNESS
70 yo M with hx of Cirrhosis due to JIMÉNEZ (on liver transplant at [facility]), Hx of hepatic encephalopathy, CAD s/p 2 stents, A.fib (on coumadin), Anemia, CKD, HTN, DM II, BPH and HLD presents for evaluation of worsening shortness of breath, onset today, associated with chills. No fever, no chest pain, no back pain, no abdominal pain, no headache. Per family, pt had paracentesis by his doctor in [place] 4 days ago with no complications. Pt brought in today because of worsening dyspnea on exertion. No other symptoms reported 70 yo M with hx of Cirrhosis due to JIMÉNEZ (on liver transplant at [facility]), Hx of multiple hepatic encephalopathy, Thrombocytopenia, CAD s/p 2 stents, A.fib (on coumadin), Anemia, CKD, HTN, DM II, BPH and HLD presents for evaluation of worsening shortness of breath, onset today, associated with chills. No fever, no chest pain, no back pain, no abdominal pain, no headache. Per family, pt had paracentesis by his doctor in [place] 4 days ago with no complications. Pt brought in today because of worsening dyspnea on exertion. No other symptoms reported 70 yo M with hx of Cirrhosis due to JIMÉNEZ (on liver transplant at [facility]), Hx of multiple hepatic encephalopathy, Ascites (hx of multiple paracentesis), Thrombocytopenia, CAD s/p 2 stents, A.fib (on coumadin), Anemia, CKD, HTN, DM II, BPH and HLD presents for evaluation of worsening shortness of breath. Patient was recently admitted in Oct 2019 for SOB secondary to decompensating cirrhosis. Patient reports that he was doing fine since discharge until about a week ago when he started feel very tired and b/l LE weakness, SOB and productive cough. Patient reports that his grandchild had cold 2 weeks ago. Denied fever, recent illness, rhinorrhea, sore throat, headache, chest pain, palpitation, abdominal pain, change in bowel movement, change in urination, dysuria. Patient recently had paracentesis at [facility] about 4 days ago w/o complications.

## 2019-12-08 NOTE — H&P ADULT - NSHPPHYSICALEXAM_GEN_ALL_CORE
PHYSICAL EXAM:  GEN: No acute distress  HEENT: EOMI. No sclera icterus. PEERLA.   LUNGS: Clear to auscultation bilaterally.   HEART: S1/S2 present. RRR. No murmur.   ABD: Soft, non-tender, non-distended. Bowel sounds present  EXT: pitting edema.   NEURO: AAOX3. Non focal. PHYSICAL EXAM:  GEN: No acute distress.   HEENT: EOMI. No sclera icterus. PEERLA.   LUNGS: + crackles on left lung.   HEART: S1/S2 present. RRR. No murmur.   ABD: Soft, non-tender to palpation. Distended abdomen. Bowel sounds present.   EXT: 2/3+ pitting edema b/l LE.   NEURO: AAOX3. Non focal.

## 2019-12-09 ENCOUNTER — TRANSCRIPTION ENCOUNTER (OUTPATIENT)
Age: 69
End: 2019-12-09

## 2019-12-09 LAB
ANION GAP SERPL CALC-SCNC: 13 MMOL/L — SIGNIFICANT CHANGE UP (ref 7–14)
BASOPHILS # BLD AUTO: 0.05 K/UL — SIGNIFICANT CHANGE UP (ref 0–0.2)
BASOPHILS NFR BLD AUTO: 0.9 % — SIGNIFICANT CHANGE UP (ref 0–1)
BUN SERPL-MCNC: 34 MG/DL — HIGH (ref 10–20)
CALCIUM SERPL-MCNC: 8.1 MG/DL — LOW (ref 8.5–10.1)
CHLORIDE SERPL-SCNC: 102 MMOL/L — SIGNIFICANT CHANGE UP (ref 98–110)
CO2 SERPL-SCNC: 15 MMOL/L — LOW (ref 17–32)
CREAT SERPL-MCNC: 2 MG/DL — HIGH (ref 0.7–1.5)
CULTURE RESULTS: SIGNIFICANT CHANGE UP
EOSINOPHIL # BLD AUTO: 0.31 K/UL — SIGNIFICANT CHANGE UP (ref 0–0.7)
EOSINOPHIL NFR BLD AUTO: 5.7 % — SIGNIFICANT CHANGE UP (ref 0–8)
GLUCOSE BLDC GLUCOMTR-MCNC: 113 MG/DL — HIGH (ref 70–99)
GLUCOSE BLDC GLUCOMTR-MCNC: 113 MG/DL — HIGH (ref 70–99)
GLUCOSE BLDC GLUCOMTR-MCNC: 120 MG/DL — HIGH (ref 70–99)
GLUCOSE BLDC GLUCOMTR-MCNC: 92 MG/DL — SIGNIFICANT CHANGE UP (ref 70–99)
GLUCOSE SERPL-MCNC: 76 MG/DL — SIGNIFICANT CHANGE UP (ref 70–99)
HCT VFR BLD CALC: 24.1 % — LOW (ref 42–52)
HGB BLD-MCNC: 8.1 G/DL — LOW (ref 14–18)
IMM GRANULOCYTES NFR BLD AUTO: 0.5 % — HIGH (ref 0.1–0.3)
INR BLD: 3.14 RATIO — HIGH (ref 0.65–1.3)
LEGIONELLA AG UR QL: NEGATIVE — SIGNIFICANT CHANGE UP
LYMPHOCYTES # BLD AUTO: 0.52 K/UL — LOW (ref 1.2–3.4)
LYMPHOCYTES # BLD AUTO: 9.5 % — LOW (ref 20.5–51.1)
MAGNESIUM SERPL-MCNC: 1.8 MG/DL — SIGNIFICANT CHANGE UP (ref 1.8–2.4)
MCHC RBC-ENTMCNC: 31.4 PG — HIGH (ref 27–31)
MCHC RBC-ENTMCNC: 33.6 G/DL — SIGNIFICANT CHANGE UP (ref 32–37)
MCV RBC AUTO: 93.4 FL — SIGNIFICANT CHANGE UP (ref 80–94)
MONOCYTES # BLD AUTO: 0.75 K/UL — HIGH (ref 0.1–0.6)
MONOCYTES NFR BLD AUTO: 13.7 % — HIGH (ref 1.7–9.3)
MRSA PCR RESULT.: NEGATIVE — SIGNIFICANT CHANGE UP
NEUTROPHILS # BLD AUTO: 3.8 K/UL — SIGNIFICANT CHANGE UP (ref 1.4–6.5)
NEUTROPHILS NFR BLD AUTO: 69.7 % — SIGNIFICANT CHANGE UP (ref 42.2–75.2)
NRBC # BLD: 0 /100 WBCS — SIGNIFICANT CHANGE UP (ref 0–0)
PLATELET # BLD AUTO: 91 K/UL — LOW (ref 130–400)
POTASSIUM SERPL-MCNC: 4.4 MMOL/L — SIGNIFICANT CHANGE UP (ref 3.5–5)
POTASSIUM SERPL-SCNC: 4.4 MMOL/L — SIGNIFICANT CHANGE UP (ref 3.5–5)
PROTHROM AB SERPL-ACNC: 35.7 SEC — HIGH (ref 9.95–12.87)
RBC # BLD: 2.58 M/UL — LOW (ref 4.7–6.1)
RBC # FLD: 17.6 % — HIGH (ref 11.5–14.5)
SODIUM SERPL-SCNC: 130 MMOL/L — LOW (ref 135–146)
SPECIMEN SOURCE: SIGNIFICANT CHANGE UP
UUN UR-MCNC: 929 MG/DL — SIGNIFICANT CHANGE UP
WBC # BLD: 5.46 K/UL — SIGNIFICANT CHANGE UP (ref 4.8–10.8)
WBC # FLD AUTO: 5.46 K/UL — SIGNIFICANT CHANGE UP (ref 4.8–10.8)

## 2019-12-09 RX ORDER — LACTULOSE 10 G/15ML
20 SOLUTION ORAL THREE TIMES A DAY
Refills: 0 | Status: DISCONTINUED | OUTPATIENT
Start: 2019-12-09 | End: 2019-12-12

## 2019-12-09 RX ORDER — LACTULOSE 10 G/15ML
20 SOLUTION ORAL
Refills: 0 | Status: DISCONTINUED | OUTPATIENT
Start: 2019-12-09 | End: 2019-12-11

## 2019-12-09 RX ORDER — URSODIOL 250 MG/1
300 TABLET, FILM COATED ORAL EVERY 12 HOURS
Refills: 0 | Status: DISCONTINUED | OUTPATIENT
Start: 2019-12-09 | End: 2019-12-13

## 2019-12-09 RX ORDER — URSODIOL 250 MG/1
500 TABLET, FILM COATED ORAL EVERY 12 HOURS
Refills: 0 | Status: DISCONTINUED | OUTPATIENT
Start: 2019-12-09 | End: 2019-12-09

## 2019-12-09 RX ADMIN — LACTULOSE 20 GRAM(S): 10 SOLUTION ORAL at 18:15

## 2019-12-09 RX ADMIN — CEFEPIME 100 MILLIGRAM(S): 1 INJECTION, POWDER, FOR SOLUTION INTRAMUSCULAR; INTRAVENOUS at 18:14

## 2019-12-09 RX ADMIN — Medication 25 MILLIGRAM(S): at 22:21

## 2019-12-09 RX ADMIN — Medication 110 MILLIGRAM(S): at 06:31

## 2019-12-09 RX ADMIN — TAMSULOSIN HYDROCHLORIDE 0.4 MILLIGRAM(S): 0.4 CAPSULE ORAL at 22:21

## 2019-12-09 RX ADMIN — PANTOPRAZOLE SODIUM 40 MILLIGRAM(S): 20 TABLET, DELAYED RELEASE ORAL at 06:30

## 2019-12-09 RX ADMIN — Medication 25 MILLIGRAM(S): at 06:30

## 2019-12-09 RX ADMIN — Medication 1 GRAM(S): at 06:30

## 2019-12-09 RX ADMIN — ATORVASTATIN CALCIUM 40 MILLIGRAM(S): 80 TABLET, FILM COATED ORAL at 22:21

## 2019-12-09 RX ADMIN — Medication 1 GRAM(S): at 18:14

## 2019-12-09 RX ADMIN — LACTULOSE 10 GRAM(S): 10 SOLUTION ORAL at 06:30

## 2019-12-09 RX ADMIN — Medication 25 MILLIGRAM(S): at 12:22

## 2019-12-09 RX ADMIN — Medication 1 GRAM(S): at 23:53

## 2019-12-09 RX ADMIN — CEFEPIME 100 MILLIGRAM(S): 1 INJECTION, POWDER, FOR SOLUTION INTRAMUSCULAR; INTRAVENOUS at 05:39

## 2019-12-09 RX ADMIN — LACTULOSE 20 GRAM(S): 10 SOLUTION ORAL at 17:12

## 2019-12-09 RX ADMIN — SODIUM CHLORIDE 60 MILLILITER(S): 9 INJECTION INTRAMUSCULAR; INTRAVENOUS; SUBCUTANEOUS at 00:10

## 2019-12-09 RX ADMIN — Medication 1 GRAM(S): at 00:10

## 2019-12-09 RX ADMIN — Medication 1 GRAM(S): at 12:22

## 2019-12-09 RX ADMIN — URSODIOL 300 MILLIGRAM(S): 250 TABLET, FILM COATED ORAL at 18:14

## 2019-12-09 RX ADMIN — Medication 110 MILLIGRAM(S): at 18:14

## 2019-12-09 NOTE — DISCHARGE NOTE NURSING/CASE MANAGEMENT/SOCIAL WORK - PATIENT PORTAL LINK FT
You can access the FollowMyHealth Patient Portal offered by [facility] by registering at the following website: [email or web address]. By joining [email or web address] FollowMyHealth portal, you will also be able to view your health information using other applications (apps) compatible with our system.

## 2019-12-09 NOTE — DISCHARGE NOTE NURSING/CASE MANAGEMENT/SOCIAL WORK - NSDCPEPTCOWAR_GEN_ALL_CORE
Warfarin/Coumadin - Follow up monitoring/Warfarin/Coumadin - Potential for adverse drug reactions and interactions/Warfarin/Coumadin - Dietary Advice/Warfarin/Coumadin - Compliance

## 2019-12-09 NOTE — PROGRESS NOTE ADULT - SUBJECTIVE AND OBJECTIVE BOX
Patient was seen and examined. Spoke with RN. Chart reviewed.  son at bedside  cardio sr jones  anticipate dc in am    No events overnight.  Vital Signs Last 24 Hrs  T(F): 99 (09 Dec 2019 13:15), Max: 99 (09 Dec 2019 13:15)  HR: 78 (09 Dec 2019 13:15) (69 - 78)  BP: 118/56 (09 Dec 2019 13:15) (94/58 - 118/56)  SpO2: 96% (08 Dec 2019 23:00) (96% - 96%)  MEDICATIONS  (STANDING):  atorvastatin 40 milliGRAM(s) Oral at bedtime  cefepime   IVPB 2000 milliGRAM(s) IV Intermittent every 12 hours  doxycycline IVPB      doxycycline IVPB 100 milliGRAM(s) IV Intermittent every 12 hours  hydrOXYzine  Oral Tab/Cap - Peds 25 milliGRAM(s) Oral three times a day  lactulose Syrup 20 Gram(s) Oral two times a day  pantoprazole    Tablet 40 milliGRAM(s) Oral before breakfast  rifAXIMin 550 milliGRAM(s) Oral two times a day  sucralfate 1 Gram(s) Oral four times a day  tamsulosin Oral Tab/Cap - Peds 0.4 milliGRAM(s) Oral at bedtime  ursodiol Capsule 300 milliGRAM(s) Oral every 12 hours    MEDICATIONS  (PRN):  lactulose Syrup 20 Gram(s) Oral three times a day PRN target 3-4 bowel movements per day    Labs:                        8.1    5.46  )-----------( 91       ( 09 Dec 2019 06:34 )             24.1                         7.9    5.87  )-----------( 86       ( 08 Dec 2019 16:51 )             23.5     09 Dec 2019 06:34    130    |  102    |  34     ----------------------------<  76     4.4     |  15     |  2.0    08 Dec 2019 16:51    130    |  102    |  36     ----------------------------<  102    4.3     |  15     |  2.0      Ca    8.1        09 Dec 2019 06:34  Ca    8.0        08 Dec 2019 16:51  Mg     1.8       09 Dec 2019 06:34  Mg     1.8       08 Dec 2019 16:51    TPro  5.5    /  Alb  1.9    /  TBili  1.5    /  DBili  x      /  AST  265    /  ALT  146    /  AlkPhos  100    08 Dec 2019 16:51  TPro  5.8    /  Alb  2.0    /  TBili  1.5    /  DBili  x      /  AST  266    /  ALT  153    /  AlkPhos  116    07 Dec 2019 22:30    PT/INR - ( 09 Dec 2019 06:34 )   PT: 35.70 sec;   INR: 3.14 ratio         PTT - ( 08 Dec 2019 16:51 )  PTT:39.4 sec  Urinalysis Basic - ( 07 Dec 2019 22:18 )    Color: Yellow / Appearance: Clear / S.022 / pH: x  Gluc: x / Ketone: Trace  / Bili: Negative / Urobili: <2 mg/dL   Blood: x / Protein: 30 mg/dL / Nitrite: Negative   Leuk Esterase: Negative / RBC: 2 /HPF / WBC 4 /HPF   Sq Epi: x / Non Sq Epi: 2 /HPF / Bacteria: Negative        Culture - Blood (collected 07 Dec 2019 22:30)  Source: .Blood Blood-Peripheral  Preliminary Report (09 Dec 2019 05:01):    No growth to date.    Culture - Blood (collected 07 Dec 2019 22:30)  Source: .Blood Blood-Peripheral  Preliminary Report (09 Dec 2019 05:01):    No growth to date.    Culture - Urine (collected 07 Dec 2019 22:18)  Source: .Urine Clean Catch (Midstream)  Final Report (09 Dec 2019 07:22):    <10,000 CFU/mL Normal Urogenital Mimi        Radiology:    General: comfortable, NAD  Neurology: A&Ox3, nonfocal  Head:  Normocephalic, atraumatic  ENT:  Mucosa moist, no ulcerations  Neck:  Supple, no JVD,   Skin: no breakdowns (as per RN)  Resp: CTA B/L  CV: RRR, S1S2,   GI: Soft, NT, bowel sounds  MS: 1+ edema, + peripheral pulses, FROM all 4 extremity      < from: Xray Chest 2 Views PA/Lat (19 @ 07:46) >  Increased patchy left perihilar and left basilar opacities, possibly   reflecting pneumonia in the appropriate clinical setting; follow-up to   complete resolution is suggested.    < end of copied text >

## 2019-12-09 NOTE — CONSULT NOTE ADULT - ASSESSMENT
68 yo M with hx of Cirrhosis due to JIMÉNEZ (on liver transplant at [facility]), Hx of multiple hepatic encephalopathy, Ascites (hx of multiple paracentesis), Thrombocytopenia, CAD s/p 2 stents, A.fib (on coumadin), Anemia, CKD, HTN, DM II, BPH and HLD presents for evaluation of worsening shortness of breath.    Presenting for:  PNA  Cirrhosis with ascites    HFpEF (grade II)  CAD s/p 2 stents  Afib on Coumadin    Recs:  Resume Lasix 40mg qd  Beta blocker?    Attending to see patient 70 yo M with hx of Cirrhosis due to JIMÉNEZ (on liver transplant at [facility]), Hx of multiple hepatic encephalopathy, Ascites (hx of multiple paracentesis), Thrombocytopenia, CAD s/p 2 stents, A.fib (on coumadin), Anemia, CKD, HTN, DM II, BPH and HLD presents for evaluation of worsening shortness of breath.    Presenting for:  PNA  Cirrhosis with ascites    HFpEF (grade II)  CAD s/p 2 stents  Afib on Coumadin, not on rate control    Recs:  Resume Lasix 40mg qd  Recent outpatient stress test no ischemic event per patient  Beta blocker? ACEI?  Appointment with Dr Quijano Dec 15    Attending to see patient 70 yo M with hx of Cirrhosis due to JIMÉNEZ (on liver transplant at [facility]), Hx of multiple hepatic encephalopathy, Ascites (hx of multiple paracentesis), Thrombocytopenia, CAD s/p 2 stents, A.fib (on coumadin), Anemia, CKD, HTN, DM II, BPH and HLD presents for evaluation of worsening shortness of breath.    Presenting for:  PNA  Cirrhosis with ascites, recent paracentesis 5 days prior    HFpEF (grade II)  CAD s/p 2 stents  Afib on Coumadin, not on rate control    Recs:  Continue holding Lasix 40mg qd  Recent outpatient stress test no ischemic event per patient  Beta blocker? ACEI?  Nephro eval for worsening CHESTER  Appointment with Dr Quijano Dec 15    Attending to see patient

## 2019-12-09 NOTE — CONSULT NOTE ADULT - SUBJECTIVE AND OBJECTIVE BOX
CHIEF COMPLAINT: worsening shortness of breath    HISTORY OF PRESENT ILLNESS:  70 yo M with hx of Cirrhosis due to JIMÉNEZ (on liver transplant at [facility]), Hx of multiple hepatic encephalopathy, Ascites (hx of multiple paracentesis), Thrombocytopenia, CAD s/p 2 stents, A.fib (on coumadin), Anemia, CKD, HTN, DM II, BPH and HLD presents for evaluation of worsening shortness of breath. Patient was recently admitted in Oct 2019 for SOB secondary to decompensating cirrhosis. Patient reports that he was doing fine since discharge until about a week ago when he started feel very tired and b/l LE weakness, SOB and productive cough. Patient reports that his grandchild had cold 2 weeks ago. Denied fever, recent illness, rhinorrhea, sore throat, headache, chest pain, palpitation, abdominal pain, change in bowel movement, change in urination, dysuria. Patient recently had paracentesis at [facility] about 4 days ago w/o complications.     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: 15 years ago    HEALTH ISSUES - PROBLEM Dx:        FAMILY HISTORY:  FH: ovarian cancer: mother  Family history of early CAD: mother    Allergies    No Known Allergies    Intolerances    	  Home Medications:  aMILoride 5 mg oral tablet: 1 tab(s) orally once a day (08 Dec 2019 10:44)  atorvastatin 40 mg oral tablet: 1 tab(s) orally once a day (at bedtime) (08 Dec 2019 10:44)  Constulose 10 g/15 mL oral syrup: 30 milliliter(s) orally 2 times a day (08 Dec 2019 10:44)  Fish Oil 1200 mg oral capsule: orally 2 times a day (08 Dec 2019 10:44)  furosemide 40 mg oral tablet: 1 tab(s) orally once a day (08 Dec 2019 10:44)  hydrOXYzine hydrochloride 25 mg oral tablet: 1 tab(s) orally 3 times a day (08 Dec 2019 10:44)  Januvia 50 mg oral tablet: 1 tab(s) orally once a day (08 Dec 2019 10:44)  pantoprazole 40 mg oral delayed release tablet: 1 tab(s) orally once a day (before a meal) (08 Dec 2019 10:44)  rifAXIMin 550 mg oral tablet: 1 tab(s) orally 2 times a day (08 Dec 2019 10:44)  sucralfate 1 g oral tablet: 1 tab(s) orally 4 times a day (before meals and at bedtime) (08 Dec 2019 10:44)  tamsulosin 0.4 mg oral capsule: 1 cap(s) orally once a day (at bedtime) (08 Dec 2019 10:44)  ursodiol 500 mg oral tablet: 1 tab(s) orally 2 times a day (08 Dec 2019 10:44)  warfarin 2.5 mg oral tablet: 1 tab(s) orally once a day (08 Dec 2019 10:44)    MEDICATIONS  (STANDING):  atorvastatin 40 milliGRAM(s) Oral at bedtime  cefepime   IVPB 2000 milliGRAM(s) IV Intermittent every 12 hours  doxycycline IVPB      doxycycline IVPB 100 milliGRAM(s) IV Intermittent every 12 hours  hydrOXYzine  Oral Tab/Cap - Peds 25 milliGRAM(s) Oral three times a day  lactulose Syrup 20 Gram(s) Oral two times a day  pantoprazole    Tablet 40 milliGRAM(s) Oral before breakfast  rifAXIMin 550 milliGRAM(s) Oral two times a day  sucralfate 1 Gram(s) Oral four times a day  tamsulosin Oral Tab/Cap - Peds 0.4 milliGRAM(s) Oral at bedtime  ursodiol Capsule 300 milliGRAM(s) Oral every 12 hours    MEDICATIONS  (PRN):  lactulose Syrup 20 Gram(s) Oral three times a day PRN target 3-4 bowel movements per day              SOCIAL HISTORY:    [ ] Non-smoker  [ ] Smoker  [ ] Alcohol      REVIEW OF SYSTEMS:  CONSTITUTIONAL: No fever, weight loss, or fatigue  CARDIOLOGY: Patient denies chest pain or syncopal episodes.   RESPIRATORY: shortness of breath per HPI, no whezeing.   NEUROLOGICAL: NO weakness, no focal deficits to report.  ENDOCRINOLOGICAL: no recent change in diabetic medications.   GI: no BRBPR, no N,V,diarrhea.    PSYCHIATRY: normal mood and affect  HEENT: no nasal discharge, no ecchymosis  SKIN: no ecchymosis, no breakdown  MUSCULOSKELETAL: Full range of motion x4.      PHYSICAL EXAM:  T(C): 37.2 (12-09-19 @ 13:15), Max: 37.2 (19 @ 15:21)  HR: 78 (19 @ 13:15) (69 - 78)  BP: 118/56 (19 @ 13:15) (94/58 - 118/56)  RR: 20 (19 @ 13:15) (18 - 20)  SpO2: 96% (19 @ 23:00) (96% - 96%)  Wt(kg): --  I&O's Summary    08 Dec 2019 07:01  -  09 Dec 2019 07:00  --------------------------------------------------------  IN: 0 mL / OUT: 50 mL / NET: -50 mL      Daily Height in cm: 175.26 (08 Dec 2019 23:00)    Daily     General Appearance: Normal	  Cardiovascular: Normal S1 S2, No JVD, No murmurs, No edema  Respiratory: Lungs clear to auscultation	  Psychiatry: A & O x 3, Mood & affect appropriate  Gastrointestinal:  Soft, Non-tender, abdominal distension  Skin: No rashes, No ecchymoses, No cyanosis	  Neurologic: Non-focal  Extremities: Normal range of motion, No clubbing, cyanosis or edema  Vascular: Peripheral pulses palpable 2+ bilaterally        LABS:	 	                          8.1    5.46  )-----------( 91       ( 09 Dec 2019 06:34 )             24.1         130<L>  |  102  |  34<H>  ----------------------------<  76  4.4   |  15<L>  |  2.0<H>    Ca    8.1<L>      09 Dec 2019 06:34  Mg     1.8         TPro  5.5<L>  /  Alb  1.9<L>  /  TBili  1.5<H>  /  DBili  x   /  AST  265<H>  /  ALT  146<H>  /  AlkPhos  100          PT/INR - ( 09 Dec 2019 06:34 )   PT: 35.70 sec;   INR: 3.14 ratio         PTT - ( 08 Dec 2019 16:51 )  PTT:39.4 sec    Urinalysis Basic - ( 07 Dec 2019 22:18 )    Color: Yellow / Appearance: Clear / S.022 / pH: x  Gluc: x / Ketone: Trace  / Bili: Negative / Urobili: <2 mg/dL   Blood: x / Protein: 30 mg/dL / Nitrite: Negative   Leuk Esterase: Negative / RBC: 2 /HPF / WBC 4 /HPF   Sq Epi: x / Non Sq Epi: 2 /HPF / Bacteria: Negative        Lactate, Blood: 1.6 mmol/L (19 @ 16:51)      Culture - Blood (collected 07 Dec 2019 22:30)  Source: .Blood Blood-Peripheral  Preliminary Report (09 Dec 2019 05:01):    No growth to date.    Culture - Blood (collected 07 Dec 2019 22:30)  Source: .Blood Blood-Peripheral  Preliminary Report (09 Dec 2019 05:01):    No growth to date.    Culture - Urine (collected 07 Dec 2019 22:18)  Source: .Urine Clean Catch (Midstream)  Final Report (09 Dec 2019 07:22):    <10,000 CFU/mL Normal Urogenital Mimi    Serum Pro-Brain Natriuretic Peptide (19 @ 00:30)    Serum Pro-Brain Natriuretic Peptide: 1065 pg/mL      Lipid Profile (10.24.08 @ 19:21)    Cholesterol, Serum: 129 mg/dL    Triglycerides, Serum: 210 mg/dL    HDL Cholesterol, Serum: 20 mg/dL    Direct LDL: 75 mg/dL        CARDIAC MARKERS:            TELEMETRY EVENTS: 	      < from: 12 Lead ECG (19 @ 22:35) >  Ventricular Rate 88 BPM  Atrial Rate 88 BPM  P-R Interval 184 ms  QRS Duration 96 ms  Q-T Interval 406 ms  QTC Calculation(Bezet) 491 ms  P Axis 62 degrees  R Axis 74 degrees  T Axis -15 degrees  Diagnosis Line Normal sinus rhythm  Early transition  Nonspecific ST abnormality  Abnormal QRS-T angle, consider primary T wave abnormality  Prolonged QT  Abnormal ECG  < end of copied text >      RADIOLOGY:    < from: Xray Chest 2 Views PA/Lat (19 @ 07:46) >  Impression:    Increased patchy left perihilar and left basilar opacities, possibly reflecting pneumonia in the appropriate clinical setting; follow-up to complete resolution is suggested.  < end of copied text >      < from: US Retroperitoneal Complete (19 @ 05:34) >  IMPRESSION:  No hydronephrosis or stone in either kidney.  Moderate abdominal pelvic ascites, unchanged from 2019.  < end of copied text >      OTHER: 	    PREVIOUS DIAGNOSTIC TESTING:    [ ] Echocardiogram:    < from: Transthoracic Echocardiogram (19 @ 12:05) >  Summary:   1. Normal global left ventricular systolic function.   2. LV Ejection Fraction by Rosenthal's Method with a biplane EF of 68 %.   3. Normal left ventricular internal cavity size.   4. Spectral Doppler shows pseudonormal pattern of left ventricular myocardial filling (Grade II diastolic dysfunction).   5. Mild mitral valve regurgitation.   6. Structurally normal mitral valve, with normal leaflet excursion.   7. Sclerotic aortic valve with normal opening.   8. LA volume Index is 40.2 ml/m² ml/m2.  < end of copied text >      [ ]  Catheterization:    [ ] Stress Test:

## 2019-12-09 NOTE — PROGRESS NOTE ADULT - SUBJECTIVE AND OBJECTIVE BOX
SUBJECTIVE:    Patient is a 69y old Male who presents with a chief complaint of Worsening SOB (08 Dec 2019 03:50)    Overnight Events: Patient is stable, no acute events overnight.  His SOB has improved, he was saturating well on RA.  He has no complaints. VS are stable.    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     ALLERGIES:  No Known Allergies    MEDICATIONS:  STANDING MEDICATIONS  atorvastatin 40 milliGRAM(s) Oral at bedtime  cefepime   IVPB 2000 milliGRAM(s) IV Intermittent every 12 hours  doxycycline IVPB      doxycycline IVPB 100 milliGRAM(s) IV Intermittent every 12 hours  hydrOXYzine  Oral Tab/Cap - Peds 25 milliGRAM(s) Oral three times a day  lactulose Syrup 10 Gram(s) Oral two times a day  pantoprazole    Tablet 40 milliGRAM(s) Oral before breakfast  rifAXIMin 550 milliGRAM(s) Oral two times a day  sucralfate 1 Gram(s) Oral four times a day  tamsulosin Oral Tab/Cap - Peds 0.4 milliGRAM(s) Oral at bedtime  ursodiol Capsule 300 milliGRAM(s) Oral every 12 hours    PRN MEDICATIONS    VITALS:   T(F): 98.1, Max: 98.9 (- @ 15:21)  HR: 75 (69 - 75)  BP: 100/51 (94/58 - 108/55)  RR: 20 (18 - 20)  SpO2: 96% (96% - 96%)    LABS:                        8.1    5.46  )-----------( 91       ( 09 Dec 2019 06:34 )             24.1     12    130<L>  |  102  |  34<H>  ----------------------------<  76  4.4   |  15<L>  |  2.0<H>    Ca    8.1<L>      09 Dec 2019 06:34  Mg     1.8     12-    TPro  5.5<L>  /  Alb  1.9<L>  /  TBili  1.5<H>  /  DBili  x   /  AST  265<H>  /  ALT  146<H>  /  AlkPhos  100      PT/INR - ( 09 Dec 2019 06:34 )   PT: 35.70 sec;   INR: 3.14 ratio         PTT - ( 08 Dec 2019 16:51 )  PTT:39.4 sec  Urinalysis Basic - ( 07 Dec 2019 22:18 )    Color: Yellow / Appearance: Clear / S.022 / pH: x  Gluc: x / Ketone: Trace  / Bili: Negative / Urobili: <2 mg/dL   Blood: x / Protein: 30 mg/dL / Nitrite: Negative   Leuk Esterase: Negative / RBC: 2 /HPF / WBC 4 /HPF   Sq Epi: x / Non Sq Epi: 2 /HPF / Bacteria: Negative        Lactate, Blood: 1.6 mmol/L (19 @ 16:51)      Culture - Blood (collected 07 Dec 2019 22:30)  Source: .Blood Blood-Peripheral  Preliminary Report (09 Dec 2019 05:01):    No growth to date.    Culture - Blood (collected 07 Dec 2019 22:30)  Source: .Blood Blood-Peripheral  Preliminary Report (09 Dec 2019 05:01):    No growth to date.    Culture - Urine (collected 07 Dec 2019 22:18)  Source: .Urine Clean Catch (Midstream)  Final Report (09 Dec 2019 07:22):    <10,000 CFU/mL Normal Urogenital Mimi              19 @ 07:01  -  19 @ 07:00  --------------------------------------------------------  IN: 0 mL / OUT: 50 mL / NET: -50 mL          IMAGING/EKG:  < from: Transthoracic Echocardiogram (19 @ 12:05) >  Summary:   1. Normal global left ventricular systolic function.   2. LV Ejection Fraction by Rosenthal's Method with a biplane EF of 68 %.   3. Normal left ventricular internal cavity size.   4. Spectral Doppler shows pseudonormal pattern of left ventricular   myocardial filling (Grade II diastolic dysfunction).   5. Mild mitral valve regurgitation.   6. Structurally normal mitral valve, with normal leaflet excursion.   7. Sclerotic aortic valve with normal opening.   8. LA volume Index is 40.2 ml/m² ml/m2.    < end of copied text >    < from: Xray Chest 2 Views PA/Lat (19 @ 07:46) >    Impression:      Increased patchy left perihilar and left basilar opacities, possibly   reflecting pneumonia in the appropriate clinical setting; follow-up to   complete resolution is suggested.      < end of copied text >    PHYSICAL EXAM:  GEN: NAD, comfortable  LUNGS: CTAB, no w/r/r  HEART: RRR, s1 and s2 appreciated, no m/r/g  ABD: soft, abdominal distention  EXT: 1+ b/l LLE  NEURO: AAOX3

## 2019-12-10 LAB
ANION GAP SERPL CALC-SCNC: 12 MMOL/L — SIGNIFICANT CHANGE UP (ref 7–14)
BASOPHILS # BLD AUTO: 0.04 K/UL — SIGNIFICANT CHANGE UP (ref 0–0.2)
BASOPHILS NFR BLD AUTO: 0.8 % — SIGNIFICANT CHANGE UP (ref 0–1)
BUN SERPL-MCNC: 40 MG/DL — HIGH (ref 10–20)
CALCIUM SERPL-MCNC: 8.1 MG/DL — LOW (ref 8.5–10.1)
CHLORIDE SERPL-SCNC: 102 MMOL/L — SIGNIFICANT CHANGE UP (ref 98–110)
CO2 SERPL-SCNC: 15 MMOL/L — LOW (ref 17–32)
CREAT SERPL-MCNC: 2 MG/DL — HIGH (ref 0.7–1.5)
EOSINOPHIL # BLD AUTO: 0.3 K/UL — SIGNIFICANT CHANGE UP (ref 0–0.7)
EOSINOPHIL NFR BLD AUTO: 6.2 % — SIGNIFICANT CHANGE UP (ref 0–8)
GLUCOSE BLDC GLUCOMTR-MCNC: 135 MG/DL — HIGH (ref 70–99)
GLUCOSE SERPL-MCNC: 106 MG/DL — HIGH (ref 70–99)
HCT VFR BLD CALC: 24.2 % — LOW (ref 42–52)
HGB BLD-MCNC: 8.1 G/DL — LOW (ref 14–18)
IMM GRANULOCYTES NFR BLD AUTO: 0.2 % — SIGNIFICANT CHANGE UP (ref 0.1–0.3)
INR BLD: 2.79 RATIO — HIGH (ref 0.65–1.3)
LYMPHOCYTES # BLD AUTO: 0.48 K/UL — LOW (ref 1.2–3.4)
LYMPHOCYTES # BLD AUTO: 10 % — LOW (ref 20.5–51.1)
MCHC RBC-ENTMCNC: 31.6 PG — HIGH (ref 27–31)
MCHC RBC-ENTMCNC: 33.5 G/DL — SIGNIFICANT CHANGE UP (ref 32–37)
MCV RBC AUTO: 94.5 FL — HIGH (ref 80–94)
MONOCYTES # BLD AUTO: 0.69 K/UL — HIGH (ref 0.1–0.6)
MONOCYTES NFR BLD AUTO: 14.3 % — HIGH (ref 1.7–9.3)
NEUTROPHILS # BLD AUTO: 3.3 K/UL — SIGNIFICANT CHANGE UP (ref 1.4–6.5)
NEUTROPHILS NFR BLD AUTO: 68.5 % — SIGNIFICANT CHANGE UP (ref 42.2–75.2)
NRBC # BLD: 0 /100 WBCS — SIGNIFICANT CHANGE UP (ref 0–0)
PLATELET # BLD AUTO: 82 K/UL — LOW (ref 130–400)
POTASSIUM SERPL-MCNC: 4.1 MMOL/L — SIGNIFICANT CHANGE UP (ref 3.5–5)
POTASSIUM SERPL-SCNC: 4.1 MMOL/L — SIGNIFICANT CHANGE UP (ref 3.5–5)
PROTHROM AB SERPL-ACNC: 31.8 SEC — HIGH (ref 9.95–12.87)
RBC # BLD: 2.56 M/UL — LOW (ref 4.7–6.1)
RBC # FLD: 17.2 % — HIGH (ref 11.5–14.5)
S PNEUM AG UR QL: NEGATIVE — SIGNIFICANT CHANGE UP
SODIUM SERPL-SCNC: 129 MMOL/L — LOW (ref 135–146)
WBC # BLD: 4.82 K/UL — SIGNIFICANT CHANGE UP (ref 4.8–10.8)
WBC # FLD AUTO: 4.82 K/UL — SIGNIFICANT CHANGE UP (ref 4.8–10.8)

## 2019-12-10 PROCEDURE — 99222 1ST HOSP IP/OBS MODERATE 55: CPT

## 2019-12-10 RX ORDER — WARFARIN SODIUM 2.5 MG/1
2.5 TABLET ORAL ONCE
Refills: 0 | Status: COMPLETED | OUTPATIENT
Start: 2019-12-10 | End: 2019-12-10

## 2019-12-10 RX ADMIN — URSODIOL 300 MILLIGRAM(S): 250 TABLET, FILM COATED ORAL at 18:08

## 2019-12-10 RX ADMIN — Medication 1 GRAM(S): at 05:43

## 2019-12-10 RX ADMIN — CEFEPIME 100 MILLIGRAM(S): 1 INJECTION, POWDER, FOR SOLUTION INTRAMUSCULAR; INTRAVENOUS at 05:42

## 2019-12-10 RX ADMIN — PANTOPRAZOLE SODIUM 40 MILLIGRAM(S): 20 TABLET, DELAYED RELEASE ORAL at 05:43

## 2019-12-10 RX ADMIN — LACTULOSE 20 GRAM(S): 10 SOLUTION ORAL at 05:42

## 2019-12-10 RX ADMIN — Medication 1 GRAM(S): at 18:08

## 2019-12-10 RX ADMIN — Medication 25 MILLIGRAM(S): at 13:39

## 2019-12-10 RX ADMIN — URSODIOL 300 MILLIGRAM(S): 250 TABLET, FILM COATED ORAL at 05:43

## 2019-12-10 RX ADMIN — Medication 110 MILLIGRAM(S): at 05:42

## 2019-12-10 RX ADMIN — WARFARIN SODIUM 2.5 MILLIGRAM(S): 2.5 TABLET ORAL at 21:06

## 2019-12-10 RX ADMIN — Medication 110 MILLIGRAM(S): at 18:40

## 2019-12-10 RX ADMIN — Medication 1 GRAM(S): at 11:20

## 2019-12-10 RX ADMIN — Medication 1 GRAM(S): at 21:09

## 2019-12-10 RX ADMIN — ATORVASTATIN CALCIUM 40 MILLIGRAM(S): 80 TABLET, FILM COATED ORAL at 21:06

## 2019-12-10 RX ADMIN — Medication 25 MILLIGRAM(S): at 05:43

## 2019-12-10 RX ADMIN — Medication 25 MILLIGRAM(S): at 21:06

## 2019-12-10 RX ADMIN — CEFEPIME 100 MILLIGRAM(S): 1 INJECTION, POWDER, FOR SOLUTION INTRAMUSCULAR; INTRAVENOUS at 18:07

## 2019-12-10 RX ADMIN — TAMSULOSIN HYDROCHLORIDE 0.4 MILLIGRAM(S): 0.4 CAPSULE ORAL at 21:06

## 2019-12-10 NOTE — PHYSICAL THERAPY INITIAL EVALUATION ADULT - PERTINENT HX OF CURRENT PROBLEM, REHAB EVAL
Patient admitted to hospital with c/o SOB and weakness on BLE. States having difficulty in standing and walking.

## 2019-12-10 NOTE — CONSULT NOTE ADULT - SUBJECTIVE AND OBJECTIVE BOX
HPI:  68 yo M with hx of Cirrhosis due to JIMÉNEZ (on liver transplant at [facility]), Hx of multiple hepatic encephalopathy, Ascites (hx of multiple paracentesis), Thrombocytopenia, CAD s/p 2 stents, A.fib (on coumadin), Anemia, CKD, HTN, DM II, BPH and HLD presents for evaluation of worsening shortness of breath. Patient was recently admitted in Oct 2019 for SOB secondary to decompensating cirrhosis. Patient reports that he was doing fine since discharge until about a week ago when he started feel very tired and b/l LE weakness, SOB and productive cough. Patient reports that his grandchild had cold 2 weeks ago. Denied fever, recent illness, rhinorrhea, sore throat, headache, chest pain, palpitation, abdominal pain, change in bowel movement, change in urination, dysuria. Patient recently had paracentesis at [facility] about 4 days ago w/o complications. 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: 15 years ago      Hospital Course:    TODAY'S SUBJECTIVE & REVIEW OF SYMPTOMS:     Constitutional WNL   Cardio WNL   Resp sob   GI WNL  Heme WNL  Endo WNL  Skin WNL  MSK Weakness  Neuro WNL  Cognitive WNL  Psych WNL      MEDICATIONS  (STANDING):  atorvastatin 40 milliGRAM(s) Oral at bedtime  cefepime   IVPB 2000 milliGRAM(s) IV Intermittent every 12 hours  doxycycline IVPB      doxycycline IVPB 100 milliGRAM(s) IV Intermittent every 12 hours  hydrOXYzine  Oral Tab/Cap - Peds 25 milliGRAM(s) Oral three times a day  lactulose Syrup 20 Gram(s) Oral two times a day  pantoprazole    Tablet 40 milliGRAM(s) Oral before breakfast  rifAXIMin 550 milliGRAM(s) Oral two times a day  sucralfate 1 Gram(s) Oral four times a day  tamsulosin Oral Tab/Cap - Peds 0.4 milliGRAM(s) Oral at bedtime  ursodiol Capsule 300 milliGRAM(s) Oral every 12 hours  warfarin 2.5 milliGRAM(s) Oral once    MEDICATIONS  (PRN):  lactulose Syrup 20 Gram(s) Oral three times a day PRN target 3-4 bowel movements per day      FAMILY HISTORY:  FH: ovarian cancer: mother  Family history of early CAD: mother      Allergies    No Known Allergies    Intolerances        SOCIAL HISTORY:    [  ] Etoh  [  ] Smoking  [  ] Substance abuse     Home Environment:  [  ] Home Alone  [x  ] Lives with Family  [  ] Home Health Aid    Dwelling:  [  ] Apartment  [x  ] Private House  [  ] Adult Home  [  ] Skilled Nursing Facility      [  ] Short Term  [  ] Long Term  [x  ] Stairs       Elevator [  ]    FUNCTIONAL STATUS PTA: (Check all that apply)  Ambulation: [ x  ]Independent    [  ] Dependent     [  ] Non-Ambulatory  Assistive Device: [ x ] SA Cane  [  ]  Q Cane  [x  ] Walker  [  ]  Wheelchair  ADL : [x  ] Independent  [  ]  Dependent       Vital Signs Last 24 Hrs  T(C): 36.4 (10 Dec 2019 13:36), Max: 37.9 (09 Dec 2019 21:25)  T(F): 97.6 (10 Dec 2019 13:36), Max: 100.2 (09 Dec 2019 21:25)  HR: 88 (10 Dec 2019 13:36) (71 - 88)  BP: 105/55 (10 Dec 2019 13:36) (101/54 - 105/55)  BP(mean): --  RR: 20 (10 Dec 2019 13:36) (18 - 20)  SpO2: 95% (10 Dec 2019 09:12) (95% - 95%)      PHYSICAL EXAM: Alert & Oriented X3  GENERAL: NAD, well-groomed, well-developed  HEAD:  Atraumatic, Normocephalic  CHEST/LUNG: decreased bs lung bases  HEART: S1S2+  ABDOMEN: distended  EXTREMITIES:  + edema regina legs    NERVOUS SYSTEM:  Cranial Nerves 2-12 intact [  ] Abnormal  [  ]  ROM: WFL all extremities [x  ]  Abnormal [  ]  Motor Strength: WFL all extremities  [  ]  Abnormal [x  ]4/5 all ext  Sensation: intact to light touch [x  ] Abnormal [  ]  Reflexes: Symmetric [  ]  Abnormal [  ]    FUNCTIONAL STATUS:  Bed Mobility: Independent [  ]  Supervision [  ]  Needs Assistance [ x ]  N/A [  ]  Transfers: Independent [  ]  Supervision [  ]  Needs Assistance [x  ]  N/A [  ]   Ambulation: Independent [  ]  Supervision [  ]  Needs Assistance [  ]  N/A [  ]  ADL: Independent [  ] Requires Assistance [  ] N/A [  ]      LABS:                        8.1    4.82  )-----------( 82       ( 10 Dec 2019 07:42 )             24.2     12-10    129<L>  |  102  |  40<H>  ----------------------------<  106<H>  4.1   |  15<L>  |  2.0<H>    Ca    8.1<L>      10 Dec 2019 07:42  Mg     1.8     12-09      PT/INR - ( 10 Dec 2019 07:42 )   PT: 31.80 sec;   INR: 2.79 ratio               RADIOLOGY & ADDITIONAL STUDIES:    Assesment:

## 2019-12-10 NOTE — PHYSICAL THERAPY INITIAL EVALUATION ADULT - ADL SKILLS, REHAB EVAL
Quality 226: Preventive Care And Screening: Tobacco Use: Screening And Cessation Intervention: Patient screened for tobacco use and is an ex/non-smoker
independent
Quality 431: Preventive Care And Screening: Unhealthy Alcohol Use - Screening: Patient screened for unhealthy alcohol use using a single question and scores less than 2 times per year
Detail Level: Detailed

## 2019-12-10 NOTE — PROGRESS NOTE ADULT - SUBJECTIVE AND OBJECTIVE BOX
Patient was seen and examined. Spoke with RN. Chart reviewed.  feels well  id eval ?po abx  d/w ho  cardio eval ?diuretics with ckd    No events overnight.  Vital Signs Last 24 Hrs  T(F): 98.5 (10 Dec 2019 04:51), Max: 100.2 (09 Dec 2019 21:25)  HR: 74 (10 Dec 2019 09:12) (71 - 78)  BP: 103/57 (10 Dec 2019 04:51) (101/54 - 118/56)  SpO2: 95% (10 Dec 2019 09:12) (95% - 95%)  MEDICATIONS  (STANDING):  atorvastatin 40 milliGRAM(s) Oral at bedtime  cefepime   IVPB 2000 milliGRAM(s) IV Intermittent every 12 hours  doxycycline IVPB      doxycycline IVPB 100 milliGRAM(s) IV Intermittent every 12 hours  hydrOXYzine  Oral Tab/Cap - Peds 25 milliGRAM(s) Oral three times a day  lactulose Syrup 20 Gram(s) Oral two times a day  pantoprazole    Tablet 40 milliGRAM(s) Oral before breakfast  rifAXIMin 550 milliGRAM(s) Oral two times a day  sucralfate 1 Gram(s) Oral four times a day  tamsulosin Oral Tab/Cap - Peds 0.4 milliGRAM(s) Oral at bedtime  ursodiol Capsule 300 milliGRAM(s) Oral every 12 hours    MEDICATIONS  (PRN):  lactulose Syrup 20 Gram(s) Oral three times a day PRN target 3-4 bowel movements per day    Labs:                        8.1    4.82  )-----------( 82       ( 10 Dec 2019 07:42 )             24.2                         8.1    5.46  )-----------( 91       ( 09 Dec 2019 06:34 )             24.1     10 Dec 2019 07:42    129    |  102    |  40     ----------------------------<  106    4.1     |  15     |  2.0    09 Dec 2019 06:34    130    |  102    |  34     ----------------------------<  76     4.4     |  15     |  2.0      Ca    8.1        10 Dec 2019 07:42  Ca    8.1        09 Dec 2019 06:34  Mg     1.8       09 Dec 2019 06:34  Mg     1.8       08 Dec 2019 16:51    TPro  5.5    /  Alb  1.9    /  TBili  1.5    /  DBili  x      /  AST  265    /  ALT  146    /  AlkPhos  100    08 Dec 2019 16:51    PT/INR - ( 10 Dec 2019 07:42 )   PT: 31.80 sec;   INR: 2.79 ratio         PTT - ( 08 Dec 2019 16:51 )  PTT:39.4 sec      Culture - Blood (collected 07 Dec 2019 22:30)  Source: .Blood Blood-Peripheral  Preliminary Report (09 Dec 2019 05:01):    No growth to date.    Culture - Blood (collected 07 Dec 2019 22:30)  Source: .Blood Blood-Peripheral  Preliminary Report (09 Dec 2019 05:01):    No growth to date.    Culture - Urine (collected 07 Dec 2019 22:18)  Source: .Urine Clean Catch (Midstream)  Final Report (09 Dec 2019 07:22):    <10,000 CFU/mL Normal Urogenital Mimi        Radiology:    General: comfortable, NAD  Neurology: A&Ox3, nonfocal  Head:  Normocephalic, atraumatic  ENT:  Mucosa moist, no ulcerations  Neck:  Supple, no JVD,   Skin: no breakdowns (as per RN)  Resp: CTA B/L  CV: RRR, S1S2,   GI: Soft, NT, bowel sounds  MS: No edema, + peripheral pulses, FROM all 4 extremity

## 2019-12-10 NOTE — CONSULT NOTE ADULT - ASSESSMENT
IMPRESSION: Rehab of gait dysfunction    PRECAUTIONS: [  ] Cardiac  [  ] Respiratory  [  ] Seizures [  ] Contact Isolation  [  ] Droplet Isolation  [  ] Other    Weight Bearing Status:     RECOMMENDATION:    Out of Bed to Chair     DVT/Decubiti Prophylaxis    REHAB PLAN:     [ x  ] Bedside P/T 3-5 times a week   [   ]   Bedside O/T  2-3 times a week             [   ] No Rehab Therapy Indicated                   [   ]  Speech Therapy   Conditioning/ROM                                    ADL  Bed Mobility                                               Conditioning/ROM  Transfers                                                     Bed Mobility  Sitting /Standing Balance                         Transfers                                        Gait Training                                               Sitting/Standing Balance  Stair Training [   ]Applicable                    Home equipment Eval                                                                        Splinting  [   ] Only      GOALS:   ADL   [   ]   Independent                    Transfers  [ x  ] Independent                          Ambulation  [  x ] Independent     [  x  ] With device                            [   ]  CG                                                         [   ]  CG                                                                  [   ] CG                            [    ] Min A                                                   [   ] Min A                                                              [   ] Min  A          DISCHARGE PLAN:   [   ]  Good candidate for Intensive Rehabilitation/Hospital based                                             Will tolerate 3hrs Intensive Rehab Daily                                       [    ]  Short Term Rehab in Skilled Nursing Facility                                       [ x   ]  Home with Outpatient or  services                                         [    ]  Possible Candidate for Intensive Hospital based Rehab

## 2019-12-10 NOTE — PROGRESS NOTE ADULT - SUBJECTIVE AND OBJECTIVE BOX
SUBJECTIVE:    Patient is a 69y old Male who presents with a chief complaint of Worsening SOB (09 Dec 2019 15:12)    Overnight Events: Patient is stable, no acute events overnight.  His sob has improved, he is saturating well on RA.  VS are stable, no major complaints.    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     ALLERGIES:  No Known Allergies    MEDICATIONS:  STANDING MEDICATIONS  atorvastatin 40 milliGRAM(s) Oral at bedtime  cefepime   IVPB 2000 milliGRAM(s) IV Intermittent every 12 hours  doxycycline IVPB      doxycycline IVPB 100 milliGRAM(s) IV Intermittent every 12 hours  hydrOXYzine  Oral Tab/Cap - Peds 25 milliGRAM(s) Oral three times a day  lactulose Syrup 20 Gram(s) Oral two times a day  pantoprazole    Tablet 40 milliGRAM(s) Oral before breakfast  rifAXIMin 550 milliGRAM(s) Oral two times a day  sucralfate 1 Gram(s) Oral four times a day  tamsulosin Oral Tab/Cap - Peds 0.4 milliGRAM(s) Oral at bedtime  ursodiol Capsule 300 milliGRAM(s) Oral every 12 hours    PRN MEDICATIONS  lactulose Syrup 20 Gram(s) Oral three times a day PRN    VITALS:   T(F): 98.5, Max: 100.2 (12-09-19 @ 21:25)  HR: 74 (71 - 78)  BP: 103/57 (101/54 - 118/56)  RR: 18 (18 - 20)  SpO2: 95% (95% - 95%)    LABS:                        8.1    4.82  )-----------( 82       ( 10 Dec 2019 07:42 )             24.2     12-09    130<L>  |  102  |  34<H>  ----------------------------<  76  4.4   |  15<L>  |  2.0<H>    Ca    8.1<L>      09 Dec 2019 06:34  Mg     1.8     12-09    TPro  5.5<L>  /  Alb  1.9<L>  /  TBili  1.5<H>  /  DBili  x   /  AST  265<H>  /  ALT  146<H>  /  AlkPhos  100  12-08    PT/INR - ( 10 Dec 2019 07:42 )   PT: 31.80 sec;   INR: 2.79 ratio         PTT - ( 08 Dec 2019 16:51 )  PTT:39.4 sec          Culture - Blood (collected 07 Dec 2019 22:30)  Source: .Blood Blood-Peripheral  Preliminary Report (09 Dec 2019 05:01):    No growth to date.    Culture - Blood (collected 07 Dec 2019 22:30)  Source: .Blood Blood-Peripheral  Preliminary Report (09 Dec 2019 05:01):    No growth to date.    Culture - Urine (collected 07 Dec 2019 22:18)  Source: .Urine Clean Catch (Midstream)  Final Report (09 Dec 2019 07:22):    <10,000 CFU/mL Normal Urogenital Mimi              12-09-19 @ 07:01  -  12-10-19 @ 07:00  --------------------------------------------------------  IN: 0 mL / OUT: 150 mL / NET: -150 mL      PHYSICAL EXAM:  GEN: NAD, comfortable  LUNGS: CTAB, no w/r/r  HEART: RRR, s1 and s2 appreciated, no m/r/g  ABD: soft, distended   EXT: 1+ b/l LLE, tenderness of b/l calf  NEURO: AAOX3

## 2019-12-11 ENCOUNTER — APPOINTMENT (OUTPATIENT)
Dept: CARDIOLOGY | Facility: CLINIC | Age: 69
End: 2019-12-11

## 2019-12-11 LAB
ALBUMIN SERPL ELPH-MCNC: 1.9 G/DL — LOW (ref 3.5–5.2)
ALP SERPL-CCNC: 93 U/L — SIGNIFICANT CHANGE UP (ref 30–115)
ALT FLD-CCNC: 127 U/L — HIGH (ref 0–41)
ANION GAP SERPL CALC-SCNC: 13 MMOL/L — SIGNIFICANT CHANGE UP (ref 7–14)
AST SERPL-CCNC: 154 U/L — HIGH (ref 0–41)
BASOPHILS # BLD AUTO: 0.05 K/UL — SIGNIFICANT CHANGE UP (ref 0–0.2)
BASOPHILS NFR BLD AUTO: 1.1 % — HIGH (ref 0–1)
BILIRUB SERPL-MCNC: 1.6 MG/DL — HIGH (ref 0.2–1.2)
BUN SERPL-MCNC: 39 MG/DL — HIGH (ref 10–20)
CALCIUM SERPL-MCNC: 8.2 MG/DL — LOW (ref 8.5–10.1)
CHLORIDE SERPL-SCNC: 106 MMOL/L — SIGNIFICANT CHANGE UP (ref 98–110)
CO2 SERPL-SCNC: 13 MMOL/L — LOW (ref 17–32)
CREAT SERPL-MCNC: 1.8 MG/DL — HIGH (ref 0.7–1.5)
EOSINOPHIL # BLD AUTO: 0.32 K/UL — SIGNIFICANT CHANGE UP (ref 0–0.7)
EOSINOPHIL NFR BLD AUTO: 7.1 % — SIGNIFICANT CHANGE UP (ref 0–8)
GLUCOSE BLDC GLUCOMTR-MCNC: 104 MG/DL — HIGH (ref 70–99)
GLUCOSE SERPL-MCNC: 100 MG/DL — HIGH (ref 70–99)
HCT VFR BLD CALC: 24.3 % — LOW (ref 42–52)
HGB BLD-MCNC: 8 G/DL — LOW (ref 14–18)
IMM GRANULOCYTES NFR BLD AUTO: 1.1 % — HIGH (ref 0.1–0.3)
INR BLD: 2.62 RATIO — HIGH (ref 0.65–1.3)
LYMPHOCYTES # BLD AUTO: 0.55 K/UL — LOW (ref 1.2–3.4)
LYMPHOCYTES # BLD AUTO: 12.1 % — LOW (ref 20.5–51.1)
MCHC RBC-ENTMCNC: 31.1 PG — HIGH (ref 27–31)
MCHC RBC-ENTMCNC: 32.9 G/DL — SIGNIFICANT CHANGE UP (ref 32–37)
MCV RBC AUTO: 94.6 FL — HIGH (ref 80–94)
MONOCYTES # BLD AUTO: 0.67 K/UL — HIGH (ref 0.1–0.6)
MONOCYTES NFR BLD AUTO: 14.8 % — HIGH (ref 1.7–9.3)
NEUTROPHILS # BLD AUTO: 2.89 K/UL — SIGNIFICANT CHANGE UP (ref 1.4–6.5)
NEUTROPHILS NFR BLD AUTO: 63.8 % — SIGNIFICANT CHANGE UP (ref 42.2–75.2)
NRBC # BLD: 0 /100 WBCS — SIGNIFICANT CHANGE UP (ref 0–0)
PLATELET # BLD AUTO: 81 K/UL — LOW (ref 130–400)
POTASSIUM SERPL-MCNC: 4.2 MMOL/L — SIGNIFICANT CHANGE UP (ref 3.5–5)
POTASSIUM SERPL-SCNC: 4.2 MMOL/L — SIGNIFICANT CHANGE UP (ref 3.5–5)
PROCALCITONIN SERPL-MCNC: 0.21 NG/ML — HIGH (ref 0.02–0.1)
PROT SERPL-MCNC: 5.7 G/DL — LOW (ref 6–8)
PROTHROM AB SERPL-ACNC: 29.9 SEC — HIGH (ref 9.95–12.87)
RBC # BLD: 2.57 M/UL — LOW (ref 4.7–6.1)
RBC # FLD: 17.2 % — HIGH (ref 11.5–14.5)
SODIUM SERPL-SCNC: 132 MMOL/L — LOW (ref 135–146)
WBC # BLD: 4.53 K/UL — LOW (ref 4.8–10.8)
WBC # FLD AUTO: 4.53 K/UL — LOW (ref 4.8–10.8)

## 2019-12-11 PROCEDURE — 93010 ELECTROCARDIOGRAM REPORT: CPT

## 2019-12-11 PROCEDURE — 71045 X-RAY EXAM CHEST 1 VIEW: CPT | Mod: 26

## 2019-12-11 PROCEDURE — 93970 EXTREMITY STUDY: CPT | Mod: 26

## 2019-12-11 RX ORDER — SODIUM BICARBONATE 1 MEQ/ML
650 SYRINGE (ML) INTRAVENOUS THREE TIMES A DAY
Refills: 0 | Status: DISCONTINUED | OUTPATIENT
Start: 2019-12-11 | End: 2019-12-13

## 2019-12-11 RX ORDER — WARFARIN SODIUM 2.5 MG/1
1 TABLET ORAL ONCE
Refills: 0 | Status: COMPLETED | OUTPATIENT
Start: 2019-12-11 | End: 2019-12-11

## 2019-12-11 RX ORDER — METOPROLOL TARTRATE 50 MG
12.5 TABLET ORAL ONCE
Refills: 0 | Status: COMPLETED | OUTPATIENT
Start: 2019-12-11 | End: 2019-12-11

## 2019-12-11 RX ADMIN — LACTULOSE 20 GRAM(S): 10 SOLUTION ORAL at 16:48

## 2019-12-11 RX ADMIN — Medication 25 MILLIGRAM(S): at 05:40

## 2019-12-11 RX ADMIN — WARFARIN SODIUM 1 MILLIGRAM(S): 2.5 TABLET ORAL at 21:58

## 2019-12-11 RX ADMIN — Medication 1 GRAM(S): at 22:11

## 2019-12-11 RX ADMIN — CEFEPIME 100 MILLIGRAM(S): 1 INJECTION, POWDER, FOR SOLUTION INTRAMUSCULAR; INTRAVENOUS at 05:39

## 2019-12-11 RX ADMIN — Medication 12.5 MILLIGRAM(S): at 18:31

## 2019-12-11 RX ADMIN — ATORVASTATIN CALCIUM 40 MILLIGRAM(S): 80 TABLET, FILM COATED ORAL at 21:57

## 2019-12-11 RX ADMIN — Medication 1 GRAM(S): at 17:37

## 2019-12-11 RX ADMIN — PANTOPRAZOLE SODIUM 40 MILLIGRAM(S): 20 TABLET, DELAYED RELEASE ORAL at 05:39

## 2019-12-11 RX ADMIN — Medication 1 GRAM(S): at 11:32

## 2019-12-11 RX ADMIN — Medication 1 GRAM(S): at 05:39

## 2019-12-11 RX ADMIN — Medication 650 MILLIGRAM(S): at 21:59

## 2019-12-11 RX ADMIN — URSODIOL 300 MILLIGRAM(S): 250 TABLET, FILM COATED ORAL at 05:40

## 2019-12-11 RX ADMIN — Medication 110 MILLIGRAM(S): at 05:40

## 2019-12-11 RX ADMIN — LACTULOSE 20 GRAM(S): 10 SOLUTION ORAL at 05:39

## 2019-12-11 RX ADMIN — URSODIOL 300 MILLIGRAM(S): 250 TABLET, FILM COATED ORAL at 17:37

## 2019-12-11 RX ADMIN — Medication 25 MILLIGRAM(S): at 13:25

## 2019-12-11 RX ADMIN — Medication 25 MILLIGRAM(S): at 21:58

## 2019-12-11 NOTE — CONSULT NOTE ADULT - ASSESSMENT
CHESTER   - improving (Cr 2.2 on admission --> now 1.8)   - likely prerenal from diuretics   - cannot r/o evolution into HRS 2  CKD 3  metabolic acidosis  hypervolemic hyponatremia due to cirrhosis  hepatic encephalopathy  cirrhosis  JIMÉNEZ  moderate ascites  pancytopenia / splenomegaly  DM2  CAD / PCI / Afib on coumadin      plan:    cont holding diuretics  add na bicarb 650mg po tid  dc flomax  trend renal function  if HCO3 worsens, check ABG  no indication for HRS cocktail yet (midodrine / octreotide / iv albumin)  cont lactulose  may need therapeutic paracentesis with iv albumin, but family wants to wait until transfer to [facility]  please facilitate transfer to [facility]  d/w resident and family at bedside

## 2019-12-11 NOTE — CONSULT NOTE ADULT - SUBJECTIVE AND OBJECTIVE BOX
SOCORRO MANUEL  69y, Male  Allergy: No Known Allergies      All historical available data reviewed.    HPI:  70 yo M with hx of Cirrhosis due to JIMÉNEZ (on liver transplant at [facility]), Hx of multiple hepatic encephalopathy, Ascites (hx of multiple paracentesis), Thrombocytopenia, CAD s/p 2 stents, A.fib (on coumadin), Anemia, CKD, HTN, DM II, BPH and HLD presents for evaluation of worsening shortness of breath. Patient was recently admitted in Oct 2019 for SOB secondary to decompensating cirrhosis. Patient reports that he was doing fine since discharge until about a week ago when he started feel very tired and b/l LE weakness, SOB and productive cough. Patient reports that his grandchild had cold 2 weeks ago. Denied fever, recent illness, rhinorrhea, sore throat, headache, chest pain, palpitation, abdominal pain, change in bowel movement, change in urination, dysuria. Patient recently had paracentesis at [facility] about 4 days ago w/o complications. 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: 15 years ago        VITALS:  T(F): 96.9, Max: 99.2 (12-10-19 @ 21:16)  HR: 89  BP: 94/52  RR: 18Vital Signs Last 24 Hrs  T(C): 36.1 (11 Dec 2019 04:53), Max: 37.3 (10 Dec 2019 21:16)  T(F): 96.9 (11 Dec 2019 04:53), Max: 99.2 (10 Dec 2019 21:16)  HR: 89 (11 Dec 2019 10:07) (78 - 89)  BP: 94/52 (11 Dec 2019 04:53) (94/52 - 95/52)  BP(mean): --  RR: 18 (11 Dec 2019 04:53) (18 - 20)  SpO2: 98% (11 Dec 2019 10:07) (98% - 98%)    TESTS & MEASUREMENTS:                        8.0    4.53  )-----------( 81       ( 11 Dec 2019 07:52 )             24.3     12-11    132<L>  |  106  |  39<H>  ----------------------------<  100<H>  4.2   |  13<L>  |  1.8<H>    Ca    8.2<L>      11 Dec 2019 07:52    TPro  5.7<L>  /  Alb  1.9<L>  /  TBili  1.6<H>  /  DBili  x   /  AST  154<H>  /  ALT  127<H>  /  AlkPhos  93  12-11    LIVER FUNCTIONS - ( 11 Dec 2019 07:52 )  Alb: 1.9 g/dL / Pro: 5.7 g/dL / ALK PHOS: 93 U/L / ALT: 127 U/L / AST: 154 U/L / GGT: x             Culture - Blood (collected 12-07-19 @ 22:30)  Source: .Blood Blood-Peripheral  Preliminary Report (12-09-19 @ 05:01):    No growth to date.    Culture - Blood (collected 12-07-19 @ 22:30)  Source: .Blood Blood-Peripheral  Preliminary Report (12-09-19 @ 05:01):    No growth to date.    Culture - Urine (collected 12-07-19 @ 22:18)  Source: .Urine Clean Catch (Midstream)  Final Report (12-09-19 @ 07:22):    <10,000 CFU/mL Normal Urogenital Immi            RADIOLOGY & ADDITIONAL TESTS:  Personal review of radiological diagnostics performed  Echo and EKG results noted when applicable.     ANTIBIOTICS:  cefepime   IVPB 2000 milliGRAM(s) IV Intermittent every 12 hours  doxycycline IVPB      doxycycline IVPB 100 milliGRAM(s) IV Intermittent every 12 hours  rifAXIMin 550 milliGRAM(s) Oral two times a day

## 2019-12-11 NOTE — CONSULT NOTE ADULT - ASSESSMENT
IMP:  - JIMÉNEZ with cirrhosis - on liver transplant list at [facility], and anticipating transfer to his MD there  - recurrent episodes of encephalopathy and ascites r/t the above  - SOB - no pneumonia, per ID  - h/o CAD, DM, A fib on Coumadin, BPH, CKD  - hypoalbuminemia due to cirrhosis    SUGGEST:  - understand that albumin levels are not a marker of nutritional status. In this case, low alb is due to liver's inability to manufacture it.  - That said, pt needs to ingest adequate protein (but not too much) & may do better with less meat proteins, more milk and egg and non-meat protein foods.     Attention to potassium content if renal function worsens or if spironolactone given.  - obtain zinc level, r/t altered food intake and to lactulose-induced loose BMs  - check 25oh vitamin D level  - consider changing PPI (which he's been taking for many months) to Pepcid, jocelynn if vitamin D and calcium are an issue  - d/w pt iron absorption and his po Fe dose and side effects  - d/w pt fish oil pills vs increasing omega 3 fat content of his diet  - would d/w Prostat 64 or any such supplement. Prefer that pt added small amounts of protein foods (such as walnuts, milk, yogurt) to his between meal snacks instead. If pr does not ingest the ~ 70-80 gm pf protein/d he needs, would add Beneprotein as a supplement to meet, but not exceed, needs.  - suggest adding muscle strengthening exercises (jocelynn quads and arms) when able, in attempt to both prevent further LBM loss and to increase extra-hepatic protein synthesis.

## 2019-12-11 NOTE — CONSULT NOTE ADULT - ASSESSMENT
69y old Male who presents with a chief complaint of Worsening SOB    IMPRESSION:  No PNA  -clinically no PNA  -CXR no consolidation  -WBC 4.5  -BNP 1065    RECOMMENDATIONS:  -d/c ABx

## 2019-12-11 NOTE — PROGRESS NOTE ADULT - SUBJECTIVE AND OBJECTIVE BOX
SUBJECTIVE:    Patient is a 69y old Male who presents with a chief complaint of Worsening SOB (11 Dec 2019 10:07)    Overnight Events: Patient is stable, no acute events overnight.  He is still complaining of SOB on exertion. At baseline he could walk and climb stairs without SOB.  VS are stable, still complaining of pain in his b/l calf, awaiting va duplex of LE.    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     ALLERGIES:  No Known Allergies    MEDICATIONS:  STANDING MEDICATIONS  atorvastatin 40 milliGRAM(s) Oral at bedtime  cefepime   IVPB 2000 milliGRAM(s) IV Intermittent every 12 hours  doxycycline IVPB      doxycycline IVPB 100 milliGRAM(s) IV Intermittent every 12 hours  hydrOXYzine  Oral Tab/Cap - Peds 25 milliGRAM(s) Oral three times a day  pantoprazole    Tablet 40 milliGRAM(s) Oral before breakfast  rifAXIMin 550 milliGRAM(s) Oral two times a day  sucralfate 1 Gram(s) Oral four times a day  tamsulosin Oral Tab/Cap - Peds 0.4 milliGRAM(s) Oral at bedtime  ursodiol Capsule 300 milliGRAM(s) Oral every 12 hours    PRN MEDICATIONS  lactulose Syrup 20 Gram(s) Oral three times a day PRN    VITALS:   T(F): 96.9, Max: 99.2 (12-10-19 @ 21:16)  HR: 89 (78 - 89)  BP: 94/52 (94/52 - 105/55)  RR: 18 (18 - 20)  SpO2: 98% (98% - 98%)    LABS:                        8.0    4.53  )-----------( 81       ( 11 Dec 2019 07:52 )             24.3     12-11    132<L>  |  106  |  39<H>  ----------------------------<  100<H>  4.2   |  13<L>  |  1.8<H>    Ca    8.2<L>      11 Dec 2019 07:52    TPro  5.7<L>  /  Alb  1.9<L>  /  TBili  1.6<H>  /  DBili  x   /  AST  154<H>  /  ALT  127<H>  /  AlkPhos  93  12-11    PT/INR - ( 11 Dec 2019 07:52 )   PT: 29.90 sec;   INR: 2.62 ratio                           12-10-19 @ 07:01  -  12-11-19 @ 07:00  --------------------------------------------------------  IN: 0 mL / OUT: 250 mL / NET: -250 mL        PHYSICAL EXAM:  GEN: NAD, comfortable  LUNGS: CTAB, no w/r/r  HEART: RRR, s1 and s2 appreciated, no m/r/g  ABD: soft, abdominal distention  EXT: b/l LE edema, 2+  NEURO: AAOX3

## 2019-12-11 NOTE — CONSULT NOTE ADULT - SUBJECTIVE AND OBJECTIVE BOX
70 yo M with hx of Cirrhosis due to JIMÉNEZ (on liver transplant at [facility]), Hx of multiple admissions for hepatic encephalopathy, Ascites (hx of multiple paracentesis), Thrombocytopenia, CAD s/p 2 stents, A.fib (on coumadin), Anemia, CKD, HTN, DM II, BPH and HLD presents for evaluation of worsening shortness of breath. Patient was recently admitted in Oct 2019 for SOB secondary to decompensating cirrhosis. Patient reports that he was doing fine since discharge until about a week ago when he started feel very tired and b/l LE weakness, SOB and productive cough. Patient reports that his grandchild had cold 2 weeks ago. Denied fever, recent illness, rhinorrhea, sore throat, headache, chest pain, palpitation, abdominal pain, change in bowel movement, change in urination, dysuria. Patient recently had paracentesis at [facility] about 4 days apta w/o complications. 	    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: 15 years ago    Vital Signs Last 24 Hrs  T(C): 36.1 (11 Dec 2019 14:14), Max: 37.3 (10 Dec 2019 21:16)  T(F): 97 (11 Dec 2019 14:14), Max: 99.2 (10 Dec 2019 21:16)  HR: 114 (11 Dec 2019 16:29) (78 - 127)  BP: 95/52 (11 Dec 2019 16:29) (94/52 - 106/60)  BP(mean): --  RR: 18 (11 Dec 2019 14:14) (18 - 20)  SpO2: 98% (11 Dec 2019 14:23) (98% - 98%)  Drug Dosing Weight  Height (cm): 175.3 (08 Dec 2019 23:00)       IBW about 73 kg  Weight (kg): 97.1 (08 Dec 2019 06:08)     spurious, due to ascites and edema  alert, verbal, Ox3  skin turgor good, conj pale  no asterixis  abd distended, not quite soft, NT, + reports feeling some abd pressure, +c/o early satiety and overall diminished appetite     no dysgeusia, no dysphagia, no textural issues,  + loose BMs r/t lactulose  4+ LE edema to over knees bilat  ++ loss of LBM visible in hands, clavicular area, quads.    MEDICATIONS  (STANDING):  atorvastatin 40 milliGRAM(s) Oral at bedtime  hydrOXYzine  Oral Tab/Cap - Peds 25 milliGRAM(s) Oral three times a day  pantoprazole    Tablet 40 milliGRAM(s) Oral before breakfast  rifAXIMin 550 milliGRAM(s) Oral two times a day  sucralfate 1 Gram(s) Oral four times a day  tamsulosin Oral Tab/Cap - Peds 0.4 milliGRAM(s) Oral at bedtime  ursodiol Capsule 300 milliGRAM(s) Oral every 12 hours  warfarin 1 milliGRAM(s) Oral once  long list of home meds reviewed with pt and his wife at bedside                        8.0    4.53  )-----------( 81       ( 11 Dec 2019 07:52 )             24.3   12-11    132<L>  |  106  |  39<H>  ----------------------------<  100<H>  4.2   |  13<L>  |  1.8<H>    Ca    8.2<L>      11 Dec 2019 07:52  TPro  5.7<L>  /  Alb  1.9<L>  /  TBili  1.6<H>  /  DBili  x   /  AST  154<H>  /  ALT  127<H>  /  AlkPhos  93  12-11  no phos levels  Vitamin B12, Serum (10.02.19 @ 08:29)    Vitamin B12, Serum: 1451 pg/mL  Folate, Serum in AM (10.02.19 @ 08:29)    Folate, Serum: 10.6 ng/mL  Lipid Profile (10.24.08 @ 19:21)    Triglycerides, Serum: 210 mg/dL  Lipid Profile (10.24.08 @ 19:21)    Cholesterol, Serum: 129 mg/dL    HDL Cholesterol, Serum: 20 mg/dL  no Zn or vit D levels    current diet order: carb consistent, 1200 ml fluid restriction, low salt  and Prostat 64, which is not formulary here, which explains why he hasn't gotten it!

## 2019-12-11 NOTE — CONSULT NOTE ADULT - SUBJECTIVE AND OBJECTIVE BOX
NEPHROLOGY CONSULTATION NOTE    8 yo Man with PMH of AFIB on Coumadin, HTN, CKD, hyperlipidemia, CAD s/p 2 stents years back, BPH, DM type II, JIMÉNEZ cirrhosis complicated by ascites, hepatic encephalopathy admitted for SOB.  Renal consulted for worsening renal function.       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  GASTROINTESTINAL: No abdominal or epigastric pain. No nausea, vomiting, or hematemesis; No diarrhea or constipation. No melena or hematochezia.  GENITOURINARY: No dysuria, frequency, foamy urine, urinary urgency, incontinence or hematuria  NEUROLOGICAL: No numbness or weakness  SKIN: No itching, burning, rashes, or lesions   VASCULAR: No bilateral lower extremity edema.   All other review of systems is negative unless indicated above.    PHYSICAL EXAM:  NAD  awake and alert  moist mm  no jvd   cta bl  rrr  soft, nt  no cvat  + distention  + edema  no rash    Hospital Medications:   MEDICATIONS  (STANDING):  atorvastatin 40 milliGRAM(s) Oral at bedtime  hydrOXYzine  Oral Tab/Cap - Peds 25 milliGRAM(s) Oral three times a day  pantoprazole    Tablet 40 milliGRAM(s) Oral before breakfast  rifAXIMin 550 milliGRAM(s) Oral two times a day  sodium bicarbonate 650 milliGRAM(s) Oral three times a day  sucralfate 1 Gram(s) Oral four times a day  ursodiol Capsule 300 milliGRAM(s) Oral every 12 hours  warfarin 1 milliGRAM(s) Oral once        VITALS:  T(F): 97 (19 @ 14:14), Max: 99.2 (12-10-19 @ 21:16)  HR: 114 (19 @ 16:29)  BP: 95/52 (19 @ 16:29)  RR: 18 (19 @ 14:14)  SpO2: 98% (19 @ 14:23)  Wt(kg): --     @ 07:01  -  12-10 @ 07:00  --------------------------------------------------------  IN: 0 mL / OUT: 150 mL / NET: -150 mL    12-10 @ 07:01  -   @ 07:00  --------------------------------------------------------  IN: 0 mL / OUT: 250 mL / NET: -250 mL          LABS:      132<L>  |  106  |  39<H>  ----------------------------<  100<H>  4.2   |  13<L>  |  1.8<H>    Ca    8.2<L>      11 Dec 2019 07:52    TPro  5.7<L>  /  Alb  1.9<L>  /  TBili  1.6<H>  /  DBili      /  AST  154<H>  /  ALT  127<H>  /  AlkPhos  93                            8.0    4.53  )-----------( 81       ( 11 Dec 2019 07:52 )             24.3       Urine Studies:  Urinalysis Basic - ( 07 Dec 2019 22:18 )    Color: Yellow / Appearance: Clear / S.022 / pH:   Gluc:  / Ketone: Trace  / Bili: Negative / Urobili: <2 mg/dL   Blood:  / Protein: 30 mg/dL / Nitrite: Negative   Leuk Esterase: Negative / RBC: 2 /HPF / WBC 4 /HPF   Sq Epi:  / Non Sq Epi: 2 /HPF / Bacteria: Negative      Sodium, Random Urine: 38.0 mmoL/L ( @ 05:07)  Protein/Creatinine Ratio Calculation: 0.3 Ratio ( @ 05:07)  Creatinine, Random Urine: 194 mg/dL ( @ 05:07)      RADIOLOGY & ADDITIONAL STUDIES:

## 2019-12-11 NOTE — PROGRESS NOTE ADULT - SUBJECTIVE AND OBJECTIVE BOX
Patient was seen and examined. Spoke with RN. Chart reviewed.  No events overnight. Very comfortable, in good spirits  Vital Signs Last 24 Hrs  T(F): 96.9 (11 Dec 2019 04:53), Max: 99.2 (10 Dec 2019 21:16)  HR: 78 (11 Dec 2019 04:53) (78 - 88)  BP: 94/52 (11 Dec 2019 04:53) (94/52 - 105/55)  SpO2: --  MEDICATIONS  (STANDING):  atorvastatin 40 milliGRAM(s) Oral at bedtime  cefepime   IVPB 2000 milliGRAM(s) IV Intermittent every 12 hours  doxycycline IVPB      doxycycline IVPB 100 milliGRAM(s) IV Intermittent every 12 hours  hydrOXYzine  Oral Tab/Cap - Peds 25 milliGRAM(s) Oral three times a day  pantoprazole    Tablet 40 milliGRAM(s) Oral before breakfast  rifAXIMin 550 milliGRAM(s) Oral two times a day  sucralfate 1 Gram(s) Oral four times a day  tamsulosin Oral Tab/Cap - Peds 0.4 milliGRAM(s) Oral at bedtime  ursodiol Capsule 300 milliGRAM(s) Oral every 12 hours    MEDICATIONS  (PRN):  lactulose Syrup 20 Gram(s) Oral three times a day PRN target 3-4 bowel movements per day    Labs:                        8.0    4.53  )-----------( 81       ( 11 Dec 2019 07:52 )             24.3                         8.1    4.82  )-----------( 82       ( 10 Dec 2019 07:42 )             24.2     11 Dec 2019 07:52    132    |  106    |  39     ----------------------------<  100    4.2     |  13     |  1.8    10 Dec 2019 07:42    129    |  102    |  40     ----------------------------<  106    4.1     |  15     |  2.0      Ca    8.2        11 Dec 2019 07:52  Ca    8.1        10 Dec 2019 07:42    TPro  5.7    /  Alb  1.9    /  TBili  1.6    /  DBili  x      /  AST  154    /  ALT  127    /  AlkPhos  93     11 Dec 2019 07:52    PT/INR - ( 11 Dec 2019 07:52 )   PT: 29.90 sec;   INR: 2.62 ratio        A/P:  68 yo man with hx of Cirrhosis due to JIMÉNEZ (on liver transplant at [facility]), Hx of multiple hepatic encephalopathy, Ascites (hx of multiple paracentesis), Thrombocytopenia, CAD s/p 2 stents, A.fib (on coumadin), Anemia, CKD, HTN, DM II, BPH and HLD admitted for shortness of breath; now improved    change to PO abx    DC today on all home meds    outpt renal, cardio, GI f/u    DVT prophylaxis  Decubitus prevention- all measures as per RN protocol  Please call or text me with any questions or updates

## 2019-12-12 LAB
ALBUMIN SERPL ELPH-MCNC: 1.7 G/DL — LOW (ref 3.5–5.2)
ALP SERPL-CCNC: 94 U/L — SIGNIFICANT CHANGE UP (ref 30–115)
ALT FLD-CCNC: 117 U/L — HIGH (ref 0–41)
ANION GAP SERPL CALC-SCNC: 12 MMOL/L — SIGNIFICANT CHANGE UP (ref 7–14)
AST SERPL-CCNC: 142 U/L — HIGH (ref 0–41)
BASOPHILS # BLD AUTO: 0.06 K/UL — SIGNIFICANT CHANGE UP (ref 0–0.2)
BASOPHILS NFR BLD AUTO: 1.2 % — HIGH (ref 0–1)
BILIRUB SERPL-MCNC: 1.4 MG/DL — HIGH (ref 0.2–1.2)
BUN SERPL-MCNC: 43 MG/DL — HIGH (ref 10–20)
CALCIUM SERPL-MCNC: 8.3 MG/DL — LOW (ref 8.5–10.1)
CHLORIDE SERPL-SCNC: 107 MMOL/L — SIGNIFICANT CHANGE UP (ref 98–110)
CO2 SERPL-SCNC: 15 MMOL/L — LOW (ref 17–32)
CREAT SERPL-MCNC: 1.9 MG/DL — HIGH (ref 0.7–1.5)
EOSINOPHIL # BLD AUTO: 0.44 K/UL — SIGNIFICANT CHANGE UP (ref 0–0.7)
EOSINOPHIL NFR BLD AUTO: 8.5 % — HIGH (ref 0–8)
GLUCOSE BLDC GLUCOMTR-MCNC: 116 MG/DL — HIGH (ref 70–99)
GLUCOSE BLDC GLUCOMTR-MCNC: 140 MG/DL — HIGH (ref 70–99)
GLUCOSE SERPL-MCNC: 84 MG/DL — SIGNIFICANT CHANGE UP (ref 70–99)
HCT VFR BLD CALC: 24.1 % — LOW (ref 42–52)
HGB BLD-MCNC: 7.9 G/DL — LOW (ref 14–18)
IMM GRANULOCYTES NFR BLD AUTO: 0.8 % — HIGH (ref 0.1–0.3)
INR BLD: 2.49 RATIO — HIGH (ref 0.65–1.3)
LYMPHOCYTES # BLD AUTO: 0.63 K/UL — LOW (ref 1.2–3.4)
LYMPHOCYTES # BLD AUTO: 12.2 % — LOW (ref 20.5–51.1)
MCHC RBC-ENTMCNC: 31.2 PG — HIGH (ref 27–31)
MCHC RBC-ENTMCNC: 32.8 G/DL — SIGNIFICANT CHANGE UP (ref 32–37)
MCV RBC AUTO: 95.3 FL — HIGH (ref 80–94)
MONOCYTES # BLD AUTO: 0.76 K/UL — HIGH (ref 0.1–0.6)
MONOCYTES NFR BLD AUTO: 14.7 % — HIGH (ref 1.7–9.3)
NEUTROPHILS # BLD AUTO: 3.23 K/UL — SIGNIFICANT CHANGE UP (ref 1.4–6.5)
NEUTROPHILS NFR BLD AUTO: 62.6 % — SIGNIFICANT CHANGE UP (ref 42.2–75.2)
NRBC # BLD: 0 /100 WBCS — SIGNIFICANT CHANGE UP (ref 0–0)
PLATELET # BLD AUTO: 83 K/UL — LOW (ref 130–400)
POTASSIUM SERPL-MCNC: 5.2 MMOL/L — HIGH (ref 3.5–5)
POTASSIUM SERPL-SCNC: 5.2 MMOL/L — HIGH (ref 3.5–5)
PROT SERPL-MCNC: 5.6 G/DL — LOW (ref 6–8)
PROTHROM AB SERPL-ACNC: 28.4 SEC — HIGH (ref 9.95–12.87)
RBC # BLD: 2.53 M/UL — LOW (ref 4.7–6.1)
RBC # FLD: 17.2 % — HIGH (ref 11.5–14.5)
SODIUM SERPL-SCNC: 134 MMOL/L — LOW (ref 135–146)
WBC # BLD: 5.16 K/UL — SIGNIFICANT CHANGE UP (ref 4.8–10.8)
WBC # FLD AUTO: 5.16 K/UL — SIGNIFICANT CHANGE UP (ref 4.8–10.8)

## 2019-12-12 PROCEDURE — 93010 ELECTROCARDIOGRAM REPORT: CPT

## 2019-12-12 RX ORDER — WARFARIN SODIUM 2.5 MG/1
2.5 TABLET ORAL ONCE
Refills: 0 | Status: COMPLETED | OUTPATIENT
Start: 2019-12-12 | End: 2019-12-12

## 2019-12-12 RX ORDER — LACTULOSE 10 G/15ML
20 SOLUTION ORAL
Refills: 0 | Status: DISCONTINUED | OUTPATIENT
Start: 2019-12-12 | End: 2019-12-13

## 2019-12-12 RX ADMIN — Medication 650 MILLIGRAM(S): at 21:54

## 2019-12-12 RX ADMIN — URSODIOL 300 MILLIGRAM(S): 250 TABLET, FILM COATED ORAL at 05:21

## 2019-12-12 RX ADMIN — Medication 650 MILLIGRAM(S): at 05:21

## 2019-12-12 RX ADMIN — Medication 25 MILLIGRAM(S): at 05:21

## 2019-12-12 RX ADMIN — Medication 1 GRAM(S): at 05:22

## 2019-12-12 RX ADMIN — Medication 25 MILLIGRAM(S): at 13:09

## 2019-12-12 RX ADMIN — ATORVASTATIN CALCIUM 40 MILLIGRAM(S): 80 TABLET, FILM COATED ORAL at 21:53

## 2019-12-12 RX ADMIN — Medication 1 GRAM(S): at 21:51

## 2019-12-12 RX ADMIN — Medication 1 GRAM(S): at 18:00

## 2019-12-12 RX ADMIN — Medication 25 MILLIGRAM(S): at 21:54

## 2019-12-12 RX ADMIN — Medication 650 MILLIGRAM(S): at 13:09

## 2019-12-12 RX ADMIN — URSODIOL 300 MILLIGRAM(S): 250 TABLET, FILM COATED ORAL at 18:00

## 2019-12-12 RX ADMIN — PANTOPRAZOLE SODIUM 40 MILLIGRAM(S): 20 TABLET, DELAYED RELEASE ORAL at 05:21

## 2019-12-12 RX ADMIN — Medication 1 GRAM(S): at 13:09

## 2019-12-12 RX ADMIN — LACTULOSE 20 GRAM(S): 10 SOLUTION ORAL at 05:29

## 2019-12-12 RX ADMIN — WARFARIN SODIUM 2.5 MILLIGRAM(S): 2.5 TABLET ORAL at 21:54

## 2019-12-12 NOTE — PROGRESS NOTE ADULT - SUBJECTIVE AND OBJECTIVE BOX
NEPHROLOGY FOLLOW UP NOTE    pt seen and examined  still swollen  tolerating po  off diuretics  awaiting 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  GASTROINTESTINAL: No abdominal or epigastric pain. No nausea, vomiting, or hematemesis; No diarrhea or constipation. No melena or hematochezia.  GENITOURINARY: No dysuria, frequency, foamy urine, urinary urgency, incontinence or hematuria  NEUROLOGICAL: No numbness or weakness  SKIN: No itching, burning, rashes, or lesions   VASCULAR: No bilateral lower extremity edema.   All other review of systems is negative unless indicated above.    advance care planning and advance care directives reivewed and discussed    PHYSICAL EXAM:  NAD  awake and alert  moist mm  no jvd   cta bl  rrr  soft, nt  no cvat  + distention  + edema  no rash    Hospital Medications:   MEDICATIONS  (STANDING):  atorvastatin 40 milliGRAM(s) Oral at bedtime  hydrOXYzine  Oral Tab/Cap - Peds 25 milliGRAM(s) Oral three times a day  lactulose Syrup 20 Gram(s) Oral two times a day  pantoprazole    Tablet 40 milliGRAM(s) Oral before breakfast  rifAXIMin 550 milliGRAM(s) Oral two times a day  sodium bicarbonate 650 milliGRAM(s) Oral three times a day  sucralfate 1 Gram(s) Oral four times a day  ursodiol Capsule 300 milliGRAM(s) Oral every 12 hours  warfarin 2.5 milliGRAM(s) Oral once        VITALS:  T(F): 98.5 (19 @ 05:24), Max: 98.6 (19 @ 19:10)  HR: 61 (19 @ 14:23)  BP: 110/72 (19 @ 16:03)  RR: 18 (19 @ 05:24)  SpO2: 93% (19 @ 08:43)  Wt(kg): --    12-10 @ 07:01  -   @ 07:00  --------------------------------------------------------  IN: 0 mL / OUT: 250 mL / NET: -250 mL          LABS:      134<L>  |  107  |  43<H>  ----------------------------<  84  5.2<H>   |  15<L>  |  1.9<H>    Ca    8.3<L>      12 Dec 2019 06:33    TPro  5.6<L>  /  Alb  1.7<L>  /  TBili  1.4<H>  /  DBili      /  AST  142<H>  /  ALT  117<H>  /  AlkPhos  94                            7.9    5.16  )-----------( 83       ( 12 Dec 2019 06:33 )             24.1       Urine Studies:  Urinalysis Basic - ( 07 Dec 2019 22:18 )    Color: Yellow / Appearance: Clear / S.022 / pH:   Gluc:  / Ketone: Trace  / Bili: Negative / Urobili: <2 mg/dL   Blood:  / Protein: 30 mg/dL / Nitrite: Negative   Leuk Esterase: Negative / RBC: 2 /HPF / WBC 4 /HPF   Sq Epi:  / Non Sq Epi: 2 /HPF / Bacteria: Negative      Sodium, Random Urine: 38.0 mmoL/L ( @ 05:07)  Protein/Creatinine Ratio Calculation: 0.3 Ratio ( @ 05:07)  Creatinine, Random Urine: 194 mg/dL ( @ 05:07)      RADIOLOGY & ADDITIONAL STUDIES:

## 2019-12-12 NOTE — PROGRESS NOTE ADULT - SUBJECTIVE AND OBJECTIVE BOX
SUBJECTIVE:    Patient is a 69y old Male who presents with a chief complaint of Worsening SOB (11 Dec 2019 19:08)    Overnight Events: Patient is stable today.  Yesterday he had palpitations and tachycardia. ECG done showed Afib with RVR.  Blood pressure was borderline, metoprolol 12.5 mg was given and HR improved.  Today VS are wnl, he is still complaining of b/l leg pain.  u/s duplex showed no DVT.    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     ALLERGIES:  No Known Allergies    MEDICATIONS:  STANDING MEDICATIONS  atorvastatin 40 milliGRAM(s) Oral at bedtime  hydrOXYzine  Oral Tab/Cap - Peds 25 milliGRAM(s) Oral three times a day  pantoprazole    Tablet 40 milliGRAM(s) Oral before breakfast  rifAXIMin 550 milliGRAM(s) Oral two times a day  sodium bicarbonate 650 milliGRAM(s) Oral three times a day  sucralfate 1 Gram(s) Oral four times a day  ursodiol Capsule 300 milliGRAM(s) Oral every 12 hours    PRN MEDICATIONS  lactulose Syrup 20 Gram(s) Oral three times a day PRN    VITALS:   T(F): 98.5, Max: 98.6 (12-11-19 @ 19:10)  HR: 62 (58 - 127)  BP: 110/57 (92/54 - 110/57)  RR: 18 (18 - 18)  SpO2: 98% (98% - 98%)    LABS:                        8.0    4.53  )-----------( 81       ( 11 Dec 2019 07:52 )             24.3     12-11    132<L>  |  106  |  39<H>  ----------------------------<  100<H>  4.2   |  13<L>  |  1.8<H>    Ca    8.2<L>      11 Dec 2019 07:52    TPro  5.7<L>  /  Alb  1.9<L>  /  TBili  1.6<H>  /  DBili  x   /  AST  154<H>  /  ALT  127<H>  /  AlkPhos  93  12-11    PT/INR - ( 12 Dec 2019 06:33 )   PT: 28.40 sec;   INR: 2.49 ratio                               IMAGING/EKG:  < from: [facility] Duplex Lower Ext Vein Scan, Bilat (12.11.19 @ 09:42) >  Impression:    No evidence of deep venous thrombosis in the bilateral lower extremities.    < end of copied text >    PHYSICAL EXAM:  GEN: NAD, comfortable  LUNGS: CTAB, no w/r/r  HEART: RRR, s1 and s2 appreciated, no m/r/g  ABD: soft, NT/ND, +BS  EXT: no edema, PP b/l  NEURO: AAOX3

## 2019-12-12 NOTE — PROGRESS NOTE ADULT - SUBJECTIVE AND OBJECTIVE BOX
SOCORRO MANUEL  69y, Male    All available historical data reviewed    OVERNIGHT EVENTS:  no fevers, has no complaints    ROS:  General: Denies rigors, night sweats  HEENT: Denies headache, rhinorrhea, sore throat, eye pain  CV: Denies CP, palpitations  PULM: Denies wheezing, hemoptysis  GI: Denies hematemesis, hematochezia, melena  : Denies discharge, hematuria  MSK: Denies arthralgias, myalgias  SKIN: Denies rash, lesions  NEURO: Denies paresthesias, weakness  PSYCH: Denies depression, anxiety    VITALS:  T(F): 98.5, Max: 98.6 (12-11-19 @ 19:10)  HR: 62  BP: 110/57  RR: 18Vital Signs Last 24 Hrs  T(C): 36.9 (12 Dec 2019 05:24), Max: 37 (11 Dec 2019 19:10)  T(F): 98.5 (12 Dec 2019 05:24), Max: 98.6 (11 Dec 2019 19:10)  HR: 62 (12 Dec 2019 05:30) (58 - 127)  BP: 110/57 (12 Dec 2019 05:24) (92/54 - 110/57)  BP(mean): --  RR: 18 (12 Dec 2019 05:24) (18 - 18)  SpO2: 93% (12 Dec 2019 08:43) (93% - 98%)    TESTS & MEASUREMENTS:                        7.9    5.16  )-----------( 83       ( 12 Dec 2019 06:33 )             24.1     12-12    134<L>  |  107  |  43<H>  ----------------------------<  84  5.2<H>   |  15<L>  |  1.9<H>    Ca    8.3<L>      12 Dec 2019 06:33    TPro  5.6<L>  /  Alb  1.7<L>  /  TBili  1.4<H>  /  DBili  x   /  AST  142<H>  /  ALT  117<H>  /  AlkPhos  94  12-12    LIVER FUNCTIONS - ( 12 Dec 2019 06:33 )  Alb: 1.7 g/dL / Pro: 5.6 g/dL / ALK PHOS: 94 U/L / ALT: 117 U/L / AST: 142 U/L / GGT: x             Culture - Blood (collected 12-07-19 @ 22:30)  Source: .Blood Blood-Peripheral  Preliminary Report (12-09-19 @ 05:01):    No growth to date.    Culture - Blood (collected 12-07-19 @ 22:30)  Source: .Blood Blood-Peripheral  Preliminary Report (12-09-19 @ 05:01):    No growth to date.    Culture - Urine (collected 12-07-19 @ 22:18)  Source: .Urine Clean Catch (Midstream)  Final Report (12-09-19 @ 07:22):    <10,000 CFU/mL Normal Urogenital Mimi            RADIOLOGY & ADDITIONAL TESTS:  Personal review of radiological diagnostics performed  Echo and EKG results noted when applicable.     ANTIBIOTICS:  rifAXIMin 550 milliGRAM(s) Oral two times a day

## 2019-12-12 NOTE — PROGRESS NOTE ADULT - SUBJECTIVE AND OBJECTIVE BOX
Patient was seen and examined. Spoke with RN. Chart reviewed.    No events overnight.  Vital Signs Last 24 Hrs  T(F): 98.5 (12 Dec 2019 05:24), Max: 98.6 (11 Dec 2019 19:10)  HR: 62 (12 Dec 2019 05:30) (58 - 127)  BP: 110/57 (12 Dec 2019 05:24) (92/54 - 110/57)  SpO2: 93% (12 Dec 2019 08:43) (93% - 98%)  MEDICATIONS  (STANDING):  atorvastatin 40 milliGRAM(s) Oral at bedtime  hydrOXYzine  Oral Tab/Cap - Peds 25 milliGRAM(s) Oral three times a day  lactulose Syrup 20 Gram(s) Oral two times a day  pantoprazole    Tablet 40 milliGRAM(s) Oral before breakfast  rifAXIMin 550 milliGRAM(s) Oral two times a day  sodium bicarbonate 650 milliGRAM(s) Oral three times a day  sucralfate 1 Gram(s) Oral four times a day  ursodiol Capsule 300 milliGRAM(s) Oral every 12 hours  warfarin 2.5 milliGRAM(s) Oral once    MEDICATIONS  (PRN):    Labs:                        7.9    5.16  )-----------( 83       ( 12 Dec 2019 06:33 )             24.1                         8.0    4.53  )-----------( 81       ( 11 Dec 2019 07:52 )             24.3     12 Dec 2019 06:33    134    |  107    |  43     ----------------------------<  84     5.2     |  15     |  1.9    11 Dec 2019 07:52    132    |  106    |  39     ----------------------------<  100    4.2     |  13     |  1.8      Ca    8.3        12 Dec 2019 06:33  Ca    8.2        11 Dec 2019 07:52    TPro  5.6    /  Alb  1.7    /  TBili  1.4    /  DBili  x      /  AST  142    /  ALT  117    /  AlkPhos  94     12 Dec 2019 06:33  TPro  5.7    /  Alb  1.9    /  TBili  1.6    /  DBili  x      /  AST  154    /  ALT  127    /  AlkPhos  93     11 Dec 2019 07:52    PT/INR - ( 12 Dec 2019 06:33 )   PT: 28.40 sec;   INR: 2.49 ratio                 Radiology:    General: comfortable, NAD  Neurology: A&Ox3, nonfocal  Head:  Normocephalic, atraumatic  ENT:  Mucosa moist, no ulcerations  Neck:  Supple, no JVD,   Skin: no breakdowns (as per RN)  Resp: CTA B/L  CV: RRR, S1S2,   GI: Soft, NT, bowel sounds  MS: No edema, + peripheral pulses, FROM all 4 extremity

## 2019-12-13 ENCOUNTER — TRANSCRIPTION ENCOUNTER (OUTPATIENT)
Age: 69
End: 2019-12-13

## 2019-12-13 VITALS — DIASTOLIC BLOOD PRESSURE: 57 MMHG | TEMPERATURE: 98 F | SYSTOLIC BLOOD PRESSURE: 118 MMHG | HEART RATE: 90 BPM

## 2019-12-13 LAB
ALBUMIN SERPL ELPH-MCNC: 2.2 G/DL — LOW (ref 3.5–5.2)
ALP SERPL-CCNC: 108 U/L — SIGNIFICANT CHANGE UP (ref 30–115)
ALT FLD-CCNC: 131 U/L — HIGH (ref 0–41)
ANION GAP SERPL CALC-SCNC: 11 MMOL/L — SIGNIFICANT CHANGE UP (ref 7–14)
AST SERPL-CCNC: 153 U/L — HIGH (ref 0–41)
BASOPHILS # BLD AUTO: 0.09 K/UL — SIGNIFICANT CHANGE UP (ref 0–0.2)
BASOPHILS NFR BLD AUTO: 1.3 % — HIGH (ref 0–1)
BILIRUB SERPL-MCNC: 1.6 MG/DL — HIGH (ref 0.2–1.2)
BUN SERPL-MCNC: 44 MG/DL — HIGH (ref 10–20)
CALCIUM SERPL-MCNC: 8.5 MG/DL — SIGNIFICANT CHANGE UP (ref 8.5–10.1)
CHLORIDE SERPL-SCNC: 108 MMOL/L — SIGNIFICANT CHANGE UP (ref 98–110)
CO2 SERPL-SCNC: 15 MMOL/L — LOW (ref 17–32)
CREAT SERPL-MCNC: 1.9 MG/DL — HIGH (ref 0.7–1.5)
CULTURE RESULTS: SIGNIFICANT CHANGE UP
CULTURE RESULTS: SIGNIFICANT CHANGE UP
EOSINOPHIL # BLD AUTO: 0.54 K/UL — SIGNIFICANT CHANGE UP (ref 0–0.7)
EOSINOPHIL NFR BLD AUTO: 7.9 % — SIGNIFICANT CHANGE UP (ref 0–8)
GLUCOSE BLDC GLUCOMTR-MCNC: 134 MG/DL — HIGH (ref 70–99)
GLUCOSE BLDC GLUCOMTR-MCNC: 79 MG/DL — SIGNIFICANT CHANGE UP (ref 70–99)
GLUCOSE SERPL-MCNC: 80 MG/DL — SIGNIFICANT CHANGE UP (ref 70–99)
HCT VFR BLD CALC: 28.7 % — LOW (ref 42–52)
HGB BLD-MCNC: 9.4 G/DL — LOW (ref 14–18)
IMM GRANULOCYTES NFR BLD AUTO: 0.9 % — HIGH (ref 0.1–0.3)
INR BLD: 2.28 RATIO — HIGH (ref 0.65–1.3)
LYMPHOCYTES # BLD AUTO: 0.84 K/UL — LOW (ref 1.2–3.4)
LYMPHOCYTES # BLD AUTO: 12.3 % — LOW (ref 20.5–51.1)
MCHC RBC-ENTMCNC: 31.3 PG — HIGH (ref 27–31)
MCHC RBC-ENTMCNC: 32.8 G/DL — SIGNIFICANT CHANGE UP (ref 32–37)
MCV RBC AUTO: 95.7 FL — HIGH (ref 80–94)
MONOCYTES # BLD AUTO: 0.8 K/UL — HIGH (ref 0.1–0.6)
MONOCYTES NFR BLD AUTO: 11.7 % — HIGH (ref 1.7–9.3)
NEUTROPHILS # BLD AUTO: 4.5 K/UL — SIGNIFICANT CHANGE UP (ref 1.4–6.5)
NEUTROPHILS NFR BLD AUTO: 65.9 % — SIGNIFICANT CHANGE UP (ref 42.2–75.2)
NRBC # BLD: 0 /100 WBCS — SIGNIFICANT CHANGE UP (ref 0–0)
PLATELET # BLD AUTO: 105 K/UL — LOW (ref 130–400)
POTASSIUM SERPL-MCNC: 4.6 MMOL/L — SIGNIFICANT CHANGE UP (ref 3.5–5)
POTASSIUM SERPL-SCNC: 4.6 MMOL/L — SIGNIFICANT CHANGE UP (ref 3.5–5)
PROT SERPL-MCNC: 6.5 G/DL — SIGNIFICANT CHANGE UP (ref 6–8)
PROTHROM AB SERPL-ACNC: 26 SEC — HIGH (ref 9.95–12.87)
RBC # BLD: 3 M/UL — LOW (ref 4.7–6.1)
RBC # FLD: 17.3 % — HIGH (ref 11.5–14.5)
SODIUM SERPL-SCNC: 134 MMOL/L — LOW (ref 135–146)
SPECIMEN SOURCE: SIGNIFICANT CHANGE UP
SPECIMEN SOURCE: SIGNIFICANT CHANGE UP
WBC # BLD: 6.83 K/UL — SIGNIFICANT CHANGE UP (ref 4.8–10.8)
WBC # FLD AUTO: 6.83 K/UL — SIGNIFICANT CHANGE UP (ref 4.8–10.8)

## 2019-12-13 RX ORDER — SODIUM BICARBONATE 1 MEQ/ML
1 SYRINGE (ML) INTRAVENOUS
Qty: 0 | Refills: 0 | DISCHARGE
Start: 2019-12-13

## 2019-12-13 RX ORDER — OMEGA-3 ACID ETHYL ESTERS 1 G
0 CAPSULE ORAL
Qty: 0 | Refills: 0 | DISCHARGE

## 2019-12-13 RX ORDER — WARFARIN SODIUM 2.5 MG/1
2.5 TABLET ORAL ONCE
Refills: 0 | Status: DISCONTINUED | OUTPATIENT
Start: 2019-12-13 | End: 2019-12-13

## 2019-12-13 RX ORDER — AMILORIDE HCL 5 MG
1 TABLET ORAL
Qty: 0 | Refills: 0 | DISCHARGE

## 2019-12-13 RX ORDER — SITAGLIPTIN 50 MG/1
1 TABLET, FILM COATED ORAL
Qty: 0 | Refills: 0 | DISCHARGE

## 2019-12-13 RX ADMIN — Medication 1 GRAM(S): at 12:06

## 2019-12-13 RX ADMIN — Medication 650 MILLIGRAM(S): at 12:06

## 2019-12-13 RX ADMIN — LACTULOSE 20 GRAM(S): 10 SOLUTION ORAL at 05:23

## 2019-12-13 RX ADMIN — Medication 25 MILLIGRAM(S): at 05:24

## 2019-12-13 RX ADMIN — URSODIOL 300 MILLIGRAM(S): 250 TABLET, FILM COATED ORAL at 18:04

## 2019-12-13 RX ADMIN — Medication 1 GRAM(S): at 18:04

## 2019-12-13 RX ADMIN — Medication 1 GRAM(S): at 05:24

## 2019-12-13 RX ADMIN — Medication 25 MILLIGRAM(S): at 12:06

## 2019-12-13 RX ADMIN — PANTOPRAZOLE SODIUM 40 MILLIGRAM(S): 20 TABLET, DELAYED RELEASE ORAL at 05:24

## 2019-12-13 RX ADMIN — LACTULOSE 20 GRAM(S): 10 SOLUTION ORAL at 12:06

## 2019-12-13 RX ADMIN — URSODIOL 300 MILLIGRAM(S): 250 TABLET, FILM COATED ORAL at 05:24

## 2019-12-13 RX ADMIN — Medication 650 MILLIGRAM(S): at 05:24

## 2019-12-13 NOTE — DISCHARGE NOTE PROVIDER - NSDCCPCAREPLAN_GEN_ALL_CORE_FT
PRINCIPAL DISCHARGE DIAGNOSIS  Diagnosis: Shortness of breath  Assessment and Plan of Treatment: You presented for shortness of breath. On admission CXR showed left olaf hilar infiltrates, pneumonia was suspected and patient was started on antibiotics. During his hospital stay he had no fever, no leukocytosis, evaluated by ID which stopped antibiotics. His shortness of breath imprvoved but patient is not back at his baseline.      SECONDARY DISCHARGE DIAGNOSES  Diagnosis: Liver cirrhosis  Assessment and Plan of Treatment: MELD score 30 during admission, to note that patient is on warfarin, INR is not reflective of liver's function. Awaiting liver transplant.    Diagnosis: Afib  Assessment and Plan of Treatment: on warfarin, one episode of afib with RVR repsonded to metorporlol 12.5. Patient is not on any HR control due to borderline blood pressure    Diagnosis: Acute kidney injury  Assessment and Plan of Treatment: You presented with acute kidney injury with elevation of creatinine to 2.2 from 1.8 your baseline. Your kidney injury is multifactorial, due to hepatorenal syndrome with possible prerenal component. Heptorenal syndrome cocktail and paracentesis were not performed. They will be done once you are transferred to [place].

## 2019-12-13 NOTE — PROGRESS NOTE ADULT - SUBJECTIVE AND OBJECTIVE BOX
SUBJECTIVE:    Patient is a 69y old Male who presents with a chief complaint of Worsening SOB (12 Dec 2019 16:34)    Overnight Events: Patient is stable, no acute events overnight.  He is complaining of cough today with phlegm, Spo2 is maintained on RA.  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     ALLERGIES:  No Known Allergies    MEDICATIONS:  STANDING MEDICATIONS  atorvastatin 40 milliGRAM(s) Oral at bedtime  hydrOXYzine  Oral Tab/Cap - Peds 25 milliGRAM(s) Oral three times a day  lactulose Syrup 20 Gram(s) Oral two times a day  pantoprazole    Tablet 40 milliGRAM(s) Oral before breakfast  rifAXIMin 550 milliGRAM(s) Oral two times a day  sodium bicarbonate 650 milliGRAM(s) Oral three times a day  sucralfate 1 Gram(s) Oral four times a day  ursodiol Capsule 300 milliGRAM(s) Oral every 12 hours    PRN MEDICATIONS    VITALS:   T(F): 98.4, Max: 98.4 (12-13-19 @ 06:57)  HR: 66 (61 - 66)  BP: 102/58 (74/48 - 110/72)  RR: 18 (18 - 18)  SpO2: 95% (95% - 97%)    LABS:                        9.4    6.83  )-----------( 105      ( 13 Dec 2019 04:30 )             28.7     12-13    134<L>  |  108  |  44<H>  ----------------------------<  80  4.6   |  15<L>  |  1.9<H>    Ca    8.5      13 Dec 2019 04:30    TPro  6.5  /  Alb  2.2<L>  /  TBili  1.6<H>  /  DBili  x   /  AST  153<H>  /  ALT  131<H>  /  AlkPhos  108  12-13    PT/INR - ( 13 Dec 2019 04:30 )   PT: 26.00 sec;   INR: 2.28 ratio        PHYSICAL EXAM:  GEN: NAD, comfortable  LUNGS: CTAB, no w/r/r  HEART: RRR, s1 and s2 appreciated, no m/r/g  ABD: soft, distended  EXT: b/l LLE, non pitting  NEURO: AAOX3

## 2019-12-13 NOTE — DISCHARGE NOTE PROVIDER - NSDCFUSCHEDAPPT_GEN_ALL_CORE_FT
SOCORRO MANUEL ; 12/19/2019 ; [facility] [address]  SOCORRO MANUEL ; 01/10/2020 ; [facility] [id number] [facility]

## 2019-12-13 NOTE — PROGRESS NOTE ADULT - REASON FOR ADMISSION
Worsening SOB

## 2019-12-13 NOTE — PROGRESS NOTE ADULT - SUBJECTIVE AND OBJECTIVE BOX
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  GASTROINTESTINAL: No abdominal or epigastric pain. No nausea, vomiting, or hematemesis; No diarrhea or constipation. No melena or hematochezia.  GENITOURINARY: No dysuria, frequency, foamy urine, urinary urgency, incontinence or hematuria  NEUROLOGICAL: No numbness or weakness  SKIN: No itching, burning, rashes, or lesions   VASCULAR: No bilateral lower extremity edema.   All other review of systems is negative unless indicated above.    PHYSICAL EXAM:  NAD  awake and alert  moist mm  no jvd   cta bl  rrr  soft, nt  no cvat  + distention  + edema  no rash    Hospital Medications:   MEDICATIONS  (STANDING):  atorvastatin 40 milliGRAM(s) Oral at bedtime  hydrOXYzine  Oral Tab/Cap - Peds 25 milliGRAM(s) Oral three times a day  lactulose Syrup 20 Gram(s) Oral two times a day  pantoprazole    Tablet 40 milliGRAM(s) Oral before breakfast  rifAXIMin 550 milliGRAM(s) Oral two times a day  sodium bicarbonate 650 milliGRAM(s) Oral three times a day  sucralfate 1 Gram(s) Oral four times a day  ursodiol Capsule 300 milliGRAM(s) Oral every 12 hours  warfarin 2.5 milliGRAM(s) Oral once        VITALS:  T(F): 96.5 (19 @ 14:09), Max: 98.4 (19 @ 06:57)  HR: 83 (19 @ 14:09)  BP: 112/57 (19 @ 14:09)  RR: 18 (19 @ 06:57)  SpO2: 95% (19 @ 08:01)  Wt(kg): --        LABS:      134<L>  |  108  |  44<H>  ----------------------------<  80  4.6   |  15<L>  |  1.9<H>    Ca    8.5      13 Dec 2019 04:30    TPro  6.5  /  Alb  2.2<L>  /  TBili  1.6<H>  /  DBili      /  AST  153<H>  /  ALT  131<H>  /  AlkPhos  108                            9.4    6.83  )-----------( 105      ( 13 Dec 2019 04:30 )             28.7       Urine Studies:  Urinalysis Basic - ( 07 Dec 2019 22:18 )    Color: Yellow / Appearance: Clear / S.022 / pH:   Gluc:  / Ketone: Trace  / Bili: Negative / Urobili: <2 mg/dL   Blood:  / Protein: 30 mg/dL / Nitrite: Negative   Leuk Esterase: Negative / RBC: 2 /HPF / WBC 4 /HPF   Sq Epi:  / Non Sq Epi: 2 /HPF / Bacteria: Negative      Sodium, Random Urine: 38.0 mmoL/L ( @ 05:07)  Protein/Creatinine Ratio Calculation: 0.3 Ratio ( @ 05:07)  Creatinine, Random Urine: 194 mg/dL ( @ 05:07)      RADIOLOGY & ADDITIONAL STUDIES:

## 2019-12-13 NOTE — DISCHARGE NOTE PROVIDER - NSDCMRMEDTOKEN_GEN_ALL_CORE_FT
atorvastatin 40 mg oral tablet: 1 tab(s) orally once a day (at bedtime)  Constulose 10 g/15 mL oral syrup: 30 milliliter(s) orally 2 times a day  hydrOXYzine hydrochloride 25 mg oral tablet: 1 tab(s) orally 3 times a day  pantoprazole 40 mg oral delayed release tablet: 1 tab(s) orally once a day (before a meal)  rifAXIMin 550 mg oral tablet: 1 tab(s) orally 2 times a day  sodium bicarbonate 650 mg oral tablet: 1 tab(s) orally 3 times a day  sucralfate 1 g oral tablet: 1 tab(s) orally 4 times a day (before meals and at bedtime)  ursodiol 500 mg oral tablet: 1 tab(s) orally 2 times a day  warfarin 2.5 mg oral tablet: 1 tab(s) orally once a day

## 2019-12-13 NOTE — DISCHARGE NOTE PROVIDER - HOSPITAL COURSE
70 yo M with hx of Cirrhosis due to JIMÉNEZ (on liver transplant at [facility]), Hx of multiple hepatic encephalopathy, Ascites (hx of multiple paracentesis), Thrombocytopenia, CAD s/p 2 stents, A.fib (on coumadin), Anemia, CKD, HTN, DM II, BPH and HLD admitted for shortness of breath.        Worsening SOB    - No sepsis on admission    - CXR: showed patchy left perihilar and left basilar opacities    - ddx volume overload vs PNA    - Repeated CXR showed improvement    - No leukocytosis, afebrile    - started on cefepime and doxy for suspicion of PNA    - Procalcitonin 0.21, antibiotics dc/ed after ID c/s    - Flu A/B, RSV negative    - MRSA, urine legionella negative    - BCx and urine Cx negative    - Saturating well on RA, having SOB on exertion though, not at his baseline    - cardio eval, G2DD with no evidence of of left heart failure        CHESTER on CKD, prerenal vs HRS    - Serum Cr 2.2 on admission (baseline ~ 1.8)    - s/p 1L LR bolus in ED    - U/S abdomen showed no hydronephrosis    - Creat downtrending, stable at 1.9    - monitor BMP and electrolytes.     - strict I & O    - c/s nephro, start NaHCO3 for metabolic acidosis, bicarb 13    - no indication for HRS cocktail yet (midodrine / octreotide / iv albumin). Patient will need it eventually along with paracentesis, it will be arranged at [facility]    - continue to hold diuretics            Cirrhosis 2/2 JIMÉNEZ    - LFT around baseline.     - U/S abdomen showed moderate size ascites    - c/w lactulose to target 2-3 bm per day and Ursodiol        # CAD with CHF    - TTE showed EF 68 with G2DD    - No orthopnea    - cardio eval, G2DD with no left heart failure        Thrombocytopenia likely 2/2 cirrhosis    - at baseline     - monitor CBC         A.fib     - Initial ECG on admission NSR    - Trend INR and give coumadin when therapeutic    - had one episode of afib with RVR during his hospital stay which responded to metoprolol 12.5        CAD s/p 2 stents    - c/w home meds        HTN    - holding meds due to patient's low BP        DM II    - well controlled    - start on insulin regimen if FS persistently > 180        HLD    - c/w statin

## 2019-12-13 NOTE — PROGRESS NOTE ADULT - ASSESSMENT
68 yo M with hx of Cirrhosis due to JIMÉNEZ (on liver transplant at [facility]), Hx of multiple hepatic encephalopathy, Ascites (hx of multiple paracentesis), Thrombocytopenia, CAD s/p 2 stents, A.fib (on coumadin), Anemia, CKD, HTN, DM II, BPH and HLD admitted for shortness of breath.    Worsening SOB with productive cough   - No sepsis on admission  - CXR: showed patchy left perihilar and left basilar opacities  - No leukocytosis, afebrile  - started on cefepime and doxy for suspicion of PNA  - Flu A/B, RSV negative  - MRSA, urine legionella negative  - BCx and urine Cx negative  - Pending strep ag   - Saturating well on RA  - cardio eval, G2DD with no evidence of of left heart failure    CHESTER on CKD, prerenal vs HRS  - Serum Cr 2.2 on admission (baseline ~ 1.8)  - s/p 1L LR bolus in ED  - U/S abdomen showed no hydronephrosis  - Creat downtrending  - monitor BMP and electrolytes.   - strict I & O    Cirrhosis 2/2 JIMÉNEZ  - LFT around baseline.   - U/S abdomen showed moderate size ascites  - c/w lactulose to target 2-3 bm per day and Ursodiol    # CAD with CHF  - TTE showed EF 68 with G2DD  - No orthopnea or signs of volume overload  - cardio eval, G2DD with no left heart failure    Thrombocytopenia likely 2/2 cirrhosis  - at baseline   - monitor CBC     A.fib   -  INR 2.74, coumadin dose today  - follow up INR and give coumadin accordingly  - complaining of pain in b/l calf, va duplex ordered    CAD s/p 2 stents  - c/w home meds    HTN  - holding meds due to patient's low BP    DM II  - well controlled  - start on insulin regimen if FS persistently > 180    HLD  - c/w statin    DISPO:  from home
70 yo M with hx of Cirrhosis due to JIMÉNEZ (on liver transplant at [facility]), Hx of multiple hepatic encephalopathy, Ascites (hx of multiple paracentesis), Thrombocytopenia, CAD s/p 2 stents, A.fib (on coumadin), Anemia, CKD, HTN, DM II, BPH and HLD admitted for shortness of breath.    Worsening SOB  - No sepsis on admission  - CXR: showed patchy left perihilar and left basilar opacities  - ddx volume overload vs PNA  - Repeated CXR showed improvement  - No leukocytosis, afebrile  - started on cefepime and doxy for suspicion of PNA  - Procalcitonin 0.21  - Flu A/B, RSV negative  - MRSA, urine legionella negative  - BCx and urine Cx negative  - Pending strep ag   - Saturating well on RA, having SOB on exertion though, not at his baseline  - cardio eval, G2DD with no evidence of of left heart failure    CHESTER on CKD, prerenal vs HRS  - Serum Cr 2.2 on admission (baseline ~ 1.8)  - s/p 1L LR bolus in ED  - U/S abdomen showed no hydronephrosis  - Creat downtrending  - monitor BMP and electrolytes.   - strict I & O  - c/s nephro, awaiting recs    Cirrhosis 2/2 JIMÉNEZ  - LFT around baseline.   - U/S abdomen showed moderate size ascites  - c/w lactulose to target 2-3 bm per day and Ursodiol  - May be transferred to [facility]    # CAD with CHF  - TTE showed EF 68 with G2DD  - No orthopnea or signs of volume overload  - cardio eval, G2DD with no left heart failure    Thrombocytopenia likely 2/2 cirrhosis  - at baseline   - monitor CBC     A.fib   -  INR 2.62, coumadin dose today  - follow up INR and give coumadin accordingly  - complaining of pain in b/l calf, va duplex ordered    CAD s/p 2 stents  - c/w home meds    HTN  - holding meds due to patient's low BP    DM II  - well controlled  - start on insulin regimen if FS persistently > 180    HLD  - c/w statin    DISPO:  from home, Physiatry recommending Home with VN services
70 yo M with hx of Cirrhosis due to JIMÉNEZ (on liver transplant at [facility]), Hx of multiple hepatic encephalopathy, Ascites (hx of multiple paracentesis), Thrombocytopenia, CAD s/p 2 stents, A.fib (on coumadin), Anemia, CKD, HTN, DM II, BPH and HLD admitted for shortness of breath.    Worsening SOB  - No sepsis on admission  - CXR: showed patchy left perihilar and left basilar opacities  - ddx volume overload vs PNA  - Repeated CXR showed improvement  - No leukocytosis, afebrile  - started on cefepime and doxy for suspicion of PNA  - Procalcitonin 0.21, antibiotics dc/ed after ID c/s  - Flu A/B, RSV negative  - MRSA, urine legionella negative  - BCx and urine Cx negative  - Saturating well on RA, having SOB on exertion though, not at his baseline  - cardio eval, G2DD with no evidence of of left heart failure    CHESTER on CKD, prerenal vs HRS  - Serum Cr 2.2 on admission (baseline ~ 1.8)  - s/p 1L LR bolus in ED  - U/S abdomen showed no hydronephrosis  - Creat downtrending, stable at 1.9  - monitor BMP and electrolytes.   - strict I & O  - c/s nephro, start NaHCO3 for metabolic acidosis  - no indication for HRS cocktail yet (midodrine / octreotide / iv albumin)  - continue to hold diuretics      Cirrhosis 2/2 JIMÉNEZ  - LFT around baseline.   - U/S abdomen showed moderate size ascites  - c/w lactulose to target 2-3 bm per day and Ursodiol  - awaiting transfer to [facility]    # CAD with CHF  - TTE showed EF 68 with G2DD  - No orthopnea or signs of volume overload  - cardio eval, G2DD with no left heart failure    Thrombocytopenia likely 2/2 cirrhosis  - at baseline   - monitor CBC     A.fib   - Initial ECG on admission NSR  - Trend INR and give coumadin when therapeutic  - Yesterday he had afib with RVR, metoprolol 12.5 mg given    CAD s/p 2 stents  - c/w home meds    HTN  - holding meds due to patient's low BP    DM II  - well controlled  - start on insulin regimen if FS persistently > 180    HLD  - c/w statin    DISPO:  from home, transfer to [facility]
70 yo M with hx of Cirrhosis due to JIMÉNEZ (on liver transplant at [facility]), Hx of multiple hepatic encephalopathy, Ascites (hx of multiple paracentesis), Thrombocytopenia, CAD s/p 2 stents, A.fib (on coumadin), Anemia, CKD, HTN, DM II, BPH and HLD admitted for shortness of breath.    Worsening SOB  - No sepsis on admission  - CXR: showed patchy left perihilar and left basilar opacities  - ddx volume overload vs PNA  - Repeated CXR showed improvement  - No leukocytosis, afebrile  - started on cefepime and doxy for suspicion of PNA  - Procalcitonin 0.21, antibiotics dc/ed after ID c/s  - Flu A/B, RSV negative  - MRSA, urine legionella negative  - BCx and urine Cx negative  - Saturating well on RA, having SOB on exertion though, not at his baseline  - cardio eval, G2DD with no evidence of of left heart failure    CHESTER on CKD, prerenal vs HRS  - Serum Cr 2.2 on admission (baseline ~ 1.8)  - s/p 1L LR bolus in ED  - U/S abdomen showed no hydronephrosis  - Creat downtrending  - monitor BMP and electrolytes.   - strict I & O  - c/s nephro, start NaHCO3 for metabolic acidosis  - no indication for HRS cocktail yet (midodrine / octreotide / iv albumin)  - continue to hold diuretics      Cirrhosis 2/2 JIMÉNEZ  - LFT around baseline.   - U/S abdomen showed moderate size ascites  - c/w lactulose to target 2-3 bm per day and Ursodiol  - Transfer to [facility]    # CAD with CHF  - TTE showed EF 68 with G2DD  - No orthopnea or signs of volume overload  - cardio eval, G2DD with no left heart failure    Thrombocytopenia likely 2/2 cirrhosis  - at baseline   - monitor CBC     A.fib   - Initial ECG on admission NSR  - Trend INR and give coumadin when therapeutic  - Yesterday he had afib with RVR, metoprolol 12.5 mg given    CAD s/p 2 stents  - c/w home meds    HTN  - holding meds due to patient's low BP    DM II  - well controlled  - start on insulin regimen if FS persistently > 180    HLD  - c/w statin    DISPO:  from home, transfer to [facility]
70 yo M with hx of Cirrhosis due to JIMÉNEZ (on liver transplant at [facility]), Hx of multiple hepatic encephalopathy, Ascites (hx of multiple paracentesis), Thrombocytopenia, CAD s/p 2 stents, A.fib (on coumadin), Anemia, CKD, HTN, DM II, BPH and HLD admitted for shortness of breath.    Worsening SOB with productive cough   - No sepsis on admission  - CXR: showed patchy left perihilar and left basilar opacities  - No leukocytosis, afebrile  - started on cefepime and doxy  - Flu A/B, RSV negative  - MRSA pending  - BCx and urine Cx negative  - Pending Urine legionella and strep ag   - Saturating well on RA    CHESTER on CKD, likely prerenal  - Serum Cr 2.2 on admission (baseline ~ 1.8)  - s/p 1L LR bolus in ED  - U/S abdomen showed no hydronephrosis  - Creat downtrending  - monitor BMP and electrolytes.   - strict I & O    Cirrhosis 2/2 JIMÉNEZ  - LFT around baseline.   - U/S abdomen showed moderate size ascites  - c/w lactulose and Ursodiol    # CAD with CHF  - TTE showed EF 68 with G2DD  - No orthopnea of signs of volume overload    Thrombocytopenia likely 2/2 cirrhosis  - at baseline   - monitor CBC     A.fib   -  INR 3.14, no coumadin dose today  - follow up INR and give coumadin accordingly    CAD s/p 2 stents  - c/w home meds    HTN  - holding meds due to patient's low BP    DM II  - well controlled  - start on insulin regimen if FS persistently > 180    HLD  - c/w statin    DISPO:  from home
CHESTER  CKD 3  metabolic acidosis  hyperkalemia  hypervolemic hyponatremia due to cirrhosis  hepatic encephalopathy  cirrhosis  JIMÉNEZ  moderate ascites  pancytopenia / splenomegaly  DM2  CAD / PCI / Afib on coumadin      plan:    cont holding diuretics  cont na bicarb 650mg po tid  off flomax  await [facility] transfer once bed available (pt already accepted)  will start hrs cocktail if renal fx worsening  may need paracentesis  d/w team  d/w family
CHESTER  CKD 3  metabolic acidosis  hyperkalemia  hypervolemic hyponatremia due to cirrhosis  hepatic encephalopathy  cirrhosis  JIMÉNEZ  moderate ascites  pancytopenia / splenomegaly  DM2  CAD / PCI / Afib on coumadin      plan:    cont holding diuretics  cont na bicarb 650mg po tid  off flomax  await [facility] tx  will start hrs cocktail if renal fx worsening  d/w team  d/w family  full code
fluid overload/diast dys  pneumonia  ty  liver transplant  ascites  thrombocytopenia  ckd2      awaiting transfer to [facility]  continue current tx  dc planning
fluid overload/diast dys  pneumonia  ty  liver transplant  ascites  thrombocytopenia  ckd2      id eval  abx continue  cardio dr De Souza  dc planning
fluid overload/diast dys  ty  liver transplant  ascites  thrombocytopenia  ckd2    fu cxr  id remi  cardio dr De Souza  dc planning
· Assessment		  69y old Male who presents with a chief complaint of Worsening SOB    IMPRESSION:  No PNA  -clinically no PNA  -CXR no consolidation  -WBC 4.5  -BNP 1065    RECOMMENDATIONS  -off ABx  -recall prn please

## 2019-12-18 DIAGNOSIS — I48.91 UNSPECIFIED ATRIAL FIBRILLATION: ICD-10-CM

## 2019-12-18 DIAGNOSIS — I13.0 HYPERTENSIVE HEART AND CHRONIC KIDNEY DISEASE WITH HEART FAILURE AND STAGE 1 THROUGH STAGE 4 CHRONIC KIDNEY DISEASE, OR UNSPECIFIED CHRONIC KIDNEY DISEASE: ICD-10-CM

## 2019-12-18 DIAGNOSIS — Z96.653 PRESENCE OF ARTIFICIAL KNEE JOINT, BILATERAL: ICD-10-CM

## 2019-12-18 DIAGNOSIS — E11.22 TYPE 2 DIABETES MELLITUS WITH DIABETIC CHRONIC KIDNEY DISEASE: ICD-10-CM

## 2019-12-18 DIAGNOSIS — D69.6 THROMBOCYTOPENIA, UNSPECIFIED: ICD-10-CM

## 2019-12-18 DIAGNOSIS — R06.02 SHORTNESS OF BREATH: ICD-10-CM

## 2019-12-18 DIAGNOSIS — E78.5 HYPERLIPIDEMIA, UNSPECIFIED: ICD-10-CM

## 2019-12-18 DIAGNOSIS — E87.1 HYPO-OSMOLALITY AND HYPONATREMIA: ICD-10-CM

## 2019-12-18 DIAGNOSIS — K72.90 HEPATIC FAILURE, UNSPECIFIED WITHOUT COMA: ICD-10-CM

## 2019-12-18 DIAGNOSIS — I50.30 UNSPECIFIED DIASTOLIC (CONGESTIVE) HEART FAILURE: ICD-10-CM

## 2019-12-18 DIAGNOSIS — Z95.5 PRESENCE OF CORONARY ANGIOPLASTY IMPLANT AND GRAFT: ICD-10-CM

## 2019-12-18 DIAGNOSIS — E87.5 HYPERKALEMIA: ICD-10-CM

## 2019-12-18 DIAGNOSIS — N18.3 CHRONIC KIDNEY DISEASE, STAGE 3 (MODERATE): ICD-10-CM

## 2019-12-18 DIAGNOSIS — I25.10 ATHEROSCLEROTIC HEART DISEASE OF NATIVE CORONARY ARTERY WITHOUT ANGINA PECTORIS: ICD-10-CM

## 2019-12-18 DIAGNOSIS — K74.60 UNSPECIFIED CIRRHOSIS OF LIVER: ICD-10-CM

## 2019-12-18 DIAGNOSIS — Z79.01 LONG TERM (CURRENT) USE OF ANTICOAGULANTS: ICD-10-CM

## 2019-12-18 DIAGNOSIS — E87.2 ACIDOSIS: ICD-10-CM

## 2019-12-18 DIAGNOSIS — E87.70 FLUID OVERLOAD, UNSPECIFIED: ICD-10-CM

## 2019-12-18 DIAGNOSIS — D63.1 ANEMIA IN CHRONIC KIDNEY DISEASE: ICD-10-CM

## 2019-12-18 DIAGNOSIS — N17.9 ACUTE KIDNEY FAILURE, UNSPECIFIED: ICD-10-CM

## 2019-12-18 DIAGNOSIS — K75.81 NONALCOHOLIC STEATOHEPATITIS (NASH): ICD-10-CM

## 2019-12-19 ENCOUNTER — APPOINTMENT (OUTPATIENT)
Dept: CARDIOLOGY | Facility: CLINIC | Age: 69
End: 2019-12-19

## 2020-01-03 ENCOUNTER — APPOINTMENT (OUTPATIENT)
Dept: CARDIOLOGY | Facility: CLINIC | Age: 70
End: 2020-01-03
Payer: MEDICARE

## 2020-01-03 PROCEDURE — 93000 ELECTROCARDIOGRAM COMPLETE: CPT

## 2020-01-03 PROCEDURE — 99214 OFFICE O/P EST MOD 30 MIN: CPT

## 2020-01-10 ENCOUNTER — APPOINTMENT (OUTPATIENT)
Dept: GASTROENTEROLOGY | Facility: CLINIC | Age: 70
End: 2020-01-10

## 2020-01-17 ENCOUNTER — APPOINTMENT (OUTPATIENT)
Dept: CARDIOLOGY | Facility: CLINIC | Age: 70
End: 2020-01-17
Payer: MEDICARE

## 2020-01-17 VITALS
SYSTOLIC BLOOD PRESSURE: 98 MMHG | HEIGHT: 69 IN | WEIGHT: 214 LBS | HEART RATE: 47 BPM | DIASTOLIC BLOOD PRESSURE: 64 MMHG | BODY MASS INDEX: 31.7 KG/M2

## 2020-01-17 DIAGNOSIS — K74.60 UNSPECIFIED CIRRHOSIS OF LIVER: ICD-10-CM

## 2020-01-17 PROCEDURE — 93000 ELECTROCARDIOGRAM COMPLETE: CPT

## 2020-01-17 PROCEDURE — 99204 OFFICE O/P NEW MOD 45 MIN: CPT

## 2020-01-17 NOTE — HISTORY OF PRESENT ILLNESS
[FreeTextEntry1] : Patient with history of PAF not on AC CHADVASC 3, diastolic HF, HTN, CKD, HLD, liver cirrhosis from JIMÉNEZ, being evaluated by [facility] transplant. Patient unable to tolerate AC due to GI bleed and cirrhosis. Patient possible watchman implant.\par \par \par EKG SR 47 bpm

## 2020-01-17 NOTE — PHYSICAL EXAM
[General Appearance - Well Developed] : well developed [Normal Appearance] : normal appearance [Well Groomed] : well groomed [General Appearance - Well Nourished] : well nourished [No Deformities] : no deformities [General Appearance - In No Acute Distress] : no acute distress [Normal Conjunctiva] : the conjunctiva exhibited no abnormalities [Eyelids - No Xanthelasma] : the eyelids demonstrated no xanthelasmas [Normal Oral Mucosa] : normal oral mucosa [No Oral Pallor] : no oral pallor [No Oral Cyanosis] : no oral cyanosis [Normal Jugular Venous A Waves Present] : normal jugular venous A waves present [Normal Jugular Venous V Waves Present] : normal jugular venous V waves present [No Jugular Venous Alan A Waves] : no jugular venous alan A waves [Respiration, Rhythm And Depth] : normal respiratory rhythm and effort [Exaggerated Use Of Accessory Muscles For Inspiration] : no accessory muscle use [Abdomen Tenderness] : non-tender [Auscultation Breath Sounds / Voice Sounds] : lungs were clear to auscultation bilaterally [Abdomen Soft] : soft [Abdomen Mass (___ Cm)] : no abdominal mass palpated [Gait - Sufficient For Exercise Testing] : the gait was sufficient for exercise testing [Abnormal Walk] : normal gait [Nail Clubbing] : no clubbing of the fingernails [Cyanosis, Localized] : no localized cyanosis [] : no ischemic changes [Petechial Hemorrhages (___cm)] : no petechial hemorrhages

## 2020-01-17 NOTE — ASSESSMENT
[FreeTextEntry1] : PAF - CHADVASC 3 and  unable to  tolerate AC due to cirrhosis HASBLED 4. Patient is a candidate for watchman procedure however at this time I'm unable to assess if patient can take 45 days of Coumadin post watchman implant. Therefore patient will follow-up next week with [facility] physicians in [place] and hopefully they be able to assess if patient can take short-term Coumadin for 45 days followed by Plavix and aspirin for six months.\par \par - If patient is unable to tolerate short-term Coumadin therefore I would recommend patient to enroll in the ASAP trial. I have given the family brochures about watchman and also given a copy of the trial protocol. I would also like to obtain the report of the last endoscopy to evaluate if patient has esophageal varices.

## 2020-01-24 ENCOUNTER — APPOINTMENT (OUTPATIENT)
Dept: CARDIOLOGY | Facility: CLINIC | Age: 70
End: 2020-01-24

## 2020-03-05 ENCOUNTER — APPOINTMENT (OUTPATIENT)
Dept: CARDIOLOGY | Facility: CLINIC | Age: 70
End: 2020-03-05

## 2020-04-02 PROBLEM — R18.8 ASCITES: Status: ACTIVE | Noted: 2019-08-09

## 2020-04-28 NOTE — DISCHARGE NOTE NURSING/CASE MANAGEMENT/SOCIAL WORK - NSDCPEPTCOWACOMP_GEN_ALL_CORE
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 avoid prescribing this combination.  Patient informed that opioids and benzodiazepines should be tapared slow enough to minimize signs and symptoms of withdrawal.  Because of the greater risk of benzo withdrawal, it is more advisable to taper off of opioids first.    Patient also informed that taking benzodiazepines in the presence of untreated sleep apnea can be lethal.

## 2020-05-08 ENCOUNTER — OUTPATIENT (OUTPATIENT)
Dept: OUTPATIENT SERVICES | Facility: HOSPITAL | Age: 70
LOS: 1 days | Discharge: HOME | End: 2020-05-08

## 2020-05-08 DIAGNOSIS — I48.91 UNSPECIFIED ATRIAL FIBRILLATION: ICD-10-CM

## 2020-05-08 DIAGNOSIS — Z96.653 PRESENCE OF ARTIFICIAL KNEE JOINT, BILATERAL: Chronic | ICD-10-CM

## 2020-05-08 DIAGNOSIS — Z12.11 ENCOUNTER FOR SCREENING FOR MALIGNANT NEOPLASM OF COLON: Chronic | ICD-10-CM

## 2020-05-08 DIAGNOSIS — Z79.01 LONG TERM (CURRENT) USE OF ANTICOAGULANTS: ICD-10-CM

## 2020-05-08 DIAGNOSIS — Z95.9 PRESENCE OF CARDIAC AND VASCULAR IMPLANT AND GRAFT, UNSPECIFIED: Chronic | ICD-10-CM

## 2020-05-08 LAB
POCT INR: 1 RATIO — SIGNIFICANT CHANGE UP (ref 0.9–1.2)
POCT PT: 12.1 SEC — SIGNIFICANT CHANGE UP (ref 10–13.4)

## 2020-05-12 ENCOUNTER — OUTPATIENT (OUTPATIENT)
Dept: OUTPATIENT SERVICES | Facility: HOSPITAL | Age: 70
LOS: 1 days | Discharge: HOME | End: 2020-05-12

## 2020-05-12 DIAGNOSIS — Z95.9 PRESENCE OF CARDIAC AND VASCULAR IMPLANT AND GRAFT, UNSPECIFIED: Chronic | ICD-10-CM

## 2020-05-12 DIAGNOSIS — Z12.11 ENCOUNTER FOR SCREENING FOR MALIGNANT NEOPLASM OF COLON: Chronic | ICD-10-CM

## 2020-05-12 DIAGNOSIS — Z79.01 LONG TERM (CURRENT) USE OF ANTICOAGULANTS: ICD-10-CM

## 2020-05-12 DIAGNOSIS — Z96.653 PRESENCE OF ARTIFICIAL KNEE JOINT, BILATERAL: Chronic | ICD-10-CM

## 2020-05-12 DIAGNOSIS — I48.91 UNSPECIFIED ATRIAL FIBRILLATION: ICD-10-CM

## 2020-05-12 LAB
POCT INR: 1 RATIO — SIGNIFICANT CHANGE UP (ref 0.9–1.2)
POCT PT: 13 SEC — SIGNIFICANT CHANGE UP (ref 10–13.4)

## 2020-05-15 ENCOUNTER — OUTPATIENT (OUTPATIENT)
Dept: OUTPATIENT SERVICES | Facility: HOSPITAL | Age: 70
LOS: 1 days | Discharge: HOME | End: 2020-05-15

## 2020-05-15 DIAGNOSIS — Z96.653 PRESENCE OF ARTIFICIAL KNEE JOINT, BILATERAL: Chronic | ICD-10-CM

## 2020-05-15 DIAGNOSIS — Z95.9 PRESENCE OF CARDIAC AND VASCULAR IMPLANT AND GRAFT, UNSPECIFIED: Chronic | ICD-10-CM

## 2020-05-15 DIAGNOSIS — I48.91 UNSPECIFIED ATRIAL FIBRILLATION: ICD-10-CM

## 2020-05-15 DIAGNOSIS — Z79.01 LONG TERM (CURRENT) USE OF ANTICOAGULANTS: ICD-10-CM

## 2020-05-15 DIAGNOSIS — Z12.11 ENCOUNTER FOR SCREENING FOR MALIGNANT NEOPLASM OF COLON: Chronic | ICD-10-CM

## 2020-05-15 LAB
POCT INR: 1 RATIO — SIGNIFICANT CHANGE UP (ref 0.9–1.2)
POCT PT: 12.4 SEC — SIGNIFICANT CHANGE UP (ref 10–13.4)

## 2020-05-20 ENCOUNTER — OUTPATIENT (OUTPATIENT)
Dept: OUTPATIENT SERVICES | Facility: HOSPITAL | Age: 70
LOS: 1 days | Discharge: HOME | End: 2020-05-20

## 2020-05-20 DIAGNOSIS — Z96.653 PRESENCE OF ARTIFICIAL KNEE JOINT, BILATERAL: Chronic | ICD-10-CM

## 2020-05-20 DIAGNOSIS — Z95.9 PRESENCE OF CARDIAC AND VASCULAR IMPLANT AND GRAFT, UNSPECIFIED: Chronic | ICD-10-CM

## 2020-05-20 DIAGNOSIS — Z12.11 ENCOUNTER FOR SCREENING FOR MALIGNANT NEOPLASM OF COLON: Chronic | ICD-10-CM

## 2020-05-20 DIAGNOSIS — I48.91 UNSPECIFIED ATRIAL FIBRILLATION: ICD-10-CM

## 2020-05-20 DIAGNOSIS — Z79.01 LONG TERM (CURRENT) USE OF ANTICOAGULANTS: ICD-10-CM

## 2020-05-20 LAB
POCT INR: 1.1 RATIO — SIGNIFICANT CHANGE UP (ref 0.9–1.2)
POCT PT: 13.4 SEC — SIGNIFICANT CHANGE UP (ref 10–13.4)

## 2020-05-26 ENCOUNTER — OUTPATIENT (OUTPATIENT)
Dept: OUTPATIENT SERVICES | Facility: HOSPITAL | Age: 70
LOS: 1 days | Discharge: HOME | End: 2020-05-26

## 2020-05-26 DIAGNOSIS — Z79.01 LONG TERM (CURRENT) USE OF ANTICOAGULANTS: ICD-10-CM

## 2020-05-26 DIAGNOSIS — I48.91 UNSPECIFIED ATRIAL FIBRILLATION: ICD-10-CM

## 2020-05-26 DIAGNOSIS — Z12.11 ENCOUNTER FOR SCREENING FOR MALIGNANT NEOPLASM OF COLON: Chronic | ICD-10-CM

## 2020-05-26 DIAGNOSIS — Z96.653 PRESENCE OF ARTIFICIAL KNEE JOINT, BILATERAL: Chronic | ICD-10-CM

## 2020-05-26 DIAGNOSIS — Z95.9 PRESENCE OF CARDIAC AND VASCULAR IMPLANT AND GRAFT, UNSPECIFIED: Chronic | ICD-10-CM

## 2020-06-02 ENCOUNTER — OUTPATIENT (OUTPATIENT)
Dept: OUTPATIENT SERVICES | Facility: HOSPITAL | Age: 70
LOS: 1 days | Discharge: HOME | End: 2020-06-02

## 2020-06-02 DIAGNOSIS — Z79.01 LONG TERM (CURRENT) USE OF ANTICOAGULANTS: ICD-10-CM

## 2020-06-02 DIAGNOSIS — Z96.653 PRESENCE OF ARTIFICIAL KNEE JOINT, BILATERAL: Chronic | ICD-10-CM

## 2020-06-02 DIAGNOSIS — Z95.9 PRESENCE OF CARDIAC AND VASCULAR IMPLANT AND GRAFT, UNSPECIFIED: Chronic | ICD-10-CM

## 2020-06-02 DIAGNOSIS — Z12.11 ENCOUNTER FOR SCREENING FOR MALIGNANT NEOPLASM OF COLON: Chronic | ICD-10-CM

## 2020-06-02 DIAGNOSIS — I48.91 UNSPECIFIED ATRIAL FIBRILLATION: ICD-10-CM

## 2020-06-02 LAB
POCT INR: 1.2 RATIO — SIGNIFICANT CHANGE UP (ref 0.9–1.2)
POCT PT: 14.7 SEC — HIGH (ref 10–13.4)

## 2020-06-09 ENCOUNTER — OUTPATIENT (OUTPATIENT)
Dept: OUTPATIENT SERVICES | Facility: HOSPITAL | Age: 70
LOS: 1 days | Discharge: HOME | End: 2020-06-09

## 2020-06-09 DIAGNOSIS — Z79.01 LONG TERM (CURRENT) USE OF ANTICOAGULANTS: ICD-10-CM

## 2020-06-09 DIAGNOSIS — Z95.9 PRESENCE OF CARDIAC AND VASCULAR IMPLANT AND GRAFT, UNSPECIFIED: Chronic | ICD-10-CM

## 2020-06-09 DIAGNOSIS — I48.91 UNSPECIFIED ATRIAL FIBRILLATION: ICD-10-CM

## 2020-06-09 DIAGNOSIS — Z96.653 PRESENCE OF ARTIFICIAL KNEE JOINT, BILATERAL: Chronic | ICD-10-CM

## 2020-06-09 DIAGNOSIS — Z12.11 ENCOUNTER FOR SCREENING FOR MALIGNANT NEOPLASM OF COLON: Chronic | ICD-10-CM

## 2020-06-09 LAB
POCT INR: 1.6 RATIO — HIGH (ref 0.9–1.2)
POCT PT: 18.7 SEC — HIGH (ref 10–13.4)

## 2020-06-11 NOTE — PATIENT PROFILE ADULT - NSPROPASSIVESMOKEEXPOSURE_GEN_A_NUR
Expected Date Of Service: 06/11/2020
Bill For Surgical Tray: no
Lab Facility: [id number]
Performing Laboratory: [id number]
Billing Type: Client Bill
No

## 2020-06-19 ENCOUNTER — OUTPATIENT (OUTPATIENT)
Dept: OUTPATIENT SERVICES | Facility: HOSPITAL | Age: 70
LOS: 1 days | Discharge: HOME | End: 2020-06-19

## 2020-06-19 DIAGNOSIS — Z12.11 ENCOUNTER FOR SCREENING FOR MALIGNANT NEOPLASM OF COLON: Chronic | ICD-10-CM

## 2020-06-19 DIAGNOSIS — Z96.653 PRESENCE OF ARTIFICIAL KNEE JOINT, BILATERAL: Chronic | ICD-10-CM

## 2020-06-19 DIAGNOSIS — Z95.9 PRESENCE OF CARDIAC AND VASCULAR IMPLANT AND GRAFT, UNSPECIFIED: Chronic | ICD-10-CM

## 2020-06-19 DIAGNOSIS — I48.91 UNSPECIFIED ATRIAL FIBRILLATION: ICD-10-CM

## 2020-06-19 DIAGNOSIS — Z79.01 LONG TERM (CURRENT) USE OF ANTICOAGULANTS: ICD-10-CM

## 2020-06-19 LAB
INR PPP: 2.8 RATIO
POCT INR: 2.8 RATIO — HIGH (ref 0.9–1.2)
POCT PT: 34 SEC — HIGH (ref 10–13.4)
POCT-PROTHROMBIN TIME: 34 SECS
QUALITY CONTROL: YES

## 2020-06-30 ENCOUNTER — OUTPATIENT (OUTPATIENT)
Dept: OUTPATIENT SERVICES | Facility: HOSPITAL | Age: 70
LOS: 1 days | Discharge: HOME | End: 2020-06-30

## 2020-06-30 DIAGNOSIS — Z12.11 ENCOUNTER FOR SCREENING FOR MALIGNANT NEOPLASM OF COLON: Chronic | ICD-10-CM

## 2020-06-30 DIAGNOSIS — Z96.653 PRESENCE OF ARTIFICIAL KNEE JOINT, BILATERAL: Chronic | ICD-10-CM

## 2020-06-30 DIAGNOSIS — Z95.9 PRESENCE OF CARDIAC AND VASCULAR IMPLANT AND GRAFT, UNSPECIFIED: Chronic | ICD-10-CM

## 2020-06-30 DIAGNOSIS — Z79.01 LONG TERM (CURRENT) USE OF ANTICOAGULANTS: ICD-10-CM

## 2020-06-30 DIAGNOSIS — I48.91 UNSPECIFIED ATRIAL FIBRILLATION: ICD-10-CM

## 2020-06-30 LAB
POCT INR: 2 RATIO — HIGH (ref 0.9–1.2)
POCT PT: 23.7 SEC — HIGH (ref 10–13.4)

## 2020-07-08 ENCOUNTER — EMERGENCY (EMERGENCY)
Facility: HOSPITAL | Age: 70
LOS: 0 days | Discharge: ANOTHER HOSPITAL | End: 2020-07-09
Attending: EMERGENCY MEDICINE | Admitting: EMERGENCY MEDICINE
Payer: MEDICARE

## 2020-07-08 VITALS
RESPIRATION RATE: 18 BRPM | OXYGEN SATURATION: 100 % | TEMPERATURE: 98 F | DIASTOLIC BLOOD PRESSURE: 88 MMHG | SYSTOLIC BLOOD PRESSURE: 188 MMHG | HEART RATE: 52 BPM

## 2020-07-08 VITALS
WEIGHT: 182.1 LBS | TEMPERATURE: 98 F | DIASTOLIC BLOOD PRESSURE: 76 MMHG | HEART RATE: 52 BPM | OXYGEN SATURATION: 99 % | RESPIRATION RATE: 20 BRPM | SYSTOLIC BLOOD PRESSURE: 160 MMHG

## 2020-07-08 DIAGNOSIS — N40.0 BENIGN PROSTATIC HYPERPLASIA WITHOUT LOWER URINARY TRACT SYMPTOMS: ICD-10-CM

## 2020-07-08 DIAGNOSIS — Z12.11 ENCOUNTER FOR SCREENING FOR MALIGNANT NEOPLASM OF COLON: Chronic | ICD-10-CM

## 2020-07-08 DIAGNOSIS — E11.9 TYPE 2 DIABETES MELLITUS WITHOUT COMPLICATIONS: ICD-10-CM

## 2020-07-08 DIAGNOSIS — R10.9 UNSPECIFIED ABDOMINAL PAIN: ICD-10-CM

## 2020-07-08 DIAGNOSIS — Z86.2 PERSONAL HISTORY OF DISEASES OF THE BLOOD AND BLOOD-FORMING ORGANS AND CERTAIN DISORDERS INVOLVING THE IMMUNE MECHANISM: ICD-10-CM

## 2020-07-08 DIAGNOSIS — Z86.718 PERSONAL HISTORY OF OTHER VENOUS THROMBOSIS AND EMBOLISM: ICD-10-CM

## 2020-07-08 DIAGNOSIS — Z94.0 KIDNEY TRANSPLANT STATUS: ICD-10-CM

## 2020-07-08 DIAGNOSIS — Z96.653 PRESENCE OF ARTIFICIAL KNEE JOINT, BILATERAL: Chronic | ICD-10-CM

## 2020-07-08 DIAGNOSIS — Z20.828 CONTACT WITH AND (SUSPECTED) EXPOSURE TO OTHER VIRAL COMMUNICABLE DISEASES: ICD-10-CM

## 2020-07-08 DIAGNOSIS — Z79.899 OTHER LONG TERM (CURRENT) DRUG THERAPY: ICD-10-CM

## 2020-07-08 DIAGNOSIS — E78.00 PURE HYPERCHOLESTEROLEMIA, UNSPECIFIED: ICD-10-CM

## 2020-07-08 DIAGNOSIS — Z95.9 PRESENCE OF CARDIAC AND VASCULAR IMPLANT AND GRAFT, UNSPECIFIED: Chronic | ICD-10-CM

## 2020-07-08 DIAGNOSIS — I48.91 UNSPECIFIED ATRIAL FIBRILLATION: ICD-10-CM

## 2020-07-08 DIAGNOSIS — Z79.01 LONG TERM (CURRENT) USE OF ANTICOAGULANTS: ICD-10-CM

## 2020-07-08 DIAGNOSIS — I10 ESSENTIAL (PRIMARY) HYPERTENSION: ICD-10-CM

## 2020-07-08 LAB
ALBUMIN SERPL ELPH-MCNC: 4.6 G/DL — SIGNIFICANT CHANGE UP (ref 3.5–5.2)
ALP SERPL-CCNC: 111 U/L — SIGNIFICANT CHANGE UP (ref 30–115)
ALT FLD-CCNC: 14 U/L — SIGNIFICANT CHANGE UP (ref 0–41)
ANION GAP SERPL CALC-SCNC: 11 MMOL/L — SIGNIFICANT CHANGE UP (ref 7–14)
ANISOCYTOSIS BLD QL: SLIGHT — SIGNIFICANT CHANGE UP
APPEARANCE UR: CLEAR — SIGNIFICANT CHANGE UP
APTT BLD: 47.4 SEC — HIGH (ref 27–39.2)
AST SERPL-CCNC: 19 U/L — SIGNIFICANT CHANGE UP (ref 0–41)
BASOPHILS # BLD AUTO: 0.02 K/UL — SIGNIFICANT CHANGE UP (ref 0–0.2)
BASOPHILS NFR BLD AUTO: 1.7 % — HIGH (ref 0–1)
BILIRUB SERPL-MCNC: 0.7 MG/DL — SIGNIFICANT CHANGE UP (ref 0.2–1.2)
BILIRUB UR-MCNC: NEGATIVE — SIGNIFICANT CHANGE UP
BUN SERPL-MCNC: 23 MG/DL — HIGH (ref 10–20)
CALCIUM SERPL-MCNC: 9.5 MG/DL — SIGNIFICANT CHANGE UP (ref 8.5–10.1)
CHLORIDE SERPL-SCNC: 102 MMOL/L — SIGNIFICANT CHANGE UP (ref 98–110)
CO2 SERPL-SCNC: 28 MMOL/L — SIGNIFICANT CHANGE UP (ref 17–32)
COLOR SPEC: YELLOW — SIGNIFICANT CHANGE UP
CREAT SERPL-MCNC: 1.1 MG/DL — SIGNIFICANT CHANGE UP (ref 0.7–1.5)
DIFF PNL FLD: NEGATIVE — SIGNIFICANT CHANGE UP
EOSINOPHIL # BLD AUTO: 0.13 K/UL — SIGNIFICANT CHANGE UP (ref 0–0.7)
EOSINOPHIL NFR BLD AUTO: 9.5 % — HIGH (ref 0–8)
GIANT PLATELETS BLD QL SMEAR: PRESENT — SIGNIFICANT CHANGE UP
GLUCOSE SERPL-MCNC: 100 MG/DL — HIGH (ref 70–99)
GLUCOSE UR QL: NEGATIVE — SIGNIFICANT CHANGE UP
HCT VFR BLD CALC: 32.4 % — LOW (ref 42–52)
HGB BLD-MCNC: 10.5 G/DL — LOW (ref 14–18)
INR BLD: 2.56 RATIO — HIGH (ref 0.65–1.3)
KETONES UR-MCNC: NEGATIVE — SIGNIFICANT CHANGE UP
LEUKOCYTE ESTERASE UR-ACNC: NEGATIVE — SIGNIFICANT CHANGE UP
LYMPHOCYTES # BLD AUTO: 0.52 K/UL — LOW (ref 1.2–3.4)
LYMPHOCYTES # BLD AUTO: 38.8 % — SIGNIFICANT CHANGE UP (ref 20.5–51.1)
MANUAL SMEAR VERIFICATION: SIGNIFICANT CHANGE UP
MCHC RBC-ENTMCNC: 29.2 PG — SIGNIFICANT CHANGE UP (ref 27–31)
MCHC RBC-ENTMCNC: 32.4 G/DL — SIGNIFICANT CHANGE UP (ref 32–37)
MCV RBC AUTO: 90 FL — SIGNIFICANT CHANGE UP (ref 80–94)
MICROCYTES BLD QL: SLIGHT — SIGNIFICANT CHANGE UP
MONOCYTES # BLD AUTO: 0.26 K/UL — SIGNIFICANT CHANGE UP (ref 0.1–0.6)
MONOCYTES NFR BLD AUTO: 19 % — HIGH (ref 1.7–9.3)
NEUTROPHILS # BLD AUTO: 0.4 K/UL — LOW (ref 1.4–6.5)
NEUTROPHILS NFR BLD AUTO: 29.3 % — LOW (ref 42.2–75.2)
NITRITE UR-MCNC: NEGATIVE — SIGNIFICANT CHANGE UP
OVALOCYTES BLD QL SMEAR: SLIGHT — SIGNIFICANT CHANGE UP
PH UR: 6 — SIGNIFICANT CHANGE UP (ref 5–8)
PLAT MORPH BLD: NORMAL — SIGNIFICANT CHANGE UP
PLATELET # BLD AUTO: 141 K/UL — SIGNIFICANT CHANGE UP (ref 130–400)
POIKILOCYTOSIS BLD QL AUTO: SLIGHT — SIGNIFICANT CHANGE UP
POTASSIUM SERPL-MCNC: 4.4 MMOL/L — SIGNIFICANT CHANGE UP (ref 3.5–5)
POTASSIUM SERPL-SCNC: 4.4 MMOL/L — SIGNIFICANT CHANGE UP (ref 3.5–5)
PROT SERPL-MCNC: 7.1 G/DL — SIGNIFICANT CHANGE UP (ref 6–8)
PROT UR-MCNC: SIGNIFICANT CHANGE UP
PROTHROM AB SERPL-ACNC: 29.4 SEC — HIGH (ref 9.95–12.87)
RBC # BLD: 3.6 M/UL — LOW (ref 4.7–6.1)
RBC # FLD: 12.3 % — SIGNIFICANT CHANGE UP (ref 11.5–14.5)
RBC BLD AUTO: ABNORMAL
SARS-COV-2 RNA SPEC QL NAA+PROBE: SIGNIFICANT CHANGE UP
SODIUM SERPL-SCNC: 141 MMOL/L — SIGNIFICANT CHANGE UP (ref 135–146)
SP GR SPEC: 1.03 — HIGH (ref 1.01–1.02)
UROBILINOGEN FLD QL: SIGNIFICANT CHANGE UP
VARIANT LYMPHS # BLD: 1.7 % — SIGNIFICANT CHANGE UP (ref 0–5)
WBC # BLD: 1.35 K/UL — LOW (ref 4.8–10.8)
WBC # FLD AUTO: 1.35 K/UL — LOW (ref 4.8–10.8)

## 2020-07-08 PROCEDURE — 74176 CT ABD & PELVIS W/O CONTRAST: CPT | Mod: 26

## 2020-07-08 PROCEDURE — 93979 VASCULAR STUDY: CPT | Mod: 26

## 2020-07-08 PROCEDURE — 99285 EMERGENCY DEPT VISIT HI MDM: CPT | Mod: CS

## 2020-07-08 PROCEDURE — 71045 X-RAY EXAM CHEST 1 VIEW: CPT | Mod: 26

## 2020-07-08 PROCEDURE — 93010 ELECTROCARDIOGRAM REPORT: CPT

## 2020-07-08 RX ORDER — PANTOPRAZOLE SODIUM 20 MG/1
40 TABLET, DELAYED RELEASE ORAL ONCE
Refills: 0 | Status: COMPLETED | OUTPATIENT
Start: 2020-07-08 | End: 2020-07-08

## 2020-07-08 RX ORDER — MAGNESIUM OXIDE 400 MG ORAL TABLET 241.3 MG
400 TABLET ORAL ONCE
Refills: 0 | Status: COMPLETED | OUTPATIENT
Start: 2020-07-08 | End: 2020-07-08

## 2020-07-08 RX ORDER — TAMSULOSIN HYDROCHLORIDE 0.4 MG/1
0.4 CAPSULE ORAL AT BEDTIME
Refills: 0 | Status: DISCONTINUED | OUTPATIENT
Start: 2020-07-08 | End: 2020-07-09

## 2020-07-08 RX ORDER — METOPROLOL TARTRATE 50 MG
25 TABLET ORAL ONCE
Refills: 0 | Status: COMPLETED | OUTPATIENT
Start: 2020-07-08 | End: 2020-07-08

## 2020-07-08 RX ORDER — WARFARIN SODIUM 2.5 MG/1
5 TABLET ORAL ONCE
Refills: 0 | Status: COMPLETED | OUTPATIENT
Start: 2020-07-08 | End: 2020-07-08

## 2020-07-08 RX ORDER — SODIUM CHLORIDE 9 MG/ML
1000 INJECTION INTRAMUSCULAR; INTRAVENOUS; SUBCUTANEOUS
Refills: 0 | Status: DISCONTINUED | OUTPATIENT
Start: 2020-07-08 | End: 2020-07-09

## 2020-07-08 RX ORDER — FLUDROCORTISONE ACETATE 0.1 MG/1
0.1 TABLET ORAL ONCE
Refills: 0 | Status: COMPLETED | OUTPATIENT
Start: 2020-07-08 | End: 2020-07-08

## 2020-07-08 RX ORDER — TACROLIMUS 5 MG/1
1 CAPSULE ORAL ONCE
Refills: 0 | Status: COMPLETED | OUTPATIENT
Start: 2020-07-08 | End: 2020-07-08

## 2020-07-08 RX ORDER — MAGNESIUM OXIDE 400 MG ORAL TABLET 241.3 MG
1600 TABLET ORAL ONCE
Refills: 0 | Status: DISCONTINUED | OUTPATIENT
Start: 2020-07-08 | End: 2020-07-08

## 2020-07-08 RX ADMIN — MAGNESIUM OXIDE 400 MG ORAL TABLET 400 MILLIGRAM(S): 241.3 TABLET ORAL at 22:38

## 2020-07-08 RX ADMIN — TACROLIMUS 1 MILLIGRAM(S): 5 CAPSULE ORAL at 22:39

## 2020-07-08 RX ADMIN — FLUDROCORTISONE ACETATE 0.1 MILLIGRAM(S): 0.1 TABLET ORAL at 22:39

## 2020-07-08 RX ADMIN — Medication 25 MILLIGRAM(S): at 20:24

## 2020-07-08 RX ADMIN — PANTOPRAZOLE SODIUM 40 MILLIGRAM(S): 20 TABLET, DELAYED RELEASE ORAL at 22:39

## 2020-07-08 RX ADMIN — TAMSULOSIN HYDROCHLORIDE 0.4 MILLIGRAM(S): 0.4 CAPSULE ORAL at 22:38

## 2020-07-08 RX ADMIN — WARFARIN SODIUM 5 MILLIGRAM(S): 2.5 TABLET ORAL at 22:39

## 2020-07-08 NOTE — ED PROVIDER NOTE - ATTENDING CONTRIBUTION TO CARE
71 yo m with pmh of htn, hld, KIM, dm, JIMÉNEZ s/p liver/renal transplant by dr. herrera at [facility], presents with c/o R flank pain.  pt says radiates to the R groin.  +dull, achy pain.  no fever, no chills, no hematuria.  reports increased urinary frequency, no dysuria.  no n/v/d.  exam: nad, ncat, perrl, eomi, mmm, rrr, ctab, abd soft,  +surgical scar, nt,nd, no cvat, imp: pt with liver/renal transplant, here with R flank pain, will check labs, ua, CT a/p

## 2020-07-08 NOTE — ED PROVIDER NOTE - CLINICAL SUMMARY MEDICAL DECISION MAKING FREE TEXT BOX
Pt with h/o liver/renal transplant, has R flank pain, mild fullness on CT with possible abscess abutting renal cortex and abdominal wall, seen by urology in ED and recommended transfer to [facility] where he had transplant done

## 2020-07-08 NOTE — ED ADULT NURSE NOTE - CHIEF COMPLAINT QUOTE
" I have pain in my right kidney, I have kidney transplant". pains stated 3 days ago, denies nausea, vomiting , or fever.

## 2020-07-08 NOTE — ED ADULT TRIAGE NOTE - CHIEF COMPLAINT QUOTE
" I have pain in my right kidney, I have kidney transplant". pains stated 3 days ago, denies nausea, vomiting , or fever.
Yes

## 2020-07-08 NOTE — CONSULT NOTE ADULT - SUBJECTIVE AND OBJECTIVE BOX
Patient is a 70y old  Male who presents with a chief complaint of     HPI:  70 y.o M with PMH of HTN, HLD, Anemia, DM, A. Fib, Cirrhosis of liver/JIMÉNEZ, BPH, s/p recent lever and Right renal transplant 5 months ago and R LE DVT on Coumadin comes to ED with c/o intermittent Right flank pain. Urology called to evaluate Pt. for CT A/P findings of interval right lower quadrant kidney transplant with mild fullness of renal pelvis without hydronephrosis. Punctate non-obstructing lower pole stone in the transplant kidney. Small encapsulated 3.1 cm collection abutting the transplanted renal cortex and the right lower quadrant abdominal wall, with adjacent fat stranding, indeterminate. Differential includes postsurgical seroma and abscess.         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: 15 years ago      REVIEW OF SYSTEMS:    CONSTITUTIONAL:  no fevers or chills  HEENT: No visual changes  ENDO: No sweating  NECK: No pain or stiffness  MUSCULOSKELETAL: No back pain, no joint pain  RESPIRATORY: No shortness of breath  CARDIOVASCULAR: No chest pain  GASTROINTESTINAL: No abdominal or epigastric pain. No nausea, vomiting,  No diarrhea or constipation.   NEUROLOGICAL: No mental status changes  PSYCH: No depression, no mood changes  SKIN: No itching      Home Medications:  atorvastatin 40 mg oral tablet: 1 tab(s) orally once a day (at bedtime) (08 Dec 2019 10:44)  Constulose 10 g/15 mL oral syrup: 30 milliliter(s) orally 2 times a day (08 Dec 2019 10:44)  hydrOXYzine hydrochloride 25 mg oral tablet: 1 tab(s) orally 3 times a day (08 Dec 2019 10:44)  pantoprazole 40 mg oral delayed release tablet: 1 tab(s) orally once a day (before a meal) (08 Dec 2019 10:44)  rifAXIMin 550 mg oral tablet: 1 tab(s) orally 2 times a day (08 Dec 2019 10:44)  sodium bicarbonate 650 mg oral tablet: 1 tab(s) orally 3 times a day (13 Dec 2019 15:10)  sucralfate 1 g oral tablet: 1 tab(s) orally 4 times a day (before meals and at bedtime) (08 Dec 2019 10:44)  ursodiol 500 mg oral tablet: 1 tab(s) orally 2 times a day (08 Dec 2019 10:44)  warfarin 2.5 mg oral tablet: 1 tab(s) orally once a day (08 Dec 2019 10:44)      MEDICATIONS  (STANDING):  sodium chloride 0.9%. 1000 milliLiter(s) (75 mL/Hr) IV Continuous <Continuous>      Allergies: NKDA       SOCIAL HISTORY: No illicit drug use, smoking, ETOH use    FAMILY HISTORY:  FH: ovarian cancer: mother  Family history of early CAD: mother      Vital Signs Last 24 Hrs  T(C): 36.8 (08 Jul 2020 12:18), Max: 36.8 (08 Jul 2020 12:18)  T(F): 98.3 (08 Jul 2020 12:18), Max: 98.3 (08 Jul 2020 12:18)  HR: 52 (08 Jul 2020 12:18) (52 - 52)  BP: 160/76 (08 Jul 2020 12:18) (160/76 - 160/76)  RR: 20 (08 Jul 2020 12:18) (20 - 20)  SpO2: 99% (08 Jul 2020 12:18) (99% - 99%)      PHYSICAL EXAM:    Constitutional: NAD, well-developed  HEENT: EOMI  Neck: no pain  Back: No CVA tenderness  Respiratory: No accessory respiratory muscle use  Abd: Soft, NT/ND  no organomegally  no hernia  :   Extremities: no edema  Neurological: A/O x 3  Psychiatric: Normal mood, normal affect  Skin: No rashes    I&O's Summary      LABS:                        10.5   1.35  )-----------( 141      ( 08 Jul 2020 13:37 )             32.4     07-08    141  |  102  |  23<H>  ----------------------------<  100<H>  4.4   |  28  |  1.1    Ca    9.5      08 Jul 2020 13:37    TPro  7.1  /  Alb  4.6  /  TBili  0.7  /  DBili  x   /  AST  19  /  ALT  14  /  AlkPhos  111  07-08    PT/INR - ( 08 Jul 2020 13:37 )   PT: 29.40 sec;   INR: 2.56 ratio         PTT - ( 08 Jul 2020 13:37 )  PTT:47.4 sec       Urinalysis (07.08.20 @ 13:37)    pH Urine: 6.0    Glucose Qualitative, Urine: Negative    Blood, Urine: Negative    Color: Yellow    Urine Appearance: Clear    Bilirubin: Negative    Ketone - Urine: Negative    Specific Gravity: 1.026    Protein, Urine: Trace    Urobilinogen: <2 mg/dL    Nitrite: Negative    Leukocyte Esterase Concentration: Negative    Manual Differential (07.08.20 @ 13:37)    Microcytosis: Slight      RADIOLOGY & ADDITIONAL STUDIES:   < from: CT Abdomen and Pelvis No Cont (07.08.20 @ 14:57) >  EXAM:  CT ABDOMEN AND PELVIS            PROCEDURE DATE:  07/08/2020            INTERPRETATION:  CLINICAL STATEMENT: Right flank pain. Recent liver and kidney transplant in February 20, 2020.      TECHNIQUE: Contiguous axial CT images were obtained from the lower chest to the pubic symphysis without intravenous contrast.  Oral contrast was not administered.  Reformatted images in the coronal and sagittal planes were acquired.    COMPARISON: CT abdomen pelvis 10/31/2017.       FINDINGS:    LOWER CHEST: Unremarkable.    HEPATOBILIARY: Status post liver transplant with hypodensity abutting the portal vein and the right hepatic lobe measuring 3 cm (series 4 image 126), nonspecific.    SPLEEN: Borderline enlarged spleen measuring 13 cm in craniocaudal dimension, previously measuring 16 cm.    PANCREAS: Unremarkable.    ADRENAL GLANDS: Unremarkable.    KIDNEYS: Status post kidney transplant in the right lower quadrant. There is prominence of the transplanted renal pelvis with no hydronephrosisor obstructing calculus. Punctate nonobstructing stone in the transplant kidney at the lower pole calyx (series 4 image 236). No hydronephrosis or radiopaque calculus in the bilateral native kidneys.    ABDOMINOPELVIC NODES: Unremarkable.    PELVIC ORGANS: Redemonstrated multiple calcifications and rim calcified structures along the right anterior margin of the prostate gland, indeterminate but likely benign given stability.    PERITONEUM/MESENTERY/BOWEL: No evidence of bowel obstruction, pneumoperitoneum or ascites. Appendix is not visualized.    BONES/SOFT TISSUES: There is a small 3.1 x 1.7 x 1.6 collection apposed to the transplanted kidney cortex and right lower quadrant anterolateral abdominal wall (series 4/257, series 601/26) with adjacent fat stranding and inflammation. No acute osseous abnormalities. Degenerative changes of the spine. Stable small left fat-containing inguinal hernia.    OTHER: Atherosclerotic vascular calcifications.      IMPRESSION:     1.  Interval right lowerquadrant kidney transplant with mild fullness of renal pelvis without hydronephrosis. Punctate nonobstructing lower pole stone in the transplant kidney.    2.  Small encapsulated 3.1 cm collection abutting the transplanted renal cortex and the right lower quadrant abdominal wall, with adjacent fat stranding, indeterminate. Differential includes postsurgical seroma and abscess. If clinically warranted, aspiration/tissue sampling can be obtained.        Dr. Michael Tsang discussed preliminary findings with ALMA KING MD on 7/8/2020 5:28 PM with readback.        MICHAEL TSANG M.D., RESIDENT RADIOLOGIST  This document has been electronically signed.  ANGELO HODGSON M.D., ATTENDING RADIOLOGIST  This document has been electronically signed. Jul 8 2020  5:59PM        < end of copied text > Patient is a 70y old  Male who presents with a chief complaint of     HPI:  70 y.o M with PMH of HTN, HLD, Anemia, DM, A. Fib, Cirrhosis of liver/JIMÉNEZ, BPH, s/p recent lever and Right renal transplant 5 months ago at ([facility])and R LE DVT on Coumadin  right renal stent removed in march comes to ED with c/o intermittent Right flank pain. Urology called to evaluate Pt. for CT A/P findings of interval right lower quadrant kidney transplant with mild fullness of renal pelvis without hydronephrosis. Punctate non-obstructing lower pole stone in the transplant kidney. Small encapsulated 3.1 cm collection abutting the transplanted renal cortex and the right lower quadrant abdominal wall, with adjacent fat stranding, indeterminate. Differential includes postsurgical seroma and abscess.   Pt. seen and examined at bedside in NAD, states to right flank pain that started 3 days ago as mild intermittent ache and last night become constant 10/10. Pt. was taking Tylenol 500 mg 2 pills yesterday evening and today in AM with some improvement in pain today.  Pt. denies fever, chills, N/V, SOB, CP, hematuria, dysuria.      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: 15 years ago      REVIEW OF SYSTEMS:    CONSTITUTIONAL:  no fevers or chills  HEENT: No visual changes  ENDO: No sweating  NECK: No pain or stiffness  MUSCULOSKELETAL: No back pain, no joint pain  RESPIRATORY: No shortness of breath  CARDIOVASCULAR: No chest pain  GASTROINTESTINAL: No abdominal or epigastric pain. No nausea, vomiting,  No diarrhea or constipation.   NEUROLOGICAL: No mental status changes  PSYCH: No depression, no mood changes  SKIN: No itching      Home Medications:  atorvastatin 40 mg oral tablet: 1 tab(s) orally once a day (at bedtime) (08 Dec 2019 10:44)  Constulose 10 g/15 mL oral syrup: 30 milliliter(s) orally 2 times a day (08 Dec 2019 10:44)  hydrOXYzine hydrochloride 25 mg oral tablet: 1 tab(s) orally 3 times a day (08 Dec 2019 10:44)  pantoprazole 40 mg oral delayed release tablet: 1 tab(s) orally once a day (before a meal) (08 Dec 2019 10:44)  rifAXIMin 550 mg oral tablet: 1 tab(s) orally 2 times a day (08 Dec 2019 10:44)  sodium bicarbonate 650 mg oral tablet: 1 tab(s) orally 3 times a day (13 Dec 2019 15:10)  sucralfate 1 g oral tablet: 1 tab(s) orally 4 times a day (before meals and at bedtime) (08 Dec 2019 10:44)  ursodiol 500 mg oral tablet: 1 tab(s) orally 2 times a day (08 Dec 2019 10:44)  warfarin 2.5 mg oral tablet: 1 tab(s) orally once a day (08 Dec 2019 10:44)      MEDICATIONS  (STANDING):  sodium chloride 0.9%. 1000 milliLiter(s) (75 mL/Hr) IV Continuous <Continuous>      Allergies: NKDA       SOCIAL HISTORY: No illicit drug use, smoking, ETOH use    FAMILY HISTORY:  FH: ovarian cancer: mother  Family history of early CAD: mother      Vital Signs Last 24 Hrs  T(C): 36.8 (08 Jul 2020 12:18), Max: 36.8 (08 Jul 2020 12:18)  T(F): 98.3 (08 Jul 2020 12:18), Max: 98.3 (08 Jul 2020 12:18)  HR: 52 (08 Jul 2020 12:18) (52 - 52)  BP: 160/76 (08 Jul 2020 12:18) (160/76 - 160/76)  RR: 20 (08 Jul 2020 12:18) (20 - 20)  SpO2: 99% (08 Jul 2020 12:18) (99% - 99%)      PHYSICAL EXAM:    Constitutional: NAD, well-developed, well nurished  HEENT: EOMI  Neck: no pain  Back: No CVA tenderness B/L . right lower back /flank, mild ttp.   Respiratory: No accessory respiratory muscle use  Abd: well healed multiple abdominal scars, Soft, NT/ND, no suprapubic ttp.    Extremities: no edema  Neurological: A/O x 3  Psychiatric: Normal mood, normal affect  Skin: No rashes       LABS:                        10.5   1.35  )-----------( 141      ( 08 Jul 2020 13:37 )             32.4     07-08    141  |  102  |  23<H>  ----------------------------<  100<H>  4.4   |  28  |  1.1    Ca    9.5      08 Jul 2020 13:37    TPro  7.1  /  Alb  4.6  /  TBili  0.7  /  DBili  x   /  AST  19  /  ALT  14  /  AlkPhos  111  07-08    PT/INR - ( 08 Jul 2020 13:37 )   PT: 29.40 sec;   INR: 2.56 ratio         PTT - ( 08 Jul 2020 13:37 )  PTT:47.4 sec       Urinalysis (07.08.20 @ 13:37)    pH Urine: 6.0    Glucose Qualitative, Urine: Negative    Blood, Urine: Negative    Color: Yellow    Urine Appearance: Clear    Bilirubin: Negative    Ketone - Urine: Negative    Specific Gravity: 1.026    Protein, Urine: Trace    Urobilinogen: <2 mg/dL    Nitrite: Negative    Leukocyte Esterase Concentration: Negative    Manual Differential (07.08.20 @ 13:37)    Microcytosis: Slight      RADIOLOGY & ADDITIONAL STUDIES:   < from: CT Abdomen and Pelvis No Cont (07.08.20 @ 14:57) >  EXAM:  CT ABDOMEN AND PELVIS            PROCEDURE DATE:  07/08/2020            INTERPRETATION:  CLINICAL STATEMENT: Right flank pain. Recent liver and kidney transplant in February 20, 2020.      TECHNIQUE: Contiguous axial CT images were obtained from the lower chest to the pubic symphysis without intravenous contrast.  Oral contrast was not administered.  Reformatted images in the coronal and sagittal planes were acquired.    COMPARISON: CT abdomen pelvis 10/31/2017.       FINDINGS:    LOWER CHEST: Unremarkable.    HEPATOBILIARY: Status post liver transplant with hypodensity abutting the portal vein and the right hepatic lobe measuring 3 cm (series 4 image 126), nonspecific.    SPLEEN: Borderline enlarged spleen measuring 13 cm in craniocaudal dimension, previously measuring 16 cm.    PANCREAS: Unremarkable.    ADRENAL GLANDS: Unremarkable.    KIDNEYS: Status post kidney transplant in the right lower quadrant. There is prominence of the transplanted renal pelvis with no hydronephrosisor obstructing calculus. Punctate nonobstructing stone in the transplant kidney at the lower pole calyx (series 4 image 236). No hydronephrosis or radiopaque calculus in the bilateral native kidneys.    ABDOMINOPELVIC NODES: Unremarkable.    PELVIC ORGANS: Redemonstrated multiple calcifications and rim calcified structures along the right anterior margin of the prostate gland, indeterminate but likely benign given stability.    PERITONEUM/MESENTERY/BOWEL: No evidence of bowel obstruction, pneumoperitoneum or ascites. Appendix is not visualized.    BONES/SOFT TISSUES: There is a small 3.1 x 1.7 x 1.6 collection apposed to the transplanted kidney cortex and right lower quadrant anterolateral abdominal wall (series 4/257, series 601/26) with adjacent fat stranding and inflammation. No acute osseous abnormalities. Degenerative changes of the spine. Stable small left fat-containing inguinal hernia.    OTHER: Atherosclerotic vascular calcifications.      IMPRESSION:     1.  Interval right lowerquadrant kidney transplant with mild fullness of renal pelvis without hydronephrosis. Punctate nonobstructing lower pole stone in the transplant kidney.    2.  Small encapsulated 3.1 cm collection abutting the transplanted renal cortex and the right lower quadrant abdominal wall, with adjacent fat stranding, indeterminate. Differential includes postsurgical seroma and abscess. If clinically warranted, aspiration/tissue sampling can be obtained.        Dr. Michael Tsang discussed preliminary findings with ALMA KING MD on 7/8/2020 5:28 PM with readback.        MICHAEL TSANG M.D., RESIDENT RADIOLOGIST  This document has been electronically signed.  ANGELO HODGSON M.D., ATTENDING RADIOLOGIST  This document has been electronically signed. Jul 8 2020  5:59PM        < end of copied text >

## 2020-07-08 NOTE — CONSULT NOTE ADULT - CONSULT REASON
right flank pain x 3 days CT A/P findings of 3.1 small encapsulated collection abutting the transplant renal cortex  and the right lower abdominal wall

## 2020-07-08 NOTE — ED PROVIDER NOTE - OBJECTIVE STATEMENT
70 y.o. male with a PMH of HTN, DM, JIMÉNEZ, s/p renal/liver transplant 5 mo ago, and RLE DVT on Coumadin presented to the ER c/o intermittent Right flank pain for the past 3 days.  Pt denies fever, chills, dysuria, hematuria, vomiting, diarrhea, chest pain, dizziness, fatigue, SOB.  No other complaints.  Pt sent to ER by Dr. Sunshine.

## 2020-07-08 NOTE — CONSULT NOTE ADULT - ASSESSMENT
70 y.o M with PMH of HTN, HLD, Anemia, DM, A. Fib, Cirrhosis of liver/JIMÉNEZ, BPH, s/p recent lever and Right renal transplant 5 months ago and R LE DVT on Coumadin comes to ED with c/o intermittent Right flank pain. Urology called to evaluate Pt. for CT A/P findings of interval right lower quadrant kidney transplant with mild fullness of renal pelvis without hydronephrosis. Punctate non-obstructing lower pole stone in the transplant kidney. Small encapsulated 3.1 cm collection abutting the transplanted renal cortex and the right lower quadrant abdominal wall, with adjacent fat stranding, indeterminate. Differential includes postsurgical seroma and abscess.       Plan: 70 y.o M with PMH of HTN, HLD, Anemia, DM, A. Fib, Cirrhosis of liver/JIMÉNEZ, BPH, s/p recent lever and Right renal transplant 5 months ago and R LE DVT on Coumadin comes to ED with c/o intermittent Right flank pain. Urology called to evaluate Pt. for CT A/P findings of interval right lower quadrant kidney transplant with mild fullness of renal pelvis without hydronephrosis. Punctate non-obstructing lower pole stone in the transplant kidney. Small encapsulated 3.1 cm collection abutting the transplanted renal cortex and the right lower quadrant abdominal wall, with adjacent fat stranding, indeterminate. Differential includes postsurgical seroma and abscess.       Plan:  Case d/w and imaging studies reviewed by Dr. Lazo  No acute  intervention needed at this time.   Please transfer Pt. to [facility] transplant service for further evaluation and care, might require FNA.     attending will F/U

## 2020-07-08 NOTE — ED PROVIDER NOTE - PROGRESS NOTE DETAILS
eduardo reese spoke to dr. herrera who recommended only a CT a/p and not an US at this time dr. herrera is a gastroenterologist, as per family, pt has not f/u with transplant surgeons or nephrology after transplant, only seeing dr. herrera.  CT prelim neg for stone, will consult renal for recommendations to r/o organ rejection call placed to Renal Transplant Dr. Kortney Chaudhary [phone], await callback spoke to urology, will consult on case called [facility] [phone], need face sheet faxed [phone], await callback from accepting physician received a call from transfer center, clinical given called [facility] [phone], need face sheet faxed [phone], await callback from accepting physician spoke to [facility], accepting physician is Dr. Femi Nelson signed out to dr. sacks of the liver team, aware that renal duplex results are still pending, but will send CD with CT and duplex images spoke to Dr. Chaudhary nephrology at [place], aware of pt transfer and aware that there is already an accepting physician

## 2020-07-08 NOTE — ED ADULT NURSE NOTE - OBJECTIVE STATEMENT
70 year old male with hx kidney and liver transplant 5 months ago. patient complaining of right lower quadrant pain, denies any gu s/s -n/v/d

## 2020-07-09 LAB
CULTURE RESULTS: SIGNIFICANT CHANGE UP
SPECIMEN SOURCE: SIGNIFICANT CHANGE UP

## 2020-07-15 ENCOUNTER — OUTPATIENT (OUTPATIENT)
Dept: OUTPATIENT SERVICES | Facility: HOSPITAL | Age: 70
LOS: 1 days | Discharge: HOME | End: 2020-07-15

## 2020-07-15 DIAGNOSIS — Z79.01 LONG TERM (CURRENT) USE OF ANTICOAGULANTS: ICD-10-CM

## 2020-07-15 DIAGNOSIS — Z96.653 PRESENCE OF ARTIFICIAL KNEE JOINT, BILATERAL: Chronic | ICD-10-CM

## 2020-07-15 DIAGNOSIS — I48.91 UNSPECIFIED ATRIAL FIBRILLATION: ICD-10-CM

## 2020-07-15 DIAGNOSIS — Z95.9 PRESENCE OF CARDIAC AND VASCULAR IMPLANT AND GRAFT, UNSPECIFIED: Chronic | ICD-10-CM

## 2020-07-15 DIAGNOSIS — Z12.11 ENCOUNTER FOR SCREENING FOR MALIGNANT NEOPLASM OF COLON: Chronic | ICD-10-CM

## 2020-07-15 LAB
INR PPP: 2.5 RATIO
POCT INR: 2.5 RATIO — HIGH (ref 0.9–1.2)
POCT PT: 29.6 SEC — HIGH (ref 10–13.4)
POCT-PROTHROMBIN TIME: 29.6 SECS
QUALITY CONTROL: YES

## 2020-07-27 ENCOUNTER — RECORD ABSTRACTING (OUTPATIENT)
Age: 70
End: 2020-07-27

## 2020-07-27 ENCOUNTER — OUTPATIENT (OUTPATIENT)
Dept: OUTPATIENT SERVICES | Facility: HOSPITAL | Age: 70
LOS: 1 days | Discharge: HOME | End: 2020-07-27

## 2020-07-27 ENCOUNTER — APPOINTMENT (OUTPATIENT)
Dept: CARDIOLOGY | Facility: CLINIC | Age: 70
End: 2020-07-27
Payer: MEDICARE

## 2020-07-27 VITALS
HEIGHT: 69 IN | TEMPERATURE: 98.5 F | DIASTOLIC BLOOD PRESSURE: 60 MMHG | RESPIRATION RATE: 16 BRPM | HEART RATE: 50 BPM | OXYGEN SATURATION: 98 % | SYSTOLIC BLOOD PRESSURE: 122 MMHG | BODY MASS INDEX: 27.25 KG/M2 | WEIGHT: 184 LBS

## 2020-07-27 DIAGNOSIS — Z80.41 FAMILY HISTORY OF MALIGNANT NEOPLASM OF OVARY: ICD-10-CM

## 2020-07-27 DIAGNOSIS — Z95.9 PRESENCE OF CARDIAC AND VASCULAR IMPLANT AND GRAFT, UNSPECIFIED: Chronic | ICD-10-CM

## 2020-07-27 DIAGNOSIS — Z87.448 PERSONAL HISTORY OF OTHER DISEASES OF URINARY SYSTEM: ICD-10-CM

## 2020-07-27 DIAGNOSIS — Z82.49 FAMILY HISTORY OF ISCHEMIC HEART DISEASE AND OTHER DISEASES OF THE CIRCULATORY SYSTEM: ICD-10-CM

## 2020-07-27 DIAGNOSIS — I48.91 UNSPECIFIED ATRIAL FIBRILLATION: ICD-10-CM

## 2020-07-27 DIAGNOSIS — Z12.11 ENCOUNTER FOR SCREENING FOR MALIGNANT NEOPLASM OF COLON: Chronic | ICD-10-CM

## 2020-07-27 DIAGNOSIS — Z86.718 PERSONAL HISTORY OF OTHER VENOUS THROMBOSIS AND EMBOLISM: ICD-10-CM

## 2020-07-27 DIAGNOSIS — Z96.653 PRESENCE OF ARTIFICIAL KNEE JOINT, BILATERAL: Chronic | ICD-10-CM

## 2020-07-27 DIAGNOSIS — G47.30 SLEEP APNEA, UNSPECIFIED: ICD-10-CM

## 2020-07-27 DIAGNOSIS — Z79.01 LONG TERM (CURRENT) USE OF ANTICOAGULANTS: ICD-10-CM

## 2020-07-27 LAB
INR PPP: 1.7 RATIO
POCT INR: 1.7 RATIO — HIGH (ref 0.9–1.2)
POCT PT: 20 SEC — HIGH (ref 10–13.4)
POCT-PROTHROMBIN TIME: 20 SECS
QUALITY CONTROL: YES

## 2020-07-27 PROCEDURE — 93000 ELECTROCARDIOGRAM COMPLETE: CPT

## 2020-07-27 PROCEDURE — 99214 OFFICE O/P EST MOD 30 MIN: CPT

## 2020-07-27 RX ORDER — RIFAXIMIN 550 MG/1
550 TABLET ORAL TWICE DAILY
Qty: 28 | Refills: 3 | Status: DISCONTINUED | COMMUNITY
Start: 2019-08-09 | End: 2020-07-27

## 2020-07-27 RX ORDER — HYDROXYZINE HYDROCHLORIDE 25 MG/1
25 TABLET ORAL 3 TIMES DAILY
Refills: 0 | Status: DISCONTINUED | COMMUNITY
End: 2020-07-27

## 2020-07-27 RX ORDER — TAMSULOSIN HYDROCHLORIDE 0.4 MG/1
0.4 CAPSULE ORAL DAILY
Refills: 0 | Status: ACTIVE | COMMUNITY

## 2020-07-27 RX ORDER — FERROUS SULFATE TAB EC 325 MG (65 MG FE EQUIVALENT) 325 (65 FE) MG
325 (65 FE) TABLET DELAYED RESPONSE ORAL 3 TIMES DAILY
Refills: 0 | Status: DISCONTINUED | COMMUNITY
End: 2020-07-27

## 2020-07-27 RX ORDER — PROPRANOLOL HCL 10 MG
10 TABLET ORAL TWICE DAILY
Refills: 0 | Status: DISCONTINUED | COMMUNITY
End: 2020-07-27

## 2020-07-27 RX ORDER — LACTULOSE 10 G/15ML
20 SOLUTION ORAL 3 TIMES DAILY
Qty: 90 | Refills: 3 | Status: DISCONTINUED | COMMUNITY
Start: 2019-09-13 | End: 2020-07-27

## 2020-07-27 RX ORDER — ATORVASTATIN CALCIUM 40 MG/1
40 TABLET, FILM COATED ORAL DAILY
Refills: 0 | Status: DISCONTINUED | COMMUNITY
End: 2020-07-27

## 2020-07-27 RX ORDER — SUCRALFATE 1 G/1
TABLET ORAL 3 TIMES DAILY
Refills: 0 | Status: DISCONTINUED | COMMUNITY
End: 2020-07-27

## 2020-07-27 RX ORDER — PROPRANOLOL HYDROCHLORIDE 10 MG/1
10 TABLET ORAL 3 TIMES DAILY
Refills: 0 | Status: DISCONTINUED | COMMUNITY
End: 2020-07-27

## 2020-07-27 RX ORDER — AMLODIPINE BESYLATE 5 MG/1
5 TABLET ORAL DAILY
Qty: 90 | Refills: 0 | Status: ACTIVE | COMMUNITY

## 2020-07-27 RX ORDER — LACTULOSE 10 G/15ML
10 SOLUTION ORAL TWICE DAILY
Refills: 0 | Status: DISCONTINUED | COMMUNITY
End: 2020-07-27

## 2020-07-27 RX ORDER — FUROSEMIDE 20 MG/1
20 TABLET ORAL DAILY
Qty: 30 | Refills: 3 | Status: DISCONTINUED | COMMUNITY
Start: 2019-09-13 | End: 2020-07-27

## 2020-07-27 RX ORDER — URSODIOL 500 MG/1
500 TABLET ORAL 3 TIMES DAILY
Refills: 0 | Status: DISCONTINUED | COMMUNITY
End: 2020-07-27

## 2020-07-27 RX ORDER — CALCIUM CARBONATE/VITAMIN D3 600 MG-10
TABLET ORAL 3 TIMES DAILY
Refills: 0 | Status: DISCONTINUED | COMMUNITY
End: 2020-07-27

## 2020-07-27 RX ORDER — AMILORIDE HYDROCHLORIDE 5 MG/1
5 TABLET ORAL DAILY
Refills: 0 | Status: DISCONTINUED | COMMUNITY
End: 2020-07-27

## 2020-07-27 RX ORDER — SODIUM BICARBONATE 650 MG/1
TABLET ORAL 3 TIMES DAILY
Refills: 0 | Status: DISCONTINUED | COMMUNITY
End: 2020-07-27

## 2020-07-27 NOTE — REASON FOR VISIT
[Follow-Up - Clinic] : a clinic follow-up of [Atrial Fibrillation] : atrial fibrillation [Heart Failure] : congestive heart failure [Coronary Artery Disease] : coronary artery disease [Hyperlipidemia] : hyperlipidemia [Hypertension] : hypertension

## 2020-07-27 NOTE — PHYSICAL EXAM
[General Appearance - Well Developed] : well developed [Well Groomed] : well groomed [Normal Appearance] : normal appearance [General Appearance - Well Nourished] : well nourished [No Deformities] : no deformities [General Appearance - In No Acute Distress] : no acute distress [Normal Conjunctiva] : the conjunctiva exhibited no abnormalities [Eyelids - No Xanthelasma] : the eyelids demonstrated no xanthelasmas [Normal Oral Mucosa] : normal oral mucosa [No Oral Pallor] : no oral pallor [No Oral Cyanosis] : no oral cyanosis [Normal Jugular Venous A Waves Present] : normal jugular venous A waves present [Normal Jugular Venous V Waves Present] : normal jugular venous V waves present [No Jugular Venous Alan A Waves] : no jugular venous alan A waves [Respiration, Rhythm And Depth] : normal respiratory rhythm and effort [Exaggerated Use Of Accessory Muscles For Inspiration] : no accessory muscle use [Auscultation Breath Sounds / Voice Sounds] : lungs were clear to auscultation bilaterally [Normal S2] : normal S2 [Normal Rate] : normal [Normal S1] : normal S1 [No Murmur] : no murmurs heard [2+] : right 2+ [No Abnormalities] : the abdominal aorta was not enlarged and no bruit was heard [No Pitting Edema] : no pitting edema present [Abdomen Soft] : soft [Abdomen Tenderness] : non-tender [Gait - Sufficient For Exercise Testing] : the gait was sufficient for exercise testing [Abdomen Mass (___ Cm)] : no abdominal mass palpated [Abnormal Walk] : normal gait [Skin Color & Pigmentation] : normal skin color and pigmentation [] : no rash [No Venous Stasis] : no venous stasis [Skin Lesions] : no skin lesions [No Skin Ulcers] : no skin ulcer [No Xanthoma] : no  xanthoma was observed [Oriented To Time, Place, And Person] : oriented to person, place, and time [Affect] : the affect was normal [No Anxiety] : not feeling anxious [Mood] : the mood was normal [S3] : no S3 [S4] : no S4 [Right Carotid Bruit] : no bruit heard over the right carotid [Left Carotid Bruit] : no bruit heard over the left carotid [Right Femoral Bruit] : no bruit heard over the right femoral artery [Left Femoral Bruit] : no bruit heard over the left femoral artery

## 2020-07-27 NOTE — HISTORY OF PRESENT ILLNESS
[FreeTextEntry1] : 70 year old male who came in for f/u of atrial fibrillation, diastolic CHF, HTN, CKD stage 3, and hyperlipidemia. The patient was recently hospitalized at [facility] from Dec. 7 - Dec. 23, 2019 for shortness of breath due to anasarca from cirrhosis (from JIMÉNEZ). The patient during that time has had multiple paracentesis. He had a liver and renal transplant performed on 2/1/2020.\par \par \par PMHx:\par \par Atrial fibrillation - CHADSVASC2 score 3 - chronic - rate not controlled\par Chronic diastolic heart failure - stable\par CAD - stent - 2008 (mid-LAD xience 4.0 x 15 and 3.0 x 18) - chronic - stable\par DVT of lower extremities 1995 - stable\par HTN - controlled\par High cholesterol - controlled\par Borderline DM - controlled\par BENIGNO - non-compliant with using CPAP - ongoing\par Liver cirrhosis (due to JIMÉNEZ) - chronic - progressive\par Hepatic encephalopathy - stable\par CKD stage 3 - chronic - stable\par Anemia - stable\par Liver and renal transplant - 2/1/2020 - stable\par \par Medications reviewed and reconciled with patient. Patient is compliant with taking appropriate medications at appropriate doses at appropriate intervals without missing doses.

## 2020-07-27 NOTE — HISTORY OF PRESENT ILLNESS
[FreeTextEntry1] : 70 year old male who came in for f/u of atrial fibrillation, diastolic CHF, HTN, CKD stage 3, and hyperlipidemia. The patient was recently hospitalized at [facility] from Dec. 7 - Dec. 23, 2019 for shortness of breath due to anasarca from cirrhosis (from JIMÉNEZ). The patient during that time has had multiple paracentesis. He had a liver and renal transplant performed on 2/1/2020.\par \par \par PMHx:\par \par Atrial fibrillation - CHADSVASC2 score 3 - chronic - rate not controlled\par Chronic diastolic heart failure - stable\par CAD - stent - 2008 (mid-LAD xience 4.0 x 15 and 3.0 x 18) - chronic - stable\par DVT of lower extremities 1995 - stable\par HTN - controlled\par High cholesterol - controlled\par Borderline DM - controlled\par BENIGNO - non-compliant with using CPAP - ongoing\par Liver cirrhosis (due to JIMÉNEZ) - chronic - progressive\par Hepatic encephalopathy - stable\par CKD stage 3 - chronic - stable\par Anemia - stable\par Liver and renal transplant - 2/1/2020 - stable\par \par Medications reviewed and reconciled with patient. Patient is compliant with taking appropriate medications at appropriate doses at appropriate intervals without missing doses.

## 2020-07-27 NOTE — PHYSICAL EXAM
[General Appearance - Well Developed] : well developed [Normal Appearance] : normal appearance [Well Groomed] : well groomed [General Appearance - Well Nourished] : well nourished [No Deformities] : no deformities [General Appearance - In No Acute Distress] : no acute distress [Normal Conjunctiva] : the conjunctiva exhibited no abnormalities [Eyelids - No Xanthelasma] : the eyelids demonstrated no xanthelasmas [Normal Oral Mucosa] : normal oral mucosa [No Oral Pallor] : no oral pallor [No Oral Cyanosis] : no oral cyanosis [Normal Jugular Venous A Waves Present] : normal jugular venous A waves present [Normal Jugular Venous V Waves Present] : normal jugular venous V waves present [No Jugular Venous Alan A Waves] : no jugular venous alan A waves [Respiration, Rhythm And Depth] : normal respiratory rhythm and effort [Auscultation Breath Sounds / Voice Sounds] : lungs were clear to auscultation bilaterally [Exaggerated Use Of Accessory Muscles For Inspiration] : no accessory muscle use [Normal S2] : normal S2 [Normal S1] : normal S1 [Normal Rate] : normal [No Murmur] : no murmurs heard [2+] : right 2+ [No Abnormalities] : the abdominal aorta was not enlarged and no bruit was heard [No Pitting Edema] : no pitting edema present [Abdomen Soft] : soft [Abdomen Tenderness] : non-tender [Abnormal Walk] : normal gait [Abdomen Mass (___ Cm)] : no abdominal mass palpated [Gait - Sufficient For Exercise Testing] : the gait was sufficient for exercise testing [Skin Color & Pigmentation] : normal skin color and pigmentation [No Venous Stasis] : no venous stasis [] : no rash [No Skin Ulcers] : no skin ulcer [Skin Lesions] : no skin lesions [No Xanthoma] : no  xanthoma was observed [Oriented To Time, Place, And Person] : oriented to person, place, and time [Mood] : the mood was normal [No Anxiety] : not feeling anxious [Affect] : the affect was normal [S3] : no S3 [S4] : no S4 [Right Carotid Bruit] : no bruit heard over the right carotid [Left Carotid Bruit] : no bruit heard over the left carotid [Right Femoral Bruit] : no bruit heard over the right femoral artery [Left Femoral Bruit] : no bruit heard over the left femoral artery

## 2020-07-27 NOTE — REASON FOR VISIT
[Follow-Up - Clinic] : a clinic follow-up of [Atrial Fibrillation] : atrial fibrillation [Coronary Artery Disease] : coronary artery disease [Heart Failure] : congestive heart failure [Hyperlipidemia] : hyperlipidemia [Hypertension] : hypertension

## 2020-07-31 ENCOUNTER — RECORD ABSTRACTING (OUTPATIENT)
Age: 70
End: 2020-07-31

## 2020-07-31 DIAGNOSIS — I34.0 NONRHEUMATIC MITRAL (VALVE) INSUFFICIENCY: ICD-10-CM

## 2020-07-31 DIAGNOSIS — I36.0 NONRHEUMATIC TRICUSPID (VALVE) STENOSIS: ICD-10-CM

## 2020-07-31 DIAGNOSIS — Z87.19 PERSONAL HISTORY OF OTHER DISEASES OF THE DIGESTIVE SYSTEM: ICD-10-CM

## 2020-07-31 DIAGNOSIS — I37.0 NONRHEUMATIC PULMONARY VALVE STENOSIS: ICD-10-CM

## 2020-08-17 ENCOUNTER — APPOINTMENT (OUTPATIENT)
Dept: MEDICATION MANAGEMENT | Facility: CLINIC | Age: 70
End: 2020-08-17

## 2020-08-17 ENCOUNTER — OUTPATIENT (OUTPATIENT)
Dept: OUTPATIENT SERVICES | Facility: HOSPITAL | Age: 70
LOS: 1 days | Discharge: HOME | End: 2020-08-17

## 2020-08-17 VITALS — HEART RATE: 68 BPM | OXYGEN SATURATION: 98 % | RESPIRATION RATE: 16 BRPM

## 2020-08-17 DIAGNOSIS — Z95.9 PRESENCE OF CARDIAC AND VASCULAR IMPLANT AND GRAFT, UNSPECIFIED: Chronic | ICD-10-CM

## 2020-08-17 DIAGNOSIS — Z12.11 ENCOUNTER FOR SCREENING FOR MALIGNANT NEOPLASM OF COLON: Chronic | ICD-10-CM

## 2020-08-17 DIAGNOSIS — Z96.653 PRESENCE OF ARTIFICIAL KNEE JOINT, BILATERAL: Chronic | ICD-10-CM

## 2020-08-17 DIAGNOSIS — I48.91 UNSPECIFIED ATRIAL FIBRILLATION: ICD-10-CM

## 2020-08-17 DIAGNOSIS — Z79.01 LONG TERM (CURRENT) USE OF ANTICOAGULANTS: ICD-10-CM

## 2020-08-17 LAB
INR PPP: 3.7 RATIO
POCT-PROTHROMBIN TIME: 44.3 SECS
QUALITY CONTROL: YES

## 2020-08-20 ENCOUNTER — APPOINTMENT (OUTPATIENT)
Dept: MEDICATION MANAGEMENT | Facility: CLINIC | Age: 70
End: 2020-08-20

## 2020-08-24 ENCOUNTER — APPOINTMENT (OUTPATIENT)
Dept: MEDICATION MANAGEMENT | Facility: CLINIC | Age: 70
End: 2020-08-24

## 2020-08-24 ENCOUNTER — OUTPATIENT (OUTPATIENT)
Dept: OUTPATIENT SERVICES | Facility: HOSPITAL | Age: 70
LOS: 1 days | Discharge: HOME | End: 2020-08-24

## 2020-08-24 VITALS — RESPIRATION RATE: 16 BRPM | OXYGEN SATURATION: 98 % | HEART RATE: 68 BPM

## 2020-08-24 DIAGNOSIS — Z12.11 ENCOUNTER FOR SCREENING FOR MALIGNANT NEOPLASM OF COLON: Chronic | ICD-10-CM

## 2020-08-24 DIAGNOSIS — Z96.653 PRESENCE OF ARTIFICIAL KNEE JOINT, BILATERAL: Chronic | ICD-10-CM

## 2020-08-24 DIAGNOSIS — Z95.9 PRESENCE OF CARDIAC AND VASCULAR IMPLANT AND GRAFT, UNSPECIFIED: Chronic | ICD-10-CM

## 2020-08-24 LAB
INR PPP: 2.9 RATIO
POCT-PROTHROMBIN TIME: 34.7 SECS
QUALITY CONTROL: YES

## 2020-08-25 DIAGNOSIS — I48.91 UNSPECIFIED ATRIAL FIBRILLATION: ICD-10-CM

## 2020-08-25 DIAGNOSIS — Z79.01 LONG TERM (CURRENT) USE OF ANTICOAGULANTS: ICD-10-CM

## 2020-09-09 ENCOUNTER — OUTPATIENT (OUTPATIENT)
Dept: OUTPATIENT SERVICES | Facility: HOSPITAL | Age: 70
LOS: 1 days | Discharge: HOME | End: 2020-09-09

## 2020-09-09 ENCOUNTER — APPOINTMENT (OUTPATIENT)
Dept: MEDICATION MANAGEMENT | Facility: CLINIC | Age: 70
End: 2020-09-09

## 2020-09-09 VITALS — OXYGEN SATURATION: 98 % | RESPIRATION RATE: 16 BRPM | HEART RATE: 58 BPM

## 2020-09-09 DIAGNOSIS — Z12.11 ENCOUNTER FOR SCREENING FOR MALIGNANT NEOPLASM OF COLON: Chronic | ICD-10-CM

## 2020-09-09 DIAGNOSIS — Z79.01 LONG TERM (CURRENT) USE OF ANTICOAGULANTS: ICD-10-CM

## 2020-09-09 DIAGNOSIS — I48.91 UNSPECIFIED ATRIAL FIBRILLATION: ICD-10-CM

## 2020-09-09 DIAGNOSIS — Z95.9 PRESENCE OF CARDIAC AND VASCULAR IMPLANT AND GRAFT, UNSPECIFIED: Chronic | ICD-10-CM

## 2020-09-09 DIAGNOSIS — Z96.653 PRESENCE OF ARTIFICIAL KNEE JOINT, BILATERAL: Chronic | ICD-10-CM

## 2020-09-09 LAB
INR PPP: 2.3 RATIO
POCT-PROTHROMBIN TIME: 28 SECS
QUALITY CONTROL: YES

## 2020-09-28 ENCOUNTER — APPOINTMENT (OUTPATIENT)
Dept: MEDICATION MANAGEMENT | Facility: CLINIC | Age: 70
End: 2020-09-28

## 2020-09-28 ENCOUNTER — OUTPATIENT (OUTPATIENT)
Dept: OUTPATIENT SERVICES | Facility: HOSPITAL | Age: 70
LOS: 1 days | Discharge: HOME | End: 2020-09-28

## 2020-09-28 VITALS — HEART RATE: 72 BPM | RESPIRATION RATE: 16 BRPM | OXYGEN SATURATION: 99 %

## 2020-09-28 DIAGNOSIS — Z79.01 LONG TERM (CURRENT) USE OF ANTICOAGULANTS: ICD-10-CM

## 2020-09-28 DIAGNOSIS — Z96.653 PRESENCE OF ARTIFICIAL KNEE JOINT, BILATERAL: Chronic | ICD-10-CM

## 2020-09-28 DIAGNOSIS — I48.91 UNSPECIFIED ATRIAL FIBRILLATION: ICD-10-CM

## 2020-09-28 DIAGNOSIS — Z95.9 PRESENCE OF CARDIAC AND VASCULAR IMPLANT AND GRAFT, UNSPECIFIED: Chronic | ICD-10-CM

## 2020-09-28 DIAGNOSIS — Z12.11 ENCOUNTER FOR SCREENING FOR MALIGNANT NEOPLASM OF COLON: Chronic | ICD-10-CM

## 2020-09-28 LAB
INR PPP: 2.4 RATIO
POCT-PROTHROMBIN TIME: 28.5 SECS
QUALITY CONTROL: YES

## 2020-10-07 DIAGNOSIS — E78.00 PURE HYPERCHOLESTEROLEMIA, UNSPECIFIED: ICD-10-CM

## 2020-10-07 DIAGNOSIS — I65.9: ICD-10-CM

## 2020-10-07 DIAGNOSIS — Z86.69 PERSONAL HISTORY OF OTHER DISEASES OF THE NERVOUS SYSTEM AND SENSE ORGANS: ICD-10-CM

## 2020-10-07 DIAGNOSIS — Z86.79 PERSONAL HISTORY OF OTHER DISEASES OF THE CIRCULATORY SYSTEM: ICD-10-CM

## 2020-10-07 NOTE — PHYSICAL THERAPY INITIAL EVALUATION ADULT - ADDITIONAL COMMENTS
[General Appearance - Well Developed] : well developed [General Appearance - Well Nourished] : well nourished [Normal Appearance] : normal appearance [Well Groomed] : well groomed [General Appearance - In No Acute Distress] : no acute distress [Abdomen Soft] : soft [Abdomen Tenderness] : non-tender [Costovertebral Angle Tenderness] : no ~M costovertebral angle tenderness [Urethral Meatus] : meatus normal [Urinary Bladder Findings] : the bladder was normal on palpation Patient states he ambulates occasionally with cane in community. Patient has 17 steps to enter home and 15 steps to bedroom [Scrotum] : the scrotum was normal [Testes Mass (___cm)] : there were no testicular masses [No Prostate Nodules] : no prostate nodules [Edema] : no peripheral edema [] : no respiratory distress [Respiration, Rhythm And Depth] : normal respiratory rhythm and effort [Exaggerated Use Of Accessory Muscles For Inspiration] : no accessory muscle use [Oriented To Time, Place, And Person] : oriented to person, place, and time [Affect] : the affect was normal [Mood] : the mood was normal [Not Anxious] : not anxious [Normal Station and Gait] : the gait and station were normal for the patient's age [No Focal Deficits] : no focal deficits [No Palpable Adenopathy] : no palpable adenopathy

## 2020-10-19 ENCOUNTER — OUTPATIENT (OUTPATIENT)
Dept: OUTPATIENT SERVICES | Facility: HOSPITAL | Age: 70
LOS: 1 days | Discharge: HOME | End: 2020-10-19

## 2020-10-19 ENCOUNTER — APPOINTMENT (OUTPATIENT)
Dept: MEDICATION MANAGEMENT | Facility: CLINIC | Age: 70
End: 2020-10-19

## 2020-10-19 VITALS — HEART RATE: 72 BPM | RESPIRATION RATE: 16 BRPM | OXYGEN SATURATION: 98 %

## 2020-10-19 DIAGNOSIS — Z95.9 PRESENCE OF CARDIAC AND VASCULAR IMPLANT AND GRAFT, UNSPECIFIED: Chronic | ICD-10-CM

## 2020-10-19 DIAGNOSIS — Z96.653 PRESENCE OF ARTIFICIAL KNEE JOINT, BILATERAL: Chronic | ICD-10-CM

## 2020-10-19 DIAGNOSIS — I48.91 UNSPECIFIED ATRIAL FIBRILLATION: ICD-10-CM

## 2020-10-19 DIAGNOSIS — Z79.01 LONG TERM (CURRENT) USE OF ANTICOAGULANTS: ICD-10-CM

## 2020-10-19 DIAGNOSIS — Z12.11 ENCOUNTER FOR SCREENING FOR MALIGNANT NEOPLASM OF COLON: Chronic | ICD-10-CM

## 2020-10-19 LAB
INR PPP: 3.4 RATIO
POCT-PROTHROMBIN TIME: 41.2 SECS
QUALITY CONTROL: YES

## 2020-10-28 ENCOUNTER — APPOINTMENT (OUTPATIENT)
Dept: CARDIOLOGY | Facility: CLINIC | Age: 70
End: 2020-10-28
Payer: MEDICARE

## 2020-10-28 VITALS
DIASTOLIC BLOOD PRESSURE: 60 MMHG | HEART RATE: 52 BPM | TEMPERATURE: 97.8 F | BODY MASS INDEX: 29.18 KG/M2 | WEIGHT: 197 LBS | RESPIRATION RATE: 16 BRPM | HEIGHT: 69 IN | SYSTOLIC BLOOD PRESSURE: 120 MMHG | OXYGEN SATURATION: 98 %

## 2020-10-28 PROCEDURE — 93000 ELECTROCARDIOGRAM COMPLETE: CPT

## 2020-10-28 PROCEDURE — 99214 OFFICE O/P EST MOD 30 MIN: CPT

## 2020-10-28 RX ORDER — FLUDROCORTISONE ACETATE 0.1 MG/1
0.1 TABLET ORAL DAILY
Refills: 0 | Status: DISCONTINUED | COMMUNITY
End: 2020-10-28

## 2020-10-28 RX ORDER — ASPIRIN 81 MG
81 TABLET, DELAYED RELEASE (ENTERIC COATED) ORAL
Refills: 0 | Status: DISCONTINUED | COMMUNITY
End: 2020-10-28

## 2020-10-28 RX ORDER — MYCOPHENILIC ACID 360 MG/1
360 TABLET, DELAYED RELEASE ORAL TWICE DAILY
Refills: 0 | Status: DISCONTINUED | COMMUNITY
End: 2020-10-28

## 2020-10-28 RX ORDER — STANDARDIZED SENNA CONCENTRATE 8.6 MG/1
TABLET ORAL
Refills: 0 | Status: DISCONTINUED | COMMUNITY
End: 2020-10-28

## 2020-10-28 NOTE — DISCUSSION/SUMMARY
[___ Month(s)] : [unfilled] month(s) [With Me] : with me [FreeTextEntry1] : Ekg results were reviewed.\par Recent lab results were reviewed.\par \par The patient has an intermediate risk medical profile to undergo a low risk procedure (dental extraction).  The risk includes the risk of complications, morbidity and mortality relating to the procedure and postoperative period.  Patient to stop warfarin 3 days prior to procedure and resume once adequate hemostasis is obtained.\par \par Fasting CBC, BMP, LFTs, Lipid Profile, HgbA1c, CRP, UA, Urine Microalbumin, Mg, Ca, TSH, T3, T4 prior to next visit\par refer to EPS for at. fib. ablation and watchman\par f/u 3 months\par

## 2020-10-28 NOTE — ASSESSMENT
[FreeTextEntry1] : Atrial fibrillation - CHADSVASC2 score 3 - chronic - rate not controlled\par Chronic diastolic heart failure - stable\par CAD - stent - 2008 (mid-LAD xience 4.0 x 15 and 3.0 x 18) - chronic - stable\par DVT of lower extremities 1995 - stable\par HTN - controlled\par High cholesterol - controlled\par Borderline DM - controlled\par BENIGNO - non-compliant with using CPAP - ongoing\par Liver cirrhosis (due to JIMÉNEZ) - chronic - progressive\par Hepatic encephalopathy - stable\par CKD stage 3 - chronic - stable\par Anemia - stable\par Liver and renal transplant - 2/1/2020 - stable

## 2020-10-28 NOTE — PHYSICAL EXAM
[General Appearance - Well Developed] : well developed [Normal Appearance] : normal appearance [Well Groomed] : well groomed [General Appearance - Well Nourished] : well nourished [No Deformities] : no deformities [General Appearance - In No Acute Distress] : no acute distress [Normal Conjunctiva] : the conjunctiva exhibited no abnormalities [Eyelids - No Xanthelasma] : the eyelids demonstrated no xanthelasmas [Normal Oral Mucosa] : normal oral mucosa [No Oral Pallor] : no oral pallor [No Oral Cyanosis] : no oral cyanosis [Normal Jugular Venous A Waves Present] : normal jugular venous A waves present [Normal Jugular Venous V Waves Present] : normal jugular venous V waves present [No Jugular Venous Alan A Waves] : no jugular venous alan A waves [Respiration, Rhythm And Depth] : normal respiratory rhythm and effort [Exaggerated Use Of Accessory Muscles For Inspiration] : no accessory muscle use [Auscultation Breath Sounds / Voice Sounds] : lungs were clear to auscultation bilaterally [Abdomen Soft] : soft [Abdomen Tenderness] : non-tender [Abdomen Mass (___ Cm)] : no abdominal mass palpated [Abnormal Walk] : normal gait [Gait - Sufficient For Exercise Testing] : the gait was sufficient for exercise testing [Skin Color & Pigmentation] : normal skin color and pigmentation [No Venous Stasis] : no venous stasis [Skin Lesions] : no skin lesions [No Skin Ulcers] : no skin ulcer [No Xanthoma] : no  xanthoma was observed [Oriented To Time, Place, And Person] : oriented to person, place, and time [Affect] : the affect was normal [Mood] : the mood was normal [No Anxiety] : not feeling anxious [Normal Rate] : normal [Normal S1] : normal S1 [Normal S2] : normal S2 [No Murmur] : no murmurs heard [2+] : left 2+ [No Abnormalities] : the abdominal aorta was not enlarged and no bruit was heard [No Pitting Edema] : no pitting edema present [Heart Rate And Rhythm] : heart rate and rhythm were normal [Heart Sounds] : normal S1 and S2 [Murmurs] : no murmurs present [Arterial Pulses Normal] : the arterial pulses were normal [Edema] : no peripheral edema present [Nail Clubbing] : no clubbing of the fingernails [Cyanosis, Localized] : no localized cyanosis [Petechial Hemorrhages (___cm)] : no petechial hemorrhages [] : no ischemic changes [S3] : no S3 [S4] : no S4 [Right Carotid Bruit] : no bruit heard over the right carotid [Left Carotid Bruit] : no bruit heard over the left carotid [Right Femoral Bruit] : no bruit heard over the right femoral artery [Left Femoral Bruit] : no bruit heard over the left femoral artery

## 2020-10-28 NOTE — HISTORY OF PRESENT ILLNESS
[FreeTextEntry1] : 70 year old male who came in for f/u of atrial fibrillation, diastolic CHF, HTN, CKD stage 3, and hyperlipidemia. The patient was recently hospitalized at [facility] from Dec. 7 - Dec. 23, 2019 for shortness of breath due to anasarca from cirrhosis (from JIMÉNEZ). The patient during that time has had multiple paracentesis. He had a liver and renal transplant performed on 2/1/2020.\par \par The patient is having oral surgery with removal of teeth on 11/9/2020.\par \par \par PMHx:\par \par Atrial fibrillation - CHADSVASC2 score 3 - chronic - rate not controlled\par Chronic diastolic heart failure - stable\par CAD - stent - 2008 (mid-LAD xience 4.0 x 15 and 3.0 x 18) - chronic - stable\par DVT of lower extremities 1995 - stable\par HTN - controlled\par High cholesterol - controlled\par Borderline DM - controlled\par BENIGNO - non-compliant with using CPAP - ongoing\par Liver cirrhosis (due to JIMÉNEZ) - chronic - progressive\par Hepatic encephalopathy - stable\par CKD stage 3 - chronic - stable\par Anemia - stable\par Liver and renal transplant - 2/1/2020 - stable\par \par Medications reviewed and reconciled with patient. Patient is compliant with taking appropriate medications at appropriate doses at appropriate intervals without missing doses.

## 2020-11-02 ENCOUNTER — APPOINTMENT (OUTPATIENT)
Dept: MEDICATION MANAGEMENT | Facility: CLINIC | Age: 70
End: 2020-11-02

## 2020-11-02 ENCOUNTER — OUTPATIENT (OUTPATIENT)
Dept: OUTPATIENT SERVICES | Facility: HOSPITAL | Age: 70
LOS: 1 days | Discharge: HOME | End: 2020-11-02

## 2020-11-02 VITALS — RESPIRATION RATE: 16 BRPM | HEART RATE: 65 BPM | OXYGEN SATURATION: 98 %

## 2020-11-02 DIAGNOSIS — Z79.01 LONG TERM (CURRENT) USE OF ANTICOAGULANTS: ICD-10-CM

## 2020-11-02 DIAGNOSIS — Z96.653 PRESENCE OF ARTIFICIAL KNEE JOINT, BILATERAL: Chronic | ICD-10-CM

## 2020-11-02 DIAGNOSIS — Z12.11 ENCOUNTER FOR SCREENING FOR MALIGNANT NEOPLASM OF COLON: Chronic | ICD-10-CM

## 2020-11-02 DIAGNOSIS — I48.91 UNSPECIFIED ATRIAL FIBRILLATION: ICD-10-CM

## 2020-11-02 DIAGNOSIS — Z95.9 PRESENCE OF CARDIAC AND VASCULAR IMPLANT AND GRAFT, UNSPECIFIED: Chronic | ICD-10-CM

## 2020-11-02 LAB
INR PPP: 2.6 RATIO
POCT-PROTHROMBIN TIME: 31.6 SECS
QUALITY CONTROL: YES

## 2020-11-02 NOTE — ED PROVIDER NOTE - CHIEF COMPLAINT
Patient is due to have his AURA Virus antibody checked again at his next infusion; Lab order in chart already; Patient would like to have this drawn at his next infusion on 11/11/20; The lab order will be faxed to his [facility] (phone [phone] fax [phone]) this week.    aMci Veloz MS RN Care Coordinator     The patient is a 69y Male complaining of multiple medical complaints.

## 2020-11-23 ENCOUNTER — APPOINTMENT (OUTPATIENT)
Dept: MEDICATION MANAGEMENT | Facility: CLINIC | Age: 70
End: 2020-11-23

## 2020-11-23 ENCOUNTER — OUTPATIENT (OUTPATIENT)
Dept: OUTPATIENT SERVICES | Facility: HOSPITAL | Age: 70
LOS: 1 days | Discharge: HOME | End: 2020-11-23

## 2020-11-23 VITALS — OXYGEN SATURATION: 98 % | HEART RATE: 65 BPM | RESPIRATION RATE: 16 BRPM

## 2020-11-23 DIAGNOSIS — Z12.11 ENCOUNTER FOR SCREENING FOR MALIGNANT NEOPLASM OF COLON: Chronic | ICD-10-CM

## 2020-11-23 DIAGNOSIS — Z79.01 LONG TERM (CURRENT) USE OF ANTICOAGULANTS: ICD-10-CM

## 2020-11-23 DIAGNOSIS — Z95.9 PRESENCE OF CARDIAC AND VASCULAR IMPLANT AND GRAFT, UNSPECIFIED: Chronic | ICD-10-CM

## 2020-11-23 DIAGNOSIS — I48.91 UNSPECIFIED ATRIAL FIBRILLATION: ICD-10-CM

## 2020-11-23 DIAGNOSIS — Z96.653 PRESENCE OF ARTIFICIAL KNEE JOINT, BILATERAL: Chronic | ICD-10-CM

## 2020-11-23 LAB
INR PPP: 1.8 RATIO
POCT-PROTHROMBIN TIME: 21.9 SECS
QUALITY CONTROL: YES

## 2020-12-11 ENCOUNTER — APPOINTMENT (OUTPATIENT)
Dept: CARDIOLOGY | Facility: CLINIC | Age: 70
End: 2020-12-11
Payer: MEDICARE

## 2020-12-11 VITALS
DIASTOLIC BLOOD PRESSURE: 82 MMHG | WEIGHT: 197 LBS | SYSTOLIC BLOOD PRESSURE: 160 MMHG | HEART RATE: 50 BPM | TEMPERATURE: 97.3 F | HEIGHT: 69 IN | BODY MASS INDEX: 29.18 KG/M2

## 2020-12-11 PROCEDURE — 99214 OFFICE O/P EST MOD 30 MIN: CPT

## 2020-12-11 PROCEDURE — 93000 ELECTROCARDIOGRAM COMPLETE: CPT

## 2020-12-11 NOTE — PHYSICAL EXAM
[General Appearance - Well Developed] : well developed [Normal Appearance] : normal appearance [Well Groomed] : well groomed [General Appearance - Well Nourished] : well nourished [No Deformities] : no deformities [General Appearance - In No Acute Distress] : no acute distress [Normal Conjunctiva] : the conjunctiva exhibited no abnormalities [Eyelids - No Xanthelasma] : the eyelids demonstrated no xanthelasmas [Normal Oral Mucosa] : normal oral mucosa [No Oral Pallor] : no oral pallor [No Oral Cyanosis] : no oral cyanosis [Normal Jugular Venous A Waves Present] : normal jugular venous A waves present [Normal Jugular Venous V Waves Present] : normal jugular venous V waves present [No Jugular Venous Alan A Waves] : no jugular venous alan A waves [Respiration, Rhythm And Depth] : normal respiratory rhythm and effort [Exaggerated Use Of Accessory Muscles For Inspiration] : no accessory muscle use [Auscultation Breath Sounds / Voice Sounds] : lungs were clear to auscultation bilaterally [Abdomen Soft] : soft [Abdomen Tenderness] : non-tender [Abdomen Mass (___ Cm)] : no abdominal mass palpated [Abnormal Walk] : normal gait [Gait - Sufficient For Exercise Testing] : the gait was sufficient for exercise testing [Nail Clubbing] : no clubbing of the fingernails [Cyanosis, Localized] : no localized cyanosis [Petechial Hemorrhages (___cm)] : no petechial hemorrhages [] : no ischemic changes

## 2020-12-11 NOTE — HISTORY OF PRESENT ILLNESS
[FreeTextEntry1] : Patient with history of PAF not on AC CHADVASC 3, diastolic HF, HTN, CKD, HLD, liver cirrhosis from JIMÉNEZ, being evaluated by [facility] transplant. Patient unable to tolerate AC due to GI bleed and cirrhosis. Patient possible watchman implant.\par \par Patietn had liver and kidney transplant. He had been recommend for watchman\par \par EKG SR

## 2020-12-11 NOTE — ASSESSMENT
[FreeTextEntry1] : PAF - CHADVASC 3 and  unable to  tolerate AC due to cirrhosis HASBLED 4. Patient is a candidate for watchman procedure however at this time I'm unable to assess if patient can take 45 days of Coumadin post watchman implant. Therefore patient will follow-up next week with [facility] physicians in [place] and hopefully they be able to assess if patient can take short-term Coumadin for 45 days followed by Plavix and aspirin for six months.\par \par Left Atrial Occlusion Device Implant\par I have discussed different treatment options with the patient including other anticoagulation medication. I have explained the risks and benefits of the procedure to the patient. I have explained to the patient the patient will require to be on warfarin for 45 days after implant and ARPAN will be repeated. If there is no leak patient will remain on aspirin and Plavix for next month, and then ASA only. There is approximately 1-2% chance of any major cardiovascular complication to occur. Complications include, but are not limited to infection, bleeding, damage to the vessels, hole in the heart, stroke, death and heart attack. The patient understands the risk and would like to proceed with the procedure.  Materials were provided to the patient. Patient indicated that all of his questions were answered to his satisfaction and verbalized understanding.\par Patients CHADVASC Score is    3\par Patients HASBLED score is3\par (Hypertension, Abnormal Renal/Liver Function, Stroke, Bleeding History or Predisposition, Labile INR, >65, Antiplatelet agents, NSAID, Drugs/Alcohol)\par Dr. De Souza              recommends and agrees with implant of watchman\par \par \par \par Dr. Humphries [phone]\par Dr. Chaudhary [phone]

## 2020-12-14 ENCOUNTER — OUTPATIENT (OUTPATIENT)
Dept: OUTPATIENT SERVICES | Facility: HOSPITAL | Age: 70
LOS: 1 days | Discharge: HOME | End: 2020-12-14

## 2020-12-14 ENCOUNTER — APPOINTMENT (OUTPATIENT)
Dept: MEDICATION MANAGEMENT | Facility: CLINIC | Age: 70
End: 2020-12-14

## 2020-12-14 VITALS — RESPIRATION RATE: 16 BRPM | OXYGEN SATURATION: 98 % | HEART RATE: 60 BPM

## 2020-12-14 DIAGNOSIS — Z95.9 PRESENCE OF CARDIAC AND VASCULAR IMPLANT AND GRAFT, UNSPECIFIED: Chronic | ICD-10-CM

## 2020-12-14 DIAGNOSIS — Z96.653 PRESENCE OF ARTIFICIAL KNEE JOINT, BILATERAL: Chronic | ICD-10-CM

## 2020-12-14 DIAGNOSIS — Z12.11 ENCOUNTER FOR SCREENING FOR MALIGNANT NEOPLASM OF COLON: Chronic | ICD-10-CM

## 2020-12-14 DIAGNOSIS — Z79.01 LONG TERM (CURRENT) USE OF ANTICOAGULANTS: ICD-10-CM

## 2020-12-14 DIAGNOSIS — I48.91 UNSPECIFIED ATRIAL FIBRILLATION: ICD-10-CM

## 2020-12-14 LAB
INR PPP: 2 RATIO
POCT-PROTHROMBIN TIME: 24.5 SECS
QUALITY CONTROL: YES

## 2021-01-19 ENCOUNTER — OUTPATIENT (OUTPATIENT)
Dept: OUTPATIENT SERVICES | Facility: HOSPITAL | Age: 71
LOS: 1 days | Discharge: HOME | End: 2021-01-19

## 2021-01-19 ENCOUNTER — APPOINTMENT (OUTPATIENT)
Dept: MEDICATION MANAGEMENT | Facility: CLINIC | Age: 71
End: 2021-01-19

## 2021-01-19 ENCOUNTER — OUTPATIENT (OUTPATIENT)
Dept: OUTPATIENT SERVICES | Facility: HOSPITAL | Age: 71
LOS: 1 days | Discharge: HOME | End: 2021-01-19
Payer: MEDICARE

## 2021-01-19 VITALS
RESPIRATION RATE: 16 BRPM | OXYGEN SATURATION: 99 % | DIASTOLIC BLOOD PRESSURE: 65 MMHG | HEIGHT: 69 IN | TEMPERATURE: 97 F | SYSTOLIC BLOOD PRESSURE: 134 MMHG | WEIGHT: 202.6 LBS | HEART RATE: 56 BPM

## 2021-01-19 VITALS — OXYGEN SATURATION: 99 % | HEART RATE: 56 BPM | RESPIRATION RATE: 16 BRPM

## 2021-01-19 DIAGNOSIS — Z95.9 PRESENCE OF CARDIAC AND VASCULAR IMPLANT AND GRAFT, UNSPECIFIED: Chronic | ICD-10-CM

## 2021-01-19 DIAGNOSIS — Z01.818 ENCOUNTER FOR OTHER PREPROCEDURAL EXAMINATION: ICD-10-CM

## 2021-01-19 DIAGNOSIS — I48.0 PAROXYSMAL ATRIAL FIBRILLATION: ICD-10-CM

## 2021-01-19 DIAGNOSIS — Z96.653 PRESENCE OF ARTIFICIAL KNEE JOINT, BILATERAL: Chronic | ICD-10-CM

## 2021-01-19 DIAGNOSIS — I48.91 UNSPECIFIED ATRIAL FIBRILLATION: ICD-10-CM

## 2021-01-19 DIAGNOSIS — Z12.11 ENCOUNTER FOR SCREENING FOR MALIGNANT NEOPLASM OF COLON: Chronic | ICD-10-CM

## 2021-01-19 DIAGNOSIS — Z94.9 TRANSPLANTED ORGAN AND TISSUE STATUS, UNSPECIFIED: Chronic | ICD-10-CM

## 2021-01-19 DIAGNOSIS — Z79.01 LONG TERM (CURRENT) USE OF ANTICOAGULANTS: ICD-10-CM

## 2021-01-19 LAB
ALBUMIN SERPL ELPH-MCNC: 4.3 G/DL — SIGNIFICANT CHANGE UP (ref 3.5–5.2)
ALP SERPL-CCNC: 74 U/L — SIGNIFICANT CHANGE UP (ref 30–115)
ALT FLD-CCNC: 15 U/L — SIGNIFICANT CHANGE UP (ref 0–41)
ANION GAP SERPL CALC-SCNC: 9 MMOL/L — SIGNIFICANT CHANGE UP (ref 7–14)
APPEARANCE UR: CLEAR — SIGNIFICANT CHANGE UP
APTT BLD: 45.2 SEC — HIGH (ref 27–39.2)
AST SERPL-CCNC: 17 U/L — SIGNIFICANT CHANGE UP (ref 0–41)
BACTERIA # UR AUTO: NEGATIVE — SIGNIFICANT CHANGE UP
BASOPHILS # BLD AUTO: 0.04 K/UL — SIGNIFICANT CHANGE UP (ref 0–0.2)
BASOPHILS NFR BLD AUTO: 0.7 % — SIGNIFICANT CHANGE UP (ref 0–1)
BILIRUB SERPL-MCNC: 0.6 MG/DL — SIGNIFICANT CHANGE UP (ref 0.2–1.2)
BILIRUB UR-MCNC: NEGATIVE — SIGNIFICANT CHANGE UP
BUN SERPL-MCNC: 31 MG/DL — HIGH (ref 10–20)
CALCIUM SERPL-MCNC: 9.2 MG/DL — SIGNIFICANT CHANGE UP (ref 8.5–10.1)
CHLORIDE SERPL-SCNC: 108 MMOL/L — SIGNIFICANT CHANGE UP (ref 98–110)
CO2 SERPL-SCNC: 25 MMOL/L — SIGNIFICANT CHANGE UP (ref 17–32)
COLOR SPEC: YELLOW — SIGNIFICANT CHANGE UP
CREAT SERPL-MCNC: 1.3 MG/DL — SIGNIFICANT CHANGE UP (ref 0.7–1.5)
DIFF PNL FLD: NEGATIVE — SIGNIFICANT CHANGE UP
EOSINOPHIL # BLD AUTO: 0.05 K/UL — SIGNIFICANT CHANGE UP (ref 0–0.7)
EOSINOPHIL NFR BLD AUTO: 0.9 % — SIGNIFICANT CHANGE UP (ref 0–8)
EPI CELLS # UR: 1 /HPF — SIGNIFICANT CHANGE UP (ref 0–5)
GLUCOSE SERPL-MCNC: 115 MG/DL — HIGH (ref 70–99)
GLUCOSE UR QL: NEGATIVE — SIGNIFICANT CHANGE UP
HCT VFR BLD CALC: 35.6 % — LOW (ref 42–52)
HGB BLD-MCNC: 11.5 G/DL — LOW (ref 14–18)
HYALINE CASTS # UR AUTO: 6 /LPF — SIGNIFICANT CHANGE UP (ref 0–7)
IMM GRANULOCYTES NFR BLD AUTO: 0.9 % — HIGH (ref 0.1–0.3)
INR BLD: 2.8 RATIO — HIGH (ref 0.65–1.3)
INR PPP: 2.9 RATIO
KETONES UR-MCNC: SIGNIFICANT CHANGE UP
LEUKOCYTE ESTERASE UR-ACNC: NEGATIVE — SIGNIFICANT CHANGE UP
LYMPHOCYTES # BLD AUTO: 1.68 K/UL — SIGNIFICANT CHANGE UP (ref 1.2–3.4)
LYMPHOCYTES # BLD AUTO: 28.7 % — SIGNIFICANT CHANGE UP (ref 20.5–51.1)
MCHC RBC-ENTMCNC: 28 PG — SIGNIFICANT CHANGE UP (ref 27–31)
MCHC RBC-ENTMCNC: 32.3 G/DL — SIGNIFICANT CHANGE UP (ref 32–37)
MCV RBC AUTO: 86.8 FL — SIGNIFICANT CHANGE UP (ref 80–94)
MONOCYTES # BLD AUTO: 0.44 K/UL — SIGNIFICANT CHANGE UP (ref 0.1–0.6)
MONOCYTES NFR BLD AUTO: 7.5 % — SIGNIFICANT CHANGE UP (ref 1.7–9.3)
MRSA PCR RESULT.: NEGATIVE — SIGNIFICANT CHANGE UP
NEUTROPHILS # BLD AUTO: 3.6 K/UL — SIGNIFICANT CHANGE UP (ref 1.4–6.5)
NEUTROPHILS NFR BLD AUTO: 61.3 % — SIGNIFICANT CHANGE UP (ref 42.2–75.2)
NITRITE UR-MCNC: NEGATIVE — SIGNIFICANT CHANGE UP
NRBC # BLD: 0 /100 WBCS — SIGNIFICANT CHANGE UP (ref 0–0)
PH UR: 6 — SIGNIFICANT CHANGE UP (ref 5–8)
PLATELET # BLD AUTO: 155 K/UL — SIGNIFICANT CHANGE UP (ref 130–400)
POCT-PROTHROMBIN TIME: 34.3 SECS
POTASSIUM SERPL-MCNC: 5.1 MMOL/L — HIGH (ref 3.5–5)
POTASSIUM SERPL-SCNC: 5.1 MMOL/L — HIGH (ref 3.5–5)
PROT SERPL-MCNC: 7.9 G/DL — SIGNIFICANT CHANGE UP (ref 6–8)
PROT UR-MCNC: ABNORMAL
PROTHROM AB SERPL-ACNC: 32.2 SEC — HIGH (ref 9.95–12.87)
QUALITY CONTROL: YES
RBC # BLD: 4.1 M/UL — LOW (ref 4.7–6.1)
RBC # FLD: 15.1 % — HIGH (ref 11.5–14.5)
RBC CASTS # UR COMP ASSIST: 2 /HPF — SIGNIFICANT CHANGE UP (ref 0–4)
SODIUM SERPL-SCNC: 142 MMOL/L — SIGNIFICANT CHANGE UP (ref 135–146)
SP GR SPEC: 1.04 — HIGH (ref 1.01–1.03)
UROBILINOGEN FLD QL: SIGNIFICANT CHANGE UP
WBC # BLD: 5.86 K/UL — SIGNIFICANT CHANGE UP (ref 4.8–10.8)
WBC # FLD AUTO: 5.86 K/UL — SIGNIFICANT CHANGE UP (ref 4.8–10.8)
WBC UR QL: 2 /HPF — SIGNIFICANT CHANGE UP (ref 0–5)

## 2021-01-19 PROCEDURE — 93010 ELECTROCARDIOGRAM REPORT: CPT

## 2021-01-19 RX ORDER — HYDROXYZINE HCL 10 MG
1 TABLET ORAL
Qty: 0 | Refills: 0 | DISCHARGE

## 2021-01-19 RX ORDER — SUCRALFATE 1 G
1 TABLET ORAL
Qty: 0 | Refills: 0 | DISCHARGE

## 2021-01-19 RX ORDER — URSODIOL 250 MG/1
1 TABLET, FILM COATED ORAL
Qty: 0 | Refills: 0 | DISCHARGE

## 2021-01-19 NOTE — H&P PST ADULT - REASON FOR ADMISSION
71 y/o male presents to PAST for left atrial appendage closure watchman, transesophageal echocardiogram with Dr. Burdick in EPS under sedation on 2/2/21

## 2021-01-19 NOTE — H&P PST ADULT - ADDITIONAL PE
NO marla  no loose teeth, upper dentures from neck tmd> 3 fbd  No neck or airway issues noted upon exam.

## 2021-01-19 NOTE — H&P PST ADULT - NSICDXPASTSURGICALHX_GEN_ALL_CORE_FT
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

## 2021-01-19 NOTE — H&P PST ADULT - NSANTHOSAYNRD_GEN_A_CORE
No. BENIGNO screening performed.  STOP BANG Legend: 0-2 = LOW Risk; 3-4 = INTERMEDIATE Risk; 5-8 = HIGH Risk

## 2021-01-19 NOTE — H&P PST ADULT - HISTORY OF PRESENT ILLNESS
71 y/o male presents to PAST for left atrial appendage closure watchman, transesophageal echocardiogram with Dr. Burdick in EPS under sedation on 2/2/21    Patient with history of a-fib not on AC CHADVASC 3, diastolic HF, HTN, CKD, HLD, liver cirrhosis from JIMÉNEZ, being evaluated by [facility] for transplant.  Patient had liver and kidney transplant. Patient recommended for watchman implant.    PATIENT CURRENTLY DENIES CHEST PAIN  SHORTNESS OF BREATH  PALPITATIONS,  DYSURIA, OR UPPER RESPIRATORY INFECTION IN PAST 2 WEEKS  EXERCISE  TOLERANCE  1-2 FLIGHT OF STAIRS  WITHOUT SHORTNESS OF BREATH    pt denies any covid s/s, or tested positive in the past  pt advised self quarantine till day of procedure    denies travel outside the [place] in the past 30 days    Anesthesia Alert  NO--Difficult Airway  NO--History of neck surgery or radiation  NO--Limited ROM of neck  NO--History of Malignant hyperthermia  NO--No personal or family history of Pseudocholinesterase deficiency.  NO--Prior Anesthesia Complication  NO--Latex Allergy  NO--Loose teeth, upper dentures  NO--History of Rheumatoid Arthritis  NO--BENIGNO  NO--Other_____    Encounter for other preprocedural examination    Paroxysmal atrial fibrillation    ^I48.0 / CPT 91358 / 94650     2/2/21 AMB EPS    FH: ovarian cancer    Family history of early CAD    Encounter for other preprocedural examination    Paroxysmal atrial fibrillation    BPH (benign prostatic hyperplasia)    Dyspnea on exertion    Cirrhosis of liver    Afib    Diabetes    Anemia    High cholesterol    HTN (hypertension)    Encounter for screening colonoscopy    S/P angioplasty with stent    Presence of bilateral total knee joint prostheses

## 2021-01-20 LAB
CULTURE RESULTS: SIGNIFICANT CHANGE UP
SPECIMEN SOURCE: SIGNIFICANT CHANGE UP

## 2021-01-21 DIAGNOSIS — Z02.9 ENCOUNTER FOR ADMINISTRATIVE EXAMINATIONS, UNSPECIFIED: ICD-10-CM

## 2021-01-21 DIAGNOSIS — Z01.818 ENCOUNTER FOR OTHER PREPROCEDURAL EXAMINATION: ICD-10-CM

## 2021-01-27 ENCOUNTER — OUTPATIENT (OUTPATIENT)
Dept: OUTPATIENT SERVICES | Facility: HOSPITAL | Age: 71
LOS: 1 days | Discharge: HOME | End: 2021-01-27

## 2021-01-27 ENCOUNTER — APPOINTMENT (OUTPATIENT)
Dept: MEDICATION MANAGEMENT | Facility: CLINIC | Age: 71
End: 2021-01-27

## 2021-01-27 VITALS — OXYGEN SATURATION: 97 % | RESPIRATION RATE: 16 BRPM | HEART RATE: 54 BPM

## 2021-01-27 DIAGNOSIS — Z94.9 TRANSPLANTED ORGAN AND TISSUE STATUS, UNSPECIFIED: Chronic | ICD-10-CM

## 2021-01-27 DIAGNOSIS — Z95.9 PRESENCE OF CARDIAC AND VASCULAR IMPLANT AND GRAFT, UNSPECIFIED: Chronic | ICD-10-CM

## 2021-01-27 DIAGNOSIS — Z96.653 PRESENCE OF ARTIFICIAL KNEE JOINT, BILATERAL: Chronic | ICD-10-CM

## 2021-01-27 DIAGNOSIS — I48.91 UNSPECIFIED ATRIAL FIBRILLATION: ICD-10-CM

## 2021-01-27 DIAGNOSIS — Z79.01 LONG TERM (CURRENT) USE OF ANTICOAGULANTS: ICD-10-CM

## 2021-01-27 DIAGNOSIS — Z12.11 ENCOUNTER FOR SCREENING FOR MALIGNANT NEOPLASM OF COLON: Chronic | ICD-10-CM

## 2021-01-27 LAB
INR PPP: 2.6 RATIO
POCT-PROTHROMBIN TIME: 30.8 SECS
QUALITY CONTROL: YES

## 2021-01-30 ENCOUNTER — OUTPATIENT (OUTPATIENT)
Dept: OUTPATIENT SERVICES | Facility: HOSPITAL | Age: 71
LOS: 1 days | Discharge: HOME | End: 2021-01-30

## 2021-01-30 DIAGNOSIS — Z95.9 PRESENCE OF CARDIAC AND VASCULAR IMPLANT AND GRAFT, UNSPECIFIED: Chronic | ICD-10-CM

## 2021-01-30 DIAGNOSIS — Z94.9 TRANSPLANTED ORGAN AND TISSUE STATUS, UNSPECIFIED: Chronic | ICD-10-CM

## 2021-01-30 DIAGNOSIS — Z12.11 ENCOUNTER FOR SCREENING FOR MALIGNANT NEOPLASM OF COLON: Chronic | ICD-10-CM

## 2021-01-30 DIAGNOSIS — Z96.653 PRESENCE OF ARTIFICIAL KNEE JOINT, BILATERAL: Chronic | ICD-10-CM

## 2021-01-30 DIAGNOSIS — Z11.59 ENCOUNTER FOR SCREENING FOR OTHER VIRAL DISEASES: ICD-10-CM

## 2021-02-01 ENCOUNTER — OUTPATIENT (OUTPATIENT)
Dept: OUTPATIENT SERVICES | Facility: HOSPITAL | Age: 71
LOS: 1 days | Discharge: HOME | End: 2021-02-01

## 2021-02-01 ENCOUNTER — APPOINTMENT (OUTPATIENT)
Dept: MEDICATION MANAGEMENT | Facility: CLINIC | Age: 71
End: 2021-02-01

## 2021-02-01 DIAGNOSIS — Z12.11 ENCOUNTER FOR SCREENING FOR MALIGNANT NEOPLASM OF COLON: Chronic | ICD-10-CM

## 2021-02-01 DIAGNOSIS — I48.91 UNSPECIFIED ATRIAL FIBRILLATION: ICD-10-CM

## 2021-02-01 DIAGNOSIS — Z79.01 LONG TERM (CURRENT) USE OF ANTICOAGULANTS: ICD-10-CM

## 2021-02-01 DIAGNOSIS — Z96.653 PRESENCE OF ARTIFICIAL KNEE JOINT, BILATERAL: Chronic | ICD-10-CM

## 2021-02-01 DIAGNOSIS — Z94.9 TRANSPLANTED ORGAN AND TISSUE STATUS, UNSPECIFIED: Chronic | ICD-10-CM

## 2021-02-01 DIAGNOSIS — Z95.9 PRESENCE OF CARDIAC AND VASCULAR IMPLANT AND GRAFT, UNSPECIFIED: Chronic | ICD-10-CM

## 2021-02-01 LAB
INR PPP: 2.6 RATIO
POCT-PROTHROMBIN TIME: 31.1 SECS
QUALITY CONTROL: YES

## 2021-02-04 ENCOUNTER — APPOINTMENT (OUTPATIENT)
Dept: CARDIOLOGY | Facility: CLINIC | Age: 71
End: 2021-02-04

## 2021-02-05 ENCOUNTER — OUTPATIENT (OUTPATIENT)
Dept: OUTPATIENT SERVICES | Facility: HOSPITAL | Age: 71
LOS: 1 days | Discharge: HOME | End: 2021-02-05

## 2021-02-05 ENCOUNTER — APPOINTMENT (OUTPATIENT)
Dept: MEDICATION MANAGEMENT | Facility: CLINIC | Age: 71
End: 2021-02-05

## 2021-02-05 VITALS — HEART RATE: 53 BPM | OXYGEN SATURATION: 100 % | RESPIRATION RATE: 16 BRPM

## 2021-02-05 DIAGNOSIS — I48.91 UNSPECIFIED ATRIAL FIBRILLATION: ICD-10-CM

## 2021-02-05 DIAGNOSIS — Z12.11 ENCOUNTER FOR SCREENING FOR MALIGNANT NEOPLASM OF COLON: Chronic | ICD-10-CM

## 2021-02-05 DIAGNOSIS — Z96.653 PRESENCE OF ARTIFICIAL KNEE JOINT, BILATERAL: Chronic | ICD-10-CM

## 2021-02-05 DIAGNOSIS — Z94.9 TRANSPLANTED ORGAN AND TISSUE STATUS, UNSPECIFIED: Chronic | ICD-10-CM

## 2021-02-05 DIAGNOSIS — Z95.9 PRESENCE OF CARDIAC AND VASCULAR IMPLANT AND GRAFT, UNSPECIFIED: Chronic | ICD-10-CM

## 2021-02-05 DIAGNOSIS — Z79.01 LONG TERM (CURRENT) USE OF ANTICOAGULANTS: ICD-10-CM

## 2021-02-05 LAB
INR PPP: 3 RATIO
POCT-PROTHROMBIN TIME: 36.6 SECS
QUALITY CONTROL: YES

## 2021-02-06 ENCOUNTER — OUTPATIENT (OUTPATIENT)
Dept: OUTPATIENT SERVICES | Facility: HOSPITAL | Age: 71
LOS: 1 days | Discharge: HOME | End: 2021-02-06

## 2021-02-06 DIAGNOSIS — Z94.9 TRANSPLANTED ORGAN AND TISSUE STATUS, UNSPECIFIED: Chronic | ICD-10-CM

## 2021-02-06 DIAGNOSIS — Z95.9 PRESENCE OF CARDIAC AND VASCULAR IMPLANT AND GRAFT, UNSPECIFIED: Chronic | ICD-10-CM

## 2021-02-06 DIAGNOSIS — Z12.11 ENCOUNTER FOR SCREENING FOR MALIGNANT NEOPLASM OF COLON: Chronic | ICD-10-CM

## 2021-02-06 DIAGNOSIS — Z96.653 PRESENCE OF ARTIFICIAL KNEE JOINT, BILATERAL: Chronic | ICD-10-CM

## 2021-02-06 DIAGNOSIS — Z11.59 ENCOUNTER FOR SCREENING FOR OTHER VIRAL DISEASES: ICD-10-CM

## 2021-02-12 ENCOUNTER — APPOINTMENT (OUTPATIENT)
Dept: CARDIOLOGY | Facility: CLINIC | Age: 71
End: 2021-02-12
Payer: MEDICARE

## 2021-02-12 VITALS
SYSTOLIC BLOOD PRESSURE: 122 MMHG | HEART RATE: 53 BPM | DIASTOLIC BLOOD PRESSURE: 70 MMHG | BODY MASS INDEX: 30.07 KG/M2 | WEIGHT: 203 LBS | TEMPERATURE: 97.8 F | HEIGHT: 69 IN | OXYGEN SATURATION: 98 %

## 2021-02-12 DIAGNOSIS — Z00.00 ENCOUNTER FOR GENERAL ADULT MEDICAL EXAMINATION W/OUT ABNORMAL FINDINGS: ICD-10-CM

## 2021-02-12 PROCEDURE — 99214 OFFICE O/P EST MOD 30 MIN: CPT

## 2021-02-12 PROCEDURE — 93000 ELECTROCARDIOGRAM COMPLETE: CPT

## 2021-02-12 RX ORDER — TACROLIMUS 1 MG/1
1 CAPSULE, GELATIN COATED ORAL TWICE DAILY
Refills: 0 | Status: ACTIVE | COMMUNITY

## 2021-02-12 RX ORDER — FLUDROCORTISONE ACETATE 0.1 MG/1
0.1 TABLET ORAL DAILY
Refills: 0 | Status: ACTIVE | COMMUNITY

## 2021-02-12 NOTE — PHYSICAL EXAM
[General Appearance - Well Developed] : well developed [Normal Appearance] : normal appearance [Well Groomed] : well groomed [General Appearance - Well Nourished] : well nourished [No Deformities] : no deformities [General Appearance - In No Acute Distress] : no acute distress [Normal Conjunctiva] : the conjunctiva exhibited no abnormalities [Eyelids - No Xanthelasma] : the eyelids demonstrated no xanthelasmas [Normal Oral Mucosa] : normal oral mucosa [No Oral Pallor] : no oral pallor [No Oral Cyanosis] : no oral cyanosis [Normal Jugular Venous A Waves Present] : normal jugular venous A waves present [Normal Jugular Venous V Waves Present] : normal jugular venous V waves present [No Jugular Venous Alan A Waves] : no jugular venous alan A waves [Respiration, Rhythm And Depth] : normal respiratory rhythm and effort [Exaggerated Use Of Accessory Muscles For Inspiration] : no accessory muscle use [Auscultation Breath Sounds / Voice Sounds] : lungs were clear to auscultation bilaterally [Heart Rate And Rhythm] : heart rate and rhythm were normal [Heart Sounds] : normal S1 and S2 [Murmurs] : no murmurs present [Arterial Pulses Normal] : the arterial pulses were normal [Edema] : no peripheral edema present [Abdomen Soft] : soft [Abdomen Tenderness] : non-tender [Abdomen Mass (___ Cm)] : no abdominal mass palpated [Abnormal Walk] : normal gait [Gait - Sufficient For Exercise Testing] : the gait was sufficient for exercise testing [Nail Clubbing] : no clubbing of the fingernails [Cyanosis, Localized] : no localized cyanosis [Petechial Hemorrhages (___cm)] : no petechial hemorrhages [Skin Color & Pigmentation] : normal skin color and pigmentation [] : no rash [No Venous Stasis] : no venous stasis [Skin Lesions] : no skin lesions [No Skin Ulcers] : no skin ulcer [No Xanthoma] : no  xanthoma was observed [Oriented To Time, Place, And Person] : oriented to person, place, and time [Affect] : the affect was normal [Mood] : the mood was normal [No Anxiety] : not feeling anxious [Normal Rate] : normal [Normal S1] : normal S1 [Normal S2] : normal S2 [No Murmur] : no murmurs heard [2+] : left 2+ [No Abnormalities] : the abdominal aorta was not enlarged and no bruit was heard [No Pitting Edema] : no pitting edema present [S3] : no S3 [S4] : no S4 [Right Carotid Bruit] : no bruit heard over the right carotid [Left Carotid Bruit] : no bruit heard over the left carotid [Right Femoral Bruit] : no bruit heard over the right femoral artery [Left Femoral Bruit] : no bruit heard over the left femoral artery

## 2021-02-12 NOTE — DISCUSSION/SUMMARY
[___ Month(s)] : [unfilled] month(s) [With Me] : with me [FreeTextEntry1] : Ekg results were reviewed.\par EPS note reviewed\par Recent lab results were reviewed.\par \par f/u EPS for Watchman/Ablation (?)\par Fasting CBC, BMP, LFTs, Lipid Profile prior to next visit\par f/u 3 months\par \par Number/complexity of problems - Mod - 2 or more chronic stable illnesses\par Amount/complexity of data -history, exam, reviewed old note, labs reviewed, ekg reviewed, EPS note reviewed\par Risk of complications - Moderate risk\par

## 2021-02-12 NOTE — HISTORY OF PRESENT ILLNESS
[FreeTextEntry1] : 70 year old male who came in for f/u of atrial fibrillation, diastolic CHF, HTN, CKD stage 3, and hyperlipidemia. The patient was recently hospitalized at [facility] from Dec. 7 - Dec. 23, 2019 for shortness of breath due to anasarca from cirrhosis (from JIMÉNEZ). The patient during that time has had multiple paracentesis. He had a liver and renal transplant performed on 2/1/2020.\par \par The patient was seen by EPS and will be getting a Watchman.  It is unclear if he will be having an ablation also.\par \par \par PMHx:\par \par Atrial fibrillation - CHADSVASC2 score 3 - chronic - rate not controlled\par Chronic diastolic heart failure - stable\par CAD - stent - 2008 (mid-LAD xience 4.0 x 15 and 3.0 x 18) - chronic - stable\par DVT of lower extremities 1995 - stable\par HTN - controlled\par High cholesterol - controlled\par Borderline DM - controlled\par BENIGNO - non-compliant with using CPAP - ongoing\par Liver cirrhosis (due to JIMÉNEZ) - chronic - progressive\par Hepatic encephalopathy - stable\par CKD stage 3 - chronic - stable\par Anemia - stable\par Liver and renal transplant - 2/1/2020 - stable\par \par Medications reviewed and reconciled with patient. Patient is compliant with taking appropriate medications at appropriate doses at appropriate intervals without missing doses.

## 2021-02-19 ENCOUNTER — OUTPATIENT (OUTPATIENT)
Dept: OUTPATIENT SERVICES | Facility: HOSPITAL | Age: 71
LOS: 1 days | Discharge: HOME | End: 2021-02-19

## 2021-02-19 ENCOUNTER — APPOINTMENT (OUTPATIENT)
Dept: MEDICATION MANAGEMENT | Facility: CLINIC | Age: 71
End: 2021-02-19

## 2021-02-19 VITALS — HEART RATE: 55 BPM | RESPIRATION RATE: 16 BRPM | OXYGEN SATURATION: 98 %

## 2021-02-19 DIAGNOSIS — I48.91 UNSPECIFIED ATRIAL FIBRILLATION: ICD-10-CM

## 2021-02-19 DIAGNOSIS — Z79.01 LONG TERM (CURRENT) USE OF ANTICOAGULANTS: ICD-10-CM

## 2021-02-19 DIAGNOSIS — Z94.9 TRANSPLANTED ORGAN AND TISSUE STATUS, UNSPECIFIED: Chronic | ICD-10-CM

## 2021-02-19 DIAGNOSIS — Z96.653 PRESENCE OF ARTIFICIAL KNEE JOINT, BILATERAL: Chronic | ICD-10-CM

## 2021-02-19 DIAGNOSIS — Z12.11 ENCOUNTER FOR SCREENING FOR MALIGNANT NEOPLASM OF COLON: Chronic | ICD-10-CM

## 2021-02-19 DIAGNOSIS — Z95.9 PRESENCE OF CARDIAC AND VASCULAR IMPLANT AND GRAFT, UNSPECIFIED: Chronic | ICD-10-CM

## 2021-02-19 LAB
INR PPP: 3.5 RATIO
POCT-PROTHROMBIN TIME: 42.3 SECS
QUALITY CONTROL: YES

## 2021-02-20 ENCOUNTER — OUTPATIENT (OUTPATIENT)
Dept: OUTPATIENT SERVICES | Facility: HOSPITAL | Age: 71
LOS: 1 days | Discharge: HOME | End: 2021-02-20

## 2021-02-20 DIAGNOSIS — Z11.59 ENCOUNTER FOR SCREENING FOR OTHER VIRAL DISEASES: ICD-10-CM

## 2021-02-20 DIAGNOSIS — Z96.653 PRESENCE OF ARTIFICIAL KNEE JOINT, BILATERAL: Chronic | ICD-10-CM

## 2021-02-20 DIAGNOSIS — Z12.11 ENCOUNTER FOR SCREENING FOR MALIGNANT NEOPLASM OF COLON: Chronic | ICD-10-CM

## 2021-02-20 DIAGNOSIS — Z94.9 TRANSPLANTED ORGAN AND TISSUE STATUS, UNSPECIFIED: Chronic | ICD-10-CM

## 2021-02-20 DIAGNOSIS — Z95.9 PRESENCE OF CARDIAC AND VASCULAR IMPLANT AND GRAFT, UNSPECIFIED: Chronic | ICD-10-CM

## 2021-02-22 ENCOUNTER — APPOINTMENT (OUTPATIENT)
Dept: MEDICATION MANAGEMENT | Facility: CLINIC | Age: 71
End: 2021-02-22

## 2021-02-22 ENCOUNTER — OUTPATIENT (OUTPATIENT)
Dept: OUTPATIENT SERVICES | Facility: HOSPITAL | Age: 71
LOS: 1 days | Discharge: HOME | End: 2021-02-22

## 2021-02-22 VITALS — HEART RATE: 56 BPM | RESPIRATION RATE: 16 BRPM | OXYGEN SATURATION: 98 %

## 2021-02-22 DIAGNOSIS — Z96.653 PRESENCE OF ARTIFICIAL KNEE JOINT, BILATERAL: Chronic | ICD-10-CM

## 2021-02-22 DIAGNOSIS — Z95.9 PRESENCE OF CARDIAC AND VASCULAR IMPLANT AND GRAFT, UNSPECIFIED: Chronic | ICD-10-CM

## 2021-02-22 DIAGNOSIS — Z79.01 LONG TERM (CURRENT) USE OF ANTICOAGULANTS: ICD-10-CM

## 2021-02-22 DIAGNOSIS — Z12.11 ENCOUNTER FOR SCREENING FOR MALIGNANT NEOPLASM OF COLON: Chronic | ICD-10-CM

## 2021-02-22 DIAGNOSIS — I48.91 UNSPECIFIED ATRIAL FIBRILLATION: ICD-10-CM

## 2021-02-22 DIAGNOSIS — Z94.9 TRANSPLANTED ORGAN AND TISSUE STATUS, UNSPECIFIED: Chronic | ICD-10-CM

## 2021-02-22 LAB
INR PPP: 2.1 RATIO
POCT-PROTHROMBIN TIME: 25.1 SECS
QUALITY CONTROL: YES

## 2021-02-23 ENCOUNTER — INPATIENT (INPATIENT)
Facility: HOSPITAL | Age: 71
LOS: 0 days | Discharge: HOME | End: 2021-02-24
Attending: INTERNAL MEDICINE | Admitting: INTERNAL MEDICINE
Payer: MEDICARE

## 2021-02-23 VITALS — HEART RATE: 77 BPM | OXYGEN SATURATION: 99 %

## 2021-02-23 DIAGNOSIS — I48.0 PAROXYSMAL ATRIAL FIBRILLATION: ICD-10-CM

## 2021-02-23 DIAGNOSIS — Z94.9 TRANSPLANTED ORGAN AND TISSUE STATUS, UNSPECIFIED: Chronic | ICD-10-CM

## 2021-02-23 DIAGNOSIS — Z12.11 ENCOUNTER FOR SCREENING FOR MALIGNANT NEOPLASM OF COLON: Chronic | ICD-10-CM

## 2021-02-23 DIAGNOSIS — Z96.653 PRESENCE OF ARTIFICIAL KNEE JOINT, BILATERAL: Chronic | ICD-10-CM

## 2021-02-23 DIAGNOSIS — Z95.9 PRESENCE OF CARDIAC AND VASCULAR IMPLANT AND GRAFT, UNSPECIFIED: Chronic | ICD-10-CM

## 2021-02-23 LAB
APTT BLD: 55.6 SEC — HIGH (ref 27–39.2)
GLUCOSE BLDC GLUCOMTR-MCNC: 116 MG/DL — HIGH (ref 70–99)
GLUCOSE BLDC GLUCOMTR-MCNC: 90 MG/DL — SIGNIFICANT CHANGE UP (ref 70–99)
GLUCOSE BLDC GLUCOMTR-MCNC: 93 MG/DL — SIGNIFICANT CHANGE UP (ref 70–99)
GLUCOSE BLDC GLUCOMTR-MCNC: 95 MG/DL — SIGNIFICANT CHANGE UP (ref 70–99)
INR BLD: 1.96 RATIO — HIGH (ref 0.65–1.3)
PROTHROM AB SERPL-ACNC: 22.5 SEC — HIGH (ref 9.95–12.87)

## 2021-02-23 PROCEDURE — 33340 PERQ CLSR TCAT L ATR APNDGE: CPT

## 2021-02-23 RX ORDER — DEXTROSE 50 % IN WATER 50 %
25 SYRINGE (ML) INTRAVENOUS ONCE
Refills: 0 | Status: DISCONTINUED | OUTPATIENT
Start: 2021-02-23 | End: 2021-02-24

## 2021-02-23 RX ORDER — INSULIN LISPRO 100/ML
VIAL (ML) SUBCUTANEOUS
Refills: 0 | Status: DISCONTINUED | OUTPATIENT
Start: 2021-02-23 | End: 2021-02-24

## 2021-02-23 RX ORDER — SODIUM CHLORIDE 9 MG/ML
1000 INJECTION, SOLUTION INTRAVENOUS
Refills: 0 | Status: DISCONTINUED | OUTPATIENT
Start: 2021-02-23 | End: 2021-02-24

## 2021-02-23 RX ORDER — ONDANSETRON 8 MG/1
4 TABLET, FILM COATED ORAL ONCE
Refills: 0 | Status: DISCONTINUED | OUTPATIENT
Start: 2021-02-23 | End: 2021-02-24

## 2021-02-23 RX ORDER — METOPROLOL TARTRATE 50 MG
25 TABLET ORAL DAILY
Refills: 0 | Status: DISCONTINUED | OUTPATIENT
Start: 2021-02-23 | End: 2021-02-24

## 2021-02-23 RX ORDER — DEXTROSE 50 % IN WATER 50 %
15 SYRINGE (ML) INTRAVENOUS ONCE
Refills: 0 | Status: DISCONTINUED | OUTPATIENT
Start: 2021-02-23 | End: 2021-02-24

## 2021-02-23 RX ORDER — WARFARIN SODIUM 2.5 MG/1
2.5 TABLET ORAL ONCE
Refills: 0 | Status: COMPLETED | OUTPATIENT
Start: 2021-02-23 | End: 2021-02-23

## 2021-02-23 RX ORDER — TAMSULOSIN HYDROCHLORIDE 0.4 MG/1
0.4 CAPSULE ORAL AT BEDTIME
Refills: 0 | Status: DISCONTINUED | OUTPATIENT
Start: 2021-02-23 | End: 2021-02-24

## 2021-02-23 RX ORDER — PANTOPRAZOLE SODIUM 20 MG/1
40 TABLET, DELAYED RELEASE ORAL
Refills: 0 | Status: DISCONTINUED | OUTPATIENT
Start: 2021-02-23 | End: 2021-02-24

## 2021-02-23 RX ORDER — GLUCAGON INJECTION, SOLUTION 0.5 MG/.1ML
1 INJECTION, SOLUTION SUBCUTANEOUS ONCE
Refills: 0 | Status: DISCONTINUED | OUTPATIENT
Start: 2021-02-23 | End: 2021-02-24

## 2021-02-23 RX ORDER — AMLODIPINE BESYLATE 2.5 MG/1
5 TABLET ORAL DAILY
Refills: 0 | Status: DISCONTINUED | OUTPATIENT
Start: 2021-02-23 | End: 2021-02-24

## 2021-02-23 RX ORDER — CEFAZOLIN SODIUM 1 G
2000 VIAL (EA) INJECTION ONCE
Refills: 0 | Status: COMPLETED | OUTPATIENT
Start: 2021-02-23 | End: 2021-02-23

## 2021-02-23 RX ORDER — TACROLIMUS 5 MG/1
1 CAPSULE ORAL EVERY 12 HOURS
Refills: 0 | Status: DISCONTINUED | OUTPATIENT
Start: 2021-02-23 | End: 2021-02-24

## 2021-02-23 RX ORDER — DEXTROSE 50 % IN WATER 50 %
12.5 SYRINGE (ML) INTRAVENOUS ONCE
Refills: 0 | Status: DISCONTINUED | OUTPATIENT
Start: 2021-02-23 | End: 2021-02-24

## 2021-02-23 RX ORDER — CEFAZOLIN SODIUM 1 G
1000 VIAL (EA) INJECTION EVERY 8 HOURS
Refills: 0 | Status: COMPLETED | OUTPATIENT
Start: 2021-02-23 | End: 2021-02-24

## 2021-02-23 RX ADMIN — Medication 100 MILLIGRAM(S): at 21:40

## 2021-02-23 RX ADMIN — TACROLIMUS 1 MILLIGRAM(S): 5 CAPSULE ORAL at 17:45

## 2021-02-23 RX ADMIN — TAMSULOSIN HYDROCHLORIDE 0.4 MILLIGRAM(S): 0.4 CAPSULE ORAL at 21:39

## 2021-02-23 RX ADMIN — PANTOPRAZOLE SODIUM 40 MILLIGRAM(S): 20 TABLET, DELAYED RELEASE ORAL at 12:01

## 2021-02-23 RX ADMIN — Medication 100 MILLIGRAM(S): at 08:55

## 2021-02-23 RX ADMIN — WARFARIN SODIUM 2.5 MILLIGRAM(S): 2.5 TABLET ORAL at 21:39

## 2021-02-23 RX ADMIN — Medication 25 MILLIGRAM(S): at 12:01

## 2021-02-23 RX ADMIN — AMLODIPINE BESYLATE 5 MILLIGRAM(S): 2.5 TABLET ORAL at 12:01

## 2021-02-23 RX ADMIN — Medication 100 MILLIGRAM(S): at 17:44

## 2021-02-23 NOTE — CHART NOTE - NSCHARTNOTEFT_GEN_A_CORE
Cardiac Electrophysiology Procedure Note    PROCEDURE:  Watchman Implant  Transesophageal echocardiogram    MATERIALS  Watchman  Size: 27 mm  SN: [id number]  Access System  SN: [id number]  Maximum SHILA Ostium Size: 22 mm   Contrast: 40    OPERATORS:  Attending	 Colby Burdick MD    Indication: Atrial Fibrillation, Elevated CHADS-VASC score, Elevated HAS-BLED, recurrent GI bleeding due to AVM’s, prior SHILA thrombus.   Respecting] values and preferences, through a shared decision making process involving  myself, the patient  and referring physician Ap , a review of patients history of paroxysmal  Atrial Fibrillation and LJY1OV7-GZJx/CHADS2 score  = 3  the patient has been determined to be at increased risk for thromboembolic event.   All risks, benefits and alternatives therapies in the management of thromboembolic risk reduction have been discussed such as long term anticoagulation therapy and LAAO. The patient has been determined to be a candidate and is agreement of  short term anticoagulation, but deemed inappropriate for long-term anticoagulation.  Weighing the risk/benefits, agrees to percutaneous LAAO closure Implant.    [facility], ClinicalTrials.gov number: [id number]” for Watchman  Procedure:  The patient was brought to the Procedure Room in a nonsedated and fasting state. Informed, written consent was obtained prior to the procedure. Anesthesia maintain comfort and analgesia throughout the procedure. Blood pressure, oxygenation and level of comfort were stable throughout. The right and left groin were cleaned and prepped with the applications of Chloraprep. Patient was then covered from head to toe with sterile drapes in the usual manner.     The left right inguinal areas and arterial pulse were defined; 30 cc of lidocaine solution were infiltrated into the skin overlying the area.     Next, using needle and guidewire, the right femoral vein was accessed once using modified Seldinger technique with ultrasound guidance. The guidewires were followed up the IVC, right of the spine, to confirm venous access. One introducer sheath was placed into the femoral vein. The dilators and guidewires were removed. Then, using needle and guidewire, the left femoral vein was accessed once using modified Seldinger technique with ultrasound guidance. The guidewires were followed up the IVC, right of the spine, to confirm venous access. One introducer sheaths were placed into the femoral vein. The dilators and guidewires were removed.   One SL-1 sheaths were exchanged for the 11F introducer sheath previously inserted in the right femoral vein. Single transeptal puncture was performed with BRK1 needle and one [place] 8 F degree SL-1 sheaths via the right femoral vein under direct visualization with echocardiography and  fluoroscopy guidance. In both instances, a J wire was followed to the left subclavian vein and the sheath and dilator combo inserted to that level. The wire was exchanged for the BRK1 needle and pulled back under fluoroscopic visualization until the interatrial septum was reached. On all occasions, the sheath needle combo was placed over the septum and into the left atrium with needle advancement. Left atrial location was confirmed for each transeptal puncture with pressure monitoring, micro-bubble confirmation on ICE, after withdrawal of bright red blood from the catheter and with advancement of a guide wire into the left pulmonary vein. Left atrial pressure was measured at 10 mmHg. The SL-1 sheaths were exchanged over a J wire located in the left pulmonary vein for the [place] Rad Access Ststem  catheter. Intravenous heparin was given prior to performing transeptal puncture and ACTs followed during the rest of the procedure to ensure an ACT greater than 300 secs.   A 6 Citizen of the [place] pigtail was advanced to the SHILA and utilized to select the appropriate lobe as well as perform angiography of the SHILA. Watchman was deployed at the ostium of the SHILA and was than partially recaptured to the appropriate ostial location but it did not cover covering all of the appendage lobes.  A 27 mm watchman was deployed at the ostium of the SHILA and cover covering all of the appendage lobes Final compression measurements under ARPAN at 0, 45,90 and 120  degrees with no olaf-Watchman leak/jet on echo. All PASS criteria were met and the device was released. A figure of 8 stitch was placed in the right groin after the 14 Citizen of the [place] Watchman access sheath was removed. No effusion noted on the ARPAN.   COMPLICATIONS:    The patient tolerated the procedure well. There were no immediate complications.     CONCLUSIONS:  Successful deployment of the Watchman Device in the left atrial appendage.          _______________________________  Colby Burdick MD  Cardiac Electrophysiology

## 2021-02-23 NOTE — PROGRESS NOTE ADULT - SUBJECTIVE AND OBJECTIVE BOX
Electrophysiology Brief Post-Op Note      I have personally seen and examined the patient.  I agree with the history and physical which I have reviewed and noted any changes below.  02-23-21 @ 07:10    PRE-OP DIAGNOSIS: AF    POST-OP DIAGNOSIS: AF    PROCEDURE: watchman implant     Vendor Representative was present for clinical support.    Physician: Gennaro  Assistant: None    ANESTHESIA TYPE:  [ X ]General Anesthesia  [  ] Sedation  [ X ] Local/Regional    ESTIMATED BLOOD LOSS:       mL    CONDITION  [  ] Critical  [  ] Serious  [  ]Fair  [ X ]Good      SPECIMENS REMOVED (IF APPLICABLE):  none    IMPLANTS (IF APPLICABLE)  frandyman    FINDINGS: none    COMPLICATIONS: none     PLAN OF CARE    -	Continue warfarin  -	Start protonix 40 mg daily tonight  -	Bed rest till am  -	Admit to telemetry

## 2021-02-24 ENCOUNTER — TRANSCRIPTION ENCOUNTER (OUTPATIENT)
Age: 71
End: 2021-02-24

## 2021-02-24 VITALS — OXYGEN SATURATION: 98 %

## 2021-02-24 DIAGNOSIS — Z02.9 ENCOUNTER FOR ADMINISTRATIVE EXAMINATIONS, UNSPECIFIED: ICD-10-CM

## 2021-02-24 LAB
APTT BLD: 39.3 SEC — HIGH (ref 27–39.2)
GLUCOSE BLDC GLUCOMTR-MCNC: 101 MG/DL — HIGH (ref 70–99)
GLUCOSE BLDC GLUCOMTR-MCNC: 136 MG/DL — HIGH (ref 70–99)
INR BLD: 1.76 RATIO — HIGH (ref 0.65–1.3)
PROTHROM AB SERPL-ACNC: 20.2 SEC — HIGH (ref 9.95–12.87)

## 2021-02-24 PROCEDURE — 93010 ELECTROCARDIOGRAM REPORT: CPT

## 2021-02-24 RX ADMIN — Medication 25 MILLIGRAM(S): at 05:24

## 2021-02-24 RX ADMIN — AMLODIPINE BESYLATE 5 MILLIGRAM(S): 2.5 TABLET ORAL at 05:24

## 2021-02-24 RX ADMIN — Medication 100 MILLIGRAM(S): at 05:48

## 2021-02-24 RX ADMIN — Medication 5 MILLIGRAM(S): at 05:24

## 2021-02-24 RX ADMIN — PANTOPRAZOLE SODIUM 40 MILLIGRAM(S): 20 TABLET, DELAYED RELEASE ORAL at 05:24

## 2021-02-24 NOTE — DISCHARGE NOTE PROVIDER - PROVIDER TOKENS
PROVIDER:[TOKEN:[[id number]],SCHEDULEDAPPT:[04/08/2021],SCHEDULEDAPPTTIME:[10:45 AM],ESTABLISHEDPATIENT:[T]]

## 2021-02-24 NOTE — DISCHARGE NOTE PROVIDER - HOSPITAL COURSE
70yMale with h/o Atrial fibrillation, underwent elective Watchman device implantation.   Procedure was uneventful. Patient tolerated procedure well.  Patient is stable for discharge

## 2021-02-24 NOTE — DISCHARGE NOTE PROVIDER - NSDCFUSCHEDAPPT_GEN_ALL_CORE_FT
SOCORRO MANUEL ; 03/08/2021 ; [facility] [address]  SOCORRO MANUEL ; 05/14/2021 ; [facility] [address] SOCORRO MANUEL ; 03/08/2021 ; [facility] [address] SOCORRO Hodges ; 04/08/2021 ; [facility] [address] SOCORRO Lynn ; 05/14/2021 ; [facility] [address]

## 2021-02-24 NOTE — PROGRESS NOTE ADULT - SUBJECTIVE AND OBJECTIVE BOX
INTERVAL HPI/OVERNIGHT EVENTS:    Patient s/p             Watchman device placement   No events over night  Patient in AF    MEDICATIONS  (STANDING):  amLODIPine   Tablet 5 milliGRAM(s) Oral daily  dextrose 40% Gel 15 Gram(s) Oral once  dextrose 5%. 1000 milliLiter(s) (50 mL/Hr) IV Continuous <Continuous>  dextrose 5%. 1000 milliLiter(s) (100 mL/Hr) IV Continuous <Continuous>  dextrose 50% Injectable 25 Gram(s) IV Push once  dextrose 50% Injectable 12.5 Gram(s) IV Push once  dextrose 50% Injectable 25 Gram(s) IV Push once  glucagon  Injectable 1 milliGRAM(s) IntraMuscular once  insulin lispro (ADMELOG) corrective regimen sliding scale   SubCutaneous Before meals and at bedtime  metoprolol succinate ER 25 milliGRAM(s) Oral daily  pantoprazole    Tablet 40 milliGRAM(s) Oral before breakfast  predniSONE   Tablet 5 milliGRAM(s) Oral daily  tacrolimus 1 milliGRAM(s) Oral every 12 hours  tamsulosin 0.4 milliGRAM(s) Oral at bedtime    MEDICATIONS  (PRN):  ondansetron Injectable 4 milliGRAM(s) IV Push once PRN Nausea and/or Vomiting      Allergies    No Known Allergies    Intolerances          Vital Signs Last 24 Hrs  T(C): 36.3 (2021 05:18), Max: 37.5 (2021 20:54)  T(F): 97.3 (2021 05:18), Max: 99.5 (2021 20:54)  HR: 70 (2021 05:18) (68 - 77)  BP: 150/70 (2021 05:18) (150/70 - 159/78)  BP(mean): --  RR: 18 (2021 05:18) (18 - 18)  SpO2: 98% (2021 09:15) (98% - 99%)    GENERAL: In no apparent distress, well nourished, and hydrated.  HEART: Regular rate and rhythm; No murmurs, rubs, or gallops.  PULMONARY: Clear to auscultation and perfusion.  No rales, wheezing, or rhonchi bilaterally.  ABDOMEN: Soft, Nontender, Nondistended; Bowel sounds present  EXTREMITIES:  2+ Peripheral Pulses, No clubbing, cyanosis, or edema  groins No hematoma, no bleeding; stiches removed  NEUROLOGICAL: Grossly nonfocal    LABS:          PT/INR - ( 2021 06:05 )   PT: 20.20 sec;   INR: 1.76 ratio         PTT - ( 2021 06:05 )  PTT:39.3 sec      EK Lead ECG:   Ventricular Rate 60 BPM    Atrial Rate 60 BPM    P-R Interval 194 ms    QRS Duration 98 ms    Q-T Interval 432 ms    QTC Calculation(Bazett) 432 ms    P Axis 13 degrees    R Axis 29 degrees    T Axis 54 degrees    Diagnosis Line Normal sinus rhythm  Minimal voltage criteria for LVH, may be normal variant  Nonspecific ST abnormality  Abnormal ECG    Confirmed by Mohit Morris ([id number]) on 2021 9:02:07 AM (21 @ 07:40)      A/P  Patient S/P    Watchman device placement  Patient is doing well  - continue  warfarin  - Check INR on Friday  - protonix 40 mg daily x 30 days  - No heavy lifting or exertional activities for 5-7days  - Can take a shower, no bathtub for 5days, do not submerge yourself in water  - FU in EP office __________________     INTERVAL HPI/OVERNIGHT EVENTS:    Patient s/p             Watchman device placement   No events over night  Patient in AF    MEDICATIONS  (STANDING):  amLODIPine   Tablet 5 milliGRAM(s) Oral daily  dextrose 40% Gel 15 Gram(s) Oral once  dextrose 5%. 1000 milliLiter(s) (50 mL/Hr) IV Continuous <Continuous>  dextrose 5%. 1000 milliLiter(s) (100 mL/Hr) IV Continuous <Continuous>  dextrose 50% Injectable 25 Gram(s) IV Push once  dextrose 50% Injectable 12.5 Gram(s) IV Push once  dextrose 50% Injectable 25 Gram(s) IV Push once  glucagon  Injectable 1 milliGRAM(s) IntraMuscular once  insulin lispro (ADMELOG) corrective regimen sliding scale   SubCutaneous Before meals and at bedtime  metoprolol succinate ER 25 milliGRAM(s) Oral daily  pantoprazole    Tablet 40 milliGRAM(s) Oral before breakfast  predniSONE   Tablet 5 milliGRAM(s) Oral daily  tacrolimus 1 milliGRAM(s) Oral every 12 hours  tamsulosin 0.4 milliGRAM(s) Oral at bedtime    MEDICATIONS  (PRN):  ondansetron Injectable 4 milliGRAM(s) IV Push once PRN Nausea and/or Vomiting      Allergies    No Known Allergies    Intolerances          Vital Signs Last 24 Hrs  T(C): 36.3 (2021 05:18), Max: 37.5 (2021 20:54)  T(F): 97.3 (2021 05:18), Max: 99.5 (2021 20:54)  HR: 70 (2021 05:18) (68 - 77)  BP: 150/70 (2021 05:18) (150/70 - 159/78)  BP(mean): --  RR: 18 (2021 05:18) (18 - 18)  SpO2: 98% (2021 09:15) (98% - 99%)    GENERAL: In no apparent distress, well nourished, and hydrated.  HEART: Regular rate and rhythm; No murmurs, rubs, or gallops.  PULMONARY: Clear to auscultation and perfusion.  No rales, wheezing, or rhonchi bilaterally.  ABDOMEN: Soft, Nontender, Nondistended; Bowel sounds present  EXTREMITIES:  2+ Peripheral Pulses, No clubbing, cyanosis, or edema  groins No hematoma, no bleeding; stiches removed  NEUROLOGICAL: Grossly nonfocal    LABS:          PT/INR - ( 2021 06:05 )   PT: 20.20 sec;   INR: 1.76 ratio         PTT - ( 2021 06:05 )  PTT:39.3 sec      EK Lead ECG:   Ventricular Rate 60 BPM    Atrial Rate 60 BPM    P-R Interval 194 ms    QRS Duration 98 ms    Q-T Interval 432 ms    QTC Calculation(Bazett) 432 ms    P Axis 13 degrees    R Axis 29 degrees    T Axis 54 degrees    Diagnosis Line Normal sinus rhythm  Minimal voltage criteria for LVH, may be normal variant  Nonspecific ST abnormality  Abnormal ECG    Confirmed by Mohit Morris ([id number]) on 2021 9:02:07 AM (21 @ 07:40)      A/P  Patient S/P    Watchman device placement  Patient is doing well  - continue  warfarin  - Check INR on Friday  - protonix 40 mg daily x 30 days  - No heavy lifting or exertional activities for 5-7days  - Can take a shower, no bathtub for 5days, do not submerge yourself in water  - FU in EP office wit Dr Burdick on  @10:45am  [address] [place] [address], [address]  [phone]

## 2021-02-24 NOTE — DISCHARGE NOTE NURSING/CASE MANAGEMENT/SOCIAL WORK - PATIENT PORTAL LINK FT
You can access the FollowMyHealth Patient Portal offered by [facility] by registering at the following website: [email or web address]. By joining [facility]’s FollowMyHealth portal, you will also be able to view your health information using other applications (apps) compatible with our system.

## 2021-02-24 NOTE — DISCHARGE NOTE PROVIDER - NSDCFUADDINST_GEN_ALL_CORE_FT
- continue  warfarin  - Check INR on Friday  - protonix 40 mg daily x 30 days  - No heavy lifting or exertional activities for 5-7days  - Can take a shower, no bathtub for 5days, do not submerge yourself in water  - FU in EP office __________________     - continue  warfarin  - Check INR on Friday  - protonix 40 mg daily x 30 days  - No heavy lifting or exertional activities for 5-7days  - Can take a shower, no bathtub for 5days, do not submerge yourself in water  - FU in EP office wit Dr Burdick on 4/8 @10:45am  [address] [place] [address], [address]  [phone]

## 2021-02-24 NOTE — DISCHARGE NOTE PROVIDER - NSDCCPTREATMENT_GEN_ALL_CORE_FT
PRINCIPAL PROCEDURE  Procedure: Insertion, Watchman left atrial appendage closure device  Findings and Treatment:

## 2021-02-24 NOTE — DISCHARGE NOTE PROVIDER - CARE PROVIDER_API CALL
Colby Burdick; [facility])  Electrophysiology Center  [address] [place] [address]  Phone: [phone]  Fax: [phone]  Established Patient  Scheduled Appointment: 04/08/2021 10:45 AM

## 2021-02-26 ENCOUNTER — OUTPATIENT (OUTPATIENT)
Dept: OUTPATIENT SERVICES | Facility: HOSPITAL | Age: 71
LOS: 1 days | Discharge: HOME | End: 2021-02-26

## 2021-02-26 ENCOUNTER — APPOINTMENT (OUTPATIENT)
Dept: MEDICATION MANAGEMENT | Facility: CLINIC | Age: 71
End: 2021-02-26

## 2021-02-26 VITALS — OXYGEN SATURATION: 99 % | RESPIRATION RATE: 16 BRPM | HEART RATE: 67 BPM

## 2021-02-26 DIAGNOSIS — Z12.11 ENCOUNTER FOR SCREENING FOR MALIGNANT NEOPLASM OF COLON: Chronic | ICD-10-CM

## 2021-02-26 DIAGNOSIS — Z95.9 PRESENCE OF CARDIAC AND VASCULAR IMPLANT AND GRAFT, UNSPECIFIED: Chronic | ICD-10-CM

## 2021-02-26 DIAGNOSIS — Z96.653 PRESENCE OF ARTIFICIAL KNEE JOINT, BILATERAL: Chronic | ICD-10-CM

## 2021-02-26 DIAGNOSIS — I48.91 UNSPECIFIED ATRIAL FIBRILLATION: ICD-10-CM

## 2021-02-26 DIAGNOSIS — Z94.9 TRANSPLANTED ORGAN AND TISSUE STATUS, UNSPECIFIED: Chronic | ICD-10-CM

## 2021-02-26 DIAGNOSIS — Z79.01 LONG TERM (CURRENT) USE OF ANTICOAGULANTS: ICD-10-CM

## 2021-02-26 LAB
INR PPP: 1.7 RATIO
POCT-PROTHROMBIN TIME: 19.9 SECS
QUALITY CONTROL: YES

## 2021-03-04 ENCOUNTER — OUTPATIENT (OUTPATIENT)
Dept: OUTPATIENT SERVICES | Facility: HOSPITAL | Age: 71
LOS: 1 days | Discharge: HOME | End: 2021-03-04

## 2021-03-04 ENCOUNTER — APPOINTMENT (OUTPATIENT)
Dept: MEDICATION MANAGEMENT | Facility: CLINIC | Age: 71
End: 2021-03-04

## 2021-03-04 VITALS — OXYGEN SATURATION: 100 % | HEART RATE: 83 BPM | RESPIRATION RATE: 16 BRPM

## 2021-03-04 DIAGNOSIS — Z79.01 LONG TERM (CURRENT) USE OF ANTICOAGULANTS: ICD-10-CM

## 2021-03-04 DIAGNOSIS — Z12.11 ENCOUNTER FOR SCREENING FOR MALIGNANT NEOPLASM OF COLON: Chronic | ICD-10-CM

## 2021-03-04 DIAGNOSIS — I48.91 UNSPECIFIED ATRIAL FIBRILLATION: ICD-10-CM

## 2021-03-04 DIAGNOSIS — Z96.653 PRESENCE OF ARTIFICIAL KNEE JOINT, BILATERAL: Chronic | ICD-10-CM

## 2021-03-04 DIAGNOSIS — Z95.9 PRESENCE OF CARDIAC AND VASCULAR IMPLANT AND GRAFT, UNSPECIFIED: Chronic | ICD-10-CM

## 2021-03-04 DIAGNOSIS — Z94.9 TRANSPLANTED ORGAN AND TISSUE STATUS, UNSPECIFIED: Chronic | ICD-10-CM

## 2021-03-04 LAB
INR PPP: 2.2 RATIO
POCT-PROTHROMBIN TIME: 26.1 SECS
QUALITY CONTROL: YES

## 2021-03-08 ENCOUNTER — APPOINTMENT (OUTPATIENT)
Dept: MEDICATION MANAGEMENT | Facility: CLINIC | Age: 71
End: 2021-03-08

## 2021-03-08 DIAGNOSIS — Z96.653 PRESENCE OF ARTIFICIAL KNEE JOINT, BILATERAL: ICD-10-CM

## 2021-03-08 DIAGNOSIS — I13.0 HYPERTENSIVE HEART AND CHRONIC KIDNEY DISEASE WITH HEART FAILURE AND STAGE 1 THROUGH STAGE 4 CHRONIC KIDNEY DISEASE, OR UNSPECIFIED CHRONIC KIDNEY DISEASE: ICD-10-CM

## 2021-03-08 DIAGNOSIS — Z94.0 KIDNEY TRANSPLANT STATUS: ICD-10-CM

## 2021-03-08 DIAGNOSIS — Z94.4 LIVER TRANSPLANT STATUS: ICD-10-CM

## 2021-03-08 DIAGNOSIS — N18.9 CHRONIC KIDNEY DISEASE, UNSPECIFIED: ICD-10-CM

## 2021-03-08 DIAGNOSIS — N40.0 BENIGN PROSTATIC HYPERPLASIA WITHOUT LOWER URINARY TRACT SYMPTOMS: ICD-10-CM

## 2021-03-08 DIAGNOSIS — I48.91 UNSPECIFIED ATRIAL FIBRILLATION: ICD-10-CM

## 2021-03-08 DIAGNOSIS — E78.5 HYPERLIPIDEMIA, UNSPECIFIED: ICD-10-CM

## 2021-03-08 DIAGNOSIS — Z00.6 ENCOUNTER FOR EXAMINATION FOR NORMAL COMPARISON AND CONTROL IN CLINICAL RESEARCH PROGRAM: ICD-10-CM

## 2021-03-08 DIAGNOSIS — E11.22 TYPE 2 DIABETES MELLITUS WITH DIABETIC CHRONIC KIDNEY DISEASE: ICD-10-CM

## 2021-03-08 DIAGNOSIS — Z95.5 PRESENCE OF CORONARY ANGIOPLASTY IMPLANT AND GRAFT: ICD-10-CM

## 2021-03-08 DIAGNOSIS — I50.32 CHRONIC DIASTOLIC (CONGESTIVE) HEART FAILURE: ICD-10-CM

## 2021-03-08 DIAGNOSIS — D63.1 ANEMIA IN CHRONIC KIDNEY DISEASE: ICD-10-CM

## 2021-03-08 DIAGNOSIS — K76.0 FATTY (CHANGE OF) LIVER, NOT ELSEWHERE CLASSIFIED: ICD-10-CM

## 2021-03-18 ENCOUNTER — APPOINTMENT (OUTPATIENT)
Dept: MEDICATION MANAGEMENT | Facility: CLINIC | Age: 71
End: 2021-03-18

## 2021-03-18 ENCOUNTER — OUTPATIENT (OUTPATIENT)
Dept: OUTPATIENT SERVICES | Facility: HOSPITAL | Age: 71
LOS: 1 days | Discharge: HOME | End: 2021-03-18

## 2021-03-18 DIAGNOSIS — Z12.11 ENCOUNTER FOR SCREENING FOR MALIGNANT NEOPLASM OF COLON: Chronic | ICD-10-CM

## 2021-03-18 DIAGNOSIS — Z96.653 PRESENCE OF ARTIFICIAL KNEE JOINT, BILATERAL: Chronic | ICD-10-CM

## 2021-03-18 DIAGNOSIS — Z51.81 ENCOUNTER FOR THERAPEUTIC DRUG LVL MONITORING: ICD-10-CM

## 2021-03-18 DIAGNOSIS — Z95.9 PRESENCE OF CARDIAC AND VASCULAR IMPLANT AND GRAFT, UNSPECIFIED: Chronic | ICD-10-CM

## 2021-03-18 DIAGNOSIS — I48.91 UNSPECIFIED ATRIAL FIBRILLATION: ICD-10-CM

## 2021-03-18 DIAGNOSIS — Z79.01 LONG TERM (CURRENT) USE OF ANTICOAGULANTS: ICD-10-CM

## 2021-03-18 DIAGNOSIS — Z79.01 ENCOUNTER FOR THERAPEUTIC DRUG LVL MONITORING: ICD-10-CM

## 2021-03-18 DIAGNOSIS — Z94.9 TRANSPLANTED ORGAN AND TISSUE STATUS, UNSPECIFIED: Chronic | ICD-10-CM

## 2021-03-18 LAB
INR PPP: 2 RATIO
POCT-PROTHROMBIN TIME: 23.5 SECS
QUALITY CONTROL: YES

## 2021-03-19 ENCOUNTER — NON-APPOINTMENT (OUTPATIENT)
Age: 71
End: 2021-03-19

## 2021-04-01 ENCOUNTER — OUTPATIENT (OUTPATIENT)
Dept: OUTPATIENT SERVICES | Facility: HOSPITAL | Age: 71
LOS: 1 days | Discharge: HOME | End: 2021-04-01

## 2021-04-01 ENCOUNTER — APPOINTMENT (OUTPATIENT)
Dept: MEDICATION MANAGEMENT | Facility: CLINIC | Age: 71
End: 2021-04-01

## 2021-04-01 VITALS — RESPIRATION RATE: 16 BRPM | HEART RATE: 66 BPM | OXYGEN SATURATION: 99 %

## 2021-04-01 DIAGNOSIS — Z79.01 LONG TERM (CURRENT) USE OF ANTICOAGULANTS: ICD-10-CM

## 2021-04-01 DIAGNOSIS — I48.91 UNSPECIFIED ATRIAL FIBRILLATION: ICD-10-CM

## 2021-04-01 DIAGNOSIS — Z96.653 PRESENCE OF ARTIFICIAL KNEE JOINT, BILATERAL: Chronic | ICD-10-CM

## 2021-04-01 DIAGNOSIS — Z12.11 ENCOUNTER FOR SCREENING FOR MALIGNANT NEOPLASM OF COLON: Chronic | ICD-10-CM

## 2021-04-01 DIAGNOSIS — Z95.9 PRESENCE OF CARDIAC AND VASCULAR IMPLANT AND GRAFT, UNSPECIFIED: Chronic | ICD-10-CM

## 2021-04-01 DIAGNOSIS — Z94.9 TRANSPLANTED ORGAN AND TISSUE STATUS, UNSPECIFIED: Chronic | ICD-10-CM

## 2021-04-01 LAB
INR PPP: 2.9 RATIO
POCT-PROTHROMBIN TIME: 24.5 SECS
QUALITY CONTROL: YES

## 2021-04-07 ENCOUNTER — OUTPATIENT (OUTPATIENT)
Dept: OUTPATIENT SERVICES | Facility: HOSPITAL | Age: 71
LOS: 1 days | Discharge: HOME | End: 2021-04-07
Payer: MEDICARE

## 2021-04-07 ENCOUNTER — NON-APPOINTMENT (OUTPATIENT)
Age: 71
End: 2021-04-07

## 2021-04-07 VITALS
HEART RATE: 67 BPM | SYSTOLIC BLOOD PRESSURE: 141 MMHG | RESPIRATION RATE: 15 BRPM | OXYGEN SATURATION: 96 % | HEIGHT: 69 IN | WEIGHT: 209.88 LBS | TEMPERATURE: 98 F | DIASTOLIC BLOOD PRESSURE: 67 MMHG

## 2021-04-07 DIAGNOSIS — Z12.11 ENCOUNTER FOR SCREENING FOR MALIGNANT NEOPLASM OF COLON: Chronic | ICD-10-CM

## 2021-04-07 DIAGNOSIS — Z94.9 TRANSPLANTED ORGAN AND TISSUE STATUS, UNSPECIFIED: Chronic | ICD-10-CM

## 2021-04-07 DIAGNOSIS — I48.0 PAROXYSMAL ATRIAL FIBRILLATION: ICD-10-CM

## 2021-04-07 DIAGNOSIS — Z01.818 ENCOUNTER FOR OTHER PREPROCEDURAL EXAMINATION: ICD-10-CM

## 2021-04-07 DIAGNOSIS — Z96.653 PRESENCE OF ARTIFICIAL KNEE JOINT, BILATERAL: Chronic | ICD-10-CM

## 2021-04-07 DIAGNOSIS — Z95.9 PRESENCE OF CARDIAC AND VASCULAR IMPLANT AND GRAFT, UNSPECIFIED: Chronic | ICD-10-CM

## 2021-04-07 LAB
A1C WITH ESTIMATED AVERAGE GLUCOSE RESULT: 5.6 % — SIGNIFICANT CHANGE UP (ref 4–5.6)
ALBUMIN SERPL ELPH-MCNC: 4.1 G/DL — SIGNIFICANT CHANGE UP (ref 3.5–5.2)
ALP SERPL-CCNC: 100 U/L — SIGNIFICANT CHANGE UP (ref 30–115)
ALT FLD-CCNC: 14 U/L — SIGNIFICANT CHANGE UP (ref 0–41)
ANION GAP SERPL CALC-SCNC: 10 MMOL/L — SIGNIFICANT CHANGE UP (ref 7–14)
APTT BLD: 58.1 SEC — HIGH (ref 27–39.2)
AST SERPL-CCNC: 19 U/L — SIGNIFICANT CHANGE UP (ref 0–41)
BASOPHILS # BLD AUTO: 0.04 K/UL — SIGNIFICANT CHANGE UP (ref 0–0.2)
BASOPHILS NFR BLD AUTO: 0.8 % — SIGNIFICANT CHANGE UP (ref 0–1)
BILIRUB SERPL-MCNC: 0.6 MG/DL — SIGNIFICANT CHANGE UP (ref 0.2–1.2)
BUN SERPL-MCNC: 24 MG/DL — HIGH (ref 10–20)
CALCIUM SERPL-MCNC: 8.7 MG/DL — SIGNIFICANT CHANGE UP (ref 8.5–10.1)
CHLORIDE SERPL-SCNC: 109 MMOL/L — SIGNIFICANT CHANGE UP (ref 98–110)
CO2 SERPL-SCNC: 24 MMOL/L — SIGNIFICANT CHANGE UP (ref 17–32)
CREAT SERPL-MCNC: 1.3 MG/DL — SIGNIFICANT CHANGE UP (ref 0.7–1.5)
EOSINOPHIL # BLD AUTO: 0.12 K/UL — SIGNIFICANT CHANGE UP (ref 0–0.7)
EOSINOPHIL NFR BLD AUTO: 2.3 % — SIGNIFICANT CHANGE UP (ref 0–8)
ESTIMATED AVERAGE GLUCOSE: 114 MG/DL — SIGNIFICANT CHANGE UP (ref 68–114)
GLUCOSE SERPL-MCNC: 105 MG/DL — HIGH (ref 70–99)
HCT VFR BLD CALC: 34 % — LOW (ref 42–52)
HGB BLD-MCNC: 10.9 G/DL — LOW (ref 14–18)
IMM GRANULOCYTES NFR BLD AUTO: 0.4 % — HIGH (ref 0.1–0.3)
INR BLD: 2.88 RATIO — HIGH (ref 0.65–1.3)
LYMPHOCYTES # BLD AUTO: 2.19 K/UL — SIGNIFICANT CHANGE UP (ref 1.2–3.4)
LYMPHOCYTES # BLD AUTO: 41.7 % — SIGNIFICANT CHANGE UP (ref 20.5–51.1)
MCHC RBC-ENTMCNC: 27.5 PG — SIGNIFICANT CHANGE UP (ref 27–31)
MCHC RBC-ENTMCNC: 32.1 G/DL — SIGNIFICANT CHANGE UP (ref 32–37)
MCV RBC AUTO: 85.9 FL — SIGNIFICANT CHANGE UP (ref 80–94)
MONOCYTES # BLD AUTO: 0.63 K/UL — HIGH (ref 0.1–0.6)
MONOCYTES NFR BLD AUTO: 12 % — HIGH (ref 1.7–9.3)
NEUTROPHILS # BLD AUTO: 2.25 K/UL — SIGNIFICANT CHANGE UP (ref 1.4–6.5)
NEUTROPHILS NFR BLD AUTO: 42.8 % — SIGNIFICANT CHANGE UP (ref 42.2–75.2)
NRBC # BLD: 0 /100 WBCS — SIGNIFICANT CHANGE UP (ref 0–0)
PLATELET # BLD AUTO: 152 K/UL — SIGNIFICANT CHANGE UP (ref 130–400)
POTASSIUM SERPL-MCNC: 4.4 MMOL/L — SIGNIFICANT CHANGE UP (ref 3.5–5)
POTASSIUM SERPL-SCNC: 4.4 MMOL/L — SIGNIFICANT CHANGE UP (ref 3.5–5)
PROT SERPL-MCNC: 7.7 G/DL — SIGNIFICANT CHANGE UP (ref 6–8)
PROTHROM AB SERPL-ACNC: 33.1 SEC — HIGH (ref 9.95–12.87)
RBC # BLD: 3.96 M/UL — LOW (ref 4.7–6.1)
RBC # FLD: 14.4 % — SIGNIFICANT CHANGE UP (ref 11.5–14.5)
SODIUM SERPL-SCNC: 143 MMOL/L — SIGNIFICANT CHANGE UP (ref 135–146)
WBC # BLD: 5.25 K/UL — SIGNIFICANT CHANGE UP (ref 4.8–10.8)
WBC # FLD AUTO: 5.25 K/UL — SIGNIFICANT CHANGE UP (ref 4.8–10.8)

## 2021-04-07 PROCEDURE — 93010 ELECTROCARDIOGRAM REPORT: CPT

## 2021-04-07 NOTE — H&P PST ADULT - REASON FOR ADMISSION
PT PRESENTS TO PAST WITH NO SOB, CP, PALPITATIONS, DYSURIA, UTI OR URI AT PRESENT.   PT ABLE TO WALK UP  1  FLIGHTS OF STEPS WITH NO SOB.  AS PER THE PT, THIS IS HIS/HER COMPLETE MEDICAL AND SURGICAL HX, INCLUDING MEDICATIONS PRESCRIBED AND OVER THE COUNTER  pt denies any covid s/s, or tested positive in the past  pt advised self quarantine till day of procedure  denies travel outside the [place] in the past 30 days  Anesthesia Alert  NO--Difficult Airway  NO--History of neck surgery or radiation  NO--Limited ROM of neck  NO--History of Malignant hyperthermia  NO--Personal or family history of Pseudocholinesterase deficiency  NO--Prior Anesthesia Complication  NO--Latex Allergy  NO--Loose teeth  NO--History of Rheumatoid Arthritis  NO--BENIGNO  NO--Other_____  PT SCHEDULED FOR ARPAN.    PT REPORTS- I HAD THE WATCHMAN  PROCEDURE ABOUT 1 MONTH AGO.   THIS IS JUST A FOLLOW TO MAKE SURE THE WATCHMAN IS WORKING.

## 2021-04-12 ENCOUNTER — OUTPATIENT (OUTPATIENT)
Dept: OUTPATIENT SERVICES | Facility: HOSPITAL | Age: 71
LOS: 1 days | Discharge: HOME | End: 2021-04-12

## 2021-04-12 ENCOUNTER — LABORATORY RESULT (OUTPATIENT)
Age: 71
End: 2021-04-12

## 2021-04-12 DIAGNOSIS — Z94.9 TRANSPLANTED ORGAN AND TISSUE STATUS, UNSPECIFIED: Chronic | ICD-10-CM

## 2021-04-12 DIAGNOSIS — Z11.59 ENCOUNTER FOR SCREENING FOR OTHER VIRAL DISEASES: ICD-10-CM

## 2021-04-12 DIAGNOSIS — Z12.11 ENCOUNTER FOR SCREENING FOR MALIGNANT NEOPLASM OF COLON: Chronic | ICD-10-CM

## 2021-04-12 DIAGNOSIS — Z95.9 PRESENCE OF CARDIAC AND VASCULAR IMPLANT AND GRAFT, UNSPECIFIED: Chronic | ICD-10-CM

## 2021-04-12 DIAGNOSIS — Z96.653 PRESENCE OF ARTIFICIAL KNEE JOINT, BILATERAL: Chronic | ICD-10-CM

## 2021-04-14 ENCOUNTER — FORM ENCOUNTER (OUTPATIENT)
Age: 71
End: 2021-04-14

## 2021-04-15 ENCOUNTER — OUTPATIENT (OUTPATIENT)
Dept: OUTPATIENT SERVICES | Facility: HOSPITAL | Age: 71
LOS: 1 days | Discharge: HOME | End: 2021-04-15
Payer: MEDICARE

## 2021-04-15 VITALS — HEIGHT: 71 IN | WEIGHT: 216.05 LBS

## 2021-04-15 DIAGNOSIS — I48.0 PAROXYSMAL ATRIAL FIBRILLATION: ICD-10-CM

## 2021-04-15 DIAGNOSIS — Z95.9 PRESENCE OF CARDIAC AND VASCULAR IMPLANT AND GRAFT, UNSPECIFIED: Chronic | ICD-10-CM

## 2021-04-15 DIAGNOSIS — Z96.653 PRESENCE OF ARTIFICIAL KNEE JOINT, BILATERAL: Chronic | ICD-10-CM

## 2021-04-15 DIAGNOSIS — Z12.11 ENCOUNTER FOR SCREENING FOR MALIGNANT NEOPLASM OF COLON: Chronic | ICD-10-CM

## 2021-04-15 DIAGNOSIS — Z94.9 TRANSPLANTED ORGAN AND TISSUE STATUS, UNSPECIFIED: Chronic | ICD-10-CM

## 2021-04-15 PROCEDURE — 93325 DOPPLER ECHO COLOR FLOW MAPG: CPT | Mod: 26

## 2021-04-15 PROCEDURE — 93312 ECHO TRANSESOPHAGEAL: CPT | Mod: 26,XU

## 2021-04-15 PROCEDURE — 93320 DOPPLER ECHO COMPLETE: CPT | Mod: 26

## 2021-04-15 RX ORDER — SENNA PLUS 8.6 MG/1
1 TABLET ORAL
Qty: 0 | Refills: 0 | DISCHARGE

## 2021-04-15 RX ORDER — MAGNESIUM CHLORIDE
800 CRYSTALS MISCELLANEOUS
Qty: 0 | Refills: 0 | DISCHARGE

## 2021-04-15 RX ORDER — TACROLIMUS 5 MG/1
1 CAPSULE ORAL
Qty: 0 | Refills: 0 | DISCHARGE

## 2021-04-15 RX ORDER — LINAGLIPTIN 5 MG/1
1 TABLET, FILM COATED ORAL
Qty: 0 | Refills: 0 | DISCHARGE

## 2021-04-15 RX ORDER — PANTOPRAZOLE SODIUM 20 MG/1
1 TABLET, DELAYED RELEASE ORAL
Qty: 0 | Refills: 0 | DISCHARGE

## 2021-04-15 RX ORDER — AMLODIPINE BESYLATE 2.5 MG/1
1 TABLET ORAL
Qty: 0 | Refills: 0 | DISCHARGE

## 2021-04-15 RX ORDER — FLUDROCORTISONE ACETATE 0.1 MG/1
1 TABLET ORAL
Qty: 0 | Refills: 0 | DISCHARGE

## 2021-04-15 RX ORDER — TAMSULOSIN HYDROCHLORIDE 0.4 MG/1
1 CAPSULE ORAL
Qty: 0 | Refills: 0 | DISCHARGE

## 2021-04-15 RX ORDER — SITAGLIPTIN 50 MG/1
1 TABLET, FILM COATED ORAL
Qty: 0 | Refills: 0 | DISCHARGE

## 2021-04-15 RX ORDER — METOPROLOL TARTRATE 50 MG
1 TABLET ORAL
Qty: 0 | Refills: 0 | DISCHARGE

## 2021-04-15 NOTE — PRE-ANESTHESIA EVALUATION ADULT - NSANTHADDINFOFT_GEN_ALL_CORE
71 y/o man evaluated for ARPAN.  ASA 3.  Plan IV sedation / GA backup.  Plan, benefits, foreseeable risks, viable alternatives discussed with patient and all his pertinent questions answered and he understands and elects to proceed.

## 2021-04-15 NOTE — ASU PATIENT PROFILE, ADULT - PSH
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

## 2021-04-15 NOTE — CHART NOTE - NSCHARTNOTEFT_GEN_A_CORE
POST OPERATIVE PROCEDURAL DOCUMENTATION  PRE-OP DIAGNOSIS:  S/P WATCHMAN procedure      POST-OP DIAGNOSIS:  Watchman in position without major leaks.     PROCEDURE: Transesophageal echocardiogram    Primary Physician:  Dr. Morris  Assistant: Dr. Vuong    ANESTHESIA TYPE  [  ] General Anesthesia  [ x ] Conscious Sedation  [  ] Local/Regional    CONDITION  [  ] Critical  [  ] Serious  [  ] Fair  [ x ] Good    SPECIMENS REMOVED (IF APPLICABLE): N/A    IMPLANTS (IF APPLICABLE): None    ESTIMATED BLOOD LOSS: None    COMPLICATIONS: None      FINDINGS:    After risks and benefits of procedures were explained, informed consent was obtained and placed in chart. Refer to Anesthesia note for sedation details.  The ARPAN probe was passed into the esophagus without difficulty.  Transesophageal and transgastric images were obtained.  The ARPAN probe was removed without difficulty and examined.  There was no evidence for bleeding.  The patient tolerated the procedure well without any immediate ARPAN-related complications.      Preliminary Findings:  LA:     SHILA: Left atrial appendage watchman in place without any major olaf-watchman leaks.    LV: LVEF moderately reduced.   MV:  mild MR, no evidence of MS.   AV: No evidence of AI, no evidence of AS.   RA:  TV: mild TR.   PV: mild PI.   IAS: no PFO. No R-> L shunt.   Aorta:  simple atheroma of aortic arch / desc aorta      DIAGNOSIS/IMPRESSION:    PLAN OF CARE:  f/u with outpatient cardiologist Dr. De Souza and EPS Dr. Burdick after discharge. POST OPERATIVE PROCEDURAL DOCUMENTATION  PRE-OP DIAGNOSIS:  S/P WATCHMAN procedure      POST-OP DIAGNOSIS:  Watchman in position without major leaks.     PROCEDURE: Transesophageal echocardiogram    Primary Physician:  Dr. Morris  Assistant: Dr. Vuong    ANESTHESIA TYPE  [  ] General Anesthesia  [ x ] Conscious Sedation  [  ] Local/Regional    CONDITION  [  ] Critical  [  ] Serious  [  ] Fair  [ x ] Good    SPECIMENS REMOVED (IF APPLICABLE): N/A    IMPLANTS (IF APPLICABLE): None    ESTIMATED BLOOD LOSS: None    COMPLICATIONS: None      FINDINGS:    After risks and benefits of procedures were explained, informed consent was obtained and placed in chart. Refer to Anesthesia note for sedation details.  The ARPAN probe was passed into the esophagus without difficulty.  Transesophageal and transgastric images were obtained.  The ARPAN probe was removed without difficulty and examined.  There was no evidence for bleeding.  The patient tolerated the procedure well without any immediate ARPAN-related complications.      Preliminary Findings:  LA:     SHILA: Left atrial appendage watchman in place without any major olaf-watchman leaks.    LV: LVEF mildly reduced  MV:  mild MR, no evidence of MS.   AV: No evidence of AI, no evidence of AS.   RA:  TV: mild TR.   PV: mild PI.   IAS: no PFO. No R-> L shunt.   Aorta:  simple atheroma of aortic arch / desc aorta      DIAGNOSIS/IMPRESSION:    PLAN OF CARE:  f/u with outpatient cardiologist Dr. De Souza and EPS Dr. Burdick after discharge. POST OPERATIVE PROCEDURAL DOCUMENTATION  PRE-OP DIAGNOSIS:  S/P WATCHMAN procedure      POST-OP DIAGNOSIS:  Watchman in position without major leaks.     PROCEDURE: Transesophageal echocardiogram    Primary Physician:  Dr. Morris  Assistant: Dr. Vuong    ANESTHESIA TYPE  [  ] General Anesthesia  [ x ] Conscious Sedation  [  ] Local/Regional    CONDITION  [  ] Critical  [  ] Serious  [  ] Fair  [ x ] Good    SPECIMENS REMOVED (IF APPLICABLE): N/A    IMPLANTS (IF APPLICABLE): None    ESTIMATED BLOOD LOSS: None    COMPLICATIONS: None      FINDINGS:    After risks and benefits of procedures were explained, informed consent was obtained and placed in chart. Refer to Anesthesia note for sedation details.  The ARPAN probe was passed into the esophagus without difficulty.  Transesophageal and transgastric images were obtained.  The ARPAN probe was removed without difficulty and examined.  There was no evidence for bleeding.  The patient tolerated the procedure well without any immediate ARPAN-related complications.      Preliminary Findings:  LA:     SHILA: Left atrial appendage watchman in place without any evidence of olaf-watchman leaks.    LV: LVEF mildly reduced  MV:  mild MR, no evidence of MS.   AV: No evidence of AI, no evidence of AS.   RA:  TV: mild TR.   PV: mild PI.   IAS: no PFO. No R-> L shunt.   Aorta:  simple atheroma of aortic arch / desc aorta      DIAGNOSIS/IMPRESSION:    PLAN OF CARE:  f/u with outpatient cardiologist Dr. De Souza and EPS Dr. Burdick after discharge.

## 2021-04-20 ENCOUNTER — OUTPATIENT (OUTPATIENT)
Dept: OUTPATIENT SERVICES | Facility: HOSPITAL | Age: 71
LOS: 1 days | Discharge: HOME | End: 2021-04-20

## 2021-04-20 ENCOUNTER — APPOINTMENT (OUTPATIENT)
Dept: MEDICATION MANAGEMENT | Facility: CLINIC | Age: 71
End: 2021-04-20

## 2021-04-20 VITALS — HEART RATE: 66 BPM | OXYGEN SATURATION: 98 % | RESPIRATION RATE: 16 BRPM

## 2021-04-20 DIAGNOSIS — Z94.9 TRANSPLANTED ORGAN AND TISSUE STATUS, UNSPECIFIED: Chronic | ICD-10-CM

## 2021-04-20 DIAGNOSIS — I48.91 UNSPECIFIED ATRIAL FIBRILLATION: ICD-10-CM

## 2021-04-20 DIAGNOSIS — Z79.01 LONG TERM (CURRENT) USE OF ANTICOAGULANTS: ICD-10-CM

## 2021-04-20 DIAGNOSIS — Z95.9 PRESENCE OF CARDIAC AND VASCULAR IMPLANT AND GRAFT, UNSPECIFIED: Chronic | ICD-10-CM

## 2021-04-20 DIAGNOSIS — Z96.653 PRESENCE OF ARTIFICIAL KNEE JOINT, BILATERAL: Chronic | ICD-10-CM

## 2021-04-20 DIAGNOSIS — Z12.11 ENCOUNTER FOR SCREENING FOR MALIGNANT NEOPLASM OF COLON: Chronic | ICD-10-CM

## 2021-04-20 LAB
INR PPP: 2.7 RATIO
POCT-PROTHROMBIN TIME: 32.1 SECS
QUALITY CONTROL: YES

## 2021-04-22 ENCOUNTER — APPOINTMENT (OUTPATIENT)
Dept: CARDIOLOGY | Facility: CLINIC | Age: 71
End: 2021-04-22
Payer: MEDICARE

## 2021-04-22 VITALS
HEART RATE: 70 BPM | WEIGHT: 202 LBS | OXYGEN SATURATION: 98 % | HEIGHT: 69 IN | TEMPERATURE: 97.8 F | DIASTOLIC BLOOD PRESSURE: 70 MMHG | SYSTOLIC BLOOD PRESSURE: 100 MMHG | BODY MASS INDEX: 29.92 KG/M2

## 2021-04-22 DIAGNOSIS — I48.91 UNSPECIFIED ATRIAL FIBRILLATION: ICD-10-CM

## 2021-04-22 DIAGNOSIS — Z95.818 PRESENCE OF OTHER CARDIAC IMPLANTS AND GRAFTS: ICD-10-CM

## 2021-04-22 PROCEDURE — 93000 ELECTROCARDIOGRAM COMPLETE: CPT

## 2021-04-22 PROCEDURE — 99213 OFFICE O/P EST LOW 20 MIN: CPT

## 2021-04-22 RX ORDER — WARFARIN 2.5 MG/1
2.5 TABLET ORAL
Qty: 60 | Refills: 3 | Status: DISCONTINUED | COMMUNITY
Start: 2021-03-19 | End: 2021-04-22

## 2021-04-22 RX ORDER — PANTOPRAZOLE 20 MG/1
20 TABLET, DELAYED RELEASE ORAL DAILY
Refills: 0 | Status: ACTIVE | COMMUNITY

## 2021-04-22 RX ORDER — PREDNISONE 5 MG/1
5 TABLET ORAL DAILY
Refills: 0 | Status: DISCONTINUED | COMMUNITY
End: 2021-04-22

## 2021-04-22 RX ORDER — WARFARIN 2.5 MG/1
2.5 TABLET ORAL
Refills: 0 | Status: DISCONTINUED | COMMUNITY
End: 2021-04-22

## 2021-04-22 RX ORDER — SITAGLIPTIN 100 MG/1
100 TABLET, FILM COATED ORAL DAILY
Refills: 0 | Status: DISCONTINUED | COMMUNITY
End: 2021-04-22

## 2021-04-22 RX ORDER — ASPIRIN 325 MG/1
325 TABLET, FILM COATED ORAL
Qty: 90 | Refills: 3 | Status: ACTIVE | COMMUNITY
Start: 2021-04-22 | End: 1900-01-01

## 2021-04-22 RX ORDER — CLOPIDOGREL BISULFATE 75 MG/1
75 TABLET, FILM COATED ORAL DAILY
Qty: 90 | Refills: 3 | Status: ACTIVE | COMMUNITY
Start: 2021-04-22

## 2021-04-22 RX ORDER — LINAGLIPTIN 5 MG/1
5 TABLET, FILM COATED ORAL DAILY
Refills: 0 | Status: ACTIVE | COMMUNITY

## 2021-04-22 NOTE — PHYSICAL EXAM
[Abdomen Tenderness] : non-tender [Abdomen Mass (___ Cm)] : no abdominal mass palpated [Normal Oral Mucosa] : normal oral mucosa [No Oral Pallor] : no oral pallor [No Oral Cyanosis] : no oral cyanosis [Normal Jugular Venous A Waves Present] : normal jugular venous A waves present [Normal Jugular Venous V Waves Present] : normal jugular venous V waves present [No Jugular Venous Alan A Waves] : no jugular venous alan A waves [Gait - Sufficient For Exercise Testing] : the gait was sufficient for exercise testing [General Appearance - Well Developed] : well developed [Normal Appearance] : normal appearance [Well Groomed] : well groomed [No Deformities] : no deformities [General Appearance - Well Nourished] : well nourished [General Appearance - In No Acute Distress] : no acute distress [Normal Conjunctiva] : the conjunctiva exhibited no abnormalities [Eyelids - No Xanthelasma] : the eyelids demonstrated no xanthelasmas [Exaggerated Use Of Accessory Muscles For Inspiration] : no accessory muscle use [Respiration, Rhythm And Depth] : normal respiratory rhythm and effort [Auscultation Breath Sounds / Voice Sounds] : lungs were clear to auscultation bilaterally [Abdomen Soft] : soft [Abnormal Walk] : normal gait [Nail Clubbing] : no clubbing of the fingernails [Cyanosis, Localized] : no localized cyanosis [Petechial Hemorrhages (___cm)] : no petechial hemorrhages [FreeTextEntry1] : No JVD [Heart Rate And Rhythm] : heart rate and rhythm were normal [Heart Sounds] : normal S1 and S2 [Arterial Pulses Normal] : the arterial pulses were normal [Skin Color & Pigmentation] : normal skin color and pigmentation [Skin Turgor] : normal skin turgor [] : no rash [Oriented To Time, Place, And Person] : oriented to person, place, and time [Impaired Insight] : insight and judgment were intact [Affect] : the affect was normal [No Anxiety] : not feeling anxious

## 2021-05-11 ENCOUNTER — APPOINTMENT (OUTPATIENT)
Dept: MEDICATION MANAGEMENT | Facility: CLINIC | Age: 71
End: 2021-05-11

## 2021-05-14 ENCOUNTER — APPOINTMENT (OUTPATIENT)
Dept: CARDIOLOGY | Facility: CLINIC | Age: 71
End: 2021-05-14
Payer: MEDICARE

## 2021-05-14 VITALS
BODY MASS INDEX: 30.66 KG/M2 | OXYGEN SATURATION: 98 % | SYSTOLIC BLOOD PRESSURE: 108 MMHG | WEIGHT: 207 LBS | HEART RATE: 53 BPM | DIASTOLIC BLOOD PRESSURE: 60 MMHG | HEIGHT: 69 IN | TEMPERATURE: 97.3 F | RESPIRATION RATE: 16 BRPM

## 2021-05-14 DIAGNOSIS — I48.0 PAROXYSMAL ATRIAL FIBRILLATION: ICD-10-CM

## 2021-05-14 DIAGNOSIS — E78.5 HYPERLIPIDEMIA, UNSPECIFIED: ICD-10-CM

## 2021-05-14 DIAGNOSIS — I50.32 CHRONIC DIASTOLIC (CONGESTIVE) HEART FAILURE: ICD-10-CM

## 2021-05-14 DIAGNOSIS — R00.0 TACHYCARDIA, UNSPECIFIED: ICD-10-CM

## 2021-05-14 DIAGNOSIS — I25.9 CHRONIC ISCHEMIC HEART DISEASE, UNSPECIFIED: ICD-10-CM

## 2021-05-14 DIAGNOSIS — I10 ESSENTIAL (PRIMARY) HYPERTENSION: ICD-10-CM

## 2021-05-14 DIAGNOSIS — I50.9 HEART FAILURE, UNSPECIFIED: ICD-10-CM

## 2021-05-14 PROCEDURE — 99213 OFFICE O/P EST LOW 20 MIN: CPT

## 2021-05-14 PROCEDURE — 93000 ELECTROCARDIOGRAM COMPLETE: CPT

## 2021-05-14 RX ORDER — MAGNESIUM OXIDE 241.3 MG/1000MG
400 TABLET ORAL TWICE DAILY
Refills: 0 | Status: ACTIVE | COMMUNITY

## 2021-05-14 RX ORDER — SENNOSIDES 8.6 MG TABLETS 8.6 MG/1
8.6 TABLET ORAL DAILY
Refills: 0 | Status: ACTIVE | COMMUNITY

## 2021-05-14 NOTE — PHYSICAL EXAM
[General Appearance - Well Developed] : well developed [Normal Appearance] : normal appearance [Well Groomed] : well groomed [General Appearance - Well Nourished] : well nourished [No Deformities] : no deformities [General Appearance - In No Acute Distress] : no acute distress [Normal Conjunctiva] : the conjunctiva exhibited no abnormalities [Eyelids - No Xanthelasma] : the eyelids demonstrated no xanthelasmas [Normal Oral Mucosa] : normal oral mucosa [No Oral Pallor] : no oral pallor [No Oral Cyanosis] : no oral cyanosis [Normal Jugular Venous A Waves Present] : normal jugular venous A waves present [Normal Jugular Venous V Waves Present] : normal jugular venous V waves present [No Jugular Venous Alan A Waves] : no jugular venous alan A waves [Respiration, Rhythm And Depth] : normal respiratory rhythm and effort [Auscultation Breath Sounds / Voice Sounds] : lungs were clear to auscultation bilaterally [Exaggerated Use Of Accessory Muscles For Inspiration] : no accessory muscle use [Heart Rate And Rhythm] : heart rate and rhythm were normal [Heart Sounds] : normal S1 and S2 [Murmurs] : no murmurs present [Arterial Pulses Normal] : the arterial pulses were normal [Edema] : no peripheral edema present [Abdomen Soft] : soft [Abdomen Tenderness] : non-tender [Abdomen Mass (___ Cm)] : no abdominal mass palpated [Abnormal Walk] : normal gait [Gait - Sufficient For Exercise Testing] : the gait was sufficient for exercise testing [Nail Clubbing] : no clubbing of the fingernails [Cyanosis, Localized] : no localized cyanosis [Petechial Hemorrhages (___cm)] : no petechial hemorrhages [Skin Color & Pigmentation] : normal skin color and pigmentation [] : no rash [No Venous Stasis] : no venous stasis [Skin Lesions] : no skin lesions [No Skin Ulcers] : no skin ulcer [No Xanthoma] : no  xanthoma was observed [Oriented To Time, Place, And Person] : oriented to person, place, and time [Affect] : the affect was normal [Mood] : the mood was normal [No Anxiety] : not feeling anxious [Normal Rate] : normal [Normal S1] : normal S1 [Normal S2] : normal S2 [No Murmur] : no murmurs heard [2+] : left 2+ [No Abnormalities] : the abdominal aorta was not enlarged and no bruit was heard [No Pitting Edema] : no pitting edema present [S3] : no S3 [S4] : no S4 [Right Carotid Bruit] : no bruit heard over the right carotid [Left Carotid Bruit] : no bruit heard over the left carotid [Right Femoral Bruit] : no bruit heard over the right femoral artery [Left Femoral Bruit] : no bruit heard over the left femoral artery

## 2021-05-14 NOTE — ASSESSMENT
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

## 2021-05-14 NOTE — DISCUSSION/SUMMARY
[___ Month(s)] : in [unfilled] month(s) [FreeTextEntry1] : Ekg results were reviewed.\par Recent lab results were reviewed.\par Reviewed EPS notes\par \par Fasting CBC, BMP, LFTs, Lipid Profile prior to next visit\par Low carbohydrate, low fat diet\par f/u 6 months\par \par \par Number/complexity of problems -  Mod - 2 or more stable chronic illnesses\par Amount/complexity of data -history, exam, reviewed old note, labs reviewed, ekg reviewed, EPS notes reviewed\par Risk of complications - Low\par

## 2021-05-14 NOTE — HISTORY OF PRESENT ILLNESS
[FreeTextEntry1] : 70 year old male who came in for f/u of atrial fibrillation, diastolic CHF, HTN, CKD stage 3, and hyperlipidemia. The patient was recently hospitalized at [facility] from Dec. 7 - Dec. 23, 2019 for shortness of breath due to anasarca from cirrhosis (from JIMÉNEZ). The patient during that time has had multiple paracentesis. He had a liver and renal transplant performed on 2/1/2020.\par \par The patient is s/p watchman on 2/23/2021.\par \par \par PMHx:\par \par Atrial fibrillation - CHADSVASC2 score 3 - chronic - rate not controlled\par Chronic diastolic heart failure - stable\par CAD - stent - 2008 (mid-LAD xience 4.0 x 15 and 3.0 x 18) - chronic - stable\par DVT of lower extremities 1995 - stable\par HTN - controlled\par High cholesterol - controlled\par Borderline DM - controlled\par BENIGNO - non-compliant with using CPAP - ongoing\par Liver cirrhosis (due to JIMÉNEZ) - chronic - progressive\par Hepatic encephalopathy - stable\par CKD stage 3 - chronic - stable\par Anemia - stable\par Liver and renal transplant - 2/1/2020 - stable\par Watchman - 2/23/2021\par \par Medications reviewed and reconciled with patient. Patient is compliant with taking appropriate medications at appropriate doses at appropriate intervals without missing doses.

## 2021-05-25 NOTE — ED PROVIDER NOTE - CROS ED CARDIOVAS ALL NEG
Patient was seen by Lizbet Sotelo on 5-14-21 for sciatica. She called into the office on 5-19-21, note from physician Veronika Christine) was read to patient. I re- read the physician note to her today. She states the sciatica is worsening and not getting better. She has been doing mild exercises and resting. Did you want her to continue to wait the month or be seen again sooner? She is leaving to go on vacation 6-12-21. negative...

## 2021-05-28 NOTE — ED ADULT NURSE NOTE - NS ED NURSE IV DC DT
Assessment and Plan:     Problem List Items Addressed This Visit        Digestive    Gastroesophageal reflux disease without esophagitis/Alvares's esophagus (Chronic)     Avoid acid and spicy foods  Follow up with GI            Cardiovascular and Mediastinum    HTN (hypertension)     Controlled  Continue norvasc, valsartan  Encouraged increased activity, exercise, and weight loss  Other Visit Diagnoses     Medicare annual wellness visit, subsequent    -  Primary          BMI Counseling: Body mass index is 47 26 kg/m²  The BMI is above normal  Nutrition recommendations include reducing portion sizes, decreasing overall calorie intake and 3-5 servings of fruits/vegetables daily  Exercise recommendations include exercising 3-5 times per week and joining a gym  Falls Plan of Care: Balance, strength, and gait training instructions were provided  Preventive health issues were discussed with patient, and age appropriate screening tests were ordered as noted in patient's After Visit Summary  Personalized health advice and appropriate referrals for health education or preventive services given if needed, as noted in patient's After Visit Summary       History of Present Illness:     Patient presents for Medicare Annual Wellness visit    Patient Care Team:  Jenifer Mensah DO as PCP - MD Mele Cristina MD Larwance Re, MD Gaylord Lacer, MD (Colon and Rectal Surgery)  Stephy Garrison MD as Endoscopist     Problem List:     Patient Active Problem List   Diagnosis    HTN (hypertension)    Morbid obesity ([facility] [address] )    VIRGEN on CPAP    Prediabetes    Right-sided lacunar infarction ([facility] [address] )    Abnormal gait    Acute maxillary sinusitis    Aortic valve disorder    Arthralgia of knee    Carotid bruit    Atherosclerosis of native coronary artery of native heart without angina pectoris    Elevated ALT measurement    Hemorrhoid    Elevated liver enzymes    Ambulatory dysfunction    Horseshoe kidney    Hyperlipidemia    Microscopic hematuria    Mild intermittent asthma without complication    Status post total left knee replacement    Thoracic aortic aneurysm (HCC)    Venous insufficiency of both lower extremities    History of transient ischemic attack (TIA)    Rectal bleeding    Special screening for malignant neoplasms, colon    Cough    Wheeze    Mass in chest    Screening PSA (prostate specific antigen)    Change in stool    Gastroesophageal reflux disease without esophagitis/Alvares's esophagus    Hypokalemia    Paroxysmal atrial fibrillation (HCC)    History of CVA (cerebrovascular accident)    Asthma    Atypical atrial flutter (HCC)    Urine abnormality    Allergy to iodine    Anticoagulated    High risk medication use    Nonrheumatic aortic valve insufficiency      Past Medical and Surgical History:     Past Medical History:   Diagnosis Date    Allergic rhinitis due to pollen     last assessed 10/01/15    Aortic aneurysm (HCC)     4 3 cm  6/2018 last check    Alvares esophagus     CPAP (continuous positive airway pressure) dependence     GERD (gastroesophageal reflux disease)     Hyperlipidemia     Hypertension     Irregular heart beat     Mild intermittent asthma without complication     last assessed 10/01/15    Positive PPD     last assesssed 12/21/15    Sleep apnea     Stroke [facility])     unsure if/when happened; no weakness    Syncopal episodes     1 year ago    Vertigo     last assessed 04/24/17     Past Surgical History:   Procedure Laterality Date    COLONOSCOPY N/A 11/14/2018    Procedure: COLONOSCOPY;  Surgeon: Prakash Jaramillo MD;  Location: [facility];   Service: Colorectal    EGD AND COLONOSCOPY      HERNIA REPAIR      JOINT REPLACEMENT Left     knee    MULTIPLE TOOTH EXTRACTIONS  05/2020    TOTAL KNEE ARTHROPLASTY  08/29/2016    Dr Alessio Aguayo 07/28/16    URETHRA SURGERY      polyps removed      Family History:     Family History Problem Relation Age of Onset    Coronary artery disease Father     Hypertension Father     Heart disease Father         cardiac disorder      Social History:     E-Cigarette/Vaping    E-Cigarette Use Never User      E-Cigarette/Vaping Substances    Nicotine No     THC No     CBD No      Social History     Socioeconomic History    Marital status: /Civil Union     Spouse name: None    Number of children: 2    Years of education: None    Highest education level: None   Occupational History    None   Social Needs    Financial resource strain: None    Food insecurity     Worry: None     Inability: None    Transportation needs     Medical: None     Non-medical: None   Tobacco Use    Smoking status: Never Smoker    Smokeless tobacco: Never Used   Substance and Sexual Activity    Alcohol use: Never     Frequency: Never    Drug use: No    Sexual activity: None   Lifestyle    Physical activity     Days per week: None     Minutes per session: None    Stress: None   Relationships    Social connections     Talks on phone: None     Gets together: None     Attends Zoroastrianism service: None     Active member of club or organization: None     Attends meetings of clubs or organizations: None     Relationship status: None    Intimate partner violence     Fear of current or ex partner: None     Emotionally abused: None     Physically abused: None     Forced sexual activity: None   Other Topics Concern    None   Social History Narrative    Retired underwater paramedic for [employer]      Medications and Allergies:     Current Outpatient Medications   Medication Sig Dispense Refill    amLODIPine (NORVASC) 10 mg tablet Take 10 mg by mouth daily      aspirin 81 MG tablet Take 81 mg by mouth       calcium carbonate (TUMS) 500 mg chewable tablet Chew 2 tablets every 4 (four) hours as needed       CHOLECALCIFEROL PO Take 2,000 Units by mouth Chew form      cimetidine (TAGAMET) 200 mg tablet Take 200 mg by mouth daily      Coenzyme Q10 (COQ-10) 100 MG CAPS Take 100 mg by mouth daily       DENTA 5000 PLUS 1 1 % CREA BRUSH TWICE DAILY  AFTER PM BRUSHING EXPECTORATE BUT DO NOT RINSE      ezetimibe (ZETIA) 10 mg tablet TAKE 1 TABLET BY MOUTH EVERY DAY 90 tablet 3    furosemide (LASIX) 20 mg tablet TAKE 1 TABLET (20 MG TOTAL) BY MOUTH DAILY  90 tablet 3    Klor-Con M10 10 MEQ tablet TAKE 2 TABLETS BY MOUTH DAILY 60 tablet 11    multivitamin (THERAGRAN) TABS Take 1 tablet by mouth Gummy form      Omega-3 Fatty Acids (FISH OIL PO) Take 1 capsule by mouth daily Gummy form      sotalol (BETAPACE) 80 mg tablet Take 80 mg by mouth 2 (two) times a day       valsartan (DIOVAN) 320 MG tablet Take 1 tablet (320 mg total) by mouth daily 90 tablet 1    XARELTO 20 MG tablet TAKE 1 TABLET BY MOUTH ONCE NDAILY WITH DINNER 30 tablet 0    fluticasone (FLONASE) 50 mcg/act nasal spray 2 sprays into each nostril daily as needed        No current facility-administered medications for this visit  Allergies   Allergen Reactions    Iodine - Food Allergy Shortness Of Breath     Other reaction(s): Respiratory Distress  Other reaction(s): Respiratory Distress    Omnipaque [Iohexol] Anaphylaxis    Erythromycin Hives     Other reaction(s): Other (See Comments)  Pleurisy   Pleurisy   Other reaction(s):  Other (See Comments)  Pleurisy     Iodixanol     Lisinopril Cough    Statins Myalgia      Immunizations:     Immunization History   Administered Date(s) Administered    SARS-CoV-2 / COVID-19 mRNA IM (Moderna) 02/06/2021, 03/07/2021    Td (adult), adsorbed 08/23/2017    Tdap 05/22/2018      Health Maintenance:         Topic Date Due    Colonoscopy Surveillance  11/14/2023    Colorectal Cancer Screening  11/14/2028    Hepatitis C Screening  Completed         Topic Date Due    Pneumococcal Vaccine: 65+ Years (1 of 1 - PPSV23) Never done    Influenza Vaccine (Season Ended) 09/01/2021      Medicare Health Risk Assessment:     /62 Pulse (!) 51   Temp (!) 97 3 °F (36 3 °C) (Temporal)   Ht 5' 5" (1 651 m)   Wt 129 kg (284 lb)   SpO2 98%   BMI 47 26 kg/m²      Dain Fitting is here for his Subsequent Wellness visit  Health Risk Assessment:   Patient rates overall health as very good  Patient feels that their physical health rating is same  Patient is very satisfied with their life  Eyesight was rated as slightly worse  Hearing was rated as same  Patient feels that their emotional and mental health rating is slightly better  Patients states they are never, rarely angry  Patient states they are never, rarely unusually tired/fatigued  Pain experienced in the last 7 days has been none  Patient states that he has experienced no weight loss or gain in last 6 months  Depression Screening:   PHQ-2 Score: 0      Fall Risk Screening: In the past year, patient has experienced: no history of falling in past year      Home Safety:  Patient does not have trouble with stairs inside or outside of their home  Patient has working smoke alarms and has working carbon monoxide detector  Home safety hazards include: none  Nutrition:   Current diet is Regular and Limited junk food  Practicing portion control  Medications:   Patient is currently taking over-the-counter supplements  OTC medications include: see medication list  Patient is able to manage medications  Activities of Daily Living (ADLs)/Instrumental Activities of Daily Living (IADLs):   Walk and transfer into and out of bed and chair?: Yes  Dress and groom yourself?: Yes    Bathe or shower yourself?: Yes    Feed yourself? Yes  Do your laundry/housekeeping?: Yes  Manage your money, pay your bills and track your expenses?: Yes  Make your own meals?: Yes    Do your own shopping?: Yes    Previous Hospitalizations:   Any hospitalizations or ED visits within the last 12 months?: No      Advance Care Planning:   Living will: No    Durable POA for healthcare:  Yes    Advanced directive: No PREVENTIVE SCREENINGS      Cardiovascular Screening:    General: Screening Not Indicated and History Lipid Disorder      Diabetes Screening:     General: Screening Current      Colorectal Cancer Screening:     General: Screening Current      Abdominal Aortic Aneurysm (AAA) Screening:    Risk factors include: age between 73-69 yo        Lung Cancer Screening:     General: Screening Not Indicated      Hepatitis C Screening:    General: Screening Current    Screening, Brief Intervention, and Referral to Treatment (SBIRT)    Screening      Single Item Drug Screening:  How often have you used an illegal drug (including marijuana) or a prescription medication for non-medical reasons in the past year? never    Single Item Drug Screen Score: 0  Interpretation: Negative screen for possible drug use disorder      Grayling Phoenix, CRNP 21-Nov-2018 18:29

## 2021-05-30 NOTE — ASSESSMENT
[FreeTextEntry1] : PAF - CHADVASC 3 and  unable to  tolerate AC due to cirrhosis HASBLED 4. Patient is a candidate for watchman procedure however at this time I'm unable to assess if patient can take 45 days of Coumadin post watchman implant. Therefore patient will follow-up next week with [facility] physicians in [place] and hopefully they be able to assess if patient can take short-term Coumadin for 45 days followed by Plavix and aspirin for six months.\par \par Left Atrial Occlusion Device Implant\par I have discussed different treatment options with the patient including other anticoagulation medication. I have explained the risks and benefits of the procedure to the patient. I have explained to the patient the patient will require to be on warfarin for 45 days after implant and ARPAN will be repeated. If there is no leak patient will remain on aspirin and Plavix for next month, and then ASA only. There is approximately 1-2% chance of any major cardiovascular complication to occur. Complications include, but are not limited to infection, bleeding, damage to the vessels, hole in the heart, stroke, death and heart attack. The patient understands the risk and would like to proceed with the procedure.  Materials were provided to the patient. Patient indicated that all of his questions were answered to his satisfaction and verbalized understanding.\par Patients CHADVASC Score is    3\par Patients HASBLED score is3\par (Hypertension, Abnormal Renal/Liver Function, Stroke, Bleeding History or Predisposition, Labile INR, >65, Antiplatelet agents, NSAID, Drugs/Alcohol)\par Dr. De Souza              recommends and agrees with implant of watchman\par \par \par \par Dr. Humphries [phone]\par Dr. Chaudhary [phone]

## 2021-05-30 NOTE — ASSESSMENT
[FreeTextEntry1] : PAF - CHADVASC 3 and  unable to  tolerate AC due to cirrhosis HASBLED 4. Patient is a candidate for watchman procedure however at this time I'm unable to assess if patient can take 45 days of Coumadin post watchman implant. Therefore patient will follow-up next week with [facility] physicians in [place] and hopefully they be able to assess if patient can take short-term Coumadin for 45 days followed by Plavix and aspirin for six months.\par \par Left Atrial Occlusion Device Implant\par I have discussed different treatment options with the patient including other anticoagulation medication. I have explained the risks and benefits of the procedure to the patient. I have explained to the patient the patient will require to be on warfarin for 45 days after implant and ARPAN will be repeated. If there is no leak patient will remain on aspirin and Plavix for next month, and then ASA only. There is approximately 1-2% chance of any major cardiovascular complication to occur. Complications include, but are not limited to infection, bleeding, damage to the vessels, hole in the heart, stroke, death and heart attack. The patient understands the risk and would like to proceed with the procedure.  Materials were provided to the patient. Patient indicated that all of his questions were answered to his satisfaction and verbalized understanding.\par Patients CHADVASC Score is    3\par Patients HASBLED score is3\par (Hypertension, Abnormal Renal/Liver Function, Stroke, Bleeding History or Predisposition, Labile INR, >65, Antiplatelet agents, NSAID, Drugs/Alcohol)\par Dr. De Souza              recommends and agrees with implant of watchman\par \par \par \par Dr. Humphries [phone]\par Dr. Chaudhary [phone]

## 2021-07-07 ENCOUNTER — INPATIENT (INPATIENT)
Facility: HOSPITAL | Age: 71
LOS: 12 days | Discharge: NURSING, REHAB OR LONG-TERM CARE | End: 2021-07-20
Attending: INTERNAL MEDICINE | Admitting: INTERNAL MEDICINE
Payer: MEDICARE

## 2021-07-07 VITALS
HEART RATE: 61 BPM | SYSTOLIC BLOOD PRESSURE: 93 MMHG | DIASTOLIC BLOOD PRESSURE: 52 MMHG | RESPIRATION RATE: 18 BRPM | HEIGHT: 71 IN | OXYGEN SATURATION: 99 % | WEIGHT: 179.9 LBS | TEMPERATURE: 98 F

## 2021-07-07 DIAGNOSIS — Z12.11 ENCOUNTER FOR SCREENING FOR MALIGNANT NEOPLASM OF COLON: Chronic | ICD-10-CM

## 2021-07-07 DIAGNOSIS — Z94.9 TRANSPLANTED ORGAN AND TISSUE STATUS, UNSPECIFIED: Chronic | ICD-10-CM

## 2021-07-07 DIAGNOSIS — Z95.9 PRESENCE OF CARDIAC AND VASCULAR IMPLANT AND GRAFT, UNSPECIFIED: Chronic | ICD-10-CM

## 2021-07-07 DIAGNOSIS — Z96.653 PRESENCE OF ARTIFICIAL KNEE JOINT, BILATERAL: Chronic | ICD-10-CM

## 2021-07-07 LAB
ALBUMIN SERPL ELPH-MCNC: 4.3 G/DL — SIGNIFICANT CHANGE UP (ref 3.5–5.2)
ALP SERPL-CCNC: 144 U/L — HIGH (ref 30–115)
ALT FLD-CCNC: 13 U/L — SIGNIFICANT CHANGE UP (ref 0–41)
ANION GAP SERPL CALC-SCNC: 9 MMOL/L — SIGNIFICANT CHANGE UP (ref 7–14)
APTT BLD: 30.2 SEC — SIGNIFICANT CHANGE UP (ref 27–39.2)
AST SERPL-CCNC: 16 U/L — SIGNIFICANT CHANGE UP (ref 0–41)
BASOPHILS # BLD AUTO: 0.03 K/UL — SIGNIFICANT CHANGE UP (ref 0–0.2)
BASOPHILS NFR BLD AUTO: 0.4 % — SIGNIFICANT CHANGE UP (ref 0–1)
BILIRUB SERPL-MCNC: 0.7 MG/DL — SIGNIFICANT CHANGE UP (ref 0.2–1.2)
BLD GP AB SCN SERPL QL: SIGNIFICANT CHANGE UP
BUN SERPL-MCNC: 27 MG/DL — HIGH (ref 10–20)
CALCIUM SERPL-MCNC: 9 MG/DL — SIGNIFICANT CHANGE UP (ref 8.5–10.1)
CHLORIDE SERPL-SCNC: 106 MMOL/L — SIGNIFICANT CHANGE UP (ref 98–110)
CO2 SERPL-SCNC: 23 MMOL/L — SIGNIFICANT CHANGE UP (ref 17–32)
CREAT SERPL-MCNC: 1.5 MG/DL — SIGNIFICANT CHANGE UP (ref 0.7–1.5)
EOSINOPHIL # BLD AUTO: 0.08 K/UL — SIGNIFICANT CHANGE UP (ref 0–0.7)
EOSINOPHIL NFR BLD AUTO: 1.1 % — SIGNIFICANT CHANGE UP (ref 0–8)
GLUCOSE SERPL-MCNC: 143 MG/DL — HIGH (ref 70–99)
HCT VFR BLD CALC: 30.2 % — LOW (ref 42–52)
HGB BLD-MCNC: 9.9 G/DL — LOW (ref 14–18)
IMM GRANULOCYTES NFR BLD AUTO: 0.3 % — SIGNIFICANT CHANGE UP (ref 0.1–0.3)
INR BLD: 1.1 RATIO — SIGNIFICANT CHANGE UP (ref 0.65–1.3)
LYMPHOCYTES # BLD AUTO: 1.38 K/UL — SIGNIFICANT CHANGE UP (ref 1.2–3.4)
LYMPHOCYTES # BLD AUTO: 19 % — LOW (ref 20.5–51.1)
MCHC RBC-ENTMCNC: 27.5 PG — SIGNIFICANT CHANGE UP (ref 27–31)
MCHC RBC-ENTMCNC: 32.8 G/DL — SIGNIFICANT CHANGE UP (ref 32–37)
MCV RBC AUTO: 83.9 FL — SIGNIFICANT CHANGE UP (ref 80–94)
MONOCYTES # BLD AUTO: 0.51 K/UL — SIGNIFICANT CHANGE UP (ref 0.1–0.6)
MONOCYTES NFR BLD AUTO: 7 % — SIGNIFICANT CHANGE UP (ref 1.7–9.3)
NEUTROPHILS # BLD AUTO: 5.26 K/UL — SIGNIFICANT CHANGE UP (ref 1.4–6.5)
NEUTROPHILS NFR BLD AUTO: 72.2 % — SIGNIFICANT CHANGE UP (ref 42.2–75.2)
NRBC # BLD: 0 /100 WBCS — SIGNIFICANT CHANGE UP (ref 0–0)
PLATELET # BLD AUTO: 171 K/UL — SIGNIFICANT CHANGE UP (ref 130–400)
POTASSIUM SERPL-MCNC: 5 MMOL/L — SIGNIFICANT CHANGE UP (ref 3.5–5)
POTASSIUM SERPL-SCNC: 5 MMOL/L — SIGNIFICANT CHANGE UP (ref 3.5–5)
PROT SERPL-MCNC: 7.4 G/DL — SIGNIFICANT CHANGE UP (ref 6–8)
PROTHROM AB SERPL-ACNC: 12.6 SEC — SIGNIFICANT CHANGE UP (ref 9.95–12.87)
RBC # BLD: 3.6 M/UL — LOW (ref 4.7–6.1)
RBC # FLD: 13.7 % — SIGNIFICANT CHANGE UP (ref 11.5–14.5)
SARS-COV-2 RNA SPEC QL NAA+PROBE: SIGNIFICANT CHANGE UP
SODIUM SERPL-SCNC: 138 MMOL/L — SIGNIFICANT CHANGE UP (ref 135–146)
WBC # BLD: 7.28 K/UL — SIGNIFICANT CHANGE UP (ref 4.8–10.8)
WBC # FLD AUTO: 7.28 K/UL — SIGNIFICANT CHANGE UP (ref 4.8–10.8)

## 2021-07-07 PROCEDURE — 71045 X-RAY EXAM CHEST 1 VIEW: CPT | Mod: 26

## 2021-07-07 PROCEDURE — 72125 CT NECK SPINE W/O DYE: CPT | Mod: 26,MA

## 2021-07-07 PROCEDURE — 71250 CT THORAX DX C-: CPT | Mod: 26,MA

## 2021-07-07 PROCEDURE — 73552 X-RAY EXAM OF FEMUR 2/>: CPT | Mod: 26,RT

## 2021-07-07 PROCEDURE — 99291 CRITICAL CARE FIRST HOUR: CPT

## 2021-07-07 PROCEDURE — 73562 X-RAY EXAM OF KNEE 3: CPT | Mod: 26,RT

## 2021-07-07 PROCEDURE — 74176 CT ABD & PELVIS W/O CONTRAST: CPT | Mod: 26,MA

## 2021-07-07 PROCEDURE — 99285 EMERGENCY DEPT VISIT HI MDM: CPT

## 2021-07-07 PROCEDURE — 70450 CT HEAD/BRAIN W/O DYE: CPT | Mod: 26,MA

## 2021-07-07 PROCEDURE — 99284 EMERGENCY DEPT VISIT MOD MDM: CPT

## 2021-07-07 PROCEDURE — 72170 X-RAY EXAM OF PELVIS: CPT | Mod: 26

## 2021-07-07 PROCEDURE — 73600 X-RAY EXAM OF ANKLE: CPT | Mod: 26,RT

## 2021-07-07 PROCEDURE — 93010 ELECTROCARDIOGRAM REPORT: CPT

## 2021-07-07 PROCEDURE — 99222 1ST HOSP IP/OBS MODERATE 55: CPT

## 2021-07-07 RX ORDER — METOPROLOL TARTRATE 50 MG
25 TABLET ORAL DAILY
Refills: 0 | Status: DISCONTINUED | OUTPATIENT
Start: 2021-07-07 | End: 2021-07-09

## 2021-07-07 RX ORDER — TACROLIMUS 5 MG/1
3 CAPSULE ORAL EVERY 12 HOURS
Refills: 0 | Status: DISCONTINUED | OUTPATIENT
Start: 2021-07-07 | End: 2021-07-09

## 2021-07-07 RX ORDER — WARFARIN SODIUM 2.5 MG/1
1 TABLET ORAL
Qty: 0 | Refills: 0 | DISCHARGE

## 2021-07-07 RX ORDER — AMLODIPINE BESYLATE 2.5 MG/1
5 TABLET ORAL DAILY
Refills: 0 | Status: DISCONTINUED | OUTPATIENT
Start: 2021-07-07 | End: 2021-07-08

## 2021-07-07 RX ORDER — SENNA PLUS 8.6 MG/1
2 TABLET ORAL AT BEDTIME
Refills: 0 | Status: DISCONTINUED | OUTPATIENT
Start: 2021-07-07 | End: 2021-07-09

## 2021-07-07 RX ORDER — FLUDROCORTISONE ACETATE 0.1 MG/1
0.1 TABLET ORAL DAILY
Refills: 0 | Status: DISCONTINUED | OUTPATIENT
Start: 2021-07-07 | End: 2021-07-09

## 2021-07-07 RX ORDER — DEXTROSE 50 % IN WATER 50 %
25 SYRINGE (ML) INTRAVENOUS ONCE
Refills: 0 | Status: DISCONTINUED | OUTPATIENT
Start: 2021-07-07 | End: 2021-07-09

## 2021-07-07 RX ORDER — FLUDROCORTISONE ACETATE 0.1 MG/1
1 TABLET ORAL
Qty: 0 | Refills: 0 | DISCHARGE

## 2021-07-07 RX ORDER — OXYCODONE HYDROCHLORIDE 5 MG/1
5 TABLET ORAL EVERY 6 HOURS
Refills: 0 | Status: DISCONTINUED | OUTPATIENT
Start: 2021-07-07 | End: 2021-07-09

## 2021-07-07 RX ORDER — SODIUM CHLORIDE 9 MG/ML
1000 INJECTION, SOLUTION INTRAVENOUS
Refills: 0 | Status: DISCONTINUED | OUTPATIENT
Start: 2021-07-07 | End: 2021-07-09

## 2021-07-07 RX ORDER — GLUCAGON INJECTION, SOLUTION 0.5 MG/.1ML
1 INJECTION, SOLUTION SUBCUTANEOUS ONCE
Refills: 0 | Status: DISCONTINUED | OUTPATIENT
Start: 2021-07-07 | End: 2021-07-09

## 2021-07-07 RX ORDER — HEPARIN SODIUM 5000 [USP'U]/ML
5000 INJECTION INTRAVENOUS; SUBCUTANEOUS EVERY 8 HOURS
Refills: 0 | Status: DISCONTINUED | OUTPATIENT
Start: 2021-07-07 | End: 2021-07-09

## 2021-07-07 RX ORDER — CHLORHEXIDINE GLUCONATE 213 G/1000ML
1 SOLUTION TOPICAL
Refills: 0 | Status: DISCONTINUED | OUTPATIENT
Start: 2021-07-07 | End: 2021-07-09

## 2021-07-07 RX ORDER — PANTOPRAZOLE SODIUM 20 MG/1
40 TABLET, DELAYED RELEASE ORAL
Refills: 0 | Status: DISCONTINUED | OUTPATIENT
Start: 2021-07-07 | End: 2021-07-09

## 2021-07-07 RX ORDER — ASPIRIN/CALCIUM CARB/MAGNESIUM 324 MG
325 TABLET ORAL DAILY
Refills: 0 | Status: DISCONTINUED | OUTPATIENT
Start: 2021-07-07 | End: 2021-07-09

## 2021-07-07 RX ORDER — TAMSULOSIN HYDROCHLORIDE 0.4 MG/1
0.4 CAPSULE ORAL AT BEDTIME
Refills: 0 | Status: DISCONTINUED | OUTPATIENT
Start: 2021-07-07 | End: 2021-07-09

## 2021-07-07 RX ORDER — DEXTROSE 50 % IN WATER 50 %
15 SYRINGE (ML) INTRAVENOUS ONCE
Refills: 0 | Status: DISCONTINUED | OUTPATIENT
Start: 2021-07-07 | End: 2021-07-09

## 2021-07-07 RX ORDER — INSULIN LISPRO 100/ML
VIAL (ML) SUBCUTANEOUS
Refills: 0 | Status: DISCONTINUED | OUTPATIENT
Start: 2021-07-07 | End: 2021-07-09

## 2021-07-07 RX ORDER — ACETAMINOPHEN 500 MG
975 TABLET ORAL ONCE
Refills: 0 | Status: COMPLETED | OUTPATIENT
Start: 2021-07-07 | End: 2021-07-07

## 2021-07-07 RX ADMIN — SODIUM CHLORIDE 125 MILLILITER(S): 9 INJECTION, SOLUTION INTRAVENOUS at 15:08

## 2021-07-07 RX ADMIN — Medication 975 MILLIGRAM(S): at 20:10

## 2021-07-07 RX ADMIN — TACROLIMUS 3 MILLIGRAM(S): 5 CAPSULE ORAL at 22:21

## 2021-07-07 RX ADMIN — Medication 975 MILLIGRAM(S): at 19:49

## 2021-07-07 RX ADMIN — TAMSULOSIN HYDROCHLORIDE 0.4 MILLIGRAM(S): 0.4 CAPSULE ORAL at 22:22

## 2021-07-07 RX ADMIN — OXYCODONE HYDROCHLORIDE 5 MILLIGRAM(S): 5 TABLET ORAL at 22:42

## 2021-07-07 RX ADMIN — OXYCODONE HYDROCHLORIDE 5 MILLIGRAM(S): 5 TABLET ORAL at 22:24

## 2021-07-07 NOTE — ED PROVIDER NOTE - PHYSICAL EXAMINATION
VS  A: intact, protected  B: breath sounds equal b/l, no cyanosis  C: extremities warm and well perfused  D: GCS 15  E:    H: NCAT,   T: oropharynx clear  Card: RRR, nml s1s2  Pulm: CTAB, breath sounds equal  Abd: S, NT, ND  Spine: no C/T/L spine TTP  Pelvis: stable  Extremities:R knee in immobilizer, FROM to hip, FROM to ankle, motor intact to ankle, DP 2+  Neuro: Awake, alert, orientedx3, no gross focal neuro deficits, moving all extremities

## 2021-07-07 NOTE — ED PROVIDER NOTE - OBJECTIVE STATEMENT
71 y.o. male with a PMH of HTN, afib not on AC, DM, JIMÉNEZ, s/p renal/liver transplant ([facility]), s/p b/l knee replacement (21 yrs ago at [facility] Dr. Salazar) presents s/p fall. pt states his R knee has been bothering him recently. when he was getting into a car, it gave out and he fell on to his R knee and buttock. no head/neck injury. pt NOT on anticoagulation. pt IS on aspirin and plavix. pt had otherwise been in [facility].  pt went to ortho clinic and was told he had a knee fracture, was placed in a knee immobilizer and to come to the ER. no numbness or weakness.

## 2021-07-07 NOTE — H&P ADULT - NSHPPHYSICALEXAM_GEN_ALL_CORE
General: NAD  HEENT: Normocephalic, atraumatic, EOMI, PEERLA. no scalp lacerations   Neck: Soft, midline trachea. no cspine tenderness  Chest: No chest wall tenderness. or subq  emphysema   Cardiac: S1, S2, RRR  Respiratory: Bilateral breath sounds, clear and equal bilaterally  Abdomen: Soft, non-distended, non-tender, no rebound,   Groin: Normal appearing, pelvis stable   Ext: palp radial b/l UE, b/l DP palp in Lower Extrem. Right knee in immobilizer, foot with flexion and extension at the ankle joint, DP and PT pulses palpable  Back: no TTP, no palpable runoff/stepoff/deformity

## 2021-07-07 NOTE — CONSULT NOTE ADULT - SUBJECTIVE AND OBJECTIVE BOX
71 y.o. male with a PMH of HTN, afib not on AC, DM, JIMÉNEZ, s/p renal/liver transplant ([facility]), s/p b/l knee replacement (21 yrs ago at [facility]) presents s/p fall. pt states his R knee has been bothering him recently. when he was getting into a car, it gave out and he fell on to his R knee and buttock.   Pt went to ortho clinic and was told he had a knee fracture, was placed in a knee immobilizer and to come to the ER. 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        Home Medications:  amLODIPine 5 mg oral tablet: 1 tab(s) orally once a day (15 Apr 2021 09:54)  Januvia 100 mg oral tablet: 1 tab(s) orally once a day (15 Apr 2021 09:54)  linagliptin 5 mg oral tablet: 1 tab(s) orally once a day (15 Apr 2021 09:54)  magnesium chloride: 800 milligram(s) orally 2 times a day (15 Apr 2021 09:54)  metoprolol succinate 25 mg oral tablet, extended release: 1 tab(s) orally once a day (15 Apr 2021 09:54)  Prograf 1 mg oral capsule: 1 cap(s) orally every 12 hours (15 Apr 2021 09:54)  Protonix 40 mg oral delayed release tablet: 1 tab(s) orally once a day (15 Apr 2021 09:54)  Senokot 8.6 mg oral tablet: 1 tab(s) orally once a day (at bedtime) (15 Apr 2021 09:54)  tamsulosin 0.4 mg oral capsule: 1 cap(s) orally once a day (15 Apr 2021 09:54)  warfarin 2.5 mg oral tablet: 1 tab(s) orally once a day (15 Apr 2021 09:54)        Allergies    No Known Allergies    Intolerances    Pt s/e in ER    NAD    Vital Signs Last 24 Hrs  T(C): 36.7 (07 Jul 2021 11:34), Max: 36.7 (07 Jul 2021 11:34)  T(F): 98 (07 Jul 2021 11:34), Max: 98 (07 Jul 2021 11:34)  HR: 63 (07 Jul 2021 15:27) (61 - 63)  BP: 116/67 (07 Jul 2021 15:27) (93/52 - 116/67)  BP(mean): --  RR: 18 (07 Jul 2021 15:27) (18 - 18)  SpO2: 98% (07 Jul 2021 15:27) (98% - 99%)      PE:  RLE knee immobilizer in place  ankle/toes- motor intact  sensation intact  foot wwp, palpable pulses                          9.9    7.28  )-----------( 171      ( 07 Jul 2021 13:00 )             30.2       07-07    138  |  106  |  27<H>  ----------------------------<  143<H>  5.0   |  23  |  1.5    Ca    9.0      07 Jul 2021 13:00    TPro  7.4  /  Alb  4.3  /  TBili  0.7  /  DBili  x   /  AST  16  /  ALT  13  /  AlkPhos  144<H>  07-07      radiology  s/p right knee replacement, distal femur periprosthetic fx    discussed with dr Mcintyre    -right knee/femur CT scan  -admit to medical service for evaluation and optimization for surgery  -bedrest  -pain control  -repeat labs in am

## 2021-07-07 NOTE — CONSULT NOTE ADULT - SUBJECTIVE AND OBJECTIVE BOX
SICU Consultation Note  ======================================================================================================  SOCORRO MANUEL  MRN-[id number]      71y Male    HPI:  71M w/PMHx of HTN, HLD, anemia, DM, Afib, JIMÉNEZ s/p liver transplant and right renal transplant at [facility] in February 2020 (follows with his hepatologist Dr. Sunshine), CAD s/p stents (10 years ago) on ASA and Plavix, watchman procedure performed ~3 months ago by Dr. Burdick, history of bilateral knee replacements (21 years ago) presents after being sent in from outpatient Urgent care office for finding of new right distal femur fracture. Patient tripped and fell while trying to get out of his car and felt right knee pain afterwards. He presented to an outpatient clinic for evaluation. XR at outpatient clinic demonstrated distal right femur fracture. He was placed in a knee immobilizer and instructed to present to the ED for further management. Patient is afebrile, HR 61, borderline BP of 93/52, saturating 99% on room air.   On exam his right knee is immobilized, he has flexion and extension of the foot at the ankle joint, +DP and PT pulses, foot warm and well perfused. No other external signs of injury, denies pain anywhere else.     SICU was consulted for preoperative hemodynamic monitoring in this patient with extensive PMH and PSH as reported above, including liver transplant, R renal transplant in 2020, CAD with stents, and a watchman procedure.     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    Watchman procedure 3 months ago        Home Meds:  Home Medications:  amLODIPine 5 mg oral tablet: 1 tab(s) orally once a day (15 Apr 2021 09:54)  aspirin 325 mg oral delayed release tablet: 1 tab(s) orally once a day (07 Jul 2021 21:29)  fludrocortisone 0.1 mg oral tablet: 1 tab(s) orally once a day (07 Jul 2021 21:28)  Januvia 100 mg oral tablet: 1 tab(s) orally once a day (15 Apr 2021 09:54)  linagliptin 5 mg oral tablet: 1 tab(s) orally once a day (15 Apr 2021 09:54)  magnesium chloride: 800 milligram(s) orally 2 times a day (15 Apr 2021 09:54)  metoprolol succinate 25 mg oral tablet, extended release: 1 tab(s) orally once a day (15 Apr 2021 09:54)  Plavix 75 mg oral tablet: 1 tab(s) orally once a day (07 Jul 2021 21:31)  Prograf 1 mg oral capsule: 1 cap(s) orally every 12 hours (15 Apr 2021 09:54)  Protonix 40 mg oral delayed release tablet: 1 tab(s) orally once a day (15 Apr 2021 09:54)  Senokot 8.6 mg oral tablet: 1 tab(s) orally once a day (at bedtime) (15 Apr 2021 09:54)  tamsulosin 0.4 mg oral capsule: 1 cap(s) orally once a day (15 Apr 2021 09:54)         Allergies  Allergies  No Known Allergies       Current Medications:  amLODIPine   Tablet 5 milliGRAM(s) Oral daily  aspirin enteric coated 325 milliGRAM(s) Oral daily  chlorhexidine 4% Liquid 1 Application(s) Topical <User Schedule>  dextrose 40% Gel 15 Gram(s) Oral once  dextrose 5%. 1000 milliLiter(s) (50 mL/Hr) IV Continuous <Continuous>  dextrose 50% Injectable 25 Gram(s) IV Push once  fludroCORTISONE 0.1 milliGRAM(s) Oral daily  glucagon  Injectable 1 milliGRAM(s) IntraMuscular once  heparin   Injectable 5000 Unit(s) SubCutaneous every 8 hours  insulin lispro (ADMELOG) corrective regimen sliding scale   SubCutaneous three times a day before meals  lactated ringers. 1000 milliLiter(s) (125 mL/Hr) IV Continuous <Continuous>  metoprolol succinate ER 25 milliGRAM(s) Oral daily  oxyCODONE    IR 5 milliGRAM(s) Oral every 6 hours PRN Moderate Pain (4 - 6)  pantoprazole    Tablet 40 milliGRAM(s) Oral before breakfast  senna 2 Tablet(s) Oral at bedtime  tacrolimus 3 milliGRAM(s) Oral every 12 hours  tamsulosin 0.4 milliGRAM(s) Oral at bedtime      Advanced Directives: Presumed Full Code    VITAL SIGNS, INS/OUTS (Last 24hours):    ICU Vital Signs Last 24 Hrs  T(C): 36.7 (07 Jul 2021 21:05), Max: 36.7 (07 Jul 2021 11:34)  T(F): 98.1 (07 Jul 2021 21:05), Max: 98.1 (07 Jul 2021 21:05)  HR: 79 (07 Jul 2021 21:05) (61 - 79)  BP: 118/66 (07 Jul 2021 21:05) (93/52 - 118/66)  RR: 18 (07 Jul 2021 21:05) (18 - 18)  SpO2: 98% (07 Jul 2021 21:05) (98% - 99%)    I&O's Summary      Height (cm): 180.3 (07-07-21)  Weight (kg): 81.6 (07-07-21)  BMI (kg/m2): 25.1 (07-07-21)  BSA (m2): 2.02 (07-07-21)    Physical Exam:   ---------------------------------------------------------------------------------------  GCS:    15  Exam: A&Ox3, no focal deficits    RESPIRATORY:  Normal expansion/effort     CARDIOVASCULAR:   S1/S2.  RRR  No peripheral edema    GASTROINTESTINAL:  Abdomen soft, non-tender, non-distended    MUSCULOSKELETAL:  Extremities warm, pink, well-perfused.  RLE: Knee immobilizer in place. Distal pulses intact. Distal motor and sensation intact. R thigh swelling, but soft.     DERM:  No skin breakdown     :   Exam: Voiding      Tubes/Lines/Drains   ----------------------------------------------------------------------------------------------------------  [x] Peripheral IV      LABS  --------------------------------------------------------------------------------------                        9.9    7.28  )-----------( 171      ( 07 Jul 2021 13:00 )             30.2     07-07    138  |  106  |  27<H>  ----------------------------<  143<H>  5.0   |  23  |  1.5    Ca    9.0      07 Jul 2021 13:00    TPro  7.4  /  Alb  4.3  /  TBili  0.7  /  DBili  x   /  AST  16  /  ALT  13  /  AlkPhos  144<H>  07-07      LFTs  LIVER FUNCTIONS - ( 07 Jul 2021 13:00 )  Alb: 4.3 g/dL / Pro: 7.4 g/dL / ALK PHOS: 144 U/L / ALT: 13 U/L / AST: 16 U/L / GGT: x           Coagulation  PT/INR - ( 07 Jul 2021 14:10 )   PT: 12.60 sec;   INR: 1.10 ratio         PTT - ( 07 Jul 2021 14:10 )  PTT:30.2 sec    Blood Sugar  CAPILLARY BLOOD GLUCOSE      Urinalysis          CT/XRAY/ECHO/TCD/EEG  ----------------------------------------------------------------------------------------------  < from: CT Head No Cont (07.07.21 @ 16:41) >  IMPRESSION:  CT HEAD:  No acute intracranial pathology or hemorrhage. No acute calvarial fracture.  CT CERVICAL SPINE:  No acute fracture or subluxation.  --- End of Report ---  < end of copied text >      < from: CT Chest No Cont (07.07.21 @ 16:50) >  IMPRESSION:  Mass like prominence of the right thigh musculature, partially imaged. Given history of distal right femur comminuted fracture noted on femur radiographs 7/7/2021 this is probably related to soft tissue swelling/hematoma    No definite acute intrathoracic or abdominopelvic abnormality on this limited noncontrast examination. However, note intravenous contrast greatly increases sensitivity for detection of solid organ and vascular injury in the setting of trauma.  < end of copied text >      < from: Xray Chest 1 View- PORTABLE-Urgent (Xray Chest 1 View- PORTABLE-Urgent .) (07.08.20 @ 14:28) >  Impression:    No radiographic evidence of acute cardiopulmonary disease.  < end of copied text >    < from: Xray Pelvis AP only (07.07.21 @ 13:30) >  impression:  Lucency involving the left lesser trochanter and a nondisplaced fracture is not excluded.  Degenerative change  < end of copied text >    < from: Xray Knee 3 Views, Right (07.07.21 @ 13:31) >  impression:  Acute comminuted fracture of the distal right femur at the level of a right total knee replacement with soft tissue swelling and joint effusion  --- End of Report ---  < end of copied text >    < from: Xray Ankle 2 Views, Right (07.07.21 @ 13:32) >  impression:  External fixation hardware limits evaluation.  No acute displaced fracture or dislocation.  Degenerative change.  Vascular calcifications.  Chronic fragmentation to a calcaneal spur  < end of copied text >    < from: ARPAN w/Probe Placement (04.15.21 @ 08:18) >  Summary:   1. Left ventricular ejection fraction, by visual estimation, is 45 to 50%.   2. Mildly decreased global left ventricular systolic function.   3. Watchman device in good position. No evidence of leak around the device in SHILA.   4.Mildly enlarged left atrium.   5. Normal right atrial size.  < end of copied text >          --------------------------------------------------------------------------------------       SICU Consultation Note  ======================================================================================================  SOCORRO MANUEL  MRN-[id number]      71y Male    HPI:  71M w/PMHx of HTN, HLD, anemia, DM, Afib, JIMÉNEZ s/p liver transplant and right renal transplant at [facility] in February 2020 (follows with his hepatologist Dr. Sunshine), CAD s/p stents (10 years ago) on ASA and Plavix, watchman procedure performed ~3 months ago by Dr. Burdick, history of bilateral knee replacements (21 years ago) presents after being sent in from outpatient Urgent care office for finding of new right distal femur fracture. Patient tripped and fell while trying to get out of his car and felt right knee pain afterwards. He presented to an outpatient clinic for evaluation. XR at outpatient clinic demonstrated distal right femur fracture. He was placed in a knee immobilizer and instructed to present to the ED for further management. Patient is afebrile, HR 61, borderline BP of 93/52, saturating 99% on room air.   On exam his right knee is immobilized, he has flexion and extension of the foot at the ankle joint, +DP and PT pulses, foot warm and well perfused. No other external signs of injury, denies pain anywhere else.     SICU was consulted for preoperative hemodynamic monitoring in this patient with extensive PMH and PSH as reported above, including liver transplant, R renal transplant in 2020, CAD with stents, and a watchman procedure.     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    Watchman procedure 3 months ago        Home Meds:  Home Medications:  amLODIPine 5 mg oral tablet: 1 tab(s) orally once a day (15 Apr 2021 09:54)  aspirin 325 mg oral delayed release tablet: 1 tab(s) orally once a day (07 Jul 2021 21:29)  fludrocortisone 0.1 mg oral tablet: 1 tab(s) orally once a day (07 Jul 2021 21:28)  Januvia 100 mg oral tablet: 1 tab(s) orally once a day (15 Apr 2021 09:54)  linagliptin 5 mg oral tablet: 1 tab(s) orally once a day (15 Apr 2021 09:54)  magnesium chloride: 800 milligram(s) orally 2 times a day (15 Apr 2021 09:54)  metoprolol succinate 25 mg oral tablet, extended release: 1 tab(s) orally once a day (15 Apr 2021 09:54)  Plavix 75 mg oral tablet: 1 tab(s) orally once a day (07 Jul 2021 21:31)  Prograf 1 mg oral capsule: 1 cap(s) orally every 12 hours (15 Apr 2021 09:54)  Protonix 40 mg oral delayed release tablet: 1 tab(s) orally once a day (15 Apr 2021 09:54)  Senokot 8.6 mg oral tablet: 1 tab(s) orally once a day (at bedtime) (15 Apr 2021 09:54)  tamsulosin 0.4 mg oral capsule: 1 cap(s) orally once a day (15 Apr 2021 09:54)         Allergies  Allergies  No Known Allergies       Current Medications:  amLODIPine   Tablet 5 milliGRAM(s) Oral daily  aspirin enteric coated 325 milliGRAM(s) Oral daily  chlorhexidine 4% Liquid 1 Application(s) Topical <User Schedule>  dextrose 40% Gel 15 Gram(s) Oral once  dextrose 5%. 1000 milliLiter(s) (50 mL/Hr) IV Continuous <Continuous>  dextrose 50% Injectable 25 Gram(s) IV Push once  fludroCORTISONE 0.1 milliGRAM(s) Oral daily  glucagon  Injectable 1 milliGRAM(s) IntraMuscular once  heparin   Injectable 5000 Unit(s) SubCutaneous every 8 hours  insulin lispro (ADMELOG) corrective regimen sliding scale   SubCutaneous three times a day before meals  lactated ringers. 1000 milliLiter(s) (125 mL/Hr) IV Continuous <Continuous>  metoprolol succinate ER 25 milliGRAM(s) Oral daily  oxyCODONE    IR 5 milliGRAM(s) Oral every 6 hours PRN Moderate Pain (4 - 6)  pantoprazole    Tablet 40 milliGRAM(s) Oral before breakfast  senna 2 Tablet(s) Oral at bedtime  tacrolimus 3 milliGRAM(s) Oral every 12 hours  tamsulosin 0.4 milliGRAM(s) Oral at bedtime      Advanced Directives: Presumed Full Code    VITAL SIGNS, INS/OUTS (Last 24hours):    ICU Vital Signs Last 24 Hrs  T(C): 36.7 (07 Jul 2021 21:05), Max: 36.7 (07 Jul 2021 11:34)  T(F): 98.1 (07 Jul 2021 21:05), Max: 98.1 (07 Jul 2021 21:05)  HR: 79 (07 Jul 2021 21:05) (61 - 79)  BP: 118/66 (07 Jul 2021 21:05) (93/52 - 118/66)  RR: 18 (07 Jul 2021 21:05) (18 - 18)  SpO2: 98% (07 Jul 2021 21:05) (98% - 99%)    I&O's Summary      Height (cm): 180.3 (07-07-21)  Weight (kg): 81.6 (07-07-21)  BMI (kg/m2): 25.1 (07-07-21)  BSA (m2): 2.02 (07-07-21)    Physical Exam:   ---------------------------------------------------------------------------------------  GCS:    15  Exam: A&Ox3, no focal deficits    RESPIRATORY:  Normal expansion/effort     CARDIOVASCULAR:   S1/S2.  RRR  No peripheral edema    GASTROINTESTINAL:  Abdomen soft, non-tender, non-distended    MUSCULOSKELETAL:  Extremities warm, pink, well-perfused.  RLE: Knee immobilizer in place. Distal pulses intact. Distal motor and sensation intact. R thigh swelling, but soft.     DERM:  No skin breakdown     :   Exam: Voiding      Tubes/Lines/Drains   ----------------------------------------------------------------------------------------------------------  [x] Peripheral IV      LABS  --------------------------------------------------------------------------------------                        9.9    7.28  )-----------( 171      ( 07 Jul 2021 13:00 )             30.2     07-07    138  |  106  |  27<H>  ----------------------------<  143<H>  5.0   |  23  |  1.5    Ca    9.0      07 Jul 2021 13:00    TPro  7.4  /  Alb  4.3  /  TBili  0.7  /  DBili  x   /  AST  16  /  ALT  13  /  AlkPhos  144<H>  07-07      LFTs  LIVER FUNCTIONS - ( 07 Jul 2021 13:00 )  Alb: 4.3 g/dL / Pro: 7.4 g/dL / ALK PHOS: 144 U/L / ALT: 13 U/L / AST: 16 U/L / GGT: x           Coagulation  PT/INR - ( 07 Jul 2021 14:10 )   PT: 12.60 sec;   INR: 1.10 ratio         PTT - ( 07 Jul 2021 14:10 )  PTT:30.2 sec    Blood Sugar  CAPILLARY BLOOD GLUCOSE      Urinalysis          CT/XRAY/ECHO/TCD/EEG  ----------------------------------------------------------------------------------------------  < from: CT Head No Cont (07.07.21 @ 16:41) >  IMPRESSION:  CT HEAD:  No acute intracranial pathology or hemorrhage. No acute calvarial fracture.  CT CERVICAL SPINE:  No acute fracture or subluxation.  --- End of Report ---  < end of copied text >      < from: CT Chest Abd and Pelv No Cont (07.07.21 @ 16:50) >  IMPRESSION:  Mass like prominence of the right thigh musculature, partially imaged. Given history of distal right femur comminuted fracture noted on femur radiographs 7/7/2021 this is probably related to soft tissue swelling/hematoma    No definite acute intrathoracic or abdominopelvic abnormality on this limited noncontrast examination. However, note intravenous contrast greatly increases sensitivity for detection of solid organ and vascular injury in the setting of trauma.  < end of copied text >      < from: Xray Chest 1 View- PORTABLE-Urgent (Xray Chest 1 View- PORTABLE-Urgent .) (07.08.20 @ 14:28) >  Impression:    No radiographic evidence of acute cardiopulmonary disease.  < end of copied text >    < from: Xray Pelvis AP only (07.07.21 @ 13:30) >  impression:  Lucency involving the left lesser trochanter and a nondisplaced fracture is not excluded.  Degenerative change  < end of copied text >    < from: Xray Knee 3 Views, Right (07.07.21 @ 13:31) >  impression:  Acute comminuted fracture of the distal right femur at the level of a right total knee replacement with soft tissue swelling and joint effusion  --- End of Report ---  < end of copied text >    < from: Xray Ankle 2 Views, Right (07.07.21 @ 13:32) >  impression:  External fixation hardware limits evaluation.  No acute displaced fracture or dislocation.  Degenerative change.  Vascular calcifications.  Chronic fragmentation to a calcaneal spur  < end of copied text >    < from: ARPAN w/Probe Placement (04.15.21 @ 08:18) >  Summary:   1. Left ventricular ejection fraction, by visual estimation, is 45 to 50%.   2. Mildly decreased global left ventricular systolic function.   3. Watchman device in good position. No evidence of leak around the device in SHILA.   4.Mildly enlarged left atrium.   5. Normal right atrial size.  < end of copied text >    --------------------------------------------------------------------------------------       SICU Consultation Note  ======================================================================================================  SOCORRO MANUEL  MRN-[id number]       HPI:  71M w/PMHx of HTN, HLD, anemia, DM, Afib, JIMÉNEZ s/p liver transplant and right renal transplant at [facility] in February 2020 (follows with his hepatologist Dr. Sunshine), CAD s/p stents (10 years ago) on ASA and Plavix, watchman procedure performed ~3 months ago by Dr. Burdick, history of bilateral knee replacements (21 years ago) presents after being sent in from outpatient Urgent care office for finding of new right distal femur fracture. Patient tripped and fell while trying to get out of his car and felt right knee pain afterwards. He presented to an outpatient clinic for evaluation. XR at outpatient clinic demonstrated distal right femur fracture. He was placed in a knee immobilizer and instructed to present to the ED for further management. Patient is afebrile, HR 61, borderline BP of 93/52, saturating 99% on room air.   On exam his right knee is immobilized, he has flexion and extension of the foot at the ankle joint, +DP and PT pulses, foot warm and well perfused. No other external signs of injury, denies pain anywhere else.     SICU was consulted for preoperative hemodynamic monitoring in this patient with extensive PMH and PSH as reported above, including liver transplant, R renal transplant in 2020, CAD with stents, and a watchman procedure.     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    Watchman procedure 3 months ago        Home Meds:  Home Medications:  amLODIPine 5 mg oral tablet: 1 tab(s) orally once a day (15 Apr 2021 09:54)  aspirin 325 mg oral delayed release tablet: 1 tab(s) orally once a day (07 Jul 2021 21:29)  fludrocortisone 0.1 mg oral tablet: 1 tab(s) orally once a day (07 Jul 2021 21:28)  Januvia 100 mg oral tablet: 1 tab(s) orally once a day (15 Apr 2021 09:54)  linagliptin 5 mg oral tablet: 1 tab(s) orally once a day (15 Apr 2021 09:54)  magnesium chloride: 800 milligram(s) orally 2 times a day (15 Apr 2021 09:54)  metoprolol succinate 25 mg oral tablet, extended release: 1 tab(s) orally once a day (15 Apr 2021 09:54)  Plavix 75 mg oral tablet: 1 tab(s) orally once a day (07 Jul 2021 21:31)  Prograf 1 mg oral capsule: 1 cap(s) orally every 12 hours (15 Apr 2021 09:54)  Protonix 40 mg oral delayed release tablet: 1 tab(s) orally once a day (15 Apr 2021 09:54)  Senokot 8.6 mg oral tablet: 1 tab(s) orally once a day (at bedtime) (15 Apr 2021 09:54)  tamsulosin 0.4 mg oral capsule: 1 cap(s) orally once a day (15 Apr 2021 09:54)         Allergies  Allergies  No Known Allergies       Current Medications:  amLODIPine   Tablet 5 milliGRAM(s) Oral daily  aspirin enteric coated 325 milliGRAM(s) Oral daily  chlorhexidine 4% Liquid 1 Application(s) Topical <User Schedule>  dextrose 40% Gel 15 Gram(s) Oral once  dextrose 5%. 1000 milliLiter(s) (50 mL/Hr) IV Continuous <Continuous>  dextrose 50% Injectable 25 Gram(s) IV Push once  fludroCORTISONE 0.1 milliGRAM(s) Oral daily  glucagon  Injectable 1 milliGRAM(s) IntraMuscular once  heparin   Injectable 5000 Unit(s) SubCutaneous every 8 hours  insulin lispro (ADMELOG) corrective regimen sliding scale   SubCutaneous three times a day before meals  lactated ringers. 1000 milliLiter(s) (125 mL/Hr) IV Continuous <Continuous>  metoprolol succinate ER 25 milliGRAM(s) Oral daily  oxyCODONE    IR 5 milliGRAM(s) Oral every 6 hours PRN Moderate Pain (4 - 6)  pantoprazole    Tablet 40 milliGRAM(s) Oral before breakfast  senna 2 Tablet(s) Oral at bedtime  tacrolimus 3 milliGRAM(s) Oral every 12 hours  tamsulosin 0.4 milliGRAM(s) Oral at bedtime      Advanced Directives: Presumed Full Code    VITAL SIGNS, INS/OUTS (Last 24hours):    ICU Vital Signs Last 24 Hrs  T(C): 36.7 (07 Jul 2021 21:05), Max: 36.7 (07 Jul 2021 11:34)  T(F): 98.1 (07 Jul 2021 21:05), Max: 98.1 (07 Jul 2021 21:05)  HR: 79 (07 Jul 2021 21:05) (61 - 79)  BP: 118/66 (07 Jul 2021 21:05) (93/52 - 118/66)  RR: 18 (07 Jul 2021 21:05) (18 - 18)  SpO2: 98% (07 Jul 2021 21:05) (98% - 99%)    I&O's Summary      Height (cm): 180.3 (07-07-21)  Weight (kg): 81.6 (07-07-21)  BMI (kg/m2): 25.1 (07-07-21)  BSA (m2): 2.02 (07-07-21)    Physical Exam:   ---------------------------------------------------------------------------------------  GCS:    15  Exam: A&Ox3, no focal deficits    RESPIRATORY:  Normal expansion/effort     CARDIOVASCULAR:   S1/S2.  RRR  No peripheral edema    GASTROINTESTINAL:  Abdomen soft, non-tender, non-distended    MUSCULOSKELETAL:  Extremities warm, pink, well-perfused.  RLE: Knee immobilizer in place. Distal pulses intact. Distal motor and sensation intact. R thigh swelling, but soft.     DERM:  No skin breakdown     :   Exam: Voiding      Tubes/Lines/Drains   ----------------------------------------------------------------------------------------------------------  [x] Peripheral IV      LABS  --------------------------------------------------------------------------------------                        9.9    7.28  )-----------( 171      ( 07 Jul 2021 13:00 )             30.2     07-07    138  |  106  |  27<H>  ----------------------------<  143<H>  5.0   |  23  |  1.5    Ca    9.0      07 Jul 2021 13:00    TPro  7.4  /  Alb  4.3  /  TBili  0.7  /  DBili  x   /  AST  16  /  ALT  13  /  AlkPhos  144<H>  07-07      LFTs  LIVER FUNCTIONS - ( 07 Jul 2021 13:00 )  Alb: 4.3 g/dL / Pro: 7.4 g/dL / ALK PHOS: 144 U/L / ALT: 13 U/L / AST: 16 U/L / GGT: x           Coagulation  PT/INR - ( 07 Jul 2021 14:10 )   PT: 12.60 sec;   INR: 1.10 ratio         PTT - ( 07 Jul 2021 14:10 )  PTT:30.2 sec    Blood Sugar  CAPILLARY BLOOD GLUCOSE      Urinalysis        CT/XRAY/ECHO/TCD/EEG  ----------------------------------------------------------------------------------------------  < from: CT Head No Cont (07.07.21 @ 16:41) >  IMPRESSION:  CT HEAD:  No acute intracranial pathology or hemorrhage. No acute calvarial fracture.  CT CERVICAL SPINE:  No acute fracture or subluxation.  --- End of Report ---  < end of copied text >      < from: CT Chest Abd and Pelv No Cont (07.07.21 @ 16:50) >  IMPRESSION:  Mass like prominence of the right thigh musculature, partially imaged. Given history of distal right femur comminuted fracture noted on femur radiographs 7/7/2021 this is probably related to soft tissue swelling/hematoma    No definite acute intrathoracic or abdominopelvic abnormality on this limited noncontrast examination. However, note intravenous contrast greatly increases sensitivity for detection of solid organ and vascular injury in the setting of trauma.  < end of copied text >      < from: Xray Chest 1 View- PORTABLE-Urgent (Xray Chest 1 View- PORTABLE-Urgent .) (07.08.20 @ 14:28) >  Impression:    No radiographic evidence of acute cardiopulmonary disease.  < end of copied text >    < from: Xray Pelvis AP only (07.07.21 @ 13:30) >  impression:  Lucency involving the left lesser trochanter and a nondisplaced fracture is not excluded.  Degenerative change  < end of copied text >    < from: Xray Knee 3 Views, Right (07.07.21 @ 13:31) >  impression:  Acute comminuted fracture of the distal right femur at the level of a right total knee replacement with soft tissue swelling and joint effusion  --- End of Report ---  < end of copied text >    < from: Xray Ankle 2 Views, Right (07.07.21 @ 13:32) >  impression:  External fixation hardware limits evaluation.  No acute displaced fracture or dislocation.  Degenerative change.  Vascular calcifications.  Chronic fragmentation to a calcaneal spur  < end of copied text >    < from: ARPAN w/Probe Placement (04.15.21 @ 08:18) >  Summary:   1. Left ventricular ejection fraction, by visual estimation, is 45 to 50%.   2. Mildly decreased global left ventricular systolic function.   3. Watchman device in good position. No evidence of leak around the device in SHILA.   4.Mildly enlarged left atrium.   5. Normal right atrial size.  < end of copied text >    --------------------------------------------------------------------------------------

## 2021-07-07 NOTE — H&P ADULT - ASSESSMENT
71M w/PMHx of HTN, HLD, anemia, DM, Afib, JIMÉNEZ s/p liver transplant and right renal transplant at [facility] in February 2020 (follows with his hepatologist Dr. Sunshine), CAD s/p stents (10 years ago) on ASA and Plavix, watchman procedure performed ~3 months ago by Dr. Burdick, history of bilateral knee replacements (21 years ago) presents after being sent in from outpatient office for finding of new right distal femur fracture.    Plan:   -Admission to the Trauma Surgery Service   -Orthopedic Surgery: OR possibly tomorrow for repair of fracture   -Medical and Cardiology risk stratification for orthopedic surgery   -NPO at midnight   -Continue home medications   -Adequate pain control   -Holding Plavix per orthopedic surgery   -Gastroenterology consult in setting of liver transplant on immunotherapy   -Patient and plan discussed with Dr. Hwang

## 2021-07-07 NOTE — CONSULT NOTE ADULT - ASSESSMENT
71M w/PMHx of HTN, HLD, anemia, DM, Afib, JIMÉNEZ s/p liver transplant, s/p renal transplant (right), CAD s/p stents (10 years ago) on ASA and Plavix, history of bilateral knee replacements (21 years ago) presents after being sent in from outpatient office for finding of new right distal femur fracture.     Plan:   -Completion pan scan

## 2021-07-07 NOTE — ED PROVIDER NOTE - NS ED ROS FT
Constitutional: no fever  Cardiovascular: no chest pain  Respiratory:  no cough  Abdominal: no abdominal pain  Neurological: no altered mental status  MSK: R knee pain

## 2021-07-07 NOTE — CONSULT NOTE ADULT - ASSESSMENT
Assessment & Plan    71y Male with extensive PMH and PSH including liver transplant, R renal transplant in 2020, CAD with stents, and a watchman procedure, who presented to the ED earlier today with a R     NEURO:    Acute pain-controlled with     RESP:     Oxygen insufficiency-wean off NC to RA as tolerate    Activity-            CARDS:     Imaging:     Labs:   amLODIPine   Tablet 5 daily  metoprolol succinate ER 25 daily  tamsulosin 0.4 at bedtime      GI/NUTR:     Diet, Regular      GI Prophylaxis-    Bowel regimen-  pantoprazole    Tablet 40 milliGRAM(s) Oral before breakfast  senna 2 Tablet(s) Oral at bedtime      /RENAL:      Monitor DANIEL-mayen in place  Volume Status:  Labs:          BUN/Cr- 27/1.5  -->          Electrolytes-Na 138 // K 5.0 // Mg -- //  Phos -- (07-07 @ 13:00)        HEME/ONC:       DVT prophylaxis-heparin   Injectable  , SCDs    Labs: Hb/Hct:  9.9/30.2  -->                      Plts:  171  -->                 PTT/INR:  30.2/1.10  (07-07 @ 14:10) --->                 T&S: (07-07)      ID:  WBC- 7.28  --->>  Temp trend- 24hrs T(F): 98.1 (07-07 @ 21:05), Max: 98.1 (07-07 @ 21:05)  Antibiotics-tacrolimus 3 every 12 hours    Cultures:           ENDO:    Glucose Glucose, Serum: 143 (07-07 @ 13:00)      HA1C     LINES/DRAINS:  Connie POWER Foley     DISPO:    SICU Assessment & Plan    71y Male with extensive PMH and PSH including liver transplant, R renal transplant in 2020, CAD with stents, and a watchman procedure, who presented to the ED earlier today with a R periprosthetic femur fracture. Patient is hemodynamically stable, but due to his extensive history and need for operative intervention with orthopedics, decision made to consult SICU for stepdown.     NEURO:    Acute pain-controlled with Oxycodone.   Tylenol and NSAIDs held 2/2 transplant history.   GCS 15    RESP:   Saturating well on room air.   Activity: Bedrest until OR          CARDS:   EKG: SB 59     Recent Echo (Not on admission)  Summary:   1. Left ventricular ejection fraction, by visual estimation, is 45 to 50%.   2. Mildly decreased global left ventricular systolic function.   3. Watchman device in good position. No evidence of leak around the device in SHILA.   4.Mildly enlarged left atrium.   5. Normal right atrial size.    Cardiac Enzymes PEnding    Home Meds:  HTN: amLODIPine  Tablet 5 daily  AFib: metoprolol succinate ER 25 daily  CAD s/p stents 10 years ago: ASA and Plavix    Cardiology consult placed, pending clearance for OR. Cardiologist is Dr. De Souza (Called service)    GI/NUTR:     Diet, Regular  NPO at midnight    GI Prophylaxis- PTX    Bowel regimen- Senna    Hx of Liver and R Renal Transplant in Feb 2020 (Dr. Sunshine is Hepatologist at [facility])  Tacrolimus 3 BID  Fludrocortisone 0.1 mg QD    Hepatology consult placed and pending.    Medicine consult placed for clearance.    /RENAL:      Monitor UO, Voiding  Labs:          BUN/Cr- 27/1.5           Electrolytes-Na 138 // K 5.0 // Mg -- //  Phos -- (07-07 @ 13:00)  Repeats pending with Mg and Ph    Hx of Liver and R Renal Transplant in Feb 2020 (Dr. Sunshine is Hepatologist at [facility])  Tacrolimus 3 BID  Fludrocortisone 0.1 mg QD    Renal consult placed and pending.     Flomax     HEME/ONC:       DVT prophylaxis-heparin   Injectable    Labs: Hb/Hct:  9.9/30.2   -->               Plts:  171  ->                 PTT/INR:  30.2/1.10  (07-07 @ 14:10) --->                 T&S: (07-07)    Takes ASA and Plavix for CAD, holding Plavix    ID:  WBC- 7.28  --->>  Temp trend- 24hrs T(F): 98.1 (07-07 @ 21:05), Max: 98.1 (07-07 @ 21:05)           ENDO:    Glucose Glucose, Serum: 143 (07-07 @ 13:00)      HA1C Pending    Takes Januvia and Litogliptin, holding, RISS      LINES/DRAINS:  PIV    DISPO:    Step down unit    Case discussed with SICU attending Dr. Hwang, who accepted patient into SDU. Assessment & Plan    71y Male with extensive PMH and PSH including liver transplant, R renal transplant in 2020, CAD with stents, and a watchman procedure, who presented to the ED earlier today with a R periprosthetic femur fracture. Patient is hemodynamically stable, but due to his extensive history and need for operative intervention with orthopedics, decision made to consult SICU for stepdown.     NEURO:    Acute pain-controlled with Oxycodone.   Tylenol and NSAIDs held 2/2 transplant history.   GCS 15    RESP:   Saturating well on room air.   Activity: Bedrest until OR          CARDS:   EKG: SB 59     Recent Echo (Not on admission)  Summary:   1. Left ventricular ejection fraction, by visual estimation, is 45 to 50%.   2. Mildly decreased global left ventricular systolic function.   3. Watchman device in good position. No evidence of leak around the device in SHILA.   4.Mildly enlarged left atrium.   5. Normal right atrial size.    Cardiac Enzymes PEnding    Home Meds:  HTN: amLODIPine  Tablet 5 daily  AFib: metoprolol succinate ER 25 daily  CAD s/p stents 10 years ago: ASA and Plavix    Cardiology consult placed, pending clearance for OR. Cardiologist is Dr. De Souza (Called service)    GI/NUTR:     Diet, Regular  NPO at midnight    GI Prophylaxis- PTX    Bowel regimen- Senna    Hx of Liver and R Renal Transplant in Feb 2020 (Dr. Sunshine is Hepatologist at [facility])  Tacrolimus 3 BID  Fludrocortisone 0.1 mg QD    Hepatology consult placed and pending.    Medicine consult placed for clearance.    /RENAL:      Monitor UO, Voiding  Labs:          BUN/Cr- 27/1.5           Electrolytes-Na 138 // K 5.0 // Mg -- //  Phos -- (07-07 @ 13:00)  Repeats pending with Mg and Ph    Hx of Liver and R Renal Transplant in Feb 2020 (Dr. Sunshine is Hepatologist at [facility])  Tacrolimus 3 BID  Fludrocortisone 0.1 mg QD    Renal consult placed and pending.     Flomax     HEME/ONC:       DVT prophylaxis-heparin   Injectable    Labs: Hb/Hct:  9.9/30.2   -->               Plts:  171  ->                 PTT/INR:  30.2/1.10  (07-07 @ 14:10) --->                 T&S: (07-07)    Takes ASA and Plavix for CAD, holding Plavix    ID:  WBC- 7.28  --->>  Temp trend- 24hrs T(F): 98.1 (07-07 @ 21:05), Max: 98.1 (07-07 @ 21:05)           ENDO:    Glucose Glucose, Serum: 143 (07-07 @ 13:00)      HA1C Pending    Takes Januvia and Litogliptin, holding, RISS    MSK:  Periprosthetic R femur fracture.   CT pending  Bedrest with plan for ORIF tomorrow 7/8    LINES/DRAINS:  PIV    DISPO:    Step down unit    Case discussed with SICU attending Dr. Hwang, who accepted patient into SDU. Assessment & Plan    71y Male with extensive PMH and PSH including liver transplant, R renal transplant in 2020, CAD with stents, and a watchman procedure, who presented to the ED earlier today with a R periprosthetic femur fracture. Patient is hemodynamically stable, but due to his extensive history and need for operative intervention with orthopedics, decision made to consult SICU for stepdown.     NEURO:    Acute pain-controlled with Oxycodone.   Tylenol and NSAIDs held 2/2 transplant history.   GCS 15    RESP:   Saturating well on room air.   Activity: Bedrest until OR          CARDS:   EKG: SB 59     Recent Echo (Not on admission)  Summary:   1. Left ventricular ejection fraction, by visual estimation, is 45 to 50%.   2. Mildly decreased global left ventricular systolic function.   3. Watchman device in good position. No evidence of leak around the device in SHILA.   4.Mildly enlarged left atrium.   5. Normal right atrial size.    Cardiac Enzymes PEnding    Home Meds:  HTN: amLODIPine  Tablet 5 daily  AFib: metoprolol succinate ER 25 daily  CAD s/p stents 10 years ago: ASA and Plavix    Cardiology consult placed, pending clearance for OR. Cardiologist is Dr. De Souza (Called service)    GI/NUTR:     Diet, Regular  NPO at midnight    GI Prophylaxis- PTX    Bowel regimen- Senna    Hx of Liver and R Renal Transplant in Feb 2020 (Dr. Sunshine is Hepatologist at [facility])  Tacrolimus 3 BID  Fludrocortisone 0.1 mg QD    Hepatology consult placed and pending.    Medicine consult placed for clearance.    /RENAL:      Monitor UO, Voiding  Labs:          BUN/Cr- 27/1.5           Electrolytes-Na 138 // K 5.0 // Mg -- //  Phos -- (07-07 @ 13:00)  Repeats pending with Mg and Ph    Hx of Liver and R Renal Transplant in Feb 2020 (Dr. Sunshine is Hepatologist at [facility])  Tacrolimus 3 BID  Fludrocortisone 0.1 mg QD    Renal consult placed and pending.     Flomax     HEME/ONC:       DVT prophylaxis-heparin   Injectable    Labs: Hb/Hct:  9.9/30.2   -->               Plts:  171  ->                 PTT/INR:  30.2/1.10  (07-07 @ 14:10) --->                 T&S: (07-07)    Takes ASA and Plavix for CAD, holding Plavix    ID:  WBC- 7.28  --->>  Temp trend- 24hrs T(F): 98.1 (07-07 @ 21:05), Max: 98.1 (07-07 @ 21:05)           ENDO:    Glucose Glucose, Serum: 143 (07-07 @ 13:00)      HA1C Pending    Takes Januvia and Litogliptin, holding, sliding scale ordered    MSK:  Periprosthetic R femur fracture.   CT pending  Bedrest with plan for ORIF tomorrow 7/8    LINES/DRAINS:  PIV    DISPO:    Step down unit    Case discussed with SICU attending Dr. Hwang, who accepted patient into SDU.

## 2021-07-07 NOTE — H&P ADULT - HISTORY OF PRESENT ILLNESS
71M w/PMHx of HTN, HLD, anemia, DM, Afib, JIMÉNEZ s/p liver transplant, s/p renal transplant (right), CAD s/p stents (10 years ago) on ASA and Plavix, history of bilateral knee replacements (21 years ago) presents after being sent in from outpatient office for finding of new right distal femur fracture. Patient tripped and fell while trying to get out of his car and felt right knee pain afterwards. He presented to an outpatient clinic for evaluation. XR at outpatient clinic demonstrated distal right femur fracture. He was placed in a knee immobilizer and instructed to present to the ED for further management. Patient is afebrile, HR 61, borderline BP of 93/52, saturating 99% on room air. On exam his right knee is immobilized, he has flexion and extension of the foot at the ankle joint, +DP and PT pulses, foot warm and well perfused. No other external signs of injury, denies pain anywhere else.    71M w/PMHx of HTN, HLD, anemia, DM, Afib, JIMÉNEZ s/p liver transplant and right renal transplant at [facility] in February 2020 (follows with his hepatologist Dr. Sunshine), CAD s/p stents (10 years ago) on ASA and Plavix, watchman procedure performed ~3 months ago by Dr. Burdick, history of bilateral knee replacements (21 years ago) presents after being sent in from outpatient office for finding of new right distal femur fracture. Patient tripped and fell while trying to get out of his car and felt right knee pain afterwards. He presented to an outpatient clinic for evaluation. XR at outpatient clinic demonstrated distal right femur fracture. He was placed in a knee immobilizer and instructed to present to the ED for further management. Patient is afebrile, HR 61, borderline BP of 93/52, saturating 99% on room air. On exam his right knee is immobilized, he has flexion and extension of the foot at the ankle joint, +DP and PT pulses, foot warm and well perfused. No other external signs of injury, denies pain anywhere else.

## 2021-07-07 NOTE — ED PROVIDER NOTE - PROGRESS NOTE DETAILS
distal femur fx, ortho aware, trauma consult placed given fracture. trauma aware and requesting additional labs. no other imaging requests nonsyncopal fall on to buttock and R knee c/o R knee pain, s/p OSH hospital showing XR. will get xr pelvis, femur, knee, ankle, ortho c/s, trauma PRN d/w trauma- trauma attending requesting panscan. pt endorsed to Dr. Estrella- pending panscan, trauma recs, reassess, dispo Dr. Estrella: Received sign out from Dr. Coello   Pt pending CT, trauma recommendations

## 2021-07-07 NOTE — CONSULT NOTE ADULT - SUBJECTIVE AND OBJECTIVE BOX
TRAUMA ACTIVATION LEVEL:  Trauma Consult     MECHANISM OF INJURY: S/p mechanical fall, -HT, -LOC, -AC    GCS: 15 	E: 4	V: 5	M: 6    HPI:  71M w/PMHx of HTN, HLD, anemia, DM, Afib, JIMÉNEZ s/p liver transplant, s/p renal transplant (right), CAD s/p stents (10 years ago) on ASA and Plavix, history of bilateral knee replacements (21 years ago) presents after being sent in from outpatient office for finding of new right distal femur fracture. He was placed in a knee immobilizer and instructed to present to the ED for further management. Patient is afebrile, HR 61, borderline BP of 93/52, saturating 99% on room air. On exam his right knee is immobilized, he has flexion and extension of the foot at the ankle joint, +DP and PT pulses, foot warm and well perfused. No other external signs of injury, denies pain anywhere else.     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    Allergies  No Known Allergies    Home Medications:  amLODIPine 5 mg oral tablet: 1 tab(s) orally once a day (15 Apr 2021 09:54)  Januvia 100 mg oral tablet: 1 tab(s) orally once a day (15 Apr 2021 09:54)  linagliptin 5 mg oral tablet: 1 tab(s) orally once a day (15 Apr 2021 09:54)  magnesium chloride: 800 milligram(s) orally 2 times a day (15 Apr 2021 09:54)  metoprolol succinate 25 mg oral tablet, extended release: 1 tab(s) orally once a day (15 Apr 2021 09:54)  Prograf 1 mg oral capsule: 1 cap(s) orally every 12 hours (15 Apr 2021 09:54)  Protonix 40 mg oral delayed release tablet: 1 tab(s) orally once a day (15 Apr 2021 09:54)  Senokot 8.6 mg oral tablet: 1 tab(s) orally once a day (at bedtime) (15 Apr 2021 09:54)  tamsulosin 0.4 mg oral capsule: 1 cap(s) orally once a day (15 Apr 2021 09:54)  warfarin 2.5 mg oral tablet: 1 tab(s) orally once a day (15 Apr 2021 09:54)    ROS: 10-system review is otherwise negative except HPI above.      Primary Survey:    A - airway intact  B - bilateral breath sounds and good chest rise  C - palpable pulses in all extremities  D - GCS 15 on arrival, SANON  Exposure obtained    Vital Signs Last 24 Hrs  T(C): 36.7 (07 Jul 2021 11:34), Max: 36.7 (07 Jul 2021 11:34)  T(F): 98 (07 Jul 2021 11:34), Max: 98 (07 Jul 2021 11:34)  HR: 61 (07 Jul 2021 11:34) (61 - 61)  BP: 93/52 (07 Jul 2021 11:34) (93/52 - 93/52)  RR: 18 (07 Jul 2021 11:34) (18 - 18)  SpO2: 99% (07 Jul 2021 11:34) (99% - 99%)    Secondary Survey:   General: NAD  HEENT: Normocephalic, atraumatic, EOMI, PEERLA. no scalp lacerations   Neck: Soft, midline trachea. no cspine tenderness  Chest: No chest wall tenderness. or subq  emphysema   Cardiac: S1, S2, RRR  Respiratory: Bilateral breath sounds, clear and equal bilaterally  Abdomen: Soft, non-distended, non-tender, no rebound,   Groin: Normal appearing, pelvis stable   Ext: palp radial b/l UE, b/l DP palp in Lower Extrem. Right knee in immobilizer, foot with flexion and extension at the ankle joint, DP and PT pulses palpable  Back: no TTP, no palpable runoff/stepoff/deformity    LABS:               9.9    7.28  )-----------( 171      ( 07 Jul 2021 13:00 )             30.2       Auto Neutrophil %: 72.2 % (07-07-21 @ 13:00)  Auto Immature Granulocyte %: 0.3 % (07-07-21 @ 13:00)    07-07    138  |  106  |  27<H>  ----------------------------<  143<H>  5.0   |  23  |  1.5    Calcium, Total Serum: 9.0 mg/dL (07-07-21 @ 13:00)    LFTs:             7.4  | 0.7  | 16       ------------------[144     ( 07 Jul 2021 13:00 )  4.3  | x    | 13          Coags:     12.60  ----< 1.10    ( 07 Jul 2021 14:10 )     30.2     RADIOLOGY & ADDITIONAL STUDIES:     TRAUMA ACTIVATION LEVEL:  Trauma Consult     MECHANISM OF INJURY: S/p mechanical fall, -HT, -LOC, -AC    GCS: 15 	E: 4	V: 5	M: 6    HPI:  71M w/PMHx of HTN, HLD, anemia, DM, Afib, JIMÉNEZ s/p liver transplant, s/p renal transplant (right), CAD s/p stents (10 years ago) on ASA and Plavix, history of bilateral knee replacements (21 years ago) presents after being sent in from outpatient office for finding of new right distal femur fracture. He was placed in a knee immobilizer and instructed to present to the ED for further management. Patient is afebrile, HR 61, borderline BP of 93/52, saturating 99% on room air. On exam his right knee is immobilized, he has flexion and extension of the foot at the ankle joint, +DP and PT pulses, foot warm and well perfused. No other external signs of injury, denies pain anywhere else.     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    Allergies  No Known Allergies    Home Medications:  amLODIPine 5 mg oral tablet: 1 tab(s) orally once a day (15 Apr 2021 09:54)  Januvia 100 mg oral tablet: 1 tab(s) orally once a day (15 Apr 2021 09:54)  linagliptin 5 mg oral tablet: 1 tab(s) orally once a day (15 Apr 2021 09:54)  magnesium chloride: 800 milligram(s) orally 2 times a day (15 Apr 2021 09:54)  metoprolol succinate 25 mg oral tablet, extended release: 1 tab(s) orally once a day (15 Apr 2021 09:54)  Prograf 1 mg oral capsule: 1 cap(s) orally every 12 hours (15 Apr 2021 09:54)  Protonix 40 mg oral delayed release tablet: 1 tab(s) orally once a day (15 Apr 2021 09:54)  Senokot 8.6 mg oral tablet: 1 tab(s) orally once a day (at bedtime) (15 Apr 2021 09:54)  tamsulosin 0.4 mg oral capsule: 1 cap(s) orally once a day (15 Apr 2021 09:54)  warfarin 2.5 mg oral tablet: 1 tab(s) orally once a day (15 Apr 2021 09:54)    ROS: 10-system review is otherwise negative except HPI above.      Primary Survey:    A - airway intact  B - bilateral breath sounds and good chest rise  C - palpable pulses in all extremities  D - GCS 15 on arrival, SANON  Exposure obtained    Vital Signs Last 24 Hrs  T(C): 36.7 (07 Jul 2021 11:34), Max: 36.7 (07 Jul 2021 11:34)  T(F): 98 (07 Jul 2021 11:34), Max: 98 (07 Jul 2021 11:34)  HR: 61 (07 Jul 2021 11:34) (61 - 61)  BP: 93/52 (07 Jul 2021 11:34) (93/52 - 93/52)  RR: 18 (07 Jul 2021 11:34) (18 - 18)  SpO2: 99% (07 Jul 2021 11:34) (99% - 99%)    Secondary Survey:   General: NAD  HEENT: Normocephalic, atraumatic, EOMI, PEERLA. no scalp lacerations   Neck: Soft, midline trachea. no cspine tenderness  Chest: No chest wall tenderness. or subq  emphysema   Cardiac: S1, S2, RRR  Respiratory: Bilateral breath sounds, clear and equal bilaterally  Abdomen: Soft, non-distended, non-tender, no rebound,   Groin: Normal appearing, pelvis stable   Ext: palp radial b/l UE, b/l DP palp in Lower Extrem. Right knee in immobilizer, foot with flexion and extension at the ankle joint, DP and PT pulses palpable  Back: no TTP, no palpable runoff/stepoff/deformity    LABS:               9.9    7.28  )-----------( 171      ( 07 Jul 2021 13:00 )             30.2       Auto Neutrophil %: 72.2 % (07-07-21 @ 13:00)  Auto Immature Granulocyte %: 0.3 % (07-07-21 @ 13:00)    07-07    138  |  106  |  27<H>  ----------------------------<  143<H>  5.0   |  23  |  1.5    Calcium, Total Serum: 9.0 mg/dL (07-07-21 @ 13:00)    LFTs:             7.4  | 0.7  | 16       ------------------[144     ( 07 Jul 2021 13:00 )  4.3  | x    | 13          Coags:     12.60  ----< 1.10    ( 07 Jul 2021 14:10 )     30.2     RADIOLOGY & ADDITIONAL STUDIES:  < from: Xray Chest 1 View- PORTABLE-Urgent (07.07.21 @ 13:29) >  Impression:  No radiographic evidence of acute cardiopulmonary disease.    < from: Xray Pelvis AP only (07.07.21 @ 13:30) >  Findings/  impression:  Lucency involving the left lesser trochanter and a nondisplaced fracture is not excluded.  Degenerative change    < from: Xray Knee 3 Views, Right (07.07.21 @ 13:31) >  Findings/  impression:  Acute comminuted fracture of the distal right femur at the level of a right total knee replacement with soft tissue swelling and joint effusion    < from: Xray Femur 2 Views, Right (07.07.21 @ 13:31) >  Findings/  impression:  Acute comminuted fracture through the distal right femur just above a right total knee replacement  Soft tissue swelling and joint effusion    < from: Xray Ankle 2 Views, Right (07.07.21 @ 13:32) >  Findings/  impression:  External fixation hardware limits evaluation.  No acute displaced fracture or dislocation.  Degenerative change.  Vascular calcifications.  Chronic fragmentation to a calcaneal spur    < from: CT Head No Cont (07.07.21 @ 16:41) >  IMPRESSION:  CT HEAD:  No acute intracranial pathology or hemorrhage. No acute calvarial fracture.  CT CERVICAL SPINE:  No acute fracture or subluxation.    < from: CT Chest No Cont (07.07.21 @ 16:50) >  IMPRESSION:  Mass like prominence of the right thigh musculature, partially imaged. Given history of distal right femur comminuted fracture noted on femur radiographs 7/7/2021 this is probably related to soft tissue swelling/hematoma  No definite acute intrathoracic or abdominopelvic abnormality on this limited noncontrast examination. However, note intravenous contrast greatly increases sensitivity for detection of solid organ and vascular injury in the setting of trauma.     TRAUMA ACTIVATION LEVEL:  Trauma Consult     MECHANISM OF INJURY: S/p mechanical fall, -HT, -LOC, -AC    GCS: 15 	E: 4	V: 5	M: 6    HPI:  71M w/PMHx of HTN, HLD, anemia, DM, Afib, JIMÉNEZ s/p liver transplant, s/p renal transplant (right), CAD s/p stents (10 years ago) on ASA and Plavix, history of bilateral knee replacements (21 years ago) presents after being sent in from outpatient office for finding of new right distal femur fracture. Patient tripped and fell while trying to get out of his car and felt right knee pain afterwards. He presented to an outpatient clinic for evaluation. XR at outpatient clinic demonstrated distal right femur fracture. He was placed in a knee immobilizer and instructed to present to the ED for further management. Patient is afebrile, HR 61, borderline BP of 93/52, saturating 99% on room air. On exam his right knee is immobilized, he has flexion and extension of the foot at the ankle joint, +DP and PT pulses, foot warm and well perfused. No other external signs of injury, denies pain anywhere else.     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    Allergies  No Known Allergies    Home Medications:  amLODIPine 5 mg oral tablet: 1 tab(s) orally once a day (15 Apr 2021 09:54)  Januvia 100 mg oral tablet: 1 tab(s) orally once a day (15 Apr 2021 09:54)  linagliptin 5 mg oral tablet: 1 tab(s) orally once a day (15 Apr 2021 09:54)  magnesium chloride: 800 milligram(s) orally 2 times a day (15 Apr 2021 09:54)  metoprolol succinate 25 mg oral tablet, extended release: 1 tab(s) orally once a day (15 Apr 2021 09:54)  Prograf 1 mg oral capsule: 1 cap(s) orally every 12 hours (15 Apr 2021 09:54)  Protonix 40 mg oral delayed release tablet: 1 tab(s) orally once a day (15 Apr 2021 09:54)  Senokot 8.6 mg oral tablet: 1 tab(s) orally once a day (at bedtime) (15 Apr 2021 09:54)  tamsulosin 0.4 mg oral capsule: 1 cap(s) orally once a day (15 Apr 2021 09:54)  warfarin 2.5 mg oral tablet: 1 tab(s) orally once a day (15 Apr 2021 09:54)    ROS: 10-system review is otherwise negative except HPI above.      Primary Survey:    A - airway intact  B - bilateral breath sounds and good chest rise  C - palpable pulses in all extremities  D - GCS 15 on arrival, SANON  Exposure obtained    Vital Signs Last 24 Hrs  T(C): 36.7 (07 Jul 2021 11:34), Max: 36.7 (07 Jul 2021 11:34)  T(F): 98 (07 Jul 2021 11:34), Max: 98 (07 Jul 2021 11:34)  HR: 61 (07 Jul 2021 11:34) (61 - 61)  BP: 93/52 (07 Jul 2021 11:34) (93/52 - 93/52)  RR: 18 (07 Jul 2021 11:34) (18 - 18)  SpO2: 99% (07 Jul 2021 11:34) (99% - 99%)    Secondary Survey:   General: NAD  HEENT: Normocephalic, atraumatic, EOMI, PEERLA. no scalp lacerations   Neck: Soft, midline trachea. no cspine tenderness  Chest: No chest wall tenderness. or subq  emphysema   Cardiac: S1, S2, RRR  Respiratory: Bilateral breath sounds, clear and equal bilaterally  Abdomen: Soft, non-distended, non-tender, no rebound,   Groin: Normal appearing, pelvis stable   Ext: palp radial b/l UE, b/l DP palp in Lower Extrem. Right knee in immobilizer, foot with flexion and extension at the ankle joint, DP and PT pulses palpable  Back: no TTP, no palpable runoff/stepoff/deformity    LABS:               9.9    7.28  )-----------( 171      ( 07 Jul 2021 13:00 )             30.2       Auto Neutrophil %: 72.2 % (07-07-21 @ 13:00)  Auto Immature Granulocyte %: 0.3 % (07-07-21 @ 13:00)    07-07    138  |  106  |  27<H>  ----------------------------<  143<H>  5.0   |  23  |  1.5    Calcium, Total Serum: 9.0 mg/dL (07-07-21 @ 13:00)    LFTs:             7.4  | 0.7  | 16       ------------------[144     ( 07 Jul 2021 13:00 )  4.3  | x    | 13          Coags:     12.60  ----< 1.10    ( 07 Jul 2021 14:10 )     30.2     RADIOLOGY & ADDITIONAL STUDIES:  < from: Xray Chest 1 View- PORTABLE-Urgent (07.07.21 @ 13:29) >  Impression:  No radiographic evidence of acute cardiopulmonary disease.    < from: Xray Pelvis AP only (07.07.21 @ 13:30) >  Findings/  impression:  Lucency involving the left lesser trochanter and a nondisplaced fracture is not excluded.  Degenerative change    < from: Xray Knee 3 Views, Right (07.07.21 @ 13:31) >  Findings/  impression:  Acute comminuted fracture of the distal right femur at the level of a right total knee replacement with soft tissue swelling and joint effusion    < from: Xray Femur 2 Views, Right (07.07.21 @ 13:31) >  Findings/  impression:  Acute comminuted fracture through the distal right femur just above a right total knee replacement  Soft tissue swelling and joint effusion    < from: Xray Ankle 2 Views, Right (07.07.21 @ 13:32) >  Findings/  impression:  External fixation hardware limits evaluation.  No acute displaced fracture or dislocation.  Degenerative change.  Vascular calcifications.  Chronic fragmentation to a calcaneal spur    < from: CT Head No Cont (07.07.21 @ 16:41) >  IMPRESSION:  CT HEAD:  No acute intracranial pathology or hemorrhage. No acute calvarial fracture.  CT CERVICAL SPINE:  No acute fracture or subluxation.    < from: CT Chest No Cont (07.07.21 @ 16:50) >  IMPRESSION:  Mass like prominence of the right thigh musculature, partially imaged. Given history of distal right femur comminuted fracture noted on femur radiographs 7/7/2021 this is probably related to soft tissue swelling/hematoma  No definite acute intrathoracic or abdominopelvic abnormality on this limited noncontrast examination. However, note intravenous contrast greatly increases sensitivity for detection of solid organ and vascular injury in the setting of trauma.

## 2021-07-07 NOTE — ED PROVIDER NOTE - CLINICAL SUMMARY MEDICAL DECISION MAKING FREE TEXT BOX
72 yo M presents to ED sp fall and was found to have a femur fracture. No other injuries. ortho consulted and they will take pt to OR tomorrow. Trauma consulted and they would like pt admitted to stepdown.

## 2021-07-07 NOTE — H&P ADULT - NSHPLABSRESULTS_GEN_ALL_CORE
Labs:               9.9    7.28  )-----------( 171      ( 07 Jul 2021 13:00 )             30.2       Auto Neutrophil %: 72.2 % (07-07-21 @ 13:00)  Auto Immature Granulocyte %: 0.3 % (07-07-21 @ 13:00)    07-07    138  |  106  |  27<H>  ----------------------------<  143<H>  5.0   |  23  |  1.5    Calcium, Total Serum: 9.0 mg/dL (07-07-21 @ 13:00)    LFTs:             7.4  | 0.7  | 16       ------------------[144     ( 07 Jul 2021 13:00 )  4.3  | x    | 13          Coags:     12.60  ----< 1.10    ( 07 Jul 2021 14:10 )     30.2      RADIOLOGY:   < from: Xray Chest 1 View- PORTABLE-Urgent (07.07.21 @ 13:29) >  Impression:  No radiographic evidence of acute cardiopulmonary disease.    < from: Xray Pelvis AP only (07.07.21 @ 13:30) >  Findings/  impression:  Lucency involving the left lesser trochanter and a nondisplaced fracture is not excluded.  Degenerative change    < from: Xray Knee 3 Views, Right (07.07.21 @ 13:31) >  Findings/  impression:  Acute comminuted fracture of the distal right femur at the level of a right total knee replacement with soft tissue swelling and joint effusion    < from: Xray Femur 2 Views, Right (07.07.21 @ 13:31) >  Findings/  impression:  Acute comminuted fracture through the distal right femur just above a right total knee replacement  Soft tissue swelling and joint effusion    < from: Xray Ankle 2 Views, Right (07.07.21 @ 13:32) >  Findings/  impression:  External fixation hardware limits evaluation.  No acute displaced fracture or dislocation.  Degenerative change.  Vascular calcifications.  Chronic fragmentation to a calcaneal spur    < from: CT Head No Cont (07.07.21 @ 16:41) >  IMPRESSION:  CT HEAD:  No acute intracranial pathology or hemorrhage. No acute calvarial fracture.  CT CERVICAL SPINE:  No acute fracture or subluxation.

## 2021-07-08 LAB
A1C WITH ESTIMATED AVERAGE GLUCOSE RESULT: 5.3 % — SIGNIFICANT CHANGE UP (ref 4–5.6)
ANION GAP SERPL CALC-SCNC: 7 MMOL/L — SIGNIFICANT CHANGE UP (ref 7–14)
ANION GAP SERPL CALC-SCNC: 8 MMOL/L — SIGNIFICANT CHANGE UP (ref 7–14)
BLD GP AB SCN SERPL QL: SIGNIFICANT CHANGE UP
BUN SERPL-MCNC: 27 MG/DL — HIGH (ref 10–20)
BUN SERPL-MCNC: 27 MG/DL — HIGH (ref 10–20)
CALCIUM SERPL-MCNC: 8.5 MG/DL — SIGNIFICANT CHANGE UP (ref 8.5–10.1)
CALCIUM SERPL-MCNC: 8.7 MG/DL — SIGNIFICANT CHANGE UP (ref 8.5–10.1)
CHLORIDE SERPL-SCNC: 106 MMOL/L — SIGNIFICANT CHANGE UP (ref 98–110)
CHLORIDE SERPL-SCNC: 106 MMOL/L — SIGNIFICANT CHANGE UP (ref 98–110)
CK MB CFR SERPL CALC: <1 NG/ML — SIGNIFICANT CHANGE UP (ref 0.6–6.3)
CK SERPL-CCNC: 31 U/L — SIGNIFICANT CHANGE UP (ref 0–225)
CK SERPL-CCNC: 31 U/L — SIGNIFICANT CHANGE UP (ref 0–225)
CK SERPL-CCNC: 34 U/L — SIGNIFICANT CHANGE UP (ref 0–225)
CO2 SERPL-SCNC: 21 MMOL/L — SIGNIFICANT CHANGE UP (ref 17–32)
CO2 SERPL-SCNC: 22 MMOL/L — SIGNIFICANT CHANGE UP (ref 17–32)
CREAT SERPL-MCNC: 1.4 MG/DL — SIGNIFICANT CHANGE UP (ref 0.7–1.5)
CREAT SERPL-MCNC: 1.4 MG/DL — SIGNIFICANT CHANGE UP (ref 0.7–1.5)
ESTIMATED AVERAGE GLUCOSE: 105 MG/DL — SIGNIFICANT CHANGE UP (ref 68–114)
GLUCOSE BLDC GLUCOMTR-MCNC: 119 MG/DL — HIGH (ref 70–99)
GLUCOSE BLDC GLUCOMTR-MCNC: 122 MG/DL — HIGH (ref 70–99)
GLUCOSE BLDC GLUCOMTR-MCNC: 134 MG/DL — HIGH (ref 70–99)
GLUCOSE SERPL-MCNC: 112 MG/DL — HIGH (ref 70–99)
GLUCOSE SERPL-MCNC: 118 MG/DL — HIGH (ref 70–99)
HCT VFR BLD CALC: 24.6 % — LOW (ref 42–52)
HCT VFR BLD CALC: 25.8 % — LOW (ref 42–52)
HGB BLD-MCNC: 7.9 G/DL — LOW (ref 14–18)
HGB BLD-MCNC: 8.4 G/DL — LOW (ref 14–18)
MAGNESIUM SERPL-MCNC: 1.5 MG/DL — LOW (ref 1.8–2.4)
MAGNESIUM SERPL-MCNC: 1.8 MG/DL — SIGNIFICANT CHANGE UP (ref 1.8–2.4)
MCHC RBC-ENTMCNC: 26.8 PG — LOW (ref 27–31)
MCHC RBC-ENTMCNC: 26.9 PG — LOW (ref 27–31)
MCHC RBC-ENTMCNC: 32.1 G/DL — SIGNIFICANT CHANGE UP (ref 32–37)
MCHC RBC-ENTMCNC: 32.6 G/DL — SIGNIFICANT CHANGE UP (ref 32–37)
MCV RBC AUTO: 82.7 FL — SIGNIFICANT CHANGE UP (ref 80–94)
MCV RBC AUTO: 83.4 FL — SIGNIFICANT CHANGE UP (ref 80–94)
NRBC # BLD: 0 /100 WBCS — SIGNIFICANT CHANGE UP (ref 0–0)
NRBC # BLD: 0 /100 WBCS — SIGNIFICANT CHANGE UP (ref 0–0)
PHOSPHATE SERPL-MCNC: 3.6 MG/DL — SIGNIFICANT CHANGE UP (ref 2.1–4.9)
PHOSPHATE SERPL-MCNC: 4 MG/DL — SIGNIFICANT CHANGE UP (ref 2.1–4.9)
PLATELET # BLD AUTO: 149 K/UL — SIGNIFICANT CHANGE UP (ref 130–400)
PLATELET # BLD AUTO: 150 K/UL — SIGNIFICANT CHANGE UP (ref 130–400)
POTASSIUM SERPL-MCNC: 4.2 MMOL/L — SIGNIFICANT CHANGE UP (ref 3.5–5)
POTASSIUM SERPL-MCNC: 4.7 MMOL/L — SIGNIFICANT CHANGE UP (ref 3.5–5)
POTASSIUM SERPL-SCNC: 4.2 MMOL/L — SIGNIFICANT CHANGE UP (ref 3.5–5)
POTASSIUM SERPL-SCNC: 4.7 MMOL/L — SIGNIFICANT CHANGE UP (ref 3.5–5)
RBC # BLD: 2.95 M/UL — LOW (ref 4.7–6.1)
RBC # BLD: 3.12 M/UL — LOW (ref 4.7–6.1)
RBC # FLD: 13.7 % — SIGNIFICANT CHANGE UP (ref 11.5–14.5)
RBC # FLD: 14 % — SIGNIFICANT CHANGE UP (ref 11.5–14.5)
SODIUM SERPL-SCNC: 134 MMOL/L — LOW (ref 135–146)
SODIUM SERPL-SCNC: 136 MMOL/L — SIGNIFICANT CHANGE UP (ref 135–146)
TROPONIN T SERPL-MCNC: 0.02 NG/ML — HIGH
WBC # BLD: 5.55 K/UL — SIGNIFICANT CHANGE UP (ref 4.8–10.8)
WBC # BLD: 6.27 K/UL — SIGNIFICANT CHANGE UP (ref 4.8–10.8)
WBC # FLD AUTO: 5.55 K/UL — SIGNIFICANT CHANGE UP (ref 4.8–10.8)
WBC # FLD AUTO: 6.27 K/UL — SIGNIFICANT CHANGE UP (ref 4.8–10.8)

## 2021-07-08 PROCEDURE — 99231 SBSQ HOSP IP/OBS SF/LOW 25: CPT

## 2021-07-08 PROCEDURE — 99291 CRITICAL CARE FIRST HOUR: CPT

## 2021-07-08 PROCEDURE — 99233 SBSQ HOSP IP/OBS HIGH 50: CPT

## 2021-07-08 PROCEDURE — 73700 CT LOWER EXTREMITY W/O DYE: CPT | Mod: 26,RT

## 2021-07-08 RX ORDER — HYDROCORTISONE 20 MG
100 TABLET ORAL ONCE
Refills: 0 | Status: COMPLETED | OUTPATIENT
Start: 2021-07-08 | End: 2021-07-08

## 2021-07-08 RX ORDER — MORPHINE SULFATE 50 MG/1
2 CAPSULE, EXTENDED RELEASE ORAL ONCE
Refills: 0 | Status: DISCONTINUED | OUTPATIENT
Start: 2021-07-08 | End: 2021-07-08

## 2021-07-08 RX ORDER — MAGNESIUM SULFATE 500 MG/ML
2 VIAL (ML) INJECTION ONCE
Refills: 0 | Status: COMPLETED | OUTPATIENT
Start: 2021-07-08 | End: 2021-07-08

## 2021-07-08 RX ORDER — OXYCODONE HYDROCHLORIDE 5 MG/1
5 TABLET ORAL ONCE
Refills: 0 | Status: DISCONTINUED | OUTPATIENT
Start: 2021-07-08 | End: 2021-07-08

## 2021-07-08 RX ORDER — ACETAMINOPHEN 500 MG
650 TABLET ORAL EVERY 6 HOURS
Refills: 0 | Status: DISCONTINUED | OUTPATIENT
Start: 2021-07-08 | End: 2021-07-08

## 2021-07-08 RX ADMIN — TACROLIMUS 3 MILLIGRAM(S): 5 CAPSULE ORAL at 06:31

## 2021-07-08 RX ADMIN — OXYCODONE HYDROCHLORIDE 5 MILLIGRAM(S): 5 TABLET ORAL at 01:42

## 2021-07-08 RX ADMIN — TAMSULOSIN HYDROCHLORIDE 0.4 MILLIGRAM(S): 0.4 CAPSULE ORAL at 21:41

## 2021-07-08 RX ADMIN — Medication 325 MILLIGRAM(S): at 11:41

## 2021-07-08 RX ADMIN — SENNA PLUS 2 TABLET(S): 8.6 TABLET ORAL at 21:41

## 2021-07-08 RX ADMIN — OXYCODONE HYDROCHLORIDE 5 MILLIGRAM(S): 5 TABLET ORAL at 05:36

## 2021-07-08 RX ADMIN — Medication 25 GRAM(S): at 14:16

## 2021-07-08 RX ADMIN — HEPARIN SODIUM 5000 UNIT(S): 5000 INJECTION INTRAVENOUS; SUBCUTANEOUS at 06:31

## 2021-07-08 RX ADMIN — SODIUM CHLORIDE 75 MILLILITER(S): 9 INJECTION, SOLUTION INTRAVENOUS at 13:29

## 2021-07-08 RX ADMIN — OXYCODONE HYDROCHLORIDE 5 MILLIGRAM(S): 5 TABLET ORAL at 21:39

## 2021-07-08 RX ADMIN — FLUDROCORTISONE ACETATE 0.1 MILLIGRAM(S): 0.1 TABLET ORAL at 11:41

## 2021-07-08 RX ADMIN — OXYCODONE HYDROCHLORIDE 5 MILLIGRAM(S): 5 TABLET ORAL at 21:41

## 2021-07-08 RX ADMIN — PANTOPRAZOLE SODIUM 40 MILLIGRAM(S): 20 TABLET, DELAYED RELEASE ORAL at 06:31

## 2021-07-08 RX ADMIN — MORPHINE SULFATE 2 MILLIGRAM(S): 50 CAPSULE, EXTENDED RELEASE ORAL at 11:20

## 2021-07-08 RX ADMIN — Medication 100 MILLIGRAM(S): at 14:17

## 2021-07-08 RX ADMIN — TACROLIMUS 3 MILLIGRAM(S): 5 CAPSULE ORAL at 18:05

## 2021-07-08 RX ADMIN — Medication 25 GRAM(S): at 05:39

## 2021-07-08 NOTE — PROGRESS NOTE ADULT - ASSESSMENT
Assessment & Plan    71y Male with extensive PMH and PSH including liver transplant, R renal transplant in 2020, CAD with stents, and a watchman procedure, who presented to the ED earlier today with a R periprosthetic femur fracture. Patient is hemodynamically stable, but due to his extensive history and need for operative intervention with orthopedics, decision made to consult SICU for stepdown.     NEURO:    Acute pain-controlled with Oxycodone.   Tylenol and NSAIDs held 2/2 transplant history.   GCS 15    RESP:   Saturating well on room air.   Activity: Bedrest until OR          CARDS:   EKG: SB 59     Recent Echo (Not on admission)  Summary:   1. Left ventricular ejection fraction, by visual estimation, is 45 to 50%.   2. Mildly decreased global left ventricular systolic function.   3. Watchman device in good position. No evidence of leak around the device in SHILA.   4.Mildly enlarged left atrium.   5. Normal right atrial size.    Cardiac Enzymes PEnding    Home Meds:  HTN: amLODIPine  Tablet 5 daily  AFib: metoprolol succinate ER 25 daily  CAD s/p stents 10 years ago: ASA and Plavix    Cardiology consult placed, pending clearance for OR. Cardiologist is Dr. De Souza (Called service)    GI/NUTR:     Diet, Regular  NPO at midnight    GI Prophylaxis- PTX    Bowel regimen- Senna    Hx of Liver and R Renal Transplant in Feb 2020 (Dr. Sunshine is Hepatologist at [facility])  Tacrolimus 3 BID  Fludrocortisone 0.1 mg QD    Hepatology consult placed and pending.    Medicine consult placed for clearance.    /RENAL:      Monitor UO, Voiding  Labs:          BUN/Cr- 27/1.5           Electrolytes-Na 138 // K 5.0 // Mg -- //  Phos -- (07-07 @ 13:00)  Repeats pending with Mg and Ph    Hx of Liver and R Renal Transplant in Feb 2020 (Dr. Sunshine is Hepatologist at [facility])  Tacrolimus 3 BID  Fludrocortisone 0.1 mg QD    Renal consult placed and pending.     Flomax     HEME/ONC:       DVT prophylaxis-heparin   Injectable    Labs: Hb/Hct:  9.9/30.2   -->               Plts:  171  ->                 PTT/INR:  30.2/1.10  (07-07 @ 14:10) --->                 T&S: (07-07)    Takes ASA and Plavix for CAD, holding Plavix    ID:  WBC- 7.28  --->>  Temp trend- 24hrs T(F): 98.1 (07-07 @ 21:05), Max: 98.1 (07-07 @ 21:05)           ENDO:    Glucose Glucose, Serum: 143 (07-07 @ 13:00)      HA1C Pending    Takes Januvia and Litogliptin, holding, sliding scale ordered    MSK:  Periprosthetic R femur fracture.   CT pending  Bedrest with plan for ORIF tomorrow 7/8    LINES/DRAINS:  PIV    DISPO:    Step down unit    Case discussed with SICU attending Dr. Hwang, who accepted patient into SDU.

## 2021-07-08 NOTE — CONSULT NOTE ADULT - ASSESSMENT
71M w/PMHx of HTN, HLD, anemia, DM, Afib, JIMÉNEZ s/p liver transplant and right renal transplant at [facility] in February 2020 (follows with his hepatologist Dr. Sunshine), CAD s/p stents (10 years ago) on ASA and Plavix, watchman procedure 3 month ago sustained R distal femur fx s/p mechanical fall plan for ORIF. Cardiology consulted for preop risk stratification    * SUMMARY:    * Patient-based characteristics (Functional capacity)  Patient is able to achieve more than 4 MET (walk 4 blocks, climb 2 flights of stairs, etc...)          Y [X] / N []    High-risk patient features:  - Recent (<30 days) or active MI          Y [] / N [X]  - Unstable or severe angina          Y [] / N [X]  - Decompensated heart failure, or worsening or new-onset heart failure          Y [] / N [X]  - Severe valvular disease          Y [] / N [X]  - Significant arrhythmia (Tachy- or Bradyarrhythmia)          Y [] / N [X]    * Surgery/Procedure-based characteristics (Type of surgery)  - Low-risk procedure (outpatient procedure, elective, endoscopy, etc...)          Y [] / N []  - Elevated or Moderate-risk procedure (Inpatient)          Y [X] / N []  - High-risk procedure (urgent/emergent procedure, Intrathoracic, vascular, etc...)          Y [] / N []    * Revised Cardiac Risk Index (RCRI)  1- History of ischemic heart disease          Y [X] / N ]  2- History of congestive heart failure          Y [X] / N []  3- History of stroke/TIA          Y [] / N [X]  4- History of insulin-dependent diabetes          Y [] / N [X]  5- Chronic kidney disease (Cr >2mg/dL)          Y [] / N [X]  6- Undergoing suprainguinal vascular, intraperitoneal, or intrathoracic surgery          Y [] / N [X]    Class I risk (Zero factors) --> 3.9% (30-day risk of death, MI, or cardiac arrest)  Class II risk (One factor) --> 6% risk (30-day risk of death, MI, or cardiac arrest)  Class III risk (Two factors) --> 10.1% risk (30-day risk of death, MI, or cardiac arrest)  Class IV risk (Three factors or more) --> >15% risk (30-day risk of death, MI, or cardiac arrest)    * IMPRESSION & RECOMMENDATIONS:  Moderate risk patient for Moderate risk surgery/procedure  Pt is Class III risk 10.1% risk (30-day risk of death, MI, or cardiac arrest)  No further cardiac work-up is needed at the moment. There are no current cardiac contraindications to prevent from proceeding with the scheduled surgery/procedure.    - This consult serves only as a olaf-operative cardiac risk stratification and evaluation to predict 30-days cardiac complications risk and mortality. The decision to proceed with the surgery/procedure is made by the performing physician and the patient -         71M w/PMHx of HTN, HLD, anemia, DM, Afib, JIMÉNEZ s/p liver transplant and right renal transplant at [facility] in February 2020 (follows with his hepatologist Dr. Sunshine), CAD s/p stents (10 years ago) on ASA and Plavix, watchman procedure 3 month ago sustained R distal femur fx s/p mechanical fall plan for ORIF. Cardiology consulted for preop risk stratification    * Patient-based characteristics (Functional capacity)  Patient is able to achieve more than 4 MET (walk 4 blocks, climb 2 flights of stairs, etc...)          Y [X] / N []    High-risk patient features:  - Recent (<30 days) or active MI          Y [] / N [X]  - Unstable or severe angina          Y [] / N [X]  - Decompensated heart failure, or worsening or new-onset heart failure          Y [] / N [X]  - Severe valvular disease          Y [] / N [X]  - Significant arrhythmia (Tachy- or Bradyarrhythmia)          Y [] / N [X]    * Surgery/Procedure-based characteristics (Type of surgery)  - Low-risk procedure (outpatient procedure, elective, endoscopy, etc...)          Y [] / N []  - Elevated or Moderate-risk procedure (Inpatient)          Y [X] / N []  - High-risk procedure (urgent/emergent procedure, Intrathoracic, vascular, etc...)          Y [] / N []    * Revised Cardiac Risk Index (RCRI)  1- History of ischemic heart disease          Y [X] / N ]  2- History of congestive heart failure          Y [X] / N []  3- History of stroke/TIA          Y [] / N [X]  4- History of insulin-dependent diabetes          Y [] / N [X]  5- Chronic kidney disease (Cr >2mg/dL)          Y [] / N [X]  6- Undergoing suprainguinal vascular, intraperitoneal, or intrathoracic surgery          Y [] / N [X]    Class I risk (Zero factors) --> 3.9% (30-day risk of death, MI, or cardiac arrest)  Class II risk (One factor) --> 6% risk (30-day risk of death, MI, or cardiac arrest)  Class III risk (Two factors) --> 10.1% risk (30-day risk of death, MI, or cardiac arrest)  Class IV risk (Three factors or more) --> >15% risk (30-day risk of death, MI, or cardiac arrest)    * IMPRESSION & RECOMMENDATIONS:  Moderate risk patient for Moderate risk surgery/procedure  Pt is Class III risk 10.1% risk (30-day risk of death, MI, or cardiac arrest)  Cont Aspirin and Plavix for post watchmen procedure   No further cardiac work-up is needed at the moment. There are no current cardiac contraindications to prevent from proceeding with the scheduled surgery/procedure.    - This consult serves only as a olaf-operative cardiac risk stratification and evaluation to predict 30-days cardiac complications risk and mortality. The decision to proceed with the surgery/procedure is made by the performing physician and the patient -

## 2021-07-08 NOTE — CHART NOTE - NSCHARTNOTEFT_GEN_A_CORE
SOCORRO MANUEL  [id number]    71M w/ PMHx of HTN, HLD, anemia, DM, Afib, JIMÉNEZ s/p liver transplant and right renal transplant at [facility] in February 2020 (follows with his hepatologist Dr. Sunshine), CAD s/p stents (10 years ago) on ASA and Plavix, watchman procedure performed ~3 months ago by Dr. Burdick, history of bilateral knee replacements (21 years ago) presents after being sent in from outpatient Urgent care office for finding of new right distal femur fracture. Patient tripped and fell while trying to get out of his car and felt right knee pain afterwards. He presented to an outpatient clinic for evaluation. XR at outpatient clinic demonstrated distal right femur fracture. He was placed in a knee immobilizer and instructed to present to the ED for further management. Patient is afebrile, HR 61, borderline BP of 93/52, saturating 99% on room air.   On exam his right knee is immobilized, he has flexion and extension of the foot at the ankle joint, +DP and PT pulses, foot warm and well perfused. No other external signs of injury, denies pain anywhere else.     SICU was consulted for preoperative hemodynamic monitoring in this patient with extensive PMH and PSH as reported above, including liver transplant, R renal transplant in 2020, CAD with stents, and a watchman procedure.     Indication for ICU admission: Stepdown Preoperative HD Monitoring R Periprosthetic femur fracture (Distal/Comminuted)    Admit Date: 07-07-21  ICU Stepdown Date: 7/7/21  OR Date: 7/8/2021 @5pm    No Known Allergies    PAST MEDICAL & SURGICAL HISTORY:  HTN (hypertension)    High cholesterol    Anemia    Diabetes    Afib    Cirrhosis of liver  JIMÉNEZ    Dyspnea on exertion    BPH (benign prostatic hyperplasia)    Presence of bilateral total knee joint prostheses  21 years ago    S/P angioplasty with stent  2 cardiac stents &gt; 10 years back    Encounter for screening colonoscopy  1 year ago    Organ transplant  liver, 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    Home Medications:  amLODIPine 5 mg oral tablet: 1 tab(s) orally once a day (15 Apr 2021 09:54)  aspirin 325 mg oral delayed release tablet: 1 tab(s) orally once a day (07 Jul 2021 21:29)  fludrocortisone 0.1 mg oral tablet: 1 tab(s) orally once a day (07 Jul 2021 21:28)  Januvia 100 mg oral tablet: 1 tab(s) orally once a day (15 Apr 2021 09:54)  linagliptin 5 mg oral tablet: 1 tab(s) orally once a day (15 Apr 2021 09:54)  magnesium chloride: 800 milligram(s) orally 2 times a day (15 Apr 2021 09:54)  metoprolol succinate 25 mg oral tablet, extended release: 1 tab(s) orally once a day (15 Apr 2021 09:54)  Plavix 75 mg oral tablet: 1 tab(s) orally once a day (07 Jul 2021 21:31)  Prograf 1 mg oral capsule: 1 cap(s) orally every 12 hours (15 Apr 2021 09:54)  Protonix 40 mg oral delayed release tablet: 1 tab(s) orally once a day (15 Apr 2021 09:54)  Senokot 8.6 mg oral tablet: 1 tab(s) orally once a day (at bedtime) (15 Apr 2021 09:54)  tamsulosin 0.4 mg oral capsule: 1 cap(s) orally once a day (15 Apr 2021 09:54)      24HRS EVENT:    7/8/21  - NPO for possible right ORIF with Dr. Kirby @ 5pm     7/7  Admitted to SDU for HD monitoring preop  Pending Med/Cards/Hepatology/Nephrology Consults/Clearances  Holding Plavix, all other home meds started  Plan for OR 7/8 ORIF    DVT PTX: HSQ     GI PTX: pantoprazole    Tablet 40 milliGRAM(s) Oral before breakfast    ***Tubes/Lines/Drains  ***  Peripheral IV    REVIEW OF SYSTEMS    [x] A ten-point review of systems was otherwise negative except as noted.  [ ] Due to altered mental status/intubation, subjective information were not able to be obtained from the patient. History was obtained, to the extent possible, from review of the chart and collateral sources of information.      Assessment & Plan  71y Male with extensive PMH and PSH including liver transplant, R renal transplant in 2020, CAD with stents, and a watchman procedure, who presented to the ED earlier today with a R periprosthetic femur fracture. Patient is hemodynamically stable, but due to his extensive history and need for operative intervention with orthopedics, decision made to consult SICU for stepdown.     NEURO:  Acute pain-controlled with Oxycodone  Tylenol and NSAIDs held 2/2 transplant history  GCS 15    RESP:   Saturating well on room air  Activity: Bedrest until OR      CARDS:   EKG: SB 59  --> NSR 80s today    Recent Echo (Not on admission)  Summary:   1. Left ventricular ejection fraction, by visual estimation, is 45 to 50%.   2. Mildly decreased global left ventricular systolic function.   3. Watchman device in good position. No evidence of leak around the device in SHILA.   4.Mildly enlarged left atrium.   5. Normal right atrial size.    Cardiac Enzymes : trop 0.02 x 2    Home Meds:  HTN: amLODIPine  Tablet 5 daily  AFib: metoprolol succinate ER 25 daily  CAD s/p stents 10 years ago: ASA and Plavix    Cardiology consult deemed moderate risk for OR    GI/NUTR:   Diet, Regular, but NPO for surgery today    GI Prophylaxis- PTX    Bowel regimen- Senna    Hx of Liver and R Renal Transplant in Feb 2020 (Dr. Sunshine is Hepatologist at [facility])  Tacrolimus 3 BID  Fludrocortisone 0.1 mg QD  * Pt receive addition stress dose of steroids today, Florinef 100mg x1 dose    Hepatology consult    Medicine consult placed for clearance and will resume service post procedure today    /RENAL:      Monitor UO, Voiding  Labs:          BUN/Cr- 27/1.5           Electrolytes-Na 134 // K 4.2 // Mg 1.8 //  Phos 3.6 - lytes repleted     Renal consult  Flomax     HEME/ONC:       DVT prophylaxis-heparin   Injectable    Labs: Hb/Hct:  9.9/30.2   --> 7.9-->24.6               Plts:  171  ->   150              PTT/INR:  30.2/1.10                T&S: (07-07)    Takes ASA and Plavix for CAD, holding Plavix    ID:  WBC- 7.28  ---> 5.55  Temp trend- 24hrs T(F): 98.1, Max: 99.4       ENDO:    Glucose, Serum: 118     HA1C 5.3    Takes Januvia and Litogliptin, holding, sliding scale ordered    MSK:  Periprosthetic R femur fracture.   CT femur: 1. Comminuted periprosthetic fracture of the distal right femur with approximately 3 cm override and 1 cm posterior displacement between the major proximal and distal fragments.  2. Large hematoma and edema within the vastus intermedius.  Bedrest with ORIF today7/8    LINES/DRAINS:  PIV    DISPO: Transfer to Medical Service, spoke with Hospitalist Dr Rivera on 7/8/21 @ 12:40pm and Dr. Cannon @ 12:55pm.    Case discussed with SICU attending Dr. Hwang, who accepted patient into SDU. SOCORRO MANUEL  [id number]    71M w/ PMHx of HTN, HLD, anemia, DM, Afib, JIMÉNEZ s/p liver transplant and right renal transplant at [facility] in February 2020 (follows with his hepatologist Dr. Sunshine), CAD s/p stents (10 years ago) on ASA and Plavix, watchman procedure performed ~3 months ago by Dr. Burdick, history of bilateral knee replacements (21 years ago) presents after being sent in from outpatient Urgent care office for finding of new right distal femur fracture. Patient tripped and fell while trying to get out of his car and felt right knee pain afterwards. He presented to an outpatient clinic for evaluation. XR at outpatient clinic demonstrated distal right femur fracture. He was placed in a knee immobilizer and instructed to present to the ED for further management. Patient is afebrile, HR 61, borderline BP of 93/52, saturating 99% on room air.   On exam his right knee is immobilized, he has flexion and extension of the foot at the ankle joint, +DP and PT pulses, foot warm and well perfused. No other external signs of injury, denies pain anywhere else.     SICU was consulted for preoperative hemodynamic monitoring in this patient with extensive PMH and PSH as reported above, including liver transplant, R renal transplant in 2020, CAD with stents, and a watchman procedure.     Indication for ICU admission: Stepdown Preoperative HD Monitoring R Periprosthetic femur fracture (Distal/Comminuted)    Admit Date: 07-07-21  ICU Stepdown Date: 7/7/21  OR Date: 7/8/2021 @5pm    No Known Allergies    PAST MEDICAL & SURGICAL HISTORY:  HTN (hypertension)    High cholesterol    Anemia    Diabetes    Afib    Cirrhosis of liver  JIMÉNEZ    Dyspnea on exertion    BPH (benign prostatic hyperplasia)    Presence of bilateral total knee joint prostheses  21 years ago    S/P angioplasty with stent  2 cardiac stents &gt; 10 years back    Encounter for screening colonoscopy  1 year ago    Organ transplant  liver, 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    Home Medications:  amLODIPine 5 mg oral tablet: 1 tab(s) orally once a day (15 Apr 2021 09:54)  aspirin 325 mg oral delayed release tablet: 1 tab(s) orally once a day (07 Jul 2021 21:29)  fludrocortisone 0.1 mg oral tablet: 1 tab(s) orally once a day (07 Jul 2021 21:28)  Januvia 100 mg oral tablet: 1 tab(s) orally once a day (15 Apr 2021 09:54)  linagliptin 5 mg oral tablet: 1 tab(s) orally once a day (15 Apr 2021 09:54)  magnesium chloride: 800 milligram(s) orally 2 times a day (15 Apr 2021 09:54)  metoprolol succinate 25 mg oral tablet, extended release: 1 tab(s) orally once a day (15 Apr 2021 09:54)  Plavix 75 mg oral tablet: 1 tab(s) orally once a day (07 Jul 2021 21:31)  Prograf 1 mg oral capsule: 1 cap(s) orally every 12 hours (15 Apr 2021 09:54)  Protonix 40 mg oral delayed release tablet: 1 tab(s) orally once a day (15 Apr 2021 09:54)  Senokot 8.6 mg oral tablet: 1 tab(s) orally once a day (at bedtime) (15 Apr 2021 09:54)  tamsulosin 0.4 mg oral capsule: 1 cap(s) orally once a day (15 Apr 2021 09:54)      24HRS EVENT:    7/8/21  - NPO for possible right ORIF with Dr. Kirby @ 5pm     7/7  Admitted to SDU for HD monitoring preop  Pending Med/Cards/Hepatology/Nephrology Consults/Clearances  Holding Plavix, all other home meds started  Plan for OR 7/8 ORIF    DVT PTX: HSQ     GI PTX: pantoprazole    Tablet 40 milliGRAM(s) Oral before breakfast    ***Tubes/Lines/Drains  ***  Peripheral IV    REVIEW OF SYSTEMS    [x] A ten-point review of systems was otherwise negative except as noted.  [ ] Due to altered mental status/intubation, subjective information were not able to be obtained from the patient. History was obtained, to the extent possible, from review of the chart and collateral sources of information.      Assessment & Plan  71y Male with extensive PMH and PSH including liver transplant, R renal transplant in 2020, CAD with stents, and a watchman procedure, who presented to the ED earlier today with a R periprosthetic femur fracture. Patient is hemodynamically stable, but due to his extensive history and need for operative intervention with orthopedics, decision made to consult SICU for stepdown.     NEURO:  Acute pain-controlled with Oxycodone  Tylenol and NSAIDs held 2/2 transplant history  GCS 15    RESP:   Saturating well on room air  Activity: Bedrest until OR      CARDS:   EKG: SB 59  --> NSR 80s today    Recent Echo (Not on admission)  Summary:   1. Left ventricular ejection fraction, by visual estimation, is 45 to 50%.   2. Mildly decreased global left ventricular systolic function.   3. Watchman device in good position. No evidence of leak around the device in SHILA.   4.Mildly enlarged left atrium.   5. Normal right atrial size.    Cardiac Enzymes : trop 0.02 x 2    Home Meds:  HTN: amLODIPine  Tablet 5 daily  AFib: metoprolol succinate ER 25 daily  CAD s/p stents 10 years ago: ASA and Plavix    Cardiology consult deemed moderate risk for OR    GI/NUTR:   Diet, Regular, but NPO for surgery today    GI Prophylaxis- PTX    Bowel regimen- Senna    Hx of Liver and R Renal Transplant in Feb 2020 (Dr. Sunshine is Hepatologist at [facility])  Tacrolimus 3 BID  Fludrocortisone 0.1 mg QD  * Pt receive addition stress dose of steroids today, Florinef 100mg x1 dose    Hepatology consult    Medicine consult placed for clearance and will resume service post procedure today    /RENAL:      Monitor UO, Voiding  Labs:          BUN/Cr- 27/1.5           Electrolytes-Na 134 // K 4.2 // Mg 1.8 //  Phos 3.6 - lytes repleted     Renal consult  Flomax     HEME/ONC:       DVT prophylaxis-heparin   Injectable    Labs: Hb/Hct:  9.9/30.2   --> 7.9-->24.6               Plts:  171  ->   150              PTT/INR:  30.2/1.10                T&S: (07-07)    Takes ASA and Plavix for CAD, holding Plavix    ID:  WBC- 7.28  ---> 5.55  Temp trend- 24hrs T(F): 98.1, Max: 99.4       ENDO:    Glucose, Serum: 118     HA1C 5.3    Takes Januvia and Litogliptin, holding, sliding scale ordered    MSK:  Periprosthetic R femur fracture.   CT femur: 1. Comminuted periprosthetic fracture of the distal right femur with approximately 3 cm override and 1 cm posterior displacement between the major proximal and distal fragments.  2. Large hematoma and edema within the vastus intermedius.  Bedrest with ORIF today7/8    LINES/DRAINS:  PIV    DISPO: Transfer to Medical Service, spoke with Hospitalist Dr Rivera on 7/8/21 @ 12:40pm and Dr. Cannon @ 12:55pm.

## 2021-07-08 NOTE — CONSULT NOTE ADULT - ASSESSMENT
72 yo M pt is pre-op for repair of a distal rt femoral fracture. Medicine eval requested for risk stratification, assistance w/ optimization.     Problem List:  (1) Acute comminuted fracture of the distal rt femur  --- Involvement of rt knee TKA  --- Large hematoma within the vastus intermedius  (2) Acute on chronic normocytic anemia possibly 2/2 blood loss:  --- Possibly 2/2 above mentioned hematoma  --- Component of anemia of chronic disease  (3) Hypomagnesemia  (4) Mild hyponatremia (possibly SIADH 2/2 pain)  (5) CKD 3 (renal function is likely at baseline)  (6) S/p liver transplant (hx of JIMÉNEZ)  (7) S/p renal transplant (rt sided at [facility] in 02/2020)  (8) Hx of HTN - chronic  (9) Hx of dyslipidemia - chronic  (10) Hx of paroxysmal A-fib (s/p SHILA closure not on anti-coag)  (11) Hx of CAD (is on DAPT)  (12) Hx of DM    Recommendations:  (1) Cardiac risk and optimization per Cardiologist recommendations  (2) Would monitor for expansion of hematoma and trend H&H  --- Resuscitative efforts if deemed necessary & as per SICU team  --- pRBC's on hold for the OR   (3) Monitor and replete electrolytes levels PRN  (4) PRN analgesics  (5) Avoid hypotension or severe hypertension (goal being to maintain organ perfusion)  --- Can consider holding amlodipine on the day of the intervention  --- Would continue beta blocker as dosed  (6) Continue w/ maintenance immunosuppressant tx   (7) Agree w/ short acting insulin to maintain target glycemic control (random BG < 180)    Given the patient's history of hepatic and renal transplantation, can consider having Hepatology and Nephrology on board (with the goal of optimizing olaf-operative outcomes).   Overall, the patient is likely at an intermediate risk for this intermediate risk orthopedic intervention, the benefits of which likely outweigh the potential risks (given the significant morbidity associated with the patient's fracture itself).     Thank you for allowing us to participate in the care of this patient. Please call us with any questions or concerns. #[phone] or covering Hospitalist on call.

## 2021-07-08 NOTE — PROGRESS NOTE ADULT - SUBJECTIVE AND OBJECTIVE BOX
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  21 years ago    S/P angioplasty with stent  2 cardiac stents &gt; 10 years back    Encounter for screening colonoscopy  1 year ago    Organ transplant  liver, 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    Home Medications:  amLODIPine 5 mg oral tablet: 1 tab(s) orally once a day (15 Apr 2021 09:54)  aspirin 325 mg oral delayed release tablet: 1 tab(s) orally once a day (07 Jul 2021 21:29)  fludrocortisone 0.1 mg oral tablet: 1 tab(s) orally once a day (07 Jul 2021 21:28)  Januvia 100 mg oral tablet: 1 tab(s) orally once a day (15 Apr 2021 09:54)  linagliptin 5 mg oral tablet: 1 tab(s) orally once a day (15 Apr 2021 09:54)  magnesium chloride: 800 milligram(s) orally 2 times a day (15 Apr 2021 09:54)  metoprolol succinate 25 mg oral tablet, extended release: 1 tab(s) orally once a day (15 Apr 2021 09:54)  Plavix 75 mg oral tablet: 1 tab(s) orally once a day (07 Jul 2021 21:31)  Prograf 1 mg oral capsule: 1 cap(s) orally every 12 hours (15 Apr 2021 09:54)  Protonix 40 mg oral delayed release tablet: 1 tab(s) orally once a day (15 Apr 2021 09:54)  Senokot 8.6 mg oral tablet: 1 tab(s) orally once a day (at bedtime) (15 Apr 2021 09:54)  tamsulosin 0.4 mg oral capsule: 1 cap(s) orally once a day (15 Apr 2021 09:54)      24HRS EVENT:  7/7  Night  Admitted to SDU for HD monitoring preop  Pending Med/Cards/Hepatology/Nephrology Consults/Clearances  Holding Plavix, all other home meds started  Plan for OR 7/8 ORIF        DVT PTX: HSQ     GI PTX:pantoprazole    Tablet 40 milliGRAM(s) Oral before breakfast      ***Tubes/Lines/Drains  ***  Peripheral IV      REVIEW OF SYSTEMS    [x] A ten-point review of systems was otherwise negative except as noted.  [ ] Due to altered mental status/intubation, subjective information were not able to be obtained from the patient. History was obtained, to the extent possible, from review of the chart and collateral sources of information.                 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  21 years ago    S/P angioplasty with stent  2 cardiac stents &gt; 10 years back    Encounter for screening colonoscopy  1 year ago    Organ transplant  liver, 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    Home Medications:  amLODIPine 5 mg oral tablet: 1 tab(s) orally once a day (15 Apr 2021 09:54)  aspirin 325 mg oral delayed release tablet: 1 tab(s) orally once a day (07 Jul 2021 21:29)  fludrocortisone 0.1 mg oral tablet: 1 tab(s) orally once a day (07 Jul 2021 21:28)  Januvia 100 mg oral tablet: 1 tab(s) orally once a day (15 Apr 2021 09:54)  linagliptin 5 mg oral tablet: 1 tab(s) orally once a day (15 Apr 2021 09:54)  magnesium chloride: 800 milligram(s) orally 2 times a day (15 Apr 2021 09:54)  metoprolol succinate 25 mg oral tablet, extended release: 1 tab(s) orally once a day (15 Apr 2021 09:54)  Plavix 75 mg oral tablet: 1 tab(s) orally once a day (07 Jul 2021 21:31)  Prograf 1 mg oral capsule: 1 cap(s) orally every 12 hours (15 Apr 2021 09:54)  Protonix 40 mg oral delayed release tablet: 1 tab(s) orally once a day (15 Apr 2021 09:54)  Senokot 8.6 mg oral tablet: 1 tab(s) orally once a day (at bedtime) (15 Apr 2021 09:54)  tamsulosin 0.4 mg oral capsule: 1 cap(s) orally once a day (15 Apr 2021 09:54)      24HRS EVENT:  7/7  Night  Admitted to SDU for HD monitoring preop  Pending Med/Cards/Hepatology/Nephrology Consults/Clearances  Holding Plavix, all other home meds started  Plan for OR 7/8 ORIF        DVT PTX: HSQ     GI PTX:pantoprazole    Tablet 40 milliGRAM(s) Oral before breakfast      ***Tubes/Lines/Drains  ***  Peripheral IV      REVIEW OF SYSTEMS    [x] A ten-point review of systems was otherwise negative except as noted.  [ ] Due to altered mental status/intubation, subjective information were not able to be obtained from the patient. History was obtained, to the extent possible, from review of the chart and collateral sources of information.        Daily     Daily     Diet, Regular (07-07-21 @ 21:56)  Diet, NPO after Midnight:      NPO Start Date: 07-Jul-2021,   NPO Start Time: 23:59 (07-07-21 @ 21:55)      CURRENT MEDS:  Neurologic Medications  oxyCODONE    IR 5 milliGRAM(s) Oral every 6 hours PRN Moderate Pain (4 - 6)    Respiratory Medications    Cardiovascular Medications  amLODIPine   Tablet 5 milliGRAM(s) Oral daily  metoprolol succinate ER 25 milliGRAM(s) Oral daily  tamsulosin 0.4 milliGRAM(s) Oral at bedtime    Gastrointestinal Medications  dextrose 5%. 1000 milliLiter(s) IV Continuous <Continuous>  lactated ringers. 1000 milliLiter(s) IV Continuous <Continuous>  pantoprazole    Tablet 40 milliGRAM(s) Oral before breakfast  senna 2 Tablet(s) Oral at bedtime    Genitourinary Medications    Hematologic/Oncologic Medications  aspirin enteric coated 325 milliGRAM(s) Oral daily  heparin   Injectable 5000 Unit(s) SubCutaneous every 8 hours  tacrolimus 3 milliGRAM(s) Oral every 12 hours    Antimicrobial/Immunologic Medications    Endocrine/Metabolic Medications  dextrose 40% Gel 15 Gram(s) Oral once  dextrose 50% Injectable 25 Gram(s) IV Push once  fludroCORTISONE 0.1 milliGRAM(s) Oral daily  glucagon  Injectable 1 milliGRAM(s) IntraMuscular once  hydrocortisone sodium succinate Injectable 100 milliGRAM(s) IV Push once  insulin lispro (ADMELOG) corrective regimen sliding scale   SubCutaneous three times a day before meals    Topical/Other Medications  chlorhexidine 4% Liquid 1 Application(s) Topical <User Schedule>      ICU Vital Signs Last 24 Hrs  T(C): 36.7 (07 Jul 2021 21:05), Max: 36.7 (07 Jul 2021 21:05)  T(F): 98.1 (07 Jul 2021 21:05), Max: 98.1 (07 Jul 2021 21:05)  HR: 79 (08 Jul 2021 09:51) (63 - 79)  BP: 109/58 (08 Jul 2021 09:51) (94/52 - 130/64)  BP(mean): --  ABP: --  ABP(mean): --  RR: 16 (08 Jul 2021 09:51) (16 - 20)  SpO2: 99% (08 Jul 2021 09:51) (98% - 100%)      Adult Advanced Hemodynamics Last 24 Hrs  CVP(mm Hg): --  CVP(cm H2O): --  CO: --  CI: --  PA: --  PA(mean): --  PCWP: --  SVR: --  SVRI: --  PVR: --  PVRI: --      I&O's Summary    I&O's Detail      PHYSICAL EXAM:    General/Neuro  AAO: x3  GCS: 15    = E   / V   / M      Deficits:     alert & oriented x 3, no focal deficits    Lungs   clear to auscultation, Normal expansion/effort.     Cardiovascular : S1, S2.  Regular rate and rhythm.  Peripheral edema   Cardiac Rhythm: Normal Sinus Rhythm    GI: Abdomen soft, Non-tender, Non-distended.      Extremities: Extremities warm, pink, well-perfused.     Derm: Good skin turgor, no skin breakdown.      :  Cano catheter in place.      CXR:     LABS:  CAPILLARY BLOOD GLUCOSE      POCT Blood Glucose.: 119 mg/dL (08 Jul 2021 11:02)  POCT Blood Glucose.: 122 mg/dL (08 Jul 2021 08:30)                          8.4    6.27  )-----------( 149      ( 08 Jul 2021 00:46 )             25.8       07-08    134<L>  |  106  |  27<H>  ----------------------------<  112<H>  4.2   |  21  |  1.4    Ca    8.7      08 Jul 2021 00:46  Phos  4.0     07-08  Mg     1.5     07-08    TPro  7.4  /  Alb  4.3  /  TBili  0.7  /  DBili  x   /  AST  16  /  ALT  13  /  AlkPhos  144<H>  07-07      PT/INR - ( 07 Jul 2021 14:10 )   PT: 12.60 sec;   INR: 1.10 ratio         PTT - ( 07 Jul 2021 14:10 )  PTT:30.2 sec  CARDIAC MARKERS ( 08 Jul 2021 05:26 )  x     / 0.02 ng/mL / 31 U/L / x     / <1.0 ng/mL

## 2021-07-08 NOTE — CONSULT NOTE ADULT - ASSESSMENT
s/p  donor liver / kidney transplant (RLQ) 2020 @ [facility]  stable allograft renal function (Cr 1.3 2021)  ESRD / ESLD due to JIMÉNEZ with HRS  Right distal femoral fracture  s/p b/l remote TKR  DM2  CAD / PCI / Afib    borderline low BP's  hypomagnesemia     plan:    cont prograf 0.3mg po bid  check prograf trough in AM  cont florinef 0.1mg po qd  agree with short course of stress dose steroids   con't LR IVF, avoid overaggressive IVF resuscitation   dc amlodipine as BP's low  cont metoprolol  trend renal function and lytes, replete Mg++  no renal contraindication to planned orthopedic surgery  will follow perioperatively   SICU care

## 2021-07-08 NOTE — CHART NOTE - NSCHARTNOTEFT_GEN_A_CORE
GENERAL SURGERY FLOOR TRANSFER NOTE    71y Male 07-07-21 transferred to medicine from trauma service.    SUBJECTIVE:  71M w/PMHx of HTN, HLD, anemia, DM, Afib, JIMÉNEZ s/p liver transplant and right renal transplant at [facility] in February 2020 (follows with his hepatologist Dr. Sunshine), CAD s/p stents (10 years ago) on ASA and Plavix, watchman procedure performed ~3 months ago by Dr. Burdick, history of bilateral knee replacements (21 years ago) presents after being sent in from outpatient Urgent care office for finding of new right distal femur fracture. Patient tripped and fell while trying to get out of his car and felt right knee pain afterwards. He presented to an outpatient clinic for evaluation. XR at outpatient clinic demonstrated distal right femur fracture. He was placed in a knee immobilizer and instructed to present to the ED for further management. Patient is afebrile, HR 61, borderline BP of 93/52, saturating 99% on room air.   On exam his right knee is immobilized, he has flexion and extension of the foot at the ankle joint, +DP and PT pulses, foot warm and well perfused. No other external signs of injury, denies pain anywhere else. He was found to have a right periprosthetic fracture and will require orthopedic intervention. Patient was admitted to trauma service for monitoring overnight but due to the patients extensive medical history including the liver and renal transplant the patient will require transfer to the medical service in addition to his orthopedic injury being fixed in the OR today. He is hemodynamically stable and currently waiting to go the OR.     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 1000 milliLiter(s) IV Continuous <Continuous>  dextrose 50% Injectable 25 Gram(s) IV Push once  fludroCORTISONE 0.1 milliGRAM(s) Oral daily  glucagon  Injectable 1 milliGRAM(s) IntraMuscular once  heparin   Injectable 5000 Unit(s) SubCutaneous every 8 hours  hydrocortisone sodium succinate Injectable 100 milliGRAM(s) IV Push once  insulin lispro (ADMELOG) corrective regimen sliding scale   SubCutaneous three times a day before meals  lactated ringers. 1000 milliLiter(s) IV Continuous <Continuous>  metoprolol succinate ER 25 milliGRAM(s) Oral daily  oxyCODONE    IR 5 milliGRAM(s) Oral every 6 hours PRN  pantoprazole    Tablet 40 milliGRAM(s) Oral before breakfast  senna 2 Tablet(s) Oral at bedtime  tacrolimus 3 milliGRAM(s) Oral every 12 hours  tamsulosin 0.4 milliGRAM(s) Oral at bedtime      OBJECTIVE:    T(C): 37.4 (07-08-21 @ 12:15), Max: 37.4 (07-08-21 @ 12:15)  HR: 79 (07-08-21 @ 12:15) (63 - 79)  BP: 121/67 (07-08-21 @ 12:15) (94/52 - 130/64)  RR: 18 (07-08-21 @ 12:15) (16 - 20)  SpO2: 99% (07-08-21 @ 09:51) (98% - 100%)  Wt(kg): --      I&O's Summary                        7.9    5.55  )-----------( 150      ( 08 Jul 2021 11:15 )             24.6       07-08    x   |  x   |  27<H>  ----------------------------<  118<H>  x    |  22  |  1.4    Ca    8.5      08 Jul 2021 11:15  Phos  3.6     07-08  Mg     1.8     07-08    TPro  7.4  /  Alb  4.3  /  TBili  0.7  /  DBili  x   /  AST  16  /  ALT  13  /  AlkPhos  144<H>  07-07      MEDICATIONS  (STANDING):  amLODIPine   Tablet 5 milliGRAM(s) Oral daily  aspirin enteric coated 325 milliGRAM(s) Oral daily  chlorhexidine 4% Liquid 1 Application(s) Topical <User Schedule>  dextrose 40% Gel 15 Gram(s) Oral once  dextrose 5%. 1000 milliLiter(s) (50 mL/Hr) IV Continuous <Continuous>  dextrose 50% Injectable 25 Gram(s) IV Push once  fludroCORTISONE 0.1 milliGRAM(s) Oral daily  glucagon  Injectable 1 milliGRAM(s) IntraMuscular once  heparin   Injectable 5000 Unit(s) SubCutaneous every 8 hours  hydrocortisone sodium succinate Injectable 100 milliGRAM(s) IV Push once  insulin lispro (ADMELOG) corrective regimen sliding scale   SubCutaneous three times a day before meals  lactated ringers. 1000 milliLiter(s) (125 mL/Hr) IV Continuous <Continuous>  metoprolol succinate ER 25 milliGRAM(s) Oral daily  pantoprazole    Tablet 40 milliGRAM(s) Oral before breakfast  senna 2 Tablet(s) Oral at bedtime  tacrolimus 3 milliGRAM(s) Oral every 12 hours  tamsulosin 0.4 milliGRAM(s) Oral at bedtime    MEDICATIONS  (PRN):  oxyCODONE    IR 5 milliGRAM(s) Oral every 6 hours PRN Moderate Pain (4 - 6)    Complete Blood Count: 23:30 (07-08-21 @ 08:09)  Phosphorus Level, Serum: 23:30 (07-08-21 @ 08:09)  Magnesium, Serum: 23:30 (07-08-21 @ 08:09)  Basic Metabolic Panel: 23:30 (07-08-21 @ 08:09)  Troponin T, Serum: 16:00 (07-08-21 @ 08:09)  CKMB Units: 16:00 (07-08-21 @ 08:09)  Creatine Kinase, Serum: 16:00 (07-08-21 @ 08:09)    Patient accepted for transfer By Dr. Rivera medicine attending   Spoke to resident in Vent unit #[id number] and signed out @[id number]

## 2021-07-08 NOTE — CONSULT NOTE ADULT - SUBJECTIVE AND OBJECTIVE BOX
HPI:  71M w/PMHx of HTN, HLD, anemia, DM, Afib, JIMÉNEZ s/p liver transplant and right renal transplant at [facility] in February 2020 (follows with his hepatologist Dr. Sunshine), CAD s/p stents (10 years ago) on ASA and Plavix, watchman procedure performed ~3 months ago by Dr. Burdick, history of bilateral knee replacements (21 years ago) presents after being sent in from outpatient office for finding of new right distal femur fracture. Patient tripped and fell while trying to get out of his car and felt right knee pain afterwards. He presented to an outpatient clinic for evaluation. XR at outpatient clinic demonstrated distal right femur fracture. He was placed in a knee immobilizer and instructed to present to the ED for further management. Patient is afebrile, HR 61, borderline BP of 93/52, saturating 99% on room air. On exam his right knee is immobilized, he has flexion and extension of the foot at the ankle joint, +DP and PT pulses, foot warm and well perfused. No other external signs of injury, denies pain anywhere else.     Pt reports no dizziness, palpitation or chest pain before or after fall. He reports being able to walk 4-5 blocks before getting SOB or 2-3 flight of stairs. 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 1000 milliLiter(s) (50 mL/Hr) IV Continuous <Continuous>  dextrose 50% Injectable 25 Gram(s) IV Push once  fludroCORTISONE 0.1 milliGRAM(s) Oral daily  glucagon  Injectable 1 milliGRAM(s) IntraMuscular once  heparin   Injectable 5000 Unit(s) SubCutaneous every 8 hours  insulin lispro (ADMELOG) corrective regimen sliding scale   SubCutaneous three times a day before meals  lactated ringers. 1000 milliLiter(s) (125 mL/Hr) IV Continuous <Continuous>  metoprolol succinate ER 25 milliGRAM(s) Oral daily  pantoprazole    Tablet 40 milliGRAM(s) Oral before breakfast  senna 2 Tablet(s) Oral at bedtime  tacrolimus 3 milliGRAM(s) Oral every 12 hours  tamsulosin 0.4 milliGRAM(s) Oral at bedtime    MEDICATIONS  (PRN):  oxyCODONE    IR 5 milliGRAM(s) Oral every 6 hours PRN Moderate Pain (4 - 6)      HOME MEDICATIONS:  Home Medications:  amLODIPine 5 mg oral tablet: 1 tab(s) orally once a day (15 Apr 2021 09:54)  aspirin 325 mg oral delayed release tablet: 1 tab(s) orally once a day (07 Jul 2021 21:29)  fludrocortisone 0.1 mg oral tablet: 1 tab(s) orally once a day (07 Jul 2021 21:28)  Januvia 100 mg oral tablet: 1 tab(s) orally once a day (15 Apr 2021 09:54)  linagliptin 5 mg oral tablet: 1 tab(s) orally once a day (15 Apr 2021 09:54)  magnesium chloride: 800 milligram(s) orally 2 times a day (15 Apr 2021 09:54)  metoprolol succinate 25 mg oral tablet, extended release: 1 tab(s) orally once a day (15 Apr 2021 09:54)  Plavix 75 mg oral tablet: 1 tab(s) orally once a day (07 Jul 2021 21:31)  Prograf 1 mg oral capsule: 1 cap(s) orally every 12 hours (15 Apr 2021 09:54)  Protonix 40 mg oral delayed release tablet: 1 tab(s) orally once a day (15 Apr 2021 09:54)  Senokot 8.6 mg oral tablet: 1 tab(s) orally once a day (at bedtime) (15 Apr 2021 09:54)  tamsulosin 0.4 mg oral capsule: 1 cap(s) orally once a day (15 Apr 2021 09:54)      VITALS:   T(F): 98.1 (07-07 @ 21:05), Max: 98.1 (07-07 @ 21:05)  HR: 73 (07-08 @ 01:44) (61 - 79)  BP: 110/64 (07-08 @ 01:44) (93/52 - 118/66)  BP(mean): --  RR: 16 (07-08 @ 01:44) (16 - 18)  SpO2: 100% (07-08 @ 01:44) (98% - 100%)    I&O's Summary      REVIEW OF SYSTEMS:  CONSTITUTIONAL: No weakness, fevers or chills  EYES: No visual changes  ENT: No vertigo or throat pain   NECK: No pain or stiffness  RESPIRATORY: No cough, wheezing, hemoptysis; No shortness of breath  CARDIOVASCULAR: No chest pain or palpitations  GASTROINTESTINAL: No abdominal or epigastric pain. No nausea, vomiting, or hematemesis; No diarrhea or constipation. No melena or hematochezia.  GENITOURINARY: No dysuria, frequency or hematuria  NEUROLOGICAL: No numbness or weakness  SKIN: No itching, no rashes  MSK: RLE pain    PHYSICAL EXAM:  NEURO: patient is awake , alert and oriented  GEN: Not in acute distress  NECK: no thyroid enlargement, no JVD  LUNGS: Clear to auscultation bilaterally   CARDIOVASCULAR: S1/S2 present, RRR , no murmurs or rubs, no carotid bruits,  + PP bilaterally  ABD: Soft, non-tender, non-distended, +BS  EXT: No MELO, R leg externally rotated  SKIN: Intact    LABS:                        8.4    6.27  )-----------( 149      ( 08 Jul 2021 00:46 )             25.8     07-08    134<L>  |  106  |  27<H>  ----------------------------<  112<H>  4.2   |  21  |  1.4    Ca    8.7      08 Jul 2021 00:46  Phos  4.0     07-08  Mg     1.5     07-08    TPro  7.4  /  Alb  4.3  /  TBili  0.7  /  DBili  x   /  AST  16  /  ALT  13  /  AlkPhos  144<H>  07-07    PT/INR - ( 07 Jul 2021 14:10 )   PT: 12.60 sec;   INR: 1.10 ratio         PTT - ( 07 Jul 2021 14:10 )  PTT:30.2 sec          Troponin trend:            RADIOLOGY:  -CT chest  < from: CT Chest No Cont (07.07.21 @ 16:50) >  Mass like prominence of the right thigh musculature, partially imaged. Given history of distal right femur comminuted fracture noted on femur radiographs 7/7/2021 this is probably related to soft tissue swelling/hematoma    No definite acute intrathoracic or abdominopelvic abnormality on this limited noncontrast examination. However, note intravenous contrast greatly increases sensitivity for detection of solid organ and vascular injury in the setting of trauma.    < end of copied text >    -TTE:  < from: ARPAN w/Probe Placement (04.15.21 @ 08:18) >   1. Left ventricular ejection fraction, by visual estimation, is 45 to 50%.   2. Mildly decreased global left ventricular systolic function.   3. Watchman device in good position. No evidence of leak around the device in SHILA.   4.Mildly enlarged left atrium.   5. Normal right atrial size.    < end of copied text >    -CCTA:  -STRESS TEST:  < from: NM Nuclear Stress Pharmacologic Multiple (09.10.19 @ 14:32) >  1. IV Adenosine Dual Isotope Study which was negative with respect to   symptoms and EKG changes.  2. Myocardial perfusion imaging reveals no fixed no reperfusion defects  3. Gated imaging reveals normal wall motion thickening and ejection   fraction    < end of copied text >    -CATHETERIZATION:    ECG:  Normal sinus rhythm @@ 59 BPM    TELEMETRY EVENTS:   HPI:  71M w/PMHx of HTN, HLD, anemia, DM, Afib, JIMÉNEZ s/p liver transplant and right renal transplant at [facility] in February 2020 (follows with his hepatologist Dr. Sunshine), CAD s/p stents (10 years ago) on ASA and Plavix, watchman procedure performed ~3 months ago by Dr. Burdick, history of bilateral knee replacements (21 years ago) presents after being sent in from outpatient office for finding of new right distal femur fracture. Patient tripped and fell while trying to get out of his car and felt right knee pain afterwards. He presented to an outpatient clinic for evaluation. XR at outpatient clinic demonstrated distal right femur fracture. He was placed in a knee immobilizer and instructed to present to the ED for further management. Patient is afebrile, HR 61, borderline BP of 93/52, saturating 99% on room air. On exam his right knee is immobilized, he has flexion and extension of the foot at the ankle joint, +DP and PT pulses, foot warm and well perfused. No other external signs of injury, denies pain anywhere else.     Pt reports no dizziness, palpitation or chest pain before or after fall. He reports being able to walk 4-5 blocks before getting SOB or 2-3 flight of stairs.       PAST MEDICAL & SURGICAL HISTORY  HTN (hypertension)    High cholesterol    Anemia    Diabetes    Afib    Cirrhosis of liver  JIMÉNEZ    Dyspnea on exertion    BPH (benign prostatic hyperplasia)    Presence of bilateral total knee joint prostheses  15 years ago    S/P angioplasty with stent  2 cardiac stents &gt; 10 years back    Encounter for screening colonoscopy  1 year ago    Organ transplant  liver, 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        FAMILY HISTORY:  FAMILY HISTORY:  Family history of early CAD  mother    FH: ovarian cancer  mother        SOCIAL HISTORY:  []smoker -neg  []Alcohol  []Drug    ALLERGIES:  No Known Allergies      MEDICATIONS:  MEDICATIONS  (STANDING):  amLODIPine   Tablet 5 milliGRAM(s) Oral daily  aspirin enteric coated 325 milliGRAM(s) Oral daily  chlorhexidine 4% Liquid 1 Application(s) Topical <User Schedule>  dextrose 40% Gel 15 Gram(s) Oral once  dextrose 5%. 1000 milliLiter(s) (50 mL/Hr) IV Continuous <Continuous>  dextrose 50% Injectable 25 Gram(s) IV Push once  fludroCORTISONE 0.1 milliGRAM(s) Oral daily  glucagon  Injectable 1 milliGRAM(s) IntraMuscular once  heparin   Injectable 5000 Unit(s) SubCutaneous every 8 hours  insulin lispro (ADMELOG) corrective regimen sliding scale   SubCutaneous three times a day before meals  lactated ringers. 1000 milliLiter(s) (125 mL/Hr) IV Continuous <Continuous>  metoprolol succinate ER 25 milliGRAM(s) Oral daily  pantoprazole    Tablet 40 milliGRAM(s) Oral before breakfast  senna 2 Tablet(s) Oral at bedtime  tacrolimus 3 milliGRAM(s) Oral every 12 hours  tamsulosin 0.4 milliGRAM(s) Oral at bedtime    MEDICATIONS  (PRN):  oxyCODONE    IR 5 milliGRAM(s) Oral every 6 hours PRN Moderate Pain (4 - 6)      HOME MEDICATIONS:  Home Medications:  amLODIPine 5 mg oral tablet: 1 tab(s) orally once a day (15 Apr 2021 09:54)  aspirin 325 mg oral delayed release tablet: 1 tab(s) orally once a day (07 Jul 2021 21:29)  fludrocortisone 0.1 mg oral tablet: 1 tab(s) orally once a day (07 Jul 2021 21:28)  Januvia 100 mg oral tablet: 1 tab(s) orally once a day (15 Apr 2021 09:54)  linagliptin 5 mg oral tablet: 1 tab(s) orally once a day (15 Apr 2021 09:54)  magnesium chloride: 800 milligram(s) orally 2 times a day (15 Apr 2021 09:54)  metoprolol succinate 25 mg oral tablet, extended release: 1 tab(s) orally once a day (15 Apr 2021 09:54)  Plavix 75 mg oral tablet: 1 tab(s) orally once a day (07 Jul 2021 21:31)  Prograf 1 mg oral capsule: 1 cap(s) orally every 12 hours (15 Apr 2021 09:54)  Protonix 40 mg oral delayed release tablet: 1 tab(s) orally once a day (15 Apr 2021 09:54)  Senokot 8.6 mg oral tablet: 1 tab(s) orally once a day (at bedtime) (15 Apr 2021 09:54)  tamsulosin 0.4 mg oral capsule: 1 cap(s) orally once a day (15 Apr 2021 09:54)      VITALS:   T(F): 98.1 (07-07 @ 21:05), Max: 98.1 (07-07 @ 21:05)  HR: 73 (07-08 @ 01:44) (61 - 79)  BP: 110/64 (07-08 @ 01:44) (93/52 - 118/66)  BP(mean): --  RR: 16 (07-08 @ 01:44) (16 - 18)  SpO2: 100% (07-08 @ 01:44) (98% - 100%)    I&O's Summary      REVIEW OF SYSTEMS:  CONSTITUTIONAL: No weakness, fevers or chills  EYES: No visual changes  ENT: No vertigo or throat pain   NECK: No pain or stiffness  RESPIRATORY: No cough, wheezing, hemoptysis; No shortness of breath  CARDIOVASCULAR: No chest pain or palpitations  GASTROINTESTINAL: No abdominal or epigastric pain. No nausea, vomiting, or hematemesis; No diarrhea or constipation. No melena or hematochezia.  GENITOURINARY: No dysuria, frequency or hematuria  NEUROLOGICAL: No numbness or weakness  SKIN: No itching, no rashes  MSK: RLE pain    PHYSICAL EXAM:  NEURO: patient is awake , alert and oriented  GEN: Not in acute distress  NECK: no thyroid enlargement, no JVD  LUNGS: Clear to auscultation bilaterally   CARDIOVASCULAR: S1/S2 present, RRR , no murmurs or rubs, no carotid bruits,  + PP bilaterally  ABD: Soft, non-tender, non-distended, +BS  EXT: No MELO, R leg externally rotated  SKIN: Intact    LABS:                        8.4    6.27  )-----------( 149      ( 08 Jul 2021 00:46 )             25.8     07-08    134<L>  |  106  |  27<H>  ----------------------------<  112<H>  4.2   |  21  |  1.4    Ca    8.7      08 Jul 2021 00:46  Phos  4.0     07-08  Mg     1.5     07-08    TPro  7.4  /  Alb  4.3  /  TBili  0.7  /  DBili  x   /  AST  16  /  ALT  13  /  AlkPhos  144<H>  07-07    PT/INR - ( 07 Jul 2021 14:10 )   PT: 12.60 sec;   INR: 1.10 ratio         PTT - ( 07 Jul 2021 14:10 )  PTT:30.2 sec          Troponin trend:            RADIOLOGY:  -CT chest  < from: CT Chest No Cont (07.07.21 @ 16:50) >  Mass like prominence of the right thigh musculature, partially imaged. Given history of distal right femur comminuted fracture noted on femur radiographs 7/7/2021 this is probably related to soft tissue swelling/hematoma    No definite acute intrathoracic or abdominopelvic abnormality on this limited noncontrast examination. However, note intravenous contrast greatly increases sensitivity for detection of solid organ and vascular injury in the setting of trauma.    < end of copied text >    -TTE:  < from: ARPAN w/Probe Placement (04.15.21 @ 08:18) >   1. Left ventricular ejection fraction, by visual estimation, is 45 to 50%.   2. Mildly decreased global left ventricular systolic function.   3. Watchman device in good position. No evidence of leak around the device in SHILA.   4.Mildly enlarged left atrium.   5. Normal right atrial size.    < end of copied text >    -CCTA:  -STRESS TEST:  < from: NM Nuclear Stress Pharmacologic Multiple (09.10.19 @ 14:32) >  1. IV Adenosine Dual Isotope Study which was negative with respect to   symptoms and EKG changes.  2. Myocardial perfusion imaging reveals no fixed no reperfusion defects  3. Gated imaging reveals normal wall motion thickening and ejection   fraction    < end of copied text >    -CATHETERIZATION:    ECG:  Normal sinus rhythm @@ 59 BPM    TELEMETRY EVENTS:

## 2021-07-08 NOTE — CONSULT NOTE ADULT - SUBJECTIVE AND OBJECTIVE BOX
NEPHROLOGY CONSULTATION NOTE    70 yo male with PMH as below, including s/p liver / kidney  donor transplant 2020 @ [facility], CAD, Afib, DM, b/l remote TKR admitted for Right distal femoral fracture.  Renal consulted for transplant management.  Pt denies recent complications with kidney or liver transplant on prograf.    PAST MEDICAL & SURGICAL HISTORY:  HTN (hypertension)  High cholesterol  Anemia  Diabetes  Afib  Cirrhosis of liver  JIMÉNEZ  Dyspnea on exertion  BPH (benign prostatic hyperplasia)  Presence of bilateral total knee joint prostheses  15 years ago  S/P angioplasty with stent  2 cardiac stents &gt; 10 years back  Encounter for screening colonoscopy  1 year ago  Organ transplant  liver, right kidney 2020      Allergies:  No Known Allergies    Home Medications Reviewed    SOCIAL HISTORY:  Denies ETOH,Smoking,   FAMILY HISTORY:  Family history of early CAD  mother    FH: ovarian cancer  mother      REVIEW OF SYSTEMS:  CONSTITUTIONAL: No weakness, fevers or chills  EYES/ENT: No visual changes;  No vertigo or throat pain   NECK: No pain or stiffness  RESPIRATORY: No cough, wheezing, hemoptysis; No shortness of breath  CARDIOVASCULAR: No chest pain or palpitations.  GASTROINTESTINAL: No abdominal or epigastric pain. No nausea, vomiting, or hematemesis; No diarrhea or constipation. No melena or hematochezia.  GENITOURINARY: No dysuria, frequency, foamy urine, urinary urgency, incontinence or hematuria  NEUROLOGICAL: No numbness or weakness  SKIN: No itching, burning, rashes, or lesions   VASCULAR: No bilateral lower extremity edema.   All other review of systems is negative unless indicated above.    PHYSICAL EXAM:  Constitutional: NAD  HEENT: anicteric sclera, oropharynx clear, dry mm  Neck: No JVD  Respiratory: CTAB, no wheezes, rales or rhonchi  Cardiovascular: S1, S2, RRR  Gastrointestinal: BS+, soft, NT/ND + scar  Extremities: No cyanosis or clubbing. No peripheral edema + RLE immobilizer  Neurological: A/O x 3, no focal deficits  Psychiatric: Normal mood, normal affect  : No CVA tenderness. No fole  Skin: No rashes    Hospital Medications:   MEDICATIONS  (STANDING):  amLODIPine   Tablet 5 milliGRAM(s) Oral daily  aspirin enteric coated 325 milliGRAM(s) Oral daily  chlorhexidine 4% Liquid 1 Application(s) Topical <User Schedule>  dextrose 40% Gel 15 Gram(s) Oral once  dextrose 5%. 1000 milliLiter(s) (50 mL/Hr) IV Continuous <Continuous>  dextrose 50% Injectable 25 Gram(s) IV Push once  fludroCORTISONE 0.1 milliGRAM(s) Oral daily  glucagon  Injectable 1 milliGRAM(s) IntraMuscular once  heparin   Injectable 5000 Unit(s) SubCutaneous every 8 hours  hydrocortisone sodium succinate Injectable 100 milliGRAM(s) IV Push once  insulin lispro (ADMELOG) corrective regimen sliding scale   SubCutaneous three times a day before meals  lactated ringers. 1000 milliLiter(s) (125 mL/Hr) IV Continuous <Continuous>  metoprolol succinate ER 25 milliGRAM(s) Oral daily  morphine  - Injectable 2 milliGRAM(s) IV Push once  pantoprazole    Tablet 40 milliGRAM(s) Oral before breakfast  senna 2 Tablet(s) Oral at bedtime  tacrolimus 3 milliGRAM(s) Oral every 12 hours  tamsulosin 0.4 milliGRAM(s) Oral at bedtime        VITALS:  T(F): 98.1 (21 @ 21:05), Max: 98.1 (21 @ 21:05)  HR: 79 (21 @ 09:51)  BP: 109/58 (21 @ 09:51)  RR: 16 (21 @ 09:51)  SpO2: 99% (21 @ 09:51)  Wt(kg): --    Height (cm): 180.3 ( @ :34)  Weight (kg): 81.6 ( @ :34)  BMI (kg/m2): 25.1 (:34)  BSA (m2): 2.02 (:34)    LABS:      134<L>  |  106  |  27<H>  ----------------------------<  112<H>  4.2   |  21  |  1.4    Ca    8.7      2021 00:46  Phos  4.0       Mg     1.5         TPro  7.4  /  Alb  4.3  /  TBili  0.7  /  DBili      /  AST  16  /  ALT  13  /  AlkPhos  144<H>                            8.4    6.27  )-----------( 149      ( 2021 00:46 )             25.8       Urine Studies:        RADIOLOGY & ADDITIONAL STUDIES:

## 2021-07-08 NOTE — CONSULT NOTE ADULT - SUBJECTIVE AND OBJECTIVE BOX
History:  72 yo M pt w/ a relevant hx of b/l TKR coming in after he fell while trying to get out of his son's car (states that his right knee was weak and gave out). Had pain at and proximal to the rt knee thereafter. He was seen in an out-pt clinic and was found to have a distal femoral fracture; was placed in a knee immobilizer and was referred to the ED for further evaluation. Describes pain as aching, precipitated by the fall, mild to moderate in intensity at the time of this evaluation, non-radiating, alleviated by rest and analgesics, aggravated by movement and wt bearing. Denies other concerns/complaints.    ROS:  Constitutional: no fevers; no chills  Eyes: no conjunctivitis; no itching  ENT: no dysphagia; no odynophagia  CVS: no PND; no orthopnea; no chest pain  Resp: no SOB; no coughing  GI: no nausea; no vomiting; no diarrhea; no abd pain  : no dysuria; no hematuria  MSK: +pain as above  Skin: no rashes; no ulcers  Neuro: no focal weakness; no headache  All other systems reviewed and are negative    Medications (in-patient):  amLODIPine   Tablet 5 milliGRAM(s) Oral daily  aspirin enteric coated 325 milliGRAM(s) Oral daily  chlorhexidine 4% Liquid 1 Application(s) Topical <User Schedule>  dextrose 40% Gel 15 Gram(s) Oral once  dextrose 5%. 1000 milliLiter(s) (50 mL/Hr) IV Continuous <Continuous>  dextrose 50% Injectable 25 Gram(s) IV Push once  fludroCORTISONE 0.1 milliGRAM(s) Oral daily  glucagon  Injectable 1 milliGRAM(s) IntraMuscular once  heparin   Injectable 5000 Unit(s) SubCutaneous every 8 hours  hydrocortisone sodium succinate Injectable 100 milliGRAM(s) IV Push once  insulin lispro (ADMELOG) corrective regimen sliding scale   SubCutaneous three times a day before meals  lactated ringers. 1000 milliLiter(s) (125 mL/Hr) IV Continuous <Continuous>  metoprolol succinate ER 25 milliGRAM(s) Oral daily  pantoprazole    Tablet 40 milliGRAM(s) Oral before breakfast  senna 2 Tablet(s) Oral at bedtime  tacrolimus 3 milliGRAM(s) Oral every 12 hours  tamsulosin 0.4 milliGRAM(s) Oral at bedtime  oxyCODONE    IR 5 milliGRAM(s) Oral every 6 hours PRN Moderate Pain (4 - 6)     History:  70 yo M pt w/ a relevant hx of b/l TKR coming in after he fell while trying to get out of his son's car (states that his right knee was weak and gave out). Had pain at and proximal to the rt knee thereafter. He was seen in an out-pt clinic and was found to have a distal femoral fracture; was placed in a knee immobilizer and was referred to the ED for further evaluation. Describes pain as aching, precipitated by the fall, mild to moderate in intensity at the time of this evaluation, non-radiating, alleviated by rest and analgesics, aggravated by movement and wt bearing. Denies other concerns/complaints.    ROS:  Constitutional: no fevers; no chills  Eyes: no conjunctivitis; no itching  ENT: no dysphagia; no odynophagia  CVS: no PND; no orthopnea; no chest pain  Resp: no SOB; no coughing  GI: no nausea; no vomiting; no diarrhea; no abd pain  : no dysuria; no hematuria  MSK: +pain as above  Skin: no rashes; no ulcers  Neuro: no focal weakness; no headache  All other systems reviewed and are negative    Medications (in-patient):  amLODIPine   Tablet 5 milliGRAM(s) Oral daily  aspirin enteric coated 325 milliGRAM(s) Oral daily  chlorhexidine 4% Liquid 1 Application(s) Topical <User Schedule>  dextrose 40% Gel 15 Gram(s) Oral once  dextrose 5%. 1000 milliLiter(s) (50 mL/Hr) IV Continuous <Continuous>  dextrose 50% Injectable 25 Gram(s) IV Push once  fludroCORTISONE 0.1 milliGRAM(s) Oral daily  glucagon  Injectable 1 milliGRAM(s) IntraMuscular once  heparin   Injectable 5000 Unit(s) SubCutaneous every 8 hours  hydrocortisone sodium succinate Injectable 100 milliGRAM(s) IV Push once  insulin lispro (ADMELOG) corrective regimen sliding scale   SubCutaneous three times a day before meals  lactated ringers. 1000 milliLiter(s) (125 mL/Hr) IV Continuous <Continuous>  metoprolol succinate ER 25 milliGRAM(s) Oral daily  pantoprazole    Tablet 40 milliGRAM(s) Oral before breakfast  senna 2 Tablet(s) Oral at bedtime  tacrolimus 3 milliGRAM(s) Oral every 12 hours  tamsulosin 0.4 milliGRAM(s) Oral at bedtime  oxyCODONE    IR 5 milliGRAM(s) Oral every 6 hours PRN Moderate Pain (4 - 6)    Exam:  Vitals: BP = 130/64; P = 78; T = 98.1; RR = 17; SpO2 > 95   General: appears stated age; cooperative  Eyes: anicteric sclera; moist conjunctiva; PERRL; EOMI  HENT: NC/AT; clear oropharynx w/ moist mucous membranes; nL hard/soft palate  Neck: supple w/ FROM; trachea midline  Lungs: no tachypnea, accessory muscle usage, wheezing, rhonci or rales  CVS: RRR; S1 and S2 w/o MRGs  Abd: BS+; soft; non-tender to palpation x 4; no masses or HSM  Ext: +swelling rt thigh; pulses palpable distally  Skin: normal temp, turgor and texture; no rashes, ulcers or nodules  Neuro: CN II-XII intact; able to move all extremities (limited due to pain)  Psych: appropriate affect; alert and oriented to person, place, time and situation    Labs reviewed:   H&H 8.4/25.8  Na 134  BUN/Cr 27/1.4  Mg 1.5    Imaging reviewed:  Comminuted fracture of distal rt femur w/ 3 cm override between major fragments  Involvement of rt knee arthroplasty  Large hematoma and edema within vastus intermedius    EKG reviewed:   Sinus bradycardia

## 2021-07-08 NOTE — PRE PROCEDURE NOTE - PRE PROCEDURE EVALUATION
ORTHOPEDIC SURGERY PRE OP NOTE      Diagnosis: right periprosthetic femur fracture    Planned Procedure: right distal femur ORIF, possible DFR    Consent: TO BE OBTAINED BY ATTENDING                   Risks/benefits/alternatives were discussed with the patient/family and they wish to proceed with surgery.       ANTICIPATED DATE OF PROCEDURE : 6/8  SCHEDULED CASE OR ADD-ON CASE: add on      Consent:     Clearances:   pending medicine and cardiology clearance    T(C): 36.7 (07-07-21 @ 21:05), Max: 36.7 (07-07-21 @ 11:34)  HR: 70 (07-08-21 @ 06:39) (61 - 79)  BP: 94/52 (07-08-21 @ 06:39) (93/52 - 118/66)  RR: 16 (07-08-21 @ 06:39) (16 - 20)  SpO2: 99% (07-08-21 @ 06:39) (98% - 100%)    Labs:                        8.4    6.27  )-----------( 149      ( 08 Jul 2021 00:46 )             25.8     07-08    134<L>  |  106  |  27<H>  ----------------------------<  112<H>  4.2   |  21  |  1.4    Ca    8.7      08 Jul 2021 00:46  Phos  4.0     07-08  Mg     1.5     07-08    TPro  7.4  /  Alb  4.3  /  TBili  0.7  /  DBili  x   /  AST  16  /  ALT  13  /  AlkPhos  144<H>  07-07    PT/INR - ( 07 Jul 2021 14:10 )   PT: 12.60 sec;   INR: 1.10 ratio         PTT - ( 07 Jul 2021 14:10 )  PTT:30.2 sec  Type and Screen X 2:    COVID-19 PCR: NotDetec (07 Jul 2021 14:25)    [x]EKG  [x]CXR      DIET: NPO after midnight  IVF: per primary team      ANTICOAGULATION STATUS ( include name of anticoagulant) :  Continue aspirin  Hold plavix                      A/P: Patient is a 71y y/o Male Pending right distal femur ORIF today    [x] -NPO and IVF @ midnight  [x]pain control/analgesia prn per primary team   [x]Incentive Spirometry   [ ]F/U Clearance  [ ]F/U Pending Labs  [ ]Notify Ortho with any questions- spectra [id number]    [x]DISCUSSED WITH PRIMARY TEAM MEMBER  ORTHOPEDIC SURGERY PRE OP NOTE      Diagnosis: right periprosthetic femur fracture    Planned Procedure: right distal femur ORIF, possible DFR    Consent: TO BE OBTAINED BY ATTENDING                   Risks/benefits/alternatives were discussed with the patient/family and they wish to proceed with surgery.       ANTICIPATED DATE OF PROCEDURE : 6/8  SCHEDULED CASE OR ADD-ON CASE: add on      Consent: to be obtained in pre op     Clearances:   [x]medical clearance- intermediate risk   [x]cardiology clearance- Moderate risk patient for Moderate risk surgery/procedure  [x]nephrology clearance- no contraindication for surgery    T(C): 36.7 (07-07-21 @ 21:05), Max: 36.7 (07-07-21 @ 11:34)  HR: 70 (07-08-21 @ 06:39) (61 - 79)  BP: 94/52 (07-08-21 @ 06:39) (93/52 - 118/66)  RR: 16 (07-08-21 @ 06:39) (16 - 20)  SpO2: 99% (07-08-21 @ 06:39) (98% - 100%)    Labs:                        8.4    6.27  )-----------( 149      ( 08 Jul 2021 00:46 )             25.8     07-08    134<L>  |  106  |  27<H>  ----------------------------<  112<H>  4.2   |  21  |  1.4    Ca    8.7      08 Jul 2021 00:46  Phos  4.0     07-08  Mg     1.5     07-08    TPro  7.4  /  Alb  4.3  /  TBili  0.7  /  DBili  x   /  AST  16  /  ALT  13  /  AlkPhos  144<H>  07-07    PT/INR - ( 07 Jul 2021 14:10 )   PT: 12.60 sec;   INR: 1.10 ratio         PTT - ( 07 Jul 2021 14:10 )  PTT:30.2 sec  [x]Type and Screen X 2: O POSITIVE     COVID-19 PCR: NotDetec (07 Jul 2021 14:25)    [x]EKG  [x]CXR      DIET: NPO after midnight  IVF: per primary team      ANTICOAGULATION STATUS ( include name of anticoagulant) :  Continue aspirin  Hold plavix                      A/P: Patient is a 71y y/o Male Pending right distal femur ORIF today    [x] -NPO and IVF @ midnight  [x]pain control/analgesia prn per primary team   [x]Incentive Spirometry   [x]F/U Clearance--> cleared by medicine, nephrology, cardiology  [x]F/U Pending Labs--> no pending labs     Notify Ortho with any questions- spectra [id number]    [x]DISCUSSED WITH PRIMARY TEAM MEMBER

## 2021-07-09 ENCOUNTER — RESULT REVIEW (OUTPATIENT)
Age: 71
End: 2021-07-09

## 2021-07-09 LAB
ALBUMIN SERPL ELPH-MCNC: 3.2 G/DL — LOW (ref 3.5–5.2)
ALBUMIN SERPL ELPH-MCNC: 3.4 G/DL — LOW (ref 3.5–5.2)
ALP SERPL-CCNC: 104 U/L — SIGNIFICANT CHANGE UP (ref 30–115)
ALP SERPL-CCNC: 108 U/L — SIGNIFICANT CHANGE UP (ref 30–115)
ALT FLD-CCNC: 7 U/L — SIGNIFICANT CHANGE UP (ref 0–41)
ALT FLD-CCNC: 7 U/L — SIGNIFICANT CHANGE UP (ref 0–41)
ANION GAP SERPL CALC-SCNC: 7 MMOL/L — SIGNIFICANT CHANGE UP (ref 7–14)
ANION GAP SERPL CALC-SCNC: 8 MMOL/L — SIGNIFICANT CHANGE UP (ref 7–14)
ANION GAP SERPL CALC-SCNC: 9 MMOL/L — SIGNIFICANT CHANGE UP (ref 7–14)
APPEARANCE UR: CLEAR — SIGNIFICANT CHANGE UP
AST SERPL-CCNC: 11 U/L — SIGNIFICANT CHANGE UP (ref 0–41)
AST SERPL-CCNC: 11 U/L — SIGNIFICANT CHANGE UP (ref 0–41)
BACTERIA # UR AUTO: NEGATIVE — SIGNIFICANT CHANGE UP
BASOPHILS # BLD AUTO: 0.02 K/UL — SIGNIFICANT CHANGE UP (ref 0–0.2)
BASOPHILS # BLD AUTO: 0.03 K/UL — SIGNIFICANT CHANGE UP (ref 0–0.2)
BASOPHILS NFR BLD AUTO: 0.3 % — SIGNIFICANT CHANGE UP (ref 0–1)
BASOPHILS NFR BLD AUTO: 0.6 % — SIGNIFICANT CHANGE UP (ref 0–1)
BILIRUB SERPL-MCNC: 0.7 MG/DL — SIGNIFICANT CHANGE UP (ref 0.2–1.2)
BILIRUB SERPL-MCNC: 0.9 MG/DL — SIGNIFICANT CHANGE UP (ref 0.2–1.2)
BILIRUB UR-MCNC: NEGATIVE — SIGNIFICANT CHANGE UP
BUN SERPL-MCNC: 26 MG/DL — HIGH (ref 10–20)
BUN SERPL-MCNC: 26 MG/DL — HIGH (ref 10–20)
BUN SERPL-MCNC: 27 MG/DL — HIGH (ref 10–20)
CALCIUM SERPL-MCNC: 7.9 MG/DL — LOW (ref 8.5–10.1)
CALCIUM SERPL-MCNC: 8.2 MG/DL — LOW (ref 8.5–10.1)
CALCIUM SERPL-MCNC: 8.6 MG/DL — SIGNIFICANT CHANGE UP (ref 8.5–10.1)
CHLORIDE SERPL-SCNC: 105 MMOL/L — SIGNIFICANT CHANGE UP (ref 98–110)
CHLORIDE SERPL-SCNC: 105 MMOL/L — SIGNIFICANT CHANGE UP (ref 98–110)
CHLORIDE SERPL-SCNC: 107 MMOL/L — SIGNIFICANT CHANGE UP (ref 98–110)
CO2 SERPL-SCNC: 22 MMOL/L — SIGNIFICANT CHANGE UP (ref 17–32)
CO2 SERPL-SCNC: 23 MMOL/L — SIGNIFICANT CHANGE UP (ref 17–32)
CO2 SERPL-SCNC: 23 MMOL/L — SIGNIFICANT CHANGE UP (ref 17–32)
COLOR SPEC: YELLOW — SIGNIFICANT CHANGE UP
COVID-19 SPIKE DOMAIN AB INTERP: POSITIVE
COVID-19 SPIKE DOMAIN ANTIBODY RESULT: 12.7 U/ML — HIGH
CREAT SERPL-MCNC: 1.3 MG/DL — SIGNIFICANT CHANGE UP (ref 0.7–1.5)
CREAT SERPL-MCNC: 1.3 MG/DL — SIGNIFICANT CHANGE UP (ref 0.7–1.5)
CREAT SERPL-MCNC: 1.4 MG/DL — SIGNIFICANT CHANGE UP (ref 0.7–1.5)
DIFF PNL FLD: NEGATIVE — SIGNIFICANT CHANGE UP
EOSINOPHIL # BLD AUTO: 0.03 K/UL — SIGNIFICANT CHANGE UP (ref 0–0.7)
EOSINOPHIL # BLD AUTO: 0.08 K/UL — SIGNIFICANT CHANGE UP (ref 0–0.7)
EOSINOPHIL NFR BLD AUTO: 0.4 % — SIGNIFICANT CHANGE UP (ref 0–8)
EOSINOPHIL NFR BLD AUTO: 1.5 % — SIGNIFICANT CHANGE UP (ref 0–8)
EPI CELLS # UR: 1 /HPF — SIGNIFICANT CHANGE UP (ref 0–5)
GLUCOSE BLDC GLUCOMTR-MCNC: 108 MG/DL — HIGH (ref 70–99)
GLUCOSE BLDC GLUCOMTR-MCNC: 110 MG/DL — HIGH (ref 70–99)
GLUCOSE BLDC GLUCOMTR-MCNC: 127 MG/DL — HIGH (ref 70–99)
GLUCOSE BLDC GLUCOMTR-MCNC: 145 MG/DL — HIGH (ref 70–99)
GLUCOSE BLDC GLUCOMTR-MCNC: 161 MG/DL — HIGH (ref 70–99)
GLUCOSE SERPL-MCNC: 100 MG/DL — HIGH (ref 70–99)
GLUCOSE SERPL-MCNC: 132 MG/DL — HIGH (ref 70–99)
GLUCOSE SERPL-MCNC: 97 MG/DL — SIGNIFICANT CHANGE UP (ref 70–99)
GLUCOSE UR QL: NEGATIVE — SIGNIFICANT CHANGE UP
HCT VFR BLD CALC: 21.4 % — LOW (ref 42–52)
HCT VFR BLD CALC: 23.2 % — LOW (ref 42–52)
HCT VFR BLD CALC: 23.3 % — LOW (ref 42–52)
HCT VFR BLD CALC: 23.7 % — LOW (ref 42–52)
HCT VFR BLD CALC: 23.8 % — LOW (ref 42–52)
HGB BLD-MCNC: 7 G/DL — LOW (ref 14–18)
HGB BLD-MCNC: 7.6 G/DL — LOW (ref 14–18)
HGB BLD-MCNC: 7.6 G/DL — LOW (ref 14–18)
HGB BLD-MCNC: 7.8 G/DL — LOW (ref 14–18)
HGB BLD-MCNC: 7.9 G/DL — LOW (ref 14–18)
HYALINE CASTS # UR AUTO: 5 /LPF — SIGNIFICANT CHANGE UP (ref 0–7)
IMM GRANULOCYTES NFR BLD AUTO: 0.4 % — HIGH (ref 0.1–0.3)
IMM GRANULOCYTES NFR BLD AUTO: 0.4 % — HIGH (ref 0.1–0.3)
KETONES UR-MCNC: ABNORMAL
LEUKOCYTE ESTERASE UR-ACNC: NEGATIVE — SIGNIFICANT CHANGE UP
LYMPHOCYTES # BLD AUTO: 1.7 K/UL — SIGNIFICANT CHANGE UP (ref 1.2–3.4)
LYMPHOCYTES # BLD AUTO: 1.71 K/UL — SIGNIFICANT CHANGE UP (ref 1.2–3.4)
LYMPHOCYTES # BLD AUTO: 25.3 % — SIGNIFICANT CHANGE UP (ref 20.5–51.1)
LYMPHOCYTES # BLD AUTO: 31.4 % — SIGNIFICANT CHANGE UP (ref 20.5–51.1)
MAGNESIUM SERPL-MCNC: 1.9 MG/DL — SIGNIFICANT CHANGE UP (ref 1.8–2.4)
MAGNESIUM SERPL-MCNC: 2 MG/DL — SIGNIFICANT CHANGE UP (ref 1.8–2.4)
MCHC RBC-ENTMCNC: 27.1 PG — SIGNIFICANT CHANGE UP (ref 27–31)
MCHC RBC-ENTMCNC: 27.2 PG — SIGNIFICANT CHANGE UP (ref 27–31)
MCHC RBC-ENTMCNC: 27.5 PG — SIGNIFICANT CHANGE UP (ref 27–31)
MCHC RBC-ENTMCNC: 27.9 PG — SIGNIFICANT CHANGE UP (ref 27–31)
MCHC RBC-ENTMCNC: 28.4 PG — SIGNIFICANT CHANGE UP (ref 27–31)
MCHC RBC-ENTMCNC: 32.6 G/DL — SIGNIFICANT CHANGE UP (ref 32–37)
MCHC RBC-ENTMCNC: 32.7 G/DL — SIGNIFICANT CHANGE UP (ref 32–37)
MCHC RBC-ENTMCNC: 32.8 G/DL — SIGNIFICANT CHANGE UP (ref 32–37)
MCHC RBC-ENTMCNC: 32.8 G/DL — SIGNIFICANT CHANGE UP (ref 32–37)
MCHC RBC-ENTMCNC: 33.3 G/DL — SIGNIFICANT CHANGE UP (ref 32–37)
MCV RBC AUTO: 82.6 FL — SIGNIFICANT CHANGE UP (ref 80–94)
MCV RBC AUTO: 83.5 FL — SIGNIFICANT CHANGE UP (ref 80–94)
MCV RBC AUTO: 83.7 FL — SIGNIFICANT CHANGE UP (ref 80–94)
MCV RBC AUTO: 83.9 FL — SIGNIFICANT CHANGE UP (ref 80–94)
MCV RBC AUTO: 86.6 FL — SIGNIFICANT CHANGE UP (ref 80–94)
MONOCYTES # BLD AUTO: 0.66 K/UL — HIGH (ref 0.1–0.6)
MONOCYTES # BLD AUTO: 0.7 K/UL — HIGH (ref 0.1–0.6)
MONOCYTES NFR BLD AUTO: 10.4 % — HIGH (ref 1.7–9.3)
MONOCYTES NFR BLD AUTO: 12.1 % — HIGH (ref 1.7–9.3)
NEUTROPHILS # BLD AUTO: 2.95 K/UL — SIGNIFICANT CHANGE UP (ref 1.4–6.5)
NEUTROPHILS # BLD AUTO: 4.24 K/UL — SIGNIFICANT CHANGE UP (ref 1.4–6.5)
NEUTROPHILS NFR BLD AUTO: 54 % — SIGNIFICANT CHANGE UP (ref 42.2–75.2)
NEUTROPHILS NFR BLD AUTO: 63.2 % — SIGNIFICANT CHANGE UP (ref 42.2–75.2)
NITRITE UR-MCNC: NEGATIVE — SIGNIFICANT CHANGE UP
NRBC # BLD: 0 /100 WBCS — SIGNIFICANT CHANGE UP (ref 0–0)
PH UR: 6 — SIGNIFICANT CHANGE UP (ref 5–8)
PHOSPHATE SERPL-MCNC: 4.1 MG/DL — SIGNIFICANT CHANGE UP (ref 2.1–4.9)
PLATELET # BLD AUTO: 104 K/UL — LOW (ref 130–400)
PLATELET # BLD AUTO: 121 K/UL — LOW (ref 130–400)
PLATELET # BLD AUTO: 132 K/UL — SIGNIFICANT CHANGE UP (ref 130–400)
PLATELET # BLD AUTO: 134 K/UL — SIGNIFICANT CHANGE UP (ref 130–400)
PLATELET # BLD AUTO: 135 K/UL — SIGNIFICANT CHANGE UP (ref 130–400)
POTASSIUM SERPL-MCNC: 4.5 MMOL/L — SIGNIFICANT CHANGE UP (ref 3.5–5)
POTASSIUM SERPL-MCNC: 4.8 MMOL/L — SIGNIFICANT CHANGE UP (ref 3.5–5)
POTASSIUM SERPL-MCNC: 5.2 MMOL/L — HIGH (ref 3.5–5)
POTASSIUM SERPL-SCNC: 4.5 MMOL/L — SIGNIFICANT CHANGE UP (ref 3.5–5)
POTASSIUM SERPL-SCNC: 4.8 MMOL/L — SIGNIFICANT CHANGE UP (ref 3.5–5)
POTASSIUM SERPL-SCNC: 5.2 MMOL/L — HIGH (ref 3.5–5)
PROT SERPL-MCNC: 5.6 G/DL — LOW (ref 6–8)
PROT SERPL-MCNC: 6.2 G/DL — SIGNIFICANT CHANGE UP (ref 6–8)
PROT UR-MCNC: ABNORMAL
RBC # BLD: 2.55 M/UL — LOW (ref 4.7–6.1)
RBC # BLD: 2.68 M/UL — LOW (ref 4.7–6.1)
RBC # BLD: 2.79 M/UL — LOW (ref 4.7–6.1)
RBC # BLD: 2.83 M/UL — LOW (ref 4.7–6.1)
RBC # BLD: 2.88 M/UL — LOW (ref 4.7–6.1)
RBC # FLD: 13.7 % — SIGNIFICANT CHANGE UP (ref 11.5–14.5)
RBC # FLD: 13.8 % — SIGNIFICANT CHANGE UP (ref 11.5–14.5)
RBC # FLD: 13.9 % — SIGNIFICANT CHANGE UP (ref 11.5–14.5)
RBC # FLD: 14 % — SIGNIFICANT CHANGE UP (ref 11.5–14.5)
RBC # FLD: 14.1 % — SIGNIFICANT CHANGE UP (ref 11.5–14.5)
RBC CASTS # UR COMP ASSIST: 5 /HPF — HIGH (ref 0–4)
SARS-COV-2 IGG+IGM SERPL QL IA: 12.7 U/ML — HIGH
SARS-COV-2 IGG+IGM SERPL QL IA: POSITIVE
SODIUM SERPL-SCNC: 136 MMOL/L — SIGNIFICANT CHANGE UP (ref 135–146)
SODIUM SERPL-SCNC: 136 MMOL/L — SIGNIFICANT CHANGE UP (ref 135–146)
SODIUM SERPL-SCNC: 137 MMOL/L — SIGNIFICANT CHANGE UP (ref 135–146)
SP GR SPEC: 1.03 — HIGH (ref 1.01–1.03)
TACROLIMUS SERPL-MCNC: 14 NG/ML — SIGNIFICANT CHANGE UP
UROBILINOGEN FLD QL: SIGNIFICANT CHANGE UP
WBC # BLD: 5.43 K/UL — SIGNIFICANT CHANGE UP (ref 4.8–10.8)
WBC # BLD: 5.45 K/UL — SIGNIFICANT CHANGE UP (ref 4.8–10.8)
WBC # BLD: 6.52 K/UL — SIGNIFICANT CHANGE UP (ref 4.8–10.8)
WBC # BLD: 6.72 K/UL — SIGNIFICANT CHANGE UP (ref 4.8–10.8)
WBC # BLD: 7.1 K/UL — SIGNIFICANT CHANGE UP (ref 4.8–10.8)
WBC # FLD AUTO: 5.43 K/UL — SIGNIFICANT CHANGE UP (ref 4.8–10.8)
WBC # FLD AUTO: 5.45 K/UL — SIGNIFICANT CHANGE UP (ref 4.8–10.8)
WBC # FLD AUTO: 6.52 K/UL — SIGNIFICANT CHANGE UP (ref 4.8–10.8)
WBC # FLD AUTO: 6.72 K/UL — SIGNIFICANT CHANGE UP (ref 4.8–10.8)
WBC # FLD AUTO: 7.1 K/UL — SIGNIFICANT CHANGE UP (ref 4.8–10.8)
WBC UR QL: 3 /HPF — SIGNIFICANT CHANGE UP (ref 0–5)

## 2021-07-09 PROCEDURE — 71045 X-RAY EXAM CHEST 1 VIEW: CPT | Mod: 26

## 2021-07-09 PROCEDURE — 88304 TISSUE EXAM BY PATHOLOGIST: CPT | Mod: 26

## 2021-07-09 PROCEDURE — 93010 ELECTROCARDIOGRAM REPORT: CPT

## 2021-07-09 PROCEDURE — 99233 SBSQ HOSP IP/OBS HIGH 50: CPT

## 2021-07-09 PROCEDURE — 93010 ELECTROCARDIOGRAM REPORT: CPT | Mod: 77

## 2021-07-09 PROCEDURE — 71045 X-RAY EXAM CHEST 1 VIEW: CPT | Mod: 26,77

## 2021-07-09 RX ORDER — SODIUM CHLORIDE 9 MG/ML
1000 INJECTION, SOLUTION INTRAVENOUS
Refills: 0 | Status: DISCONTINUED | OUTPATIENT
Start: 2021-07-09 | End: 2021-07-09

## 2021-07-09 RX ORDER — DILTIAZEM HCL 120 MG
5 CAPSULE, EXT RELEASE 24 HR ORAL
Qty: 125 | Refills: 0 | Status: DISCONTINUED | OUTPATIENT
Start: 2021-07-09 | End: 2021-07-10

## 2021-07-09 RX ORDER — PANTOPRAZOLE SODIUM 20 MG/1
40 TABLET, DELAYED RELEASE ORAL
Refills: 0 | Status: DISCONTINUED | OUTPATIENT
Start: 2021-07-09 | End: 2021-07-13

## 2021-07-09 RX ORDER — METOPROLOL TARTRATE 50 MG
25 TABLET ORAL
Refills: 0 | Status: DISCONTINUED | OUTPATIENT
Start: 2021-07-09 | End: 2021-07-10

## 2021-07-09 RX ORDER — SODIUM CHLORIDE 9 MG/ML
1000 INJECTION, SOLUTION INTRAVENOUS ONCE
Refills: 0 | Status: COMPLETED | OUTPATIENT
Start: 2021-07-09 | End: 2021-07-09

## 2021-07-09 RX ORDER — TACROLIMUS 5 MG/1
3 CAPSULE ORAL EVERY 12 HOURS
Refills: 0 | Status: DISCONTINUED | OUTPATIENT
Start: 2021-07-09 | End: 2021-07-14

## 2021-07-09 RX ORDER — MORPHINE SULFATE 50 MG/1
2 CAPSULE, EXTENDED RELEASE ORAL ONCE
Refills: 0 | Status: DISCONTINUED | OUTPATIENT
Start: 2021-07-09 | End: 2021-07-09

## 2021-07-09 RX ORDER — SENNA PLUS 8.6 MG/1
2 TABLET ORAL AT BEDTIME
Refills: 0 | Status: DISCONTINUED | OUTPATIENT
Start: 2021-07-09 | End: 2021-07-14

## 2021-07-09 RX ORDER — OXYCODONE HYDROCHLORIDE 5 MG/1
10 TABLET ORAL EVERY 6 HOURS
Refills: 0 | Status: DISCONTINUED | OUTPATIENT
Start: 2021-07-09 | End: 2021-07-14

## 2021-07-09 RX ORDER — HEPARIN SODIUM 5000 [USP'U]/ML
5000 INJECTION INTRAVENOUS; SUBCUTANEOUS EVERY 8 HOURS
Refills: 0 | Status: DISCONTINUED | OUTPATIENT
Start: 2021-07-10 | End: 2021-07-10

## 2021-07-09 RX ORDER — HEPARIN SODIUM 5000 [USP'U]/ML
5000 INJECTION INTRAVENOUS; SUBCUTANEOUS EVERY 8 HOURS
Refills: 0 | Status: DISCONTINUED | OUTPATIENT
Start: 2021-07-09 | End: 2021-07-09

## 2021-07-09 RX ORDER — CEFEPIME 1 G/1
1000 INJECTION, POWDER, FOR SOLUTION INTRAMUSCULAR; INTRAVENOUS EVERY 12 HOURS
Refills: 0 | Status: DISCONTINUED | OUTPATIENT
Start: 2021-07-09 | End: 2021-07-12

## 2021-07-09 RX ORDER — OXYCODONE HYDROCHLORIDE 5 MG/1
5 TABLET ORAL EVERY 6 HOURS
Refills: 0 | Status: DISCONTINUED | OUTPATIENT
Start: 2021-07-09 | End: 2021-07-09

## 2021-07-09 RX ORDER — DILTIAZEM HCL 120 MG
5 CAPSULE, EXT RELEASE 24 HR ORAL ONCE
Refills: 0 | Status: COMPLETED | OUTPATIENT
Start: 2021-07-09 | End: 2021-07-09

## 2021-07-09 RX ORDER — DILTIAZEM HCL 120 MG
10 CAPSULE, EXT RELEASE 24 HR ORAL
Qty: 125 | Refills: 0 | Status: DISCONTINUED | OUTPATIENT
Start: 2021-07-09 | End: 2021-07-09

## 2021-07-09 RX ORDER — DEXTROSE 50 % IN WATER 50 %
15 SYRINGE (ML) INTRAVENOUS ONCE
Refills: 0 | Status: DISCONTINUED | OUTPATIENT
Start: 2021-07-09 | End: 2021-07-14

## 2021-07-09 RX ORDER — INSULIN LISPRO 100/ML
VIAL (ML) SUBCUTANEOUS
Refills: 0 | Status: DISCONTINUED | OUTPATIENT
Start: 2021-07-09 | End: 2021-07-14

## 2021-07-09 RX ORDER — ASPIRIN/CALCIUM CARB/MAGNESIUM 324 MG
325 TABLET ORAL DAILY
Refills: 0 | Status: DISCONTINUED | OUTPATIENT
Start: 2021-07-10 | End: 2021-07-14

## 2021-07-09 RX ORDER — ACETAMINOPHEN 500 MG
650 TABLET ORAL EVERY 6 HOURS
Refills: 0 | Status: DISCONTINUED | OUTPATIENT
Start: 2021-07-09 | End: 2021-07-14

## 2021-07-09 RX ORDER — MORPHINE SULFATE 50 MG/1
2 CAPSULE, EXTENDED RELEASE ORAL EVERY 6 HOURS
Refills: 0 | Status: DISCONTINUED | OUTPATIENT
Start: 2021-07-09 | End: 2021-07-09

## 2021-07-09 RX ORDER — TAMSULOSIN HYDROCHLORIDE 0.4 MG/1
0.4 CAPSULE ORAL AT BEDTIME
Refills: 0 | Status: DISCONTINUED | OUTPATIENT
Start: 2021-07-09 | End: 2021-07-14

## 2021-07-09 RX ORDER — CEFAZOLIN SODIUM 1 G
2000 VIAL (EA) INJECTION EVERY 8 HOURS
Refills: 0 | Status: DISCONTINUED | OUTPATIENT
Start: 2021-07-09 | End: 2021-07-09

## 2021-07-09 RX ORDER — HYDROMORPHONE HYDROCHLORIDE 2 MG/ML
0.5 INJECTION INTRAMUSCULAR; INTRAVENOUS; SUBCUTANEOUS
Refills: 0 | Status: DISCONTINUED | OUTPATIENT
Start: 2021-07-09 | End: 2021-07-09

## 2021-07-09 RX ORDER — ASPIRIN/CALCIUM CARB/MAGNESIUM 324 MG
325 TABLET ORAL DAILY
Refills: 0 | Status: DISCONTINUED | OUTPATIENT
Start: 2021-07-09 | End: 2021-07-09

## 2021-07-09 RX ORDER — SODIUM CHLORIDE 9 MG/ML
1500 INJECTION INTRAMUSCULAR; INTRAVENOUS; SUBCUTANEOUS ONCE
Refills: 0 | Status: COMPLETED | OUTPATIENT
Start: 2021-07-09 | End: 2021-07-09

## 2021-07-09 RX ORDER — GLUCAGON INJECTION, SOLUTION 0.5 MG/.1ML
1 INJECTION, SOLUTION SUBCUTANEOUS ONCE
Refills: 0 | Status: DISCONTINUED | OUTPATIENT
Start: 2021-07-09 | End: 2021-07-14

## 2021-07-09 RX ORDER — SODIUM CHLORIDE 9 MG/ML
250 INJECTION INTRAMUSCULAR; INTRAVENOUS; SUBCUTANEOUS ONCE
Refills: 0 | Status: COMPLETED | OUTPATIENT
Start: 2021-07-09 | End: 2021-07-09

## 2021-07-09 RX ORDER — METOPROLOL TARTRATE 50 MG
25 TABLET ORAL DAILY
Refills: 0 | Status: DISCONTINUED | OUTPATIENT
Start: 2021-07-09 | End: 2021-07-09

## 2021-07-09 RX ORDER — ONDANSETRON 8 MG/1
4 TABLET, FILM COATED ORAL ONCE
Refills: 0 | Status: DISCONTINUED | OUTPATIENT
Start: 2021-07-09 | End: 2021-07-09

## 2021-07-09 RX ORDER — NOREPINEPHRINE BITARTRATE/D5W 8 MG/250ML
0.05 PLASTIC BAG, INJECTION (ML) INTRAVENOUS
Qty: 8 | Refills: 0 | Status: DISCONTINUED | OUTPATIENT
Start: 2021-07-09 | End: 2021-07-10

## 2021-07-09 RX ORDER — DEXTROSE 50 % IN WATER 50 %
25 SYRINGE (ML) INTRAVENOUS ONCE
Refills: 0 | Status: DISCONTINUED | OUTPATIENT
Start: 2021-07-09 | End: 2021-07-14

## 2021-07-09 RX ORDER — VANCOMYCIN HCL 1 G
2000 VIAL (EA) INTRAVENOUS ONCE
Refills: 0 | Status: COMPLETED | OUTPATIENT
Start: 2021-07-09 | End: 2021-07-09

## 2021-07-09 RX ORDER — FLUDROCORTISONE ACETATE 0.1 MG/1
0.1 TABLET ORAL DAILY
Refills: 0 | Status: DISCONTINUED | OUTPATIENT
Start: 2021-07-09 | End: 2021-07-10

## 2021-07-09 RX ORDER — ACETAMINOPHEN 500 MG
650 TABLET ORAL ONCE
Refills: 0 | Status: COMPLETED | OUTPATIENT
Start: 2021-07-09 | End: 2021-07-09

## 2021-07-09 RX ORDER — SODIUM CHLORIDE 9 MG/ML
500 INJECTION, SOLUTION INTRAVENOUS ONCE
Refills: 0 | Status: COMPLETED | OUTPATIENT
Start: 2021-07-09 | End: 2021-07-09

## 2021-07-09 RX ADMIN — CHLORHEXIDINE GLUCONATE 1 APPLICATION(S): 213 SOLUTION TOPICAL at 05:53

## 2021-07-09 RX ADMIN — Medication 10 MG/HR: at 18:40

## 2021-07-09 RX ADMIN — SODIUM CHLORIDE 500 MILLILITER(S): 9 INJECTION, SOLUTION INTRAVENOUS at 15:33

## 2021-07-09 RX ADMIN — Medication 100 MILLIGRAM(S): at 14:04

## 2021-07-09 RX ADMIN — Medication 650 MILLIGRAM(S): at 23:20

## 2021-07-09 RX ADMIN — MORPHINE SULFATE 2 MILLIGRAM(S): 50 CAPSULE, EXTENDED RELEASE ORAL at 18:48

## 2021-07-09 RX ADMIN — HYDROMORPHONE HYDROCHLORIDE 0.5 MILLIGRAM(S): 2 INJECTION INTRAMUSCULAR; INTRAVENOUS; SUBCUTANEOUS at 13:04

## 2021-07-09 RX ADMIN — OXYCODONE HYDROCHLORIDE 5 MILLIGRAM(S): 5 TABLET ORAL at 07:06

## 2021-07-09 RX ADMIN — HYDROMORPHONE HYDROCHLORIDE 0.5 MILLIGRAM(S): 2 INJECTION INTRAMUSCULAR; INTRAVENOUS; SUBCUTANEOUS at 13:55

## 2021-07-09 RX ADMIN — TAMSULOSIN HYDROCHLORIDE 0.4 MILLIGRAM(S): 0.4 CAPSULE ORAL at 21:45

## 2021-07-09 RX ADMIN — Medication 650 MILLIGRAM(S): at 17:13

## 2021-07-09 RX ADMIN — Medication 5 MILLIGRAM(S): at 15:33

## 2021-07-09 RX ADMIN — SODIUM CHLORIDE 75 MILLILITER(S): 9 INJECTION, SOLUTION INTRAVENOUS at 06:06

## 2021-07-09 RX ADMIN — Medication 5 MG/HR: at 22:11

## 2021-07-09 RX ADMIN — SODIUM CHLORIDE 3000 MILLILITER(S): 9 INJECTION INTRAMUSCULAR; INTRAVENOUS; SUBCUTANEOUS at 22:10

## 2021-07-09 RX ADMIN — Medication 25 MILLIGRAM(S): at 18:00

## 2021-07-09 RX ADMIN — SODIUM CHLORIDE 2000 MILLILITER(S): 9 INJECTION, SOLUTION INTRAVENOUS at 20:33

## 2021-07-09 RX ADMIN — HYDROMORPHONE HYDROCHLORIDE 0.5 MILLIGRAM(S): 2 INJECTION INTRAMUSCULAR; INTRAVENOUS; SUBCUTANEOUS at 13:25

## 2021-07-09 RX ADMIN — SODIUM CHLORIDE 1000 MILLILITER(S): 9 INJECTION INTRAMUSCULAR; INTRAVENOUS; SUBCUTANEOUS at 18:52

## 2021-07-09 RX ADMIN — OXYCODONE HYDROCHLORIDE 10 MILLIGRAM(S): 5 TABLET ORAL at 16:42

## 2021-07-09 RX ADMIN — Medication 25 MILLIGRAM(S): at 06:04

## 2021-07-09 RX ADMIN — OXYCODONE HYDROCHLORIDE 10 MILLIGRAM(S): 5 TABLET ORAL at 17:13

## 2021-07-09 RX ADMIN — Medication 650 MILLIGRAM(S): at 22:20

## 2021-07-09 RX ADMIN — Medication 650 MILLIGRAM(S): at 15:33

## 2021-07-09 RX ADMIN — TACROLIMUS 3 MILLIGRAM(S): 5 CAPSULE ORAL at 18:00

## 2021-07-09 RX ADMIN — HYDROMORPHONE HYDROCHLORIDE 0.5 MILLIGRAM(S): 2 INJECTION INTRAMUSCULAR; INTRAVENOUS; SUBCUTANEOUS at 13:10

## 2021-07-09 RX ADMIN — Medication 650 MILLIGRAM(S): at 18:48

## 2021-07-09 RX ADMIN — CEFEPIME 100 MILLIGRAM(S): 1 INJECTION, POWDER, FOR SOLUTION INTRAMUSCULAR; INTRAVENOUS at 18:00

## 2021-07-09 RX ADMIN — SENNA PLUS 2 TABLET(S): 8.6 TABLET ORAL at 21:45

## 2021-07-09 RX ADMIN — SODIUM CHLORIDE 100 MILLILITER(S): 9 INJECTION, SOLUTION INTRAVENOUS at 11:30

## 2021-07-09 RX ADMIN — Medication 250 MILLIGRAM(S): at 18:23

## 2021-07-09 RX ADMIN — TACROLIMUS 3 MILLIGRAM(S): 5 CAPSULE ORAL at 05:52

## 2021-07-09 NOTE — CHART NOTE - NSCHARTNOTEFT_GEN_A_CORE
Was called by RN for Fever of 100 and .  Pt seen at bedside shivering. Alert and oriented. Pt was complaining of feeling cold.  Discussed with Dr. Rivera   -blood cultures sent stat  -UA and Urine cx sent  -CXR  -MRSA swab  -Incentive spirometry  -Iv cardizem 5 mg IV push  -Oxycodone increased from 5 to 10 for pain Was called by RN for Fever of 100.8 and .  Pt seen at bedside shivering. Alert and oriented. Pt was complaining of feeling cold.  Discussed with Dr. Rivera   -blood cultures sent stat  -UA and Urine cx sent  -CXR  -MRSA swab  -Incentive spirometry  -Iv cardizem 5 mg IV push  -Oxycodone increased from 5 to 10 for pain  -change Antibiotics from cefazolin to cefepime and vancomycin  -F/u with ID  -hold amlodipine for now  -start on sliding scale Was called by RN for Fever of 100.8 and .  Pt seen at bedside shivering. Alert and oriented. Pt was complaining of feeling cold.  Discussed with Dr. Rivera   -blood cultures sent stat  -UA and Urine cx sent  -CXR  -MRSA swab  -Incentive spirometry  -Iv cardizem 5 mg IV push  -Oxycodone increased from 5 to 10 for pain  -change Antibiotics from cefazolin to cefepime and vancomycin  -F/u with ID  -hold amlodipine for now  -start on sliding scale      Addendum:  Pt remained tachycardic   cardizem drip started at 10 mg/hr

## 2021-07-09 NOTE — BRIEF OPERATIVE NOTE - OPERATION/FINDINGS
Periprosthetic distal femur fracture, retained TKA prosthesis  Synthes 14 hole distal femur plate Periprosthetic distal femur fracture, retained TKA prosthesis; post op - NWB x 6-10 weeks; Abx and DVT ppx; ROM of as tolerated  Dict:  Implants: Synthes 14 hole obey angle condyle femur plate; 6 cannulated locking, 6 solid cannulated Periprosthetic distal femur fracture, retained TKA prosthesis; post op - NWB x 6-10 weeks; Abx and DVT ppx; ROM of as tolerated  Dict:  Implants: Synthes 14 hole obey angle condyle femur plate; 6 cannulated 5mm locking, 7 solid 5mm Periprosthetic distal femur fracture, retained TKA prosthesis; post op - NWB x 6-10 weeks; Abx and DVT ppx; ROM of as tolerated  Dict:[id number]  Implants: Synthes 14 hole obey angle condyle femur plate; 6 cannulated 5mm locking, 7 solid 5mm

## 2021-07-09 NOTE — PROGRESS NOTE ADULT - SUBJECTIVE AND OBJECTIVE BOX
Orthopaedic Surgery Postoperative Check Note    Procedure: R femur ORIF  EBL: 700  Volume replacement: 1500 crystalloid, 2U PRBC    Pt S&E after above procedure. Pt resting comfortably. Pain well controlled. Denies f/c/cp/sob/n/v/d. He was transfered to  due to tachycardia and intermittent fever.    Vitals:  T(C): 38.8 (07-09-21 @ 18:41), Max: 38.8 (07-09-21 @ 18:41)  HR: 130 (07-09-21 @ 18:41) (69 - 135)  BP: 106/63 (07-09-21 @ 18:41) (101/64 - 133/61)  RR: 18 (07-09-21 @ 14:50) (10 - 32)  SpO2: 99% (07-09-21 @ 14:30) (98% - 100%)    P/E:  NAD, Awake, alert  Nonlabored breathing    RLE  Dressing in place, c/d/i  Compartments soft/compressible, no pain w/ passive stretch  SILT sp/dp/t/sural/saph  Firing ta/ehl/fhl/gs  DP pulse 2+, foot wwp    Labs:                        7.9    6.52  )-----------( 132      ( 09 Jul 2021 17:26 )             23.7       A/P:   Pt in stable condition after above procedure, currently with tachycardia and intermittent fevers but asymptomatic    Weight bearing: NWB RLE  AM labs  DVT ppx: chem, scds  Postop abx for 24 hrs  Diet ok  Pain control  Home medications  PT/OT/rehab  Rest of care per primary medical team  Please page Ortho w/ any questions/concerns

## 2021-07-09 NOTE — CHART NOTE - NSCHARTNOTEFT_GEN_A_CORE
PACU ANESTHESIA ADMISSION NOTE      Procedure: ORIF, fracture, femur, periprosthetic      Post op diagnosis:  Periprosthetic supracondylar fracture of femur          __x__  Patent Airway    __x__  Full return of protective reflexes    _x___  Full recovery from anesthesia / back to baseline     Vitals:   T: 99.0          R:  10                BP:118/73                  Sat:  98%                 P: 120      Mental Status:  __x__ Awake   _____ Alert   _____ Drowsy   _____ Sedated    Nausea/Vomiting:  ____ NO  ______Yes,   See Post - Op Orders          Pain Scale (0-10):  _____    Treatment: __x__ None    ___x_ See Post - Op/PCA Orders    Post - Operative Fluids:   ____ Oral   __x__ See Post - Op Orders    Plan: Discharge:   ____Home       __x___Floor     _____Critical Care    _____  Other:_________________    Comments: Pt tolerated procedure well, no anesthesia related complications. Care of pt endorsed to PACU, report given to PACU RN. Discharge when criteria are met.

## 2021-07-09 NOTE — PROGRESS NOTE ADULT - ASSESSMENT
s/p  donor liver / kidney transplant (RLQ) 2020 @ [facility]  stable allograft renal function (Cr 1.3 2021)  ESRD / ESLD due to JIMÉNEZ with HRS  Right distal femoral fracture  s/p b/l remote TKR  DM2  CAD / PCI / Afib        plan:    cont prograf 0.3mg po bid  check prograf trough in AM  cont florinef 0.1mg po qd  short course of stress dose steroids with rapid taper  gentle hydration periop  off amlodipine    cont metoprolol  full code  d/w resident

## 2021-07-09 NOTE — CONSULT NOTE ADULT - ASSESSMENT
IMPRESSION: Rehab of right femur fx, s/p orif    PRECAUTIONS: [   ] Cardiac  [   ] Respiratory  [   ] Seizures [   ] Contact Isolation  [   ] Droplet Isolation  [   ] Other    Weight Bearing Status: NWB RLE    RECOMMENDATION:    Out of Bed to Chair     DVT/Decubiti Prophylaxis    REHAB PLAN:     [  x  ] Bedside P/T 3-5 times a week   [x    ]   Bedside O/T  2-3 times a week             [    ] Speech Therapy               [    ]  No Rehab Therapy Indicated   Conditioning/ROM                                    ADL  Bed Mobility                                               Conditioning/ROM  Transfers                                                     Bed Mobility  Sitting /Standing Balance                         Transfers                                        Gait Training                                               Sitting/Standing Balance  Stair Training [   ]Applicable                    Home equipment Eval                                                                        Splinting  [   ] Only      GOALS:   ADL   [  x  ]   Independent                    Transfers  [  x  ] Independent                          Ambulation  [  x  ] Independent     [  x   ] With device                            [    ]  CG                                                         [    ]  CG                                                                  [    ] CG                            [    ] Min A                                                   [    ] Min A                                                              [    ] Min  A          DISCHARGE PLAN:   [    ]  Good candidate for Intensive Rehabilitation/Hospital based                                             Will tolerate 3hrs Intensive Rehab Daily                                       [     ]  Short Term Rehab in Skilled Nursing Facility                                       [     ]  Home with Outpatient or  services                                         [  x   ]  Possible Candidate for Intensive Hospital based Rehab

## 2021-07-09 NOTE — CONSULT NOTE ADULT - SUBJECTIVE AND OBJECTIVE BOX
HPI:  71M w/PMHx of HTN, HLD, anemia, DM, Afib, JIMÉNEZ s/p liver transplant and right renal transplant at [facility] in February 2020 (follows with his hepatologist Dr. Sunshine), CAD s/p stents (10 years ago) on ASA and Plavix, watchman procedure performed ~3 months ago by Dr. Burdick, history of bilateral knee replacements (21 years ago) presents after being sent in from outpatient office for finding of new right distal femur fracture. Patient tripped and fell while trying to get out of his car and felt right knee pain afterwards. He presented to an outpatient clinic for evaluation. XR at outpatient clinic demonstrated distal right femur fracture. He was placed in a knee immobilizer and instructed to present to the ED for further management. Patient is afebrile, HR 61, borderline BP of 93/52, saturating 99% on room air. On exam his right knee is immobilized, he has flexion and extension of the foot at the ankle joint, +DP and PT pulses, foot warm and well perfused. No other external signs of injury, denies pain anywhere else. s/p orif right distal femur fx on 7/9, NWB         PAST MEDICAL & SURGICAL HISTORY:  HTN (hypertension)    High cholesterol    Anemia    Diabetes    Afib    Cirrhosis of liver  JIMÉNEZ    Dyspnea on exertion    BPH (benign prostatic hyperplasia)    Presence of bilateral total knee joint prostheses  15 years ago    S/P angioplasty with stent  2 cardiac stents &gt; 10 years back    Encounter for screening colonoscopy  1 year ago    Organ transplant  liver, 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        Hospital Course:    TODAY'S SUBJECTIVE & REVIEW OF SYMPTOMS:     Constitutional WNL   Cardio WNL   Resp WNL   GI WNL  Heme WNL  Endo WNL  Skin WNL  MSK pain  Neuro WNL  Cognitive WNL  Psych WNL      MEDICATIONS  (STANDING):  ceFAZolin   IVPB 2000 milliGRAM(s) IV Intermittent every 8 hours  dextrose 40% Gel 15 Gram(s) Oral once  dextrose 50% Injectable 25 Gram(s) IV Push once  fludroCORTISONE 0.1 milliGRAM(s) Oral daily  glucagon  Injectable 1 milliGRAM(s) IntraMuscular once  insulin lispro (ADMELOG) corrective regimen sliding scale   SubCutaneous three times a day before meals  metoprolol succinate ER 25 milliGRAM(s) Oral daily  pantoprazole    Tablet 40 milliGRAM(s) Oral before breakfast  senna 2 Tablet(s) Oral at bedtime  tacrolimus 3 milliGRAM(s) Oral every 12 hours  tamsulosin 0.4 milliGRAM(s) Oral at bedtime    MEDICATIONS  (PRN):  acetaminophen   Tablet .. 650 milliGRAM(s) Oral every 6 hours PRN Temp greater or equal to 38C (100.4F), Mild Pain (1 - 3)  oxyCODONE    IR 5 milliGRAM(s) Oral every 6 hours PRN Moderate Pain (4 - 6)      FAMILY HISTORY:  Family history of early CAD  mother    FH: ovarian cancer  mother        Allergies    No Known Allergies    Intolerances        SOCIAL HISTORY:    [  ] Etoh  [  ] Smoking  [  ] Substance abuse     Home Environment:  [   ] Home Alone  [ x  ] Lives with Family  [   ] Home Health Aid    Dwelling:  [   ] Apartment  [ x  ] Private House  [   ] Adult Home  [   ] Skilled Nursing Facility      [   ] Short Term  [   ] Long Term  [ x  ] Stairs       Elevator [   ]    FUNCTIONAL STATUS PTA: (Check all that apply)  Ambulation: [  x  ]Independent    [   ] Dependent     [   ] Non-Ambulatory  Assistive Device: [   ] SA Cane  [   ]  Q Cane  [   ] Walker  [   ]  Wheelchair  ADL : [ x  ] Independent  [    ]  Dependent       Vital Signs Last 24 Hrs  T(C): 38.2 (09 Jul 2021 14:50), Max: 38.2 (09 Jul 2021 14:50)  T(F): 100.8 (09 Jul 2021 14:50), Max: 100.8 (09 Jul 2021 14:50)  HR: 130 (09 Jul 2021 14:50) (69 - 135)  BP: 120/60 (09 Jul 2021 14:50) (101/64 - 133/66)  BP(mean): 80 (09 Jul 2021 12:15) (79 - 92)  RR: 18 (09 Jul 2021 14:50) (10 - 32)  SpO2: 99% (09 Jul 2021 14:30) (98% - 100%)      PHYSICAL EXAM: Awake & Alert  GENERAL: NAD  HEAD:  Normocephalic  CHEST/LUNG: Clear   HEART: S1S2+  ABDOMEN: Soft, Nontender  EXTREMITIES:  no calf tenderness    NERVOUS SYSTEM:  Cranial Nerves 2-12 intact [   ] Abnormal  [   ]  ROM: WFL all extremities [   ]  Abnormal [  x ]limited RLE   Motor Strength: WFL all extremities  [   ]  Abnormal [  x ]limited RLE  Sensation: intact to light touch [  x ] Abnormal [   ]    FUNCTIONAL STATUS:  Bed Mobility: Independent [   ]  Supervision [   ]  Needs Assistance [ x  ]  N/A [   ]  Transfers: Independent [   ]  Supervision [   ]  Needs Assistance [x   ]  N/A [   ]   Ambulation: Independent [   ]  Supervision [   ]  Needs Assistance [   ]  N/A [   ]  ADL: Independent [   ] Requires Assistance [   ] N/A [   ]      LABS:                        7.6    7.10  )-----------( 104      ( 09 Jul 2021 13:10 )             23.2     07-09    136  |  105  |  26<H>  ----------------------------<  132<H>  5.2<H>   |  23  |  1.3    Ca    7.9<L>      09 Jul 2021 13:10  Phos  4.1     07-09  Mg     1.9     07-09    TPro  5.6<L>  /  Alb  3.2<L>  /  TBili  0.7  /  DBili  x   /  AST  11  /  ALT  7   /  AlkPhos  104  07-09          RADIOLOGY & ADDITIONAL STUDIES:

## 2021-07-09 NOTE — CHART NOTE - NSCHARTNOTEFT_GEN_A_CORE
MICU Transfer Note    Transfer from: MICU  Transfer to:  (  ) Medicine    ( X ) Telemetry    (  ) RCU    (  ) Palliative    (  ) Stroke Unit    (  ) _______________  Accepting physican:      MICU COURSE:   71M w/ PMHx of HTN, HLD, anemia, DM, Afib, JIMÉNEZ s/p liver transplant and right renal transplant at [facility] in February 2020 (follows with his hepatologist Dr. Sunshine), CAD s/p stents (10 years ago) on ASA and Plavix, watchman procedure performed ~3 months ago by Dr. Burdick, history of bilateral knee replacements (21 years ago) presents after being sent in from outpatient Urgent care office for finding of new right distal femur fracture.    He was transferred to Step down over night while waiting for surgery. He received 1 unit of pRBC overnight for Hgb of 7.8. AM Hgb was 7.2, not responsive. He went for ortho procedure in am with no complications. In PACU, he was in Afib RVR to 130s-140s. He came back down to 110s. This is most likely due to pain. No metoprolol push was given. Goal is to optimize pain control for now. To be admitted to tele for monitoring.     For Follow-Up:  - Optimize Pain control   - Check prograf am trough!!!  - Post-surgery management of fracture per Ortho   - F/U 8pm CBC for Hgb. If <7, transfuse.       ASSESSMENT & PLAN:   #Acute comminuted fracture of the distal rt femur  - CT 7/8: Comminuted fracture of distal rt femur w/ 3 cm override between major fragments, Involvement of rt knee TKA, Large hematoma within the vastus intermedius  - For OR today     #Acute on chronic normocytic anemia possibly 2/2 blood loss:  -Possibly 2/2 above mentioned hematoma  -Component of anemia of chronic disease  - Hgb 7.8 > 7.2 after 1 unit pRBC not responsive  - FOR OR procedure today.  - Transfuse if Hgb <7.0   - Trend CBC     #S/p liver transplant (hx of JIMÉNEZ)  # S/p renal transplant (rt sided at [facility] in 02/2020)  -Nephro on board, following   - c/w Prograf 0.3mg PO BID   - F/U AM prograf trough  - cont florinef 0.1mg po qd  - c/w short course of stress dose steroids   - cont metoprolol  - d/c'd home amlodipine for hypotension   - trend renal function and lytes, replete Mg++  - no renal contraindication to planned orthopedic surgery    # Hx of paroxysmal A-fib (s/p SHILA closure not on anti-coag)  - s/p watchman   - no anti-coagulation  - home rate controlled: metoprolol extended release 25mg QD  - He was in Afib RVR to 130s-140s post procedure in PACU and has now come down to 110s-120s. Likely due to poor pain control. Admit to Tele service.   - Consider metoprolol 5mg push if HR not controlled.  #Hypomagnesemia  -Resolved  - 1.9 today    # Hx of CAD (is on DAPT) s/p stents and watchman  - ARPAN 4/15/21: EF 45-50% Global LV systolic function was mildly decreased  - Continue Aspirin and Plavix for watchman   - Cardiology evaluated on 7/8 for procedure: Patient is Class III risk 10.1% risk (30-day risk of death, MI, or cardiac arrest). No cardiac contraindication for procedure.    #Mild hyponatremia  -134 on 7/8  - Resolved,     #CKD 3 (renal function is likely at baseline)  -At baseline 1.4  - Hx of right renal transplant at [facility] in Feb 2020   - Avoid nephrotoxins     # Hx of HTN - chronic  - D/C'd home amlodipine for hypotension per nephro rec  - Normotensive this am     # Hx of dyslipidemia - chronic  - not on any home statin      #Hx of DM  - A1c 7/8: 5.3   - Home Januvia 100mg, holding  - Home  Linagliptin 5 mg QD, holding    #BPH  - c/w tamsulosin 0.4mg     Diet: NPO for procedure  GI prophylaxis: Pantoprazole  DVT Prophylaxis: SCDs         Vital Signs Last 24 Hrs  T(C): 37.2 (09 Jul 2021 11:30), Max: 37.4 (08 Jul 2021 16:27)  T(F): 99 (09 Jul 2021 11:30), Max: 99.4 (08 Jul 2021 16:27)  HR: 128 (09 Jul 2021 14:00) (69 - 135)  BP: 104/57 (09 Jul 2021 13:30) (101/64 - 133/66)  BP(mean): 80 (09 Jul 2021 12:15) (79 - 92)  RR: 11 (09 Jul 2021 14:00) (10 - 32)  SpO2: 100% (09 Jul 2021 14:00) (98% - 100%)  I&O's Summary    08 Jul 2021 07:01  -  09 Jul 2021 07:00  --------------------------------------------------------  IN: 876 mL / OUT: 400 mL / NET: 476 mL    09 Jul 2021 07:01  -  09 Jul 2021 13:58  --------------------------------------------------------  IN: 50 mL / OUT: 0 mL / NET: 50 mL          MEDICATIONS  (STANDING):  ceFAZolin   IVPB 2000 milliGRAM(s) IV Intermittent every 8 hours  dextrose 40% Gel 15 Gram(s) Oral once  dextrose 50% Injectable 25 Gram(s) IV Push once  fludroCORTISONE 0.1 milliGRAM(s) Oral daily  glucagon  Injectable 1 milliGRAM(s) IntraMuscular once  insulin lispro (ADMELOG) corrective regimen sliding scale   SubCutaneous three times a day before meals  lactated ringers. 1000 milliLiter(s) (100 mL/Hr) IV Continuous <Continuous>  metoprolol succinate ER 25 milliGRAM(s) Oral daily  pantoprazole    Tablet 40 milliGRAM(s) Oral before breakfast  senna 2 Tablet(s) Oral at bedtime  tacrolimus 3 milliGRAM(s) Oral every 12 hours  tamsulosin 0.4 milliGRAM(s) Oral at bedtime    MEDICATIONS  (PRN):  HYDROmorphone  Injectable 0.5 milliGRAM(s) IV Push every 10 minutes PRN Moderate Pain (4 - 6)  ondansetron Injectable 4 milliGRAM(s) IV Push once PRN Nausea and/or Vomiting  oxyCODONE    IR 5 milliGRAM(s) Oral every 6 hours PRN Moderate Pain (4 - 6)        LABS                                            7.6                   Neurophils% (auto):   x      (07-09 @ 13:10):    7.10 )-----------(104          Lymphocytes% (auto):  x                                             23.2                   Eosinphils% (auto):   x        Manual%: Neutrophils x    ; Lymphocytes x    ; Eosinophils x    ; Bands%: x    ; Blasts x                                    136    |  105    |  26                  Calcium: 7.9   / iCa: x      (07-09 @ 13:10)    ----------------------------<  132       Magnesium: x                                5.2     |  23     |  1.3              Phosphorous: x        TPro  5.6    /  Alb  3.2    /  TBili  0.7    /  DBili  x      /  AST  11     /  ALT  7      /  AlkPhos  104    09 Jul 2021 13:10 MICU Transfer Note    Transfer from: MICU  Transfer to:  (  ) Medicine    ( X ) Telemetry    (  ) RCU    (  ) Palliative    (  ) Stroke Unit    (  ) _______________  Accepting physican:      MICU COURSE:   71M w/ PMHx of HTN, HLD, anemia, DM, Afib, JIMÉNEZ s/p liver transplant and right renal transplant at [facility] in February 2020 (follows with his hepatologist Dr. Sunshine), CAD s/p stents (10 years ago) on ASA and Plavix, watchman procedure performed ~3 months ago by Dr. Burdick, history of bilateral knee replacements (21 years ago) presents after being sent in from outpatient Urgent care office for finding of new right distal femur fracture.    He was transferred to Step down over night while waiting for surgery. He received 1 unit of pRBC overnight for Hgb of 7.8. AM Hgb was 7.2, not responsive. He went for ortho procedure in am with no complications. In PACU, he was in Afib RVR to 130s-140s. He came back down to 110s. This is most likely due to pain. No metoprolol push was given. Goal is to optimize pain control for now. To be admitted to tele for monitoring.     For Follow-Up:  - Optimize Pain control   - Check prograf am trough!!!  - Post-surgery management of fracture per Ortho   - F/U 8pm CBC for Hgb. If <7, transfuse.   - C/w antibiotics Cefazolin q8    ASSESSMENT & PLAN:   #Acute comminuted fracture of the distal rt femur  - CT 7/8: Comminuted fracture of distal rt femur w/ 3 cm override between major fragments, Involvement of rt knee TKA, Large hematoma within the vastus intermedius  - For OR today     #Acute on chronic normocytic anemia possibly 2/2 blood loss:  -Possibly 2/2 above mentioned hematoma  -Component of anemia of chronic disease  - Hgb 7.8 > 7.2 after 1 unit pRBC not responsive  - FOR OR procedure today.  - Transfuse if Hgb <7.0   - Trend CBC     #S/p liver transplant (hx of JIMÉNEZ)  # S/p renal transplant (rt sided at [facility] in 02/2020)  -Nephro on board, following   - c/w Prograf 0.3mg PO BID   - F/U AM prograf trough  - cont florinef 0.1mg po qd  - c/w short course of stress dose steroids   - cont metoprolol  - d/c'd home amlodipine for hypotension   - trend renal function and lytes, replete Mg++  - no renal contraindication to planned orthopedic surgery    # Hx of paroxysmal A-fib (s/p SHILA closure not on anti-coag)  - s/p watchman   - no anti-coagulation  - home rate controlled: metoprolol extended release 25mg QD  - He was in Afib RVR to 130s-140s post procedure in PACU and has now come down to 110s-120s. Likely due to poor pain control. Admit to Tele service.   - Consider metoprolol 5mg push if HR not controlled.  #Hypomagnesemia  -Resolved  - 1.9 today    # Hx of CAD (is on DAPT) s/p stents and watchman  - ARPAN 4/15/21: EF 45-50% Global LV systolic function was mildly decreased  - Continue Aspirin and Plavix for watchman   - Cardiology evaluated on 7/8 for procedure: Patient is Class III risk 10.1% risk (30-day risk of death, MI, or cardiac arrest). No cardiac contraindication for procedure.    #Mild hyponatremia  -134 on 7/8  - Resolved,     #CKD 3 (renal function is likely at baseline)  -At baseline 1.4  - Hx of right renal transplant at [facility] in Feb 2020   - Avoid nephrotoxins     # Hx of HTN - chronic  - D/C'd home amlodipine for hypotension per nephro rec  - Normotensive this am     # Hx of dyslipidemia - chronic  - not on any home statin      #Hx of DM  - A1c 7/8: 5.3   - Home Januvia 100mg, holding  - Home  Linagliptin 5 mg QD, holding    #BPH  - c/w tamsulosin 0.4mg     Diet: NPO for procedure  GI prophylaxis: Pantoprazole  DVT Prophylaxis: SCDs         Vital Signs Last 24 Hrs  T(C): 37.2 (09 Jul 2021 11:30), Max: 37.4 (08 Jul 2021 16:27)  T(F): 99 (09 Jul 2021 11:30), Max: 99.4 (08 Jul 2021 16:27)  HR: 128 (09 Jul 2021 14:00) (69 - 135)  BP: 104/57 (09 Jul 2021 13:30) (101/64 - 133/66)  BP(mean): 80 (09 Jul 2021 12:15) (79 - 92)  RR: 11 (09 Jul 2021 14:00) (10 - 32)  SpO2: 100% (09 Jul 2021 14:00) (98% - 100%)  I&O's Summary    08 Jul 2021 07:01  -  09 Jul 2021 07:00  --------------------------------------------------------  IN: 876 mL / OUT: 400 mL / NET: 476 mL    09 Jul 2021 07:01  -  09 Jul 2021 13:58  --------------------------------------------------------  IN: 50 mL / OUT: 0 mL / NET: 50 mL          MEDICATIONS  (STANDING):  ceFAZolin   IVPB 2000 milliGRAM(s) IV Intermittent every 8 hours  dextrose 40% Gel 15 Gram(s) Oral once  dextrose 50% Injectable 25 Gram(s) IV Push once  fludroCORTISONE 0.1 milliGRAM(s) Oral daily  glucagon  Injectable 1 milliGRAM(s) IntraMuscular once  insulin lispro (ADMELOG) corrective regimen sliding scale   SubCutaneous three times a day before meals  lactated ringers. 1000 milliLiter(s) (100 mL/Hr) IV Continuous <Continuous>  metoprolol succinate ER 25 milliGRAM(s) Oral daily  pantoprazole    Tablet 40 milliGRAM(s) Oral before breakfast  senna 2 Tablet(s) Oral at bedtime  tacrolimus 3 milliGRAM(s) Oral every 12 hours  tamsulosin 0.4 milliGRAM(s) Oral at bedtime    MEDICATIONS  (PRN):  HYDROmorphone  Injectable 0.5 milliGRAM(s) IV Push every 10 minutes PRN Moderate Pain (4 - 6)  ondansetron Injectable 4 milliGRAM(s) IV Push once PRN Nausea and/or Vomiting  oxyCODONE    IR 5 milliGRAM(s) Oral every 6 hours PRN Moderate Pain (4 - 6)        LABS                                            7.6                   Neurophils% (auto):   x      (07-09 @ 13:10):    7.10 )-----------(104          Lymphocytes% (auto):  x                                             23.2                   Eosinphils% (auto):   x        Manual%: Neutrophils x    ; Lymphocytes x    ; Eosinophils x    ; Bands%: x    ; Blasts x                                    136    |  105    |  26                  Calcium: 7.9   / iCa: x      (07-09 @ 13:10)    ----------------------------<  132       Magnesium: x                                5.2     |  23     |  1.3              Phosphorous: x        TPro  5.6    /  Alb  3.2    /  TBili  0.7    /  DBili  x      /  AST  11     /  ALT  7      /  AlkPhos  104    09 Jul 2021 13:10 MICU Transfer Note    Transfer from: MICU  Transfer to:  (  ) Medicine    ( X ) Telemetry    (  ) RCU    (  ) Palliative    (  ) Stroke Unit    (  ) _______________  Accepting physican:      MICU COURSE:   71M w/ PMHx of HTN, HLD, anemia, DM, Afib, JIMÉNEZ s/p liver transplant and right renal transplant at [facility] in February 2020 (follows with his hepatologist Dr. Sunshine), CAD s/p stents (10 years ago) on ASA and Plavix, watchman procedure performed ~3 months ago by Dr. Burdick, history of bilateral knee replacements (21 years ago) presents after being sent in from outpatient Urgent care office for finding of new right distal femur fracture.    He was transferred to Step down over night while waiting for surgery. He received 1 unit of pRBC overnight for Hgb of 7.8. AM Hgb was 7.6, not responsive. He went for ortho procedure in am with no complications. In PACU, he was in Afib RVR to 130s-140s. He came back down to 110s. This is most likely due to pain. No metoprolol push was given. Goal is to optimize pain control for now. To be admitted to tele for monitoring.     For Follow-Up:  - Optimize Pain control   - Check prograf am trough!!!  - Post-surgery management of fracture per Ortho   - F/U 8pm CBC for Hgb. If <7, transfuse.   - C/w antibiotics Cefazolin q8    ASSESSMENT & PLAN:   #Acute comminuted fracture of the distal rt femur  - CT 7/8: Comminuted fracture of distal rt femur w/ 3 cm override between major fragments, Involvement of rt knee TKA, Large hematoma within the vastus intermedius  - For OR today     #Acute on chronic normocytic anemia possibly 2/2 blood loss:  -Possibly 2/2 above mentioned hematoma  -Component of anemia of chronic disease  - Hgb 7.8 > 7.6 after 1 unit pRBC not responsive  - FOR OR procedure today.  - Transfuse if Hgb <7.0   - Trend CBC     #S/p liver transplant (hx of JIMÉNEZ)  # S/p renal transplant (rt sided at [facility] in 02/2020)  -Nephro on board, following   - c/w Prograf 0.3mg PO BID   - F/U AM prograf trough  - cont florinef 0.1mg po qd  - c/w short course of stress dose steroids   - cont metoprolol  - d/c'd home amlodipine for hypotension   - trend renal function and lytes, replete Mg++  - no renal contraindication to planned orthopedic surgery    # Hx of paroxysmal A-fib (s/p SHILA closure not on anti-coag)  - s/p watchman   - no anti-coagulation  - home rate controlled: metoprolol extended release 25mg QD  - He was in Afib RVR to 130s-140s post procedure in PACU and has now come down to 110s-120s. Likely due to poor pain control. Admit to Tele service.   - Consider metoprolol 5mg push if HR not controlled.  #Hypomagnesemia  -Resolved  - 1.9 today    # Hx of CAD (is on DAPT) s/p stents and watchman  - ARPAN 4/15/21: EF 45-50% Global LV systolic function was mildly decreased  - Continue Aspirin and Plavix for watchman   - Cardiology evaluated on 7/8 for procedure: Patient is Class III risk 10.1% risk (30-day risk of death, MI, or cardiac arrest). No cardiac contraindication for procedure.    #Mild hyponatremia  -134 on 7/8  - Resolved,     #CKD 3 (renal function is likely at baseline)  -At baseline 1.4  - Hx of right renal transplant at [facility] in Feb 2020   - Avoid nephrotoxins     # Hx of HTN - chronic  - D/C'd home amlodipine for hypotension per nephro rec  - Normotensive this am     # Hx of dyslipidemia - chronic  - not on any home statin      #Hx of DM  - A1c 7/8: 5.3   - Home Januvia 100mg, holding  - Home  Linagliptin 5 mg QD, holding    #BPH  - c/w tamsulosin 0.4mg     Diet: NPO for procedure  GI prophylaxis: Pantoprazole  DVT Prophylaxis: SCDs         Vital Signs Last 24 Hrs  T(C): 37.2 (09 Jul 2021 11:30), Max: 37.4 (08 Jul 2021 16:27)  T(F): 99 (09 Jul 2021 11:30), Max: 99.4 (08 Jul 2021 16:27)  HR: 128 (09 Jul 2021 14:00) (69 - 135)  BP: 104/57 (09 Jul 2021 13:30) (101/64 - 133/66)  BP(mean): 80 (09 Jul 2021 12:15) (79 - 92)  RR: 11 (09 Jul 2021 14:00) (10 - 32)  SpO2: 100% (09 Jul 2021 14:00) (98% - 100%)  I&O's Summary    08 Jul 2021 07:01  -  09 Jul 2021 07:00  --------------------------------------------------------  IN: 876 mL / OUT: 400 mL / NET: 476 mL    09 Jul 2021 07:01  -  09 Jul 2021 13:58  --------------------------------------------------------  IN: 50 mL / OUT: 0 mL / NET: 50 mL          MEDICATIONS  (STANDING):  ceFAZolin   IVPB 2000 milliGRAM(s) IV Intermittent every 8 hours  dextrose 40% Gel 15 Gram(s) Oral once  dextrose 50% Injectable 25 Gram(s) IV Push once  fludroCORTISONE 0.1 milliGRAM(s) Oral daily  glucagon  Injectable 1 milliGRAM(s) IntraMuscular once  insulin lispro (ADMELOG) corrective regimen sliding scale   SubCutaneous three times a day before meals  lactated ringers. 1000 milliLiter(s) (100 mL/Hr) IV Continuous <Continuous>  metoprolol succinate ER 25 milliGRAM(s) Oral daily  pantoprazole    Tablet 40 milliGRAM(s) Oral before breakfast  senna 2 Tablet(s) Oral at bedtime  tacrolimus 3 milliGRAM(s) Oral every 12 hours  tamsulosin 0.4 milliGRAM(s) Oral at bedtime    MEDICATIONS  (PRN):  HYDROmorphone  Injectable 0.5 milliGRAM(s) IV Push every 10 minutes PRN Moderate Pain (4 - 6)  ondansetron Injectable 4 milliGRAM(s) IV Push once PRN Nausea and/or Vomiting  oxyCODONE    IR 5 milliGRAM(s) Oral every 6 hours PRN Moderate Pain (4 - 6)        LABS                                            7.6                   Neurophils% (auto):   x      (07-09 @ 13:10):    7.10 )-----------(104          Lymphocytes% (auto):  x                                             23.2                   Eosinphils% (auto):   x        Manual%: Neutrophils x    ; Lymphocytes x    ; Eosinophils x    ; Bands%: x    ; Blasts x                                    136    |  105    |  26                  Calcium: 7.9   / iCa: x      (07-09 @ 13:10)    ----------------------------<  132       Magnesium: x                                5.2     |  23     |  1.3              Phosphorous: x        TPro  5.6    /  Alb  3.2    /  TBili  0.7    /  DBili  x      /  AST  11     /  ALT  7      /  AlkPhos  104    09 Jul 2021 13:10

## 2021-07-09 NOTE — PRE PROCEDURE NOTE - PRE PROCEDURE EVALUATION
ORTHOPAEDIC SURGERY PREOP CHECKLIST    SOCORRO MANUEL  MRN-[id number]      [] Consent   TO BE DONE IN PREOP AREA  DIAGNOSIS: RIGHT TKA PERIPROSTHETIC DISTAL FEMUR FRACTURE  PROCEDURE: RIGHT FEMUR OPEN REDUCTION INTERNAL FIXATION    [X] Added on  [X] Anesthesia Clearance  [X] Testing required: 1U PRBC GIVEN PREOP    Consults required:  [X] Medical/Cards Clearance  [X] NEPHROLOGY CLEARANCE     [X] NPO @ [facility]  CUURENT DIET: Diet, NPO after Midnight:      NPO Start Date: 08-Jul-2021,   NPO Start Time: 23:59 (07-08-21 @ 19:41) [Active]  Diet, Regular (07-08-21 @ 17:13) [Active]  Diet, NPO after Midnight:      NPO Start Date: 07-Jul-2021,   NPO Start Time: 23:59 (07-07-21 @ 21:55) [Active]    [X] IVF    [x] HOLD ANTICOAGULATION - hsq discontinued    [x] Type and Screen x2  [x] Blood ordered    [X] EKG in chart  [X] CXR in PACS     [X] CBC                        7.8    5.43  )-----------( 135      ( 09 Jul 2021 01:24 )             23.8       [X] BMP  07-09    136  |  105  |  27<H>  ----------------------------<  97  4.8   |  22  |  1.4    Ca    8.2<L>      09 Jul 2021 01:24  Phos  4.1     07-09  Mg     2.0     07-09    TPro  7.4  /  Alb  4.3  /  TBili  0.7  /  DBili  x   /  AST  16  /  ALT  13  /  AlkPhos  144<H>  07-07    [X] Coags  PT/INR - ( 07 Jul 2021 14:10 )   PT: 12.60 sec;   INR: 1.10 ratio    PTT - ( 07 Jul 2021 14:10 )  PTT:30.2 sec      COVID STATUS:  [] pending  [X] negative  [] postive   COVID-19 PCR: NotDetec (07 Jul 2021 14:25)

## 2021-07-09 NOTE — BRIEF OPERATIVE NOTE - NSICDXBRIEFPROCEDURE_GEN_ALL_CORE_FT
PROCEDURES:  ORIF, fracture, femur, periprosthetic 09-Jul-2021 11:37:27  Rodriguez Bradford   PROCEDURES:  ORIF, fracture, femur, periprosthetic 09-Jul-2021 11:37:27 distal femur about retained total knee repalcement, CPT code 76615-92666 (modied due to retention of prosthesis, which required longer surgical time and greater blood loss; akin to ORIF supracondylar femur fracture intercondylar extension, CPT code 00371) Rodriguez Bradford

## 2021-07-09 NOTE — PROGRESS NOTE ADULT - SUBJECTIVE AND OBJECTIVE BOX
NEPHROLOGY FOLLOW UP NOTE    renal function stable  s/p femur ORIF  rapid afib after OR    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      Allergies:  No Known Allergies    Home Medications Reviewed    SOCIAL HISTORY:  Denies ETOH,Smoking,   FAMILY HISTORY:  Family history of early CAD  mother    FH: ovarian cancer  mother      REVIEW OF SYSTEMS:  CONSTITUTIONAL: No weakness, fevers or chills  EYES/ENT: No visual changes;  No vertigo or throat pain   NECK: No pain or stiffness  RESPIRATORY: No cough, wheezing, hemoptysis; No shortness of breath  CARDIOVASCULAR: No chest pain or palpitations.  GASTROINTESTINAL: No abdominal or epigastric pain. No nausea, vomiting, or hematemesis; No diarrhea or constipation. No melena or hematochezia.  GENITOURINARY: No dysuria, frequency, foamy urine, urinary urgency, incontinence or hematuria  NEUROLOGICAL: No numbness or weakness  SKIN: No itching, burning, rashes, or lesions   VASCULAR: No bilateral lower extremity edema.   All other review of systems is negative unless indicated above.    advance care planning and advance care directives reviewed     PHYSICAL EXAM:  Constitutional: NAD  HEENT: anicteric sclera, oropharynx clear, dry mm  Neck: No JVD  Respiratory: CTAB, no wheezes, rales or rhonchi  Cardiovascular: S1, S2, RRR  Gastrointestinal: BS+, soft, NT/ND + scar  Extremities: No cyanosis or clubbing. No peripheral edema + RLE immobilizer  Neurological: A/O x 3, no focal deficits  Psychiatric: Normal mood, normal affect  : No CVA tenderness.    Skin: No rashes      Hospital Medications:   MEDICATIONS  (STANDING):  ceFAZolin   IVPB 2000 milliGRAM(s) IV Intermittent every 8 hours  dextrose 40% Gel 15 Gram(s) Oral once  dextrose 50% Injectable 25 Gram(s) IV Push once  fludroCORTISONE 0.1 milliGRAM(s) Oral daily  glucagon  Injectable 1 milliGRAM(s) IntraMuscular once  insulin lispro (ADMELOG) corrective regimen sliding scale   SubCutaneous three times a day before meals  metoprolol succinate ER 25 milliGRAM(s) Oral daily  pantoprazole    Tablet 40 milliGRAM(s) Oral before breakfast  senna 2 Tablet(s) Oral at bedtime  tacrolimus 3 milliGRAM(s) Oral every 12 hours  tamsulosin 0.4 milliGRAM(s) Oral at bedtime        VITALS:  T(F): 100.8 (07-09-21 @ 14:50), Max: 100.8 (07-09-21 @ 14:50)  HR: 130 (07-09-21 @ 14:50)  BP: 120/60 (07-09-21 @ 14:50)  RR: 18 (07-09-21 @ 14:50)  SpO2: 99% (07-09-21 @ 14:30)  Wt(kg): --    07-08 @ 07:01  -  07-09 @ 07:00  --------------------------------------------------------  IN: 876 mL / OUT: 400 mL / NET: 476 mL    07-09 @ 07:01  -  07-09 @ 15:43  --------------------------------------------------------  IN: 300 mL / OUT: 50 mL / NET: 250 mL      Height (cm): 175.3 (07-09 @ 14:50)  Weight (kg): 90 (07-09 @ 14:50)  BMI (kg/m2): 29.3 (07-09 @ 14:50)  BSA (m2): 2.06 (07-09 @ 14:50)    LABS:  07-09    136  |  105  |  26<H>  ----------------------------<  132<H>  5.2<H>   |  23  |  1.3    Ca    7.9<L>      09 Jul 2021 13:10  Phos  4.1     07-09  Mg     1.9     07-09    TPro  5.6<L>  /  Alb  3.2<L>  /  TBili  0.7  /  DBili      /  AST  11  /  ALT  7   /  AlkPhos  104  07-09                          7.6    7.10  )-----------( 104      ( 09 Jul 2021 13:10 )             23.2       Urine Studies:        RADIOLOGY & ADDITIONAL STUDIES:

## 2021-07-09 NOTE — PROGRESS NOTE ADULT - SUBJECTIVE AND OBJECTIVE BOX
SOCORRO MANUEL 71y Male  MRN#: [id number]   CODE STATUS: FULL CODE     Hospital Day: 2d    Pt is currently admitted with the primary diagnosis of R Periprosthetic femur fracture     SUBJECTIVE  Hospital Course  71M w/ PMHx of HTN, HLD, anemia, DM, Afib, JIMÉNEZ s/p liver transplant and right renal transplant at [facility] in February 2020 (follows with his hepatologist Dr. Sunshine), CAD s/p stents (10 years ago) on ASA and Plavix, watchman procedure performed ~3 months ago by Dr. Burdick, history of bilateral knee replacements (21 years ago) presents after being sent in from outpatient Urgent care office for finding of new right distal femur fracture. Patient tripped and fell while trying to get out of his car and felt right knee pain afterwards. He presented to an outpatient clinic for evaluation. XR at outpatient clinic demonstrated distal right femur fracture. He was placed in a knee immobilizer and instructed to present to the ED for further management. Patient is afebrile, HR 61, borderline BP of 93/52, saturating 99% on room air.   On exam his right knee is immobilized, he has flexion and extension of the foot at the ankle joint, +DP and PT pulses, foot warm and well perfused. No other external signs of injury, denies pain anywhere else.     SICU was consulted for preoperative hemodynamic monitoring in this patient with extensive PMH and PSH as reported above, including liver transplant, R renal transplant in 2020, CAD with stents, and a watchman procedure. Transfered to Step down for further monitoring prior to procedure.       Overnight events: He received 1 unit of pRBC. Hgb 7.8 > 7.6. Not Responsive.     Subjective complaints: He has no complaints today morning. Denies headaches, SOB, chest pain, palpitations, N/V/D. For OR today for R Periprosthetic femur fracture. He is hemodynamically stable otherwise.     Post Procedure: He was AAOx3 post procedure, complaint of pain. Denies chest pain, palpitations, SOB. He was in rapid Afib to 130s-140s most likely due to poor pain control. No metoprolol push was given at that time. He came down to HR of 110s on his own.     Present Today:   - Cano:  No [  ], Yes [   ] : Indication:     - Type of IV Access:       .. CVC/Piccline:  No [  ], Yes [   ] : Indication:       .. 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                                              -----------------------------------------------------------  ALLERGIES:  No Known Allergies                                            ------------------------------------------------------------    HOME MEDICATIONS  Home Medications:  amLODIPine 5 mg oral tablet: 1 tab(s) orally once a day (15 Apr 2021 09:54)  aspirin 325 mg oral delayed release tablet: 1 tab(s) orally once a day (07 Jul 2021 21:29)  fludrocortisone 0.1 mg oral tablet: 1 tab(s) orally once a day (07 Jul 2021 21:28)  Januvia 100 mg oral tablet: 1 tab(s) orally once a day (15 Apr 2021 09:54)  linagliptin 5 mg oral tablet: 1 tab(s) orally once a day (15 Apr 2021 09:54)  magnesium chloride: 800 milligram(s) orally 2 times a day (15 Apr 2021 09:54)  metoprolol succinate 25 mg oral tablet, extended release: 1 tab(s) orally once a day (15 Apr 2021 09:54)  Plavix 75 mg oral tablet: 1 tab(s) orally once a day (07 Jul 2021 21:31)  Prograf 1 mg oral capsule: 1 cap(s) orally every 12 hours (15 Apr 2021 09:54)  Protonix 40 mg oral delayed release tablet: 1 tab(s) orally once a day (15 Apr 2021 09:54)  Senokot 8.6 mg oral tablet: 1 tab(s) orally once a day (at bedtime) (15 Apr 2021 09:54)  tamsulosin 0.4 mg oral capsule: 1 cap(s) orally once a day (15 Apr 2021 09:54)                           MEDICATIONS:  STANDING MEDICATIONS  ceFAZolin   IVPB 2000 milliGRAM(s) IV Intermittent every 8 hours  dextrose 40% Gel 15 Gram(s) Oral once  dextrose 50% Injectable 25 Gram(s) IV Push once  fludroCORTISONE 0.1 milliGRAM(s) Oral daily  glucagon  Injectable 1 milliGRAM(s) IntraMuscular once  insulin lispro (ADMELOG) corrective regimen sliding scale   SubCutaneous three times a day before meals  lactated ringers. 1000 milliLiter(s) IV Continuous <Continuous>  metoprolol succinate ER 25 milliGRAM(s) Oral daily  pantoprazole    Tablet 40 milliGRAM(s) Oral before breakfast  senna 2 Tablet(s) Oral at bedtime  tacrolimus 3 milliGRAM(s) Oral every 12 hours  tamsulosin 0.4 milliGRAM(s) Oral at bedtime    PRN MEDICATIONS  HYDROmorphone  Injectable 0.5 milliGRAM(s) IV Push every 10 minutes PRN  ondansetron Injectable 4 milliGRAM(s) IV Push once PRN  oxyCODONE    IR 5 milliGRAM(s) Oral every 6 hours PRN                                            ------------------------------------------------------------  VITAL SIGNS: Last 24 Hours  T(C): 37.2 (09 Jul 2021 11:30), Max: 37.4 (08 Jul 2021 16:27)  T(F): 99 (09 Jul 2021 11:30), Max: 99.4 (08 Jul 2021 16:27)  HR: 120 (09 Jul 2021 13:00) (69 - 135)  BP: 113/69 (09 Jul 2021 13:00) (101/64 - 133/66)  BP(mean): 80 (09 Jul 2021 12:15) (79 - 92)  RR: 18 (09 Jul 2021 13:00) (14 - 32)  SpO2: 99% (09 Jul 2021 13:00) (98% - 100%)      07-08-21 @ 07:01  -  07-09-21 @ 07:00  --------------------------------------------------------  IN: 876 mL / OUT: 400 mL / NET: 476 mL                                             --------------------------------------------------------------  LABS:                        7.6    5.45  )-----------( 134      ( 09 Jul 2021 07:15 )             23.3     07-09    137  |  107  |  26<H>  ----------------------------<  100<H>  4.5   |  23  |  1.3    Ca    8.6      09 Jul 2021 07:15  Phos  4.1     07-09  Mg     1.9     07-09    TPro  6.2  /  Alb  3.4<L>  /  TBili  0.9  /  DBili  x   /  AST  11  /  ALT  7   /  AlkPhos  108  07-09    PT/INR - ( 07 Jul 2021 14:10 )   PT: 12.60 sec;   INR: 1.10 ratio         PTT - ( 07 Jul 2021 14:10 )  PTT:30.2 sec      Creatine Kinase, Serum: 34 U/L (07-08-21 @ 16:25)  Troponin T, Serum: 0.02 ng/mL *H* (07-08-21 @ 16:25)          CARDIAC MARKERS ( 08 Jul 2021 16:25 )  x     / 0.02 ng/mL / 34 U/L / x     / <1.0 ng/mL  CARDIAC MARKERS ( 08 Jul 2021 11:15 )  x     / 0.02 ng/mL / 31 U/L / x     / <1.0 ng/mL  CARDIAC MARKERS ( 08 Jul 2021 05:26 )  x     / 0.02 ng/mL / 31 U/L / x     / <1.0 ng/mL                                              -------------------------------------------------------------  RADIOLOGY:                                            --------------------------------------------------------------    PHYSICAL EXAM:  General: Not in acute distress. Well developed.   HEENT: NCAT  LUNGS: CTAB, Good air entry bilat  HEART: RRR, +S1,S2, RRR  ABDOMEN: SNTTP, ND x 4 q's  EXT: Warm, well perfused x 4  NEURO: AxOx3, No FND's noted  MSK: Right leg in cast. per Ortho.   SKIN: No new breakdown or rashes noted                                           --------------------------------------------------------------    ASSESSMENT & PLAN    Past medical history and hospital course   72 yo M pt is pre-op for repair of a distal rt femoral fracture. PMH of HTN, HLD, anemia, Afib s/p watchman, rate controlled, JIMÉNEZ s/p liver transplant and rught renal transplant, CAD s/p stents on ASA and Plavix.       Problem List:  #Acute comminuted fracture of the distal rt femur  - CT 7/8: Comminuted fracture of distal rt femur w/ 3 cm override between major fragments, Involvement of rt knee TKA, Large hematoma within the vastus intermedius  - For OR today     #Acute on chronic normocytic anemia possibly 2/2 blood loss:  -Possibly 2/2 above mentioned hematoma  -Component of anemia of chronic disease  - Hgb 7.8 > 7.2 after 1 unit pRBC not responsive  - FOR OR procedure today.  - Transfuse if Hgb <7.0   - Trend CBC     #S/p liver transplant (hx of JIMÉNEZ)  # S/p renal transplant (rt sided at [facility] in 02/2020)  -Nephro on board, following   - c/w Prograf 0.3mg PO BID   - F/U AM prograf trough  - cont florinef 0.1mg po qd  - c/w short course of stress dose steroids   - cont metoprolol  - d/c'd home amlodipine for hypotension   - trend renal function and lytes, replete Mg++  - no renal contraindication to planned orthopedic surgery    # Hx of paroxysmal A-fib (s/p SHILA closure not on anti-coag)  - s/p watchman   - no anti-coagulation  - home rate controlled: metoprolol extended release 25mg QD  - He was in Afib RVR to 130s-140s post procedure in PACU and has now come down to 110s-120s. Likely due to poor pain control. Admit to Tele service.   - Consider metoprolol 5mg push if HR not controlled.  #Hypomagnesemia  -Resolved  - 1.9 today    # Hx of CAD (is on DAPT) s/p stents and watchman  - ARPAN 4/15/21: EF 45-50% Global LV systolic function was mildly decreased  - Continue Aspirin and Plavix for watchman   - Cardiology evaluated on 7/8 for procedure: Patient is Class III risk 10.1% risk (30-day risk of death, MI, or cardiac arrest). No cardiac contraindication for procedure.    #Mild hyponatremia  -134 on 7/8  - Resolved,     #CKD 3 (renal function is likely at baseline)  -At baseline 1.4  - Hx of right renal transplant at [facility] in Feb 2020   - Avoid nephrotoxins     # Hx of HTN - chronic  - D/C'd home amlodipine for hypotension per nephro rec  - Normotensive this am     # Hx of dyslipidemia - chronic  - not on any home statin      #Hx of DM  - A1c 7/8: 5.3   - Home Januvia 100mg, holding  - Home  Linagliptin 5 mg QD, holding    #BPH  - c/w tamsulosin 0.4mg     Diet: NPO for procedure  GI prophylaxis: Pantoprazole  DVT Prophylaxis: SCDs                           SOCORRO MANUEL 71y Male  MRN#: [id number]   CODE STATUS: FULL CODE     Hospital Day: 2d    Pt is currently admitted with the primary diagnosis of R Periprosthetic femur fracture     SUBJECTIVE  Hospital Course  71M w/ PMHx of HTN, HLD, anemia, DM, Afib, JIMÉNEZ s/p liver transplant and right renal transplant at [facility] in February 2020 (follows with his hepatologist Dr. Sunshine), CAD s/p stents (10 years ago) on ASA and Plavix, watchman procedure performed ~3 months ago by Dr. Burdick, history of bilateral knee replacements (21 years ago) presents after being sent in from outpatient Urgent care office for finding of new right distal femur fracture. Patient tripped and fell while trying to get out of his car and felt right knee pain afterwards. He presented to an outpatient clinic for evaluation. XR at outpatient clinic demonstrated distal right femur fracture. He was placed in a knee immobilizer and instructed to present to the ED for further management. Patient is afebrile, HR 61, borderline BP of 93/52, saturating 99% on room air.   On exam his right knee is immobilized, he has flexion and extension of the foot at the ankle joint, +DP and PT pulses, foot warm and well perfused. No other external signs of injury, denies pain anywhere else.     SICU was consulted for preoperative hemodynamic monitoring in this patient with extensive PMH and PSH as reported above, including liver transplant, R renal transplant in 2020, CAD with stents, and a watchman procedure. Transfered to Step down for further monitoring prior to procedure.       Overnight events: He received 1 unit of pRBC. Hgb 7.8 > 7.6. Not Responsive.     Attending Note: Pt seen and examined after surgery in Pacu. No cp or sob. pt was in afib with rvr in pacu but was in severe pain.     Subjective complaints: He has no complaints today morning. Denies headaches, SOB, chest pain, palpitations, N/V/D. For OR today for R Periprosthetic femur fracture. He is hemodynamically stable otherwise.     Post Procedure: He was AAOx3 post procedure, complaint of pain. Denies chest pain, palpitations, SOB. He was in rapid Afib to 130s-140s most likely due to poor pain control. No metoprolol push was given at that time. He came down to HR of 110s on his own.     Present Today:   - Rachael:  No [  ], Yes [   ] : Indication:     - Type of IV Access:       .. CVC/Piccline:  No [  ], Yes [   ] : Indication:       .. 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                                              -----------------------------------------------------------  ALLERGIES:  No Known Allergies                                            ------------------------------------------------------------    HOME MEDICATIONS  Home Medications:  amLODIPine 5 mg oral tablet: 1 tab(s) orally once a day (15 Apr 2021 09:54)  aspirin 325 mg oral delayed release tablet: 1 tab(s) orally once a day (07 Jul 2021 21:29)  fludrocortisone 0.1 mg oral tablet: 1 tab(s) orally once a day (07 Jul 2021 21:28)  Januvia 100 mg oral tablet: 1 tab(s) orally once a day (15 Apr 2021 09:54)  linagliptin 5 mg oral tablet: 1 tab(s) orally once a day (15 Apr 2021 09:54)  magnesium chloride: 800 milligram(s) orally 2 times a day (15 Apr 2021 09:54)  metoprolol succinate 25 mg oral tablet, extended release: 1 tab(s) orally once a day (15 Apr 2021 09:54)  Plavix 75 mg oral tablet: 1 tab(s) orally once a day (07 Jul 2021 21:31)  Prograf 1 mg oral capsule: 1 cap(s) orally every 12 hours (15 Apr 2021 09:54)  Protonix 40 mg oral delayed release tablet: 1 tab(s) orally once a day (15 Apr 2021 09:54)  Senokot 8.6 mg oral tablet: 1 tab(s) orally once a day (at bedtime) (15 Apr 2021 09:54)  tamsulosin 0.4 mg oral capsule: 1 cap(s) orally once a day (15 Apr 2021 09:54)                           MEDICATIONS:  STANDING MEDICATIONS  ceFAZolin   IVPB 2000 milliGRAM(s) IV Intermittent every 8 hours  dextrose 40% Gel 15 Gram(s) Oral once  dextrose 50% Injectable 25 Gram(s) IV Push once  fludroCORTISONE 0.1 milliGRAM(s) Oral daily  glucagon  Injectable 1 milliGRAM(s) IntraMuscular once  insulin lispro (ADMELOG) corrective regimen sliding scale   SubCutaneous three times a day before meals  lactated ringers. 1000 milliLiter(s) IV Continuous <Continuous>  metoprolol succinate ER 25 milliGRAM(s) Oral daily  pantoprazole    Tablet 40 milliGRAM(s) Oral before breakfast  senna 2 Tablet(s) Oral at bedtime  tacrolimus 3 milliGRAM(s) Oral every 12 hours  tamsulosin 0.4 milliGRAM(s) Oral at bedtime    PRN MEDICATIONS  HYDROmorphone  Injectable 0.5 milliGRAM(s) IV Push every 10 minutes PRN  ondansetron Injectable 4 milliGRAM(s) IV Push once PRN  oxyCODONE    IR 5 milliGRAM(s) Oral every 6 hours PRN                                            ------------------------------------------------------------  VITAL SIGNS: Last 24 Hours  T(C): 37.2 (09 Jul 2021 11:30), Max: 37.4 (08 Jul 2021 16:27)  T(F): 99 (09 Jul 2021 11:30), Max: 99.4 (08 Jul 2021 16:27)  HR: 120 (09 Jul 2021 13:00) (69 - 135)  BP: 113/69 (09 Jul 2021 13:00) (101/64 - 133/66)  BP(mean): 80 (09 Jul 2021 12:15) (79 - 92)  RR: 18 (09 Jul 2021 13:00) (14 - 32)  SpO2: 99% (09 Jul 2021 13:00) (98% - 100%)      07-08-21 @ 07:01  -  07-09-21 @ 07:00  --------------------------------------------------------  IN: 876 mL / OUT: 400 mL / NET: 476 mL                                             --------------------------------------------------------------  LABS:                        7.6    5.45  )-----------( 134      ( 09 Jul 2021 07:15 )             23.3     07-09    137  |  107  |  26<H>  ----------------------------<  100<H>  4.5   |  23  |  1.3    Ca    8.6      09 Jul 2021 07:15  Phos  4.1     07-09  Mg     1.9     07-09    TPro  6.2  /  Alb  3.4<L>  /  TBili  0.9  /  DBili  x   /  AST  11  /  ALT  7   /  AlkPhos  108  07-09    PT/INR - ( 07 Jul 2021 14:10 )   PT: 12.60 sec;   INR: 1.10 ratio         PTT - ( 07 Jul 2021 14:10 )  PTT:30.2 sec      Creatine Kinase, Serum: 34 U/L (07-08-21 @ 16:25)  Troponin T, Serum: 0.02 ng/mL *H* (07-08-21 @ 16:25)          CARDIAC MARKERS ( 08 Jul 2021 16:25 )  x     / 0.02 ng/mL / 34 U/L / x     / <1.0 ng/mL  CARDIAC MARKERS ( 08 Jul 2021 11:15 )  x     / 0.02 ng/mL / 31 U/L / x     / <1.0 ng/mL  CARDIAC MARKERS ( 08 Jul 2021 05:26 )  x     / 0.02 ng/mL / 31 U/L / x     / <1.0 ng/mL                                              -------------------------------------------------------------  RADIOLOGY:                                            --------------------------------------------------------------    PHYSICAL EXAM:  General: Not in acute distress. Well developed.   HEENT: NCAT  LUNGS: CTAB, Good air entry bilat  HEART: RRR, +S1,S2, RRR  ABDOMEN: SNTTP, ND x 4 q's  EXT: Warm, well perfused x 4  NEURO: AxOx3, No FND's noted  MSK: Right leg in cast. per Ortho.   SKIN: No new breakdown or rashes noted                                           --------------------------------------------------------------    ASSESSMENT & PLAN    Past medical history and hospital course   70 yo M pt is pre-op for repair of a distal rt femoral fracture. PMH of HTN, HLD, anemia, Afib s/p watchman, rate controlled, JIMÉNEZ s/p liver transplant and rught renal transplant, CAD s/p stents on ASA and Plavix.       Problem List:  #Acute comminuted fracture of the distal rt femur  - CT 7/8: Comminuted fracture of distal rt femur w/ 3 cm override between major fragments, Involvement of rt knee TKA, Large hematoma within the vastus intermedius  - For OR today     #Acute on chronic normocytic anemia possibly 2/2 blood loss:  -Possibly 2/2 above mentioned hematoma  -Component of anemia of chronic disease  - Hgb 7.8 > 7.2 after 1 unit pRBC not responsive  - FOR OR procedure today.  - Transfuse if Hgb <7.0   - Trend CBC, cbc in pm     #S/p liver transplant (hx of JIMÉNEZ)  # S/p renal transplant (rt sided at [facility] in 02/2020)  -Nephro on board, following   - c/w Prograf 0.3mg PO BID   - F/U AM prograf trough  - cont florinef 0.1mg po qd  - c/w short course of stress dose steroids   - cont metoprolol  - d/c'd home amlodipine for hypotension   - trend renal function and lytes, replete Mg++  - no renal contraindication to planned orthopedic surgery  - nephro following     #thrombpcytopenia- appears chronic- was lower last year- continue to trend     # Hx of paroxysmal A-fib (s/p SHILA closure not on anti-coag)  #Afib with RVR   - s/p watchman   - no anti-coagulation  - home rate controlled: metoprolol extended release 25mg QD  - He was in Afib RVR to 130s-140s post procedure in PACU and has now come down to 110s-120s. Likely due to poor pain control. Admit to Tele service.   - Consider metoprolol 5mg push if HR not controlled.    #suspected magnesium deff  -Resolved  - 1.9 today    # Hx of CAD (is on DAPT) s/p stents and watchman  - ARPAN 4/15/21: EF 45-50% Global LV systolic function was mildly decreased  - Continue Aspirin and Plavix for watchman   - Cardiology evaluated on 7/8 for procedure: Patient is Class III risk 10.1% risk (30-day risk of death, MI, or cardiac arrest). No cardiac contraindication for procedure.    #Mild hyponatremia  -134 on 7/8  - Resolved,     #CKD 3 (renal function is likely at baseline)  -At baseline 1.4  - Hx of right renal transplant at [facility] in Feb 2020   - Avoid nephrotoxins   - nephro following     # Hx of HTN - chronic  - D/C'd home amlodipine for hypotension per nephro rec  - Normotensive this am     # Hx of dyslipidemia - chronic  - not on any home statin      #Hx of DM  - A1c 7/8: 5.3   - Home Januvia 100mg, holding  - Home  Linagliptin 5 mg QD, holding    #BPH  - c/w tamsulosin 0.4mg     Diet: NPO for procedure  GI prophylaxis: Pantoprazole  DVT Prophylaxis: SCDs                           SOCORRO MANUEL 71y Male  MRN#: [id number]   CODE STATUS: FULL CODE     Hospital Day: 2d    Pt is currently admitted with the primary diagnosis of R Periprosthetic femur fracture     SUBJECTIVE  Hospital Course  71M w/ PMHx of HTN, HLD, anemia, DM, Afib, JIMÉNEZ s/p liver transplant and right renal transplant at [facility] in February 2020 (follows with his hepatologist Dr. Sunshine), CAD s/p stents (10 years ago) on ASA and Plavix, watchman procedure performed ~3 months ago by Dr. Burdick, history of bilateral knee replacements (21 years ago) presents after being sent in from outpatient Urgent care office for finding of new right distal femur fracture. Patient tripped and fell while trying to get out of his car and felt right knee pain afterwards. He presented to an outpatient clinic for evaluation. XR at outpatient clinic demonstrated distal right femur fracture. He was placed in a knee immobilizer and instructed to present to the ED for further management. Patient is afebrile, HR 61, borderline BP of 93/52, saturating 99% on room air.   On exam his right knee is immobilized, he has flexion and extension of the foot at the ankle joint, +DP and PT pulses, foot warm and well perfused. No other external signs of injury, denies pain anywhere else.     SICU was consulted for preoperative hemodynamic monitoring in this patient with extensive PMH and PSH as reported above, including liver transplant, R renal transplant in 2020, CAD with stents, and a watchman procedure. Transfered to Step down for further monitoring prior to procedure.       Overnight events: He received 1 unit of pRBC. Hgb 7.8 > 7.6. Not Responsive.     Attending Note: Pt seen and examined after surgery in Pacu. No cp or sob. pt was in afib with rvr in pacu but was in severe pain.     Subjective complaints: He has no complaints today morning. Denies headaches, SOB, chest pain, palpitations, N/V/D. For OR today for R Periprosthetic femur fracture. He is hemodynamically stable otherwise.     Post Procedure: He was AAOx3 post procedure, complaint of pain. Denies chest pain, palpitations, SOB. He was in rapid Afib to 130s-140s most likely due to poor pain control. No metoprolol push was given at that time. He came down to HR of 110s on his own.     Present Today:   - Rachael:  No [  ], Yes [   ] : Indication:     - Type of IV Access:       .. CVC/Piccline:  No [  ], Yes [   ] : Indication:       .. 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                                              -----------------------------------------------------------  ALLERGIES:  No Known Allergies                                            ------------------------------------------------------------    HOME MEDICATIONS  Home Medications:  amLODIPine 5 mg oral tablet: 1 tab(s) orally once a day (15 Apr 2021 09:54)  aspirin 325 mg oral delayed release tablet: 1 tab(s) orally once a day (07 Jul 2021 21:29)  fludrocortisone 0.1 mg oral tablet: 1 tab(s) orally once a day (07 Jul 2021 21:28)  Januvia 100 mg oral tablet: 1 tab(s) orally once a day (15 Apr 2021 09:54)  linagliptin 5 mg oral tablet: 1 tab(s) orally once a day (15 Apr 2021 09:54)  magnesium chloride: 800 milligram(s) orally 2 times a day (15 Apr 2021 09:54)  metoprolol succinate 25 mg oral tablet, extended release: 1 tab(s) orally once a day (15 Apr 2021 09:54)  Plavix 75 mg oral tablet: 1 tab(s) orally once a day (07 Jul 2021 21:31)  Prograf 1 mg oral capsule: 1 cap(s) orally every 12 hours (15 Apr 2021 09:54)  Protonix 40 mg oral delayed release tablet: 1 tab(s) orally once a day (15 Apr 2021 09:54)  Senokot 8.6 mg oral tablet: 1 tab(s) orally once a day (at bedtime) (15 Apr 2021 09:54)  tamsulosin 0.4 mg oral capsule: 1 cap(s) orally once a day (15 Apr 2021 09:54)                           MEDICATIONS:  STANDING MEDICATIONS  ceFAZolin   IVPB 2000 milliGRAM(s) IV Intermittent every 8 hours  dextrose 40% Gel 15 Gram(s) Oral once  dextrose 50% Injectable 25 Gram(s) IV Push once  fludroCORTISONE 0.1 milliGRAM(s) Oral daily  glucagon  Injectable 1 milliGRAM(s) IntraMuscular once  insulin lispro (ADMELOG) corrective regimen sliding scale   SubCutaneous three times a day before meals  lactated ringers. 1000 milliLiter(s) IV Continuous <Continuous>  metoprolol succinate ER 25 milliGRAM(s) Oral daily  pantoprazole    Tablet 40 milliGRAM(s) Oral before breakfast  senna 2 Tablet(s) Oral at bedtime  tacrolimus 3 milliGRAM(s) Oral every 12 hours  tamsulosin 0.4 milliGRAM(s) Oral at bedtime    PRN MEDICATIONS  HYDROmorphone  Injectable 0.5 milliGRAM(s) IV Push every 10 minutes PRN  ondansetron Injectable 4 milliGRAM(s) IV Push once PRN  oxyCODONE    IR 5 milliGRAM(s) Oral every 6 hours PRN                                            ------------------------------------------------------------  VITAL SIGNS: Last 24 Hours  T(C): 37.2 (09 Jul 2021 11:30), Max: 37.4 (08 Jul 2021 16:27)  T(F): 99 (09 Jul 2021 11:30), Max: 99.4 (08 Jul 2021 16:27)  HR: 120 (09 Jul 2021 13:00) (69 - 135)  BP: 113/69 (09 Jul 2021 13:00) (101/64 - 133/66)  BP(mean): 80 (09 Jul 2021 12:15) (79 - 92)  RR: 18 (09 Jul 2021 13:00) (14 - 32)  SpO2: 99% (09 Jul 2021 13:00) (98% - 100%)      07-08-21 @ 07:01  -  07-09-21 @ 07:00  --------------------------------------------------------  IN: 876 mL / OUT: 400 mL / NET: 476 mL                                             --------------------------------------------------------------  LABS:                        7.6    5.45  )-----------( 134      ( 09 Jul 2021 07:15 )             23.3     07-09    137  |  107  |  26<H>  ----------------------------<  100<H>  4.5   |  23  |  1.3    Ca    8.6      09 Jul 2021 07:15  Phos  4.1     07-09  Mg     1.9     07-09    TPro  6.2  /  Alb  3.4<L>  /  TBili  0.9  /  DBili  x   /  AST  11  /  ALT  7   /  AlkPhos  108  07-09    PT/INR - ( 07 Jul 2021 14:10 )   PT: 12.60 sec;   INR: 1.10 ratio         PTT - ( 07 Jul 2021 14:10 )  PTT:30.2 sec      Creatine Kinase, Serum: 34 U/L (07-08-21 @ 16:25)  Troponin T, Serum: 0.02 ng/mL *H* (07-08-21 @ 16:25)          CARDIAC MARKERS ( 08 Jul 2021 16:25 )  x     / 0.02 ng/mL / 34 U/L / x     / <1.0 ng/mL  CARDIAC MARKERS ( 08 Jul 2021 11:15 )  x     / 0.02 ng/mL / 31 U/L / x     / <1.0 ng/mL  CARDIAC MARKERS ( 08 Jul 2021 05:26 )  x     / 0.02 ng/mL / 31 U/L / x     / <1.0 ng/mL                                              -------------------------------------------------------------  RADIOLOGY:                                            --------------------------------------------------------------    PHYSICAL EXAM:  General: Not in acute distress. Well developed.   HEENT: NCAT  LUNGS: CTAB, Good air entry bilat  HEART: RRR, +S1,S2, RRR  ABDOMEN: SNTTP, ND x 4 q's  EXT: Warm, well perfused x 4  NEURO: AxOx3, No FND's noted  MSK: Right leg in cast. per Ortho.   SKIN: No new breakdown or rashes noted                                           --------------------------------------------------------------    ASSESSMENT & PLAN    Past medical history and hospital course   72 yo M pt is pre-op for repair of a distal rt femoral fracture. PMH of HTN, HLD, anemia, Afib s/p watchman, rate controlled, JIMÉNEZ s/p liver transplant and rught renal transplant, CAD s/p stents on ASA and Plavix.       Problem List:  #Acute comminuted fracture of the distal rt femur  - CT 7/8: Comminuted fracture of distal rt femur w/ 3 cm override between major fragments, Involvement of rt knee TKA, Large hematoma within the vastus intermedius  - For OR today     #Acute on chronic normocytic anemia possibly 2/2 blood loss:  -Possibly 2/2 above mentioned hematoma  -Component of anemia of chronic disease  - Hgb 7.8 > 7.6 after 1 unit pRBC not responsive  - FOR OR procedure today.  - Transfuse if Hgb <7.0   - Trend CBC, cbc in pm     #S/p liver transplant (hx of JIMÉNEZ)  # S/p renal transplant (rt sided at [facility] in 02/2020)  -Nephro on board, following   - c/w Prograf 0.3mg PO BID   - F/U AM prograf trough  - cont florinef 0.1mg po qd  - c/w short course of stress dose steroids   - cont metoprolol  - d/c'd home amlodipine for hypotension   - trend renal function and lytes, replete Mg++  - no renal contraindication to planned orthopedic surgery  - nephro following     #thrombpcytopenia- appears chronic- was lower last year- continue to trend     # Hx of paroxysmal A-fib (s/p SHILA closure not on anti-coag)  #Afib with RVR   - s/p watchman   - no anti-coagulation  - home rate controlled: metoprolol extended release 25mg QD  - He was in Afib RVR to 130s-140s post procedure in PACU and has now come down to 110s-120s. Likely due to poor pain control. Admit to Tele service.   - Consider metoprolol 5mg push if HR not controlled.    #suspected magnesium deff  -Resolved  - 1.9 today    # Hx of CAD (is on DAPT) s/p stents and watchman  - ARPAN 4/15/21: EF 45-50% Global LV systolic function was mildly decreased  - Continue Aspirin and Plavix for watchman   - Cardiology evaluated on 7/8 for procedure: Patient is Class III risk 10.1% risk (30-day risk of death, MI, or cardiac arrest). No cardiac contraindication for procedure.    #Mild hyponatremia  -134 on 7/8  - Resolved,     #CKD 3 (renal function is likely at baseline)  -At baseline 1.4  - Hx of right renal transplant at [facility] in Feb 2020   - Avoid nephrotoxins   - nephro following     # Hx of HTN - chronic  - D/C'd home amlodipine for hypotension per nephro rec  - Normotensive this am     # Hx of dyslipidemia - chronic  - not on any home statin      #Hx of DM  - A1c 7/8: 5.3   - Home Januvia 100mg, holding  - Home  Linagliptin 5 mg QD, holding    #BPH  - c/w tamsulosin 0.4mg     Diet: NPO for procedure  GI prophylaxis: Pantoprazole  DVT Prophylaxis: SCDs

## 2021-07-09 NOTE — BRIEF OPERATIVE NOTE - NSICDXBRIEFPOSTOP_GEN_ALL_CORE_FT
POST-OP DIAGNOSIS:  Periprosthetic supracondylar fracture of femur 09-Jul-2021 11:38:47  Rodriguez Bradford   POST-OP DIAGNOSIS:  Periprosthetic supracondylar fracture of femur 09-Jul-2021 11:38:47 distal left about retained total knee replacement Rodriguez Bradford   POST-OP DIAGNOSIS:  Periprosthetic supracondylar fracture of femur 09-Jul-2021 11:38:47 distal right about retained total knee replacement Rodriguez Bradford

## 2021-07-09 NOTE — BRIEF OPERATIVE NOTE - NSICDXBRIEFPREOP_GEN_ALL_CORE_FT
PRE-OP DIAGNOSIS:  Periprosthetic supracondylar fracture of femur 09-Jul-2021 11:38:36  Rodriguez Bradford   PRE-OP DIAGNOSIS:  Periprosthetic supracondylar fracture of femur 09-Jul-2021 11:38:36 distal left about retained total knee replacement Rodriguez Bradford   PRE-OP DIAGNOSIS:  Periprosthetic supracondylar fracture of femur 09-Jul-2021 11:38:36 distal right about retained total knee replacement Rdoriguez Bradford

## 2021-07-09 NOTE — PHARMACOTHERAPY INTERVENTION NOTE - COMMENTS
pt 90 kg, 70 yo, post renal transplant, gfr= 55  md asked to suggest vancomycin dose  20 mg / kg loading followed by 1 g q24h, trough before 4th dose

## 2021-07-10 LAB
ALBUMIN SERPL ELPH-MCNC: 2.8 G/DL — LOW (ref 3.5–5.2)
ALBUMIN SERPL ELPH-MCNC: 2.8 G/DL — LOW (ref 3.5–5.2)
ALBUMIN SERPL ELPH-MCNC: 2.9 G/DL — LOW (ref 3.5–5.2)
ALP SERPL-CCNC: 142 U/L — HIGH (ref 30–115)
ALP SERPL-CCNC: 161 U/L — HIGH (ref 30–115)
ALP SERPL-CCNC: 167 U/L — HIGH (ref 30–115)
ALT FLD-CCNC: 12 U/L — SIGNIFICANT CHANGE UP (ref 0–41)
ALT FLD-CCNC: 14 U/L — SIGNIFICANT CHANGE UP (ref 0–41)
ALT FLD-CCNC: 9 U/L — SIGNIFICANT CHANGE UP (ref 0–41)
ANION GAP SERPL CALC-SCNC: 11 MMOL/L — SIGNIFICANT CHANGE UP (ref 7–14)
ANION GAP SERPL CALC-SCNC: 5 MMOL/L — LOW (ref 7–14)
ANION GAP SERPL CALC-SCNC: 6 MMOL/L — LOW (ref 7–14)
AST SERPL-CCNC: 27 U/L — SIGNIFICANT CHANGE UP (ref 0–41)
AST SERPL-CCNC: 34 U/L — SIGNIFICANT CHANGE UP (ref 0–41)
AST SERPL-CCNC: 35 U/L — SIGNIFICANT CHANGE UP (ref 0–41)
BASOPHILS # BLD AUTO: 0.02 K/UL — SIGNIFICANT CHANGE UP (ref 0–0.2)
BASOPHILS # BLD AUTO: 0.03 K/UL — SIGNIFICANT CHANGE UP (ref 0–0.2)
BASOPHILS # BLD AUTO: 0.03 K/UL — SIGNIFICANT CHANGE UP (ref 0–0.2)
BASOPHILS # BLD AUTO: 0.04 K/UL — SIGNIFICANT CHANGE UP (ref 0–0.2)
BASOPHILS NFR BLD AUTO: 0.2 % — SIGNIFICANT CHANGE UP (ref 0–1)
BASOPHILS NFR BLD AUTO: 0.4 % — SIGNIFICANT CHANGE UP (ref 0–1)
BILIRUB SERPL-MCNC: 1.4 MG/DL — HIGH (ref 0.2–1.2)
BILIRUB SERPL-MCNC: 1.7 MG/DL — HIGH (ref 0.2–1.2)
BILIRUB SERPL-MCNC: 3.1 MG/DL — HIGH (ref 0.2–1.2)
BUN SERPL-MCNC: 18 MG/DL — SIGNIFICANT CHANGE UP (ref 10–20)
BUN SERPL-MCNC: 18 MG/DL — SIGNIFICANT CHANGE UP (ref 10–20)
BUN SERPL-MCNC: 20 MG/DL — SIGNIFICANT CHANGE UP (ref 10–20)
CALCIUM SERPL-MCNC: 7.6 MG/DL — LOW (ref 8.5–10.1)
CALCIUM SERPL-MCNC: 7.7 MG/DL — LOW (ref 8.5–10.1)
CALCIUM SERPL-MCNC: 7.7 MG/DL — LOW (ref 8.5–10.1)
CHLORIDE SERPL-SCNC: 102 MMOL/L — SIGNIFICANT CHANGE UP (ref 98–110)
CHLORIDE SERPL-SCNC: 105 MMOL/L — SIGNIFICANT CHANGE UP (ref 98–110)
CHLORIDE SERPL-SCNC: 106 MMOL/L — SIGNIFICANT CHANGE UP (ref 98–110)
CO2 SERPL-SCNC: 19 MMOL/L — SIGNIFICANT CHANGE UP (ref 17–32)
CO2 SERPL-SCNC: 20 MMOL/L — SIGNIFICANT CHANGE UP (ref 17–32)
CO2 SERPL-SCNC: 22 MMOL/L — SIGNIFICANT CHANGE UP (ref 17–32)
CREAT SERPL-MCNC: 1.1 MG/DL — SIGNIFICANT CHANGE UP (ref 0.7–1.5)
CREAT SERPL-MCNC: 1.1 MG/DL — SIGNIFICANT CHANGE UP (ref 0.7–1.5)
CREAT SERPL-MCNC: 1.3 MG/DL — SIGNIFICANT CHANGE UP (ref 0.7–1.5)
CULTURE RESULTS: NO GROWTH — SIGNIFICANT CHANGE UP
EOSINOPHIL # BLD AUTO: 0.01 K/UL — SIGNIFICANT CHANGE UP (ref 0–0.7)
EOSINOPHIL # BLD AUTO: 0.01 K/UL — SIGNIFICANT CHANGE UP (ref 0–0.7)
EOSINOPHIL # BLD AUTO: 0.03 K/UL — SIGNIFICANT CHANGE UP (ref 0–0.7)
EOSINOPHIL # BLD AUTO: 0.03 K/UL — SIGNIFICANT CHANGE UP (ref 0–0.7)
EOSINOPHIL NFR BLD AUTO: 0.1 % — SIGNIFICANT CHANGE UP (ref 0–8)
EOSINOPHIL NFR BLD AUTO: 0.1 % — SIGNIFICANT CHANGE UP (ref 0–8)
EOSINOPHIL NFR BLD AUTO: 0.3 % — SIGNIFICANT CHANGE UP (ref 0–8)
EOSINOPHIL NFR BLD AUTO: 0.4 % — SIGNIFICANT CHANGE UP (ref 0–8)
GLUCOSE BLDC GLUCOMTR-MCNC: 115 MG/DL — HIGH (ref 70–99)
GLUCOSE BLDC GLUCOMTR-MCNC: 135 MG/DL — HIGH (ref 70–99)
GLUCOSE BLDC GLUCOMTR-MCNC: 152 MG/DL — HIGH (ref 70–99)
GLUCOSE SERPL-MCNC: 112 MG/DL — HIGH (ref 70–99)
GLUCOSE SERPL-MCNC: 114 MG/DL — HIGH (ref 70–99)
GLUCOSE SERPL-MCNC: 139 MG/DL — HIGH (ref 70–99)
HCT VFR BLD CALC: 20.7 % — LOW (ref 42–52)
HCT VFR BLD CALC: 21 % — LOW (ref 42–52)
HCT VFR BLD CALC: 22.9 % — LOW (ref 42–52)
HCT VFR BLD CALC: 23.8 % — LOW (ref 42–52)
HGB BLD-MCNC: 6.9 G/DL — CRITICAL LOW (ref 14–18)
HGB BLD-MCNC: 7 G/DL — LOW (ref 14–18)
HGB BLD-MCNC: 7.7 G/DL — LOW (ref 14–18)
HGB BLD-MCNC: 7.9 G/DL — LOW (ref 14–18)
IMM GRANULOCYTES NFR BLD AUTO: 0.5 % — HIGH (ref 0.1–0.3)
IMM GRANULOCYTES NFR BLD AUTO: 0.5 % — HIGH (ref 0.1–0.3)
IMM GRANULOCYTES NFR BLD AUTO: 0.6 % — HIGH (ref 0.1–0.3)
IMM GRANULOCYTES NFR BLD AUTO: 0.7 % — HIGH (ref 0.1–0.3)
LACTATE SERPL-SCNC: 1.2 MMOL/L — SIGNIFICANT CHANGE UP (ref 0.7–2)
LDH SERPL L TO P-CCNC: 201 U/L — SIGNIFICANT CHANGE UP (ref 50–242)
LYMPHOCYTES # BLD AUTO: 1.81 K/UL — SIGNIFICANT CHANGE UP (ref 1.2–3.4)
LYMPHOCYTES # BLD AUTO: 14.1 % — LOW (ref 20.5–51.1)
LYMPHOCYTES # BLD AUTO: 15.5 % — LOW (ref 20.5–51.1)
LYMPHOCYTES # BLD AUTO: 2 K/UL — SIGNIFICANT CHANGE UP (ref 1.2–3.4)
LYMPHOCYTES # BLD AUTO: 2.33 K/UL — SIGNIFICANT CHANGE UP (ref 1.2–3.4)
LYMPHOCYTES # BLD AUTO: 2.49 K/UL — SIGNIFICANT CHANGE UP (ref 1.2–3.4)
LYMPHOCYTES # BLD AUTO: 21.1 % — SIGNIFICANT CHANGE UP (ref 20.5–51.1)
LYMPHOCYTES # BLD AUTO: 22.4 % — SIGNIFICANT CHANGE UP (ref 20.5–51.1)
MAGNESIUM SERPL-MCNC: 1.4 MG/DL — LOW (ref 1.8–2.4)
MCHC RBC-ENTMCNC: 28.3 PG — SIGNIFICANT CHANGE UP (ref 27–31)
MCHC RBC-ENTMCNC: 28.4 PG — SIGNIFICANT CHANGE UP (ref 27–31)
MCHC RBC-ENTMCNC: 28.5 PG — SIGNIFICANT CHANGE UP (ref 27–31)
MCHC RBC-ENTMCNC: 28.6 PG — SIGNIFICANT CHANGE UP (ref 27–31)
MCHC RBC-ENTMCNC: 33.2 G/DL — SIGNIFICANT CHANGE UP (ref 32–37)
MCHC RBC-ENTMCNC: 33.3 G/DL — SIGNIFICANT CHANGE UP (ref 32–37)
MCHC RBC-ENTMCNC: 33.3 G/DL — SIGNIFICANT CHANGE UP (ref 32–37)
MCHC RBC-ENTMCNC: 33.6 G/DL — SIGNIFICANT CHANGE UP (ref 32–37)
MCV RBC AUTO: 84.2 FL — SIGNIFICANT CHANGE UP (ref 80–94)
MCV RBC AUTO: 85.5 FL — SIGNIFICANT CHANGE UP (ref 80–94)
MCV RBC AUTO: 85.6 FL — SIGNIFICANT CHANGE UP (ref 80–94)
MCV RBC AUTO: 85.7 FL — SIGNIFICANT CHANGE UP (ref 80–94)
MONOCYTES # BLD AUTO: 0.88 K/UL — HIGH (ref 0.1–0.6)
MONOCYTES # BLD AUTO: 1.12 K/UL — HIGH (ref 0.1–0.6)
MONOCYTES # BLD AUTO: 1.24 K/UL — HIGH (ref 0.1–0.6)
MONOCYTES # BLD AUTO: 1.71 K/UL — HIGH (ref 0.1–0.6)
MONOCYTES NFR BLD AUTO: 10.6 % — HIGH (ref 1.7–9.3)
MONOCYTES NFR BLD AUTO: 10.9 % — HIGH (ref 1.7–9.3)
MONOCYTES NFR BLD AUTO: 11.2 % — HIGH (ref 1.7–9.3)
MONOCYTES NFR BLD AUTO: 7.9 % — SIGNIFICANT CHANGE UP (ref 1.7–9.3)
NEUTROPHILS # BLD AUTO: 10.95 K/UL — HIGH (ref 1.4–6.5)
NEUTROPHILS # BLD AUTO: 11.76 K/UL — HIGH (ref 1.4–6.5)
NEUTROPHILS # BLD AUTO: 5.31 K/UL — SIGNIFICANT CHANGE UP (ref 1.4–6.5)
NEUTROPHILS # BLD AUTO: 7.33 K/UL — HIGH (ref 1.4–6.5)
NEUTROPHILS NFR BLD AUTO: 65.6 % — SIGNIFICANT CHANGE UP (ref 42.2–75.2)
NEUTROPHILS NFR BLD AUTO: 66.5 % — SIGNIFICANT CHANGE UP (ref 42.2–75.2)
NEUTROPHILS NFR BLD AUTO: 72.9 % — SIGNIFICANT CHANGE UP (ref 42.2–75.2)
NEUTROPHILS NFR BLD AUTO: 77.1 % — HIGH (ref 42.2–75.2)
NRBC # BLD: 0 /100 WBCS — SIGNIFICANT CHANGE UP (ref 0–0)
PLATELET # BLD AUTO: 129 K/UL — LOW (ref 130–400)
PLATELET # BLD AUTO: 153 K/UL — SIGNIFICANT CHANGE UP (ref 130–400)
PLATELET # BLD AUTO: 167 K/UL — SIGNIFICANT CHANGE UP (ref 130–400)
PLATELET # BLD AUTO: 181 K/UL — SIGNIFICANT CHANGE UP (ref 130–400)
POTASSIUM SERPL-MCNC: 4.3 MMOL/L — SIGNIFICANT CHANGE UP (ref 3.5–5)
POTASSIUM SERPL-MCNC: 4.4 MMOL/L — SIGNIFICANT CHANGE UP (ref 3.5–5)
POTASSIUM SERPL-MCNC: 4.5 MMOL/L — SIGNIFICANT CHANGE UP (ref 3.5–5)
POTASSIUM SERPL-SCNC: 4.3 MMOL/L — SIGNIFICANT CHANGE UP (ref 3.5–5)
POTASSIUM SERPL-SCNC: 4.4 MMOL/L — SIGNIFICANT CHANGE UP (ref 3.5–5)
POTASSIUM SERPL-SCNC: 4.5 MMOL/L — SIGNIFICANT CHANGE UP (ref 3.5–5)
PROCALCITONIN SERPL-MCNC: 0.32 NG/ML — HIGH (ref 0.02–0.1)
PROT SERPL-MCNC: 5.1 G/DL — LOW (ref 6–8)
PROT SERPL-MCNC: 5.3 G/DL — LOW (ref 6–8)
PROT SERPL-MCNC: 5.4 G/DL — LOW (ref 6–8)
RBC # BLD: 2.42 M/UL — LOW (ref 4.7–6.1)
RBC # BLD: 2.45 M/UL — LOW (ref 4.7–6.1)
RBC # BLD: 2.61 M/UL — LOW (ref 4.7–6.1)
RBC # BLD: 2.72 M/UL — LOW (ref 4.7–6.1)
RBC # BLD: 2.78 M/UL — LOW (ref 4.7–6.1)
RBC # FLD: 14 % — SIGNIFICANT CHANGE UP (ref 11.5–14.5)
RBC # FLD: 14.1 % — SIGNIFICANT CHANGE UP (ref 11.5–14.5)
RBC # FLD: 14.2 % — SIGNIFICANT CHANGE UP (ref 11.5–14.5)
RBC # FLD: 14.3 % — SIGNIFICANT CHANGE UP (ref 11.5–14.5)
RETICS #: 72.6 K/UL — SIGNIFICANT CHANGE UP (ref 25–125)
RETICS/RBC NFR: 2.8 % — HIGH (ref 0.5–1.5)
SODIUM SERPL-SCNC: 132 MMOL/L — LOW (ref 135–146)
SPECIMEN SOURCE: SIGNIFICANT CHANGE UP
TACROLIMUS SERPL-MCNC: 9.3 NG/ML — SIGNIFICANT CHANGE UP
TROPONIN T SERPL-MCNC: 0.02 NG/ML — HIGH
WBC # BLD: 11.03 K/UL — HIGH (ref 4.8–10.8)
WBC # BLD: 14.2 K/UL — HIGH (ref 4.8–10.8)
WBC # BLD: 16.11 K/UL — HIGH (ref 4.8–10.8)
WBC # BLD: 8.09 K/UL — SIGNIFICANT CHANGE UP (ref 4.8–10.8)
WBC # FLD AUTO: 11.03 K/UL — HIGH (ref 4.8–10.8)
WBC # FLD AUTO: 14.2 K/UL — HIGH (ref 4.8–10.8)
WBC # FLD AUTO: 16.11 K/UL — HIGH (ref 4.8–10.8)
WBC # FLD AUTO: 8.09 K/UL — SIGNIFICANT CHANGE UP (ref 4.8–10.8)

## 2021-07-10 PROCEDURE — 99291 CRITICAL CARE FIRST HOUR: CPT

## 2021-07-10 PROCEDURE — 99232 SBSQ HOSP IP/OBS MODERATE 35: CPT

## 2021-07-10 PROCEDURE — 99222 1ST HOSP IP/OBS MODERATE 55: CPT

## 2021-07-10 PROCEDURE — 71045 X-RAY EXAM CHEST 1 VIEW: CPT | Mod: 26

## 2021-07-10 RX ORDER — METOPROLOL TARTRATE 50 MG
25 TABLET ORAL
Refills: 0 | Status: DISCONTINUED | OUTPATIENT
Start: 2021-07-10 | End: 2021-07-14

## 2021-07-10 RX ORDER — ESMOLOL HCL 100MG/10ML
50 VIAL (ML) INTRAVENOUS
Qty: 2500 | Refills: 0 | Status: DISCONTINUED | OUTPATIENT
Start: 2021-07-10 | End: 2021-07-12

## 2021-07-10 RX ORDER — PHENYLEPHRINE HYDROCHLORIDE 10 MG/ML
0.4 INJECTION INTRAVENOUS
Qty: 160 | Refills: 0 | Status: DISCONTINUED | OUTPATIENT
Start: 2021-07-10 | End: 2021-07-11

## 2021-07-10 RX ORDER — HYDROCORTISONE 20 MG
100 TABLET ORAL EVERY 8 HOURS
Refills: 0 | Status: DISCONTINUED | OUTPATIENT
Start: 2021-07-10 | End: 2021-07-10

## 2021-07-10 RX ORDER — MAGNESIUM SULFATE 500 MG/ML
2 VIAL (ML) INJECTION ONCE
Refills: 0 | Status: COMPLETED | OUTPATIENT
Start: 2021-07-10 | End: 2021-07-10

## 2021-07-10 RX ORDER — SODIUM CHLORIDE 9 MG/ML
1000 INJECTION, SOLUTION INTRAVENOUS
Refills: 0 | Status: DISCONTINUED | OUTPATIENT
Start: 2021-07-10 | End: 2021-07-12

## 2021-07-10 RX ORDER — ESMOLOL HCL 100MG/10ML
50 VIAL (ML) INTRAVENOUS
Qty: 2500 | Refills: 0 | Status: DISCONTINUED | OUTPATIENT
Start: 2021-07-10 | End: 2021-07-10

## 2021-07-10 RX ORDER — CHLORHEXIDINE GLUCONATE 213 G/1000ML
1 SOLUTION TOPICAL DAILY
Refills: 0 | Status: DISCONTINUED | OUTPATIENT
Start: 2021-07-10 | End: 2021-07-14

## 2021-07-10 RX ORDER — HYDROCORTISONE 20 MG
100 TABLET ORAL EVERY 8 HOURS
Refills: 0 | Status: DISCONTINUED | OUTPATIENT
Start: 2021-07-10 | End: 2021-07-13

## 2021-07-10 RX ORDER — METOPROLOL TARTRATE 50 MG
25 TABLET ORAL ONCE
Refills: 0 | Status: COMPLETED | OUTPATIENT
Start: 2021-07-10 | End: 2021-07-10

## 2021-07-10 RX ADMIN — HEPARIN SODIUM 5000 UNIT(S): 5000 INJECTION INTRAVENOUS; SUBCUTANEOUS at 05:20

## 2021-07-10 RX ADMIN — Medication 325 MILLIGRAM(S): at 11:19

## 2021-07-10 RX ADMIN — Medication 650 MILLIGRAM(S): at 04:27

## 2021-07-10 RX ADMIN — Medication 25 GRAM(S): at 11:17

## 2021-07-10 RX ADMIN — Medication 25 MILLIGRAM(S): at 22:45

## 2021-07-10 RX ADMIN — SODIUM CHLORIDE 500 MILLILITER(S): 9 INJECTION, SOLUTION INTRAVENOUS at 09:00

## 2021-07-10 RX ADMIN — TACROLIMUS 3 MILLIGRAM(S): 5 CAPSULE ORAL at 05:22

## 2021-07-10 RX ADMIN — Medication 100 MILLIGRAM(S): at 14:33

## 2021-07-10 RX ADMIN — Medication 650 MILLIGRAM(S): at 05:53

## 2021-07-10 RX ADMIN — TAMSULOSIN HYDROCHLORIDE 0.4 MILLIGRAM(S): 0.4 CAPSULE ORAL at 21:25

## 2021-07-10 RX ADMIN — CEFEPIME 100 MILLIGRAM(S): 1 INJECTION, POWDER, FOR SOLUTION INTRAMUSCULAR; INTRAVENOUS at 05:31

## 2021-07-10 RX ADMIN — Medication 650 MILLIGRAM(S): at 09:36

## 2021-07-10 RX ADMIN — Medication 27 MICROGRAM(S)/KG/MIN: at 09:15

## 2021-07-10 RX ADMIN — Medication 25 MILLIGRAM(S): at 14:47

## 2021-07-10 RX ADMIN — FLUDROCORTISONE ACETATE 0.1 MILLIGRAM(S): 0.1 TABLET ORAL at 05:20

## 2021-07-10 RX ADMIN — PHENYLEPHRINE HYDROCHLORIDE 6.75 MICROGRAM(S)/KG/MIN: 10 INJECTION INTRAVENOUS at 09:17

## 2021-07-10 RX ADMIN — Medication 100 MILLIGRAM(S): at 21:25

## 2021-07-10 RX ADMIN — Medication 650 MILLIGRAM(S): at 10:40

## 2021-07-10 RX ADMIN — PANTOPRAZOLE SODIUM 40 MILLIGRAM(S): 20 TABLET, DELAYED RELEASE ORAL at 06:09

## 2021-07-10 RX ADMIN — Medication 2: at 17:42

## 2021-07-10 RX ADMIN — OXYCODONE HYDROCHLORIDE 10 MILLIGRAM(S): 5 TABLET ORAL at 10:29

## 2021-07-10 RX ADMIN — SENNA PLUS 2 TABLET(S): 8.6 TABLET ORAL at 21:25

## 2021-07-10 RX ADMIN — CEFEPIME 100 MILLIGRAM(S): 1 INJECTION, POWDER, FOR SOLUTION INTRAMUSCULAR; INTRAVENOUS at 18:09

## 2021-07-10 RX ADMIN — Medication 100 MILLIGRAM(S): at 09:28

## 2021-07-10 RX ADMIN — TACROLIMUS 3 MILLIGRAM(S): 5 CAPSULE ORAL at 17:40

## 2021-07-10 RX ADMIN — Medication 0: at 10:28

## 2021-07-10 RX ADMIN — OXYCODONE HYDROCHLORIDE 10 MILLIGRAM(S): 5 TABLET ORAL at 11:20

## 2021-07-10 NOTE — CONSULT NOTE ADULT - SUBJECTIVE AND OBJECTIVE BOX
Patient is a 71y old  Male who presents with a chief complaint of Right periprosthetic femur fracture (10 Jul 2021 10:15)        HPI:  71M w/PMHx of HTN, HLD, anemia, DM, Afib, JIMÉNEZ s/p liver transplant and right renal transplant at [facility] in 2020 (follows with his hepatologist Dr. Sunshine), CAD s/p stents (10 years ago) on ASA and Plavix, watchman procedure performed ~3 months ago by Dr. Burdick, history of bilateral knee replacements (21 years ago) presents after being sent in from outpatient office for finding of new right distal femur fracture. s/p OR, ID consulted for post-op fever. Was called by RN for Fever of 100.8 and . Pt seen at bedside shivering. Alert and oriented. Pt was complaining of feeling cold. EP called for evaluation of AF with RVR        PAST MEDICAL & SURGICAL HISTORY:  HTN (hypertension)    High cholesterol    Anemia    Diabetes    Afib    Cirrhosis of liver  JIMÉNEZ    Dyspnea on exertion    BPH (benign prostatic hyperplasia)    Presence of bilateral total knee joint prostheses  15 years ago    S/P angioplasty with stent  2 cardiac stents &gt; 10 years back    Encounter for screening colonoscopy  1 year ago    Organ transplant  liver, right kidney 2020                    PREVIOUS DIAGNOSTIC TESTING:      ECHO  FINDINGS:    STRESS  FINDINGS:    CATHETERIZATION  FINDINGS:    ELECTROPHYSIOLOGY STUDY  FINDINGS:    CAROTID ULTRASOUND:  FINDINGS    VENOUS DUPLEX SCAN:  FINDINGS:    CHEST CT PULMONARY ANGIO with IV Contrast:  FINDINGS:    MEDICATIONS  (STANDING):  aspirin enteric coated 325 milliGRAM(s) Oral daily  cefepime   IVPB 1000 milliGRAM(s) IV Intermittent every 12 hours  dextrose 40% Gel 15 Gram(s) Oral once  dextrose 50% Injectable 25 Gram(s) IV Push once  esMOLOL  Infusion 50 MICROgram(s)/kG/Min (27 mL/Hr) IV Continuous <Continuous>  glucagon  Injectable 1 milliGRAM(s) IntraMuscular once  heparin   Injectable 5000 Unit(s) SubCutaneous every 8 hours  hydrocortisone sodium succinate Injectable 100 milliGRAM(s) IV Push every 8 hours  insulin lispro (ADMELOG) corrective regimen sliding scale   SubCutaneous three times a day before meals  lactated ringers. 1000 milliLiter(s) (500 mL/Hr) IV Continuous <Continuous>  magnesium sulfate  IVPB 2 Gram(s) IV Intermittent once  pantoprazole    Tablet 40 milliGRAM(s) Oral before breakfast  phenylephrine    Infusion 0.4 MICROgram(s)/kG/Min (6.75 mL/Hr) IV Continuous <Continuous>  senna 2 Tablet(s) Oral at bedtime  tacrolimus 3 milliGRAM(s) Oral every 12 hours  tamsulosin 0.4 milliGRAM(s) Oral at bedtime    MEDICATIONS  (PRN):  acetaminophen   Tablet .. 650 milliGRAM(s) Oral every 6 hours PRN Temp greater or equal to 38C (100.4F), Mild Pain (1 - 3)  oxyCODONE    IR 10 milliGRAM(s) Oral every 6 hours PRN Moderate Pain (4 - 6)      FAMILY HISTORY:  Family history of early CAD  mother    FH: ovarian cancer  mother        SOCIAL HISTORY:    CIGARETTES:    ALCOHOL:    Past Surgical History:    Allergies:    No Known Allergies      REVIEW OF SYSTEMS:    CONSTITUTIONAL: No fever, weight loss, chills, shakes, or fatigue  EYES: No eye pain, visual disturbances, or discharge  ENMT:  No difficulty hearing, tinnitus, vertigo; No sinus or throat pain  NECK: No pain or stiffness  BREASTS: No pain, masses, or nipple discharge  RESPIRATORY: No cough, wheezing, hemoptysis, or shortness of breath  CARDIOVASCULAR: No chest pain, dyspnea, palpitations, dizziness, syncope, paroxysmal nocturnal dyspnea, orthopnea, or arm or leg swelling  GASTROINTESTINAL: No abdominal  or epigastric pain, nausea, vomiting, hematemesis, diarrhea, constipation, melena or bright red blood.  GENITOURINARY: No dysuria, nocturia, hematuria, or urinary incontinence  NEUROLOGICAL: No headaches, memory loss, slurred speech, limb weakness, loss of strength, numbness, or tremors  SKIN: No itching, burning, rashes, or lesions   LYMPH NODES: No enlarged glands  ENDOCRINE: No heat or cold intolerance, or hair loss  MUSCULOSKELETAL: No joint pain or swelling, muscle, back, or extremity pain  PSYCHIATRIC: No depression, anxiety, or difficulty sleeping  HEME/LYMPH: No easy bruising or bleeding gums  ALLERY AND IMMUNOLOGIC: No hives or rash.      Vital Signs Last 24 Hrs  T(C): 38.3 (10 Jul 2021 10:15), Max: 38.9 (10 Jul 2021 09:15)  T(F): 101 (10 Jul 2021 10:15), Max: 102.1 (10 Jul 2021 09:15)  HR: 110 (10 Jul 2021 10:15) (96 - 135)  BP: 101/59 (10 Jul 2021 10:15) (75/42 - 133/61)  BP(mean): 72 (10 Jul 2021 10:15) (54 - 88)  RR: 18 (10 Jul 2021 10:15) (10 - 32)  SpO2: 100% (10 Jul 2021 10:15) (96% - 100%)    PHYSICAL EXAM:        GENERAL: In no apparent distress, well nourished, and hydrated.  HEAD:  Atraumatic, Normocephalic  EYES: EOMI, PERRLA, conjunctiva and sclera clear  ENMT: No tonsillar erythema, exudates, or enlargements; ist mucous membranes, Good dentition, No lesions  NECK: Supple and normal thyroid.  No JVD or carotid bruit.  Carotid pulse is 2+ bilaterally.  HEART: Regular rate and rhythm; No murmurs, rubs, or gallops.  PULMONARY: Clear to auscultation and perfusion.  No rales, wheezing, or rhonchi bilaterally.  ABDOMEN: Soft, Nontender, Nondistended; Bowel sounds present  EXTREMITIES:  2+ Peripheral Pulses, No clubbing, cyanosis, or edema  LYMPH: No lymphadenopathy noted  NEUROLOGICAL: Grossly nonfocal      INTERPRETATION OF TELEMETRY:    ECG:    I&O's Detail    2021 07:01  -  10 Jul 2021 07:00  --------------------------------------------------------  IN:    Diltiazem: 35 mL    IV PiggyBack: 50 mL    Lactated Ringers: 250 mL    Norepinephrine: 58.8 mL    PRBCs (Packed Red Blood Cells): 50 mL  Total IN: 443.8 mL    OUT:    Voided (mL): 650 mL  Total OUT: 650 mL    Total NET: -206.2 mL          LABS:                        7.0    11.03 )-----------( 153      ( 10 Jul 2021 08:08 )             21.0     07-10    132<L>  |  105  |  18  ----------------------------<  112<H>  4.3   |  22  |  1.1    Ca    7.7<L>      10 Jul 2021 08:08  Phos  4.1     07-09  Mg     1.4     07-10    TPro  5.3<L>  /  Alb  2.8<L>  /  TBili  1.7<H>  /  DBili  x   /  AST  34  /  ALT  12  /  AlkPhos  161<H>  07-10    CARDIAC MARKERS ( 10 Jul 2021 01:52 )  x     / 0.02 ng/mL / x     / x     / x      CARDIAC MARKERS ( 2021 16:25 )  x     / 0.02 ng/mL / 34 U/L / x     / <1.0 ng/mL        Urinalysis Basic - ( 2021 16:10 )    Color: Yellow / Appearance: Clear / S.031 / pH: x  Gluc: x / Ketone: Small  / Bili: Negative / Urobili: <2 mg/dL   Blood: x / Protein: 30 mg/dL / Nitrite: Negative   Leuk Esterase: Negative / RBC: 5 /HPF / WBC 3 /HPF   Sq Epi: x / Non Sq Epi: 1 /HPF / Bacteria: Negative      BNP  I&O's Detail    2021 07:01  -  10 Jul 2021 07:00  --------------------------------------------------------  IN:    Diltiazem: 35 mL    IV PiggyBack: 50 mL    Lactated Ringers: 250 mL    Norepinephrine: 58.8 mL    PRBCs (Packed Red Blood Cells): 50 mL  Total IN: 443.8 mL    OUT:    Voided (mL): 650 mL  Total OUT: 650 mL    Total NET: -206.2 mL        Daily Height in cm: 175.26 (2021 14:50)    Daily     RADIOLOGY & ADDITIONAL STUDIES: Patient is a 71y old  Male who presents with a chief complaint of Right periprosthetic femur fracture (10 Jul 2021 10:15)        HPI:  71M w/PMHx of HTN, HLD, anemia, DM, Afib, JIMÉNEZ s/p liver transplant and right renal transplant at [facility] in 2020 (follows with his hepatologist Dr. Sunshine), CAD s/p stents (10 years ago) on ASA and Plavix, watchman procedure performed ~3 months ago by Dr. Burdick, history of bilateral knee replacements (21 years ago) presents after being sent in from outpatient office for finding of new right distal femur fracture. s/p OR, ID consulted for post-op fever. Was called by RN for Fever of 100.8 and . Pt seen at bedside shivering. Alert and oriented. Pt was complaining of feeling cold. EP called for evaluation of AF with RVR        PAST MEDICAL & SURGICAL HISTORY:  HTN (hypertension)    High cholesterol    Anemia    Diabetes    Afib    Cirrhosis of liver  JIMÉNEZ    Dyspnea on exertion    BPH (benign prostatic hyperplasia)    Presence of bilateral total knee joint prostheses  15 years ago    S/P angioplasty with stent  2 cardiac stents &gt; 10 years back    Encounter for screening colonoscopy  1 year ago    Organ transplant  liver, right kidney 2020                    PREVIOUS DIAGNOSTIC TESTING:      ECHO  FINDINGS:  cho< from: ARPAN w/Probe Placement (04.15.21 @ 08:18) >  Left Ventricle: Global LV systolic function was mildly decreased. Left ventricular ejection fraction, by visual estimation, is 45 to 50%.  Right Ventricle: Normal right ventricular size and function.  Left Atrium: Mildly enlarged left atrium. Watchman device in good position. No evidence of leak around the device in SHILA.  Right Atrium: Normal right atrial size.  Pericardium: There is no evidence of pericardial effusion.  Mitral Valve: Structurally normal mitral valve, with normal leaflet excursion. No evidence of mitral valve stenosis. Mild to moderate mitral valve regurgitation is seen.    < end of copied text >      STRESS  FINDINGS:    CATHETERIZATION  FINDINGS:    ELECTROPHYSIOLOGY STUDY  FINDINGS:    CAROTID ULTRASOUND:  FINDINGS    VENOUS DUPLEX SCAN:  FINDINGS:    CHEST CT PULMONARY ANGIO with IV Contrast:  FINDINGS:    MEDICATIONS  (STANDING):  aspirin enteric coated 325 milliGRAM(s) Oral daily  cefepime   IVPB 1000 milliGRAM(s) IV Intermittent every 12 hours  dextrose 40% Gel 15 Gram(s) Oral once  dextrose 50% Injectable 25 Gram(s) IV Push once  esMOLOL  Infusion 50 MICROgram(s)/kG/Min (27 mL/Hr) IV Continuous <Continuous>  glucagon  Injectable 1 milliGRAM(s) IntraMuscular once  heparin   Injectable 5000 Unit(s) SubCutaneous every 8 hours  hydrocortisone sodium succinate Injectable 100 milliGRAM(s) IV Push every 8 hours  insulin lispro (ADMELOG) corrective regimen sliding scale   SubCutaneous three times a day before meals  lactated ringers. 1000 milliLiter(s) (500 mL/Hr) IV Continuous <Continuous>  magnesium sulfate  IVPB 2 Gram(s) IV Intermittent once  pantoprazole    Tablet 40 milliGRAM(s) Oral before breakfast  phenylephrine    Infusion 0.4 MICROgram(s)/kG/Min (6.75 mL/Hr) IV Continuous <Continuous>  senna 2 Tablet(s) Oral at bedtime  tacrolimus 3 milliGRAM(s) Oral every 12 hours  tamsulosin 0.4 milliGRAM(s) Oral at bedtime    MEDICATIONS  (PRN):  acetaminophen   Tablet .. 650 milliGRAM(s) Oral every 6 hours PRN Temp greater or equal to 38C (100.4F), Mild Pain (1 - 3)  oxyCODONE    IR 10 milliGRAM(s) Oral every 6 hours PRN Moderate Pain (4 - 6)      FAMILY HISTORY:  Family history of early CAD  mother    FH: ovarian cancer  mother        SOCIAL HISTORY:    CIGARETTES: none    ALCOHOL: nne    Past Surgical History:    Allergies:    No Known Allergies      REVIEW OF SYSTEMS:    CONSTITUTIONAL: No fever, weight loss, chills, shakes, or fatigue  EYES: No eye pain, visual disturbances, or discharge  ENMT:  No difficulty hearing, tinnitus, vertigo; No sinus or throat pain  NECK: No pain or stiffness  BREASTS: No pain, masses, or nipple discharge  RESPIRATORY: No cough, wheezing, hemoptysis, or shortness of breath  CARDIOVASCULAR: No chest pain, dyspnea, palpitations, dizziness, syncope, paroxysmal nocturnal dyspnea, orthopnea, or arm or leg swelling  GASTROINTESTINAL: No abdominal  or epigastric pain, nausea, vomiting, hematemesis, diarrhea, constipation, melena or bright red blood.  GENITOURINARY: No dysuria, nocturia, hematuria, or urinary incontinence  NEUROLOGICAL: No headaches, memory loss, slurred speech, limb weakness, loss of strength, numbness, or tremors  SKIN: No itching, burning, rashes, or lesions   MUSCULOSKELETAL: No joint pain or swelling, muscle, back, or extremity pain  PSYCHIATRIC: No depression, anxiety, or difficulty sleeping  HEME/LYMPH: No easy bruising or bleeding gums      Vital Signs Last 24 Hrs  T(C): 38.3 (10 Jul 2021 10:15), Max: 38.9 (10 Jul 2021 09:15)  T(F): 101 (10 Jul 2021 10:15), Max: 102.1 (10 Jul 2021 09:15)  HR: 110 (10 Jul 2021 10:15) (96 - 135)  BP: 101/59 (10 Jul 2021 10:15) (75/42 - 133/61)  BP(mean): 72 (10 Jul 2021 10:15) (54 - 88)  RR: 18 (10 Jul 2021 10:15) (10 - 32)  SpO2: 100% (10 Jul 2021 10:15) (96% - 100%)    PHYSICAL EXAM:        GENERAL: In no apparent distress, well nourished, and hydrated.  HEAD:  Atraumatic, Normocephalic  EYES: EOMI, PERRLA, conjunctiva and sclera clear  ENMT: No tonsillar erythema, exudates, or enlargements; ist mucous membranes, Good dentition, No lesions  NECK: Supple and normal thyroid.  No JVD or carotid bruit.  Carotid pulse is 2+ bilaterally.  HEART: Regular rate and rhythm; No murmurs, rubs, or gallops.  PULMONARY: Clear to auscultation and perfusion.  No rales, wheezing, or rhonchi bilaterally.  ABDOMEN: Soft, Nontender, Nondistended; Bowel sounds present  EXTREMITIES:  2+ Peripheral Pulses, No clubbing, cyanosis, or edema  LYMPH: No lymphadenopathy noted  NEUROLOGICAL: Grossly nonfocal      INTERPRETATION OF TELEMETRY:    ECG:    I&O's Detail    2021 07:01  -  10 Jul 2021 07:00  --------------------------------------------------------  IN:    Diltiazem: 35 mL    IV PiggyBack: 50 mL    Lactated Ringers: 250 mL    Norepinephrine: 58.8 mL    PRBCs (Packed Red Blood Cells): 50 mL  Total IN: 443.8 mL    OUT:    Voided (mL): 650 mL  Total OUT: 650 mL    Total NET: -206.2 mL          LABS:                        7.0    11.03 )-----------( 153      ( 10 Jul 2021 08:08 )             21.0     07-10    132<L>  |  105  |  18  ----------------------------<  112<H>  4.3   |  22  |  1.1    Ca    7.7<L>      10 Jul 2021 08:08  Phos  4.1     07-09  Mg     1.4     07-10    TPro  5.3<L>  /  Alb  2.8<L>  /  TBili  1.7<H>  /  DBili  x   /  AST  34  /  ALT  12  /  AlkPhos  161<H>  07-10    CARDIAC MARKERS ( 10 Jul 2021 01:52 )  x     / 0.02 ng/mL / x     / x     / x      CARDIAC MARKERS ( 2021 16:25 )  x     / 0.02 ng/mL / 34 U/L / x     / <1.0 ng/mL        Urinalysis Basic - ( 2021 16:10 )    Color: Yellow / Appearance: Clear / S.031 / pH: x  Gluc: x / Ketone: Small  / Bili: Negative / Urobili: <2 mg/dL   Blood: x / Protein: 30 mg/dL / Nitrite: Negative   Leuk Esterase: Negative / RBC: 5 /HPF / WBC 3 /HPF   Sq Epi: x / Non Sq Epi: 1 /HPF / Bacteria: Negative      BNP  I&O's Detail    2021 07:01  -  10 Jul 2021 07:00  --------------------------------------------------------  IN:    Diltiazem: 35 mL    IV PiggyBack: 50 mL    Lactated Ringers: 250 mL    Norepinephrine: 58.8 mL    PRBCs (Packed Red Blood Cells): 50 mL  Total IN: 443.8 mL    OUT:    Voided (mL): 650 mL  Total OUT: 650 mL    Total NET: -206.2 mL        Daily Height in cm: 175.26 (2021 14:50)    Daily     RADIOLOGY & ADDITIONAL STUDIES:

## 2021-07-10 NOTE — CHART NOTE - NSCHARTNOTEFT_GEN_A_CORE
71M w/PMHx of HTN, HLD, anemia, DM, Afib, JIMÉNEZ s/p liver transplant and right renal transplant at [facility] in February 2020 (follows with his hepatologist Dr. Sunshine), CAD s/p stents (10 years ago) on ASA and Plavix, watchman procedure performed ~3 months ago, sent in from outpatient office for finding of new right distal femur fracture. Patient tripped and fell while trying to get out of his car and felt right knee pain afterwards.     Course of Events:    s/p Right femur ORIF, following the procedure pt developed fever, hbg droped to 6.9, presented Hbg was 10, right pelvic hematoma on CT scan, hypotension and went to Afib with RVR,   Septic workup had been sent, started on cefepime  overnight started diltiazem for Afib,   s/p 2 Liter of boluses, s/p 1 unit of prbc  Started on a low dose levophed and Central line was inserted         ASSESSMENT & PLAN:   70 yo man PMH HTN, HLD, anemia, Afib s/p watchman device JIMÉNEZ s/p liver transplant and right renal transplant  CAD s/p stents on ASA and Plavix.     # Shock: likely hemorrhagic shock from right pelvic hematoma  - b=presents with hbg of 10, dropped to 6.9 s/p 1 unit, given low hbg, HF and hypotension will give another 1 of prbc  - check cbc q12h, lactate, f/u hemolytic panel   - switch levo to Marcus given Afib  - switch Cardizem to esmolol  - s/p renal and liver transplant on fludrocotsion, will give stress dose of Hydrocortisone, resume fludrocortisone allyn improve and upon DC IV hydrocortisone   - hold Plavix and DVT pro, if Hbg stable by Am, start heparin sq    #fever: unknown source, could be from the hematoma, f/u bcx and procalcitnoin, c/w cefepime, trend wbc    #Afib s/p watchman now with RVR   - due to shock  - switch Cardizem to esmolol  - EP eval, not sure if he was on AC (watchman 3 months ago)  - dc Norvasc and metoprolol given hypotension       #Acute comminuted fracture of the distal rt femur  - s/p Fixation (OR done 07/09/2021)  - Ortho following       #S/p liver transplant (hx of JIMÉNEZ)  # S/p renal transplant (rt sided at [facility] in 02/2020)  -Nephro on board, following   - c/w Prograf 0.3mg PO BID   - F/U AM prograf trough  - c/w short course of stress dose steroids   - nephro following     #thrombocytopenia- appears chronic, continue to trend         # Hx of CAD (is on DAPT) s/p stents and watchman  - ARPAN 4/15/21: EF 45-50% Global LV systolic function was mildly decreased  - Continue Aspirin, keep off Plavix given drop in hbg and hematoma       #Mild hyponatremia  - Resolved,     #CKD 3 (renal function is likely at baseline)  -At baseline 1.4  - Hx of right renal transplant at [facility] in Feb 2020   - Avoid nephrotoxins   - nephro following     # Hx of HTN - chronic      # Hx of dyslipidemia - chronic  - not on any home statin      #Hx of DM  - A1c 7/8: 5.3   - Home Januvia 100mg, holding  - Home  Linagliptin 5 mg QD, holding    #BPH  - c/w tamsulosin 0.4mg     Diet: DASH   GI prophylaxis: Pantoprazole  DVT Prophylaxis: SCDs 71M w/PMHx of HTN, HLD, anemia, DM, Afib, JIMÉNEZ s/p liver transplant and right renal transplant at [facility] in February 2020 (follows with his hepatologist Dr. Sunshine), CAD s/p stents (10 years ago) on ASA and Plavix, watchman procedure performed ~3 months ago, sent in from outpatient office for finding of new right distal femur fracture. Patient tripped and fell while trying to get out of his car and felt right knee pain afterwards.     Course of Events:    s/p Right femur ORIF, following the procedure pt developed fever, hbg droped to 6.9, presented Hbg was 10, right pelvic hematoma on CT scan, hypotension and went to Afib with RVR,   Septic workup had been sent, started on cefepime  overnight started diltiazem for Afib,   s/p 2 Liter of boluses, s/p 1 unit of prbc  Started on a low dose levophed and Central line was inserted         ASSESSMENT & PLAN:   72 yo man PMH HTN, HLD, anemia, Afib s/p watchman device JIMÉNEZ s/p liver transplant and right renal transplant  CAD s/p stents on ASA and Plavix.     # Shock: likely hemorrhagic shock from right pelvic hematoma  - presents with hbg of 10, dropped to 6.9 s/p 1 unit, given low hbg, HF and hypotension will give another 1 of prbc  - check cbc q12h, keep Hbg > 8, f/u lactate   - switch levo to Marcus given Afib  - switch Cardizem to esmolol  - s/p renal and liver transplant on fludrocortisone, will give stress dose of Hydrocortisone 100 q8h, resume fludrocortisone once improve and upon DC IV hydrocortisone   - hold Plavix and DVT pro, if Hbg stable by Am, start heparin sq    #fever: unknown source, could be from the hematoma, f/u bcx and procalcitnoin, c/w cefepime, trend wbc    #Afib s/p watchman now with RVR   - due to shock  - switch Cardizem to esmolol  - EP eval, not sure if he was on AC (watchman 3 months ago)  - dc Norvasc and metoprolol given hypotension       #Acute comminuted fracture of the distal rt femur  - s/p Fixation (OR done 07/09/2021)  - Ortho following       #S/p liver transplant (hx of JIMÉNEZ)  # S/p renal transplant (rt sided at [facility] in 02/2020)  -Nephro on board, following   - c/w Prograf 0.3mg PO BID   - F/U AM prograf trough  - c/w short course of stress dose steroids   - nephro following     #thrombocytopenia- appears chronic, continue to trend         # Hx of CAD (is on DAPT) s/p stents and watchman  - ARPAN 4/15/21: EF 45-50% Global LV systolic function was mildly decreased  - Continue Aspirin, keep off Plavix given drop in hbg and hematoma       #Mild hyponatremia  - Resolved,     #CKD 3 (renal function is likely at baseline)  -At baseline 1.4  - Hx of right renal transplant at [facility] in Feb 2020   - Avoid nephrotoxins   - nephro following     # Hx of HTN - chronic      # Hx of dyslipidemia - chronic  - not on any home statin      #Hx of DM  - A1c 7/8: 5.3   - Home Januvia 100mg, holding  - Home  Linagliptin 5 mg QD, holding    #BPH  - c/w tamsulosin 0.4mg     Diet: DASH   GI prophylaxis: Pantoprazole  DVT Prophylaxis: SCDs 71M w/PMHx of HTN, HLD, anemia, DM, Afib, JIMÉNEZ s/p liver transplant and right renal transplant at [facility] in February 2020 (follows with his hepatologist Dr. Sunshine), CAD s/p stents (10 years ago) on ASA and Plavix, watchman procedure performed ~3 months ago, sent in from outpatient office for finding of new right distal femur fracture. Patient tripped and fell while trying to get out of his car and felt right knee pain afterwards.     Course of Events:    s/p Right femur ORIF, following the procedure pt developed fever, hbg droped to 6.9, presented Hbg was 10, right pelvic hematoma on CT scan, hypotension and went to Afib with RVR,   Septic workup had been sent, started on cefepime  overnight started diltiazem for Afib,   s/p 2 Liter of boluses, s/p 1 unit of prbc  Started on a low dose levophed and Central line was inserted         ASSESSMENT & PLAN:   70 yo man PMH HTN, HLD, anemia, Afib s/p watchman device JMIÉNEZ s/p liver transplant and right renal transplant  CAD s/p stents on ASA and Plavix.     # Shock: likely hemorrhagic shock from right pelvic hematoma  - presents with hbg of 10, dropped to 6.9 s/p 1 unit, given low hbg, HF and hypotension will give another 1 of prbc  - check cbc q12h, keep Hbg > 8, f/u lactate   - switch levo to Marcus given Afib  - switch Cardizem to esmolol  - s/p renal and liver transplant on fludrocortisone, will give stress dose of Hydrocortisone 100 q8h, resume fludrocortisone once improve and upon DC IV hydrocortisone   - hold Plavix and DVT pro, if Hbg stable by Am, start heparin sq    #fever: unknown source, could be from the hematoma, f/u bcx and procalcitonin, c/w cefepime, trend wbc    #Afib s/p watchman now with RVR   - due to shock  - switch Cardizem to esmolol  -  s/p 45 days of coumadin after Watchman 3 months ago   - dc Norvasc and metoprolol given hypotension       #Acute comminuted fracture of the distal rt femur  - s/p Fixation (OR done 07/09/2021)  - Ortho following       #S/p liver transplant (hx of JIMÉNEZ)  # S/p renal transplant (rt sided at [facility] in 02/2020)  -Nephro on board, following   - c/w Prograf 0.3mg PO BID   - F/U AM prograf trough  - c/w short course of stress dose steroids   - nephro following     #thrombocytopenia- appears chronic, continue to trend         # Hx of CAD (is on DAPT) s/p stents and watchman  - ARPAN 4/15/21: EF 45-50% Global LV systolic function was mildly decreased  - Continue Aspirin, keep off Plavix given drop in hbg and hematoma       #Mild hyponatremia  - Resolved,     #CKD 3 (renal function is likely at baseline)  -At baseline 1.4  - Hx of right renal transplant at [facility] in Feb 2020   - Avoid nephrotoxins   - nephro following     # Hx of HTN - chronic      # Hx of dyslipidemia - chronic  - not on any home statin      #Hx of DM  - A1c 7/8: 5.3   - Home Januvia 100mg, holding  - Home  Linagliptin 5 mg QD, holding    #BPH  - c/w tamsulosin 0.4mg     Diet: DASH   GI prophylaxis: Pantoprazole  DVT Prophylaxis: SCDs

## 2021-07-10 NOTE — CONSULT NOTE ADULT - SUBJECTIVE AND OBJECTIVE BOX
SOCORRO MANUEL  71y, Male  Allergy: No Known Allergies      CHIEF COMPLAINT:   Right periprosthetic femur fracture (10 Jul 2021 07:37)      LOS  3d    HPI  HPI:  71M w/PMHx of HTN, HLD, anemia, DM, Afib, JIMÉNEZ s/p liver transplant and right renal transplant at [facility] in 2020 (follows with his hepatologist Dr. Sunshine), CAD s/p stents (10 years ago) on ASA and Plavix, watchman procedure performed ~3 months ago by Dr. Burdick, history of bilateral knee replacements (21 years ago) presents after being sent in from outpatient office for finding of new right distal femur fracture. Patient tripped and fell while trying to get out of his car and felt right knee pain afterwards. He presented to an outpatient clinic for evaluation. XR at outpatient clinic demonstrated distal right femur fracture. He was placed in a knee immobilizer and instructed to present to the ED for further management. Patient is afebrile, HR 61, borderline BP of 93/52, saturating 99% on room air. On exam his right knee is immobilized, he has flexion and extension of the foot at the ankle joint, +DP and PT pulses, foot warm and well perfused. No other external signs of injury, denies pain anywhere else.    (2021 21:21)      INFECTIOUS DISEASE HISTORY:  ID consulted for post-op fever  ORIF, fracture, femur, periprosthetic 2021  WBC 6-->8-->11, no neutrophilia, downtrending hgb    started on cefepime    PMH  PAST MEDICAL & SURGICAL HISTORY:  HTN (hypertension)    High cholesterol    Anemia    Diabetes    Afib    Cirrhosis of liver  JIMÉNEZ    Dyspnea on exertion    BPH (benign prostatic hyperplasia)    Presence of bilateral total knee joint prostheses  15 years ago    S/P angioplasty with stent  2 cardiac stents &gt; 10 years back    Encounter for screening colonoscopy  1 year ago    Organ transplant  liver, right kidney 2020        FAMILY HISTORY  Family history of early CAD    FH: ovarian cancer        SOCIAL HISTORY  Social History:  Alcohol -Denied.   Smoke -Denied.   Illicit drug -Denied. (08 Dec 2019 03:50)        ROS  ***    VITALS:  T(F): 99.7, Max: 101.8 (21 @ 18:41)  HR: 115  BP: 104/57  RR: 18Vital Signs Last 24 Hrs  T(C): 37.6 (10 Jul 2021 08:45), Max: 38.8 (2021 18:41)  T(F): 99.7 (10 Jul 2021 08:45), Max: 101.8 (2021 18:41)  HR: 115 (10 Jul 2021 08:45) (96 - 135)  BP: 104/57 (10 Jul 2021 08:45) (75/42 - 133/61)  BP(mean): 72 (10 Jul 2021 08:45) (54 - 88)  RR: 18 (10 Jul 2021 08:45) (10 - 32)  SpO2: 100% (10 Jul 2021 08:45) (96% - 100%)    PHYSICAL EXAM:  ***    TESTS & MEASUREMENTS:                        7.0    11.03 )-----------( 153      ( 10 Jul 2021 08:08 )             21.0     07-10    132<L>  |  105  |  18  ----------------------------<  112<H>  4.3   |  22  |  1.1    Ca    7.7<L>      10 Jul 2021 08:08  Phos  4.1     07-09  Mg     1.4     10    TPro  5.3<L>  /  Alb  2.8<L>  /  TBili  1.7<H>  /  DBili  x   /  AST  34  /  ALT  12  /  AlkPhos  161<H>  07-10    eGFR if Non African American: 67 mL/min/1.73M2 (07-10-21 @ 08:08)  eGFR if : 78 mL/min/1.73M2 (07-10-21 @ 08:08)  eGFR if Non African American: 67 mL/min/1.73M2 (07-10-21 @ 01:52)  eGFR if African American: 78 mL/min/1.73M2 (07-10-21 @ 01:52)  eGFR if Non African American: 55 mL/min/1.73M2 (21 @ 13:10)  eGFR if : 64 mL/min/1.73M2 (21 @ 13:10)    LIVER FUNCTIONS - ( 10 Jul 2021 08:08 )  Alb: 2.8 g/dL / Pro: 5.3 g/dL / ALK PHOS: 161 U/L / ALT: 12 U/L / AST: 34 U/L / GGT: x           Urinalysis Basic - ( 2021 16:10 )    Color: Yellow / Appearance: Clear / S.031 / pH: x  Gluc: x / Ketone: Small  / Bili: Negative / Urobili: <2 mg/dL   Blood: x / Protein: 30 mg/dL / Nitrite: Negative   Leuk Esterase: Negative / RBC: 5 /HPF / WBC 3 /HPF   Sq Epi: x / Non Sq Epi: 1 /HPF / Bacteria: Negative        Culture - Urine (collected 21 @ 14:58)  Source: .Urine Clean Catch (Midstream)  Final Report (21 @ 19:38):    <10,000 CFU/mL Normal Urogenital Mimi            INFECTIOUS DISEASES TESTING  COVID-19 PCR: NotDetec (21 @ 14:25)  MRSA PCR Result.: Negative (21 @ 14:58)      INFLAMMATORY MARKERS      RADIOLOGY & ADDITIONAL TESTS:  I have personally reviewed the last Chest xray  CXR  Xray Chest 1 View- PORTABLE-Urgent:   EXAM:  XR CHEST PORTABLE URGENT 1V            PROCEDURE DATE:  07/10/2021            INTERPRETATION:  Clinical History / Reason for exam: Sepsis    Comparison : Chest radiograph 2021.    Technique/Positioning: Adequate.    Findings:    Support devices: Right IJ line with its tip overlying the SVC.    Cardiac/mediastinum/hilum: Unremarkable.    Lung parenchyma/Pleura: Within normal limits.    Skeleton/soft tissues: Unremarkable.    Impression:    No radiographic evidence of acute cardiopulmonary disease.    Right IJ line.    --- End of Report ---              ROBB KHAN MD; Attending Radiologist  This document has been electronically signed. Jul 10 2021  8:22AM (07-10-21 @ 03:26)      CT  CT Abdomen and Pelvis No Cont:   EXAM:  CT CHEST        EXAM:  CT ABDOMEN AND PELVIS            PROCEDURE DATE:  2021            INTERPRETATION:  CLINICAL STATEMENT: Trauma.    TECHNIQUE:  CT of the thorax was performed after administration of intravenous contrast. Sagittal and coronal reformats were performed as well as MIP reconstructions.  Contiguous axial CT images were obtained of the abdomen and pelvis without intravenous contrast administration. Oral contrast was not administered. Reformatted images in the coronaland sagittal planes were acquired.    COMPARISON CT: CT chest 10/31/2017. CT abdomen/pelvis 2020    FINDINGS:  Evaluation of solid organs and vascular structures is limited due to lack of intravenous contrast.    AIRWAYS, LUNGS AND PLEURA: The central tracheobronchial tree is patent. There are bilateral scattered 2 mm nodules without significant change. There is no pleural effusion. There is no pneumothorax.    MEDIASTINUM: There are no enlarged mediastinal, hilar or axillary lymph nodes.    HEART AND VESSELS: The heart is normal in size. There is no pericardial effusion. Left atrial appendage closure device.    HEPATOBILIARY: Post liver transplant.    SPLEEN: Unremarkable.    PANCREAS: Unremarkable.    ADRENAL GLANDS: Unremarkable.    KIDNEYS: No hydronephrosis or radiopaque urinary tract calculi bilaterally. Status post kidney transplant in the right lower quadrant.    ABDOMINOPELVIC NODES: No enlarged abdominal or pelvic lymph nodes.    PELVIC ORGANS: Redemonstrated multiple calcifications and rim calcified structures along the right anterior margin of the prostate gland.    PERITONEUM/MESENTERY/BOWEL: No bowel obstruction, ascites or free intraperitoneal air. The appendix is not definitely visualized..    BONES/SOFT TISSUES: Degenerative changes of the spine. Stable small left fat-containing inguinal hernia. Gynecomastia.    IMPRESSION:  Mass like prominence of the right thigh musculature, partially imaged. Given history of distal right femur comminuted fracture noted on femur radiographs 2021 this is probably related to soft tissue swelling/hematoma    No definite acute intrathoracic or abdominopelvic abnormality on this limited noncontrast examination. However, note intravenous contrast greatly increases sensitivity for detection of solid organ and vascular injury in the setting of trauma.        Spoke with AYSHA DAMON on 2021 7:07 PM with readback.    --- End of Report ---              SUSAN STEWART MD; Attending Radiologist  This document has been electronically signed. 2021  7:19PM (21 @ 17:02)      CARDIOLOGY TESTING  12 Lead ECG:   Ventricular Rate 98 BPM    QRS Duration 88 ms    Q-T Interval 298 ms    QTC Calculation(Bazett) 380 ms    R Axis 21 degrees    T Axis 148 degrees    Diagnosis Line Atrial fibrillation  Nonspecific T wave abnormality  Abnormal ECG    Confirmed by Mohit Morris ([id number]) on 7/10/2021 7:50:13 AM (21 @ 21:44)  12 Lead ECG:   Ventricular Rate 131 BPM    QRS Duration 82 ms    Q-T Interval 298 ms    QTC Calculation(Bazett) 440 ms    R Axis 31 degrees    T Axis 140 degrees    Diagnosis Line Atrial fibrillation with rapid ventricular response  Nonspecific ST and T wave abnormality  Abnormal ECG    Confirmed by SEGUN CASTANEDA MD ([id number]) on 2021 4:31:50 PM (21 @ 15:16)      MEDICATIONS  aspirin enteric coated 325 Oral daily  cefepime   IVPB 1000 IV Intermittent every 12 hours  dextrose 40% Gel 15 Oral once  dextrose 50% Injectable 25 IV Push once  esMOLOL  Infusion 50 IV Continuous <Continuous>  glucagon  Injectable 1 IntraMuscular once  heparin   Injectable 5000 SubCutaneous every 8 hours  hydrocortisone sodium succinate Injectable 100 IV Push every 8 hours  insulin lispro (ADMELOG) corrective regimen sliding scale  SubCutaneous three times a day before meals  lactated ringers. 1000 IV Continuous <Continuous>  magnesium sulfate  IVPB 2 IV Intermittent once  pantoprazole    Tablet 40 Oral before breakfast  phenylephrine    Infusion 0.4 IV Continuous <Continuous>  senna 2 Oral at bedtime  tacrolimus 3 Oral every 12 hours  tamsulosin 0.4 Oral at bedtime        ANTIBIOTICS:  cefepime   IVPB 1000 milliGRAM(s) IV Intermittent every 12 hours      ALLERGIES:  No Known Allergies       SOCORRO MANUEL  71y, Male  Allergy: No Known Allergies      CHIEF COMPLAINT:   Right periprosthetic femur fracture (10 Jul 2021 07:37)      LOS  3d    HPI  HPI:  71M w/PMHx of HTN, HLD, anemia, DM, Afib, JIMÉNEZ s/p liver transplant and right renal transplant at [facility] in 2020 (follows with his hepatologist Dr. Sunshine), CAD s/p stents (10 years ago) on ASA and Plavix, watchman procedure performed ~3 months ago by Dr. Burdick, history of bilateral knee replacements (21 years ago) presents after being sent in from outpatient office for finding of new right distal femur fracture. Patient tripped and fell while trying to get out of his car and felt right knee pain afterwards. He presented to an outpatient clinic for evaluation. XR at outpatient clinic demonstrated distal right femur fracture. He was placed in a knee immobilizer and instructed to present to the ED for further management. Patient is afebrile, HR 61, borderline BP of 93/52, saturating 99% on room air. On exam his right knee is immobilized, he has flexion and extension of the foot at the ankle joint, +DP and PT pulses, foot warm and well perfused. No other external signs of injury, denies pain anywhere else.    (2021 21:21)      INFECTIOUS DISEASE HISTORY:  ID consulted for post-op fever  ORIF, fracture, femur, periprosthetic 2021  WBC 6-->8-->11, no neutrophilia, downtrending hgb    started on cefepime    PMH  PAST MEDICAL & SURGICAL HISTORY:  HTN (hypertension)    High cholesterol    Anemia    Diabetes    Afib    Cirrhosis of liver  JIMÉNEZ    Dyspnea on exertion    BPH (benign prostatic hyperplasia)    Presence of bilateral total knee joint prostheses  15 years ago    S/P angioplasty with stent  2 cardiac stents &gt; 10 years back    Encounter for screening colonoscopy  1 year ago    Organ transplant  liver, right kidney 2020        FAMILY HISTORY  Family history of early CAD    FH: ovarian cancer        SOCIAL HISTORY  Social History:  Alcohol -Denied.   Smoke -Denied.   Illicit drug -Denied. (08 Dec 2019 03:50)        ROS  General: Denies rigors, nightsweats  HEENT: Denies headache, rhinorrhea, sore throat, eye pain  CV: Denies CP, palpitations  PULM: Denies wheezing, hemoptysis  GI: Denies hematemesis, hematochezia, melena  : Denies discharge, hematuria  MSK: Denies arthralgias, myalgias  SKIN: Denies rash, lesions  NEURO: Denies paresthesias, weakness  PSYCH: Denies depression, anxiety     VITALS:  T(F): 99.7, Max: 101.8 (21 @ 18:41)  HR: 115  BP: 104/57  RR: 18Vital Signs Last 24 Hrs  T(C): 37.6 (10 Jul 2021 08:45), Max: 38.8 (2021 18:41)  T(F): 99.7 (10 Jul 2021 08:45), Max: 101.8 (2021 18:41)  HR: 115 (10 Jul 2021 08:45) (96 - 135)  BP: 104/57 (10 Jul 2021 08:45) (75/42 - 133/61)  BP(mean): 72 (10 Jul 2021 08:45) (54 - 88)  RR: 18 (10 Jul 2021 08:45) (10 - 32)  SpO2: 100% (10 Jul 2021 08:45) (96% - 100%)    PHYSICAL EXAM:  Gen: NAD, resting in bed  HEENT: Normocephalic, atraumatic  Neck: supple, no lymphadenopathy  CV: Regular rate & regular rhythm  Lungs: decreased BS at bases, no fremitus  Abdomen: Soft, BS present  Ext: Warm, well perfused RLE dressing  Neuro: non focal, awake  Skin: no rash, no erythema  Lines: no phlebitis     TESTS & MEASUREMENTS:                        7.0    11.03 )-----------( 153      ( 10 Jul 2021 08:08 )             21.0     07-10    132<L>  |  105  |  18  ----------------------------<  112<H>  4.3   |  22  |  1.1    Ca    7.7<L>      10 Jul 2021 08:08  Phos  4.1     07-09  Mg     1.4     07-10    TPro  5.3<L>  /  Alb  2.8<L>  /  TBili  1.7<H>  /  DBili  x   /  AST  34  /  ALT  12  /  AlkPhos  161<H>  07-10    eGFR if Non African American: 67 mL/min/1.73M2 (07-10-21 @ 08:08)  eGFR if : 78 mL/min/1.73M2 (07-10-21 @ 08:08)  eGFR if Non African American: 67 mL/min/1.73M2 (07-10-21 @ 01:52)  eGFR if African American: 78 mL/min/1.73M2 (07-10-21 @ 01:52)  eGFR if Non African American: 55 mL/min/1.73M2 (21 @ 13:10)  eGFR if : 64 mL/min/1.73M2 (21 @ 13:10)    LIVER FUNCTIONS - ( 10 Jul 2021 08:08 )  Alb: 2.8 g/dL / Pro: 5.3 g/dL / ALK PHOS: 161 U/L / ALT: 12 U/L / AST: 34 U/L / GGT: x           Urinalysis Basic - ( 2021 16:10 )    Color: Yellow / Appearance: Clear / S.031 / pH: x  Gluc: x / Ketone: Small  / Bili: Negative / Urobili: <2 mg/dL   Blood: x / Protein: 30 mg/dL / Nitrite: Negative   Leuk Esterase: Negative / RBC: 5 /HPF / WBC 3 /HPF   Sq Epi: x / Non Sq Epi: 1 /HPF / Bacteria: Negative        Culture - Urine (collected 21 @ 14:58)  Source: .Urine Clean Catch (Midstream)  Final Report (21 @ 19:38):    <10,000 CFU/mL Normal Urogenital Mimi            INFECTIOUS DISEASES TESTING  COVID-19 PCR: NotDetec (21 @ 14:25)  MRSA PCR Result.: Negative (21 @ 14:58)      INFLAMMATORY MARKERS      RADIOLOGY & ADDITIONAL TESTS:  I have personally reviewed the last Chest xray  CXR  Xray Chest 1 View- PORTABLE-Urgent:   EXAM:  XR CHEST PORTABLE URGENT 1V            PROCEDURE DATE:  07/10/2021            INTERPRETATION:  Clinical History / Reason for exam: Sepsis    Comparison : Chest radiograph 2021.    Technique/Positioning: Adequate.    Findings:    Support devices: Right IJ line with its tip overlying the SVC.    Cardiac/mediastinum/hilum: Unremarkable.    Lung parenchyma/Pleura: Within normal limits.    Skeleton/soft tissues: Unremarkable.    Impression:    No radiographic evidence of acute cardiopulmonary disease.    Right IJ line.    --- End of Report ---              ROBB KHAN MD; Attending Radiologist  This document has been electronically signed. Jul 10 2021  8:22AM (07-10-21 @ 03:26)      CT  CT Abdomen and Pelvis No Cont:   EXAM:  CT CHEST        EXAM:  CT ABDOMEN AND PELVIS            PROCEDURE DATE:  2021            INTERPRETATION:  CLINICAL STATEMENT: Trauma.    TECHNIQUE:  CT of the thorax was performed after administration of intravenous contrast. Sagittal and coronal reformats were performed as well as MIP reconstructions.  Contiguous axial CT images were obtained of the abdomen and pelvis without intravenous contrast administration. Oral contrast was not administered. Reformatted images in the coronaland sagittal planes were acquired.    COMPARISON CT: CT chest 10/31/2017. CT abdomen/pelvis 2020    FINDINGS:  Evaluation of solid organs and vascular structures is limited due to lack of intravenous contrast.    AIRWAYS, LUNGS AND PLEURA: The central tracheobronchial tree is patent. There are bilateral scattered 2 mm nodules without significant change. There is no pleural effusion. There is no pneumothorax.    MEDIASTINUM: There are no enlarged mediastinal, hilar or axillary lymph nodes.    HEART AND VESSELS: The heart is normal in size. There is no pericardial effusion. Left atrial appendage closure device.    HEPATOBILIARY: Post liver transplant.    SPLEEN: Unremarkable.    PANCREAS: Unremarkable.    ADRENAL GLANDS: Unremarkable.    KIDNEYS: No hydronephrosis or radiopaque urinary tract calculi bilaterally. Status post kidney transplant in the right lower quadrant.    ABDOMINOPELVIC NODES: No enlarged abdominal or pelvic lymph nodes.    PELVIC ORGANS: Redemonstrated multiple calcifications and rim calcified structures along the right anterior margin of the prostate gland.    PERITONEUM/MESENTERY/BOWEL: No bowel obstruction, ascites or free intraperitoneal air. The appendix is not definitely visualized..    BONES/SOFT TISSUES: Degenerative changes of the spine. Stable small left fat-containing inguinal hernia. Gynecomastia.    IMPRESSION:  Mass like prominence of the right thigh musculature, partially imaged. Given history of distal right femur comminuted fracture noted on femur radiographs 2021 this is probably related to soft tissue swelling/hematoma    No definite acute intrathoracic or abdominopelvic abnormality on this limited noncontrast examination. However, note intravenous contrast greatly increases sensitivity for detection of solid organ and vascular injury in the setting of trauma.        Spoke with AYSHA DAMON on 2021 7:07 PM with readback.    --- End of Report ---              SUSAN STEWART MD; Attending Radiologist  This document has been electronically signed. 2021  7:19PM (21 @ 17:02)      CARDIOLOGY TESTING  12 Lead ECG:   Ventricular Rate 98 BPM    QRS Duration 88 ms    Q-T Interval 298 ms    QTC Calculation(Bazett) 380 ms    R Axis 21 degrees    T Axis 148 degrees    Diagnosis Line Atrial fibrillation  Nonspecific T wave abnormality  Abnormal ECG    Confirmed by Mohit Morris ([id number]) on 7/10/2021 7:50:13 AM (21 @ 21:44)  12 Lead ECG:   Ventricular Rate 131 BPM    QRS Duration 82 ms    Q-T Interval 298 ms    QTC Calculation(Bazett) 440 ms    R Axis 31 degrees    T Axis 140 degrees    Diagnosis Line Atrial fibrillation with rapid ventricular response  Nonspecific ST and T wave abnormality  Abnormal ECG    Confirmed by SEGUN CASTANEDA MD ([id number]) on 2021 4:31:50 PM (21 @ 15:16)      MEDICATIONS  aspirin enteric coated 325 Oral daily  cefepime   IVPB 1000 IV Intermittent every 12 hours  dextrose 40% Gel 15 Oral once  dextrose 50% Injectable 25 IV Push once  esMOLOL  Infusion 50 IV Continuous <Continuous>  glucagon  Injectable 1 IntraMuscular once  heparin   Injectable 5000 SubCutaneous every 8 hours  hydrocortisone sodium succinate Injectable 100 IV Push every 8 hours  insulin lispro (ADMELOG) corrective regimen sliding scale  SubCutaneous three times a day before meals  lactated ringers. 1000 IV Continuous <Continuous>  magnesium sulfate  IVPB 2 IV Intermittent once  pantoprazole    Tablet 40 Oral before breakfast  phenylephrine    Infusion 0.4 IV Continuous <Continuous>  senna 2 Oral at bedtime  tacrolimus 3 Oral every 12 hours  tamsulosin 0.4 Oral at bedtime        ANTIBIOTICS:  cefepime   IVPB 1000 milliGRAM(s) IV Intermittent every 12 hours      ALLERGIES:  No Known Allergies

## 2021-07-10 NOTE — CONSULT NOTE ADULT - ASSESSMENT
AF with RVR  - currently HR in 110 bpm; increase in HR likely from post op fever; ID note appreciated  - cont  esmolol for now.   - will switch to Po metoprolol tomorrow  - AC; cont plavix and asa

## 2021-07-10 NOTE — CONSULT NOTE ADULT - SUBJECTIVE AND OBJECTIVE BOX
Patient is a 71y old  Male who presents with a chief complaint of Right periprosthetic femur fracture (2021 19:48)      HPI:  71M w/PMHx of HTN, HLD, anemia, DM, Afib, JIMÉNEZ s/p liver transplant and right renal transplant at [facility] in 2020 (follows with his hepatologist Dr. Sunshine), CAD s/p stents (10 years ago) on ASA and Plavix, watchman procedure performed ~3 months ago by Dr. Burdick, history of bilateral knee replacements (21 years ago) presents after being sent in from outpatient office for finding of new right distal femur fracture. Patient tripped and fell while trying to get out of his car and felt right knee pain afterwards. He presented to an outpatient clinic for evaluation. XR at outpatient clinic demonstrated distal right femur fracture. He was placed in a knee immobilizer and instructed to present to the ED for further management. Patient is afebrile, HR 61, borderline BP of 93/52, saturating 99% on room air. On exam his right knee is immobilized, he has flexion and extension of the foot at the ankle joint, +DP and PT pulses, foot warm and well perfused. No other external signs of injury, denies pain anywhere else. went to OR, sp ORIF, developped R A fib, hypotension started on levophed/ cardizem drip transferred to SDU, seen and and examined this am on cardizem 5 mg/h. levophed 0.05        PAST MEDICAL & SURGICAL HISTORY:  HTN (hypertension)    High cholesterol    Anemia    Diabetes    Afib    Cirrhosis of liver  JIMÉNEZ    Dyspnea on exertion    BPH (benign prostatic hyperplasia)    Presence of bilateral total knee joint prostheses  15 years ago    S/P angioplasty with stent  2 cardiac stents &gt; 10 years back    Encounter for screening colonoscopy  1 year ago    Organ transplant  liver, right kidney 2020        SOCIAL HX:   Smoking  -    FAMILY HISTORY:  Family history of early CAD  mother    FH: ovarian cancer  mother        REVIEW OF SYSTEMS see hpi      Allergies    No Known Allergies    Intolerances        acetaminophen   Tablet .. 650 milliGRAM(s) Oral every 6 hours PRN  aspirin enteric coated 325 milliGRAM(s) Oral daily  cefepime   IVPB 1000 milliGRAM(s) IV Intermittent every 12 hours  dextrose 40% Gel 15 Gram(s) Oral once  dextrose 50% Injectable 25 Gram(s) IV Push once  diltiazem Infusion 5 mG/Hr IV Continuous <Continuous>  fludroCORTISONE 0.1 milliGRAM(s) Oral daily  glucagon  Injectable 1 milliGRAM(s) IntraMuscular once  heparin   Injectable 5000 Unit(s) SubCutaneous every 8 hours  insulin lispro (ADMELOG) corrective regimen sliding scale   SubCutaneous three times a day before meals  metoprolol tartrate 25 milliGRAM(s) Oral two times a day  norepinephrine Infusion 0.05 MICROgram(s)/kG/Min IV Continuous <Continuous>  oxyCODONE    IR 10 milliGRAM(s) Oral every 6 hours PRN  pantoprazole    Tablet 40 milliGRAM(s) Oral before breakfast  senna 2 Tablet(s) Oral at bedtime  tacrolimus 3 milliGRAM(s) Oral every 12 hours  tamsulosin 0.4 milliGRAM(s) Oral at bedtime  : Home Meds:      PHYSICAL EXAM    ICU Vital Signs Last 24 Hrs  T(C): 38.3 (10 Jul 2021 05:51), Max: 38.8 (2021 18:41)  T(F): 101 (10 Jul 2021 05:51), Max: 101.8 (2021 18:41)  HR: 123 (10 Jul 2021 03:07) (86 - 135)  BP: 102/54 (10 Jul 2021 05:51) (75/42 - 133/61)  BP(mean): 71 (10 Jul 2021 05:51) (54 - 88)  RR: 18 (10 Jul 2021 05:51) (10 - 32)  SpO2: 100% (10 Jul 2021 05:51) (96% - 100%)      General: ill looking  HEENT:  PATY              Lymph Nodes: No cervical LN   Lungs: Bilateral BS, dec bs both bases  Cardiovascular: Irregualr, JOYCELYN 3/6  Abdomen: Soft, Positive BS  Extremities: RLE dressog  Skin: Warm  Neurological: Non focal       21 @ 07:01  -  07-10-21 @ 07:00  --------------------------------------------------------  IN:    Diltiazem: 35 mL    IV PiggyBack: 50 mL    Lactated Ringers: 250 mL    Norepinephrine: 58.8 mL    PRBCs (Packed Red Blood Cells): 50 mL  Total IN: 443.8 mL    OUT:    Voided (mL): 650 mL  Total OUT: 650 mL    Total NET: -206.2 mL          LABS:                          6.9    8.09  )-----------( 129      ( 10 Jul 2021 01:52 )             20.7                                               07-10    132<L>  |  106  |  20  ----------------------------<  114<H>  4.4   |  20  |  1.1    Ca    7.6<L>      10 Jul 2021 01:52  Phos  4.1     07-  Mg     1.9     07-09    TPro  5.1<L>  /  Alb  2.8<L>  /  TBili  1.4<H>  /  DBili  x   /  AST  27  /  ALT  9   /  AlkPhos  142<H>  07-10                                             Urinalysis Basic - ( 2021 16:10 )    Color: Yellow / Appearance: Clear / S.031 / pH: x  Gluc: x / Ketone: Small  / Bili: Negative / Urobili: <2 mg/dL   Blood: x / Protein: 30 mg/dL / Nitrite: Negative   Leuk Esterase: Negative / RBC: 5 /HPF / WBC 3 /HPF   Sq Epi: x / Non Sq Epi: 1 /HPF / Bacteria: Negative        CARDIAC MARKERS ( 10 Jul 2021 01:52 )  x     / 0.02 ng/mL / x     / x     / x      CARDIAC MARKERS ( 2021 16:25 )  x     / 0.02 ng/mL / 34 U/L / x     / <1.0 ng/mL  CARDIAC MARKERS ( 2021 11:15 )  x     / 0.02 ng/mL / 31 U/L / x     / <1.0 ng/mL                                            LIVER FUNCTIONS - ( 10 Jul 2021 01:52 )  Alb: 2.8 g/dL / Pro: 5.1 g/dL / ALK PHOS: 142 U/L / ALT: 9 U/L / AST: 27 U/L / GGT: x                                                                                                                                       X-Rays   reviewed    MEDICATIONS  (STANDING):  aspirin enteric coated 325 milliGRAM(s) Oral daily  cefepime   IVPB 1000 milliGRAM(s) IV Intermittent every 12 hours  dextrose 40% Gel 15 Gram(s) Oral once  dextrose 50% Injectable 25 Gram(s) IV Push once  diltiazem Infusion 5 mG/Hr (5 mL/Hr) IV Continuous <Continuous>  fludroCORTISONE 0.1 milliGRAM(s) Oral daily  glucagon  Injectable 1 milliGRAM(s) IntraMuscular once  heparin   Injectable 5000 Unit(s) SubCutaneous every 8 hours  insulin lispro (ADMELOG) corrective regimen sliding scale   SubCutaneous three times a day before meals  metoprolol tartrate 25 milliGRAM(s) Oral two times a day  norepinephrine Infusion 0.05 MICROgram(s)/kG/Min (8.44 mL/Hr) IV Continuous <Continuous>  pantoprazole    Tablet 40 milliGRAM(s) Oral before breakfast  senna 2 Tablet(s) Oral at bedtime  tacrolimus 3 milliGRAM(s) Oral every 12 hours  tamsulosin 0.4 milliGRAM(s) Oral at bedtime    MEDICATIONS  (PRN):  acetaminophen   Tablet .. 650 milliGRAM(s) Oral every 6 hours PRN Temp greater or equal to 38C (100.4F), Mild Pain (1 - 3)  oxyCODONE    IR 10 milliGRAM(s) Oral every 6 hours PRN Moderate Pain (4 - 6)

## 2021-07-10 NOTE — CHART NOTE - NSCHARTNOTEFT_GEN_A_CORE
Transfer from:  Telemetry   Transfer to: Stepdown unit             ASSESSMENT & PLAN:         For Follow-Up:          Vital Signs Last 24 Hrs  T(C): 37.5 (10 Jul 2021 00:26), Max: 38.8 (09 Jul 2021 18:41)  T(F): 99.5 (10 Jul 2021 00:26), Max: 101.8 (09 Jul 2021 18:41)  HR: 103 (10 Jul 2021 00:26) (69 - 135)  BP: 92/55 (10 Jul 2021 00:26) (75/42 - 133/61)  BP(mean): 69 (10 Jul 2021 00:26) (54 - 88)  RR: 18 (09 Jul 2021 20:40) (10 - 32)  SpO2: 96% (09 Jul 2021 20:39) (96% - 100%)  I&O's Summary    08 Jul 2021 07:01  -  09 Jul 2021 07:00  --------------------------------------------------------  IN: 876 mL / OUT: 400 mL / NET: 476 mL    09 Jul 2021 07:01  -  10 Jul 2021 03:10  --------------------------------------------------------  IN: 353.6 mL / OUT: 450 mL / NET: -96.4 mL          MEDICATIONS  (STANDING):  aspirin enteric coated 325 milliGRAM(s) Oral daily  cefepime   IVPB 1000 milliGRAM(s) IV Intermittent every 12 hours  dextrose 40% Gel 15 Gram(s) Oral once  dextrose 50% Injectable 25 Gram(s) IV Push once  diltiazem Infusion 5 mG/Hr (5 mL/Hr) IV Continuous <Continuous>  fludroCORTISONE 0.1 milliGRAM(s) Oral daily  glucagon  Injectable 1 milliGRAM(s) IntraMuscular once  heparin   Injectable 5000 Unit(s) SubCutaneous every 8 hours  insulin lispro (ADMELOG) corrective regimen sliding scale   SubCutaneous three times a day before meals  metoprolol tartrate 25 milliGRAM(s) Oral two times a day  norepinephrine Infusion 0.05 MICROgram(s)/kG/Min (8.44 mL/Hr) IV Continuous <Continuous>  pantoprazole    Tablet 40 milliGRAM(s) Oral before breakfast  senna 2 Tablet(s) Oral at bedtime  tacrolimus 3 milliGRAM(s) Oral every 12 hours  tamsulosin 0.4 milliGRAM(s) Oral at bedtime    MEDICATIONS  (PRN):  acetaminophen   Tablet .. 650 milliGRAM(s) Oral every 6 hours PRN Temp greater or equal to 38C (100.4F), Mild Pain (1 - 3)  oxyCODONE    IR 10 milliGRAM(s) Oral every 6 hours PRN Moderate Pain (4 - 6)        LABS                                            6.9                   Neurophils% (auto):   65.6   (07-10 @ 01:52):    8.09 )-----------(129          Lymphocytes% (auto):  22.4                                          20.7                   Eosinphils% (auto):   0.4      Manual%: Neutrophils x    ; Lymphocytes x    ; Eosinophils x    ; Bands%: x    ; Blasts x                                    132    |  106    |  20                  Calcium: 7.6   / iCa: x      (07-10 @ 01:52)    ----------------------------<  114       Magnesium: x                                4.4     |  20     |  1.1              Phosphorous: x        TPro  5.1    /  Alb  2.8    /  TBili  1.4    /  DBili  x      /  AST  27     /  ALT  9      /  AlkPhos  142    10 Jul 2021 01:52

## 2021-07-10 NOTE — CHART NOTE - NSCHARTNOTEFT_GEN_A_CORE
Transfer From:  Telemetry   Transfer to:  (  ) Medicine       (  ) RCU    (  ) Palliative    (  ) Stroke Unit    (  ) _______________        MICU COURSE:          ASSESSMENT & PLAN:         For Follow-Up:          Vital Signs Last 24 Hrs  T(C): 37.5 (10 Jul 2021 00:26), Max: 38.8 (09 Jul 2021 18:41)  T(F): 99.5 (10 Jul 2021 00:26), Max: 101.8 (09 Jul 2021 18:41)  HR: 103 (10 Jul 2021 00:26) (69 - 135)  BP: 92/55 (10 Jul 2021 00:26) (75/42 - 133/61)  BP(mean): 69 (10 Jul 2021 00:26) (54 - 88)  RR: 18 (09 Jul 2021 20:40) (10 - 32)  SpO2: 96% (09 Jul 2021 20:39) (96% - 100%)  I&O's Summary    08 Jul 2021 07:01  -  09 Jul 2021 07:00  --------------------------------------------------------  IN: 876 mL / OUT: 400 mL / NET: 476 mL    09 Jul 2021 07:01  -  10 Jul 2021 03:04  --------------------------------------------------------  IN: 353.6 mL / OUT: 450 mL / NET: -96.4 mL          MEDICATIONS  (STANDING):  aspirin enteric coated 325 milliGRAM(s) Oral daily  cefepime   IVPB 1000 milliGRAM(s) IV Intermittent every 12 hours  dextrose 40% Gel 15 Gram(s) Oral once  dextrose 50% Injectable 25 Gram(s) IV Push once  diltiazem Infusion 5 mG/Hr (5 mL/Hr) IV Continuous <Continuous>  fludroCORTISONE 0.1 milliGRAM(s) Oral daily  glucagon  Injectable 1 milliGRAM(s) IntraMuscular once  heparin   Injectable 5000 Unit(s) SubCutaneous every 8 hours  insulin lispro (ADMELOG) corrective regimen sliding scale   SubCutaneous three times a day before meals  metoprolol tartrate 25 milliGRAM(s) Oral two times a day  norepinephrine Infusion 0.05 MICROgram(s)/kG/Min (8.44 mL/Hr) IV Continuous <Continuous>  pantoprazole    Tablet 40 milliGRAM(s) Oral before breakfast  senna 2 Tablet(s) Oral at bedtime  tacrolimus 3 milliGRAM(s) Oral every 12 hours  tamsulosin 0.4 milliGRAM(s) Oral at bedtime    MEDICATIONS  (PRN):  acetaminophen   Tablet .. 650 milliGRAM(s) Oral every 6 hours PRN Temp greater or equal to 38C (100.4F), Mild Pain (1 - 3)  oxyCODONE    IR 10 milliGRAM(s) Oral every 6 hours PRN Moderate Pain (4 - 6)        LABS                                            6.9                   Neurophils% (auto):   65.6   (07-10 @ 01:52):    8.09 )-----------(129          Lymphocytes% (auto):  22.4                                          20.7                   Eosinphils% (auto):   0.4      Manual%: Neutrophils x    ; Lymphocytes x    ; Eosinophils x    ; Bands%: x    ; Blasts x                                    132    |  106    |  20                  Calcium: 7.6   / iCa: x      (07-10 @ 01:52)    ----------------------------<  114       Magnesium: x                                4.4     |  20     |  1.1              Phosphorous: x        TPro  5.1    /  Alb  2.8    /  TBili  1.4    /  DBili  x      /  AST  27     /  ALT  9      /  AlkPhos  142    10 Jul 2021 01:52 Transfer From:  Telemetry   Transfer to:  Stepdown       HPI  71M w/PMHx of HTN, HLD, anemia, DM, Afib, JIMÉNEZ s/p liver transplant and right renal transplant at [facility] in February 2020 (follows with his hepatologist Dr. Sunshine), CAD s/p stents (10 years ago) on ASA and Plavix, watchman procedure performed ~3 months ago by Dr. Burdick, history of bilateral knee replacements (21 years ago) presents after being sent in from outpatient office for finding of new right distal femur fracture. Patient tripped and fell while trying to get out of his car and felt right knee pain afterwards. He presented to an outpatient clinic for evaluation. XR at outpatient clinic demonstrated distal right femur fracture. He was placed in a knee immobilizer and instructed to present to the ED for further management. Patient is afebrile, HR 61, borderline BP of 93/52, saturating 99% on room air. On exam his right knee is immobilized, he has flexion and extension of the foot at the ankle joint, +DP and PT pulses, foot warm and well perfused. No other external signs of injury, denies pain anywhere else.     Course of Events:   Patient developed fever during the day with hypotension and Afib  Septic workup had been sent -> which was negative. Broad spectrum abx were started   Afib controlled with diltiazem   Patient received boluses of fluid s with no improvement in BP. Total taken 2.5 L NSS  Started on a low dose levophed and Central line was inserted   Drop in Hgb:7 post-op --> received 1 Unit of pRBC       ASSESSMENT & PLAN:   72 yo man PMH HTN, HLD, anemia, Afib s/p watchman device JIMÉNEZ s/p liver transplant and right renal transplant       For Follow-Up:          Vital Signs Last 24 Hrs  T(C): 37.5 (10 Jul 2021 00:26), Max: 38.8 (09 Jul 2021 18:41)  T(F): 99.5 (10 Jul 2021 00:26), Max: 101.8 (09 Jul 2021 18:41)  HR: 103 (10 Jul 2021 00:26) (69 - 135)  BP: 92/55 (10 Jul 2021 00:26) (75/42 - 133/61)  BP(mean): 69 (10 Jul 2021 00:26) (54 - 88)  RR: 18 (09 Jul 2021 20:40) (10 - 32)  SpO2: 96% (09 Jul 2021 20:39) (96% - 100%)  I&O's Summary    08 Jul 2021 07:01  -  09 Jul 2021 07:00  --------------------------------------------------------  IN: 876 mL / OUT: 400 mL / NET: 476 mL    09 Jul 2021 07:01  -  10 Jul 2021 03:04  --------------------------------------------------------  IN: 353.6 mL / OUT: 450 mL / NET: -96.4 mL          MEDICATIONS  (STANDING):  aspirin enteric coated 325 milliGRAM(s) Oral daily  cefepime   IVPB 1000 milliGRAM(s) IV Intermittent every 12 hours  dextrose 40% Gel 15 Gram(s) Oral once  dextrose 50% Injectable 25 Gram(s) IV Push once  diltiazem Infusion 5 mG/Hr (5 mL/Hr) IV Continuous <Continuous>  fludroCORTISONE 0.1 milliGRAM(s) Oral daily  glucagon  Injectable 1 milliGRAM(s) IntraMuscular once  heparin   Injectable 5000 Unit(s) SubCutaneous every 8 hours  insulin lispro (ADMELOG) corrective regimen sliding scale   SubCutaneous three times a day before meals  metoprolol tartrate 25 milliGRAM(s) Oral two times a day  norepinephrine Infusion 0.05 MICROgram(s)/kG/Min (8.44 mL/Hr) IV Continuous <Continuous>  pantoprazole    Tablet 40 milliGRAM(s) Oral before breakfast  senna 2 Tablet(s) Oral at bedtime  tacrolimus 3 milliGRAM(s) Oral every 12 hours  tamsulosin 0.4 milliGRAM(s) Oral at bedtime    MEDICATIONS  (PRN):  acetaminophen   Tablet .. 650 milliGRAM(s) Oral every 6 hours PRN Temp greater or equal to 38C (100.4F), Mild Pain (1 - 3)  oxyCODONE    IR 10 milliGRAM(s) Oral every 6 hours PRN Moderate Pain (4 - 6)        LABS                                            6.9                   Neurophils% (auto):   65.6   (07-10 @ 01:52):    8.09 )-----------(129          Lymphocytes% (auto):  22.4                                          20.7                   Eosinphils% (auto):   0.4      Manual%: Neutrophils x    ; Lymphocytes x    ; Eosinophils x    ; Bands%: x    ; Blasts x                                    132    |  106    |  20                  Calcium: 7.6   / iCa: x      (07-10 @ 01:52)    ----------------------------<  114       Magnesium: x                                4.4     |  20     |  1.1              Phosphorous: x        TPro  5.1    /  Alb  2.8    /  TBili  1.4    /  DBili  x      /  AST  27     /  ALT  9      /  AlkPhos  142    10 Jul 2021 01:52 Transfer From:  Telemetry   Transfer to:  Stepdown       HPI  71M w/PMHx of HTN, HLD, anemia, DM, Afib, JIMÉNEZ s/p liver transplant and right renal transplant at [facility] in February 2020 (follows with his hepatologist Dr. Sunshine), CAD s/p stents (10 years ago) on ASA and Plavix, watchman procedure performed ~3 months ago by Dr. Burdick, history of bilateral knee replacements (21 years ago) presents after being sent in from outpatient office for finding of new right distal femur fracture. Patient tripped and fell while trying to get out of his car and felt right knee pain afterwards. He presented to an outpatient clinic for evaluation. XR at outpatient clinic demonstrated distal right femur fracture. He was placed in a knee immobilizer and instructed to present to the ED for further management. Patient is afebrile, HR 61, borderline BP of 93/52, saturating 99% on room air. On exam his right knee is immobilized, he has flexion and extension of the foot at the ankle joint, +DP and PT pulses, foot warm and well perfused. No other external signs of injury, denies pain anywhere else.     Course of Events:   Patient developed fever during the day with hypotension and Afib  Septic workup had been sent -> which was negative. Broad spectrum abx were started   Afib controlled with diltiazem   Patient received boluses of fluid s with no improvement in BP. Total taken 2.5 L NSS  Started on a low dose levophed and Central line was inserted   Drop in Hgb:7 post-op --> received 1 Unit of pRBC       ASSESSMENT & PLAN:   70 yo man PMH HTN, HLD, anemia, Afib s/p watchman device JIMÉNEZ s/p liver transplant and right renal transplant  CAD s/p stents on ASA and Plavix.     # Shock: R/O septic shock   - Patient on vasopressors to keep MAP>65  - Central line in place   - Started on Cefepime and vancomycin   - Fever on Tylenol     #Afib with RVR   - atrributed secondary to fever or hypovolemia   - Diltiazem drip   - no anti-coagulation    #Acute comminuted fracture of the distal rt femur  - CT 7/8: Comminuted fracture of distal rt femur w/ 3 cm override between major fragments, Involvement of rt knee TKA, Large hematoma within the vastus intermedius  - s/p Fixation (OR done 07/09/2021)    #Acute anemia possibly  blood loss in the OR:  - Hgb 7  - Transfuse 1pRBC   - Trend CBC    #S/p liver transplant (hx of JIMÉNEZ)  # S/p renal transplant (rt sided at [facility] in 02/2020)  -Nephro on board, following   - c/w Prograf 0.3mg PO BID   - F/U AM prograf trough  - cont florinef 0.1mg po qd  - c/w short course of stress dose steroids   - cont metoprolol  - d/c'd home amlodipine for hypotension   - trend renal function and lytes, replete Mg++  - no renal contraindication to planned orthopedic surgery  - nephro following     #thrombpcytopenia- appears chronic- was lower last year- continue to trend     #suspected magnesium deff  -Resolved  - 1.9 today    # Hx of CAD (is on DAPT) s/p stents and watchman  - ARPAN 4/15/21: EF 45-50% Global LV systolic function was mildly decreased  - Continue Aspirin and Plavix for watchman   - Cardiology evaluated on 7/8 for procedure: Patient is Class III risk 10.1% risk (30-day risk of death, MI, or cardiac arrest). No cardiac contraindication for procedure.    #Mild hyponatremia  -134 on 7/8  - Resolved,     #CKD 3 (renal function is likely at baseline)  -At baseline 1.4  - Hx of right renal transplant at [facility] in Feb 2020   - Avoid nephrotoxins   - nephro following     # Hx of HTN - chronic  - D/C'd home amlodipine for hypotension per nephro rec  - Normotensive this am     # Hx of dyslipidemia - chronic  - not on any home statin      #Hx of DM  - A1c 7/8: 5.3   - Home Januvia 100mg, holding  - Home  Linagliptin 5 mg QD, holding    #BPH  - c/w tamsulosin 0.4mg     Diet: NPO for procedure  GI prophylaxis: Pantoprazole  DVT Prophylaxis: SCDs     For Follow-Up: workup CBC, troponins, CMP, lactate, procalcitonin      ------------------------------------------------------------------------------------------------------------------------------------------------------------------------------    Vital Signs Last 24 Hrs  T(C): 37.5 (10 Jul 2021 00:26), Max: 38.8 (09 Jul 2021 18:41)  T(F): 99.5 (10 Jul 2021 00:26), Max: 101.8 (09 Jul 2021 18:41)  HR: 103 (10 Jul 2021 00:26) (69 - 135)  BP: 92/55 (10 Jul 2021 00:26) (75/42 - 133/61)  BP(mean): 69 (10 Jul 2021 00:26) (54 - 88)  RR: 18 (09 Jul 2021 20:40) (10 - 32)  SpO2: 96% (09 Jul 2021 20:39) (96% - 100%)  I&O's Summary    08 Jul 2021 07:01  -  09 Jul 2021 07:00  --------------------------------------------------------  IN: 876 mL / OUT: 400 mL / NET: 476 mL    09 Jul 2021 07:01  -  10 Jul 2021 03:04  --------------------------------------------------------  IN: 353.6 mL / OUT: 450 mL / NET: -96.4 mL          MEDICATIONS  (STANDING):  aspirin enteric coated 325 milliGRAM(s) Oral daily  cefepime   IVPB 1000 milliGRAM(s) IV Intermittent every 12 hours  dextrose 40% Gel 15 Gram(s) Oral once  dextrose 50% Injectable 25 Gram(s) IV Push once  diltiazem Infusion 5 mG/Hr (5 mL/Hr) IV Continuous <Continuous>  fludroCORTISONE 0.1 milliGRAM(s) Oral daily  glucagon  Injectable 1 milliGRAM(s) IntraMuscular once  heparin   Injectable 5000 Unit(s) SubCutaneous every 8 hours  insulin lispro (ADMELOG) corrective regimen sliding scale   SubCutaneous three times a day before meals  metoprolol tartrate 25 milliGRAM(s) Oral two times a day  norepinephrine Infusion 0.05 MICROgram(s)/kG/Min (8.44 mL/Hr) IV Continuous <Continuous>  pantoprazole    Tablet 40 milliGRAM(s) Oral before breakfast  senna 2 Tablet(s) Oral at bedtime  tacrolimus 3 milliGRAM(s) Oral every 12 hours  tamsulosin 0.4 milliGRAM(s) Oral at bedtime    MEDICATIONS  (PRN):  acetaminophen   Tablet .. 650 milliGRAM(s) Oral every 6 hours PRN Temp greater or equal to 38C (100.4F), Mild Pain (1 - 3)  oxyCODONE    IR 10 milliGRAM(s) Oral every 6 hours PRN Moderate Pain (4 - 6)        LABS                                            6.9                   Neurophils% (auto):   65.6   (07-10 @ 01:52):    8.09 )-----------(129          Lymphocytes% (auto):  22.4                                          20.7                   Eosinphils% (auto):   0.4      Manual%: Neutrophils x    ; Lymphocytes x    ; Eosinophils x    ; Bands%: x    ; Blasts x                                    132    |  106    |  20                  Calcium: 7.6   / iCa: x      (07-10 @ 01:52)    ----------------------------<  114       Magnesium: x                                4.4     |  20     |  1.1              Phosphorous: x        TPro  5.1    /  Alb  2.8    /  TBili  1.4    /  DBili  x      /  AST  27     /  ALT  9      /  AlkPhos  142    10 Jul 2021 01:52

## 2021-07-10 NOTE — CONSULT NOTE ADULT - ASSESSMENT
ASSESSMENT  71M w/PMHx of HTN, HLD, anemia, DM, Afib, JIMÉNEZ s/p liver transplant and right renal transplant at [facility] in February 2020 (follows with his hepatologist Dr. Sunshine), CAD s/p stents (10 years ago) on ASA and Plavix, watchman procedure performed ~3 months ago by Dr. Burdick, history of bilateral knee replacements (21 years ago) presents after being sent in from outpatient office for finding of new right distal femur fracture. s/p OR, ID consulted for post-op fever    IMPRESSION  #Post-op fever (sepsis T>101 P>90)    ORIF, fracture, femur, periprosthetic 09-Jul-2021    WBC 6-->8-->11, no neutrophilia, downtrending hgb    CXR no PNA    UA unremarkable   #Elevated Tbili/Alk Ph  #Hyponatremia   #KT/LT on immunosuppression  Creatinine, Serum: 1.1 (07-10-21 @ 08:08)    Weight (kg): 90 (07-09-21 @ 14:50)    RECOMMENDATIONS  This is an incomplete consult note. All final recommendations to follow after interview and examination of the patient. Please follow recommendations noted below.    If any questions, please call or send a message on [employer] Teams  Please continue to update ID with any pertinent new laboratory or radiographic findings  Spectra [phone]     ASSESSMENT  71M w/PMHx of HTN, HLD, anemia, DM, Afib, JIMÉNEZ s/p liver transplant and right renal transplant at [facility] in February 2020 (follows with his hepatologist Dr. Sunshine), CAD s/p stents (10 years ago) on ASA and Plavix, watchman procedure performed ~3 months ago by Dr. Burdick, history of bilateral knee replacements (21 years ago) presents after being sent in from outpatient office for finding of new right distal femur fracture. s/p OR, ID consulted for post-op fever    IMPRESSION  #Post-op fever (rule out sepsis T>101 P>90)- rule out bleed given downtrending hgb     ORIF, fracture, femur, periprosthetic 09-Jul-2021    WBC 6-->8-->11, no neutrophilia, downtrending hgb    CXR no PNA    UA unremarkable   #Elevated Tbili/Alk Ph  #Hyponatremia   #KT/LT on immunosuppression  Creatinine, Serum: 1.1 (07-10-21 @ 08:08)    Weight (kg): 90 (07-09-21 @ 14:50)    RECOMMENDATIONS  - CT imaging per primary team  rule out bleed  - ok for cefepime for now, send BCX, if NG D/C    If any questions, please call or send a message on [employer] Teams  Please continue to update ID with any pertinent new laboratory or radiographic findings  Spectra [phone]

## 2021-07-10 NOTE — PROGRESS NOTE ADULT - ASSESSMENT
ORTHO PROGRESS NOTE     71yMale POD #1 S/P right femur ORIF    Patient seen and examined at bedside . The patient is awake and alert in NAD. No complaints of chest pain, SOB, N/V.    MEDICATIONS  (STANDING):  aspirin enteric coated 325 milliGRAM(s) Oral daily  cefepime   IVPB 1000 milliGRAM(s) IV Intermittent every 12 hours  dextrose 40% Gel 15 Gram(s) Oral once  dextrose 50% Injectable 25 Gram(s) IV Push once  diltiazem Infusion 5 mG/Hr (5 mL/Hr) IV Continuous <Continuous>  fludroCORTISONE 0.1 milliGRAM(s) Oral daily  glucagon  Injectable 1 milliGRAM(s) IntraMuscular once  heparin   Injectable 5000 Unit(s) SubCutaneous every 8 hours  insulin lispro (ADMELOG) corrective regimen sliding scale   SubCutaneous three times a day before meals  metoprolol tartrate 25 milliGRAM(s) Oral two times a day  norepinephrine Infusion 0.05 MICROgram(s)/kG/Min (8.44 mL/Hr) IV Continuous <Continuous>  pantoprazole    Tablet 40 milliGRAM(s) Oral before breakfast  senna 2 Tablet(s) Oral at bedtime  tacrolimus 3 milliGRAM(s) Oral every 12 hours  tamsulosin 0.4 milliGRAM(s) Oral at bedtime    MEDICATIONS  (PRN):  acetaminophen   Tablet .. 650 milliGRAM(s) Oral every 6 hours PRN Temp greater or equal to 38C (100.4F), Mild Pain (1 - 3)  oxyCODONE    IR 10 milliGRAM(s) Oral every 6 hours PRN Moderate Pain (4 - 6)      Vital Signs Last 24 Hrs  T(C): 38.3 (10 Jul 2021 05:51), Max: 38.8 (2021 18:41)  T(F): 101 (10 Jul 2021 05:51), Max: 101.8 (2021 18:41)  HR: 123 (10 Jul 2021 03:07) (86 - 135)  BP: 102/54 (10 Jul 2021 05:51) (75/42 - 133/61)  BP(mean): 71 (10 Jul 2021 05:51) (54 - 88)  RR: 18 (10 Jul 2021 05:51) (10 - 32)  SpO2: 100% (10 Jul 2021 05:51) (96% - 100%)    PE:   Dressing C/D/I   Compartments soft and compressible  Motor intact distally  SILT distally  CR<2sec  palpable pulses                               6.9    8.09  )-----------( 129      ( 10 Jul 2021 01:52 )             20.7     07-10    132<L>  |  106  |  20  ----------------------------<  114<H>  4.4   |  20  |  1.1    Ca    7.6<L>      10 Jul 2021 01:52  Phos  4.1     07-  Mg     1.9     -    TPro  5.1<L>  /  Alb  2.8<L>  /  TBili  1.4<H>  /  DBili  x   /  AST  27  /  ALT  9   /  AlkPhos  142<H>  07-10      Urinalysis Basic - ( 2021 16:10 )    Color: Yellow / Appearance: Clear / S.031 / pH: x  Gluc: x / Ketone: Small  / Bili: Negative / Urobili: <2 mg/dL   Blood: x / Protein: 30 mg/dL / Nitrite: Negative   Leuk Esterase: Negative / RBC: 5 /HPF / WBC 3 /HPF   Sq Epi: x / Non Sq Epi: 1 /HPF / Bacteria: Negative        21 @ 07:01  -  07-10-21 @ 07:00  --------------------------------------------------------  IN: 443.8 mL / OUT: 650 mL / NET: -206.2 mL          A/P: 71yMale POD #1 S/P right femur ORIF.   Please transfuse 1U PRBC  OOB to Chair   NWB RLE  PT/OT  Pain control   Ext icing/elevation  Incentive Spirometry   DVT Prophylaxis; HSQ  Discharge planning

## 2021-07-10 NOTE — CONSULT NOTE ADULT - ASSESSMENT
IMPRESSION:    Sp fall/ R ORIF  Rapid a Fib/ hypotension/ Dec hb/ fever nL wbc (? related to hematoma)  Sp renal/ liver transplant/ immunosuppressed  CAD        PLAN:    CNS: Avoid CNS depressant    HEENT:  Oral care    PULMONARY:  HOB @ 45 degrees, aspiration precaution, NC keep SaO2 92 TO 96%    CARDIOVASCULAR: [place]et[place], taper pressors, cardio/ EPS eval    GI: GI prophylaxis                                          Feeding  po    RENAL:  F/u  lytes.  Correct as needed. accurate I/O, renal f/up    INFECTIOUS DISEASE: pancx, ABX for now, procal    HEMATOLOGICAL:  DVT prophylaxis. LE doppler, transfuse, serial CBC    ENDOCRINE:  Follow up FS.  Insulin protocol if needed. hydrocortisone 100 q 8 for now    MUSCULOSKELETAL: ortho f/up    CODE STATUS: FULL CODE    DISPOSITION: Pt requires continued monitoring in the MICU

## 2021-07-11 LAB
ALBUMIN SERPL ELPH-MCNC: 2.6 G/DL — LOW (ref 3.5–5.2)
ALP SERPL-CCNC: 138 U/L — HIGH (ref 30–115)
ALT FLD-CCNC: 11 U/L — SIGNIFICANT CHANGE UP (ref 0–41)
ANION GAP SERPL CALC-SCNC: 7 MMOL/L — SIGNIFICANT CHANGE UP (ref 7–14)
AST SERPL-CCNC: 22 U/L — SIGNIFICANT CHANGE UP (ref 0–41)
BASOPHILS # BLD AUTO: 0.01 K/UL — SIGNIFICANT CHANGE UP (ref 0–0.2)
BASOPHILS NFR BLD AUTO: 0.1 % — SIGNIFICANT CHANGE UP (ref 0–1)
BILIRUB SERPL-MCNC: 1.3 MG/DL — HIGH (ref 0.2–1.2)
BLD GP AB SCN SERPL QL: SIGNIFICANT CHANGE UP
BUN SERPL-MCNC: 19 MG/DL — SIGNIFICANT CHANGE UP (ref 10–20)
CALCIUM SERPL-MCNC: 7.8 MG/DL — LOW (ref 8.5–10.1)
CHLORIDE SERPL-SCNC: 106 MMOL/L — SIGNIFICANT CHANGE UP (ref 98–110)
CO2 SERPL-SCNC: 20 MMOL/L — SIGNIFICANT CHANGE UP (ref 17–32)
CREAT SERPL-MCNC: 1.1 MG/DL — SIGNIFICANT CHANGE UP (ref 0.7–1.5)
EOSINOPHIL # BLD AUTO: 0 K/UL — SIGNIFICANT CHANGE UP (ref 0–0.7)
EOSINOPHIL NFR BLD AUTO: 0 % — SIGNIFICANT CHANGE UP (ref 0–8)
GLUCOSE BLDC GLUCOMTR-MCNC: 144 MG/DL — HIGH (ref 70–99)
GLUCOSE BLDC GLUCOMTR-MCNC: 151 MG/DL — HIGH (ref 70–99)
GLUCOSE BLDC GLUCOMTR-MCNC: 183 MG/DL — HIGH (ref 70–99)
GLUCOSE SERPL-MCNC: 129 MG/DL — HIGH (ref 70–99)
HAPTOGLOB SERPL-MCNC: 201 MG/DL — HIGH (ref 34–200)
HCT VFR BLD CALC: 19 % — LOW (ref 42–52)
HCT VFR BLD CALC: 20.8 % — LOW (ref 42–52)
HCT VFR BLD CALC: 22.1 % — LOW (ref 42–52)
HGB BLD-MCNC: 6.4 G/DL — CRITICAL LOW (ref 14–18)
HGB BLD-MCNC: 7 G/DL — LOW (ref 14–18)
HGB BLD-MCNC: 7.3 G/DL — LOW (ref 14–18)
IMM GRANULOCYTES NFR BLD AUTO: 1.3 % — HIGH (ref 0.1–0.3)
LACTATE SERPL-SCNC: 1 MMOL/L — SIGNIFICANT CHANGE UP (ref 0.7–2)
LYMPHOCYTES # BLD AUTO: 1.34 K/UL — SIGNIFICANT CHANGE UP (ref 1.2–3.4)
LYMPHOCYTES # BLD AUTO: 20 % — LOW (ref 20.5–51.1)
MAGNESIUM SERPL-MCNC: 1.5 MG/DL — LOW (ref 1.8–2.4)
MCHC RBC-ENTMCNC: 27.5 PG — SIGNIFICANT CHANGE UP (ref 27–31)
MCHC RBC-ENTMCNC: 28.2 PG — SIGNIFICANT CHANGE UP (ref 27–31)
MCHC RBC-ENTMCNC: 28.3 PG — SIGNIFICANT CHANGE UP (ref 27–31)
MCHC RBC-ENTMCNC: 33 G/DL — SIGNIFICANT CHANGE UP (ref 32–37)
MCHC RBC-ENTMCNC: 33.7 G/DL — SIGNIFICANT CHANGE UP (ref 32–37)
MCHC RBC-ENTMCNC: 33.7 G/DL — SIGNIFICANT CHANGE UP (ref 32–37)
MCV RBC AUTO: 83.4 FL — SIGNIFICANT CHANGE UP (ref 80–94)
MCV RBC AUTO: 83.7 FL — SIGNIFICANT CHANGE UP (ref 80–94)
MCV RBC AUTO: 84.2 FL — SIGNIFICANT CHANGE UP (ref 80–94)
MONOCYTES # BLD AUTO: 0.62 K/UL — HIGH (ref 0.1–0.6)
MONOCYTES NFR BLD AUTO: 9.3 % — SIGNIFICANT CHANGE UP (ref 1.7–9.3)
NEUTROPHILS # BLD AUTO: 4.64 K/UL — SIGNIFICANT CHANGE UP (ref 1.4–6.5)
NEUTROPHILS NFR BLD AUTO: 69.3 % — SIGNIFICANT CHANGE UP (ref 42.2–75.2)
NRBC # BLD: 0 /100 WBCS — SIGNIFICANT CHANGE UP (ref 0–0)
PLATELET # BLD AUTO: 123 K/UL — LOW (ref 130–400)
PLATELET # BLD AUTO: 125 K/UL — LOW (ref 130–400)
PLATELET # BLD AUTO: 126 K/UL — LOW (ref 130–400)
POTASSIUM SERPL-MCNC: 4.3 MMOL/L — SIGNIFICANT CHANGE UP (ref 3.5–5)
POTASSIUM SERPL-SCNC: 4.3 MMOL/L — SIGNIFICANT CHANGE UP (ref 3.5–5)
PROT SERPL-MCNC: 5 G/DL — LOW (ref 6–8)
RBC # BLD: 2.27 M/UL — LOW (ref 4.7–6.1)
RBC # BLD: 2.47 M/UL — LOW (ref 4.7–6.1)
RBC # BLD: 2.65 M/UL — LOW (ref 4.7–6.1)
RBC # FLD: 14.3 % — SIGNIFICANT CHANGE UP (ref 11.5–14.5)
RBC # FLD: 14.4 % — SIGNIFICANT CHANGE UP (ref 11.5–14.5)
RBC # FLD: 14.5 % — SIGNIFICANT CHANGE UP (ref 11.5–14.5)
SODIUM SERPL-SCNC: 133 MMOL/L — LOW (ref 135–146)
WBC # BLD: 6.21 K/UL — SIGNIFICANT CHANGE UP (ref 4.8–10.8)
WBC # BLD: 6.64 K/UL — SIGNIFICANT CHANGE UP (ref 4.8–10.8)
WBC # BLD: 6.7 K/UL — SIGNIFICANT CHANGE UP (ref 4.8–10.8)
WBC # FLD AUTO: 6.21 K/UL — SIGNIFICANT CHANGE UP (ref 4.8–10.8)
WBC # FLD AUTO: 6.64 K/UL — SIGNIFICANT CHANGE UP (ref 4.8–10.8)
WBC # FLD AUTO: 6.7 K/UL — SIGNIFICANT CHANGE UP (ref 4.8–10.8)

## 2021-07-11 PROCEDURE — 93306 TTE W/DOPPLER COMPLETE: CPT | Mod: 26

## 2021-07-11 PROCEDURE — 93010 ELECTROCARDIOGRAM REPORT: CPT

## 2021-07-11 PROCEDURE — 99232 SBSQ HOSP IP/OBS MODERATE 35: CPT

## 2021-07-11 PROCEDURE — 99231 SBSQ HOSP IP/OBS SF/LOW 25: CPT

## 2021-07-11 RX ORDER — DIGOXIN 250 MCG
250 TABLET ORAL ONCE
Refills: 0 | Status: DISCONTINUED | OUTPATIENT
Start: 2021-07-11 | End: 2021-07-11

## 2021-07-11 RX ORDER — DIGOXIN 250 MCG
500 TABLET ORAL ONCE
Refills: 0 | Status: COMPLETED | OUTPATIENT
Start: 2021-07-11 | End: 2021-07-11

## 2021-07-11 RX ORDER — MAGNESIUM SULFATE 500 MG/ML
1 VIAL (ML) INJECTION ONCE
Refills: 0 | Status: COMPLETED | OUTPATIENT
Start: 2021-07-11 | End: 2021-07-11

## 2021-07-11 RX ADMIN — OXYCODONE HYDROCHLORIDE 10 MILLIGRAM(S): 5 TABLET ORAL at 12:00

## 2021-07-11 RX ADMIN — Medication 325 MILLIGRAM(S): at 11:07

## 2021-07-11 RX ADMIN — TAMSULOSIN HYDROCHLORIDE 0.4 MILLIGRAM(S): 0.4 CAPSULE ORAL at 21:40

## 2021-07-11 RX ADMIN — TACROLIMUS 3 MILLIGRAM(S): 5 CAPSULE ORAL at 17:46

## 2021-07-11 RX ADMIN — CHLORHEXIDINE GLUCONATE 1 APPLICATION(S): 213 SOLUTION TOPICAL at 11:10

## 2021-07-11 RX ADMIN — SENNA PLUS 2 TABLET(S): 8.6 TABLET ORAL at 21:40

## 2021-07-11 RX ADMIN — Medication 100 MILLIGRAM(S): at 05:02

## 2021-07-11 RX ADMIN — OXYCODONE HYDROCHLORIDE 10 MILLIGRAM(S): 5 TABLET ORAL at 11:09

## 2021-07-11 RX ADMIN — Medication 100 MILLIGRAM(S): at 13:43

## 2021-07-11 RX ADMIN — TACROLIMUS 3 MILLIGRAM(S): 5 CAPSULE ORAL at 05:02

## 2021-07-11 RX ADMIN — Medication 27 MICROGRAM(S)/KG/MIN: at 11:09

## 2021-07-11 RX ADMIN — OXYCODONE HYDROCHLORIDE 10 MILLIGRAM(S): 5 TABLET ORAL at 22:00

## 2021-07-11 RX ADMIN — CEFEPIME 100 MILLIGRAM(S): 1 INJECTION, POWDER, FOR SOLUTION INTRAMUSCULAR; INTRAVENOUS at 05:01

## 2021-07-11 RX ADMIN — Medication 25 MILLIGRAM(S): at 05:02

## 2021-07-11 RX ADMIN — Medication 2: at 07:43

## 2021-07-11 RX ADMIN — OXYCODONE HYDROCHLORIDE 10 MILLIGRAM(S): 5 TABLET ORAL at 00:41

## 2021-07-11 RX ADMIN — PANTOPRAZOLE SODIUM 40 MILLIGRAM(S): 20 TABLET, DELAYED RELEASE ORAL at 07:44

## 2021-07-11 RX ADMIN — Medication 100 GRAM(S): at 06:43

## 2021-07-11 RX ADMIN — Medication 500 MICROGRAM(S): at 08:39

## 2021-07-11 RX ADMIN — Medication 2: at 11:07

## 2021-07-11 RX ADMIN — CEFEPIME 100 MILLIGRAM(S): 1 INJECTION, POWDER, FOR SOLUTION INTRAMUSCULAR; INTRAVENOUS at 17:46

## 2021-07-11 RX ADMIN — OXYCODONE HYDROCHLORIDE 10 MILLIGRAM(S): 5 TABLET ORAL at 22:30

## 2021-07-11 RX ADMIN — Medication 27 MICROGRAM(S)/KG/MIN: at 00:08

## 2021-07-11 RX ADMIN — Medication 25 MILLIGRAM(S): at 17:46

## 2021-07-11 RX ADMIN — Medication 100 MILLIGRAM(S): at 21:40

## 2021-07-11 RX ADMIN — OXYCODONE HYDROCHLORIDE 10 MILLIGRAM(S): 5 TABLET ORAL at 00:10

## 2021-07-11 NOTE — PROGRESS NOTE ADULT - ASSESSMENT
ASSESSMENT  71M w/PMHx of HTN, HLD, anemia, DM, Afib, JIMÉNEZ s/p liver transplant and right renal transplant at [facility] in February 2020 (follows with his hepatologist Dr. Sunshine), CAD s/p stents (10 years ago) on ASA and Plavix, watchman procedure performed ~3 months ago by Dr. Burdick, history of bilateral knee replacements (21 years ago) presents after being sent in from outpatient office for finding of new right distal femur fracture. s/p OR, ID consulted for post-op fever    IMPRESSION  #Post-op fever (rule out sepsis T>101 P>90)- secondary to hematoma downtrending hgb     BCX NG    ORIF, fracture, femur, periprosthetic 09-Jul-2021    WBC 6-->8-->11, no neutrophilia, downtrending hgb    CXR no PNA    UA unremarkable   < from: CT Femur No Cont, Right (07.08.21 @ 04:04) >  1.  Comminuted periprosthetic fracture of the distal right femur with approximately 3 cm override and 1 cm posterior displacement between the major proximal and distal fragments.  2.  Large hematoma and edema within the vastusintermedius.  #Elevated Tbili/Alk Ph  #Hyponatremia   #KT/LT on immunosuppression  Creatinine, Serum: 1.1 (07-10-21 @ 08:08)    Weight (kg): 90 (07-09-21 @ 14:50)    RECOMMENDATIONS  - monitor off abx     If any questions, please call or send a message on [employer] Teams  Please continue to update ID with any pertinent new laboratory or radiographic findings  Spectra [phone]

## 2021-07-11 NOTE — PROGRESS NOTE ADULT - SUBJECTIVE AND OBJECTIVE BOX
PATIENT:  SOCORRO MANUEL  [id number]    CHIEF COMPLAINT:  Patient is a 71y old  Male who presents with a chief complaint of Right periprosthetic femur fracture (10 Jul 2021 11:22)    HPI:  71M w/PMHx of HTN, HLD, anemia, DM, Afib, JIMÉNEZ s/p liver transplant and right renal transplant at [facility] in 2020 (follows with his hepatologist Dr. Sunshine), CAD s/p stents (10 years ago) on ASA and Plavix, watchman procedure performed ~3 months ago by Dr. Burdick, history of bilateral knee replacements (21 years ago) presents after being sent in from outpatient office for finding of new right distal femur fracture. Patient tripped and fell while trying to get out of his car and felt right knee pain afterwards. He presented to an outpatient clinic for evaluation. XR at outpatient clinic demonstrated distal right femur fracture. He was placed in a knee immobilizer and instructed to present to the ED for further management. Patient is afebrile, HR 61, borderline BP of 93/52, saturating 99% on room air. On exam his right knee is immobilized, he has flexion and extension of the foot at the ankle joint, +DP and PT pulses, foot warm and well perfused. No other external signs of injury, denies pain anywhere else.    INTERVAL HISTORY/OVERNIGHT EVENTS:  Patient seen and evaluated at bedside. Started patient on Lopressor PO last night. HR was consistently in 110s, so esmolol drip was continued. No new c/o    MEDICATIONS:  MEDICATIONS  (STANDING):  aspirin enteric coated 325 milliGRAM(s) Oral daily  cefepime   IVPB 1000 milliGRAM(s) IV Intermittent every 12 hours  chlorhexidine 4% Liquid 1 Application(s) Topical daily  dextrose 40% Gel 15 Gram(s) Oral once  dextrose 50% Injectable 25 Gram(s) IV Push once  esMOLOL  Infusion 50 MICROgram(s)/kG/Min (27 mL/Hr) IV Continuous <Continuous>  glucagon  Injectable 1 milliGRAM(s) IntraMuscular once  hydrocortisone sodium succinate Injectable 100 milliGRAM(s) IV Push every 8 hours  insulin lispro (ADMELOG) corrective regimen sliding scale   SubCutaneous three times a day before meals  lactated ringers. 1000 milliLiter(s) (500 mL/Hr) IV Continuous <Continuous>  metoprolol tartrate 25 milliGRAM(s) Oral two times a day  pantoprazole    Tablet 40 milliGRAM(s) Oral before breakfast  phenylephrine    Infusion 0.4 MICROgram(s)/kG/Min (6.75 mL/Hr) IV Continuous <Continuous>  senna 2 Tablet(s) Oral at bedtime  tacrolimus 3 milliGRAM(s) Oral every 12 hours  tamsulosin 0.4 milliGRAM(s) Oral at bedtime    MEDICATIONS  (PRN):  acetaminophen   Tablet .. 650 milliGRAM(s) Oral every 6 hours PRN Temp greater or equal to 38C (100.4F), Mild Pain (1 - 3)  oxyCODONE    IR 10 milliGRAM(s) Oral every 6 hours PRN Moderate Pain (4 - 6)      ALLERGIES:  Allergies    No Known Allergies    Intolerances    OBJECTIVE:  ICU Vital Signs Last 24 Hrs  T(C): 37.8 (10 Jul 2021 20:11), Max: 38.9 (10 Jul 2021 09:15)  T(F): 100.1 (10 Jul 2021 20:11), Max: 102.1 (10 Jul 2021 09:15)  HR: 108 (2021 02:00) (98 - 123)  BP: 125/81 (2021 02:00) (85/51 - 135/74)  BP(mean): 93 (2021 02:00) (62 - 95)  ABP: --  ABP(mean): --  RR: 28 (2021 02:00) (15 - 34)  SpO2: 96% (2021 02:00) (96% - 100%)    CAPILLARY BLOOD GLUCOSE      POCT Blood Glucose.: 115 mg/dL (10 Jul 2021 21:32)  POCT Blood Glucose.: 152 mg/dL (10 Jul 2021 16:31)  POCT Blood Glucose.: 135 mg/dL (10 Jul 2021 10:27)    CAPILLARY BLOOD GLUCOSE      POCT Blood Glucose.: 115 mg/dL (10 Jul 2021 21:32)    I&O's Summary    2021 07:01  -  10 Jul 2021 07:00  --------------------------------------------------------  IN: 443.8 mL / OUT: 650 mL / NET: -206.2 mL    10 Jul 2021 07:01  -  2021 02:26  --------------------------------------------------------  IN: 1583.3 mL / OUT: 725 mL / NET: 858.3 mL      Daily     Daily Weight in k.5 (10 Jul 2021 13:47)    PHYSICAL EXAMINATION:  General: WN/WD NAD  HEENT: PERRLA, EOMI, moist mucous membranes  Neurology: A&Ox3, nonfocal, SANON x 4  Respiratory: CTA B/L, normal respiratory effort, no wheezes, crackles, rales  CV: RRR, S1S2, no murmurs, rubs or gallops  Abdominal: Soft, NT, ND +BS  Extremities: No edema, + peripheral pulses    LABS:               7.9    16.11 )-----------( 181      ( 10 Jul 2021 16:15 )             23.8     07-10    132<L>  |  102  |  18  ----------------------------<  139<H>  4.5   |  19  |  1.3    Ca    7.7<L>      10 Jul 2021 16:15  Mg     1.4     07-10    TPro  5.4<L>  /  Alb  2.9<L>  /  TBili  3.1<H>  /  DBili  x   /  AST  35  /  ALT  14  /  AlkPhos  167<H>  0710    LIVER FUNCTIONS - ( 10 Jul 2021 16:15 )  Alb: 2.9 g/dL / Pro: 5.4 g/dL / ALK PHOS: 167 U/L / ALT: 14 U/L / AST: 35 U/L / GGT: x           CARDIAC MARKERS ( 10 Jul 2021 01:52 )  x     / 0.02 ng/mL / x     / x     / x          Urinalysis Basic - ( 2021 16:10 )    Color: Yellow / Appearance: Clear / S.031 / pH: x  Gluc: x / Ketone: Small  / Bili: Negative / Urobili: <2 mg/dL   Blood: x / Protein: 30 mg/dL / Nitrite: Negative   Leuk Esterase: Negative / RBC: 5 /HPF / WBC 3 /HPF   Sq Epi: x / Non Sq Epi: 1 /HPF / Bacteria: Negative    TELEMETRY:    EKG:     IMAGING:   PATIENT:  SOCORRO MANUEL  [id number]    CHIEF COMPLAINT:  Patient is a 71y old  Male who presents with a chief complaint of Right periprosthetic femur fracture (10 Jul 2021 11:22)    HPI:  71M w/PMHx of HTN, HLD, anemia, DM, Afib, JIMÉNEZ s/p liver transplant and right renal transplant at [facility] in 2020 (follows with his hepatologist Dr. Sunshine), CAD s/p stents (10 years ago) on ASA and Plavix, watchman procedure performed ~3 months ago by Dr. Burdick, history of bilateral knee replacements (21 years ago) presents after being sent in from outpatient office for finding of new right distal femur fracture. Patient tripped and fell while trying to get out of his car and felt right knee pain afterwards. He presented to an outpatient clinic for evaluation. XR at outpatient clinic demonstrated distal right femur fracture. He was placed in a knee immobilizer and instructed to present to the ED for further management. Patient is afebrile, HR 61, borderline BP of 93/52, saturating 99% on room air. On exam his right knee is immobilized, he has flexion and extension of the foot at the ankle joint, +DP and PT pulses, foot warm and well perfused. No other external signs of injury, denies pain anywhere else.    INTERVAL HISTORY/OVERNIGHT EVENTS:  Patient seen and evaluated at bedside. Started patient on Lopressor PO last night. HR was consistently in 110s, so esmolol drip was continued. AM labs significant for H/H 6.4/19.0, 1U pRBC transfused. No new complaints    MEDICATIONS:  MEDICATIONS  (STANDING):  aspirin enteric coated 325 milliGRAM(s) Oral daily  cefepime   IVPB 1000 milliGRAM(s) IV Intermittent every 12 hours  chlorhexidine 4% Liquid 1 Application(s) Topical daily  dextrose 40% Gel 15 Gram(s) Oral once  dextrose 50% Injectable 25 Gram(s) IV Push once  esMOLOL  Infusion 50 MICROgram(s)/kG/Min (27 mL/Hr) IV Continuous <Continuous>  glucagon  Injectable 1 milliGRAM(s) IntraMuscular once  hydrocortisone sodium succinate Injectable 100 milliGRAM(s) IV Push every 8 hours  insulin lispro (ADMELOG) corrective regimen sliding scale   SubCutaneous three times a day before meals  lactated ringers. 1000 milliLiter(s) (500 mL/Hr) IV Continuous <Continuous>  metoprolol tartrate 25 milliGRAM(s) Oral two times a day  pantoprazole    Tablet 40 milliGRAM(s) Oral before breakfast  phenylephrine    Infusion 0.4 MICROgram(s)/kG/Min (6.75 mL/Hr) IV Continuous <Continuous>  senna 2 Tablet(s) Oral at bedtime  tacrolimus 3 milliGRAM(s) Oral every 12 hours  tamsulosin 0.4 milliGRAM(s) Oral at bedtime    MEDICATIONS  (PRN):  acetaminophen   Tablet .. 650 milliGRAM(s) Oral every 6 hours PRN Temp greater or equal to 38C (100.4F), Mild Pain (1 - 3)  oxyCODONE    IR 10 milliGRAM(s) Oral every 6 hours PRN Moderate Pain (4 - 6)      ALLERGIES:  Allergies    No Known Allergies    Intolerances    OBJECTIVE:  ICU Vital Signs Last 24 Hrs  T(C): 37.8 (10 Jul 2021 20:11), Max: 38.9 (10 Jul 2021 09:15)  T(F): 100.1 (10 Jul 2021 20:11), Max: 102.1 (10 Jul 2021 09:15)  HR: 108 (2021 02:00) (98 - 123)  BP: 125/81 (2021 02:00) (85/51 - 135/74)  BP(mean): 93 (2021 02:00) (62 - 95)  ABP: --  ABP(mean): --  RR: 28 (2021 02:00) (15 - 34)  SpO2: 96% (2021 02:00) (96% - 100%)    CAPILLARY BLOOD GLUCOSE      POCT Blood Glucose.: 115 mg/dL (10 Jul 2021 21:32)  POCT Blood Glucose.: 152 mg/dL (10 Jul 2021 16:31)  POCT Blood Glucose.: 135 mg/dL (10 Jul 2021 10:27)    CAPILLARY BLOOD GLUCOSE      POCT Blood Glucose.: 115 mg/dL (10 Jul 2021 21:32)    I&O's Summary    2021 07:01  -  10 Jul 2021 07:00  --------------------------------------------------------  IN: 443.8 mL / OUT: 650 mL / NET: -206.2 mL    10 Jul 2021 07:01  -  2021 02:26  --------------------------------------------------------  IN: 1583.3 mL / OUT: 725 mL / NET: 858.3 mL      Daily     Daily Weight in k.5 (10 Jul 2021 13:47)    PHYSICAL EXAMINATION:  General: WN/WD NAD  HEENT: PERRLA, EOMI, moist mucous membranes  Neurology: A&Ox3, nonfocal, SANON x 4  Respiratory: CTA B/L, normal respiratory effort, no wheezes, crackles, rales  CV: RRR, S1S2, no murmurs, rubs or gallops  Abdominal: Soft, NT, ND +BS  Extremities: No edema, + peripheral pulses    LABS:               7.9    16.11 )-----------( 181      ( 10 Jul 2021 16:15 )             23.8     07-10    132<L>  |  102  |  18  ----------------------------<  139<H>  4.5   |  19  |  1.3    Ca    7.7<L>      10 Jul 2021 16:15  Mg     1.4     07-10    TPro  5.4<L>  /  Alb  2.9<L>  /  TBili  3.1<H>  /  DBili  x   /  AST  35  /  ALT  14  /  AlkPhos  167<H>  07-10    LIVER FUNCTIONS - ( 10 Jul 2021 16:15 )  Alb: 2.9 g/dL / Pro: 5.4 g/dL / ALK PHOS: 167 U/L / ALT: 14 U/L / AST: 35 U/L / GGT: x           CARDIAC MARKERS ( 10 Jul 2021 01:52 )  x     / 0.02 ng/mL / x     / x     / x          Urinalysis Basic - ( 2021 16:10 )    Color: Yellow / Appearance: Clear / S.031 / pH: x  Gluc: x / Ketone: Small  / Bili: Negative / Urobili: <2 mg/dL   Blood: x / Protein: 30 mg/dL / Nitrite: Negative   Leuk Esterase: Negative / RBC: 5 /HPF / WBC 3 /HPF   Sq Epi: x / Non Sq Epi: 1 /HPF / Bacteria: Negative    TELEMETRY:  No events, HR 110s    EKG:    IMAGING:   PATIENT:  SOCORRO MANUEL  [id number]    CHIEF COMPLAINT:  Patient is a 71y old  Male who presents with a chief complaint of Right periprosthetic femur fracture (10 Jul 2021 11:22)    HPI:  71M w/PMHx of HTN, HLD, anemia, DM, Afib, JIMÉNEZ s/p liver transplant and right renal transplant at [facility] in 2020 (follows with his hepatologist Dr. Sunshine), CAD s/p stents (10 years ago) on ASA and Plavix, watchman procedure performed ~3 months ago by Dr. Burdick, history of bilateral knee replacements (21 years ago) presents after being sent in from outpatient office for finding of new right distal femur fracture. Patient tripped and fell while trying to get out of his car and felt right knee pain afterwards. He presented to an outpatient clinic for evaluation. XR at outpatient clinic demonstrated distal right femur fracture. He was placed in a knee immobilizer and instructed to present to the ED for further management. Patient is afebrile, HR 61, borderline BP of 93/52, saturating 99% on room air. On exam his right knee is immobilized, he has flexion and extension of the foot at the ankle joint, +DP and PT pulses, foot warm and well perfused. No other external signs of injury, denies pain anywhere else.    INTERVAL HISTORY/OVERNIGHT EVENTS:  Patient seen and evaluated at bedside. Started patient on Lopressor PO last night. HR was consistently in 110s, so esmolol drip was continued. AM labs significant for H/H 6.4/19.0, 1U pRBC transfused. Mag repleted. No new complaints    MEDICATIONS:  MEDICATIONS  (STANDING):  aspirin enteric coated 325 milliGRAM(s) Oral daily  cefepime   IVPB 1000 milliGRAM(s) IV Intermittent every 12 hours  chlorhexidine 4% Liquid 1 Application(s) Topical daily  dextrose 40% Gel 15 Gram(s) Oral once  dextrose 50% Injectable 25 Gram(s) IV Push once  esMOLOL  Infusion 50 MICROgram(s)/kG/Min (27 mL/Hr) IV Continuous <Continuous>  glucagon  Injectable 1 milliGRAM(s) IntraMuscular once  hydrocortisone sodium succinate Injectable 100 milliGRAM(s) IV Push every 8 hours  insulin lispro (ADMELOG) corrective regimen sliding scale   SubCutaneous three times a day before meals  lactated ringers. 1000 milliLiter(s) (500 mL/Hr) IV Continuous <Continuous>  metoprolol tartrate 25 milliGRAM(s) Oral two times a day  pantoprazole    Tablet 40 milliGRAM(s) Oral before breakfast  phenylephrine    Infusion 0.4 MICROgram(s)/kG/Min (6.75 mL/Hr) IV Continuous <Continuous>  senna 2 Tablet(s) Oral at bedtime  tacrolimus 3 milliGRAM(s) Oral every 12 hours  tamsulosin 0.4 milliGRAM(s) Oral at bedtime    MEDICATIONS  (PRN):  acetaminophen   Tablet .. 650 milliGRAM(s) Oral every 6 hours PRN Temp greater or equal to 38C (100.4F), Mild Pain (1 - 3)  oxyCODONE    IR 10 milliGRAM(s) Oral every 6 hours PRN Moderate Pain (4 - 6)      ALLERGIES:  Allergies    No Known Allergies    Intolerances    OBJECTIVE:  ICU Vital Signs Last 24 Hrs  T(C): 37.8 (10 Jul 2021 20:11), Max: 38.9 (10 Jul 2021 09:15)  T(F): 100.1 (10 Jul 2021 20:11), Max: 102.1 (10 Jul 2021 09:15)  HR: 108 (2021 02:00) (98 - 123)  BP: 125/81 (2021 02:00) (85/51 - 135/74)  BP(mean): 93 (2021 02:00) (62 - 95)  ABP: --  ABP(mean): --  RR: 28 (2021 02:00) (15 - 34)  SpO2: 96% (2021 02:00) (96% - 100%)    CAPILLARY BLOOD GLUCOSE      POCT Blood Glucose.: 115 mg/dL (10 Jul 2021 21:32)  POCT Blood Glucose.: 152 mg/dL (10 Jul 2021 16:31)  POCT Blood Glucose.: 135 mg/dL (10 Jul 2021 10:27)    CAPILLARY BLOOD GLUCOSE      POCT Blood Glucose.: 115 mg/dL (10 Jul 2021 21:32)    I&O's Summary    2021 07:01  -  10 Jul 2021 07:00  --------------------------------------------------------  IN: 443.8 mL / OUT: 650 mL / NET: -206.2 mL    10 Jul 2021 07:01  -  2021 02:26  --------------------------------------------------------  IN: 1583.3 mL / OUT: 725 mL / NET: 858.3 mL      Daily     Daily Weight in k.5 (10 Jul 2021 13:47)    PHYSICAL EXAMINATION:  General: WN/WD NAD  HEENT: PERRLA, EOMI, moist mucous membranes  Neurology: A&Ox3, nonfocal, SANON x 4  Respiratory: CTA B/L, normal respiratory effort, no wheezes, crackles, rales  CV: RRR, S1S2, no murmurs, rubs or gallops  Abdominal: Soft, NT, ND +BS  Extremities: No edema, + peripheral pulses    LABS:               7.9    16.11 )-----------( 181      ( 10 Jul 2021 16:15 )             23.8     07-10    132<L>  |  102  |  18  ----------------------------<  139<H>  4.5   |  19  |  1.3    Ca    7.7<L>      10 Jul 2021 16:15  Mg     1.4     10    TPro  5.4<L>  /  Alb  2.9<L>  /  TBili  3.1<H>  /  DBili  x   /  AST  35  /  ALT  14  /  AlkPhos  167<H>  07-10    LIVER FUNCTIONS - ( 10 Jul 2021 16:15 )  Alb: 2.9 g/dL / Pro: 5.4 g/dL / ALK PHOS: 167 U/L / ALT: 14 U/L / AST: 35 U/L / GGT: x           CARDIAC MARKERS ( 10 Jul 2021 01:52 )  x     / 0.02 ng/mL / x     / x     / x          Urinalysis Basic - ( 2021 16:10 )    Color: Yellow / Appearance: Clear / S.031 / pH: x  Gluc: x / Ketone: Small  / Bili: Negative / Urobili: <2 mg/dL   Blood: x / Protein: 30 mg/dL / Nitrite: Negative   Leuk Esterase: Negative / RBC: 5 /HPF / WBC 3 /HPF   Sq Epi: x / Non Sq Epi: 1 /HPF / Bacteria: Negative    TELEMETRY:  No events, HR 110s    EKG:    IMAGING:  < from: CT Femur No Cont, Right (21 @ 04:04) >  IMPRESSION:    1.  Comminuted periprosthetic fracture of the distal right femur with approximately 3 cm override and 1 cm posterior displacement between the major proximal and distal fragments.  2.  Large hematoma and edema within the vastusintermedius.    < end of copied text >   PATIENT:  SOCORRO MANUEL  [id number]      CHIEF COMPLAINT:  Patient is a 71y old  Male who presents with a chief complaint of Right periprosthetic femur fracture (2021 02:21)      HPI:  71M w/PMHx of HTN, HLD, anemia, DM, Afib, JIMÉNEZ s/p liver transplant and right renal transplant at [facility] in 2020 (follows with his hepatologist Dr. Sunshine), CAD s/p stents (10 years ago) on ASA and Plavix, watchman procedure performed ~3 months ago by Dr. Burdick, history of bilateral knee replacements (21 years ago) presents after being sent in from outpatient office for finding of new right distal femur fracture. Patient tripped and fell while trying to get out of his car and felt right knee pain afterwards. He presented to an outpatient clinic for evaluation. XR at outpatient clinic demonstrated distal right femur fracture. He was placed in a knee immobilizer and instructed to present to the ED for further management. Patient is afebrile, HR 61, borderline BP of 93/52, saturating 99% on room air. On exam his right knee is immobilized, he has flexion and extension of the foot at the ankle joint, +DP and PT pulses, foot warm and well perfused. No other external signs of injury, denies pain anywhere else.      INTERVAL HISTORY/OVERNIGHT EVENTS:  Patient seen and evaluated at bedside. Started patient on Lopressor PO last night. HR was consistently in 110s, so esmolol drip was continued. AM labs significant for H/H 6.4/19.0, 1U pRBC transfused. Mag repleted. No new complaints      MEDICATIONS:  MEDICATIONS  (STANDING):  aspirin enteric coated 325 milliGRAM(s) Oral daily  cefepime   IVPB 1000 milliGRAM(s) IV Intermittent every 12 hours  chlorhexidine 4% Liquid 1 Application(s) Topical daily  dextrose 40% Gel 15 Gram(s) Oral once  dextrose 50% Injectable 25 Gram(s) IV Push once  esMOLOL  Infusion 50 MICROgram(s)/kG/Min (27 mL/Hr) IV Continuous <Continuous>  glucagon  Injectable 1 milliGRAM(s) IntraMuscular once  hydrocortisone sodium succinate Injectable 100 milliGRAM(s) IV Push every 8 hours  insulin lispro (ADMELOG) corrective regimen sliding scale   SubCutaneous three times a day before meals  lactated ringers. 1000 milliLiter(s) (500 mL/Hr) IV Continuous <Continuous>  metoprolol tartrate 25 milliGRAM(s) Oral two times a day  pantoprazole    Tablet 40 milliGRAM(s) Oral before breakfast  phenylephrine    Infusion 0.4 MICROgram(s)/kG/Min (6.75 mL/Hr) IV Continuous <Continuous>  senna 2 Tablet(s) Oral at bedtime  tacrolimus 3 milliGRAM(s) Oral every 12 hours  tamsulosin 0.4 milliGRAM(s) Oral at bedtime    MEDICATIONS  (PRN):  acetaminophen   Tablet .. 650 milliGRAM(s) Oral every 6 hours PRN Temp greater or equal to 38C (100.4F), Mild Pain (1 - 3)  oxyCODONE    IR 10 milliGRAM(s) Oral every 6 hours PRN Moderate Pain (4 - 6)      ALLERGIES:  Allergies    No Known Allergies    Intolerances    OBJECTIVE:  ICU Vital Signs Last 24 Hrs  T(C): 37.5 (2021 06:00), Max: 38.9 (10 Jul 2021 09:15)  T(F): 99.5 (2021 06:00), Max: 102.1 (10 Jul 2021 09:15)  HR: 112 (2021 06:00) (98 - 123)  BP: 116/69 (2021 06:00) (85/51 - 135/74)  BP(mean): 91 (2021 06:00) (62 - 95)  ABP: --  ABP(mean): --  RR: 25 (2021 06:00) (15 - 34)  SpO2: 99% (2021 06:00) (95% - 100%)    CAPILLARY BLOOD GLUCOSE      POCT Blood Glucose.: 115 mg/dL (10 Jul 2021 21:32)  POCT Blood Glucose.: 152 mg/dL (10 Jul 2021 16:31)  POCT Blood Glucose.: 135 mg/dL (10 Jul 2021 10:27)    CAPILLARY BLOOD GLUCOSE      POCT Blood Glucose.: 115 mg/dL (10 Jul 2021 21:32)    I&O's Summary    10 Jul 2021 07:01  -  2021 07:00  --------------------------------------------------------  IN: 2341.3 mL / OUT: 875 mL / NET: 1466.3 mL      Daily     Daily Weight in k.7 (2021 06:00)    PHYSICAL EXAMINATION:  General: WN/WD NAD  HEENT: PERRLA, EOMI, moist mucous membranes  Neurology: A&Ox3, nonfocal, SANON x 4  Respiratory: CTA B/L, normal respiratory effort, no wheezes, crackles, rales  CV: RRR, S1S2, no murmurs, rubs or gallops  Abdominal: Soft, NT, ND +BS  Extremities:     LABS:               6.4    6.70  )-----------( 125      ( 2021 04:25 )             19.0     07-    133<L>  |  106  |  19  ----------------------------<  129<H>  4.3   |  20  |  1.1    Ca    7.8<L>      2021 04:25  Mg     1.5     07-    TPro  5.0<L>  /  Alb  2.6<L>  /  TBili  1.3<H>  /  DBili  x   /  AST  22  /  ALT  11  /  AlkPhos  138<H>  0711    LIVER FUNCTIONS - ( 2021 04:25 )  Alb: 2.6 g/dL / Pro: 5.0 g/dL / ALK PHOS: 138 U/L / ALT: 11 U/L / AST: 22 U/L / GGT: x             CARDIAC MARKERS ( 10 Jul 2021 01:52 )  x     / 0.02 ng/mL / x     / x     / x          Urinalysis Basic - ( 2021 16:10 )    Color: Yellow / Appearance: Clear / S.031 / pH: x  Gluc: x / Ketone: Small  / Bili: Negative / Urobili: <2 mg/dL   Blood: x / Protein: 30 mg/dL / Nitrite: Negative   Leuk Esterase: Negative / RBC: 5 /HPF / WBC 3 /HPF   Sq Epi: x / Non Sq Epi: 1 /HPF / Bacteria: Negative        TELEMETRY:  No events, HR 110s    EKG:    IMAGING:  < from: CT Femur No Cont, Right (21 @ 04:04) >  IMPRESSION:    1.  Comminuted periprosthetic fracture of the distal right femur with approximately 3 cm override and 1 cm posterior displacement between the major proximal and distal fragments.  2.  Large hematoma and edema within the vastusintermedius.    < end of copied text >

## 2021-07-11 NOTE — PHYSICAL THERAPY INITIAL EVALUATION ADULT - SPECIFY REASON(S)
Pt. does not have activity orders at this time. Will follow-up for PT Michelleal when activity orders are placed.
Hold PT at this time ; Hgb decreased at 6.4; for blood transfusion;  Pt also has pending activity orders; will f/u once Hgb optimized for therapy.

## 2021-07-11 NOTE — PROGRESS NOTE ADULT - ASSESSMENT
Cards: Dr De Souza  EP Dr Burdick    71M with h/o of HTN, HLD, anemia, DM, Afib, JIMÉNEZ s/p liver transplant and right renal transplant at [facility] in February 2020 , CAD s/p stents (10 years ago) , watchman procedure performed ~3 months ago, on ASA and Plavix  by Dr. Burdick, history of bilateral knee replacements (21 years ago) admitted s/p mechanical fall now right distal fracture, s/p ORIF, now complicated by intra thigh hematoma  EP consulted for AF RVR,     AF with RVR  Anemia due to acute blood loss  post op fever    con't tele  currently HR in 110 bpm; increase in HR likely blood loss  Tmax 102.1 yesterday AM, last 24hrs Tmax 100.1 last night, 99 now, likely post op in the settings of bleeding and hematoma  Increase Metoprolol and wean off esmolol   stop digoxin   Anemia acute blood loss, appropriate response after 1u PRBC, transfuse another unit. pt's baseline Hgb 10.5-11.5 (see april 2021 admission)  Con't asa, resume plavix once Hgb stable

## 2021-07-11 NOTE — PROGRESS NOTE ADULT - ASSESSMENT
s/p  donor liver / kidney transplant (RLQ) 2020 @ [facility]  stable allograft renal function (Cr 1.3 2021)  ESRD / ESLD due to JIMÉNEZ with HRS  Right distal femoral fracture  s/p b/l remote TKR  DM2  CAD / PCI /   rapid Afib        plan:    CCU care for a fib  cardiology f/u  cont prograf 0.3mg po bid  f/u prograf trough in AM  Cr stable, f/u BMP  s/p surgery  avoid NSAIDs    d/w CCU staff

## 2021-07-11 NOTE — PROGRESS NOTE ADULT - ASSESSMENT
72 yo man PMH HTN, HLD, anemia, Afib s/p watchman device JIMÉNEZ s/p liver transplant and right renal transplant  CAD s/p stents on ASA and Plavix.     Neuro: Currently AOx3. No acute issues.    CV: Afib RVR currently rate controlled. Off AC due to hematoma. Currently BP stable on esmolol gtt and off pressors.  #Afib s/p watchman now with RVR   - due to shock  - c/w esmolol  -  s/p 45 days of coumadin after Watchman 3 months ago   - start digoxin for further rate control.    GI: Tolerating diet. Constipation.    Infectious:  #fever: unknown source, could be from the hematoma, f/u bcx and procalcitonin, c/w cefepime, trend wbc    MSK:  #Acute comminuted fracture of the distal rt femur  - s/p Fixation (OR done 07/09/2021)  - Ortho following. Will need to further evaluate hematoma. Discuss with ortho regarding modality of imaging.   - PT eval.    Renal:  # S/p renal transplant (rt sided at [facility] in 02/2020)  -Nephro on board, following   - c/w Prograf 0.3mg PO BID   - nephro following; f/u recs.    Heme: Anemic likely 2/2 hematoma.  - Closely monitor Hb. Transfusion goal 7-8.  - Will need to further evaluate hematoma. Discuss with ortho regarding modality of imaging.    Endocrine  #Hx of DM  - A1c 7/8: 5.3   - Home Januvia 100mg, holding  - Home  Linagliptin 5 mg QD, holding   70 yo man PMH HTN, HLD, anemia, Afib s/p watchman device JIMÉNEZ s/p liver transplant and right renal transplant  CAD s/p stents on ASA and Plavix.     Neuro: Currently AOx3. No acute issues.    CV: Afib RVR currently rate controlled. Off AC due to hematoma. Currently BP stable on esmolol gtt and off pressors.  #Afib s/p watchman now with RVR   - due to shock  - c/w esmolol  -  s/p 45 days of coumadin after Watchman 3 months ago   - start digoxin for further rate control. once HGB stable and rate stable can eventually d/c dig     GI: Tolerating diet. Constipation.    Infectious:  #fever: unknown source, could be from the hematoma, f/u bcx and procalcitonin, c/w cefepime, trend wbc    MSK:  #Acute comminuted fracture of the distal rt femur  - s/p Fixation (OR done 07/09/2021)  - Ortho following. Will need to further evaluate hematoma. Discuss with ortho regarding modality of imaging.   - PT eval.    Renal:  # S/p renal transplant (rt sided at [facility] in 02/2020)  -Nephro on board, following   - c/w Prograf 0.3mg PO BID   - nephro following; f/u recs.    Heme: Anemic likely 2/2 hematoma.  - Closely monitor Hb. Transfusion goal 7-8.  - Will need to further evaluate hematoma. Discuss with ortho regarding modality of imaging.  -f/u Hgb     Endocrine  #Hx of DM  - A1c 7/8: 5.3   - Home Januvia 100mg, holding  - Home  Linagliptin 5 mg QD, holding   Yes, Non-Core measure site...

## 2021-07-11 NOTE — PROGRESS NOTE ADULT - SUBJECTIVE AND OBJECTIVE BOX
[facility] FOLLOW UP NOTE  --------------------------------------------------------------------------------  Chief Complaint/24 hour events/subjective:    pt seen, HR controlled, no c/o    PAST HISTORY  --------------------------------------------------------------------------------  No significant changes to PMH, PSH, FHx, SHx, unless otherwise noted    ALLERGIES & MEDICATIONS  --------------------------------------------------------------------------------  Allergies    No Known Allergies    Intolerances      Standing Inpatient Medications  aspirin enteric coated 325 milliGRAM(s) Oral daily  cefepime   IVPB 1000 milliGRAM(s) IV Intermittent every 12 hours  chlorhexidine 4% Liquid 1 Application(s) Topical daily  dextrose 40% Gel 15 Gram(s) Oral once  dextrose 50% Injectable 25 Gram(s) IV Push once  esMOLOL  Infusion 50 MICROgram(s)/kG/Min IV Continuous <Continuous>  glucagon  Injectable 1 milliGRAM(s) IntraMuscular once  hydrocortisone sodium succinate Injectable 100 milliGRAM(s) IV Push every 8 hours  insulin lispro (ADMELOG) corrective regimen sliding scale   SubCutaneous three times a day before meals  lactated ringers. 1000 milliLiter(s) IV Continuous <Continuous>  metoprolol tartrate 25 milliGRAM(s) Oral two times a day  pantoprazole    Tablet 40 milliGRAM(s) Oral before breakfast  senna 2 Tablet(s) Oral at bedtime  tacrolimus 3 milliGRAM(s) Oral every 12 hours  tamsulosin 0.4 milliGRAM(s) Oral at bedtime    PRN Inpatient Medications  acetaminophen   Tablet .. 650 milliGRAM(s) Oral every 6 hours PRN  oxyCODONE    IR 10 milliGRAM(s) Oral every 6 hours PRN      REVIEW OF SYSTEMS  --------------------------------------------------------------------------------    All other systems were reviewed and are negative, except as noted.    VITALS/PHYSICAL EXAM  --------------------------------------------------------------------------------  T(C): 36.9 (07-11-21 @ 08:00), Max: 37.8 (07-10-21 @ 20:11)  HR: 96 (07-11-21 @ 10:00) (96 - 118)  BP: 99/63 (07-11-21 @ 10:00) (85/51 - 135/74)  RR: 20 (07-11-21 @ 10:00) (15 - 34)  SpO2: 97% (07-11-21 @ 10:00) (95% - 100%)  Wt(kg): --  Height (cm): 175.3 (07-09-21 @ 14:50)  Weight (kg): 90 (07-09-21 @ 14:50)  BMI (kg/m2): 29.3 (07-09-21 @ 14:50)  BSA (m2): 2.06 (07-09-21 @ 14:50)      07-10-21 @ 07:01  -  07-11-21 @ 07:00  --------------------------------------------------------  IN: 2341.3 mL / OUT: 875 mL / NET: 1466.3 mL    07-11-21 @ 07:01  -  07-11-21 @ 11:31  --------------------------------------------------------  IN: 466 mL / OUT: 120 mL / NET: 346 mL      Physical Exam:    	Gen: no distress   	Pulm: CTA B/L  	CV: S1S2; irregular HR  	Abd: +BS, soft, nontender/nondistended  	LE:  no  edema      LABS/STUDIES  --------------------------------------------------------------------------------              7.3    6.64  >-----------<  123      [07-11-21 @ 11:10]              22.1     133  |  106  |  19  ----------------------------<  129      [07-11-21 @ 04:25]  4.3   |  20  |  1.1        Ca     7.8     [07-11-21 @ 04:25]      Mg     1.5     [07-11-21 @ 04:25]    TPro  5.0  /  Alb  2.6  /  TBili  1.3  /  DBili  x   /  AST  22  /  ALT  11  /  AlkPhos  138  [07-11-21 @ 04:25]        Troponin 0.02      [07-10-21 @ 01:52]        [07-10-21 @ 12:00]    Creatinine Trend:  SCr 1.1 [07-11 @ 04:25]  SCr 1.3 [07-10 @ 16:15]  SCr 1.1 [07-10 @ 08:08]  SCr 1.1 [07-10 @ 01:52]  SCr 1.3 [07-09 @ 13:10]    Urinalysis - [07-09-21 @ 16:10]      Color Yellow / Appearance Clear / SG 1.031 / pH 6.0      Gluc Negative / Ketone Small  / Bili Negative / Urobili <2 mg/dL       Blood Negative / Protein 30 mg/dL / Leuk Est Negative / Nitrite Negative      RBC 5 / WBC 3 / Hyaline 5 / Gran  / Sq Epi  / Non Sq Epi 1 / Bacteria Negative      HbA1c 5.3      [06-23-19 @ 08:24]

## 2021-07-11 NOTE — PROGRESS NOTE ADULT - ASSESSMENT
ORTHO PROGRESS NOTE     71yMale POD #2 S/P right femur ORIF. Received 1u pRBC yesterday. receiving another unit this AM. The patient is awake and alert in NAD. No complaints of chest pain, SOB, N/V.    MEDICATIONS  (STANDING):  aspirin enteric coated 325 milliGRAM(s) Oral daily  cefepime   IVPB 1000 milliGRAM(s) IV Intermittent every 12 hours  dextrose 40% Gel 15 Gram(s) Oral once  dextrose 50% Injectable 25 Gram(s) IV Push once  diltiazem Infusion 5 mG/Hr (5 mL/Hr) IV Continuous <Continuous>  fludroCORTISONE 0.1 milliGRAM(s) Oral daily  glucagon  Injectable 1 milliGRAM(s) IntraMuscular once  heparin   Injectable 5000 Unit(s) SubCutaneous every 8 hours  insulin lispro (ADMELOG) corrective regimen sliding scale   SubCutaneous three times a day before meals  metoprolol tartrate 25 milliGRAM(s) Oral two times a day  norepinephrine Infusion 0.05 MICROgram(s)/kG/Min (8.44 mL/Hr) IV Continuous <Continuous>  pantoprazole    Tablet 40 milliGRAM(s) Oral before breakfast  senna 2 Tablet(s) Oral at bedtime  tacrolimus 3 milliGRAM(s) Oral every 12 hours  tamsulosin 0.4 milliGRAM(s) Oral at bedtime    MEDICATIONS  (PRN):  acetaminophen   Tablet .. 650 milliGRAM(s) Oral every 6 hours PRN Temp greater or equal to 38C (100.4F), Mild Pain (1 - 3)  oxyCODONE    IR 10 milliGRAM(s) Oral every 6 hours PRN Moderate Pain (4 - 6)      Vital Signs Last 24 Hrs  T(C): 38.3 (10 Jul 2021 05:51), Max: 38.8 (2021 18:41)  T(F): 101 (10 Jul 2021 05:51), Max: 101.8 (2021 18:41)  HR: 123 (10 Jul 2021 03:07) (86 - 135)  BP: 102/54 (10 Jul 2021 05:51) (75/42 - 133/61)  BP(mean): 71 (10 Jul 2021 05:51) (54 - 88)  RR: 18 (10 Jul 2021 05:51) (10 - 32)  SpO2: 100% (10 Jul 2021 05:51) (96% - 100%)    PE:   Dressing C/D/I - changed this AM  Incisions c/d/i w/ staples in place   Compartments soft and compressible  Motor intact distally  SILT distally  CR<2sec  palpable pulses                        6.9    8.09  )-----------( 129      ( 10 Jul 2021 01:52 )             20.7     07-10    132<L>  |  106  |  20  ----------------------------<  114<H>  4.4   |  20  |  1.1    Ca    7.6<L>      10 Jul 2021 01:52  Phos  4.1     07-  Mg     1.9     -    TPro  5.1<L>  /  Alb  2.8<L>  /  TBili  1.4<H>  /  DBili  x   /  AST  27  /  ALT  9   /  AlkPhos  142<H>  07-10      Urinalysis Basic - ( 2021 16:10 )    Color: Yellow / Appearance: Clear / S.031 / pH: x  Gluc: x / Ketone: Small  / Bili: Negative / Urobili: <2 mg/dL   Blood: x / Protein: 30 mg/dL / Nitrite: Negative   Leuk Esterase: Negative / RBC: 5 /HPF / WBC 3 /HPF   Sq Epi: x / Non Sq Epi: 1 /HPF / Bacteria: Negative        21 @ 07:01  -  07-10-21 @ 07:00  --------------------------------------------------------  IN: 443.8 mL / OUT: 650 mL / NET: -206.2 mL      A/P: 71yMale POD #2 S/P right femur ORIF.   Receiving another 1u pRBC this AM  OOB to Chair   NWB RLE  PT/OT  Pain control   Ext icing/elevation  Incentive Spirometry   DVT Prophylaxis; HSQ  Discharge planning

## 2021-07-11 NOTE — PROGRESS NOTE ADULT - SUBJECTIVE AND OBJECTIVE BOX
INTERVAL HPI/OVERNIGHT EVENTS:  still tachycardic,   Hgb this AM 6.4, s/p 1u PRBC, repeat 7.3  Still on esmolol, and Metoprolol 25mg q12h  BP stable    MEDICATIONS  (STANDING):  aspirin enteric coated 325 milliGRAM(s) Oral daily  cefepime   IVPB 1000 milliGRAM(s) IV Intermittent every 12 hours  chlorhexidine 4% Liquid 1 Application(s) Topical daily  dextrose 40% Gel 15 Gram(s) Oral once  dextrose 50% Injectable 25 Gram(s) IV Push once  esMOLOL  Infusion 50 MICROgram(s)/kG/Min (27 mL/Hr) IV Continuous <Continuous>  glucagon  Injectable 1 milliGRAM(s) IntraMuscular once  hydrocortisone sodium succinate Injectable 100 milliGRAM(s) IV Push every 8 hours  insulin lispro (ADMELOG) corrective regimen sliding scale   SubCutaneous three times a day before meals  lactated ringers. 1000 milliLiter(s) (500 mL/Hr) IV Continuous <Continuous>  metoprolol tartrate 25 milliGRAM(s) Oral two times a day  pantoprazole    Tablet 40 milliGRAM(s) Oral before breakfast  senna 2 Tablet(s) Oral at bedtime  tacrolimus 3 milliGRAM(s) Oral every 12 hours  tamsulosin 0.4 milliGRAM(s) Oral at bedtime    MEDICATIONS  (PRN):  acetaminophen   Tablet .. 650 milliGRAM(s) Oral every 6 hours PRN Temp greater or equal to 38C (100.4F), Mild Pain (1 - 3)  oxyCODONE    IR 10 milliGRAM(s) Oral every 6 hours PRN Moderate Pain (4 - 6)      Allergies    No Known Allergies    Intolerances        REVIEW OF SYSTEMS    [x ] A ten-point review of systems was otherwise negative except as noted.  [ ] Due to altered mental status/intubation, subjective information were not able to be obtained from the patient. History was obtained, to the extent possible, from review of the chart and collateral sources of information.      Vital Signs Last 24 Hrs  T(C): 36.9 (2021 08:00), Max: 37.8 (10 Jul 2021 20:11)  T(F): 98.5 (2021 08:00), Max: 100.1 (10 Jul 2021 20:11)  HR: 96 (2021 11:00) (96 - 118)  BP: 114/64 (2021 11:00) (92/53 - 135/74)  BP(mean): 79 (2021 11:00) (67 - 95)  RR: 20 (2021 11:00) (15 - 34)  SpO2: 99% (2021 11:00) (95% - 100%)    07-10-21 @ 07:  -  21 @ 07:00  --------------------------------------------------------  IN: 2341.3 mL / OUT: 875 mL / NET: 1466.3 mL    21 @ 07:  -  21 @ 12:24  --------------------------------------------------------  IN: 493 mL / OUT: 120 mL / NET: 373 mL        PHYSICAL EXAM:    GENERAL: In no apparent distress, well nourished, and hydrated.  HEART: Regular rate and rhythm; No murmur; NO rubs, or gallops.  PULMONARY: Clear to auscultation and percussion.  Normal expansion/effort. No rales, wheezing, or rhonchi bilaterally.  ABDOMEN: Soft, Nontender, Nondistended; Bowel sounds present  EXTREMITIES:  Extremities warm, pink, well-perfused, 2+ Peripheral Pulses, No clubbing, cyanosis, or edema  Rt LE soft, swollen, tender, good palpable pulses, hematoma at thigh incision site  NEUROLOGICAL: alert & oriented x 3, no focal deficits, PERRLA, EOMI    LABS:                        7.3    6.64  )-----------( 123      ( 2021 11:10 )             22.1   Complete Blood Count + Automated Diff in AM (21 @ 04:25)    WBC Count: 6.70 K/uL    RBC Count: 2.27 M/uL    Hemoglobin: 6.4: This result has been called to  by Jerrica Ott on 2021 at 0508, and has been read back. g/dL    Hematocrit: 19.0: This result has been called to  by Jerrica Ott on 2021 at 0508, and has been read back. %    Mean Cell Volume: 83.7 fL    Mean Cell Hemoglobin: 28.2 pg    Mean Cell Hemoglobin Conc: 33.7 g/dL    Red Cell Distrib Width: 14.3 %    Platelet Count - Automated: 125 K/uL    Auto Neutrophil #: 4.64 K/uL    Auto Lymphocyte #: 1.34 K/uL    Auto Monocyte #: 0.62 K/uL    Auto Eosinophil #: 0.00 K/uL    Auto Basophil #: 0.01 K/uL    Auto Neutrophil %: 69.3: Differential percentages must be correlated with absolute numbers for  clinical significance. %    Auto Lymphocyte %: 20.0 %    Auto Monocyte %: 9.3 %    Auto Eosinophil %: 0.0 %    Auto Basophil %: 0.1 %    Auto Immature Granulocyte %: 1.3: (Includes meta, myelo and promyelocytes) %    Nucleated RBC: 0 /100 WBCs            133<L>  |  106  |  19  ----------------------------<  129<H>  4.3   |  20  |  1.1    Ca    7.8<L>      2021 04:25  Mg     1.5         TPro  5.0<L>  /  Alb  2.6<L>  /  TBili  1.3<H>  /  DBili  x   /  AST  22  /  ALT  11  /  AlkPhos  138<H>        Urinalysis Basic - ( 2021 16:10 )    Color: Yellow / Appearance: Clear / S.031 / pH: x  Gluc: x / Ketone: Small  / Bili: Negative / Urobili: <2 mg/dL   Blood: x / Protein: 30 mg/dL / Nitrite: Negative   Leuk Esterase: Negative / RBC: 5 /HPF / WBC 3 /HPF   Sq Epi: x / Non Sq Epi: 1 /HPF / Bacteria: Negative        12 Lead ECG:   Ventricular Rate 98 BPM    Atrial Rate 375 BPM    QRS Duration 90 ms    Q-T Interval 344 ms    QTC Calculation(Bazett) 439 ms    R Axis 39 degrees    T Axis 54 degrees    Diagnosis Line Atrial fibrillation  Abnormal ECG    Confirmed by Mohit Morris ([id number]) on 2021 8:21:27 AM (21 @ 05:27)      RADIOLOGY & ADDITIONAL TESTS:

## 2021-07-11 NOTE — PROGRESS NOTE ADULT - SUBJECTIVE AND OBJECTIVE BOX
SOCORRO MANUEL  71y, Male  Allergy: No Known Allergies      LOS  4d    CHIEF COMPLAINT: Right periprosthetic femur fracture (2021 12:23)      INTERVAL EVENTS/HPI  - No acute events overnight  - T(F): , Max: 100.1 (07-10-21 @ 20:11)  - Denies any worsening symptoms  - Tolerating medication  - WBC Count: 6.64 (21 @ 11:10)  WBC Count: 6.70 (21 @ 04:25)     - Creatinine, Serum: 1.1 (21 @ 04:25)  Creatinine, Serum: 1.3 (07-10-21 @ 16:15)       ROS  General: Denies rigors, nightsweats  HEENT: Denies headache, rhinorrhea, sore throat, eye pain  CV: Denies CP, palpitations  PULM: Denies wheezing, hemoptysis  GI: Denies hematemesis, hematochezia, melena  : Denies discharge, hematuria  MSK: Denies arthralgias, myalgias  SKIN: Denies rash, lesions  NEURO: Denies paresthesias, weakness  PSYCH: Denies depression, anxiety    VITALS:  T(F): 99.2, Max: 100.1 (07-10-21 @ 20:11)  HR: 102  BP: 129/79  RR: 25Vital Signs Last 24 Hrs  T(C): 37.3 (2021 12:00), Max: 37.8 (10 Jul 2021 20:11)  T(F): 99.2 (2021 12:00), Max: 100.1 (10 Jul 2021 20:11)  HR: 102 (2021 12:00) (96 - 118)  BP: 129/79 (2021 12:00) (92/53 - 135/74)  BP(mean): 95 (2021 12:00) (67 - 95)  RR: 25 (2021 12:00) (15 - 34)  SpO2: 96% (2021 12:00) (95% - 100%)    PHYSICAL EXAM:  Gen: NAD, resting in bed  HEENT: Normocephalic, atraumatic  Neck: supple, no lymphadenopathy  CV: Regular rate & regular rhythm  Lungs: decreased BS at bases, no fremitus  Abdomen: Soft, BS present  Ext: Warm, well perfused LE dressings   Neuro: non focal, awake  Skin: no rash, no erythema  Lines: no phlebitis    FH: Non-contributory  Social Hx: Non-contributory    TESTS & MEASUREMENTS:                        7.3    6.64  )-----------( 123      ( 2021 11:10 )             22.1         133<L>  |  106  |  19  ----------------------------<  129<H>  4.3   |  20  |  1.1    Ca    7.8<L>      2021 04:25  Mg     1.5         TPro  5.0<L>  /  Alb  2.6<L>  /  TBili  1.3<H>  /  DBili  x   /  AST  22  /  ALT  11  /  AlkPhos  138<H>      eGFR if Non African American: 67 mL/min/1.73M2 (21 @ 04:25)  eGFR if African American: 78 mL/min/1.73M2 (21 @ 04:25)  eGFR if Non African American: 55 mL/min/1.73M2 (07-10-21 @ 16:15)  eGFR if : 64 mL/min/1.73M2 (07-10-21 @ 16:15)    LIVER FUNCTIONS - ( 2021 04:25 )  Alb: 2.6 g/dL / Pro: 5.0 g/dL / ALK PHOS: 138 U/L / ALT: 11 U/L / AST: 22 U/L / GGT: x           Urinalysis Basic - ( 2021 16:10 )    Color: Yellow / Appearance: Clear / S.031 / pH: x  Gluc: x / Ketone: Small  / Bili: Negative / Urobili: <2 mg/dL   Blood: x / Protein: 30 mg/dL / Nitrite: Negative   Leuk Esterase: Negative / RBC: 5 /HPF / WBC 3 /HPF   Sq Epi: x / Non Sq Epi: 1 /HPF / Bacteria: Negative        Culture - Blood (collected 21 @ 17:26)  Source: .Blood None  Preliminary Report (21 @ 01:01):    No growth to date.    Culture - Urine (collected 21 @ 16:10)  Source: .Urine Clean Catch (Midstream)  Final Report (07-10-21 @ 21:56):    No growth    Culture - Blood (collected 21 @ 16:00)  Source: .Blood Blood-Peripheral  Preliminary Report (07-10-21 @ 23:01):    No growth to date.        Lactate, Blood: 1.0 mmol/L (21 @ 04:25)  Lactate, Blood: 1.2 mmol/L (07-10-21 @ 16:15)      INFECTIOUS DISEASES TESTING  Procalcitonin, Serum: 0.32 (07-10-21 @ 01:52)  COVID-19 PCR: NotDetec (21 @ 14:25)  MRSA PCR Result.: Negative (21 @ 14:58)      INFLAMMATORY MARKERS      RADIOLOGY & ADDITIONAL TESTS:  I have personally reviewed the last available Chest xray  CXR  Xray Chest 1 View- PORTABLE-Urgent:   EXAM:  XR CHEST PORTABLE URGENT 1V            PROCEDURE DATE:  07/10/2021            INTERPRETATION:  Clinical History / Reason for exam: Sepsis    Comparison : Chest radiograph 2021.    Technique/Positioning: Adequate.    Findings:    Support devices: Right IJ line with its tip overlying the SVC.    Cardiac/mediastinum/hilum: Unremarkable.    Lung parenchyma/Pleura: Within normal limits.    Skeleton/soft tissues: Unremarkable.    Impression:    No radiographic evidence of acute cardiopulmonary disease.    Right IJ line.    --- End of Report ---              ROBB KHAN MD; Attending Radiologist  This document has been electronically signed. Jul 10 2021  8:22AM (07-10-21 @ 03:26)      CT      CARDIOLOGY TESTING  12 Lead ECG:   Ventricular Rate 98 BPM    Atrial Rate 375 BPM    QRS Duration 90 ms    Q-T Interval 344 ms    QTC Calculation(Bazett) 439 ms    R Axis 39 degrees    T Axis 54 degrees    Diagnosis Line Atrial fibrillation  Abnormal ECG    Confirmed by Mohit Morris ([id number]) on 2021 8:21:27 AM (21 @ 05:27)  12 Lead ECG:   Ventricular Rate 98 BPM    QRS Duration 88 ms    Q-T Interval 298 ms    QTC Calculation(Bazett) 380 ms    R Axis 21 degrees    T Axis 148 degrees    Diagnosis Line Atrial fibrillation  Nonspecific T wave abnormality  Abnormal ECG    Confirmed by Mohit Morris ([id number]) on 7/10/2021 7:50:13 AM (21 @ 21:44)      MEDICATIONS  aspirin enteric coated 325 Oral daily  cefepime   IVPB 1000 IV Intermittent every 12 hours  chlorhexidine 4% Liquid 1 Topical daily  dextrose 40% Gel 15 Oral once  dextrose 50% Injectable 25 IV Push once  esMOLOL  Infusion 50 IV Continuous <Continuous>  glucagon  Injectable 1 IntraMuscular once  hydrocortisone sodium succinate Injectable 100 IV Push every 8 hours  insulin lispro (ADMELOG) corrective regimen sliding scale  SubCutaneous three times a day before meals  lactated ringers. 1000 IV Continuous <Continuous>  metoprolol tartrate 25 Oral two times a day  pantoprazole    Tablet 40 Oral before breakfast  senna 2 Oral at bedtime  tacrolimus 3 Oral every 12 hours  tamsulosin 0.4 Oral at bedtime      WEIGHT  Weight (kg): 90 (21 @ 14:50)  Creatinine, Serum: 1.1 mg/dL (21 @ 04:25)  Creatinine, Serum: 1.3 mg/dL (07-10-21 @ 16:15)      ANTIBIOTICS:  cefepime   IVPB 1000 milliGRAM(s) IV Intermittent every 12 hours      All available historical records have been reviewed

## 2021-07-12 LAB
ALBUMIN SERPL ELPH-MCNC: 2.7 G/DL — LOW (ref 3.5–5.2)
ALP SERPL-CCNC: 124 U/L — HIGH (ref 30–115)
ALT FLD-CCNC: 9 U/L — SIGNIFICANT CHANGE UP (ref 0–41)
ANION GAP SERPL CALC-SCNC: 8 MMOL/L — SIGNIFICANT CHANGE UP (ref 7–14)
AST SERPL-CCNC: 14 U/L — SIGNIFICANT CHANGE UP (ref 0–41)
BASOPHILS # BLD AUTO: 0 K/UL — SIGNIFICANT CHANGE UP (ref 0–0.2)
BASOPHILS # BLD AUTO: 0 K/UL — SIGNIFICANT CHANGE UP (ref 0–0.2)
BASOPHILS NFR BLD AUTO: 0 % — SIGNIFICANT CHANGE UP (ref 0–1)
BASOPHILS NFR BLD AUTO: 0 % — SIGNIFICANT CHANGE UP (ref 0–1)
BILIRUB SERPL-MCNC: 1.1 MG/DL — SIGNIFICANT CHANGE UP (ref 0.2–1.2)
BUN SERPL-MCNC: 26 MG/DL — HIGH (ref 10–20)
CALCIUM SERPL-MCNC: 8.2 MG/DL — LOW (ref 8.5–10.1)
CHLORIDE SERPL-SCNC: 106 MMOL/L — SIGNIFICANT CHANGE UP (ref 98–110)
CO2 SERPL-SCNC: 20 MMOL/L — SIGNIFICANT CHANGE UP (ref 17–32)
CREAT SERPL-MCNC: 1.1 MG/DL — SIGNIFICANT CHANGE UP (ref 0.7–1.5)
EOSINOPHIL # BLD AUTO: 0.01 K/UL — SIGNIFICANT CHANGE UP (ref 0–0.7)
EOSINOPHIL # BLD AUTO: 0.01 K/UL — SIGNIFICANT CHANGE UP (ref 0–0.7)
EOSINOPHIL NFR BLD AUTO: 0.2 % — SIGNIFICANT CHANGE UP (ref 0–8)
EOSINOPHIL NFR BLD AUTO: 0.3 % — SIGNIFICANT CHANGE UP (ref 0–8)
GLUCOSE BLDC GLUCOMTR-MCNC: 120 MG/DL — HIGH (ref 70–99)
GLUCOSE BLDC GLUCOMTR-MCNC: 132 MG/DL — HIGH (ref 70–99)
GLUCOSE BLDC GLUCOMTR-MCNC: 134 MG/DL — HIGH (ref 70–99)
GLUCOSE BLDC GLUCOMTR-MCNC: 152 MG/DL — HIGH (ref 70–99)
GLUCOSE SERPL-MCNC: 120 MG/DL — HIGH (ref 70–99)
HCT VFR BLD CALC: 19.8 % — LOW (ref 42–52)
HCT VFR BLD CALC: 23.5 % — LOW (ref 42–52)
HGB BLD-MCNC: 6.4 G/DL — CRITICAL LOW (ref 14–18)
HGB BLD-MCNC: 7.6 G/DL — LOW (ref 14–18)
IMM GRANULOCYTES NFR BLD AUTO: 0.8 % — HIGH (ref 0.1–0.3)
IMM GRANULOCYTES NFR BLD AUTO: 1 % — HIGH (ref 0.1–0.3)
LYMPHOCYTES # BLD AUTO: 0.79 K/UL — LOW (ref 1.2–3.4)
LYMPHOCYTES # BLD AUTO: 1.11 K/UL — LOW (ref 1.2–3.4)
LYMPHOCYTES # BLD AUTO: 21 % — SIGNIFICANT CHANGE UP (ref 20.5–51.1)
LYMPHOCYTES # BLD AUTO: 21.1 % — SIGNIFICANT CHANGE UP (ref 20.5–51.1)
MAGNESIUM SERPL-MCNC: 1.8 MG/DL — SIGNIFICANT CHANGE UP (ref 1.8–2.4)
MCHC RBC-ENTMCNC: 27.3 PG — SIGNIFICANT CHANGE UP (ref 27–31)
MCHC RBC-ENTMCNC: 27.5 PG — SIGNIFICANT CHANGE UP (ref 27–31)
MCHC RBC-ENTMCNC: 32.3 G/DL — SIGNIFICANT CHANGE UP (ref 32–37)
MCHC RBC-ENTMCNC: 32.3 G/DL — SIGNIFICANT CHANGE UP (ref 32–37)
MCV RBC AUTO: 84.5 FL — SIGNIFICANT CHANGE UP (ref 80–94)
MCV RBC AUTO: 85 FL — SIGNIFICANT CHANGE UP (ref 80–94)
MONOCYTES # BLD AUTO: 0.26 K/UL — SIGNIFICANT CHANGE UP (ref 0.1–0.6)
MONOCYTES # BLD AUTO: 0.45 K/UL — SIGNIFICANT CHANGE UP (ref 0.1–0.6)
MONOCYTES NFR BLD AUTO: 6.9 % — SIGNIFICANT CHANGE UP (ref 1.7–9.3)
MONOCYTES NFR BLD AUTO: 8.6 % — SIGNIFICANT CHANGE UP (ref 1.7–9.3)
NEUTROPHILS # BLD AUTO: 2.68 K/UL — SIGNIFICANT CHANGE UP (ref 1.4–6.5)
NEUTROPHILS # BLD AUTO: 3.63 K/UL — SIGNIFICANT CHANGE UP (ref 1.4–6.5)
NEUTROPHILS NFR BLD AUTO: 69.1 % — SIGNIFICANT CHANGE UP (ref 42.2–75.2)
NEUTROPHILS NFR BLD AUTO: 71 % — SIGNIFICANT CHANGE UP (ref 42.2–75.2)
NRBC # BLD: 0 /100 WBCS — SIGNIFICANT CHANGE UP (ref 0–0)
NRBC # BLD: 0 /100 WBCS — SIGNIFICANT CHANGE UP (ref 0–0)
PLATELET # BLD AUTO: 122 K/UL — LOW (ref 130–400)
PLATELET # BLD AUTO: 126 K/UL — LOW (ref 130–400)
POTASSIUM SERPL-MCNC: 4.5 MMOL/L — SIGNIFICANT CHANGE UP (ref 3.5–5)
POTASSIUM SERPL-SCNC: 4.5 MMOL/L — SIGNIFICANT CHANGE UP (ref 3.5–5)
PROT SERPL-MCNC: 5.3 G/DL — LOW (ref 6–8)
RBC # BLD: 2.33 M/UL — LOW (ref 4.7–6.1)
RBC # BLD: 2.78 M/UL — LOW (ref 4.7–6.1)
RBC # FLD: 14.5 % — SIGNIFICANT CHANGE UP (ref 11.5–14.5)
RBC # FLD: 14.6 % — HIGH (ref 11.5–14.5)
SODIUM SERPL-SCNC: 134 MMOL/L — LOW (ref 135–146)
WBC # BLD: 3.77 K/UL — LOW (ref 4.8–10.8)
WBC # BLD: 5.25 K/UL — SIGNIFICANT CHANGE UP (ref 4.8–10.8)
WBC # FLD AUTO: 3.77 K/UL — LOW (ref 4.8–10.8)
WBC # FLD AUTO: 5.25 K/UL — SIGNIFICANT CHANGE UP (ref 4.8–10.8)

## 2021-07-12 PROCEDURE — 93010 ELECTROCARDIOGRAM REPORT: CPT

## 2021-07-12 PROCEDURE — 74174 CTA ABD&PLVS W/CONTRAST: CPT | Mod: 26

## 2021-07-12 PROCEDURE — 99233 SBSQ HOSP IP/OBS HIGH 50: CPT

## 2021-07-12 PROCEDURE — 73706 CT ANGIO LWR EXTR W/O&W/DYE: CPT | Mod: 26,RT

## 2021-07-12 PROCEDURE — 99232 SBSQ HOSP IP/OBS MODERATE 35: CPT

## 2021-07-12 RX ORDER — SODIUM CHLORIDE 9 MG/ML
500 INJECTION INTRAMUSCULAR; INTRAVENOUS; SUBCUTANEOUS ONCE
Refills: 0 | Status: COMPLETED | OUTPATIENT
Start: 2021-07-12 | End: 2021-07-12

## 2021-07-12 RX ADMIN — CHLORHEXIDINE GLUCONATE 1 APPLICATION(S): 213 SOLUTION TOPICAL at 11:28

## 2021-07-12 RX ADMIN — Medication 325 MILLIGRAM(S): at 11:28

## 2021-07-12 RX ADMIN — TAMSULOSIN HYDROCHLORIDE 0.4 MILLIGRAM(S): 0.4 CAPSULE ORAL at 21:41

## 2021-07-12 RX ADMIN — PANTOPRAZOLE SODIUM 40 MILLIGRAM(S): 20 TABLET, DELAYED RELEASE ORAL at 06:21

## 2021-07-12 RX ADMIN — Medication 100 MILLIGRAM(S): at 21:41

## 2021-07-12 RX ADMIN — Medication 25 MILLIGRAM(S): at 05:49

## 2021-07-12 RX ADMIN — OXYCODONE HYDROCHLORIDE 10 MILLIGRAM(S): 5 TABLET ORAL at 19:00

## 2021-07-12 RX ADMIN — SENNA PLUS 2 TABLET(S): 8.6 TABLET ORAL at 21:41

## 2021-07-12 RX ADMIN — OXYCODONE HYDROCHLORIDE 10 MILLIGRAM(S): 5 TABLET ORAL at 17:59

## 2021-07-12 RX ADMIN — CEFEPIME 100 MILLIGRAM(S): 1 INJECTION, POWDER, FOR SOLUTION INTRAMUSCULAR; INTRAVENOUS at 05:50

## 2021-07-12 RX ADMIN — TACROLIMUS 3 MILLIGRAM(S): 5 CAPSULE ORAL at 05:49

## 2021-07-12 RX ADMIN — Medication 25 MILLIGRAM(S): at 17:51

## 2021-07-12 RX ADMIN — Medication 100 MILLIGRAM(S): at 13:26

## 2021-07-12 RX ADMIN — SODIUM CHLORIDE 1000 MILLILITER(S): 9 INJECTION INTRAMUSCULAR; INTRAVENOUS; SUBCUTANEOUS at 20:16

## 2021-07-12 RX ADMIN — TACROLIMUS 3 MILLIGRAM(S): 5 CAPSULE ORAL at 17:51

## 2021-07-12 RX ADMIN — Medication 100 MILLIGRAM(S): at 05:49

## 2021-07-12 NOTE — PROGRESS NOTE ADULT - ASSESSMENT
IMPRESSION:    Sp fall/ R ORIF  Rapid a Fib/ hypotension/ Dec hb/ fever   Sp renal/ liver transplant/ immunosuppressed  CAD  blood loss anemia'  rapid afib        PLAN:    CNS: Avoid CNS depressant    HEENT:  Oral care    PULMONARY:  HOB @ 45 degrees, aspiration precaution, NC keep SaO2 92 TO 96%    CARDIOVASCULAR: [place]et[place], taper pressors,   cardio/ EPS eval    GI: GI prophylaxis                                            Feeding  po    RENAL:  F/u  lytes.    Correct as needed.   accurate I/O, renal f/up    INFECTIOUS DISEASE:   pancx,   ABX for now,   procal    HEMATOLOGICAL:  DVT prophylaxis.   LE doppler, transfuse, serial CBC    ENDOCRINE:  Follow up FS.  Insulin protocol if needed.   hydrocortisone 100 q 8     MUSCULOSKELETAL: ortho f/up     CODE STATUS: FULL CODE    DISPOSITION: Pt requires continued monitoring in the MICU

## 2021-07-12 NOTE — OCCUPATIONAL THERAPY INITIAL EVALUATION ADULT - ADL RETRAINING, OT EVAL
Patient will perform upper body dressing independently by discharge. ; Patient will perform lower body dressing with moderate assistance with use of appropriate adaptive equipment as needed by discharge.

## 2021-07-12 NOTE — PROGRESS NOTE ADULT - ASSESSMENT
s/p  donor liver / kidney transplant (RLQ) 2020 @ [facility]  stable allograft renal function   ESRD / ESLD due to JIMÉNEZ with HRS  Right distal femoral fracture s/p orif  post op anemia  s/p b/l remote TKR  DM2  CAD / PCI / Afib    borderline low BP's  hypomagnesemia     plan:    cont prograf 0.3mg po bid  cont florinef 0.1mg po qd  taper off hydrocortisone  PRN PRBC transfusion  monitor cr s/p iv contrast dye  f/u ortho / wound care  d/w nursing

## 2021-07-12 NOTE — OCCUPATIONAL THERAPY INITIAL EVALUATION ADULT - TRANSFER TRAINING, PT EVAL
Patient will perform bed <> chair transfers with moderate assistance with appropriate assistive device by discharge.

## 2021-07-12 NOTE — PROGRESS NOTE ADULT - SUBJECTIVE AND OBJECTIVE BOX
Patient is a 71y old  Male who presents with a chief complaint of Right periprosthetic femur fracture (11 Jul 2021 13:08)        HPI:  71M w/PMHx of HTN, HLD, anemia, DM, Afib, JIMÉNEZ s/p liver transplant and right renal transplant at [facility] in February 2020 (follows with his hepatologist Dr. Sunshine), CAD s/p stents (10 years ago) on ASA and Plavix, watchman procedure performed ~3 months ago by Dr. Burdick, history of bilateral knee replacements (21 years ago) presents after being sent in from outpatient office for finding of new right distal femur fracture. Patient tripped and fell while trying to get out of his car and felt right knee pain afterwards. He presented to an outpatient clinic for evaluation. XR at outpatient clinic demonstrated distal right femur fracture. He was placed in a knee immobilizer and instructed to present to the ED for further management. Patient is afebrile, HR 61, borderline BP of 93/52, saturating 99% on room air. On exam his right knee is immobilized, he has flexion and extension of the foot at the ankle joint, +DP and PT pulses, foot warm and well perfused. No other external signs of injury, denies pain anywhere else.    (07 Jul 2021 21:21)      Pt evaluated on rounds.  I reviewed the radiology tests and hospital record.    I reviewed previous notes on this patient.    Interval Events: No overnight events.    REVIEW OF SYSTEMS:   see HPI      OBJECTIVE:  ICU Vital Signs Last 24 Hrs  T(C): 36.7 (12 Jul 2021 04:00), Max: 37.4 (11 Jul 2021 16:00)  T(F): 98 (12 Jul 2021 04:00), Max: 99.3 (11 Jul 2021 16:00)  HR: 84 (12 Jul 2021 06:00) (78 - 104)  BP: 117/76 (12 Jul 2021 06:00) (93/597 - 131/75)  BP(mean): 91 (12 Jul 2021 06:00) (73 - 103)    RR: 23 (12 Jul 2021 06:00) (20 - 29)  SpO2: 98% (12 Jul 2021 06:00) (95% - 100%)        07-10 @ 07:01  -  07-11 @ 07:00  --------------------------------------------------------  IN: 2341.3 mL / OUT: 875 mL / NET: 1466.3 mL    07-11 @ 07:01  - 07-12 @ 06:39  --------------------------------------------------------  IN: 1516 mL / OUT: 630 mL / NET: 886 mL      CAPILLARY BLOOD GLUCOSE      POCT Blood Glucose.: 144 mg/dL (11 Jul 2021 16:27)        PHYSICAL EXAM:     · CONSTITUTIONAL:   not septic appearing,   well nourished,   NAD    · ENMT:   Airway patent,   Nasal mucosa clear.  Mouth with normal mucosa.   No thrush    · EYES:   Clear bilaterally,   pupils equal,   round and reactive to light.    · CARDIAC:   Normal rate,   irregular rhythm.    Heart sounds S1, S2.   No murmurs, no rubs or gallops on auscultation  no edema        CAROTID:   normal systolic impulse  no bruits    · RESPIRATORY:   rales  normal chest expansion  no retractions or use of accessory muscles  palpation of chest is normal with no fremitus  percussion of chest demonstrates no hyperresonance or dullness    · GASTROINTESTINAL:  Abdomen soft,   non-tender,   + BS  liver/spleen not palpable    · MUSCULOSKELETAL:   no clubbing, cyanosis      · SKIN:   Skin normal color for race,   warm, dry   No evidence of rash.        · HEME LYMPH:   no splenomegaly.  No cervical  lymphadenopathy.  no inguinal lymphadenopathy    HOSPITAL MEDICATIONS:  MEDICATIONS  (STANDING):  aspirin enteric coated 325 milliGRAM(s) Oral daily  cefepime   IVPB 1000 milliGRAM(s) IV Intermittent every 12 hours  chlorhexidine 4% Liquid 1 Application(s) Topical daily  dextrose 40% Gel 15 Gram(s) Oral once  dextrose 50% Injectable 25 Gram(s) IV Push once  esMOLOL  Infusion 50 MICROgram(s)/kG/Min (27 mL/Hr) IV Continuous <Continuous>  glucagon  Injectable 1 milliGRAM(s) IntraMuscular once  hydrocortisone sodium succinate Injectable 100 milliGRAM(s) IV Push every 8 hours  insulin lispro (ADMELOG) corrective regimen sliding scale   SubCutaneous three times a day before meals  lactated ringers. 1000 milliLiter(s) (500 mL/Hr) IV Continuous <Continuous>  metoprolol tartrate 25 milliGRAM(s) Oral two times a day  pantoprazole    Tablet 40 milliGRAM(s) Oral before breakfast  senna 2 Tablet(s) Oral at bedtime  tacrolimus 3 milliGRAM(s) Oral every 12 hours  tamsulosin 0.4 milliGRAM(s) Oral at bedtime    MEDICATIONS  (PRN):  acetaminophen   Tablet .. 650 milliGRAM(s) Oral every 6 hours PRN Temp greater or equal to 38C (100.4F), Mild Pain (1 - 3)  oxyCODONE    IR 10 milliGRAM(s) Oral every 6 hours PRN Moderate Pain (4 - 6)    lactated ringers.: Solution, 1000 milliLiter(s) infuse at 500 mL/Hr  sodium chloride 0.9% Bolus:   1500 milliLiter(s), IV Bolus, once, infuse over 30 Minute(s), Stop After 1 Doses  lactated ringers Bolus:   1000 milliLiter(s), IV Bolus, once, infuse over 30 Minute(s), Stop After 1 Doses  sodium chloride 0.9% Bolus:   250 milliLiter(s), IV Bolus, once, infuse over 15 Minute(s), Stop After 1 Doses  lactated ringers Bolus:   500 milliLiter(s), IV Bolus, once, infuse over 60 Minute(s), Stop After 1 Doses  lactated ringers.: Solution, 1000 milliLiter(s) infuse at 100 mL/Hr  lactated ringers.: Solution, 1000 milliLiter(s) infuse at 75 mL/Hr  Provider's Contact #: [phone]      LABS:                        7.6    5.25  )-----------( 126      ( 12 Jul 2021 04:27 )             23.5     07-12    134<L>  |  106  |  26<H>  ----------------------------<  120<H>  4.5   |  20  |  1.1    Ca    8.2<L>      12 Jul 2021 04:27  Mg     1.8     07-12    TPro  5.3<L>  /  Alb  2.7<L>  /  TBili  1.1  /  DBili  x   /  AST  14  /  ALT  9   /  AlkPhos  124<H>  07-12                COVID-19 PCR: NotDetec (07 Jul 2021 14:25)              RADIOLOGY: Today I personally interpreted the latest CXR and other pertinent films.

## 2021-07-12 NOTE — PROGRESS NOTE ADULT - SUBJECTIVE AND OBJECTIVE BOX
PATIENT:  SOCORRO MANUEL  [id number]      CHIEF COMPLAINT:  Patient is a 71y old  Male who presents with a chief complaint of Right periprosthetic femur fracture (2021 06:39)      HPI:  71M w/PMHx of HTN, HLD, anemia, DM, Afib, JIMÉNEZ s/p liver transplant and right renal transplant at [facility] in 2020 (follows with his hepatologist Dr. Sunshine), CAD s/p stents (10 years ago) on ASA and Plavix, watchman procedure performed ~3 months ago by Dr. Burdick, history of bilateral knee replacements (21 years ago) presents after being sent in from outpatient office for finding of new right distal femur fracture. Patient tripped and fell while trying to get out of his car and felt right knee pain afterwards. He presented to an outpatient clinic for evaluation. XR at outpatient clinic demonstrated distal right femur fracture. He was placed in a knee immobilizer and instructed to present to the ED for further management. Patient is afebrile, HR 61, borderline BP of 93/52, saturating 99% on room air. On exam his right knee is immobilized, he has flexion and extension of the foot at the ankle joint, +DP and PT pulses, foot warm and well perfused. No other external signs of injury, denies pain anywhere else.      INTERVAL HISTORY/OVERNIGHT EVENTS:  Patient seen and evaluated at bedside. No new complaints. Received 1U pRBC yesterday, Hgb still 7.6, will transfuse another unit. HR controlled in 80s      MEDICATIONS:  MEDICATIONS  (STANDING):  aspirin enteric coated 325 milliGRAM(s) Oral daily  cefepime   IVPB 1000 milliGRAM(s) IV Intermittent every 12 hours  chlorhexidine 4% Liquid 1 Application(s) Topical daily  dextrose 40% Gel 15 Gram(s) Oral once  dextrose 50% Injectable 25 Gram(s) IV Push once  esMOLOL  Infusion 50 MICROgram(s)/kG/Min (27 mL/Hr) IV Continuous <Continuous>  glucagon  Injectable 1 milliGRAM(s) IntraMuscular once  hydrocortisone sodium succinate Injectable 100 milliGRAM(s) IV Push every 8 hours  insulin lispro (ADMELOG) corrective regimen sliding scale   SubCutaneous three times a day before meals  lactated ringers. 1000 milliLiter(s) (500 mL/Hr) IV Continuous <Continuous>  metoprolol tartrate 25 milliGRAM(s) Oral two times a day  pantoprazole    Tablet 40 milliGRAM(s) Oral before breakfast  senna 2 Tablet(s) Oral at bedtime  tacrolimus 3 milliGRAM(s) Oral every 12 hours  tamsulosin 0.4 milliGRAM(s) Oral at bedtime    MEDICATIONS  (PRN):  acetaminophen   Tablet .. 650 milliGRAM(s) Oral every 6 hours PRN Temp greater or equal to 38C (100.4F), Mild Pain (1 - 3)  oxyCODONE    IR 10 milliGRAM(s) Oral every 6 hours PRN Moderate Pain (4 - 6)      ALLERGIES:  Allergies    No Known Allergies    Intolerances        OBJECTIVE:  ICU Vital Signs Last 24 Hrs  T(C): 36.7 (2021 04:00), Max: 37.4 (2021 16:00)  T(F): 98 (2021 04:00), Max: 99.3 (2021 16:00)  HR: 84 (2021 07:00) (78 - 104)  BP: 125/81 (2021 07:00) (93/597 - 131/75)  BP(mean): 97 (2021 07:00) (73 - 103)  ABP: --  ABP(mean): --  RR: 24 (2021 07:00) (20 - 29)  SpO2: 98% (2021 07:00) (95% - 100%)      CAPILLARY BLOOD GLUCOSE      POCT Blood Glucose.: 144 mg/dL (2021 16:27)  POCT Blood Glucose.: 183 mg/dL (2021 10:59)  POCT Blood Glucose.: 151 mg/dL (2021 07:36)    CAPILLARY BLOOD GLUCOSE      POCT Blood Glucose.: 144 mg/dL (2021 16:27)    I&O's Summary    2021 07:01  -  2021 07:00  --------------------------------------------------------  IN: 1516 mL / OUT: 630 mL / NET: 886 mL      Daily     Daily Weight in k.5 (2021 06:00)    PHYSICAL EXAMINATION:  General: WN/WD NAD  HEENT: PERRLA, EOMI, moist mucous membranes  Neurology: A&Ox3, nonfocal, SANON x 4  Respiratory: CTA B/L, normal respiratory effort, no wheezes, crackles, rales  CV: RRR, S1S2, no murmurs, rubs or gallops  Abdominal: Soft, NT, ND +BS, Last BM  Extremities: No edema, + peripheral pulses    LABS:                          7.6    5.25  )-----------( 126      ( 2021 04:27 )             23.5     07-12    134<L>  |  106  |  26<H>  ----------------------------<  120<H>  4.5   |  20  |  1.1    Ca    8.2<L>      2021 04:27  Mg     1.8     07-12    TPro  5.3<L>  /  Alb  2.7<L>  /  TBili  1.1  /  DBili  x   /  AST  14  /  ALT  9   /  AlkPhos  124<H>  07-12    LIVER FUNCTIONS - ( 2021 04:27 )  Alb: 2.7 g/dL / Pro: 5.3 g/dL / ALK PHOS: 124 U/L / ALT: 9 U/L / AST: 14 U/L / GGT: x             TELEMETRY:  No events    EKG:      IMAGING:   PATIENT:  SOCORRO MANUEL  [id number]      CHIEF COMPLAINT:  Patient is a 71y old  Male who presents with a chief complaint of Right periprosthetic femur fracture (2021 06:39)      HPI:  71M w/PMHx of HTN, HLD, anemia, DM, Afib, JIMÉNEZ s/p liver transplant and right renal transplant at [facility] in 2020 (follows with his hepatologist Dr. Sunshine), CAD s/p stents (10 years ago) on ASA and Plavix, watchman procedure performed ~3 months ago by Dr. Burdick, history of bilateral knee replacements (21 years ago) presents after being sent in from outpatient office for finding of new right distal femur fracture. Patient tripped and fell while trying to get out of his car and felt right knee pain afterwards. He presented to an outpatient clinic for evaluation. XR at outpatient clinic demonstrated distal right femur fracture. He was placed in a knee immobilizer and instructed to present to the ED for further management. Patient is afebrile, HR 61, borderline BP of 93/52, saturating 99% on room air. On exam his right knee is immobilized, he has flexion and extension of the foot at the ankle joint, +DP and PT pulses, foot warm and well perfused. No other external signs of injury, denies pain anywhere else.      INTERVAL HISTORY/OVERNIGHT EVENTS:  Patient seen and evaluated at bedside. No new complaints. Received 1U pRBC yesterday, Hgb still 7.6, will transfuse another unit. HR controlled in 80s      MEDICATIONS:  MEDICATIONS  (STANDING):  aspirin enteric coated 325 milliGRAM(s) Oral daily  cefepime   IVPB 1000 milliGRAM(s) IV Intermittent every 12 hours  chlorhexidine 4% Liquid 1 Application(s) Topical daily  dextrose 40% Gel 15 Gram(s) Oral once  dextrose 50% Injectable 25 Gram(s) IV Push once  esMOLOL  Infusion 50 MICROgram(s)/kG/Min (27 mL/Hr) IV Continuous <Continuous>  glucagon  Injectable 1 milliGRAM(s) IntraMuscular once  hydrocortisone sodium succinate Injectable 100 milliGRAM(s) IV Push every 8 hours  insulin lispro (ADMELOG) corrective regimen sliding scale   SubCutaneous three times a day before meals  lactated ringers. 1000 milliLiter(s) (500 mL/Hr) IV Continuous <Continuous>  metoprolol tartrate 25 milliGRAM(s) Oral two times a day  pantoprazole    Tablet 40 milliGRAM(s) Oral before breakfast  senna 2 Tablet(s) Oral at bedtime  tacrolimus 3 milliGRAM(s) Oral every 12 hours  tamsulosin 0.4 milliGRAM(s) Oral at bedtime    MEDICATIONS  (PRN):  acetaminophen   Tablet .. 650 milliGRAM(s) Oral every 6 hours PRN Temp greater or equal to 38C (100.4F), Mild Pain (1 - 3)  oxyCODONE    IR 10 milliGRAM(s) Oral every 6 hours PRN Moderate Pain (4 - 6)      ALLERGIES:  Allergies    No Known Allergies    Intolerances        OBJECTIVE:  ICU Vital Signs Last 24 Hrs  T(C): 36.7 (2021 04:00), Max: 37.4 (2021 16:00)  T(F): 98 (2021 04:00), Max: 99.3 (2021 16:00)  HR: 84 (2021 07:00) (78 - 104)  BP: 125/81 (2021 07:00) (93/597 - 131/75)  BP(mean): 97 (2021 07:00) (73 - 103)  ABP: --  ABP(mean): --  RR: 24 (2021 07:00) (20 - 29)  SpO2: 98% (2021 07:00) (95% - 100%)      CAPILLARY BLOOD GLUCOSE      POCT Blood Glucose.: 144 mg/dL (2021 16:27)  POCT Blood Glucose.: 183 mg/dL (2021 10:59)  POCT Blood Glucose.: 151 mg/dL (2021 07:36)    CAPILLARY BLOOD GLUCOSE      POCT Blood Glucose.: 144 mg/dL (2021 16:27)    I&O's Summary    2021 07:01  -  2021 07:00  --------------------------------------------------------  IN: 1516 mL / OUT: 630 mL / NET: 886 mL      Daily     Daily Weight in k.5 (2021 06:00)    PHYSICAL EXAMINATION:  General: WN/WD NAD  HEENT: PERRLA, EOMI, moist mucous membranes  Neurology: A&Ox3, nonfocal, SANON x 4  Respiratory: CTA B/L, normal respiratory effort, no wheezes, crackles, rales  CV: RRR, S1S2, no murmurs, rubs or gallops  Abdominal: Soft, NT, ND +BS, Last BM  Extremities: No edema, + peripheral pulses    LABS:                          7.6    5.25  )-----------( 126      ( 2021 04:27 )             23.5     07-12    134<L>  |  106  |  26<H>  ----------------------------<  120<H>  4.5   |  20  |  1.1    Ca    8.2<L>      2021 04:27  Mg     1.8     07-12    TPro  5.3<L>  /  Alb  2.7<L>  /  TBili  1.1  /  DBili  x   /  AST  14  /  ALT  9   /  AlkPhos  124<H>  07-12    LIVER FUNCTIONS - ( 2021 04:27 )  Alb: 2.7 g/dL / Pro: 5.3 g/dL / ALK PHOS: 124 U/L / ALT: 9 U/L / AST: 14 U/L / GGT: x             TELEMETRY:  No events    EKG:  < from: 12 Lead ECG (21 @ 05:34) >  Diagnosis Line Atrial fibrillation  Abnormal ECG    < end of copied text >    IMAGING:

## 2021-07-12 NOTE — PROGRESS NOTE ADULT - ASSESSMENT
72 y/o M with PMHx JIMÉNEZ s/p liver and R renal transplant (Feb 2020 [place]), HTN, HLD, DM, CAD (s/p stents 10 yrs ago on ASA and Plavix), AFib (s/p Watchman 3 months ago) admitted for R distal femur fx. Had R femur ORIF by ortho, afterwards Hgb dropped to 6.9. CT showed hematoma in R vastus intermedius. Developed hypotension and went into AFib with RVR. Placed on levo, switched to valerio, now off pressors. Septic workup sent, started on cefepime. Started on diltiazem for AFib, switched to esmolol drip and digoxin, weaned off to PO metoprolol. S/p 2U pRBC. ID following, recommend watching off abx    Neuro: Currently AOx3. No acute issues.    CV: Afib RVR currently rate controlled. Off AC due to hematoma. Currently BP stable on esmolol gtt and off pressors.  #Afib s/p watchman now with RVR   - Likely d/t hemorrhagic shock  - S/p 45 days of Coumadin after Watchman 3 months ago  - Weaned off esmolol, d/c'd digoxin  - Today, HR stable in 80s-90s  - C/w metoprolol tartrate 25 mg PO BID    GI: Tolerating diet. Constipation.    Infectious:  #fever: unknown source, could be from the hematoma, f/u bcx and procalcitonin, c/w cefepime, trend wbc  - ID recs     - D/c cefepime    MSK:  #Acute comminuted fracture of the distal rt femur  - s/p Fixation (OR done 07/09/2021)  - Ortho following. Will need to further evaluate hematoma. Discuss with ortho regarding modality of imaging.   - PT eval.    Renal:  # S/p renal transplant (rt sided at [facility] in 02/2020)  -Nephro on board, following   - c/w Prograf 0.3mg PO BID   - nephro following; f/u recs.    Heme: Anemic likely 2/2 hematoma.  - Closely monitor Hb. Transfusion goal 7-8.  - Will need to further evaluate hematoma. Discuss with ortho regarding modality of imaging.  -f/u Hgb     Endocrine  #Hx of DM  - A1c 7/8: 5.3   - Home Januvia 100mg, holding  - Home  Linagliptin 5 mg QD, holding 72 y/o M with PMHx JIMÉNEZ s/p liver and R renal transplant (Feb 2020 [place]), HTN, HLD, DM, CAD (s/p stents 10 yrs ago on ASA and Plavix), AFib (s/p Watchman 3 months ago) admitted for R distal femur fx. Had R femur ORIF by ortho, afterwards Hgb dropped to 6.9. CT showed hematoma in R vastus intermedius. Developed hypotension and went into AFib with RVR. Placed on levo, switched to valerio, now off pressors. Septic workup sent, started on cefepime. Started on diltiazem for AFib, switched to esmolol drip and digoxin, weaned off to PO metoprolol. S/p 2U pRBC. ID following, recommend watching off abx    Neuro: Currently AOx3. No acute issues.    CV: Afib RVR currently rate controlled. Off AC due to hematoma. Currently BP stable on esmolol gtt and off pressors.  #Afib s/p watchman now with RVR   - Likely d/t hemorrhagic shock  - S/p 45 days of Coumadin after Watchman 3 months ago  - Weaned off esmolol, d/c'd digoxin  - Today, HR stable in 80s-90s  - C/w metoprolol tartrate 25 mg PO BID    GI: Tolerating diet. Constipation.    Infectious:  #Fever: unknown source, could be from the hematoma, f/u bcx and procalcitonin, c/w cefepime, trend wbc  - ID recs     - D/c cefepime  - BCx 7/9: NGTD  - UCx 7/9: NGTD  - Procal level in AM    MSK:  #Acute comminuted fracture of the distal rt femur  - s/p Fixation (OR done 07/09/2021)  - Ortho following for dressing changes   - PT/OT eval  - RLE NWB  - CTA abd/pelvis/RLE w/ IV contrast to evaluate for hematoma    Renal:  #S/p renal transplant (rt sided at [facility] in 02/2020)  - Nephro on board, following   - C/w Prograf 0.3mg PO BID   - Prograf levels tomorrow AM    Heme:  #Anemia likely 2/2 hematoma.  - Closely monitor Hb. Transfusion goal 7-8  - CTA abd/pelvis/RLE w/ IV contrast to evaluate for hematoma  - S/p 4U pRBC total  - Hgb today 7.6  - Transfuse 1U pRBC when patient returns from CT scan  - Trend H/H    Endocrine  #Hx of DM  - A1c 7/8: 5.3   - Home Januvia 100mg, holding  - Home  Linagliptin 5 mg QD, holding

## 2021-07-12 NOTE — PROGRESS NOTE ADULT - ASSESSMENT
Cards: Dr De Souza  EP Dr Burdick    71M with h/o of HTN, HLD, anemia, DM, Afib, JIMÉNEZ s/p liver transplant and right renal transplant at [facility] in February 2020 , CAD s/p stents (10 years ago) , watchman procedure performed ~3 months ago, on ASA and Plavix  by Dr. Burdick, history of bilateral knee replacements (21 years ago) admitted s/p mechanical fall now right distal fracture, s/p ORIF, now complicated by intra thigh hematoma  EP consulted for AF RVR, no controlled     AF with RVR now resolved  Anemia due to acute blood loss  periprosthetic femoral fracture, s/p ORIF  post op fever, afebrile    con't tele  currently HR in 70-80s bpm;   Off esmolol, on Metoprolol 25mg q12h, increase as tolerates as tolerates,    Anemia acute blood loss, appropriate response after 1u PRBC, however stil trending down, consider transfusing another unit. pt's baseline Hgb 10.5-11.5 (see april 2021 admission)  CT pending  Con't asa, resume plavix once Hgb stable

## 2021-07-12 NOTE — OCCUPATIONAL THERAPY INITIAL EVALUATION ADULT - GENERAL OBSERVATIONS, REHAB EVAL
Pt received semi may in bed in NAD, agreeable to OT evaluation, +tele, +BP cuff, +pulse oxi, left semi may in bed 2* to pt pending CT scan of knee and prefers to get OOB to chair after

## 2021-07-12 NOTE — PROGRESS NOTE ADULT - ASSESSMENT
ORTHO PROGRESS NOTE     71yMale POD #3 S/P right femur ORIF. S/p 2u pRBC yesterday for continuous dropping Hb. Pending CTA CAP today. Pain well controlled.     MEDICATIONS  (STANDING):  aspirin enteric coated 325 milliGRAM(s) Oral daily  cefepime   IVPB 1000 milliGRAM(s) IV Intermittent every 12 hours  dextrose 40% Gel 15 Gram(s) Oral once  dextrose 50% Injectable 25 Gram(s) IV Push once  diltiazem Infusion 5 mG/Hr (5 mL/Hr) IV Continuous <Continuous>  fludroCORTISONE 0.1 milliGRAM(s) Oral daily  glucagon  Injectable 1 milliGRAM(s) IntraMuscular once  heparin   Injectable 5000 Unit(s) SubCutaneous every 8 hours  insulin lispro (ADMELOG) corrective regimen sliding scale   SubCutaneous three times a day before meals  metoprolol tartrate 25 milliGRAM(s) Oral two times a day  norepinephrine Infusion 0.05 MICROgram(s)/kG/Min (8.44 mL/Hr) IV Continuous <Continuous>  pantoprazole    Tablet 40 milliGRAM(s) Oral before breakfast  senna 2 Tablet(s) Oral at bedtime  tacrolimus 3 milliGRAM(s) Oral every 12 hours  tamsulosin 0.4 milliGRAM(s) Oral at bedtime    MEDICATIONS  (PRN):  acetaminophen   Tablet .. 650 milliGRAM(s) Oral every 6 hours PRN Temp greater or equal to 38C (100.4F), Mild Pain (1 - 3)  oxyCODONE    IR 10 milliGRAM(s) Oral every 6 hours PRN Moderate Pain (4 - 6)      Vital Signs Last 24 Hrs  ICU Vital Signs Last 24 Hrs  T(C): 36.7 (12 Jul 2021 04:00), Max: 37.4 (11 Jul 2021 16:00)  T(F): 98 (12 Jul 2021 04:00), Max: 99.3 (11 Jul 2021 16:00)  HR: 84 (12 Jul 2021 07:00) (78 - 104)  BP: 125/81 (12 Jul 2021 07:00) (93/597 - 131/75)  BP(mean): 97 (12 Jul 2021 07:00) (73 - 103)  ABP: --  ABP(mean): --  RR: 24 (12 Jul 2021 07:00) (20 - 29)  SpO2: 98% (12 Jul 2021 07:00) (95% - 100%)    PE:   Dressing C/D/I - changed this AM  Incisions c/d/i w/ staples in place   Compartments soft and compressible  Some ecchymosis posteriorly   Motor intact distally  SILT distally  CR<2sec  palpable pulses                          7.6    5.25  )-----------( 126      ( 12 Jul 2021 04:27 )             23.5          A/P: 71yMale POD #3 S/P right femur ORIF.     OOB to Chair   NWB RLE  PT/OT  Pain control   Ext icing/elevation  Incentive Spirometry   DVT Prophylaxis per CCU - currently on ASA  Discharge planning

## 2021-07-12 NOTE — PROGRESS NOTE ADULT - SUBJECTIVE AND OBJECTIVE BOX
INTERVAL HPI/OVERNIGHT EVENTS:  s/p 1u PRBC 6.7->7.3->7.0 1u PRBC ->7.6  AF is now rate controlled       MEDICATIONS  (STANDING):  aspirin enteric coated 325 milliGRAM(s) Oral daily  chlorhexidine 4% Liquid 1 Application(s) Topical daily  dextrose 40% Gel 15 Gram(s) Oral once  dextrose 50% Injectable 25 Gram(s) IV Push once  glucagon  Injectable 1 milliGRAM(s) IntraMuscular once  hydrocortisone sodium succinate Injectable 100 milliGRAM(s) IV Push every 8 hours  insulin lispro (ADMELOG) corrective regimen sliding scale   SubCutaneous three times a day before meals  lactated ringers. 1000 milliLiter(s) (500 mL/Hr) IV Continuous <Continuous>  metoprolol tartrate 25 milliGRAM(s) Oral two times a day  pantoprazole    Tablet 40 milliGRAM(s) Oral before breakfast  senna 2 Tablet(s) Oral at bedtime  tacrolimus 3 milliGRAM(s) Oral every 12 hours  tamsulosin 0.4 milliGRAM(s) Oral at bedtime    MEDICATIONS  (PRN):  acetaminophen   Tablet .. 650 milliGRAM(s) Oral every 6 hours PRN Temp greater or equal to 38C (100.4F), Mild Pain (1 - 3)  oxyCODONE    IR 10 milliGRAM(s) Oral every 6 hours PRN Moderate Pain (4 - 6)      Allergies    No Known Allergies    Intolerances        REVIEW OF SYSTEMS    [x ] A ten-point review of systems was otherwise negative except as noted.  [ ] Due to altered mental status/intubation, subjective information were not able to be obtained from the patient. History was obtained, to the extent possible, from review of the chart and collateral sources of information.      Vital Signs Last 24 Hrs  T(C): 36.7 (12 Jul 2021 08:00), Max: 37.4 (11 Jul 2021 16:00)  T(F): 98.1 (12 Jul 2021 08:00), Max: 99.3 (11 Jul 2021 16:00)  HR: 88 (12 Jul 2021 11:00) (78 - 102)  BP: 141/76 (12 Jul 2021 11:00) (93/597 - 145/73)  BP(mean): 86 (12 Jul 2021 11:00) (75 - 105)  RR: 17 (12 Jul 2021 11:00) (17 - 29)  SpO2: 99% (12 Jul 2021 11:00) (95% - 100%)    07-11-21 @ 07:01  -  07-12-21 @ 07:00  --------------------------------------------------------  IN: 1516 mL / OUT: 630 mL / NET: 886 mL    07-12-21 @ 07:01  -  07-12-21 @ 11:49  --------------------------------------------------------  IN: 0 mL / OUT: 180 mL / NET: -180 mL        PHYSICAL EXAM:    GENERAL: In no apparent distress, well nourished, and hydrated.  HEART: Regular rate and rhythm; No murmur; NO rubs, or gallops.  PULMONARY: Clear to auscultation and percussion.  Normal expansion/effort. No rales, wheezing, or rhonchi bilaterally.  ABDOMEN: Soft, Nontender, Nondistended; Bowel sounds present  EXTREMITIES:  Extremities warm, pink, well-perfused, 2+ Peripheral Pulses, No clubbing, cyanosis, or edema  Rt LE soft, swollen, tender, good palpable pulses, hematoma at thigh incision site, no changes from yesterday exam   NEUROLOGICAL: alert & oriented x 3, no focal deficits, MAUREEN MARTÍNEZ    LABS:                        7.6    5.25  )-----------( 126      ( 12 Jul 2021 04:27 )             23.5     07-12    134<L>  |  106  |  26<H>  ----------------------------<  120<H>  4.5   |  20  |  1.1    Ca    8.2<L>      12 Jul 2021 04:27  Mg     1.8     07-12    TPro  5.3<L>  /  Alb  2.7<L>  /  TBili  1.1  /  DBili  x   /  AST  14  /  ALT  9   /  AlkPhos  124<H>  07-12          12 Lead ECG:   Ventricular Rate 81 BPM    Atrial Rate 267 BPM    QRS Duration 116 ms    Q-T Interval 370 ms    QTC Calculation(Bazett) 429 ms    R Axis 28 degrees    T Axis 8 degrees    Diagnosis Line Atrial fibrillation  Abnormal ECG    Confirmed by Ting Ocasio MD ([id number]) on 7/12/2021 9:27:39 AM (07-12-21 @ 05:34)      RADIOLOGY & ADDITIONAL TESTS:

## 2021-07-12 NOTE — PROGRESS NOTE ADULT - SUBJECTIVE AND OBJECTIVE BOX
NEPHROLOGY FOLLOW UP NOTE    pt seen and examined in ccu  cr stable  drop in h/h  s/p prbc transfusions  ortho input noted.    PAST MEDICAL & SURGICAL HISTORY:  HTN (hypertension)  High cholesterol  Anemia  Diabetes  Afib  Cirrhosis of liver  JIMÉNEZ  Dyspnea on exertion  BPH (benign prostatic hyperplasia)  Presence of bilateral total knee joint prostheses  15 years ago  S/P angioplasty with stent  2 cardiac stents &gt; 10 years back  Encounter for screening colonoscopy  1 year ago  Organ transplant  liver, right kidney 2020      Allergies:  No Known Allergies    Home Medications Reviewed    SOCIAL HISTORY:  Denies ETOH,Smoking,   FAMILY HISTORY:  Family history of early CAD  mother    FH: ovarian cancer  mother      REVIEW OF SYSTEMS:  CONSTITUTIONAL: No weakness, fevers or chills  EYES/ENT: No visual changes;  No vertigo or throat pain   NECK: No pain or stiffness  RESPIRATORY: No cough, wheezing, hemoptysis; No shortness of breath  CARDIOVASCULAR: No chest pain or palpitations.  GASTROINTESTINAL: No abdominal or epigastric pain. No nausea, vomiting, or hematemesis; No diarrhea or constipation. No melena or hematochezia.  GENITOURINARY: No dysuria, frequency, foamy urine, urinary urgency, incontinence or hematuria  NEUROLOGICAL: No numbness or weakness  SKIN: No itching, burning, rashes, or lesions   VASCULAR: No bilateral lower extremity edema.   All other review of systems is negative unless indicated above.    PHYSICAL EXAM:  Constitutional: NAD  HEENT: anicteric sclera, oropharynx clear, dry mm  Neck: No JVD  Respiratory: CTAB, no wheezes, rales or rhonchi  Cardiovascular: S1, S2, RRR  Gastrointestinal: BS+, soft, NT/ND + scar  Extremities: No cyanosis or clubbing. No peripheral edema + RLE edema  Neurological: A/O x 3, no focal deficits  Psychiatric: Normal mood, normal affect  : No CVA tenderness. No fole  Skin: No rashes    Hospital Medications:   MEDICATIONS  (STANDING):  aspirin enteric coated 325 milliGRAM(s) Oral daily  chlorhexidine 4% Liquid 1 Application(s) Topical daily  dextrose 40% Gel 15 Gram(s) Oral once  dextrose 50% Injectable 25 Gram(s) IV Push once  glucagon  Injectable 1 milliGRAM(s) IntraMuscular once  hydrocortisone sodium succinate Injectable 100 milliGRAM(s) IV Push every 8 hours  insulin lispro (ADMELOG) corrective regimen sliding scale   SubCutaneous three times a day before meals  lactated ringers. 1000 milliLiter(s) (500 mL/Hr) IV Continuous <Continuous>  metoprolol tartrate 25 milliGRAM(s) Oral two times a day  pantoprazole    Tablet 40 milliGRAM(s) Oral before breakfast  senna 2 Tablet(s) Oral at bedtime  tacrolimus 3 milliGRAM(s) Oral every 12 hours  tamsulosin 0.4 milliGRAM(s) Oral at bedtime        VITALS:  T(F): 97.9 (21 @ 12:00), Max: 98.4 (21 @ 18:00)  HR: 88 (21 @ 14:00)  BP: 128/75 (21 @ 14:00)  RR: 24 (21 @ 14:00)  SpO2: 97% (21 @ 14:00)  Wt(kg): --    07-10 @ 07:  -   @ 07:00  --------------------------------------------------------  IN: 2341.3 mL / OUT: 875 mL / NET: 1466.3 mL     @ 07:  -   @ 07:00  --------------------------------------------------------  IN: 1516 mL / OUT: 630 mL / NET: 886 mL     @ :  -   @ 16:29  --------------------------------------------------------  IN: 0 mL / OUT: 400 mL / NET: -400 mL          LABS:      134<L>  |  106  |  26<H>  ----------------------------<  120<H>  4.5   |  20  |  1.1    Ca    8.2<L>      2021 04:27  Mg     1.8         TPro  5.3<L>  /  Alb  2.7<L>  /  TBili  1.1  /  DBili      /  AST  14  /  ALT  9   /  AlkPhos  124<H>                            7.6    5.25  )-----------( 126      ( 2021 04:27 )             23.5       Urine Studies:  Urinalysis Basic - ( 2021 16:10 )    Color: Yellow / Appearance: Clear / S.031 / pH:   Gluc:  / Ketone: Small  / Bili: Negative / Urobili: <2 mg/dL   Blood:  / Protein: 30 mg/dL / Nitrite: Negative   Leuk Esterase: Negative / RBC: 5 /HPF / WBC 3 /HPF   Sq Epi:  / Non Sq Epi: 1 /HPF / Bacteria: Negative          RADIOLOGY & ADDITIONAL STUDIES:          RADIOLOGY & ADDITIONAL STUDIES:

## 2021-07-13 LAB
ALBUMIN SERPL ELPH-MCNC: 2.8 G/DL — LOW (ref 3.5–5.2)
ALP SERPL-CCNC: 133 U/L — HIGH (ref 30–115)
ALT FLD-CCNC: 11 U/L — SIGNIFICANT CHANGE UP (ref 0–41)
ANION GAP SERPL CALC-SCNC: 8 MMOL/L — SIGNIFICANT CHANGE UP (ref 7–14)
ANION GAP SERPL CALC-SCNC: 9 MMOL/L — SIGNIFICANT CHANGE UP (ref 7–14)
APTT BLD: 25.3 SEC — LOW (ref 27–39.2)
AST SERPL-CCNC: 14 U/L — SIGNIFICANT CHANGE UP (ref 0–41)
BASOPHILS # BLD AUTO: 0.01 K/UL — SIGNIFICANT CHANGE UP (ref 0–0.2)
BASOPHILS # BLD AUTO: 0.01 K/UL — SIGNIFICANT CHANGE UP (ref 0–0.2)
BASOPHILS NFR BLD AUTO: 0.2 % — SIGNIFICANT CHANGE UP (ref 0–1)
BASOPHILS NFR BLD AUTO: 0.2 % — SIGNIFICANT CHANGE UP (ref 0–1)
BILIRUB DIRECT SERPL-MCNC: 0.5 MG/DL — HIGH (ref 0–0.2)
BILIRUB INDIRECT FLD-MCNC: 0.9 MG/DL — SIGNIFICANT CHANGE UP (ref 0.2–1.2)
BILIRUB SERPL-MCNC: 1.4 MG/DL — HIGH (ref 0.2–1.2)
BILIRUB SERPL-MCNC: 1.4 MG/DL — HIGH (ref 0.2–1.2)
BUN SERPL-MCNC: 26 MG/DL — HIGH (ref 10–20)
BUN SERPL-MCNC: 28 MG/DL — HIGH (ref 10–20)
CALCIUM SERPL-MCNC: 8 MG/DL — LOW (ref 8.5–10.1)
CALCIUM SERPL-MCNC: 8 MG/DL — LOW (ref 8.5–10.1)
CHLORIDE SERPL-SCNC: 105 MMOL/L — SIGNIFICANT CHANGE UP (ref 98–110)
CHLORIDE SERPL-SCNC: 107 MMOL/L — SIGNIFICANT CHANGE UP (ref 98–110)
CO2 SERPL-SCNC: 21 MMOL/L — SIGNIFICANT CHANGE UP (ref 17–32)
CO2 SERPL-SCNC: 21 MMOL/L — SIGNIFICANT CHANGE UP (ref 17–32)
CREAT SERPL-MCNC: 1 MG/DL — SIGNIFICANT CHANGE UP (ref 0.7–1.5)
CREAT SERPL-MCNC: 1 MG/DL — SIGNIFICANT CHANGE UP (ref 0.7–1.5)
D DIMER BLD IA.RAPID-MCNC: 3157 NG/ML DDU — HIGH (ref 0–230)
DIR ANTIGLOB POLYSPECIFIC INTERPRETATION: SIGNIFICANT CHANGE UP
EOSINOPHIL # BLD AUTO: 0.01 K/UL — SIGNIFICANT CHANGE UP (ref 0–0.7)
EOSINOPHIL # BLD AUTO: 0.04 K/UL — SIGNIFICANT CHANGE UP (ref 0–0.7)
EOSINOPHIL NFR BLD AUTO: 0.2 % — SIGNIFICANT CHANGE UP (ref 0–8)
EOSINOPHIL NFR BLD AUTO: 0.9 % — SIGNIFICANT CHANGE UP (ref 0–8)
FIBRINOGEN PPP-MCNC: >700 MG/DL — HIGH (ref 204.4–570.6)
GLUCOSE BLDC GLUCOMTR-MCNC: 107 MG/DL — HIGH (ref 70–99)
GLUCOSE BLDC GLUCOMTR-MCNC: 121 MG/DL — HIGH (ref 70–99)
GLUCOSE BLDC GLUCOMTR-MCNC: 127 MG/DL — HIGH (ref 70–99)
GLUCOSE BLDC GLUCOMTR-MCNC: 153 MG/DL — HIGH (ref 70–99)
GLUCOSE SERPL-MCNC: 123 MG/DL — HIGH (ref 70–99)
GLUCOSE SERPL-MCNC: 129 MG/DL — HIGH (ref 70–99)
HCT VFR BLD CALC: 21 % — LOW (ref 42–52)
HCT VFR BLD CALC: 28.4 % — LOW (ref 42–52)
HCT VFR BLD CALC: 29.6 % — LOW (ref 42–52)
HGB BLD-MCNC: 7 G/DL — LOW (ref 14–18)
HGB BLD-MCNC: 9.4 G/DL — LOW (ref 14–18)
HGB BLD-MCNC: 9.8 G/DL — LOW (ref 14–18)
IMM GRANULOCYTES NFR BLD AUTO: 1.8 % — HIGH (ref 0.1–0.3)
IMM GRANULOCYTES NFR BLD AUTO: 2.8 % — HIGH (ref 0.1–0.3)
INR BLD: 1.1 RATIO — SIGNIFICANT CHANGE UP (ref 0.65–1.3)
LDH SERPL L TO P-CCNC: 160 U/L — SIGNIFICANT CHANGE UP (ref 50–242)
LYMPHOCYTES # BLD AUTO: 0.86 K/UL — LOW (ref 1.2–3.4)
LYMPHOCYTES # BLD AUTO: 0.91 K/UL — LOW (ref 1.2–3.4)
LYMPHOCYTES # BLD AUTO: 18.4 % — LOW (ref 20.5–51.1)
LYMPHOCYTES # BLD AUTO: 20.6 % — SIGNIFICANT CHANGE UP (ref 20.5–51.1)
MCHC RBC-ENTMCNC: 27.8 PG — SIGNIFICANT CHANGE UP (ref 27–31)
MCHC RBC-ENTMCNC: 27.9 PG — SIGNIFICANT CHANGE UP (ref 27–31)
MCHC RBC-ENTMCNC: 28.6 PG — SIGNIFICANT CHANGE UP (ref 27–31)
MCHC RBC-ENTMCNC: 33.1 G/DL — SIGNIFICANT CHANGE UP (ref 32–37)
MCHC RBC-ENTMCNC: 33.1 G/DL — SIGNIFICANT CHANGE UP (ref 32–37)
MCHC RBC-ENTMCNC: 33.3 G/DL — SIGNIFICANT CHANGE UP (ref 32–37)
MCV RBC AUTO: 84.1 FL — SIGNIFICANT CHANGE UP (ref 80–94)
MCV RBC AUTO: 84.3 FL — SIGNIFICANT CHANGE UP (ref 80–94)
MCV RBC AUTO: 85.7 FL — SIGNIFICANT CHANGE UP (ref 80–94)
MONOCYTES # BLD AUTO: 0.34 K/UL — SIGNIFICANT CHANGE UP (ref 0.1–0.6)
MONOCYTES # BLD AUTO: 0.51 K/UL — SIGNIFICANT CHANGE UP (ref 0.1–0.6)
MONOCYTES NFR BLD AUTO: 10.9 % — HIGH (ref 1.7–9.3)
MONOCYTES NFR BLD AUTO: 7.7 % — SIGNIFICANT CHANGE UP (ref 1.7–9.3)
NEUTROPHILS # BLD AUTO: 3.06 K/UL — SIGNIFICANT CHANGE UP (ref 1.4–6.5)
NEUTROPHILS # BLD AUTO: 3.13 K/UL — SIGNIFICANT CHANGE UP (ref 1.4–6.5)
NEUTROPHILS NFR BLD AUTO: 66.8 % — SIGNIFICANT CHANGE UP (ref 42.2–75.2)
NEUTROPHILS NFR BLD AUTO: 69.5 % — SIGNIFICANT CHANGE UP (ref 42.2–75.2)
NRBC # BLD: 0 /100 WBCS — SIGNIFICANT CHANGE UP (ref 0–0)
OB PNL STL: NEGATIVE — SIGNIFICANT CHANGE UP
PLATELET # BLD AUTO: 120 K/UL — LOW (ref 130–400)
PLATELET # BLD AUTO: 170 K/UL — SIGNIFICANT CHANGE UP (ref 130–400)
PLATELET # BLD AUTO: 191 K/UL — SIGNIFICANT CHANGE UP (ref 130–400)
POTASSIUM SERPL-MCNC: 3.6 MMOL/L — SIGNIFICANT CHANGE UP (ref 3.5–5)
POTASSIUM SERPL-MCNC: 4.3 MMOL/L — SIGNIFICANT CHANGE UP (ref 3.5–5)
POTASSIUM SERPL-SCNC: 3.6 MMOL/L — SIGNIFICANT CHANGE UP (ref 3.5–5)
POTASSIUM SERPL-SCNC: 4.3 MMOL/L — SIGNIFICANT CHANGE UP (ref 3.5–5)
PROCALCITONIN SERPL-MCNC: 0.56 NG/ML — HIGH (ref 0.02–0.1)
PROT SERPL-MCNC: 5.5 G/DL — LOW (ref 6–8)
PROTHROM AB SERPL-ACNC: 12.6 SEC — SIGNIFICANT CHANGE UP (ref 9.95–12.87)
RBC # BLD: 2.45 M/UL — LOW (ref 4.7–6.1)
RBC # BLD: 3.37 M/UL — LOW (ref 4.7–6.1)
RBC # BLD: 3.52 M/UL — LOW (ref 4.7–6.1)
RBC # BLD: 3.52 M/UL — LOW (ref 4.7–6.1)
RBC # FLD: 14.5 % — SIGNIFICANT CHANGE UP (ref 11.5–14.5)
RBC # FLD: 14.5 % — SIGNIFICANT CHANGE UP (ref 11.5–14.5)
RBC # FLD: 14.6 % — HIGH (ref 11.5–14.5)
RETICS #: 97.9 K/UL — SIGNIFICANT CHANGE UP (ref 25–125)
RETICS/RBC NFR: 2.8 % — HIGH (ref 0.5–1.5)
SODIUM SERPL-SCNC: 135 MMOL/L — SIGNIFICANT CHANGE UP (ref 135–146)
SODIUM SERPL-SCNC: 136 MMOL/L — SIGNIFICANT CHANGE UP (ref 135–146)
SURGICAL PATHOLOGY STUDY: SIGNIFICANT CHANGE UP
TACROLIMUS SERPL-MCNC: 10.8 NG/ML — SIGNIFICANT CHANGE UP
WBC # BLD: 3.21 K/UL — LOW (ref 4.8–10.8)
WBC # BLD: 4.41 K/UL — LOW (ref 4.8–10.8)
WBC # BLD: 4.68 K/UL — LOW (ref 4.8–10.8)
WBC # FLD AUTO: 3.21 K/UL — LOW (ref 4.8–10.8)
WBC # FLD AUTO: 4.41 K/UL — LOW (ref 4.8–10.8)
WBC # FLD AUTO: 4.68 K/UL — LOW (ref 4.8–10.8)

## 2021-07-13 PROCEDURE — 99232 SBSQ HOSP IP/OBS MODERATE 35: CPT

## 2021-07-13 PROCEDURE — 99222 1ST HOSP IP/OBS MODERATE 55: CPT

## 2021-07-13 PROCEDURE — 93970 EXTREMITY STUDY: CPT | Mod: 26

## 2021-07-13 RX ORDER — PANTOPRAZOLE SODIUM 20 MG/1
40 TABLET, DELAYED RELEASE ORAL EVERY 12 HOURS
Refills: 0 | Status: DISCONTINUED | OUTPATIENT
Start: 2021-07-13 | End: 2021-07-14

## 2021-07-13 RX ORDER — FLUDROCORTISONE ACETATE 0.1 MG/1
0.1 TABLET ORAL DAILY
Refills: 0 | Status: CANCELLED | OUTPATIENT
Start: 2021-07-15 | End: 2021-07-14

## 2021-07-13 RX ORDER — MORPHINE SULFATE 50 MG/1
4 CAPSULE, EXTENDED RELEASE ORAL ONCE
Refills: 0 | Status: DISCONTINUED | OUTPATIENT
Start: 2021-07-13 | End: 2021-07-13

## 2021-07-13 RX ORDER — HYDROCORTISONE 20 MG
50 TABLET ORAL EVERY 12 HOURS
Refills: 0 | Status: DISCONTINUED | OUTPATIENT
Start: 2021-07-13 | End: 2021-07-13

## 2021-07-13 RX ORDER — HYDROCORTISONE 20 MG
25 TABLET ORAL EVERY 12 HOURS
Refills: 0 | Status: DISCONTINUED | OUTPATIENT
Start: 2021-07-14 | End: 2021-07-14

## 2021-07-13 RX ORDER — HYDROCORTISONE 20 MG
50 TABLET ORAL EVERY 12 HOURS
Refills: 0 | Status: COMPLETED | OUTPATIENT
Start: 2021-07-13 | End: 2021-07-14

## 2021-07-13 RX ADMIN — MORPHINE SULFATE 4 MILLIGRAM(S): 50 CAPSULE, EXTENDED RELEASE ORAL at 21:42

## 2021-07-13 RX ADMIN — SENNA PLUS 2 TABLET(S): 8.6 TABLET ORAL at 20:53

## 2021-07-13 RX ADMIN — MORPHINE SULFATE 4 MILLIGRAM(S): 50 CAPSULE, EXTENDED RELEASE ORAL at 22:25

## 2021-07-13 RX ADMIN — Medication 100 MILLIGRAM(S): at 05:14

## 2021-07-13 RX ADMIN — OXYCODONE HYDROCHLORIDE 10 MILLIGRAM(S): 5 TABLET ORAL at 19:54

## 2021-07-13 RX ADMIN — Medication 25 MILLIGRAM(S): at 05:15

## 2021-07-13 RX ADMIN — Medication 325 MILLIGRAM(S): at 11:57

## 2021-07-13 RX ADMIN — Medication 25 MILLIGRAM(S): at 17:49

## 2021-07-13 RX ADMIN — Medication 50 MILLIGRAM(S): at 17:48

## 2021-07-13 RX ADMIN — TAMSULOSIN HYDROCHLORIDE 0.4 MILLIGRAM(S): 0.4 CAPSULE ORAL at 20:53

## 2021-07-13 RX ADMIN — PANTOPRAZOLE SODIUM 40 MILLIGRAM(S): 20 TABLET, DELAYED RELEASE ORAL at 05:13

## 2021-07-13 RX ADMIN — TACROLIMUS 3 MILLIGRAM(S): 5 CAPSULE ORAL at 17:49

## 2021-07-13 RX ADMIN — OXYCODONE HYDROCHLORIDE 10 MILLIGRAM(S): 5 TABLET ORAL at 10:45

## 2021-07-13 RX ADMIN — Medication 2: at 11:57

## 2021-07-13 RX ADMIN — CHLORHEXIDINE GLUCONATE 1 APPLICATION(S): 213 SOLUTION TOPICAL at 11:58

## 2021-07-13 RX ADMIN — PANTOPRAZOLE SODIUM 40 MILLIGRAM(S): 20 TABLET, DELAYED RELEASE ORAL at 17:48

## 2021-07-13 RX ADMIN — OXYCODONE HYDROCHLORIDE 10 MILLIGRAM(S): 5 TABLET ORAL at 11:30

## 2021-07-13 RX ADMIN — OXYCODONE HYDROCHLORIDE 10 MILLIGRAM(S): 5 TABLET ORAL at 20:05

## 2021-07-13 RX ADMIN — TACROLIMUS 3 MILLIGRAM(S): 5 CAPSULE ORAL at 05:15

## 2021-07-13 NOTE — PHYSICAL THERAPY INITIAL EVALUATION ADULT - DID THE PATIENT HAVE SURGERY?
Periprosthetic distal femur fracture, retained TKA prosthesis; ORIF, fracture, femur, periprosthetic/yes

## 2021-07-13 NOTE — CONSULT NOTE ADULT - SUBJECTIVE AND OBJECTIVE BOX
INTERVENTIONAL RADIOLOGY CONSULT:     Procedure Requested: RLE angiogram    HPI:  71M w/PMHx of HTN, HLD, anemia, DM, Afib, JIMÉNEZ s/p liver transplant and right renal transplant at [facility] in February 2020 (follows with his hepatologist Dr. Sunshine), CAD s/p stents (10 years ago) on ASA and Plavix, watchman procedure performed ~3 months ago by Dr. Burdick, history of bilateral knee replacements (21 years ago) presents after being sent in from outpatient office for finding of new right distal femur fracture. Patient tripped and fell while trying to get out of his car and felt right knee pain afterwards. He presented to an outpatient clinic for evaluation. XR at outpatient clinic demonstrated distal right femur fracture. He was placed in a knee immobilizer and instructed to present to the ED for further management. Patient is afebrile, HR 61, borderline BP of 93/52, saturating 99% on room air. On exam his right knee is immobilized, he has flexion and extension of the foot at the ankle joint, +DP and PT pulses, foot warm and well perfused. No other external signs of injury, denies pain anywhere else.    (07 Jul 2021 21:21)      PAST MEDICAL & SURGICAL HISTORY:  HTN (hypertension)    High cholesterol    Anemia    Diabetes    Afib    Cirrhosis of liver  JIMÉNEZ    Dyspnea on exertion    BPH (benign prostatic hyperplasia)    Presence of bilateral total knee joint prostheses  15 years ago    S/P angioplasty with stent  2 cardiac stents &gt; 10 years back    Encounter for screening colonoscopy  1 year ago    Organ transplant  liver, 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        MEDICATIONS  (STANDING):  aspirin enteric coated 325 milliGRAM(s) Oral daily  chlorhexidine 4% Liquid 1 Application(s) Topical daily  dextrose 40% Gel 15 Gram(s) Oral once  dextrose 50% Injectable 25 Gram(s) IV Push once  glucagon  Injectable 1 milliGRAM(s) IntraMuscular once  hydrocortisone sodium succinate Injectable 50 milliGRAM(s) IV Push every 12 hours  insulin lispro (ADMELOG) corrective regimen sliding scale   SubCutaneous three times a day before meals  metoprolol tartrate 25 milliGRAM(s) Oral two times a day  pantoprazole  Injectable 40 milliGRAM(s) IV Push every 12 hours  senna 2 Tablet(s) Oral at bedtime  tacrolimus 3 milliGRAM(s) Oral every 12 hours  tamsulosin 0.4 milliGRAM(s) Oral at bedtime    MEDICATIONS  (PRN):  acetaminophen   Tablet .. 650 milliGRAM(s) Oral every 6 hours PRN Temp greater or equal to 38C (100.4F), Mild Pain (1 - 3)  oxyCODONE    IR 10 milliGRAM(s) Oral every 6 hours PRN Moderate Pain (4 - 6)      Allergies    No Known Allergies      Physical Exam:   Vital Signs Last 24 Hrs  T(C): 36.6 (13 Jul 2021 12:00), Max: 37.2 (13 Jul 2021 05:00)  T(F): 97.8 (13 Jul 2021 12:00), Max: 98.9 (13 Jul 2021 05:00)  HR: 78 (13 Jul 2021 14:00) (76 - 112)  BP: 139/80 (13 Jul 2021 14:00) (104/81 - 164/95)  BP(mean): 100 (13 Jul 2021 14:00) (84 - 136)  RR: 25 (13 Jul 2021 14:00) (18 - 39)  SpO2: 99% (13 Jul 2021 14:00) (97% - 100%)    Labs:                         9.8    4.41  )-----------( 170      ( 13 Jul 2021 11:00 )             29.6     07-13    135  |  105  |  28<H>  ----------------------------<  129<H>  4.3   |  21  |  1.0    Ca    8.0<L>      13 Jul 2021 04:30  Mg     1.8     07-12    TPro  x   /  Alb  x   /  TBili  1.4<H>  /  DBili  0.5<H>  /  AST  x   /  ALT  x   /  AlkPhos  x   07-13        Pertinent labs:                      9.8    4.41  )-----------( 170      ( 13 Jul 2021 11:00 )             29.6       07-13    135  |  105  |  28<H>  ----------------------------<  129<H>  4.3   |  21  |  1.0    Ca    8.0<L>      13 Jul 2021 04:30  Mg     1.8     07-12    TPro  x   /  Alb  x   /  TBili  1.4<H>  /  DBili  0.5<H>  /  AST  x   /  ALT  x   /  AlkPhos  x   07-13      Radiology imaging reviewed.     ASSESSMENT/ PLAN:   Continued downtrending Hgb. Per Orthopedic surgery evaluation, low concern for active bleeding within RLE. RLE remains soft, compressible, without increased swelling.  - CTA of RLE canceled by vascular surgery  - Continue PRN transfusions  - Hold IR diagnostic angiogram at this time given low concern for active RLE hemorrhage and unclear source.    Thank you for the courtesy of this consult, please call [phone] with any further questions.

## 2021-07-13 NOTE — PROGRESS NOTE ADULT - ASSESSMENT
ORTHO PROGRESS NOTE     71yMale POD #4 S/P right femur ORIF. Obtained CTA yesterday - PE found. Hb continuing to drop. Minimal pain in RLE.     MEDICATIONS  (STANDING):  aspirin enteric coated 325 milliGRAM(s) Oral daily  cefepime   IVPB 1000 milliGRAM(s) IV Intermittent every 12 hours  dextrose 40% Gel 15 Gram(s) Oral once  dextrose 50% Injectable 25 Gram(s) IV Push once  diltiazem Infusion 5 mG/Hr (5 mL/Hr) IV Continuous <Continuous>  fludroCORTISONE 0.1 milliGRAM(s) Oral daily  glucagon  Injectable 1 milliGRAM(s) IntraMuscular once  heparin   Injectable 5000 Unit(s) SubCutaneous every 8 hours  insulin lispro (ADMELOG) corrective regimen sliding scale   SubCutaneous three times a day before meals  metoprolol tartrate 25 milliGRAM(s) Oral two times a day  norepinephrine Infusion 0.05 MICROgram(s)/kG/Min (8.44 mL/Hr) IV Continuous <Continuous>  pantoprazole    Tablet 40 milliGRAM(s) Oral before breakfast  senna 2 Tablet(s) Oral at bedtime  tacrolimus 3 milliGRAM(s) Oral every 12 hours  tamsulosin 0.4 milliGRAM(s) Oral at bedtime    MEDICATIONS  (PRN):  acetaminophen   Tablet .. 650 milliGRAM(s) Oral every 6 hours PRN Temp greater or equal to 38C (100.4F), Mild Pain (1 - 3)  oxyCODONE    IR 10 milliGRAM(s) Oral every 6 hours PRN Moderate Pain (4 - 6)      Vital Signs Last 24 Hrs  ICU Vital Signs Last 24 Hrs  T(C): 36.9 (13 Jul 2021 08:00), Max: 37.2 (13 Jul 2021 05:00)  T(F): 98.4 (13 Jul 2021 08:00), Max: 98.9 (13 Jul 2021 05:00)  HR: 80 (13 Jul 2021 08:00) (76 - 112)  BP: 164/95 (13 Jul 2021 08:00) (104/81 - 164/95)  BP(mean): 136 (13 Jul 2021 08:00) (84 - 136)  ABP: --  ABP(mean): --  RR: 24 (13 Jul 2021 08:00) (17 - 39)  SpO2: 99% (13 Jul 2021 08:00) (97% - 99%)    PE:   Dressing C/D/I - changed this AM  Incisions c/d/i w/ staples in place   Compartments soft and compressible  Some ecchymosis posteriorly - unchanged from yesterday  Motor intact distally  SILT distally  CR<2sec  palpable pulses                          7.0    3.21  )-----------( 120      ( 13 Jul 2021 04:30 )             21.0            A/P: 71yMale POD #4 S/P right femur ORIF.     OOB to Chair   NWB RLE  PT/OT  Pain control   Ext icing/elevation  Incentive Spirometry   DVT Prophylaxis per CCU - currently on ASA  Patient continues to drop Hb. R thigh unlikely as source of bleeding given that patient is now POD4 with stable fixation of his R femur. In addition, his thigh remains soft/compressible without significant pain and his swelling is not worsening

## 2021-07-13 NOTE — PROGRESS NOTE ADULT - SUBJECTIVE AND OBJECTIVE BOX
Patient is a 71y old  Male who presents with a chief complaint of Right periprosthetic femur fracture (12 Jul 2021 16:26)        HPI:  71M w/PMHx of HTN, HLD, anemia, DM, Afib, JIMÉNEZ s/p liver transplant and right renal transplant at [facility] in February 2020 (follows with his hepatologist Dr. Sunshine), CAD s/p stents (10 years ago) on ASA and Plavix, watchman procedure performed ~3 months ago by Dr. Burdick, history of bilateral knee replacements (21 years ago) presents after being sent in from outpatient office for finding of new right distal femur fracture. Patient tripped and fell while trying to get out of his car and felt right knee pain afterwards. He presented to an outpatient clinic for evaluation. XR at outpatient clinic demonstrated distal right femur fracture. He was placed in a knee immobilizer and instructed to present to the ED for further management. Patient is afebrile, HR 61, borderline BP of 93/52, saturating 99% on room air. On exam his right knee is immobilized, he has flexion and extension of the foot at the ankle joint, +DP and PT pulses, foot warm and well perfused. No other external signs of injury, denies pain anywhere else.    (07 Jul 2021 21:21)      Pt evaluated on rounds.  I reviewed the radiology tests and hospital record.    I reviewed my previous notes on this patient.    Interval Events: Continue slow bleed overnight.     REVIEW OF SYSTEMS:   see HPI      OBJECTIVE:  ICU Vital Signs Last 24 Hrs  T(C): 37.2 (13 Jul 2021 05:00), Max: 37.2 (13 Jul 2021 05:00)  T(F): 98.9 (13 Jul 2021 05:00), Max: 98.9 (13 Jul 2021 05:00)  HR: 80 (13 Jul 2021 05:00) (76 - 112)  BP: 149/89 (13 Jul 2021 05:00) (104/81 - 158/95)  BP(mean): 114 (13 Jul 2021 05:00) (84 - 125)    RR: 20 (13 Jul 2021 05:00) (17 - 39)  SpO2: 99% (13 Jul 2021 05:00) (97% - 99%)        07-11 @ 07:01  -  07-12 @ 07:00  --------------------------------------------------------  IN: 1516 mL / OUT: 630 mL / NET: 886 mL    07-12 @ 07:01  - 07-13 @ 06:39  --------------------------------------------------------  IN: 1183 mL / OUT: 1070 mL / NET: 113 mL      CAPILLARY BLOOD GLUCOSE      POCT Blood Glucose.: 152 mg/dL (12 Jul 2021 22:33)        PHYSICAL EXAM:     · CONSTITUTIONAL:   not septic appearing,   well nourished,   NAD    · ENMT:   Airway patent,   Nasal mucosa clear.  Mouth with normal mucosa.   No thrush    · EYES:   Clear bilaterally,   pupils equal,   round and reactive to light.    · CARDIAC:   Normal rate,   regular rhythm.    Heart sounds S1, S2.   No murmurs, no rubs or gallops on auscultation  no edema        CAROTID:   normal systolic impulse  no bruits    · RESPIRATORY:   rales  normal chest expansion  no retractions or use of accessory muscles  palpation of chest is normal with no fremitus  percussion of chest demonstrates no hyperresonance or dullness    · GASTROINTESTINAL:  Abdomen soft,   non-tender,   + BS  liver/spleen not palpable    · MUSCULOSKELETAL:   no clubbing, cyanosis      · SKIN:   Skin normal color for race,   warm, dry   No evidence of rash.        · HEME LYMPH:   no splenomegaly.  No cervical  lymphadenopathy.  no inguinal lymphadenopathy    HOSPITAL MEDICATIONS:  MEDICATIONS  (STANDING):  aspirin enteric coated 325 milliGRAM(s) Oral daily  chlorhexidine 4% Liquid 1 Application(s) Topical daily  dextrose 40% Gel 15 Gram(s) Oral once  dextrose 50% Injectable 25 Gram(s) IV Push once  glucagon  Injectable 1 milliGRAM(s) IntraMuscular once  hydrocortisone sodium succinate Injectable 100 milliGRAM(s) IV Push every 8 hours  insulin lispro (ADMELOG) corrective regimen sliding scale   SubCutaneous three times a day before meals  metoprolol tartrate 25 milliGRAM(s) Oral two times a day  pantoprazole    Tablet 40 milliGRAM(s) Oral before breakfast  senna 2 Tablet(s) Oral at bedtime  tacrolimus 3 milliGRAM(s) Oral every 12 hours  tamsulosin 0.4 milliGRAM(s) Oral at bedtime    MEDICATIONS  (PRN):  acetaminophen   Tablet .. 650 milliGRAM(s) Oral every 6 hours PRN Temp greater or equal to 38C (100.4F), Mild Pain (1 - 3)  oxyCODONE    IR 10 milliGRAM(s) Oral every 6 hours PRN Moderate Pain (4 - 6)    sodium chloride 0.9% Bolus:   500 milliLiter(s), IV Bolus, once, infuse over 30 Minute(s), Stop After 1 Doses  lactated ringers.: Solution, 1000 milliLiter(s) infuse at 500 mL/Hr  sodium chloride 0.9% Bolus:   1500 milliLiter(s), IV Bolus, once, infuse over 30 Minute(s), Stop After 1 Doses  lactated ringers Bolus:   1000 milliLiter(s), IV Bolus, once, infuse over 30 Minute(s), Stop After 1 Doses  sodium chloride 0.9% Bolus:   250 milliLiter(s), IV Bolus, once, infuse over 15 Minute(s), Stop After 1 Doses  lactated ringers Bolus:   500 milliLiter(s), IV Bolus, once, infuse over 60 Minute(s), Stop After 1 Doses  lactated ringers.: Solution, 1000 milliLiter(s) infuse at 100 mL/Hr  lactated ringers.: Solution, 1000 milliLiter(s) infuse at 75 mL/Hr  Provider's Contact #: [phone]      LABS:                        7.0    3.21  )-----------( 120      ( 13 Jul 2021 04:30 )             21.0     07-13    135  |  105  |  28<H>  ----------------------------<  129<H>  4.3   |  21  |  1.0    Ca    8.0<L>      13 Jul 2021 04:30  Mg     1.8     07-12    TPro  5.5<L>  /  Alb  2.8<L>  /  TBili  1.4<H>  /  DBili  x   /  AST  14  /  ALT  11  /  AlkPhos  133<H>  07-13                COVID-19 PCR: Claudia (07 Jul 2021 14:25)              RADIOLOGY: Today I personally interpreted the latest CXR and other pertinent films.

## 2021-07-13 NOTE — PHYSICAL THERAPY INITIAL EVALUATION ADULT - BED MOBILITY TRAINING, PT EVAL
Pt will participate in supine to sit and reverse using siderails with CGA by discharge to facilitate return to PLOF.

## 2021-07-13 NOTE — PHYSICAL THERAPY INITIAL EVALUATION ADULT - GENERAL OBSERVATIONS, REHAB EVAL
Pt is currently with no activity orders, called Resident x [phone], Pt will be transferred to ICU, Hold PT evaluation for now. PT will follow up.
Pt was approached for PT IE. HGH: 7.6. spoke to residentr regarding OOB orders, Pt cleared for OOB. Pt is NWB over R LE. however, Pt is current going for CT scan. PT will follow up.
10:45-11:30. chart reviewed. Pt received semi-may at B/S, alert, oriented, able to follow multi-step instructions and agreeable to PT evaluation.  family at B/S, + IV, + monitoring, denies pain or discomfort, VSS, Hgh:7,, recent PE. on RA

## 2021-07-13 NOTE — CONSULT NOTE ADULT - ASSESSMENT
ASSESSMENT:  71M w/PMHx of HTN, HLD, anemia, DM, Afib, JIMÉNEZ s/p liver transplant and right renal transplant at [facility] in February 2020 (follows with his hepatologist Dr. Sunshine), CAD s/p stents (10 years ago) on ASA and Plavix, watchman procedure performed ~3 months ago by Dr. Burdick, history of bilateral knee replacements presented with right distal femur fracture, he is now s/p right femur ORIF on 7/9. post op patient has had a drop in hemoglobin and received 6 units of PRBC. He was also found to have a right lower lobe PE. Medical team called for evaluation for IVC filter placement    PLAN:  - vascular surgery will plan for IVC filter placement on 7/14   - NPO after midnight , IVF  - follow up blood work, replete electrolytes as needed    Above plan discussed vascular fellow Dr Chinchilla , patient, patient family, and Primary team  07-13-21 @ 23:56 ASSESSMENT:  71M w/PMHx of HTN, HLD, anemia, DM, Afib, JIMÉNEZ s/p liver transplant and right renal transplant at [facility] in February 2020 (follows with his hepatologist Dr. Sunshine), CAD s/p stents (10 years ago) on ASA and Plavix, watchman procedure performed ~3 months ago by Dr. Burdick, history of bilateral knee replacements presented with right distal femur fracture, he is now s/p right femur ORIF on 7/9. post op patient has had a drop in hemoglobin and received 6 units of PRBC. He was also found to have a right lower lobe PE.  venous duplex shows left popliteal, post tibial and gastrocnemius DVTs. vascular surgery consulted for evaluation for IVC filter placement    PLAN:  - vascular surgery will plan for IVC filter placement on 7/14   - NPO after midnight , IVF  - follow up blood work, replete electrolytes as needed    Above plan discussed vascular fellow Dr Chinchilla , patient, patient family, and Primary team  07-13-21 @ 23:56

## 2021-07-13 NOTE — PROGRESS NOTE ADULT - ASSESSMENT
70 y/o M with PMHx JIMÉNEZ s/p liver and R renal transplant (Feb 2020 [place]), HTN, HLD, DM, CAD (s/p stents 10 yrs ago on ASA and Plavix), AFib (s/p Watchman 3 months ago) admitted for R distal femur fx. Had R femur ORIF by ortho, afterwards Hgb dropped to 6.9. CT showed hematoma in R vastus intermedius. Developed hypotension and went into AFib with RVR. Placed on levo, switched to valerio, now off pressors. Septic workup sent, started on cefepime. Started on diltiazem for AFib, switched to esmolol drip and digoxin, weaned off to PO metoprolol. S/p 2U pRBC. ID following, recommend watching off abx    Neuro: Currently AOx3. No acute issues.    CV: Afib RVR currently rate controlled. Off AC due to hematoma. Currently BP stable on esmolol gtt and off pressors.  #Afib s/p watchman now with RVR   - Likely d/t hemorrhagic shock  - S/p 45 days of Coumadin after Watchman 3 months ago  - Weaned off esmolol, d/c'd digoxin  - Today, HR stable in 80s-90s  - C/w metoprolol tartrate 25 mg PO BID    GI: Tolerating diet. Constipation.    Infectious:  #Fever: unknown source, possibly d/t hematoma -- resolved  - No longer febrile  - BCx 7/9: NGTD  - UCx 7/9: NGTD  - Procal 7/10 neg    MSK:  #Acute comminuted fracture of the distal rt femur  - s/p Fixation (OR done 07/09/2021)  - Ortho following for dressing changes   - PT/OT eval  - RLE NWB  - CTA abd/pelvis/RLE w/ IV contrast to evaluate for hematoma    Renal:  #S/p renal transplant (rt sided at [facility] in 02/2020)  - Nephro on board, following   - C/w Prograf 0.3mg PO BID   - Prograf levels tomorrow AM    Heme:  #Anemia likely 2/2 hematoma.  - Closely monitor Hb. Transfusion goal 7-8  - CTA abd/pelvis/RLE w/ IV contrast to evaluate for hematoma  - S/p 4U pRBC total  - Hgb today 7.6  - Transfuse 1U pRBC when patient returns from CT scan  - Trend H/H    Endocrine  #Hx of DM  - A1c 7/8: 5.3   - Home Januvia 100mg, holding  - Home  Linagliptin 5 mg QD, holding     72 y/o M with PMHx JIMÉNEZ s/p liver and R renal transplant (Feb 2020 [place]), HTN, HLD, DM, CAD (s/p stents 10 yrs ago on ASA and Plavix), AFib (s/p Watchman 3 months ago) admitted for R distal femur fx. Had R femur ORIF by ortho, afterwards Hgb dropped to 6.9. CT showed hematoma in R vastus intermedius. Developed hypotension and went into AFib with RVR. Placed on levo, switched to valerio, now off pressors. Septic workup sent, started on cefepime. Started on diltiazem for AFib, switched to esmolol drip and digoxin, weaned off to PO metoprolol. S/p 2U pRBC. ID following, recommend watching off abx    Neuro: Currently AOx3. No acute issues.    CV: Afib RVR currently rate controlled. Off AC due to hematoma. Currently BP stable on esmolol gtt and off pressors.  #Afib s/p watchman now with RVR  - Likely d/t hemorrhagic shock  - S/p 45 days of Coumadin after Watchman 3 months ago  - Weaned off esmolol, d/c'd digoxin  - Today, HR stable in 80s-90s, still in AFib  - C/w metoprolol tartrate 25 mg PO BID    Pulm  #Incidental RLL subsegmental PE  - CTA A/P showed incidental RLL subsegmental PE  - Asx currently  - Cannot be on AC d/t active bleeding  - Duplex ordered to evaluate for DVT, may need IVC filter    GI: Tolerating diet. Constipation.    Infectious:  #Fever: unknown source, possibly d/t hematoma -- resolved  - No longer febrile  - BCx 7/9: NGTD  - UCx 7/9: NGTD  - Procal 7/10 neg    MSK:  #Acute comminuted fracture of the distal rt femur  - s/p Fixation (OR done 07/09/2021)  - Ortho following for dressing changes   - PT/OT eval  - RLE NWB  - CTA RLE not able to be done d/t hardware which would cause artifact  - IR consult to see if angiogram or other studies can be done    Renal:  #S/p renal transplant (rt sided at [facility] in 02/2020)  - Nephro on board, following   - Prograf levels therapeutic  - C/w Prograf  - Nephro recs     - Hydrocortisone taper: 50 mg IV q12h for 24 hours --> 25 mg IV q12h --> d/c hydrocortisone and c/w florinef 0.1 mg PO daily     - C/w Florinef 0.1 mg PO daily    Heme:  #Anemia likely 2/2 hematoma.  - Closely monitor Hb. Transfusion goal 7-8  - S/p 5U pRBC total  - Hgb today 7.0  - Transfuse 1U pRBC  - F/u 11 AM CBC    Endocrine  #Hx of DM  - A1c 7/8: 5.3   - Home Januvia 100mg, holding  - Home  Linagliptin 5 mg QD, holding     70 y/o M with PMHx JIMÉNEZ s/p liver and R renal transplant (Feb 2020 [place]), HTN, HLD, DM, CAD (s/p stents 10 yrs ago on ASA and Plavix), AFib (s/p Watchman 3 months ago) admitted for R distal femur fx. Had R femur ORIF by ortho, afterwards Hgb dropped to 6.9. CT showed hematoma in R vastus intermedius. Developed hypotension and went into AFib with RVR. Placed on levo, switched to valerio, now off pressors. Septic workup sent, started on cefepime. Started on diltiazem for AFib, switched to esmolol drip and digoxin, weaned off to PO metoprolol. S/p 2U pRBC. ID following, recommend watching off abx    Neuro: Currently AOx3. No acute issues.    CV: Afib RVR currently rate controlled. Off AC due to hematoma. Currently BP stable on esmolol gtt and off pressors.  #Afib s/p watchman now with RVR  - Likely d/t hemorrhagic shock  - S/p 45 days of Coumadin after Watchman 3 months ago  - Weaned off esmolol, d/c'd digoxin  - Today, HR stable in 80s-90s, still in AFib  - C/w metoprolol tartrate 25 mg PO BID    Pulm  #Incidental RLL subsegmental PE  - CTA A/P showed incidental RLL subsegmental PE  - Asx currently  - Cannot be on AC d/t active bleeding  - Duplex ordered to evaluate for DVT, may need IVC filter    GI: Tolerating diet. Constipation.    Infectious:  #Fever: unknown source, possibly d/t hematoma -- resolved  - No longer febrile  - BCx 7/9: NGTD  - UCx 7/9: NGTD  - Procal 7/10 neg    MSK:  #Acute comminuted fracture of the distal rt femur  - s/p Fixation (OR done 07/09/2021)  - Ortho following for dressing changes   - PT/OT eval  - RLE NWB  - CTA RLE not able to be done d/t hardware which would cause artifact  - IR consult to see if angiogram or other studies can be done    Renal:  #S/p renal transplant (rt sided at [facility] in 02/2020)  - Nephro on board, following   - Prograf levels therapeutic  - C/w Prograf  - Nephro recs     - Hydrocortisone taper: 50 mg IV q12h for 24 hours --> 25 mg IV q12h --> d/c hydrocortisone and c/w florinef 0.1 mg PO daily     - C/w Florinef 0.1 mg PO daily    Heme:  #Anemia likely 2/2 hematoma.  - Closely monitor Hb. Transfusion goal 7-8  - S/p 5U pRBC total  - Hgb today 7.0  - 1U pRBC transfused, 11AM Hgb improved to 9.6  - Trend H/H    Endocrine  #Hx of DM  - A1c 7/8: 5.3   - Home Januvia 100mg, holding  - Home  Linagliptin 5 mg QD, holding

## 2021-07-13 NOTE — PROGRESS NOTE ADULT - SUBJECTIVE AND OBJECTIVE BOX
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  15 years ago  S/P angioplasty with stent  2 cardiac stents &gt; 10 years back  Encounter for screening colonoscopy  1 year ago  Organ transplant  liver, right kidney 2020      Allergies:  No Known Allergies    Home Medications Reviewed    SOCIAL HISTORY:  Denies ETOH,Smoking,   FAMILY HISTORY:  Family history of early CAD  mother    FH: ovarian cancer  mother      REVIEW OF SYSTEMS:  CONSTITUTIONAL: No weakness, fevers or chills  EYES/ENT: No visual changes;  No vertigo or throat pain   NECK: No pain or stiffness  RESPIRATORY: No cough, wheezing, hemoptysis; No shortness of breath  CARDIOVASCULAR: No chest pain or palpitations.  GASTROINTESTINAL: No abdominal or epigastric pain. No nausea, vomiting, or hematemesis; No diarrhea or constipation. No melena or hematochezia.  GENITOURINARY: No dysuria, frequency, foamy urine, urinary urgency, incontinence or hematuria  NEUROLOGICAL: No numbness or weakness  SKIN: No itching, burning, rashes, or lesions   VASCULAR: No bilateral lower extremity edema.   All other review of systems is negative unless indicated above.    PHYSICAL EXAM:  Constitutional: NAD  HEENT: anicteric sclera, oropharynx clear, dry mm  Neck: No JVD  Respiratory: CTAB, no wheezes, rales or rhonchi  Cardiovascular: S1, S2, RRR  Gastrointestinal: BS+, soft, NT/ND + scar  Extremities: No cyanosis or clubbing. No peripheral edema + RLE edema  Neurological: A/O x 3, no focal deficits  Psychiatric: Normal mood, normal affect  : No CVA tenderness. No mayen  Skin: No rashes    Hospital Medications:   MEDICATIONS  (STANDING):  aspirin enteric coated 325 milliGRAM(s) Oral daily  chlorhexidine 4% Liquid 1 Application(s) Topical daily  dextrose 40% Gel 15 Gram(s) Oral once  dextrose 50% Injectable 25 Gram(s) IV Push once  glucagon  Injectable 1 milliGRAM(s) IntraMuscular once  hydrocortisone sodium succinate Injectable 100 milliGRAM(s) IV Push every 8 hours  insulin lispro (ADMELOG) corrective regimen sliding scale   SubCutaneous three times a day before meals  metoprolol tartrate 25 milliGRAM(s) Oral two times a day  pantoprazole    Tablet 40 milliGRAM(s) Oral before breakfast  senna 2 Tablet(s) Oral at bedtime  tacrolimus 3 milliGRAM(s) Oral every 12 hours  tamsulosin 0.4 milliGRAM(s) Oral at bedtime        VITALS:  T(F): 98.9 (21 @ 09:00), Max: 98.9 (21 @ 05:00)  HR: 102 (21 @ 09:00)  BP: 150/87 (21 @ 09:00)  RR: 22 (21 @ 09:00)  SpO2: 99% (21 @ 09:00)  Wt(kg): --     @ 07:  -   @ 07:00  --------------------------------------------------------  IN: 1516 mL / OUT: 630 mL / NET: 886 mL     @ 07:  -   @ 07:00  --------------------------------------------------------  IN: 1183 mL / OUT: 1070 mL / NET: 113 mL     @ 07:  -   @ 09:54  --------------------------------------------------------  IN: 560 mL / OUT: 320 mL / NET: 240 mL          LABS:      135  |  105  |  28<H>  ----------------------------<  129<H>  4.3   |  21  |  1.0    Ca    8.0<L>      2021 04:30  Mg     1.8         TPro  5.5<L>  /  Alb  2.8<L>  /  TBili  1.4<H>  /  DBili      /  AST  14  /  ALT  11  /  AlkPhos  133<H>                            7.0    3.21  )-----------( 120      ( 2021 04:30 )             21.0       Urine Studies:  Urinalysis Basic - ( 2021 16:10 )    Color: Yellow / Appearance: Clear / S.031 / pH:   Gluc:  / Ketone: Small  / Bili: Negative / Urobili: <2 mg/dL   Blood:  / Protein: 30 mg/dL / Nitrite: Negative   Leuk Esterase: Negative / RBC: 5 /HPF / WBC 3 /HPF   Sq Epi:  / Non Sq Epi: 1 /HPF / Bacteria: Negative          RADIOLOGY & ADDITIONAL STUDIES:

## 2021-07-13 NOTE — CONSULT NOTE ADULT - SUBJECTIVE AND OBJECTIVE BOX
GENERAL SURGERY CONSULT NOTE    Patient: SOCORRO MANUEL , 71y ([birth year])Male   MRN: [id number]  Location: [facility] A  Visit: 07-07-21 Inpatient  Date: 07-13-21 @ 23:56    HPI:  71M w/PMHx of HTN, HLD, anemia, DM, Afib, JIMÉNEZ s/p liver transplant and right renal transplant at [facility] in February 2020 (follows with his hepatologist Dr. Sunshine), CAD s/p stents (10 years ago) on ASA and Plavix, watchman procedure performed ~3 months ago by Dr. Burdick, history of bilateral knee replacements presented with right distal femur fracture, he is now s/p right femur ORIF on 7/9. post op patient has had a drop in hemoglobin and received 6 units of PRBC. He was also found to have a right lower lobe PE. 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  15 years ago    S/P angioplasty with stent  2 cardiac stents &gt; 10 years back    Encounter for screening colonoscopy  1 year ago    Organ transplant  liver, 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    Home Medications:  amLODIPine 5 mg oral tablet: 1 tab(s) orally once a day (15 Apr 2021 09:54)  aspirin 325 mg oral delayed release tablet: 1 tab(s) orally once a day (07 Jul 2021 21:29)  fludrocortisone 0.1 mg oral tablet: 1 tab(s) orally once a day (07 Jul 2021 21:28)  Januvia 100 mg oral tablet: 1 tab(s) orally once a day (15 Apr 2021 09:54)  linagliptin 5 mg oral tablet: 1 tab(s) orally once a day (15 Apr 2021 09:54)  magnesium chloride: 800 milligram(s) orally 2 times a day (15 Apr 2021 09:54)  metoprolol succinate 25 mg oral tablet, extended release: 1 tab(s) orally once a day (15 Apr 2021 09:54)  Plavix 75 mg oral tablet: 1 tab(s) orally once a day (07 Jul 2021 21:31)  Prograf 1 mg oral capsule: 1 cap(s) orally every 12 hours (15 Apr 2021 09:54)  Protonix 40 mg oral delayed release tablet: 1 tab(s) orally once a day (15 Apr 2021 09:54)  Senokot 8.6 mg oral tablet: 1 tab(s) orally once a day (at bedtime) (15 Apr 2021 09:54)  tamsulosin 0.4 mg oral capsule: 1 cap(s) orally once a day (15 Apr 2021 09:54)    VITALS:  T(F): 97.7 (07-13-21 @ 16:00), Max: 98.9 (07-13-21 @ 05:00)  HR: 86 (07-13-21 @ 22:00) (76 - 102)  BP: 165/90 (07-13-21 @ 22:00) (113/65 - 165/90)  RR: 29 (07-13-21 @ 22:00) (18 - 39)  SpO2: 98% (07-13-21 @ 22:00) (98% - 100%)    PHYSICAL EXAM:  General: NAD,  Cardiac:  S1, S2,  Respiratory: CTAB,   Abdomen: Soft, non-distended, non-tender,   Extremities: normal in color and temperature. right thigh swelling and tenderness. No LLE calf tenderness.     MEDICATIONS  (STANDING):  aspirin enteric coated 325 milliGRAM(s) Oral daily  chlorhexidine 4% Liquid 1 Application(s) Topical daily  dextrose 40% Gel 15 Gram(s) Oral once  dextrose 50% Injectable 25 Gram(s) IV Push once  glucagon  Injectable 1 milliGRAM(s) IntraMuscular once  hydrocortisone sodium succinate Injectable 50 milliGRAM(s) IV Push every 12 hours  insulin lispro (ADMELOG) corrective regimen sliding scale   SubCutaneous three times a day before meals  metoprolol tartrate 25 milliGRAM(s) Oral two times a day  pantoprazole  Injectable 40 milliGRAM(s) IV Push every 12 hours  senna 2 Tablet(s) Oral at bedtime  tacrolimus 3 milliGRAM(s) Oral every 12 hours  tamsulosin 0.4 milliGRAM(s) Oral at bedtime    MEDICATIONS  (PRN):  acetaminophen   Tablet .. 650 milliGRAM(s) Oral every 6 hours PRN Temp greater or equal to 38C (100.4F), Mild Pain (1 - 3)  oxyCODONE    IR 10 milliGRAM(s) Oral every 6 hours PRN Moderate Pain (4 - 6)      LAB/STUDIES:                        9.4    4.68  )-----------( 191      ( 13 Jul 2021 23:02 )             28.4     07-13    136  |  107  |  26<H>  ----------------------------<  123<H>  3.6   |  21  |  1.0    Ca    8.0<L>      13 Jul 2021 23:02  Mg     1.8     07-12    TPro  x   /  Alb  x   /  TBili  1.4<H>  /  DBili  0.5<H>  /  AST  x   /  ALT  x   /  AlkPhos  x   07-13    PT/INR - ( 13 Jul 2021 23:02 )   PT: 12.60 sec;   INR: 1.10 ratio         PTT - ( 13 Jul 2021 23:02 )  PTT:25.3 sec  LIVER FUNCTIONS - ( 13 Jul 2021 04:30 )  Alb: 2.8 g/dL / Pro: 5.5 g/dL / ALK PHOS: 133 U/L / ALT: 11 U/L / AST: 14 U/L / GGT: x             IMAGING:  < from: CT Angio Abdomen and Pelvis w/ IV Cont (07.12.21 @ 16:23) >    IMPRESSION:    1. The pulmonary arteries are partially visualized. Right lower lobe pulmonary emboli. Full pulmonary CTA recommended for confirmation and to fully evaluate the full chest.    2. The lower extremity CTA portion of the study was canceled by vascular surgery due to the extensive metallic hardware that would produce extensive artifact. The  films show bilateral knee arthroplasties and a screw and plate prosthesis involving the shaft of the right femur extending to the femoral condyles.    3. No vascular abnormalities within the abdomen and pelvis.    4. Transplanted kidney in the right iliac fossa demonstrating no evidence of vascular abnormality or hydronephrosis.      A callback was requested.    < end of copied text >   GENERAL SURGERY CONSULT NOTE    Patient: SOCORRO MANUEL , 71y ([birth year])Male   MRN: [id number]  Location: [facility] 107 A  Visit: 07-07-21 Inpatient  Date: 07-13-21 @ 23:56    HPI:  71M w/PMHx of HTN, HLD, anemia, DM, Afib, JIMÉNEZ s/p liver transplant and right renal transplant at [facility] in February 2020 (follows with his hepatologist Dr. Sunshine), CAD s/p stents (10 years ago) on ASA and Plavix, watchman procedure performed ~3 months ago by Dr. Burdick, history of bilateral knee replacements presented with right distal femur fracture, he is now s/p right femur ORIF on 7/9. post op patient has had a drop in hemoglobin and received 6 units of PRBC. He was also found to have a right lower lobe PE. venous duplex shows left popliteal, post tibial and gastrocnemius DVTs. 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  15 years ago    S/P angioplasty with stent  2 cardiac stents &gt; 10 years back    Encounter for screening colonoscopy  1 year ago    Organ transplant  liver, 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    Home Medications:  amLODIPine 5 mg oral tablet: 1 tab(s) orally once a day (15 Apr 2021 09:54)  aspirin 325 mg oral delayed release tablet: 1 tab(s) orally once a day (07 Jul 2021 21:29)  fludrocortisone 0.1 mg oral tablet: 1 tab(s) orally once a day (07 Jul 2021 21:28)  Januvia 100 mg oral tablet: 1 tab(s) orally once a day (15 Apr 2021 09:54)  linagliptin 5 mg oral tablet: 1 tab(s) orally once a day (15 Apr 2021 09:54)  magnesium chloride: 800 milligram(s) orally 2 times a day (15 Apr 2021 09:54)  metoprolol succinate 25 mg oral tablet, extended release: 1 tab(s) orally once a day (15 Apr 2021 09:54)  Plavix 75 mg oral tablet: 1 tab(s) orally once a day (07 Jul 2021 21:31)  Prograf 1 mg oral capsule: 1 cap(s) orally every 12 hours (15 Apr 2021 09:54)  Protonix 40 mg oral delayed release tablet: 1 tab(s) orally once a day (15 Apr 2021 09:54)  Senokot 8.6 mg oral tablet: 1 tab(s) orally once a day (at bedtime) (15 Apr 2021 09:54)  tamsulosin 0.4 mg oral capsule: 1 cap(s) orally once a day (15 Apr 2021 09:54)    VITALS:  T(F): 97.7 (07-13-21 @ 16:00), Max: 98.9 (07-13-21 @ 05:00)  HR: 86 (07-13-21 @ 22:00) (76 - 102)  BP: 165/90 (07-13-21 @ 22:00) (113/65 - 165/90)  RR: 29 (07-13-21 @ 22:00) (18 - 39)  SpO2: 98% (07-13-21 @ 22:00) (98% - 100%)    PHYSICAL EXAM:  General: NAD,  Cardiac:  S1, S2,  Respiratory: CTAB,   Abdomen: Soft, non-distended, non-tender,   Extremities: normal in color and temperature. right thigh swelling and tenderness. No LLE calf tenderness.     MEDICATIONS  (STANDING):  aspirin enteric coated 325 milliGRAM(s) Oral daily  chlorhexidine 4% Liquid 1 Application(s) Topical daily  dextrose 40% Gel 15 Gram(s) Oral once  dextrose 50% Injectable 25 Gram(s) IV Push once  glucagon  Injectable 1 milliGRAM(s) IntraMuscular once  hydrocortisone sodium succinate Injectable 50 milliGRAM(s) IV Push every 12 hours  insulin lispro (ADMELOG) corrective regimen sliding scale   SubCutaneous three times a day before meals  metoprolol tartrate 25 milliGRAM(s) Oral two times a day  pantoprazole  Injectable 40 milliGRAM(s) IV Push every 12 hours  senna 2 Tablet(s) Oral at bedtime  tacrolimus 3 milliGRAM(s) Oral every 12 hours  tamsulosin 0.4 milliGRAM(s) Oral at bedtime    MEDICATIONS  (PRN):  acetaminophen   Tablet .. 650 milliGRAM(s) Oral every 6 hours PRN Temp greater or equal to 38C (100.4F), Mild Pain (1 - 3)  oxyCODONE    IR 10 milliGRAM(s) Oral every 6 hours PRN Moderate Pain (4 - 6)      LAB/STUDIES:                        9.4    4.68  )-----------( 191      ( 13 Jul 2021 23:02 )             28.4     07-13    136  |  107  |  26<H>  ----------------------------<  123<H>  3.6   |  21  |  1.0    Ca    8.0<L>      13 Jul 2021 23:02  Mg     1.8     07-12    TPro  x   /  Alb  x   /  TBili  1.4<H>  /  DBili  0.5<H>  /  AST  x   /  ALT  x   /  AlkPhos  x   07-13    PT/INR - ( 13 Jul 2021 23:02 )   PT: 12.60 sec;   INR: 1.10 ratio         PTT - ( 13 Jul 2021 23:02 )  PTT:25.3 sec  LIVER FUNCTIONS - ( 13 Jul 2021 04:30 )  Alb: 2.8 g/dL / Pro: 5.5 g/dL / ALK PHOS: 133 U/L / ALT: 11 U/L / AST: 14 U/L / GGT: x             IMAGING:  < from: CT Angio Abdomen and Pelvis w/ IV Cont (07.12.21 @ 16:23) >    IMPRESSION:    1. The pulmonary arteries are partially visualized. Right lower lobe pulmonary emboli. Full pulmonary CTA recommended for confirmation and to fully evaluate the full chest.    2. The lower extremity CTA portion of the study was canceled by vascular surgery due to the extensive metallic hardware that would produce extensive artifact. The  films show bilateral knee arthroplasties and a screw and plate prosthesis involving the shaft of the right femur extending to the femoral condyles.    3. No vascular abnormalities within the abdomen and pelvis.    4. Transplanted kidney in the right iliac fossa demonstrating no evidence of vascular abnormality or hydronephrosis.      A callback was requested.    < end of copied text >

## 2021-07-13 NOTE — PHYSICAL THERAPY INITIAL EVALUATION ADULT - PERTINENT HX OF CURRENT PROBLEM, REHAB EVAL
71M w/PMHx of HTN, HLD, anemia, DM, Afib, JIMÉNEZ s/p liver transplant and right renal transplant at [facility] in February 2020 (follows with his hepatologist Dr. Sunshine), CAD s/p stents (10 years ago) on ASA and Plavix, watchman procedure performed ~3 months ago by Dr. Burdick, history of bilateral knee replacements (21 years ago) presents after being sent in from outpatient office for finding of new right distal femur fracture. CTA A/P showed RLL subsegmental PE

## 2021-07-13 NOTE — PROGRESS NOTE ADULT - SUBJECTIVE AND OBJECTIVE BOX
PATIENT:  SOCORRO MANUEL  [id number]      CHIEF COMPLAINT:  Patient is a 71y old  Male who presents with a chief complaint of Right periprosthetic femur fracture (2021 09:53)      HPI:  71M w/PMHx of HTN, HLD, anemia, DM, Afib, JIMÉNEZ s/p liver transplant and right renal transplant at [facility] in 2020 (follows with his hepatologist Dr. Sunshine), CAD s/p stents (10 years ago) on ASA and Plavix, watchman procedure performed ~3 months ago by Dr. Burdick, history of bilateral knee replacements (21 years ago) presents after being sent in from outpatient office for finding of new right distal femur fracture. Patient tripped and fell while trying to get out of his car and felt right knee pain afterwards. He presented to an outpatient clinic for evaluation. XR at outpatient clinic demonstrated distal right femur fracture. He was placed in a knee immobilizer and instructed to present to the ED for further management. Patient is afebrile, HR 61, borderline BP of 93/52, saturating 99% on room air. On exam his right knee is immobilized, he has flexion and extension of the foot at the ankle joint, +DP and PT pulses, foot warm and well perfused. No other external signs of injury, denies pain anywhere else.      INTERVAL HISTORY/OVERNIGHT EVENTS:  Patient seen and evaluated at bedside. No new complaints. Received 1U pRBC yesterday night, repeat CBC still 7.0, transfused additional 1U this AM. CTA A/P showed RLL subsegmental PE. CTA RLE not able to be done d/t hardware--per radiology, would cause too much artifact. Will workup for other potential causes of bleeding (guaiac, hemolysis w/u). Per nephro, taper off hydrocortisone and switch to home fludrocortisone on Thursday.      MEDICATIONS:  MEDICATIONS  (STANDING):  aspirin enteric coated 325 milliGRAM(s) Oral daily  chlorhexidine 4% Liquid 1 Application(s) Topical daily  dextrose 40% Gel 15 Gram(s) Oral once  dextrose 50% Injectable 25 Gram(s) IV Push once  glucagon  Injectable 1 milliGRAM(s) IntraMuscular once  hydrocortisone sodium succinate Injectable 50 milliGRAM(s) IV Push every 12 hours  insulin lispro (ADMELOG) corrective regimen sliding scale   SubCutaneous three times a day before meals  metoprolol tartrate 25 milliGRAM(s) Oral two times a day  pantoprazole  Injectable 40 milliGRAM(s) IV Push every 12 hours  senna 2 Tablet(s) Oral at bedtime  tacrolimus 3 milliGRAM(s) Oral every 12 hours  tamsulosin 0.4 milliGRAM(s) Oral at bedtime    MEDICATIONS  (PRN):  acetaminophen   Tablet .. 650 milliGRAM(s) Oral every 6 hours PRN Temp greater or equal to 38C (100.4F), Mild Pain (1 - 3)  oxyCODONE    IR 10 milliGRAM(s) Oral every 6 hours PRN Moderate Pain (4 - 6)      ALLERGIES:  Allergies    No Known Allergies    Intolerances        OBJECTIVE:  ICU Vital Signs Last 24 Hrs  T(C): 37.2 (2021 09:00), Max: 37.2 (2021 05:00)  T(F): 98.9 (2021 09:00), Max: 98.9 (2021 05:00)  HR: 102 (2021 09:00) (76 - 112)  BP: 150/87 (2021 09:00) (104/81 - 164/95)  BP(mean): 122 (2021 09:00) (84 - 136)  ABP: --  ABP(mean): --  RR: 22 (2021 09:00) (20 - 39)  SpO2: 99% (2021 09:00) (97% - 99%)      CAPILLARY BLOOD GLUCOSE      POCT Blood Glucose.: 121 mg/dL (2021 07:41)  POCT Blood Glucose.: 152 mg/dL (2021 22:33)  POCT Blood Glucose.: 134 mg/dL (2021 17:37)  POCT Blood Glucose.: 132 mg/dL (2021 11:36)    CAPILLARY BLOOD GLUCOSE      POCT Blood Glucose.: 121 mg/dL (2021 07:41)    I&O's Summary    2021 07:01  -  2021 07:00  --------------------------------------------------------  IN: 1183 mL / OUT: 1070 mL / NET: 113 mL    2021 07:01  -  2021 11:14  --------------------------------------------------------  IN: 560 mL / OUT: 320 mL / NET: 240 mL      Daily     Daily Weight in k.3 (2021 05:00)    PHYSICAL EXAMINATION:  General: WN/WD NAD  HEENT: PERRLA, EOMI, moist mucous membranes  Neurology: A&Ox3, nonfocal, SANON x 4  Respiratory: CTA B/L, normal respiratory effort, no wheezes, crackles, rales  CV: RRR, S1S2, no murmurs, rubs or gallops  Abdominal: Soft, NT, ND +BS, Last BM  Extremities: No edema, + peripheral pulses  Incisions:   Tubes:    LABS:                          7.0    3.21  )-----------( 120      ( 2021 04:30 )             21.0     07-13    135  |  105  |  28<H>  ----------------------------<  129<H>  4.3   |  21  |  1.0    Ca    8.0<L>      2021 04:30  Mg     1.8     07-12    TPro  5.5<L>  /  Alb  2.8<L>  /  TBili  1.4<H>  /  DBili  x   /  AST  14  /  ALT  11  /  AlkPhos  133<H>  07-13    LIVER FUNCTIONS - ( 2021 04:30 )  Alb: 2.8 g/dL / Pro: 5.5 g/dL / ALK PHOS: 133 U/L / ALT: 11 U/L / AST: 14 U/L / GGT: x                       TELEMETRY:    EKG:     IMAGING:   PATIENT:  SOCORRO MANUEL  [id number]      CHIEF COMPLAINT:  Patient is a 71y old  Male who presents with a chief complaint of Right periprosthetic femur fracture (2021 09:53)      HPI:  71M w/PMHx of HTN, HLD, anemia, DM, Afib, JIMÉNEZ s/p liver transplant and right renal transplant at [facility] in 2020 (follows with his hepatologist Dr. Sunshine), CAD s/p stents (10 years ago) on ASA and Plavix, watchman procedure performed ~3 months ago by Dr. Burdick, history of bilateral knee replacements (21 years ago) presents after being sent in from outpatient office for finding of new right distal femur fracture. Patient tripped and fell while trying to get out of his car and felt right knee pain afterwards. He presented to an outpatient clinic for evaluation. XR at outpatient clinic demonstrated distal right femur fracture. He was placed in a knee immobilizer and instructed to present to the ED for further management. Patient is afebrile, HR 61, borderline BP of 93/52, saturating 99% on room air. On exam his right knee is immobilized, he has flexion and extension of the foot at the ankle joint, +DP and PT pulses, foot warm and well perfused. No other external signs of injury, denies pain anywhere else.      INTERVAL HISTORY/OVERNIGHT EVENTS:  Patient seen and evaluated at bedside. No new complaints. Received 1U pRBC yesterday night, repeat CBC still 7.0, transfused additional 1U this AM. CTA A/P showed RLL subsegmental PE. CTA RLE not able to be done d/t hardware--per radiology, would cause too much artifact. Will workup for other potential causes of bleeding (guaiac, hemolysis w/u). Per nephro, taper off hydrocortisone and switch to home fludrocortisone on Thursday.      MEDICATIONS:  MEDICATIONS  (STANDING):  aspirin enteric coated 325 milliGRAM(s) Oral daily  chlorhexidine 4% Liquid 1 Application(s) Topical daily  dextrose 40% Gel 15 Gram(s) Oral once  dextrose 50% Injectable 25 Gram(s) IV Push once  glucagon  Injectable 1 milliGRAM(s) IntraMuscular once  hydrocortisone sodium succinate Injectable 50 milliGRAM(s) IV Push every 12 hours  insulin lispro (ADMELOG) corrective regimen sliding scale   SubCutaneous three times a day before meals  metoprolol tartrate 25 milliGRAM(s) Oral two times a day  pantoprazole  Injectable 40 milliGRAM(s) IV Push every 12 hours  senna 2 Tablet(s) Oral at bedtime  tacrolimus 3 milliGRAM(s) Oral every 12 hours  tamsulosin 0.4 milliGRAM(s) Oral at bedtime    MEDICATIONS  (PRN):  acetaminophen   Tablet .. 650 milliGRAM(s) Oral every 6 hours PRN Temp greater or equal to 38C (100.4F), Mild Pain (1 - 3)  oxyCODONE    IR 10 milliGRAM(s) Oral every 6 hours PRN Moderate Pain (4 - 6)      ALLERGIES:  Allergies    No Known Allergies    Intolerances        OBJECTIVE:  ICU Vital Signs Last 24 Hrs  T(C): 37.2 (2021 09:00), Max: 37.2 (2021 05:00)  T(F): 98.9 (2021 09:00), Max: 98.9 (2021 05:00)  HR: 102 (2021 09:00) (76 - 112)  BP: 150/87 (2021 09:00) (104/81 - 164/95)  BP(mean): 122 (2021 09:00) (84 - 136)  ABP: --  ABP(mean): --  RR: 22 (2021 09:00) (20 - 39)  SpO2: 99% (2021 09:00) (97% - 99%)      CAPILLARY BLOOD GLUCOSE      POCT Blood Glucose.: 121 mg/dL (2021 07:41)  POCT Blood Glucose.: 152 mg/dL (2021 22:33)  POCT Blood Glucose.: 134 mg/dL (2021 17:37)  POCT Blood Glucose.: 132 mg/dL (2021 11:36)    CAPILLARY BLOOD GLUCOSE      POCT Blood Glucose.: 121 mg/dL (2021 07:41)    I&O's Summary    2021 07:01  -  2021 07:00  --------------------------------------------------------  IN: 1183 mL / OUT: 1070 mL / NET: 113 mL    2021 07:01  -  2021 11:14  --------------------------------------------------------  IN: 560 mL / OUT: 320 mL / NET: 240 mL      Daily     Daily Weight in k.3 (2021 05:00)    PHYSICAL EXAMINATION:  General: WN/WD NAD  HEENT: PERRLA, EOMI, moist mucous membranes  Neurology: A&Ox3, nonfocal, SANON x 4  Respiratory: CTA B/L, normal respiratory effort, no wheezes, crackles, rales  CV: RRR, S1S2, no murmurs, rubs or gallops  Abdominal: Soft, NT, ND +BS, Last BM  Extremities: RLE with bandage to lateral thigh    LABS:                          7.0    3.21  )-----------( 120      ( 2021 04:30 )             21.0     07-13    135  |  105  |  28<H>  ----------------------------<  129<H>  4.3   |  21  |  1.0    Ca    8.0<L>      2021 04:30  Mg     1.8     07-12    TPro  5.5<L>  /  Alb  2.8<L>  /  TBili  1.4<H>  /  DBili  x   /  AST  14  /  ALT  11  /  AlkPhos  133<H>  07-13    LIVER FUNCTIONS - ( 2021 04:30 )  Alb: 2.8 g/dL / Pro: 5.5 g/dL / ALK PHOS: 133 U/L / ALT: 11 U/L / AST: 14 U/L / GGT: x             TELEMETRY:  No events    IMAGING:  < from: CT Angio Abdomen and Pelvis w/ IV Cont (21 @ 16:23) >  IMPRESSION:    1. The pulmonary arteries are partially visualized. Right lower lobe pulmonary emboli. Full pulmonary CTA recommended for confirmation and to fully evaluate the full chest.    2. The lower extremity CTA portion of the study was canceled by vascular surgery due to the extensive metallic hardware that would produce extensive artifact. The  films show bilateral knee arthroplasties and a screw and plate prosthesis involving the shaft of the right femur extending to the femoral condyles.    3. No vascular abnormalities within the abdomen and pelvis.    4. Transplanted kidney in the right iliac fossa demonstrating no evidence of vascular abnormality or hydronephrosis.    < end of copied text >

## 2021-07-13 NOTE — PROGRESS NOTE ADULT - ASSESSMENT
IMPRESSION:    Sp fall/ R ORIF  Rapid a Fib/ hypotension/ Dec hb/ fever   Sp renal/ liver transplant/ immunosuppressed  CAD  blood loss anemia  rapid afib        PLAN:    CNS: Avoid CNS depressant    HEENT:  Oral care    PULMONARY:  HOB @ 45 degrees, aspiration precaution, NC keep SaO2 92 TO 96%    CARDIOVASCULAR:   monitor I/O    GI: GI prophylaxis                                            Feeding  po    RENAL:  F/u  lytes.    Correct as needed.   accurate I/O, renal f/up    INFECTIOUS DISEASE:   ABX       HEMATOLOGICAL:  DVT prophylaxis.   LE doppler, transfuse, serial CBC    ENDOCRINE:  Follow up FS.  Insulin protocol if needed.   hydrocortisone 100 q 8     MUSCULOSKELETAL: ortho f/up     CODE STATUS: FULL CODE    DISPOSITION: Pt requires continued monitoring in the MICU     IMPRESSION:    Sp fall/ R ORIF  Rapid a Fib/ hypotension/ Dec hb/ fever   Sp renal/ liver transplant/ immunosuppressed  CAD  blood loss anemia  rapid kailey b        PLAN:    CNS: Avoid CNS depressant    HEENT:  Oral care    PULMONARY:  HOB @ 45 degrees, aspiration precaution, NC keep SaO2 92 TO 96%    CARDIOVASCULAR:   monitor I/O    GI: GI prophylaxis                                            Feeding  po    RENAL:  F/u  lytes.    Correct as needed.   accurate I/O, renal f/up    INFECTIOUS DISEASE:   ABX       HEMATOLOGICAL:  DVT prophylaxis.   LE doppler, transfuse, serial CBC    ENDOCRINE:  Follow up FS.  Insulin protocol if needed.   hydrocortisone 100 q 8     MUSCULOSKELETAL: ortho f/up     CODE STATUS: FULL CODE    DISPOSITION: Pt requires continued monitoring in the MICU     IMPRESSION:    Sp fall/ R ORIF  Rapid a Fib/ hypotension/ Dec hb/ fever   Sp renal/ liver transplant/ immunosuppressed  CAD  blood loss anemia  rapid kailey b        PLAN:    CNS: Avoid CNS depressant    HEENT:  Oral care    PULMONARY:  HOB @ 45 degrees, aspiration precaution, NC keep SaO2 92 TO 96%  small PE seen on angiogram  DO not repeat pulm angiogram.  Pt sans resp symptoms and he cannot have anticoagulation  doppler LE r/o DVT    CARDIOVASCULAR:   monitor I/O    GI: GI prophylaxis                                            Feeding  po    RENAL:  F/u  lytes.    Correct as needed.   accurate I/O, renal f/up    INFECTIOUS DISEASE:   ABX       HEMATOLOGICAL:  DVT prophylaxis.   LE doppler, transfuse, serial CBC  ortho stats dropping HH not from leg.    ENDOCRINE:  Follow up FS.  Insulin protocol if needed.   hydrocortisone 100 q 8     MUSCULOSKELETAL: ortho f/up     CODE STATUS: FULL CODE    DISPOSITION: Pt requires continued monitoring in the MICU

## 2021-07-13 NOTE — PROGRESS NOTE ADULT - ASSESSMENT
s/p  donor liver / kidney transplant (RLQ) 2020 @ [facility]  stable allograft renal function   ESRD / ESLD due to JIMÉNEZ with HRS  Right distal femoral fracture s/p orif  post op anemia  PE on CTA  s/p iv contrast   s/p b/l remote TKR  DM2  CAD / PCI / Afib    borderline low BP's  hypomagnesemia     plan:    cont prograf 0.3mg po bid  cont florinef 0.1mg po qd  taper off hydrocortisone - 50mg iv q12h x 24h, then 25mg iv q12h x 24, then back to florinef 0.1mg po qd  PRBC transfusion today  monitor h/h  monitor cr s/p iv contrast dye  f/u ortho / wound care  d/w resident  CCU care

## 2021-07-13 NOTE — PHYSICAL THERAPY INITIAL EVALUATION ADULT - IMPAIRED TRANSFERS: SIT/STAND, REHAB EVAL
Addended by: Herminio Gonzales on: 11/2/2018 03:58 PM     Modules accepted: Orders
impaired balance/pain/decreased ROM/decreased strength

## 2021-07-14 LAB
ALBUMIN SERPL ELPH-MCNC: 2.6 G/DL — LOW (ref 3.5–5.2)
ALP SERPL-CCNC: 133 U/L — HIGH (ref 30–115)
ALT FLD-CCNC: 10 U/L — SIGNIFICANT CHANGE UP (ref 0–41)
ANION GAP SERPL CALC-SCNC: 5 MMOL/L — LOW (ref 7–14)
AST SERPL-CCNC: 13 U/L — SIGNIFICANT CHANGE UP (ref 0–41)
BASOPHILS # BLD AUTO: 0.01 K/UL — SIGNIFICANT CHANGE UP (ref 0–0.2)
BASOPHILS # BLD AUTO: 0.01 K/UL — SIGNIFICANT CHANGE UP (ref 0–0.2)
BASOPHILS NFR BLD AUTO: 0.2 % — SIGNIFICANT CHANGE UP (ref 0–1)
BASOPHILS NFR BLD AUTO: 0.2 % — SIGNIFICANT CHANGE UP (ref 0–1)
BILIRUB SERPL-MCNC: 1.4 MG/DL — HIGH (ref 0.2–1.2)
BUN SERPL-MCNC: 25 MG/DL — HIGH (ref 10–20)
CALCIUM SERPL-MCNC: 7.9 MG/DL — LOW (ref 8.5–10.1)
CHLORIDE SERPL-SCNC: 108 MMOL/L — SIGNIFICANT CHANGE UP (ref 98–110)
CO2 SERPL-SCNC: 24 MMOL/L — SIGNIFICANT CHANGE UP (ref 17–32)
CREAT SERPL-MCNC: 1 MG/DL — SIGNIFICANT CHANGE UP (ref 0.7–1.5)
CULTURE RESULTS: SIGNIFICANT CHANGE UP
EOSINOPHIL # BLD AUTO: 0.04 K/UL — SIGNIFICANT CHANGE UP (ref 0–0.7)
EOSINOPHIL # BLD AUTO: 0.06 K/UL — SIGNIFICANT CHANGE UP (ref 0–0.7)
EOSINOPHIL NFR BLD AUTO: 0.9 % — SIGNIFICANT CHANGE UP (ref 0–8)
EOSINOPHIL NFR BLD AUTO: 1.4 % — SIGNIFICANT CHANGE UP (ref 0–8)
GLUCOSE BLDC GLUCOMTR-MCNC: 108 MG/DL — HIGH (ref 70–99)
GLUCOSE BLDC GLUCOMTR-MCNC: 110 MG/DL — HIGH (ref 70–99)
GLUCOSE BLDC GLUCOMTR-MCNC: 115 MG/DL — HIGH (ref 70–99)
GLUCOSE BLDC GLUCOMTR-MCNC: 95 MG/DL — SIGNIFICANT CHANGE UP (ref 70–99)
GLUCOSE SERPL-MCNC: 106 MG/DL — HIGH (ref 70–99)
HAPTOGLOB SERPL-MCNC: 313 MG/DL — HIGH (ref 34–200)
HCT VFR BLD CALC: 22.5 % — LOW (ref 42–52)
HCT VFR BLD CALC: 26.7 % — LOW (ref 42–52)
HGB BLD-MCNC: 7.5 G/DL — LOW (ref 14–18)
HGB BLD-MCNC: 8.9 G/DL — LOW (ref 14–18)
IMM GRANULOCYTES NFR BLD AUTO: 1.4 % — HIGH (ref 0.1–0.3)
IMM GRANULOCYTES NFR BLD AUTO: 1.8 % — HIGH (ref 0.1–0.3)
LYMPHOCYTES # BLD AUTO: 0.92 K/UL — LOW (ref 1.2–3.4)
LYMPHOCYTES # BLD AUTO: 1.04 K/UL — LOW (ref 1.2–3.4)
LYMPHOCYTES # BLD AUTO: 21 % — SIGNIFICANT CHANGE UP (ref 20.5–51.1)
LYMPHOCYTES # BLD AUTO: 24.1 % — SIGNIFICANT CHANGE UP (ref 20.5–51.1)
MCHC RBC-ENTMCNC: 28.2 PG — SIGNIFICANT CHANGE UP (ref 27–31)
MCHC RBC-ENTMCNC: 28.5 PG — SIGNIFICANT CHANGE UP (ref 27–31)
MCHC RBC-ENTMCNC: 33.3 G/DL — SIGNIFICANT CHANGE UP (ref 32–37)
MCHC RBC-ENTMCNC: 33.3 G/DL — SIGNIFICANT CHANGE UP (ref 32–37)
MCV RBC AUTO: 84.5 FL — SIGNIFICANT CHANGE UP (ref 80–94)
MCV RBC AUTO: 85.6 FL — SIGNIFICANT CHANGE UP (ref 80–94)
MONOCYTES # BLD AUTO: 0.47 K/UL — SIGNIFICANT CHANGE UP (ref 0.1–0.6)
MONOCYTES # BLD AUTO: 0.5 K/UL — SIGNIFICANT CHANGE UP (ref 0.1–0.6)
MONOCYTES NFR BLD AUTO: 10.9 % — HIGH (ref 1.7–9.3)
MONOCYTES NFR BLD AUTO: 11.4 % — HIGH (ref 1.7–9.3)
NEUTROPHILS # BLD AUTO: 2.69 K/UL — SIGNIFICANT CHANGE UP (ref 1.4–6.5)
NEUTROPHILS # BLD AUTO: 2.81 K/UL — SIGNIFICANT CHANGE UP (ref 1.4–6.5)
NEUTROPHILS NFR BLD AUTO: 62.5 % — SIGNIFICANT CHANGE UP (ref 42.2–75.2)
NEUTROPHILS NFR BLD AUTO: 64.2 % — SIGNIFICANT CHANGE UP (ref 42.2–75.2)
NRBC # BLD: 0 /100 WBCS — SIGNIFICANT CHANGE UP (ref 0–0)
NRBC # BLD: 0 /100 WBCS — SIGNIFICANT CHANGE UP (ref 0–0)
PLATELET # BLD AUTO: 154 K/UL — SIGNIFICANT CHANGE UP (ref 130–400)
PLATELET # BLD AUTO: 177 K/UL — SIGNIFICANT CHANGE UP (ref 130–400)
POTASSIUM SERPL-MCNC: 3.7 MMOL/L — SIGNIFICANT CHANGE UP (ref 3.5–5)
POTASSIUM SERPL-SCNC: 3.7 MMOL/L — SIGNIFICANT CHANGE UP (ref 3.5–5)
PROT SERPL-MCNC: 5.1 G/DL — LOW (ref 6–8)
RBC # BLD: 2.63 M/UL — LOW (ref 4.7–6.1)
RBC # BLD: 3.16 M/UL — LOW (ref 4.7–6.1)
RBC # FLD: 14.4 % — SIGNIFICANT CHANGE UP (ref 11.5–14.5)
RBC # FLD: 14.5 % — SIGNIFICANT CHANGE UP (ref 11.5–14.5)
SARS-COV-2 RNA SPEC QL NAA+PROBE: SIGNIFICANT CHANGE UP
SODIUM SERPL-SCNC: 137 MMOL/L — SIGNIFICANT CHANGE UP (ref 135–146)
SPECIMEN SOURCE: SIGNIFICANT CHANGE UP
WBC # BLD: 4.31 K/UL — LOW (ref 4.8–10.8)
WBC # BLD: 4.38 K/UL — LOW (ref 4.8–10.8)
WBC # FLD AUTO: 4.31 K/UL — LOW (ref 4.8–10.8)
WBC # FLD AUTO: 4.38 K/UL — LOW (ref 4.8–10.8)

## 2021-07-14 PROCEDURE — 37191 INS ENDOVAS VENA CAVA FILTR: CPT | Mod: GC

## 2021-07-14 PROCEDURE — 93010 ELECTROCARDIOGRAM REPORT: CPT

## 2021-07-14 PROCEDURE — 99222 1ST HOSP IP/OBS MODERATE 55: CPT

## 2021-07-14 PROCEDURE — 99232 SBSQ HOSP IP/OBS MODERATE 35: CPT

## 2021-07-14 RX ORDER — ACETAMINOPHEN 500 MG
650 TABLET ORAL EVERY 6 HOURS
Refills: 0 | Status: DISCONTINUED | OUTPATIENT
Start: 2021-07-14 | End: 2021-07-20

## 2021-07-14 RX ORDER — POTASSIUM CHLORIDE 20 MEQ
20 PACKET (EA) ORAL ONCE
Refills: 0 | Status: COMPLETED | OUTPATIENT
Start: 2021-07-14 | End: 2021-07-14

## 2021-07-14 RX ORDER — SODIUM CHLORIDE 9 MG/ML
1000 INJECTION INTRAMUSCULAR; INTRAVENOUS; SUBCUTANEOUS
Refills: 0 | Status: DISCONTINUED | OUTPATIENT
Start: 2021-07-14 | End: 2021-07-14

## 2021-07-14 RX ORDER — HYDROMORPHONE HYDROCHLORIDE 2 MG/ML
0.2 INJECTION INTRAMUSCULAR; INTRAVENOUS; SUBCUTANEOUS
Refills: 0 | Status: DISCONTINUED | OUTPATIENT
Start: 2021-07-14 | End: 2021-07-14

## 2021-07-14 RX ORDER — TAMSULOSIN HYDROCHLORIDE 0.4 MG/1
0.4 CAPSULE ORAL AT BEDTIME
Refills: 0 | Status: DISCONTINUED | OUTPATIENT
Start: 2021-07-14 | End: 2021-07-20

## 2021-07-14 RX ORDER — MORPHINE SULFATE 50 MG/1
2 CAPSULE, EXTENDED RELEASE ORAL EVERY 6 HOURS
Refills: 0 | Status: DISCONTINUED | OUTPATIENT
Start: 2021-07-14 | End: 2021-07-20

## 2021-07-14 RX ORDER — DEXTROSE 50 % IN WATER 50 %
15 SYRINGE (ML) INTRAVENOUS ONCE
Refills: 0 | Status: DISCONTINUED | OUTPATIENT
Start: 2021-07-14 | End: 2021-07-20

## 2021-07-14 RX ORDER — SODIUM CHLORIDE 9 MG/ML
1000 INJECTION, SOLUTION INTRAVENOUS
Refills: 0 | Status: DISCONTINUED | OUTPATIENT
Start: 2021-07-14 | End: 2021-07-14

## 2021-07-14 RX ORDER — DEXTROSE 50 % IN WATER 50 %
25 SYRINGE (ML) INTRAVENOUS ONCE
Refills: 0 | Status: DISCONTINUED | OUTPATIENT
Start: 2021-07-14 | End: 2021-07-20

## 2021-07-14 RX ORDER — CHLORHEXIDINE GLUCONATE 213 G/1000ML
1 SOLUTION TOPICAL DAILY
Refills: 0 | Status: DISCONTINUED | OUTPATIENT
Start: 2021-07-14 | End: 2021-07-20

## 2021-07-14 RX ORDER — INSULIN LISPRO 100/ML
VIAL (ML) SUBCUTANEOUS
Refills: 0 | Status: DISCONTINUED | OUTPATIENT
Start: 2021-07-14 | End: 2021-07-20

## 2021-07-14 RX ORDER — FLUDROCORTISONE ACETATE 0.1 MG/1
0.1 TABLET ORAL DAILY
Refills: 0 | Status: DISCONTINUED | OUTPATIENT
Start: 2021-07-14 | End: 2021-07-20

## 2021-07-14 RX ORDER — ONDANSETRON 8 MG/1
4 TABLET, FILM COATED ORAL ONCE
Refills: 0 | Status: DISCONTINUED | OUTPATIENT
Start: 2021-07-14 | End: 2021-07-14

## 2021-07-14 RX ORDER — MORPHINE SULFATE 50 MG/1
4 CAPSULE, EXTENDED RELEASE ORAL ONCE
Refills: 0 | Status: DISCONTINUED | OUTPATIENT
Start: 2021-07-14 | End: 2021-07-14

## 2021-07-14 RX ORDER — GLUCAGON INJECTION, SOLUTION 0.5 MG/.1ML
1 INJECTION, SOLUTION SUBCUTANEOUS ONCE
Refills: 0 | Status: DISCONTINUED | OUTPATIENT
Start: 2021-07-14 | End: 2021-07-20

## 2021-07-14 RX ORDER — FOLIC ACID 0.8 MG
1 TABLET ORAL DAILY
Refills: 0 | Status: DISCONTINUED | OUTPATIENT
Start: 2021-07-14 | End: 2021-07-20

## 2021-07-14 RX ORDER — HYDROCORTISONE 20 MG
25 TABLET ORAL EVERY 12 HOURS
Refills: 0 | Status: DISCONTINUED | OUTPATIENT
Start: 2021-07-14 | End: 2021-07-14

## 2021-07-14 RX ORDER — METOPROLOL TARTRATE 50 MG
25 TABLET ORAL
Refills: 0 | Status: DISCONTINUED | OUTPATIENT
Start: 2021-07-14 | End: 2021-07-20

## 2021-07-14 RX ORDER — OXYCODONE HYDROCHLORIDE 5 MG/1
10 TABLET ORAL EVERY 6 HOURS
Refills: 0 | Status: DISCONTINUED | OUTPATIENT
Start: 2021-07-14 | End: 2021-07-20

## 2021-07-14 RX ORDER — SENNA PLUS 8.6 MG/1
2 TABLET ORAL AT BEDTIME
Refills: 0 | Status: DISCONTINUED | OUTPATIENT
Start: 2021-07-14 | End: 2021-07-20

## 2021-07-14 RX ORDER — TACROLIMUS 5 MG/1
3 CAPSULE ORAL EVERY 12 HOURS
Refills: 0 | Status: DISCONTINUED | OUTPATIENT
Start: 2021-07-14 | End: 2021-07-20

## 2021-07-14 RX ORDER — FERROUS SULFATE 325(65) MG
325 TABLET ORAL
Refills: 0 | Status: DISCONTINUED | OUTPATIENT
Start: 2021-07-14 | End: 2021-07-20

## 2021-07-14 RX ORDER — PANTOPRAZOLE SODIUM 20 MG/1
40 TABLET, DELAYED RELEASE ORAL EVERY 12 HOURS
Refills: 0 | Status: DISCONTINUED | OUTPATIENT
Start: 2021-07-14 | End: 2021-07-16

## 2021-07-14 RX ORDER — ASPIRIN/CALCIUM CARB/MAGNESIUM 324 MG
325 TABLET ORAL DAILY
Refills: 0 | Status: DISCONTINUED | OUTPATIENT
Start: 2021-07-14 | End: 2021-07-15

## 2021-07-14 RX ORDER — MORPHINE SULFATE 50 MG/1
2 CAPSULE, EXTENDED RELEASE ORAL EVERY 6 HOURS
Refills: 0 | Status: DISCONTINUED | OUTPATIENT
Start: 2021-07-14 | End: 2021-07-14

## 2021-07-14 RX ADMIN — MORPHINE SULFATE 2 MILLIGRAM(S): 50 CAPSULE, EXTENDED RELEASE ORAL at 08:41

## 2021-07-14 RX ADMIN — TAMSULOSIN HYDROCHLORIDE 0.4 MILLIGRAM(S): 0.4 CAPSULE ORAL at 21:39

## 2021-07-14 RX ADMIN — Medication 25 MILLIGRAM(S): at 05:15

## 2021-07-14 RX ADMIN — Medication 650 MILLIGRAM(S): at 00:11

## 2021-07-14 RX ADMIN — CHLORHEXIDINE GLUCONATE 1 APPLICATION(S): 213 SOLUTION TOPICAL at 19:15

## 2021-07-14 RX ADMIN — Medication 325 MILLIGRAM(S): at 12:07

## 2021-07-14 RX ADMIN — FLUDROCORTISONE ACETATE 0.1 MILLIGRAM(S): 0.1 TABLET ORAL at 18:05

## 2021-07-14 RX ADMIN — CHLORHEXIDINE GLUCONATE 1 APPLICATION(S): 213 SOLUTION TOPICAL at 12:02

## 2021-07-14 RX ADMIN — TACROLIMUS 3 MILLIGRAM(S): 5 CAPSULE ORAL at 05:15

## 2021-07-14 RX ADMIN — SENNA PLUS 2 TABLET(S): 8.6 TABLET ORAL at 21:39

## 2021-07-14 RX ADMIN — MORPHINE SULFATE 2 MILLIGRAM(S): 50 CAPSULE, EXTENDED RELEASE ORAL at 08:15

## 2021-07-14 RX ADMIN — HYDROMORPHONE HYDROCHLORIDE 0.2 MILLIGRAM(S): 2 INJECTION INTRAMUSCULAR; INTRAVENOUS; SUBCUTANEOUS at 14:38

## 2021-07-14 RX ADMIN — Medication 50 MILLIEQUIVALENT(S): at 04:03

## 2021-07-14 RX ADMIN — OXYCODONE HYDROCHLORIDE 10 MILLIGRAM(S): 5 TABLET ORAL at 20:12

## 2021-07-14 RX ADMIN — PANTOPRAZOLE SODIUM 40 MILLIGRAM(S): 20 TABLET, DELAYED RELEASE ORAL at 05:17

## 2021-07-14 RX ADMIN — SODIUM CHLORIDE 75 MILLILITER(S): 9 INJECTION INTRAMUSCULAR; INTRAVENOUS; SUBCUTANEOUS at 04:03

## 2021-07-14 RX ADMIN — OXYCODONE HYDROCHLORIDE 10 MILLIGRAM(S): 5 TABLET ORAL at 20:40

## 2021-07-14 RX ADMIN — Medication 25 MILLIGRAM(S): at 18:05

## 2021-07-14 RX ADMIN — HYDROMORPHONE HYDROCHLORIDE 0.2 MILLIGRAM(S): 2 INJECTION INTRAMUSCULAR; INTRAVENOUS; SUBCUTANEOUS at 14:55

## 2021-07-14 NOTE — CONSULT NOTE ADULT - REASON FOR ADMISSION
Right periprosthetic femur fracture

## 2021-07-14 NOTE — PROGRESS NOTE ADULT - SUBJECTIVE AND OBJECTIVE BOX
NEPHROLOGY FOLLOW UP NOTE    still in CCU  cr same  s/p prbc tx  + dvt on le duplex      PAST MEDICAL & SURGICAL HISTORY:  HTN (hypertension)  High cholesterol  Anemia  Diabetes  Afib  Cirrhosis of liver  JIMÉNEZ  Dyspnea on exertion  BPH (benign prostatic hyperplasia)  Presence of bilateral total knee joint prostheses  15 years ago  S/P angioplasty with stent  2 cardiac stents &gt; 10 years back  Encounter for screening colonoscopy  1 year ago  Organ transplant  liver, right kidney 2020      Allergies:  No Known Allergies    Home Medications Reviewed    SOCIAL HISTORY:  Denies ETOH,Smoking,   FAMILY HISTORY:  Family history of early CAD  mother    FH: ovarian cancer  mother      REVIEW OF SYSTEMS:  CONSTITUTIONAL: No weakness, fevers or chills  EYES/ENT: No visual changes;  No vertigo or throat pain   NECK: No pain or stiffness  RESPIRATORY: No cough, wheezing, hemoptysis; No shortness of breath  CARDIOVASCULAR: No chest pain or palpitations.  GASTROINTESTINAL: No abdominal or epigastric pain. No nausea, vomiting, or hematemesis; No diarrhea or constipation. No melena or hematochezia.  GENITOURINARY: No dysuria, frequency, foamy urine, urinary urgency, incontinence or hematuria  NEUROLOGICAL: No numbness or weakness  SKIN: No itching, burning, rashes, or lesions   VASCULAR: No bilateral lower extremity edema.   All other review of systems is negative unless indicated above.    PHYSICAL EXAM:  Constitutional: NAD  HEENT: anicteric sclera, oropharynx clear, dry mm  Neck: No JVD  Respiratory: CTAB, no wheezes, rales or rhonchi  Cardiovascular: S1, S2, RRR  Gastrointestinal: BS+, soft, NT/ND + scar  Extremities: No cyanosis or clubbing. No peripheral edema + RLE edema  Neurological: A/O x 3, no focal deficits  Psychiatric: Normal mood, normal affect  : No CVA tenderness. No mayen  Skin: No rashes    Hospital Medications:   MEDICATIONS  (STANDING):  aspirin enteric coated 325 milliGRAM(s) Oral daily  chlorhexidine 4% Liquid 1 Application(s) Topical daily  dextrose 40% Gel 15 Gram(s) Oral once  dextrose 50% Injectable 25 Gram(s) IV Push once  ferrous    sulfate 325 milliGRAM(s) Oral <User Schedule>  fludroCORTISONE 0.1 milliGRAM(s) Oral daily  folic acid 1 milliGRAM(s) Oral daily  glucagon  Injectable 1 milliGRAM(s) IntraMuscular once  insulin lispro (ADMELOG) corrective regimen sliding scale   SubCutaneous three times a day before meals  metoprolol tartrate 25 milliGRAM(s) Oral two times a day  pantoprazole  Injectable 40 milliGRAM(s) IV Push every 12 hours  senna 2 Tablet(s) Oral at bedtime  tacrolimus 3 milliGRAM(s) Oral every 12 hours  tamsulosin 0.4 milliGRAM(s) Oral at bedtime        VITALS:  T(F): 98.4 (21 @ 14:12), Max: 99.9 (21 @ 08:00)  HR: 93 (21 @ 15:33)  BP: 153/79 (21 @ 15:33)  RR: 16 (21 @ 15:33)  SpO2: 99% (07-14-21 @ 15:33)  Wt(kg): --     @ 07:01  -   @ 07:00  --------------------------------------------------------  IN: 1183 mL / OUT: 1070 mL / NET: 113 mL     @ 07:01  -   @ 07:00  --------------------------------------------------------  IN: 1560 mL / OUT: 1900 mL / NET: -340 mL     @ 07:01  -   @ 16:24  --------------------------------------------------------  IN: 450 mL / OUT: 375 mL / NET: 75 mL      Height (cm): 175.3 ( @ 08:54)  Weight (kg): 90 ( 08:54)  BMI (kg/m2): 29.3 ( 08:54)  BSA (m2): 2.06 ( @ 08:54)    LABS:      137  |  108  |  25<H>  ----------------------------<  106<H>  3.7   |  24  |  1.0    Ca    7.9<L>      2021 04:00    TPro  5.1<L>  /  Alb  2.6<L>  /  TBili  1.4<H>  /  DBili      /  AST  13  /  ALT  10  /  AlkPhos  133<H>                            8.9    4.38  )-----------( 177      ( 2021 04:00 )             26.7       Urine Studies:  Urinalysis Basic - ( 2021 16:10 )    Color: Yellow / Appearance: Clear / S.031 / pH:   Gluc:  / Ketone: Small  / Bili: Negative / Urobili: <2 mg/dL   Blood:  / Protein: 30 mg/dL / Nitrite: Negative   Leuk Esterase: Negative / RBC: 5 /HPF / WBC 3 /HPF   Sq Epi:  / Non Sq Epi: 1 /HPF / Bacteria: Negative          RADIOLOGY & ADDITIONAL STUDIES:

## 2021-07-14 NOTE — PROGRESS NOTE ADULT - ASSESSMENT
ORTHO PROGRESS NOTE     71yMale POD #5 S/P right femur ORIF. C/o some thigh pain today. Otherwise, no acute events overnight.     MEDICATIONS  (STANDING):  aspirin enteric coated 325 milliGRAM(s) Oral daily  cefepime   IVPB 1000 milliGRAM(s) IV Intermittent every 12 hours  dextrose 40% Gel 15 Gram(s) Oral once  dextrose 50% Injectable 25 Gram(s) IV Push once  diltiazem Infusion 5 mG/Hr (5 mL/Hr) IV Continuous <Continuous>  fludroCORTISONE 0.1 milliGRAM(s) Oral daily  glucagon  Injectable 1 milliGRAM(s) IntraMuscular once  heparin   Injectable 5000 Unit(s) SubCutaneous every 8 hours  insulin lispro (ADMELOG) corrective regimen sliding scale   SubCutaneous three times a day before meals  metoprolol tartrate 25 milliGRAM(s) Oral two times a day  norepinephrine Infusion 0.05 MICROgram(s)/kG/Min (8.44 mL/Hr) IV Continuous <Continuous>  pantoprazole    Tablet 40 milliGRAM(s) Oral before breakfast  senna 2 Tablet(s) Oral at bedtime  tacrolimus 3 milliGRAM(s) Oral every 12 hours  tamsulosin 0.4 milliGRAM(s) Oral at bedtime    MEDICATIONS  (PRN):  acetaminophen   Tablet .. 650 milliGRAM(s) Oral every 6 hours PRN Temp greater or equal to 38C (100.4F), Mild Pain (1 - 3)  oxyCODONE    IR 10 milliGRAM(s) Oral every 6 hours PRN Moderate Pain (4 - 6)      Vital Signs Last 24 Hrs  Vital Signs Last 24 Hrs  T(C): 36.7 (14 Jul 2021 06:12), Max: 37.2 (13 Jul 2021 09:00)  T(F): 98 (14 Jul 2021 06:12), Max: 98.9 (13 Jul 2021 09:00)  HR: 80 (14 Jul 2021 06:12) (78 - 102)  BP: 140/81 (14 Jul 2021 06:00) (112/66 - 165/90)  BP(mean): 116 (14 Jul 2021 06:00) (81 - 136)  RR: 12 (14 Jul 2021 06:12) (12 - 29)  SpO2: 100% (14 Jul 2021 06:12) (97% - 100%)    PE:   Dressing C/D/I - changed this AM  Incisions c/d/i w/ staples in place   Compartments soft and compressible  Some ecchymosis posteriorly - unchanged from yesterday  Motor intact distally  SILT distally  CR<2sec  palpable pulses                          7.0    3.21  )-----------( 120      ( 13 Jul 2021 04:30 )             21.0       A/P: 71yMale POD #5 S/P right femur ORIF.     OOB to Chair   NWB RLE  PT/OT  Pain control   Ext icing/elevation  Incentive Spirometry   Trend H/H - transfuse PRN  DVT Prophylaxis per CCU - currently on ASA  Ortho following

## 2021-07-14 NOTE — PROGRESS NOTE ADULT - ASSESSMENT
s/p  donor liver / kidney transplant (RLQ) 2020 @ [facility]  stable allograft renal function   ESRD / ESLD due to JIMÉNEZ with HRS  Right distal femoral fracture s/p orif  post op anemia  PE on CTA / DVT  s/p iv contrast   s/p b/l remote TKR  DM2  CAD / PCI / Afib    borderline low BP's  hypomagnesemia     plan:    cont prograf 0.3mg po bid  resume florinef 0.1mg po qd  taper off hydrocortisone   PRBC transfusion PRN  IVC filter today  monitor h/h  monitor cr s/p iv contrast dye  f/u ortho / wound care  CCU care

## 2021-07-14 NOTE — CONSULT NOTE ADULT - ATTENDING COMMENTS
Pt. seen/ examined  XR reviewed  Awaiting CT  Discussed case with Dr. Kirby - trauma, patient, and his son  Plan for ORIF tomorrow w/ Dr. Kirby
Pt seen and examined. Agree with above A/P.  Plan d/w family
Patient seen, case d/w cardiology fellow.  Agree with assessment and plan.  Proceed with surgery.  Continue ASA and Plavix.  F/u with Dr. De Souza as outpatient.

## 2021-07-14 NOTE — CONSULT NOTE ADULT - ASSESSMENT
SOCORRO MANUEL is a 71y Male with a past medical history as described above who presented after a traumatic fall resulting in a femoral fracture. Pt has been persistently dropping his hemoglobin, concern for bleeding.    # Acute on Chronic Anemia, Normocytic    - Likely due to blood loss anemia and component of chronic inflammation + effect from anti-rejection medication on the marrow   - Check iron studies, B12, folate, SPEP/UPEP  - Transfuse for Hb < 7  -  Would continue to hold DAPT and any DOAC until bleeding stops/Hb stabilizes   - Start folic acid 1mg PO daily and PO ferrous sulfate for bone marrow support, reticulocytes should be higher than what they are    SOCORRO MANUEL is a 71y Male with a past medical history as described above who presented after a traumatic fall resulting in a femoral fracture. Pt has been persistently dropping his hemoglobin, concern for bleeding.    # Acute on Chronic Anemia, Normocytic    - Likely due to acute blood loss anemia and component of chronic inflammation and effect from anti-rejection medication on the marrow   - other less likely DDx include B12/folate deficiency, multiple myeloma   - Check iron studies, B12, folate, SPEP/UPEP  - Transfuse for Hb < 7  - Would continue to hold DAPT and any DOAC until bleeding stops/Hb stabilizes   - Start folic acid 1mg PO daily and PO ferrous sulfate for bone marrow support, reticulocytes should be higher than what they are    SOCORRO MANUEL is a 71y Male with a past medical history as described above who presented after a traumatic fall resulting in a femoral fracture. Pt has been persistently dropping his hemoglobin, concern for bleeding.    # Acute on Chronic Anemia, Normocytic    - Likely due to acute blood loss anemia and component of chronic inflammation and effect from anti-rejection medication on the marrow   - other less likely DDx include B12/folate deficiency, multiple myeloma   - Check iron studies, B12, folate, SPEP/UPEP  - Transfuse for Hb < 7  - Would continue to hold DAPT and any DOAC until bleeding stops/Hb stabilizes   - Start folic acid 1mg PO daily and PO ferrous sulfate for bone marrow support,

## 2021-07-14 NOTE — CHART NOTE - NSCHARTNOTEFT_GEN_A_CORE
PACU ANESTHESIA ADMISSION NOTE      Procedure:     IVC filter placement      Post op diagnosis:  DVT        ____  Intubated  TV:______       Rate: ______      FiO2: ______    __X__  Patent Airway    __X__  Full return of protective reflexes    __X__  Full recovery from anesthesia / back to baseline status    Vitals:  T(F): 98.0  HR: 113  BP: 141/85  RR: 21  SpO2: 99%     Mental Status:  __X__ Awake   __X___ Alert   _____ Drowsy   _____ Sedated    Nausea/Vomiting:  ____ Yes, See Post - Op Orders      ___X_ No    Pain Scale (0-10):  __0___    Treatment: ____ None    ____ See Post - Op/PCA Orders    Post - Operative Fluids:   ____ Oral   __X__ See Post - Op Orders    Plan: Discharge:   ____Home       __X___Floor     _____Critical Care    _____  Other:_________________    Comments:

## 2021-07-14 NOTE — PROGRESS NOTE ADULT - ASSESSMENT
72 y/o M with PMHx JIMÉNEZ s/p liver and R renal transplant (Feb 2020 [place]), HTN, HLD, DM, CAD (s/p stents 10 yrs ago on ASA and Plavix), AFib (s/p Watchman 3 months ago) admitted for R distal femur fx. Had R femur ORIF by ortho, afterwards Hgb dropped to 6.9. CT showed hematoma in R vastus intermedius. Developed hypotension and went into AFib with RVR. Placed on levo, switched to valerio, now off pressors. Septic workup sent, started on cefepime. Started on diltiazem for AFib, switched to esmolol drip and digoxin, weaned off to PO metoprolol. S/p 2U pRBC. ID following, recommend watching off abx    Neuro: Currently AOx3. No acute issues.    CV: Afib RVR currently rate controlled. Off AC due to hematoma. Currently BP stable on esmolol gtt and off pressors.  #Afib s/p watchman now with RVR  - Likely d/t hemorrhagic shock  - S/p 45 days of Coumadin after Watchman 3 months ago  - Weaned off esmolol, d/c'd digoxin  - HR stable, still in AFib  - C/w metoprolol tartrate 25 mg PO BID    Pulm  #Incidental RLL subsegmental PE  - CTA A/P showed incidental RLL subsegmental PE  - Asx currently, no SOB  - Cannot be on AC d/t active bleeding  - Duplex: R popliteal and posterior tibialis DVT, R gastrocnemius thrombosis  - Vascular to place IVC filter today    GI: Tolerating diet. Constipation.    Infectious:  #Fever: unknown source, possibly d/t hematoma -- resolved  - No longer febrile  - BCx 7/9: NGTD  - UCx 7/9: NGTD  - Procal 7/10 neg    MSK:  #Acute comminuted fracture of the distal rt femur  - s/p Fixation (OR done 07/09/2021)  - Ortho following for dressing changes   - PT/OT eval  - RLE NWB  - CTA RLE not able to be done d/t hardware which would cause artifact  - IR consulted, no angiogram to be done at this time    Renal:  #S/p renal transplant (rt sided at [facility] in 02/2020)  - Nephro on board, following   - C/w Prograf  - Nephro recs     - Hydrocortisone taper: 50 mg IV q12h for 24 hours --> 25 mg IV q12h --> d/c hydrocortisone and c/w florinef 0.1 mg PO daily     - C/w Florinef 0.1 mg PO daily    Heme:  #Anemia likely 2/2 hematoma.  - Closely monitor Hb. Transfusion goal 7-8  - H/H 8.9 today  - Trend H/H    Endocrine  #Hx of DM  - A1c 7/8: 5.3   - Home Januvia 100mg, holding  - Home  Linagliptin 5 mg QD, holding 72 y/o M with PMHx JIMÉNEZ s/p liver and R renal transplant (Feb 2020 [place]), HTN, HLD, DM, CAD (s/p stents 10 yrs ago on ASA and Plavix), AFib (s/p Watchman 3 months ago) admitted for R distal femur fx. Had R femur ORIF by ortho, afterwards Hgb dropped to 6.9. CT showed hematoma in R vastus intermedius. Developed hypotension and went into AFib with RVR. Placed on levo, switched to valerio, now off pressors. Septic workup sent, started on cefepime. Started on diltiazem for AFib, switched to esmolol drip and digoxin, weaned off to PO metoprolol. S/p 2U pRBC. ID following, recommend watching off abx    Neuro: Currently AOx3. No acute issues.    CV: Afib RVR currently rate controlled. Off AC due to hematoma. Currently BP stable on esmolol gtt and off pressors.  #Afib s/p watchman now with RVR  - Likely d/t hemorrhagic shock  - S/p 45 days of Coumadin after Watchman 3 months ago  - Weaned off esmolol, d/c'd digoxin  - HR stable, still in AFib  - C/w metoprolol tartrate 25 mg PO BID    Pulm  #Incidental RLL subsegmental PE  - CTA A/P showed incidental RLL subsegmental PE  - Asx currently, no SOB  - Cannot be on AC d/t active bleeding  - Duplex: R popliteal and posterior tibialis DVT, R gastrocnemius thrombosis  - Vascular to place IVC filter today    GI: Tolerating diet. Constipation.    Infectious:  #Fever: unknown source, possibly d/t hematoma -- resolved  - No longer febrile  - BCx 7/9: NGTD  - UCx 7/9: NGTD  - Procal 7/10 neg    MSK:  #Acute comminuted fracture of the distal rt femur  - s/p Fixation (OR done 07/09/2021)  - Ortho following for dressing changes   - PT/OT eval  - RLE NWB  - CTA RLE not able to be done d/t hardware which would cause artifact  - IR consulted, no angiogram to be done at this time    Renal:  #S/p renal transplant (rt sided at [facility] in 02/2020)  - Nephro on board, following   - C/w Prograf  - Nephro recs     - Hydrocortisone taper: 50 mg IV q12h for 24 hours --> 25 mg IV q12h --> d/c hydrocortisone and c/w florinef 0.1 mg PO daily     - C/w Florinef 0.1 mg PO daily    Heme:  #Anemia likely 2/2 hematoma.  - Closely monitor Hb. Transfusion goal 7-8  - H/H 8.9 today  - Trend H/H  - Folate, B12, iron studies    Endocrine  #Hx of DM  - A1c 7/8: 5.3   - Home Januvia 100mg, holding  - Home  Linagliptin 5 mg QD, holding

## 2021-07-14 NOTE — CONSULT NOTE ADULT - CONSULT REQUESTED BY NAME
Hospitalist
dr Ellis
ED
Dr Vigil
residents
Surgical team on behalf of Dr. Ellis
ER team
José Miguel Ali
Medical team
Trauma Surgery
CCU Team
ed
ortho

## 2021-07-14 NOTE — BRIEF OPERATIVE NOTE - OPERATION/FINDINGS
Right groin was used to access femoral vein with ultrasound probe. CT with contrast was used to identify, place, and confirm the IVC filter. Right groin was used to access femoral vein with ultrasound probe. Fluoroscopy was used to identify, place, and confirm the IVC filter.

## 2021-07-14 NOTE — CONSULT NOTE ADULT - SUBJECTIVE AND OBJECTIVE BOX
HEMATOLOGY ONCOLOGY CONSULT     Patient is a 71y old  Male who presents with a chief complaint of Right periprosthetic femur fracture (14 Jul 2021 13:30)      HPI:  71M w/PMHx of HTN, HLD, anemia, DM, Afib, JIMÉNEZ s/p liver transplant and right renal transplant at [facility] in February 2020 (follows with his hepatologist Dr. Sunshine), CAD s/p stents (10 years ago) on ASA and Plavix, watchman procedure performed ~3 months ago by Dr. Burdick, history of bilateral knee replacements (21 years ago) presents after being sent in from outpatient office for finding of new right distal femur fracture. Patient tripped and fell while trying to get out of his car and felt right knee pain afterwards. He presented to an outpatient clinic for evaluation. XR at outpatient clinic demonstrated distal right femur fracture. He was placed in a knee immobilizer and instructed to present to the ED for further management. Patient is afebrile, HR 61, borderline BP of 93/52, saturating 99% on room air. On exam his right knee is immobilized, he has flexion and extension of the foot at the ankle joint, +DP and PT pulses, foot warm and well perfused. No other external signs of injury, denies pain anywhere else.    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  15 years ago    S/P angioplasty with stent  2 cardiac stents &gt; 10 years back    Encounter for screening colonoscopy  1 year ago    Organ transplant  liver, 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        SOCIAL HISTORY:    FAMILY HISTORY:  Family history of early CAD  mother    FH: ovarian cancer  mother        MEDICATIONS  (STANDING):  aspirin enteric coated 325 milliGRAM(s) Oral daily  chlorhexidine 4% Liquid 1 Application(s) Topical daily  dextrose 40% Gel 15 Gram(s) Oral once  dextrose 50% Injectable 25 Gram(s) IV Push once  ferrous    sulfate 325 milliGRAM(s) Oral <User Schedule>  fludroCORTISONE 0.1 milliGRAM(s) Oral daily  folic acid 1 milliGRAM(s) Oral daily  glucagon  Injectable 1 milliGRAM(s) IntraMuscular once  insulin lispro (ADMELOG) corrective regimen sliding scale   SubCutaneous three times a day before meals  metoprolol tartrate 25 milliGRAM(s) Oral two times a day  pantoprazole  Injectable 40 milliGRAM(s) IV Push every 12 hours  senna 2 Tablet(s) Oral at bedtime  tacrolimus 3 milliGRAM(s) Oral every 12 hours  tamsulosin 0.4 milliGRAM(s) Oral at bedtime    MEDICATIONS  (PRN):  acetaminophen   Tablet .. 650 milliGRAM(s) Oral every 6 hours PRN Temp greater or equal to 38C (100.4F), Mild Pain (1 - 3)  morphine  - Injectable 2 milliGRAM(s) IV Push every 6 hours PRN Severe Pain (7 - 10)  oxyCODONE    IR 10 milliGRAM(s) Oral every 6 hours PRN Moderate Pain (4 - 6)      Allergies    No Known Allergies    Intolerances        Vital Signs Last 24 Hrs  T(C): 36.9 (14 Jul 2021 14:12), Max: 37.7 (14 Jul 2021 08:00)  T(F): 98.4 (14 Jul 2021 14:12), Max: 99.9 (14 Jul 2021 08:00)  HR: 93 (14 Jul 2021 15:33) (78 - 99)  BP: 153/79 (14 Jul 2021 15:33) (112/66 - 165/90)  BP(mean): 104 (14 Jul 2021 12:00) (81 - 120)  RR: 16 (14 Jul 2021 15:33) (8 - 29)  SpO2: 99% (14 Jul 2021 15:33) (97% - 100%)    PHYSICAL EXAM  General: adult in NAD  HEENT: clear oropharynx, anicteric sclera, pink conjunctiva  Neck: supple  CV: normal S1/S2 with no murmur rubs or gallops  Lungs: positive air movement b/l ant lungs,clear to auscultation, no wheezes, no rales  Abdomen: soft non-tender non-distended, no hepatosplenomegaly  Ext: no clubbing cyanosis or edema  Skin: no rashes and no petechiae  Neuro: alert and oriented X 4, no focal deficits      07-13-21 @ 07:01  -  07-14-21 @ 07:00  --------------------------------------------------------  IN: 1560 mL / OUT: 1900 mL / NET: -340 mL    07-14-21 @ 07:01  -  07-14-21 @ 16:20  --------------------------------------------------------  IN: 450 mL / OUT: 375 mL / NET: 75 mL      LABS:                          8.9    4.38  )-----------( 177      ( 14 Jul 2021 04:00 )             26.7         Mean Cell Volume : 84.5 fL  Mean Cell Hemoglobin : 28.2 pg  Mean Cell Hemoglobin Concentration : 33.3 g/dL  Auto Neutrophil # : 2.81 K/uL  Auto Lymphocyte # : 0.92 K/uL  Auto Monocyte # : 0.50 K/uL  Auto Eosinophil # : 0.06 K/uL  Auto Basophil # : 0.01 K/uL  Auto Neutrophil % : 64.2 %  Auto Lymphocyte % : 21.0 %  Auto Monocyte % : 11.4 %  Auto Eosinophil % : 1.4 %  Auto Basophil % : 0.2 %      07-14    137  |  108  |  25<H>  ----------------------------<  106<H>  3.7   |  24  |  1.0    Ca    7.9<L>      14 Jul 2021 04:00    TPro  5.1<L>  /  Alb  2.6<L>  /  TBili  1.4<H>  /  DBili  x   /  AST  13  /  ALT  10  /  AlkPhos  133<H>  07-14      PT/INR - ( 13 Jul 2021 23:02 )   PT: 12.60 sec;   INR: 1.10 ratio         PTT - ( 13 Jul 2021 23:02 )  PTT:25.3 sec    Reticulocyte Percent: 2.8 % (07-13 @ 11:00)  Reticulocyte Percent: 2.8 % (07-10 @ 12:00)              BLOOD SMEAR INTERPRETATION:       RADIOLOGY & ADDITIONAL STUDIES:

## 2021-07-14 NOTE — PROGRESS NOTE ADULT - SUBJECTIVE AND OBJECTIVE BOX
PATIENT:  SOCORRO MANUEL  [id number]      CHIEF COMPLAINT:  Patient is a 71y old  Male who presents with a chief complaint of Right periprosthetic femur fracture (2021 07:54)      HPI:  71M w/PMHx of HTN, HLD, anemia, DM, Afib, JIMÉNEZ s/p liver transplant and right renal transplant at [facility] in 2020 (follows with his hepatologist Dr. Sunshine), CAD s/p stents (10 years ago) on ASA and Plavix, watchman procedure performed ~3 months ago by Dr. Burdick, history of bilateral knee replacements (21 years ago) presents after being sent in from outpatient office for finding of new right distal femur fracture. Patient tripped and fell while trying to get out of his car and felt right knee pain afterwards. He presented to an outpatient clinic for evaluation. XR at outpatient clinic demonstrated distal right femur fracture. He was placed in a knee immobilizer and instructed to present to the ED for further management. Patient is afebrile, HR 61, borderline BP of 93/52, saturating 99% on room air. On exam his right knee is immobilized, he has flexion and extension of the foot at the ankle joint, +DP and PT pulses, foot warm and well perfused. No other external signs of injury, denies pain anywhere else.      INTERVAL HISTORY/OVERNIGHT EVENTS:  Patient seen and evaluated at bedside. Patient had episode of severe pain to R thigh last night. Ortho evaluated patient. Duplex significant for R popliteal and posterior tibialis DVTs. Vascular consulted, will place IVC filter today since patient cannot have anticoagulation. Today, patient feels well, no complaints. H/H 8.9, guaiac neg      MEDICATIONS:  MEDICATIONS  (STANDING):    MEDICATIONS  (PRN):      ALLERGIES:  Allergies    No Known Allergies    Intolerances      OBJECTIVE:  ICU Vital Signs Last 24 Hrs  T(C): 37 (2021 12:25), Max: 37.7 (2021 08:00)  T(F): 98.6 (2021 12:25), Max: 99.9 (2021 08:00)  HR: 95 (2021 12:25) (78 - 95)  BP: 150/80 (2021 12:25) (112/66 - 165/90)  BP(mean): 104 (2021 12:00) (81 - 120)  ABP: --  ABP(mean): --  RR: 18 (2021 12:25) (12 - 29)  SpO2: 98% (2021 12:25) (97% - 100%)      CAPILLARY BLOOD GLUCOSE      POCT Blood Glucose.: 108 mg/dL (2021 11:45)  POCT Blood Glucose.: 115 mg/dL (2021 07:16)  POCT Blood Glucose.: 127 mg/dL (2021 22:33)  POCT Blood Glucose.: 107 mg/dL (2021 16:31)    CAPILLARY BLOOD GLUCOSE      POCT Blood Glucose.: 108 mg/dL (2021 11:45)    I&O's Summary    2021 07:01  -  2021 07:00  --------------------------------------------------------  IN: 1560 mL / OUT: 1900 mL / NET: -340 mL    2021 07:01  -  2021 13:31  --------------------------------------------------------  IN: 450 mL / OUT: 375 mL / NET: 75 mL      Daily Height in cm: 175.26 (2021 08:54)    Daily Weight in k.6 (2021 05:00)    PHYSICAL EXAMINATION:  General: WN/WD NAD  HEENT: PERRLA, EOMI, moist mucous membranes  Neurology: A&Ox3, nonfocal, SANON x 4  Respiratory: CTA B/L, normal respiratory effort, no wheezes, crackles, rales  CV: RRR, S1S2, no murmurs, rubs or gallops  Abdominal: Soft, NT, ND +BS, Last BM  Extremities: R thigh bandaged      LABS:                          8.9    4.38  )-----------( 177      ( 2021 04:00 )             26.7     07-14    137  |  108  |  25<H>  ----------------------------<  106<H>  3.7   |  24  |  1.0    Ca    7.9<L>      2021 04:00    TPro  5.1<L>  /  Alb  2.6<L>  /  TBili  1.4<H>  /  DBili  x   /  AST  13  /  ALT  10  /  AlkPhos  133<H>      LIVER FUNCTIONS - ( 2021 04:00 )  Alb: 2.6 g/dL / Pro: 5.1 g/dL / ALK PHOS: 133 U/L / ALT: 10 U/L / AST: 13 U/L / GGT: x           PT/INR - ( 2021 23:02 )   PT: 12.60 sec;   INR: 1.10 ratio         PTT - ( 2021 23:02 )  PTT:25.3 sec      TELEMETRY:  No events    EKG:  < from: 12 Lead ECG (21 @ 05:23) >    Diagnosis Line Atrial fibrillation  Abnormal ECG    < end of copied text >      IMAGING:  < from: [facility] Duplex Lower Ext Vein Scan, Bilat (21 @ 19:07) >    IMPRESSION:  No evidence of deep venous thrombosis in the left lower extremity.      Acute right popliteal and posterior tibial vein DVT. Right gastrocnemius vein thrombosis.    < end of copied text >

## 2021-07-14 NOTE — CONSULT NOTE ADULT - CONSULT REQUESTED DATE/TIME
09-Jul-2021 15:45
13-Jul-2021 15:51
07-Jul-2021 23:00
08-Jul-2021 06:14
08-Jul-2021 11:04
13-Jul-2021
07-Jul-2021 16:03
10-Jul-2021 07:28
14-Jul-2021 16:20
07-Jul-2021 15:04
07-Jul-2021 21:55
10-Jul-2021 11:22
10-Jul-2021 00:00

## 2021-07-14 NOTE — PROGRESS NOTE ADULT - ASSESSMENT
IMPRESSION:    Sp fall/ R ORIF  Rapid a Fib/ hypotension/ Dec hb/ fever   Sp renal/ liver transplant/ immunosuppressed  CAD  blood loss anemia  rapid kailey b        PLAN:    CNS: Avoid CNS depressant    HEENT:  Oral care    PULMONARY:  HOB @ 45 degrees, aspiration precaution, NC keep SaO2 92 TO 96%  small PE seen on angiogram  DO not repeat pulm angiogram.  Pt sans resp symptoms and he cannot have anticoagulation  doppler LE r/o DVT    CARDIOVASCULAR:   monitor I/O    GI: GI prophylaxis                                            Feeding  po    RENAL:  F/u  lytes.    Correct as needed.   accurate I/O, renal f/up    INFECTIOUS DISEASE:   ABX       HEMATOLOGICAL:  DVT prophylaxis.   LE doppler, transfuse, serial CBC  keep Hgb >8  ortho stats dropping HH not from leg.    ENDOCRINE:  Follow up FS.  Insulin protocol if needed.   hydrocortisone 100 q 8     MUSCULOSKELETAL: ortho f/up     CODE STATUS: FULL CODE    DISPOSITION: Pt requires continued monitoring in the MICU     IMPRESSION:    Sp fall/ R ORIF  Rapid a Fib/ hypotension/ Dec hb/ fever   Sp renal/ liver transplant/ immunosuppressed  CAD  blood loss anemia  rapid kailey b  DVT        PLAN:    CNS: Avoid CNS depressant    HEENT:  Oral care    PULMONARY:  HOB @ 45 degrees, aspiration precaution, NC keep SaO2 92 TO 96%  small PE seen on angiogram  DO not repeat pulm angiogram.  Pt sans resp symptoms and he cannot have anticoagulation  + DVT vasc for filter    CARDIOVASCULAR:   monitor I/O    GI: GI prophylaxis                                            Feeding  po    RENAL:  F/u  lytes.    Correct as needed.   accurate I/O, renal f/up    INFECTIOUS DISEASE:   ABX       HEMATOLOGICAL:  retic low  DVT prophylaxis.    transfuse, serial CBC  keep Hgb >8  ortho stats dropping HH not from leg.  check B12, folate, iron stores    ENDOCRINE:  Follow up FS.  Insulin protocol if needed.   hydrocortisone 100 q 8     MUSCULOSKELETAL: ortho f/up     CODE STATUS: FULL CODE    DISPOSITION: If stable after filter can go to F

## 2021-07-14 NOTE — PROGRESS NOTE ADULT - SUBJECTIVE AND OBJECTIVE BOX
Patient is a 71y old  Male who presents with a chief complaint of Right periprosthetic femur fracture (13 Jul 2021 23:54)        HPI:  71M w/PMHx of HTN, HLD, anemia, DM, Afib, JIMÉNEZ s/p liver transplant and right renal transplant at [facility] in February 2020 (follows with his hepatologist Dr. Sunshine), CAD s/p stents (10 years ago) on ASA and Plavix, watchman procedure performed ~3 months ago by Dr. Burdick, history of bilateral knee replacements (21 years ago) presents after being sent in from outpatient office for finding of new right distal femur fracture. Patient tripped and fell while trying to get out of his car and felt right knee pain afterwards. He presented to an outpatient clinic for evaluation. XR at outpatient clinic demonstrated distal right femur fracture. He was placed in a knee immobilizer and instructed to present to the ED for further management. Patient is afebrile, HR 61, borderline BP of 93/52, saturating 99% on room air. On exam his right knee is immobilized, he has flexion and extension of the foot at the ankle joint, +DP and PT pulses, foot warm and well perfused. No other external signs of injury, denies pain anywhere else.    (07 Jul 2021 21:21)      Pt evaluated on rounds.  I reviewed the radiology tests and hospital record.    I reviewed my previous notes on this patient.    Interval Events: No overnight events.    REVIEW OF SYSTEMS:   see HPI      OBJECTIVE:  ICU Vital Signs Last 24 Hrs  T(C): 36.7 (14 Jul 2021 06:12), Max: 37.2 (13 Jul 2021 09:00)  T(F): 98 (14 Jul 2021 06:12), Max: 98.9 (13 Jul 2021 09:00)  HR: 80 (14 Jul 2021 06:12) (78 - 102)  BP: 140/81 (14 Jul 2021 06:00) (112/66 - 165/90)  BP(mean): 116 (14 Jul 2021 06:00) (81 - 136)  RR: 12 (14 Jul 2021 06:12) (12 - 29)  SpO2: 100% (14 Jul 2021 06:12) (97% - 100%)        07-12 @ 07:01  -  07-13 @ 07:00  --------------------------------------------------------  IN: 1183 mL / OUT: 1070 mL / NET: 113 mL    07-13 @ 07:01  - 07-14 @ 06:18  --------------------------------------------------------  IN: 1485 mL / OUT: 1775 mL / NET: -290 mL      CAPILLARY BLOOD GLUCOSE      POCT Blood Glucose.: 127 mg/dL (13 Jul 2021 22:33)        PHYSICAL EXAM:     · CONSTITUTIONAL:   not septic appearing,   well nourished,   NAD    · ENMT:   Airway patent,   Nasal mucosa clear.  Mouth with normal mucosa.   No thrush    · EYES:   Clear bilaterally,   pupils equal,   round and reactive to light.    · CARDIAC:   Normal rate,   regular rhythm.    Heart sounds S1, S2.   No murmurs, no rubs or gallops on auscultation  no edema        CAROTID:   normal systolic impulse  no bruits    · RESPIRATORY:   rales  normal chest expansion  no retractions or use of accessory muscles  palpation of chest is normal with no fremitus  percussion of chest demonstrates no hyperresonance or dullness    · GASTROINTESTINAL:  Abdomen soft,   non-tender,   + BS  liver/spleen not palpable    · MUSCULOSKELETAL:   no clubbing, cyanosis      · SKIN:   Skin normal color for race,   warm, dry   No evidence of rash.        · HEME LYMPH:   no splenomegaly.  No cervical  lymphadenopathy.  no inguinal lymphadenopathy    HOSPITAL MEDICATIONS:  MEDICATIONS  (STANDING):  aspirin enteric coated 325 milliGRAM(s) Oral daily  chlorhexidine 4% Liquid 1 Application(s) Topical daily  dextrose 40% Gel 15 Gram(s) Oral once  dextrose 50% Injectable 25 Gram(s) IV Push once  glucagon  Injectable 1 milliGRAM(s) IntraMuscular once  hydrocortisone sodium succinate Injectable 25 milliGRAM(s) IV Push every 12 hours  insulin lispro (ADMELOG) corrective regimen sliding scale   SubCutaneous three times a day before meals  metoprolol tartrate 25 milliGRAM(s) Oral two times a day  pantoprazole  Injectable 40 milliGRAM(s) IV Push every 12 hours  senna 2 Tablet(s) Oral at bedtime  sodium chloride 0.9%. 1000 milliLiter(s) (75 mL/Hr) IV Continuous <Continuous>  tacrolimus 3 milliGRAM(s) Oral every 12 hours  tamsulosin 0.4 milliGRAM(s) Oral at bedtime    MEDICATIONS  (PRN):  acetaminophen   Tablet .. 650 milliGRAM(s) Oral every 6 hours PRN Temp greater or equal to 38C (100.4F), Mild Pain (1 - 3)  oxyCODONE    IR 10 milliGRAM(s) Oral every 6 hours PRN Moderate Pain (4 - 6)    sodium chloride 0.9%.: Solution, 1000 milliLiter(s) infuse at 75 mL/Hr  Provider's Contact #: [phone]  sodium chloride 0.9% Bolus:   500 milliLiter(s), IV Bolus, once, infuse over 30 Minute(s), Stop After 1 Doses  lactated ringers.: Solution, 1000 milliLiter(s) infuse at 500 mL/Hr  sodium chloride 0.9% Bolus:   1500 milliLiter(s), IV Bolus, once, infuse over 30 Minute(s), Stop After 1 Doses  lactated ringers Bolus:   1000 milliLiter(s), IV Bolus, once, infuse over 30 Minute(s), Stop After 1 Doses  sodium chloride 0.9% Bolus:   250 milliLiter(s), IV Bolus, once, infuse over 15 Minute(s), Stop After 1 Doses  lactated ringers Bolus:   500 milliLiter(s), IV Bolus, once, infuse over 60 Minute(s), Stop After 1 Doses  lactated ringers.: Solution, 1000 milliLiter(s) infuse at 100 mL/Hr  lactated ringers.: Solution, 1000 milliLiter(s) infuse at 75 mL/Hr  Provider's Contact #: [phone]      LABS:                        8.9    4.38  )-----------( 177      ( 14 Jul 2021 04:00 )             26.7     07-14    137  |  108  |  25<H>  ----------------------------<  106<H>  3.7   |  24  |  1.0    Ca    7.9<L>      14 Jul 2021 04:00    TPro  5.1<L>  /  Alb  2.6<L>  /  TBili  1.4<H>  /  DBili  x   /  AST  13  /  ALT  10  /  AlkPhos  133<H>  07-14    PT/INR - ( 13 Jul 2021 23:02 )   PT: 12.60 sec;   INR: 1.10 ratio         PTT - ( 13 Jul 2021 23:02 )  PTT:25.3 sec            COVID-19 PCR: NotDetec (14 Jul 2021 02:36)  COVID-19 PCR: NotDetec (07 Jul 2021 14:25)              RADIOLOGY: Today I personally interpreted the latest CXR and other pertinent films.

## 2021-07-14 NOTE — CONSULT NOTE ADULT - NSICDXPILOT_GEN_ALL_CORE
[place]

## 2021-07-14 NOTE — PRE-ANESTHESIA EVALUATION ADULT - NSANTHPMHFT_GEN_ALL_CORE
70 y/o M with PMHx JIMÉNEZ s/p liver and R renal transplant (Feb 2020 [place]), HTN, HLD, DM, CAD (s/p stents 10 yrs ago on ASA and Plavix), AFib (s/p Watchman 3 months ago) admitted for R distal femur fx. Had R femur ORIF by ortho, afterwards Hgb dropped to 6.9. CT showed hematoma in R vastus intermedius. Developed hypotension and was on levo --> valerio --> weaned off pressors. Patient also went into AFib w/ RVR, treated with diltiazem, esmolol drip, and digoxin, weaned off now to PO metoprolol. S/p multiple units pRBC for low H/H. CTA Abd/Pelvis: RLL subsegmental PE. CTA RLE canceled d/t hardware. LDH, vy, absolute retic WNL. Haptoglobin pending. FOBT neg. Tbili/Dbili mildly elevated. Duplex BLE significant for DVT. Vascular surgery consulted, IVC filter 7/14  AFIB rate controlled with PO metoprolol  NOW OFF PRESSORS

## 2021-07-14 NOTE — CONSULT NOTE ADULT - CONSULT REASON
Pre-op risk stratification
SICU Stepdown HD monitoring, extensive PMH, s/p Right periprosthetic femur fracture
evaluation for IVC filter placement, patient with left popliteal, post tibial and gastrocnemius DVTs
right distal femur fx
right distal femur periprosthetic fx
AF
Preop risk stratification
Anemia
S/p fall, -HT, -LOC, -AC
fever
rapid A Fib/ hypotension
renal transplant
Femoral fracture, downtrending hgb

## 2021-07-15 LAB
ALBUMIN SERPL ELPH-MCNC: 2.6 G/DL — LOW (ref 3.5–5.2)
ALP SERPL-CCNC: 234 U/L — HIGH (ref 30–115)
ALT FLD-CCNC: 21 U/L — SIGNIFICANT CHANGE UP (ref 0–41)
ANION GAP SERPL CALC-SCNC: 8 MMOL/L — SIGNIFICANT CHANGE UP (ref 7–14)
AST SERPL-CCNC: 29 U/L — SIGNIFICANT CHANGE UP (ref 0–41)
BASOPHILS # BLD AUTO: 0.01 K/UL — SIGNIFICANT CHANGE UP (ref 0–0.2)
BASOPHILS NFR BLD AUTO: 0.3 % — SIGNIFICANT CHANGE UP (ref 0–1)
BILIRUB SERPL-MCNC: 1.7 MG/DL — HIGH (ref 0.2–1.2)
BUN SERPL-MCNC: 18 MG/DL — SIGNIFICANT CHANGE UP (ref 10–20)
CALCIUM SERPL-MCNC: 7.9 MG/DL — LOW (ref 8.5–10.1)
CHLORIDE SERPL-SCNC: 105 MMOL/L — SIGNIFICANT CHANGE UP (ref 98–110)
CO2 SERPL-SCNC: 24 MMOL/L — SIGNIFICANT CHANGE UP (ref 17–32)
CREAT SERPL-MCNC: 0.9 MG/DL — SIGNIFICANT CHANGE UP (ref 0.7–1.5)
CULTURE RESULTS: SIGNIFICANT CHANGE UP
EOSINOPHIL # BLD AUTO: 0.07 K/UL — SIGNIFICANT CHANGE UP (ref 0–0.7)
EOSINOPHIL NFR BLD AUTO: 1.8 % — SIGNIFICANT CHANGE UP (ref 0–8)
FOLATE SERPL-MCNC: 7.6 NG/ML — SIGNIFICANT CHANGE UP
GLUCOSE BLDC GLUCOMTR-MCNC: 112 MG/DL — HIGH (ref 70–99)
GLUCOSE BLDC GLUCOMTR-MCNC: 113 MG/DL — HIGH (ref 70–99)
GLUCOSE BLDC GLUCOMTR-MCNC: 121 MG/DL — HIGH (ref 70–99)
GLUCOSE BLDC GLUCOMTR-MCNC: 192 MG/DL — HIGH (ref 70–99)
GLUCOSE BLDC GLUCOMTR-MCNC: 421 MG/DL — HIGH (ref 70–99)
GLUCOSE SERPL-MCNC: 97 MG/DL — SIGNIFICANT CHANGE UP (ref 70–99)
HCT VFR BLD CALC: 21.5 % — LOW (ref 42–52)
HCT VFR BLD CALC: 27.2 % — LOW (ref 42–52)
HGB BLD-MCNC: 7 G/DL — LOW (ref 14–18)
HGB BLD-MCNC: 8.8 G/DL — LOW (ref 14–18)
IMM GRANULOCYTES NFR BLD AUTO: 2.1 % — HIGH (ref 0.1–0.3)
LYMPHOCYTES # BLD AUTO: 1.15 K/UL — LOW (ref 1.2–3.4)
LYMPHOCYTES # BLD AUTO: 29.9 % — SIGNIFICANT CHANGE UP (ref 20.5–51.1)
MAGNESIUM SERPL-MCNC: 1.3 MG/DL — LOW (ref 1.8–2.4)
MCHC RBC-ENTMCNC: 27.4 PG — SIGNIFICANT CHANGE UP (ref 27–31)
MCHC RBC-ENTMCNC: 27.9 PG — SIGNIFICANT CHANGE UP (ref 27–31)
MCHC RBC-ENTMCNC: 32.4 G/DL — SIGNIFICANT CHANGE UP (ref 32–37)
MCHC RBC-ENTMCNC: 32.6 G/DL — SIGNIFICANT CHANGE UP (ref 32–37)
MCV RBC AUTO: 84.7 FL — SIGNIFICANT CHANGE UP (ref 80–94)
MCV RBC AUTO: 85.7 FL — SIGNIFICANT CHANGE UP (ref 80–94)
MONOCYTES # BLD AUTO: 0.43 K/UL — SIGNIFICANT CHANGE UP (ref 0.1–0.6)
MONOCYTES NFR BLD AUTO: 11.2 % — HIGH (ref 1.7–9.3)
NEUTROPHILS # BLD AUTO: 2.1 K/UL — SIGNIFICANT CHANGE UP (ref 1.4–6.5)
NEUTROPHILS NFR BLD AUTO: 54.7 % — SIGNIFICANT CHANGE UP (ref 42.2–75.2)
NRBC # BLD: 0 /100 WBCS — SIGNIFICANT CHANGE UP (ref 0–0)
NRBC # BLD: 0 /100 WBCS — SIGNIFICANT CHANGE UP (ref 0–0)
PLATELET # BLD AUTO: 142 K/UL — SIGNIFICANT CHANGE UP (ref 130–400)
PLATELET # BLD AUTO: 179 K/UL — SIGNIFICANT CHANGE UP (ref 130–400)
POTASSIUM SERPL-MCNC: 3.8 MMOL/L — SIGNIFICANT CHANGE UP (ref 3.5–5)
POTASSIUM SERPL-SCNC: 3.8 MMOL/L — SIGNIFICANT CHANGE UP (ref 3.5–5)
PROT ?TM UR-MCNC: 27 MG/DL — HIGH (ref 0–12)
PROT SERPL-MCNC: 5.1 G/DL — LOW (ref 6–8)
RBC # BLD: 2.51 M/UL — LOW (ref 4.7–6.1)
RBC # BLD: 3.21 M/UL — LOW (ref 4.7–6.1)
RBC # FLD: 14.4 % — SIGNIFICANT CHANGE UP (ref 11.5–14.5)
RBC # FLD: 14.7 % — HIGH (ref 11.5–14.5)
SODIUM SERPL-SCNC: 137 MMOL/L — SIGNIFICANT CHANGE UP (ref 135–146)
SPECIMEN SOURCE: SIGNIFICANT CHANGE UP
VIT B12 SERPL-MCNC: 486 PG/ML — SIGNIFICANT CHANGE UP (ref 232–1245)
WBC # BLD: 3.84 K/UL — LOW (ref 4.8–10.8)
WBC # BLD: 4.67 K/UL — LOW (ref 4.8–10.8)
WBC # FLD AUTO: 3.84 K/UL — LOW (ref 4.8–10.8)
WBC # FLD AUTO: 4.67 K/UL — LOW (ref 4.8–10.8)

## 2021-07-15 PROCEDURE — 93010 ELECTROCARDIOGRAM REPORT: CPT

## 2021-07-15 PROCEDURE — 99233 SBSQ HOSP IP/OBS HIGH 50: CPT

## 2021-07-15 PROCEDURE — 73706 CT ANGIO LWR EXTR W/O&W/DYE: CPT | Mod: 26,RT

## 2021-07-15 PROCEDURE — 99232 SBSQ HOSP IP/OBS MODERATE 35: CPT

## 2021-07-15 RX ORDER — POLYETHYLENE GLYCOL 3350 17 G/17G
17 POWDER, FOR SOLUTION ORAL DAILY
Refills: 0 | Status: DISCONTINUED | OUTPATIENT
Start: 2021-07-15 | End: 2021-07-20

## 2021-07-15 RX ORDER — MAGNESIUM SULFATE 500 MG/ML
2 VIAL (ML) INJECTION
Refills: 0 | Status: COMPLETED | OUTPATIENT
Start: 2021-07-15 | End: 2021-07-15

## 2021-07-15 RX ORDER — POTASSIUM CHLORIDE 20 MEQ
20 PACKET (EA) ORAL ONCE
Refills: 0 | Status: COMPLETED | OUTPATIENT
Start: 2021-07-15 | End: 2021-07-15

## 2021-07-15 RX ORDER — MAGNESIUM SULFATE 500 MG/ML
2 VIAL (ML) INJECTION ONCE
Refills: 0 | Status: DISCONTINUED | OUTPATIENT
Start: 2021-07-15 | End: 2021-07-15

## 2021-07-15 RX ORDER — SODIUM CHLORIDE 9 MG/ML
1000 INJECTION, SOLUTION INTRAVENOUS
Refills: 0 | Status: DISCONTINUED | OUTPATIENT
Start: 2021-07-15 | End: 2021-07-15

## 2021-07-15 RX ADMIN — Medication 25 MILLIGRAM(S): at 17:41

## 2021-07-15 RX ADMIN — SODIUM CHLORIDE 70 MILLILITER(S): 9 INJECTION, SOLUTION INTRAVENOUS at 11:15

## 2021-07-15 RX ADMIN — FLUDROCORTISONE ACETATE 0.1 MILLIGRAM(S): 0.1 TABLET ORAL at 05:31

## 2021-07-15 RX ADMIN — MORPHINE SULFATE 2 MILLIGRAM(S): 50 CAPSULE, EXTENDED RELEASE ORAL at 05:15

## 2021-07-15 RX ADMIN — Medication 50 MILLIEQUIVALENT(S): at 08:26

## 2021-07-15 RX ADMIN — TACROLIMUS 3 MILLIGRAM(S): 5 CAPSULE ORAL at 05:30

## 2021-07-15 RX ADMIN — MORPHINE SULFATE 2 MILLIGRAM(S): 50 CAPSULE, EXTENDED RELEASE ORAL at 05:01

## 2021-07-15 RX ADMIN — Medication 25 GRAM(S): at 08:27

## 2021-07-15 RX ADMIN — SENNA PLUS 2 TABLET(S): 8.6 TABLET ORAL at 21:32

## 2021-07-15 RX ADMIN — OXYCODONE HYDROCHLORIDE 10 MILLIGRAM(S): 5 TABLET ORAL at 11:42

## 2021-07-15 RX ADMIN — OXYCODONE HYDROCHLORIDE 10 MILLIGRAM(S): 5 TABLET ORAL at 21:19

## 2021-07-15 RX ADMIN — TAMSULOSIN HYDROCHLORIDE 0.4 MILLIGRAM(S): 0.4 CAPSULE ORAL at 21:31

## 2021-07-15 RX ADMIN — TACROLIMUS 3 MILLIGRAM(S): 5 CAPSULE ORAL at 17:43

## 2021-07-15 RX ADMIN — Medication 25 GRAM(S): at 06:37

## 2021-07-15 RX ADMIN — PANTOPRAZOLE SODIUM 40 MILLIGRAM(S): 20 TABLET, DELAYED RELEASE ORAL at 05:33

## 2021-07-15 RX ADMIN — OXYCODONE HYDROCHLORIDE 10 MILLIGRAM(S): 5 TABLET ORAL at 13:17

## 2021-07-15 RX ADMIN — CHLORHEXIDINE GLUCONATE 1 APPLICATION(S): 213 SOLUTION TOPICAL at 11:44

## 2021-07-15 RX ADMIN — Medication 325 MILLIGRAM(S): at 05:30

## 2021-07-15 RX ADMIN — Medication 325 MILLIGRAM(S): at 11:44

## 2021-07-15 RX ADMIN — PANTOPRAZOLE SODIUM 40 MILLIGRAM(S): 20 TABLET, DELAYED RELEASE ORAL at 17:41

## 2021-07-15 RX ADMIN — POLYETHYLENE GLYCOL 3350 17 GRAM(S): 17 POWDER, FOR SOLUTION ORAL at 21:31

## 2021-07-15 RX ADMIN — Medication 1 MILLIGRAM(S): at 11:45

## 2021-07-15 RX ADMIN — SODIUM CHLORIDE 70 MILLILITER(S): 9 INJECTION, SOLUTION INTRAVENOUS at 21:32

## 2021-07-15 RX ADMIN — OXYCODONE HYDROCHLORIDE 10 MILLIGRAM(S): 5 TABLET ORAL at 21:31

## 2021-07-15 RX ADMIN — Medication 25 MILLIGRAM(S): at 05:31

## 2021-07-15 RX ADMIN — Medication 25 GRAM(S): at 09:33

## 2021-07-15 NOTE — PROGRESS NOTE ADULT - SUBJECTIVE AND OBJECTIVE BOX
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  15 years ago    S/P angioplasty with stent  2 cardiac stents &gt; 10 years back    Encounter for screening colonoscopy  1 year ago    Organ transplant  liver, 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        Vital Signs Last 24 Hrs  T(C): 36.7 (15 Jul 2021 08:00), Max: 37.5 (14 Jul 2021 16:00)  T(F): 98 (15 Jul 2021 08:00), Max: 99.5 (14 Jul 2021 16:00)  HR: 86 (15 Jul 2021 08:00) (76 - 102)  BP: 132/78 (15 Jul 2021 08:00) (123/75 - 157/86)  BP(mean): 101 (15 Jul 2021 08:00) (93 - 120)  RR: 22 (15 Jul 2021 08:00) (8 - 31)  SpO2: 99% (15 Jul 2021 06:51) (96% - 100%)    Pain (0-10):            Pain Control Adequate: [] YES [] N    Diet:    I&O's Detail    14 Jul 2021 07:01  -  15 Jul 2021 07:00  --------------------------------------------------------  IN:    IV PiggyBack: 50 mL    sodium chloride 0.9%: 525 mL  Total IN: 575 mL    OUT:    Voided (mL): 775 mL  Total OUT: 775 mL    Total NET: -200 mL            PHYSICAL EXAM    Appearance: Normal	  HEENT:   Normal oral mucosa, PERRL, EOMI	  Neck: Supple, - JVD;   Cardiovascular: Normal S1 S2, No JVD, No murmurs,   Respiratory: Lungs clear to auscultation/No Rales, Rhonchi, Wheezing	  Gastrointestinal:  Soft, Non-tender, + BS	  Skin: No rashes, No ecchymoses, No cyanosis  Extremities: Normal range of motion, No clubbing, cyanosis or edema  Neurologic: Non-focal  Psychiatry: A & O x 3, Mood & affect appropriate        MEDICATIONS:   MEDICATIONS  (STANDING):  aspirin enteric coated 325 milliGRAM(s) Oral daily  chlorhexidine 4% Liquid 1 Application(s) Topical daily  dextrose 40% Gel 15 Gram(s) Oral once  dextrose 50% Injectable 25 Gram(s) IV Push once  ferrous    sulfate 325 milliGRAM(s) Oral <User Schedule>  fludroCORTISONE 0.1 milliGRAM(s) Oral daily  folic acid 1 milliGRAM(s) Oral daily  glucagon  Injectable 1 milliGRAM(s) IntraMuscular once  insulin lispro (ADMELOG) corrective regimen sliding scale   SubCutaneous three times a day before meals  lactated ringers. 1000 milliLiter(s) (70 mL/Hr) IV Continuous <Continuous>  magnesium sulfate  IVPB 2 Gram(s) IV Intermittent every 2 hours  metoprolol tartrate 25 milliGRAM(s) Oral two times a day  pantoprazole  Injectable 40 milliGRAM(s) IV Push every 12 hours  senna 2 Tablet(s) Oral at bedtime  tacrolimus 3 milliGRAM(s) Oral every 12 hours  tamsulosin 0.4 milliGRAM(s) Oral at bedtime    MEDICATIONS  (PRN):  acetaminophen   Tablet .. 650 milliGRAM(s) Oral every 6 hours PRN Temp greater or equal to 38C (100.4F), Mild Pain (1 - 3)  morphine  - Injectable 2 milliGRAM(s) IV Push every 6 hours PRN Severe Pain (7 - 10)  oxyCODONE    IR 10 milliGRAM(s) Oral every 6 hours PRN Moderate Pain (4 - 6)        LAB/STUDIES:                        7.0    3.84  )-----------( 142      ( 15 Jul 2021 04:24 )             21.5     07-15    137  |  105  |  18  ----------------------------<  97  3.8   |  24  |  0.9    Ca    7.9<L>      15 Jul 2021 04:24  Mg     1.3     07-15    TPro  5.1<L>  /  Alb  2.6<L>  /  TBili  1.7<H>  /  DBili  x   /  AST  29  /  ALT  21  /  AlkPhos  234<H>  07-15    PT/INR - ( 13 Jul 2021 23:02 )   PT: 12.60 sec;   INR: 1.10 ratio         PTT - ( 13 Jul 2021 23:02 )  PTT:25.3 sec  LIVER FUNCTIONS - ( 15 Jul 2021 04:24 )  Alb: 2.6 g/dL / Pro: 5.1 g/dL / ALK PHOS: 234 U/L / ALT: 21 U/L / AST: 29 U/L / GGT: x

## 2021-07-15 NOTE — PROGRESS NOTE ADULT - ASSESSMENT
72 y/o M with PMHx JIMÉNEZ s/p liver and R renal transplant (Feb 2020 [place]), HTN, HLD, DM, CAD (s/p stents 10 yrs ago on ASA and Plavix), AFib (s/p Watchman 3 months ago) admitted for R distal femur fx. Had R femur ORIF by ortho, afterwards Hgb dropped to 6.9. CT showed hematoma in R vastus intermedius. Developed hypotension and went into AFib with RVR. Placed on levo, switched to valerio, now off pressors. Septic workup sent, started on cefepime, now off abx. Started on diltiazem for AFib, switched to esmolol drip and digoxin, weaned off to PO metoprolol. S/p 8U pRBC in total. ID, hem/onc all following.    Neuro: Currently AOx3. No acute issues.    CV: Afib RVR currently rate controlled. Off AC due to hematoma. Currently BP stable on esmolol gtt and off pressors.  #Afib s/p watchman now with RVR  - Likely d/t hemorrhagic shock  - S/p 45 days of Coumadin after Watchman 3 months ago  - Weaned off esmolol, d/c'd digoxin  - HR stable, still in AFib  - C/w metoprolol tartrate 25 mg PO BID    Pulm  #Incidental RLL subsegmental PE, R popliteal and posterior tibialis DVT  - Cannot be on AC d/t active bleeding  - Vascular to placed IVC filter yesterday    GI: Tolerating diet. Constipation.    Infectious:  #Fever: unknown source, possibly d/t hematoma -- resolved  - No longer febrile  - BCx 7/9: NGTD  - UCx 7/9: NGTD  - Procal 7/10 neg    MSK:  #Acute comminuted fracture of the distal rt femur  - s/p Fixation (OR done 07/09/2021)  - Ortho following for dressing changes   - PT/OT eval  - RLE NWB  - CTA RLE not able to be done d/t hardware which would cause artifact  - IR consulted, no angiogram to be done at this time    Renal:  #S/p renal transplant (rt sided at [facility] in 02/2020)  - Nephro on board, following   - C/w Prograf  - Nephro recs     - Hydrocortisone taper: 50 mg IV q12h for 24 hours --> 25 mg IV q12h --> d/c hydrocortisone and c/w florinef 0.1 mg PO daily     - C/w Florinef 0.1 mg PO daily    Heme:  #Anemia likely 2/2 hematoma.  - Closely monitor Hb. Transfusion goal 7-8  - Hgb 7.0 this AM, 1U pRBC given  - Trend H/H  - Heme/onc recs      - Likely d/t blood loss anemia + chronic inflammation + antirejection meds effect on marrow      - Iron studies, B12, folate, SPEP/UPEP      - Start folic acid 1 mg PO daily, PO ferrous sulfate      - Hold DAPT -- will d/w Dr Blanco to see if aspirin 324 mg can be decreased to 81 mg    Endocrine  #Hx of DM  - A1c 7/8: 5.3   - Home Januvia 100mg, holding  - Home  Linagliptin 5 mg QD, holding   70 y/o M with PMHx JIMÉNEZ s/p liver and R renal transplant (Feb 2020 [place]), HTN, HLD, DM, CAD (s/p stents 10 yrs ago on ASA and Plavix), AFib (s/p Watchman 3 months ago) admitted for R distal femur fx. Had R femur ORIF by ortho, afterwards Hgb dropped to 6.9. CT showed hematoma in R vastus intermedius. Developed hypotension and went into AFib with RVR. Placed on levo, switched to valerio, now off pressors. Septic workup sent, started on cefepime, now off abx. Started on diltiazem for AFib, switched to esmolol drip and digoxin, weaned off to PO metoprolol. S/p 8U pRBC in total. ID, hem/onc all following.    Neuro: Currently AOx3. No acute issues.    CV: Afib RVR currently rate controlled. Off AC due to hematoma. Currently BP stable on esmolol gtt and off pressors.  #Afib s/p watchman now with RVR  - Likely d/t hemorrhagic shock  - S/p 45 days of Coumadin after Watchman 3 months ago  - Weaned off esmolol, d/c'd digoxin  - HR stable, still in AFib  - C/w metoprolol tartrate 25 mg PO BID    Pulm  #Incidental RLL subsegmental PE, R popliteal and posterior tibialis DVT  - Cannot be on AC d/t active bleeding  - Vascular placed IVC filter yesterday    GI: Tolerating diet. Constipation.    Infectious:  #Fever: unknown source, possibly d/t hematoma -- resolved  - No longer febrile  - BCx 7/9: NGTD  - UCx 7/9: NGTD  - Procal 7/10 neg    MSK:  #Acute comminuted fracture of the distal rt femur  - s/p Fixation (OR done 07/09/2021)  - Ortho following for dressing changes   - PT/OT eval  - RLE NWB  - CTA RLE ordered -- Dr Roberson s/w Dr Pathak, nephrology cleared patient to receive contrast    Renal:  #S/p renal transplant (rt sided at [facility] in 02/2020)  - Nephro on board, following   - C/w Prograf  - Nephro recs     - Hydrocortisone taper: 50 mg IV q12h for 24 hours --> 25 mg IV q12h --> d/c hydrocortisone and c/w florinef 0.1 mg PO daily     - C/w Florinef 0.1 mg PO daily    Heme:  #Anemia likely 2/2 hematoma.  - Closely monitor Hb. Transfusion goal 7-8  - Hgb 7.0 this AM, 1U pRBC given  - Trend H/H  - Heme/onc recs      - Likely d/t blood loss anemia + chronic inflammation + antirejection meds effect on marrow      - Iron studies, B12, folate, SPEP/UPEP      - Start folic acid 1 mg PO daily, PO ferrous sulfate      - Hold DAPT -- will d/w Dr Blanco to see if aspirin 324 mg can be decreased to 81 mg    Endocrine  #Hx of DM  - A1c 7/8: 5.3   - Home Januvia 100mg, holding  - Home  Linagliptin 5 mg QD, holding

## 2021-07-15 NOTE — PROGRESS NOTE ADULT - ASSESSMENT
s/p  donor liver / kidney transplant (RLQ) 2020 @ [facility]  stable allograft renal function   ESRD / ESLD due to JIMÉNEZ with HRS  Right distal femoral fracture s/p orif  post op anemia  PE on CTA / DVT s/p ivc filter  s/p iv contrast   s/p b/l remote TKR  DM2  CAD / PCI / Afib    borderline low BP's  hypomagnesemia     plan:    cont prograf 0.3mg po bid  cont florinef 0.1mg po qd  off hydrocortisone   PRBC transfusion PRN  monitor h/h  for CTA LE today  ivf with iv contrast  monitor cr s/p iv contrast dye  f/u ortho / wound care  CCU care

## 2021-07-15 NOTE — PROGRESS NOTE ADULT - ATTENDING COMMENTS
s/p orif periprosthetic femur fx  dressings dry  nwb  pain control  management as per ccu
Orthopedic Attending    Patient seen and examined with resident and/or PA.  All new laboratory work-up and consults reviewed.  All new radiographs were reviewed.   Agree with findings, assessment and plan.
s/p orif periprosthetic femur fx  dressings dry  nwb  pain control  management as per ccu
#Progress Note Handoff  Pending (specify):  transfer to cardiac telemonitoring, pain control, can consider increasing oxy to 10 mg, monitor RC, follow up ortho  Family discussion: house staff updated pt family  Disposition: cardiac telemonitoring
Agree with above
s/p orif distal femur  nwb  pain control  management as per icu
liver and kidney transplant   periprostatic femur fracture   for OR with ortho today   transfer to medicine PO   no need for SICU admit PO
add dig for rate control and then taper esmolol   f/u hgb  ortho to see hematoma and f/u fx

## 2021-07-15 NOTE — PROGRESS NOTE ADULT - ASSESSMENT
ORTHO PROGRESS NOTE     71yMale POD #6 S/P right femur ORIF. Thigh pain significantly improved from yesterday. No acute events overnight.  Deniea any f,c,n,v,sob,cp     MEDICATIONS  (STANDING):  aspirin enteric coated 325 milliGRAM(s) Oral daily  cefepime   IVPB 1000 milliGRAM(s) IV Intermittent every 12 hours  dextrose 40% Gel 15 Gram(s) Oral once  dextrose 50% Injectable 25 Gram(s) IV Push once  diltiazem Infusion 5 mG/Hr (5 mL/Hr) IV Continuous <Continuous>  fludroCORTISONE 0.1 milliGRAM(s) Oral daily  glucagon  Injectable 1 milliGRAM(s) IntraMuscular once  heparin   Injectable 5000 Unit(s) SubCutaneous every 8 hours  insulin lispro (ADMELOG) corrective regimen sliding scale   SubCutaneous three times a day before meals  metoprolol tartrate 25 milliGRAM(s) Oral two times a day  norepinephrine Infusion 0.05 MICROgram(s)/kG/Min (8.44 mL/Hr) IV Continuous <Continuous>  pantoprazole    Tablet 40 milliGRAM(s) Oral before breakfast  senna 2 Tablet(s) Oral at bedtime  tacrolimus 3 milliGRAM(s) Oral every 12 hours  tamsulosin 0.4 milliGRAM(s) Oral at bedtime    MEDICATIONS  (PRN):  acetaminophen   Tablet .. 650 milliGRAM(s) Oral every 6 hours PRN Temp greater or equal to 38C (100.4F), Mild Pain (1 - 3)  oxyCODONE    IR 10 milliGRAM(s) Oral every 6 hours PRN Moderate Pain (4 - 6)      Vital Signs Last 24 Hrs  Vital Signs Last 24 Hrs  T(C): 36.7 (14 Jul 2021 06:12), Max: 37.2 (13 Jul 2021 09:00)  T(F): 98 (14 Jul 2021 06:12), Max: 98.9 (13 Jul 2021 09:00)  HR: 80 (14 Jul 2021 06:12) (78 - 102)  BP: 140/81 (14 Jul 2021 06:00) (112/66 - 165/90)  BP(mean): 116 (14 Jul 2021 06:00) (81 - 136)  RR: 12 (14 Jul 2021 06:12) (12 - 29)  SpO2: 100% (14 Jul 2021 06:12) (97% - 100%)    PE:   Dressing C/D/I   Incisions c/d/i w/ staples in place   Compartments soft and compressible  Some ecchymosis posteriorly appreciated   Motor intact distally  SILT distally  CR<2sec  palpable pulses                          7.0    3.21  )-----------( 120      ( 13 Jul 2021 04:30 )             21.0       A/P: 71yMale POD #6 S/P right femur ORIF.     OOB to Chair   NWB RLE  PT/OT  Pain control   Ext icing/elevation  Incentive Spirometry   Trend H/H - transfuse PRN  DVT Prophylaxis per CCU - currently on ASA  Ortho following

## 2021-07-15 NOTE — PROGRESS NOTE ADULT - SUBJECTIVE AND OBJECTIVE BOX
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  15 years ago  S/P angioplasty with stent  2 cardiac stents &gt; 10 years back  Encounter for screening colonoscopy  1 year ago  Organ transplant  liver, right kidney 2020      Allergies:  No Known Allergies    Home Medications Reviewed    SOCIAL HISTORY:  Denies ETOH,Smoking,   FAMILY HISTORY:  Family history of early CAD  mother    FH: ovarian cancer  mother      REVIEW OF SYSTEMS:  CONSTITUTIONAL: No weakness, fevers or chills  EYES/ENT: No visual changes;  No vertigo or throat pain   NECK: No pain or stiffness  RESPIRATORY: No cough, wheezing, hemoptysis; No shortness of breath  CARDIOVASCULAR: No chest pain or palpitations.  GASTROINTESTINAL: No abdominal or epigastric pain. No nausea, vomiting, or hematemesis; No diarrhea or constipation. No melena or hematochezia.  GENITOURINARY: No dysuria, frequency, foamy urine, urinary urgency, incontinence or hematuria  NEUROLOGICAL: No numbness or weakness  SKIN: No itching, burning, rashes, or lesions   VASCULAR: No bilateral lower extremity edema.   All other review of systems is negative unless indicated above.    PHYSICAL EXAM:  Constitutional: NAD  HEENT: anicteric sclera, oropharynx clear, dry mm  Neck: No JVD  Respiratory: CTAB, no wheezes, rales or rhonchi  Cardiovascular: S1, S2, RRR  Gastrointestinal: BS+, soft, NT/ND + scar  Extremities: No cyanosis or clubbing. No peripheral edema + RLE edema  Neurological: A/O x 3, no focal deficits  Psychiatric: Normal mood, normal affect  : No CVA tenderness. No mayen  Skin: No rashes    Hospital Medications:   MEDICATIONS  (STANDING):  aspirin enteric coated 325 milliGRAM(s) Oral daily  chlorhexidine 4% Liquid 1 Application(s) Topical daily  dextrose 40% Gel 15 Gram(s) Oral once  dextrose 50% Injectable 25 Gram(s) IV Push once  ferrous    sulfate 325 milliGRAM(s) Oral <User Schedule>  fludroCORTISONE 0.1 milliGRAM(s) Oral daily  folic acid 1 milliGRAM(s) Oral daily  glucagon  Injectable 1 milliGRAM(s) IntraMuscular once  insulin lispro (ADMELOG) corrective regimen sliding scale   SubCutaneous three times a day before meals  lactated ringers. 1000 milliLiter(s) (70 mL/Hr) IV Continuous <Continuous>  metoprolol tartrate 25 milliGRAM(s) Oral two times a day  pantoprazole  Injectable 40 milliGRAM(s) IV Push every 12 hours  senna 2 Tablet(s) Oral at bedtime  tacrolimus 3 milliGRAM(s) Oral every 12 hours  tamsulosin 0.4 milliGRAM(s) Oral at bedtime        VITALS:  T(F): 98 (07-15-21 @ 12:00), Max: 98.5 (21 @ 20:00)  HR: 100 (07-15-21 @ 16:00)  BP: 163/76 (07-15-21 @ 16:00)  RR: 20 (07-15-21 @ 16:00)  SpO2: 99% (07-15-21 @ 06:51)  Wt(kg): --     @ 07:  -   @ 07:00  --------------------------------------------------------  IN: 1560 mL / OUT: 1900 mL / NET: -340 mL     @ 07:  -  07-15 @ 07:00  --------------------------------------------------------  IN: 575 mL / OUT: 775 mL / NET: -200 mL    07-15 @ 07:  -  07-15 @ 16:59  --------------------------------------------------------  IN: 630 mL / OUT: 850 mL / NET: -220 mL          LABS:  07-15    137  |  105  |  18  ----------------------------<  97  3.8   |  24  |  0.9    Ca    7.9<L>      15 Jul 2021 04:24  Mg     1.3     07-15    TPro  5.1<L>  /  Alb  2.6<L>  /  TBili  1.7<H>  /  DBili      /  AST  29  /  ALT  21  /  AlkPhos  234<H>  07-15                          8.8    4.67  )-----------( 179      ( 15 Jul 2021 11:00 )             27.2       Urine Studies:  Urinalysis Basic - ( 2021 16:10 )    Color: Yellow / Appearance: Clear / S.031 / pH:   Gluc:  / Ketone: Small  / Bili: Negative / Urobili: <2 mg/dL   Blood:  / Protein: 30 mg/dL / Nitrite: Negative   Leuk Esterase: Negative / RBC: 5 /HPF / WBC 3 /HPF   Sq Epi:  / Non Sq Epi: 1 /HPF / Bacteria: Negative          RADIOLOGY & ADDITIONAL STUDIES:

## 2021-07-15 NOTE — PROGRESS NOTE ADULT - ASSESSMENT
IMPRESSION:    Sp fall/ R ORIF  Rapid a Fib/ hypotension/ Dec hb/ fever   Sp renal/ liver transplant/ immunosuppressed  CAD  blood loss anemia  rapid kailey b  DVT        PLAN:    CNS: Avoid CNS depressant    HEENT:  Oral care    PULMONARY:  HOB @ 45 degrees, aspiration precaution, NC keep SaO2 92 TO 96%  small PE seen on angiogram  DO not repeat pulm angiogram.  Pt sans resp symptoms and he cannot have anticoagulation  + DVT vasc s/p filter    CARDIOVASCULAR:   monitor I/O    GI: GI prophylaxis                                            Feeding  po    RENAL:  F/u  lytes.    Correct as needed.   accurate I/O, renal f/up    INFECTIOUS DISEASE:   ABX       HEMATOLOGICAL:  retic low  DVT prophylaxis.    transfuse, serial CBC  HH continues to drop despite 8 units PRBC  keep Hgb >8  ortho stats dropping HH not from leg.  check B12, folate, iron stores  hemolysis w/u    ENDOCRINE:  Follow up FS.  Insulin protocol if needed.   hydrocortisone 100 q 8     MUSCULOSKELETAL: ortho f/up     CODE STATUS: FULL CODE    DISPOSITION: If stable after filter can go to F

## 2021-07-15 NOTE — PROGRESS NOTE ADULT - ASSESSMENT
s/p IVC filter 7/14/21    Please, start AC when possible   Pt should follow up in the office in 2-3 weeks after discharge     SPECTRA [id number]

## 2021-07-15 NOTE — PROGRESS NOTE ADULT - SUBJECTIVE AND OBJECTIVE BOX
Patient is a 71y old  Male who presents with a chief complaint of Right periprosthetic femur fracture (14 Jul 2021 16:23)        HPI:  71M w/PMHx of HTN, HLD, anemia, DM, Afib, JIMÉNEZ s/p liver transplant and right renal transplant at [facility] in February 2020 (follows with his hepatologist Dr. Sunshine), CAD s/p stents (10 years ago) on ASA and Plavix, watchman procedure performed ~3 months ago by Dr. Burdick, history of bilateral knee replacements (21 years ago) presents after being sent in from outpatient office for finding of new right distal femur fracture. Patient tripped and fell while trying to get out of his car and felt right knee pain afterwards. He presented to an outpatient clinic for evaluation. XR at outpatient clinic demonstrated distal right femur fracture. He was placed in a knee immobilizer and instructed to present to the ED for further management. Patient is afebrile, HR 61, borderline BP of 93/52, saturating 99% on room air. On exam his right knee is immobilized, he has flexion and extension of the foot at the ankle joint, +DP and PT pulses, foot warm and well perfused. No other external signs of injury, denies pain anywhere else.    (07 Jul 2021 21:21)      Pt evaluated on rounds.  I reviewed the radiology tests and hospital record.    I reviewed previous notes on this patient.    Interval Events: No overnight events.    REVIEW OF SYSTEMS:   see HPI      OBJECTIVE:  ICU Vital Signs Last 24 Hrs  T(C): 36.9 (15 Jul 2021 05:25), Max: 37.7 (14 Jul 2021 08:00)  T(F): 98.4 (15 Jul 2021 05:25), Max: 99.9 (14 Jul 2021 08:00)  HR: 82 (15 Jul 2021 05:40) (80 - 102)  BP: 131/79 (15 Jul 2021 05:40) (123/75 - 157/86)  BP(mean): 93 (15 Jul 2021 05:40) (93 - 120)  RR: 23 (15 Jul 2021 05:40) (8 - 31)  SpO2: 98% (15 Jul 2021 04:00) (96% - 100%)        07-13 @ 07:01  -  07-14 @ 07:00  --------------------------------------------------------  IN: 1560 mL / OUT: 1900 mL / NET: -340 mL    07-14 @ 07:01  - 07-15 @ 05:59  --------------------------------------------------------  IN: 525 mL / OUT: 775 mL / NET: -250 mL      CAPILLARY BLOOD GLUCOSE      POCT Blood Glucose.: 110 mg/dL (14 Jul 2021 21:48)        PHYSICAL EXAM:     · CONSTITUTIONAL:   not septic appearing,   well nourished,   NAD    · ENMT:   Airway patent,   Nasal mucosa clear.  Mouth with normal mucosa.   No thrush    · EYES:   Clear bilaterally,   pupils equal,   round and reactive to light.    · CARDIAC:   Normal rate,   regular rhythm.    Heart sounds S1, S2.   No murmurs, no rubs or gallops on auscultation  no edema        CAROTID:   normal systolic impulse  no bruits    · RESPIRATORY:   rales  normal chest expansion  no retractions or use of accessory muscles  palpation of chest is normal with no fremitus  percussion of chest demonstrates no hyperresonance or dullness    · GASTROINTESTINAL:  Abdomen soft,   non-tender,   + BS  liver/spleen not palpable    · MUSCULOSKELETAL:   no clubbing, cyanosis      · SKIN:   Skin normal color for race,   warm, dry   No evidence of rash.        · HEME LYMPH:   no splenomegaly.  No cervical  lymphadenopathy.  no inguinal lymphadenopathy    HOSPITAL MEDICATIONS:  MEDICATIONS  (STANDING):  aspirin enteric coated 325 milliGRAM(s) Oral daily  chlorhexidine 4% Liquid 1 Application(s) Topical daily  dextrose 40% Gel 15 Gram(s) Oral once  dextrose 50% Injectable 25 Gram(s) IV Push once  ferrous    sulfate 325 milliGRAM(s) Oral <User Schedule>  fludroCORTISONE 0.1 milliGRAM(s) Oral daily  folic acid 1 milliGRAM(s) Oral daily  glucagon  Injectable 1 milliGRAM(s) IntraMuscular once  insulin lispro (ADMELOG) corrective regimen sliding scale   SubCutaneous three times a day before meals  metoprolol tartrate 25 milliGRAM(s) Oral two times a day  pantoprazole  Injectable 40 milliGRAM(s) IV Push every 12 hours  senna 2 Tablet(s) Oral at bedtime  tacrolimus 3 milliGRAM(s) Oral every 12 hours  tamsulosin 0.4 milliGRAM(s) Oral at bedtime    MEDICATIONS  (PRN):  acetaminophen   Tablet .. 650 milliGRAM(s) Oral every 6 hours PRN Temp greater or equal to 38C (100.4F), Mild Pain (1 - 3)  morphine  - Injectable 2 milliGRAM(s) IV Push every 6 hours PRN Severe Pain (7 - 10)  oxyCODONE    IR 10 milliGRAM(s) Oral every 6 hours PRN Moderate Pain (4 - 6)    lactated ringers.: Solution, 1000 milliLiter(s) infuse at 75 mL/Hr  Special Instructions: May DC when patient ready for PACU discharge  sodium chloride 0.9%.: Solution, 1000 milliLiter(s) infuse at 75 mL/Hr  Provider's Contact #: [phone]  sodium chloride 0.9% Bolus:   500 milliLiter(s), IV Bolus, once, infuse over 30 Minute(s), Stop After 1 Doses  lactated ringers.: Solution, 1000 milliLiter(s) infuse at 500 mL/Hr  sodium chloride 0.9% Bolus:   1500 milliLiter(s), IV Bolus, once, infuse over 30 Minute(s), Stop After 1 Doses  lactated ringers Bolus:   1000 milliLiter(s), IV Bolus, once, infuse over 30 Minute(s), Stop After 1 Doses  sodium chloride 0.9% Bolus:   250 milliLiter(s), IV Bolus, once, infuse over 15 Minute(s), Stop After 1 Doses  lactated ringers Bolus:   500 milliLiter(s), IV Bolus, once, infuse over 60 Minute(s), Stop After 1 Doses  lactated ringers.: Solution, 1000 milliLiter(s) infuse at 100 mL/Hr  lactated ringers.: Solution, 1000 milliLiter(s) infuse at 75 mL/Hr  Provider's Contact #: [phone]      LABS:                        7.0    3.84  )-----------( 142      ( 15 Jul 2021 04:24 )             21.5     07-15    137  |  105  |  18  ----------------------------<  97  3.8   |  24  |  0.9    Ca    7.9<L>      15 Jul 2021 04:24  Mg     1.3     07-15    TPro  5.1<L>  /  Alb  2.6<L>  /  TBili  1.7<H>  /  DBili  x   /  AST  29  /  ALT  21  /  AlkPhos  234<H>  07-15    PT/INR - ( 13 Jul 2021 23:02 )   PT: 12.60 sec;   INR: 1.10 ratio         PTT - ( 13 Jul 2021 23:02 )  PTT:25.3 sec            COVID-19 PCR: NotDetec (14 Jul 2021 02:36)  COVID-19 PCR: NotDetec (07 Jul 2021 14:25)              RADIOLOGY: Today I personally interpreted the latest CXR and other pertinent films.           Patient is a 71y old  Male who presents with a chief complaint of Right periprosthetic femur fracture (14 Jul 2021 16:23)        HPI:  71M w/PMHx of HTN, HLD, anemia, DM, Afib, JIMÉNEZ s/p liver transplant and right renal transplant at [facility] in February 2020 (follows with his hepatologist Dr. Sunshine), CAD s/p stents (10 years ago) on ASA and Plavix, watchman procedure performed ~3 months ago by Dr. Burdick, history of bilateral knee replacements (21 years ago) presents after being sent in from outpatient office for finding of new right distal femur fracture. Patient tripped and fell while trying to get out of his car and felt right knee pain afterwards. He presented to an outpatient clinic for evaluation. XR at outpatient clinic demonstrated distal right femur fracture. He was placed in a knee immobilizer and instructed to present to the ED for further management. Patient is afebrile, HR 61, borderline BP of 93/52, saturating 99% on room air. On exam his right knee is immobilized, he has flexion and extension of the foot at the ankle joint, +DP and PT pulses, foot warm and well perfused. No othe r external signs of injury, denies pain anywhere else.    (07 Jul 2021 21:21)      Pt evaluated on rounds.  I reviewed the radiology tests and hospital record.    I reviewed previous notes on this patient.    Interval Events: No overnight events.    REVIEW OF SYSTEMS:   see HPI      OBJECTIVE:  ICU Vital Signs Last 24 Hrs  T(C): 36.9 (15 Jul 2021 05:25), Max: 37.7 (14 Jul 2021 08:00)  T(F): 98.4 (15 Jul 2021 05:25), Max: 99.9 (14 Jul 2021 08:00)  HR: 82 (15 Jul 2021 05:40) (80 - 102)  BP: 131/79 (15 Jul 2021 05:40) (123/75 - 157/86)  BP(mean): 93 (15 Jul 2021 05:40) (93 - 120)  RR: 23 (15 Jul 2021 05:40) (8 - 31)  SpO2: 98% (15 Jul 2021 04:00) (96% - 100%)        07-13 @ 07:01  -  07-14 @ 07:00  --------------------------------------------------------  IN: 1560 mL / OUT: 1900 mL / NET: -340 mL    07-14 @ 07:01  - 07-15 @ 05:59  --------------------------------------------------------  IN: 525 mL / OUT: 775 mL / NET: -250 mL      CAPILLARY BLOOD GLUCOSE      POCT Blood Glucose.: 110 mg/dL (14 Jul 2021 21:48)        PHYSICAL EXAM:     · CONSTITUTIONAL:   not septic appearing,   well nourished,   NAD    · ENMT:   Airway patent,   Nasal mucosa clear.  Mouth with normal mucosa.   No thrush    · EYES:   Clear bilaterally,   pupils equal,   round and reactive to light.    · CARDIAC:   Normal rate,   regular rhythm.    Heart sounds S1, S2.   No murmurs, no rubs or gallops on auscultation  no edema        CAROTID:   normal systolic impulse  no bruits    · RESPIRATORY:   rales  normal chest expansion  no retractions or use of accessory muscles  palpation of chest is normal with no fremitus  percussion of chest demonstrates no hyperresonance or dullness    · GASTROINTESTINAL:  Abdomen soft,   non-tender,   + BS  liver/spleen not palpable    · MUSCULOSKELETAL:   no clubbing, cyanosis      · SKIN:   Skin normal color for race,   warm, dry   No evidence of rash.        · HEME LYMPH:   no splenomegaly.  No cervical  lymphadenopathy.  no inguinal lymphadenopathy    HOSPITAL MEDICATIONS:  MEDICATIONS  (STANDING):  aspirin enteric coated 325 milliGRAM(s) Oral daily  chlorhexidine 4% Liquid 1 Application(s) Topical daily  dextrose 40% Gel 15 Gram(s) Oral once  dextrose 50% Injectable 25 Gram(s) IV Push once  ferrous    sulfate 325 milliGRAM(s) Oral <User Schedule>  fludroCORTISONE 0.1 milliGRAM(s) Oral daily  folic acid 1 milliGRAM(s) Oral daily  glucagon  Injectable 1 milliGRAM(s) IntraMuscular once  insulin lispro (ADMELOG) corrective regimen sliding scale   SubCutaneous three times a day before meals  metoprolol tartrate 25 milliGRAM(s) Oral two times a day  pantoprazole  Injectable 40 milliGRAM(s) IV Push every 12 hours  senna 2 Tablet(s) Oral at bedtime  tacrolimus 3 milliGRAM(s) Oral every 12 hours  tamsulosin 0.4 milliGRAM(s) Oral at bedtime    MEDICATIONS  (PRN):  acetaminophen   Tablet .. 650 milliGRAM(s) Oral every 6 hours PRN Temp greater or equal to 38C (100.4F), Mild Pain (1 - 3)  morphine  - Injectable 2 milliGRAM(s) IV Push every 6 hours PRN Severe Pain (7 - 10)  oxyCODONE    IR 10 milliGRAM(s) Oral every 6 hours PRN Moderate Pain (4 - 6)    lactated ringers.: Solution, 1000 milliLiter(s) infuse at 75 mL/Hr  Special Instructions: May DC when patient ready for PACU discharge  sodium chloride 0.9%.: Solution, 1000 milliLiter(s) infuse at 75 mL/Hr  Provider's Contact #: [phone]  sodium chloride 0.9% Bolus:   500 milliLiter(s), IV Bolus, once, infuse over 30 Minute(s), Stop After 1 Doses  lactated ringers.: Solution, 1000 milliLiter(s) infuse at 500 mL/Hr  sodium chloride 0.9% Bolus:   1500 milliLiter(s), IV Bolus, once, infuse over 30 Minute(s), Stop After 1 Doses  lactated ringers Bolus:   1000 milliLiter(s), IV Bolus, once, infuse over 30 Minute(s), Stop After 1 Doses  sodium chloride 0.9% Bolus:   250 milliLiter(s), IV Bolus, once, infuse over 15 Minute(s), Stop After 1 Doses  lactated ringers Bolus:   500 milliLiter(s), IV Bolus, once, infuse over 60 Minute(s), Stop After 1 Doses  lactated ringers.: Solution, 1000 milliLiter(s) infuse at 100 mL/Hr  lactated ringers.: Solution, 1000 milliLiter(s) infuse at 75 mL/Hr  Provider's Contact #: [phone]      LABS:                        7.0    3.84  )-----------( 142      ( 15 Jul 2021 04:24 )             21.5     07-15    137  |  105  |  18  ----------------------------<  97  3.8   |  24  |  0.9    Ca    7.9<L>      15 Jul 2021 04:24  Mg     1.3     07-15    TPro  5.1<L>  /  Alb  2.6<L>  /  TBili  1.7<H>  /  DBili  x   /  AST  29  /  ALT  21  /  AlkPhos  234<H>  07-15    PT/INR - ( 13 Jul 2021 23:02 )   PT: 12.60 sec;   INR: 1.10 ratio         PTT - ( 13 Jul 2021 23:02 )  PTT:25.3 sec            COVID-19 PCR: NotDetec (14 Jul 2021 02:36)  COVID-19 PCR: NotDetec (07 Jul 2021 14:25)              RADIOLOGY: Today I personally interpreted the latest CXR and other pertinent films.

## 2021-07-15 NOTE — PROGRESS NOTE ADULT - SUBJECTIVE AND OBJECTIVE BOX
PATIENT:  SOCORRO MANUEL  [id number]      CHIEF COMPLAINT:  Patient is a 71y old  Male who presents with a chief complaint of Right periprosthetic femur fracture (15 Jul 2021 05:58)      HPI:  71M w/PMHx of HTN, HLD, anemia, DM, Afib, JIMÉNEZ s/p liver transplant and right renal transplant at [facility] in 2020 (follows with his hepatologist Dr. Sunshine), CAD s/p stents (10 years ago) on ASA and Plavix, watchman procedure performed ~3 months ago by Dr. Burdick, history of bilateral knee replacements (21 years ago) presents after being sent in from outpatient office for finding of new right distal femur fracture. Patient tripped and fell while trying to get out of his car and felt right knee pain afterwards. He presented to an outpatient clinic for evaluation. XR at outpatient clinic demonstrated distal right femur fracture. He was placed in a knee immobilizer and instructed to present to the ED for further management. Patient is afebrile, HR 61, borderline BP of 93/52, saturating 99% on room air. On exam his right knee is immobilized, he has flexion and extension of the foot at the ankle joint, +DP and PT pulses, foot warm and well perfused. No other external signs of injury, denies pain anywhere else.      INTERVAL HISTORY/OVERNIGHT EVENTS:  Patient seen and evaluated at bedside. IVC filter placed yesterday. Patient has no complaints. Hgb 7.0 this AM, 1U pRBC ordered.      MEDICATIONS:  MEDICATIONS  (STANDING):  aspirin enteric coated 325 milliGRAM(s) Oral daily  chlorhexidine 4% Liquid 1 Application(s) Topical daily  dextrose 40% Gel 15 Gram(s) Oral once  dextrose 50% Injectable 25 Gram(s) IV Push once  ferrous    sulfate 325 milliGRAM(s) Oral <User Schedule>  fludroCORTISONE 0.1 milliGRAM(s) Oral daily  folic acid 1 milliGRAM(s) Oral daily  glucagon  Injectable 1 milliGRAM(s) IntraMuscular once  insulin lispro (ADMELOG) corrective regimen sliding scale   SubCutaneous three times a day before meals  magnesium sulfate  IVPB 2 Gram(s) IV Intermittent every 2 hours  metoprolol tartrate 25 milliGRAM(s) Oral two times a day  pantoprazole  Injectable 40 milliGRAM(s) IV Push every 12 hours  potassium chloride  20 mEq/100 mL IVPB 20 milliEquivalent(s) IV Intermittent once  senna 2 Tablet(s) Oral at bedtime  tacrolimus 3 milliGRAM(s) Oral every 12 hours  tamsulosin 0.4 milliGRAM(s) Oral at bedtime    MEDICATIONS  (PRN):  acetaminophen   Tablet .. 650 milliGRAM(s) Oral every 6 hours PRN Temp greater or equal to 38C (100.4F), Mild Pain (1 - 3)  morphine  - Injectable 2 milliGRAM(s) IV Push every 6 hours PRN Severe Pain (7 - 10)  oxyCODONE    IR 10 milliGRAM(s) Oral every 6 hours PRN Moderate Pain (4 - 6)      ALLERGIES:  Allergies    No Known Allergies    Intolerances      OBJECTIVE:  ICU Vital Signs Last 24 Hrs  T(C): 36.7 (15 Jul 2021 08:00), Max: 37.5 (2021 16:00)  T(F): 98 (15 Jul 2021 08:00), Max: 99.5 (2021 16:00)  HR: 86 (15 Jul 2021 08:00) (76 - 102)  BP: 132/78 (15 Jul 2021 08:00) (123/75 - 157/86)  BP(mean): 101 (15 Jul 2021 08:00) (93 - 120)  ABP: --  ABP(mean): --  RR: 23 (15 Jul 2021 06:51) (8 - 31)  SpO2: 99% (15 Jul 2021 06:51) (96% - 100%)    CAPILLARY BLOOD GLUCOSE      POCT Blood Glucose.: 113 mg/dL (15 Jul 2021 07:59)  POCT Blood Glucose.: 110 mg/dL (2021 21:48)  POCT Blood Glucose.: 95 mg/dL (2021 16:11)  POCT Blood Glucose.: 108 mg/dL (2021 11:45)    CAPILLARY BLOOD GLUCOSE      POCT Blood Glucose.: 113 mg/dL (15 Jul 2021 07:59)    I&O's Summary    2021 07:01  -  15 Jul 2021 07:00  --------------------------------------------------------  IN: 575 mL / OUT: 775 mL / NET: -200 mL      Daily Height in cm: 175.26 (2021 08:54)    Daily Weight in k.1 (15 Jul 2021 06:00)    PHYSICAL EXAMINATION:  General: WN/WD NAD  HEENT: PERRLA, EOMI, moist mucous membranes  Neurology: A&Ox3, nonfocal, SANON x 4  Respiratory: CTA B/L, normal respiratory effort, no wheezes, crackles, rales  CV: RRR, S1S2, no murmurs, rubs or gallops  Abdominal: Soft, NT, ND +BS  Extremities: No edema, + peripheral pulses. Bandage to RLE      LABS:                          7.0    3.84  )-----------( 142      ( 15 Jul 2021 04:24 )             21.5     07-15    137  |  105  |  18  ----------------------------<  97  3.8   |  24  |  0.9    Ca    7.9<L>      15 Jul 2021 04:24  Mg     1.3     07-15    TPro  5.1<L>  /  Alb  2.6<L>  /  TBili  1.7<H>  /  DBili  x   /  AST  29  /  ALT  21  /  AlkPhos  234<H>  15    LIVER FUNCTIONS - ( 15 Jul 2021 04:24 )  Alb: 2.6 g/dL / Pro: 5.1 g/dL / ALK PHOS: 234 U/L / ALT: 21 U/L / AST: 29 U/L / GGT: x           PT/INR - ( 2021 23:02 )   PT: 12.60 sec;   INR: 1.10 ratio         PTT - ( 2021 23:02 )  PTT:25.3 sec      TELEMETRY:  No events      EKG:  < from: 12 Lead ECG (07.15.21 @ 05:15) >  Diagnosis Line Atrial fibrillation  Abnormal ECG    < end of copied text >

## 2021-07-16 LAB
ALBUMIN SERPL ELPH-MCNC: 2.7 G/DL — LOW (ref 3.5–5.2)
ALP SERPL-CCNC: 256 U/L — HIGH (ref 30–115)
ALT FLD-CCNC: 23 U/L — SIGNIFICANT CHANGE UP (ref 0–41)
ANION GAP SERPL CALC-SCNC: 8 MMOL/L — SIGNIFICANT CHANGE UP (ref 7–14)
AST SERPL-CCNC: 27 U/L — SIGNIFICANT CHANGE UP (ref 0–41)
BILIRUB SERPL-MCNC: 2.2 MG/DL — HIGH (ref 0.2–1.2)
BUN SERPL-MCNC: 15 MG/DL — SIGNIFICANT CHANGE UP (ref 10–20)
CALCIUM SERPL-MCNC: 8 MG/DL — LOW (ref 8.5–10.1)
CHLORIDE SERPL-SCNC: 104 MMOL/L — SIGNIFICANT CHANGE UP (ref 98–110)
CO2 SERPL-SCNC: 26 MMOL/L — SIGNIFICANT CHANGE UP (ref 17–32)
CREAT SERPL-MCNC: 1 MG/DL — SIGNIFICANT CHANGE UP (ref 0.7–1.5)
GLUCOSE BLDC GLUCOMTR-MCNC: 101 MG/DL — HIGH (ref 70–99)
GLUCOSE BLDC GLUCOMTR-MCNC: 119 MG/DL — HIGH (ref 70–99)
GLUCOSE BLDC GLUCOMTR-MCNC: 121 MG/DL — HIGH (ref 70–99)
GLUCOSE BLDC GLUCOMTR-MCNC: 128 MG/DL — HIGH (ref 70–99)
GLUCOSE SERPL-MCNC: 100 MG/DL — HIGH (ref 70–99)
HCT VFR BLD CALC: 26.7 % — LOW (ref 42–52)
HCT VFR BLD CALC: 27.6 % — LOW (ref 42–52)
HGB BLD-MCNC: 8.5 G/DL — LOW (ref 14–18)
HGB BLD-MCNC: 8.8 G/DL — LOW (ref 14–18)
MAGNESIUM SERPL-MCNC: 1.7 MG/DL — LOW (ref 1.8–2.4)
MCHC RBC-ENTMCNC: 27.4 PG — SIGNIFICANT CHANGE UP (ref 27–31)
MCHC RBC-ENTMCNC: 27.6 PG — SIGNIFICANT CHANGE UP (ref 27–31)
MCHC RBC-ENTMCNC: 31.8 G/DL — LOW (ref 32–37)
MCHC RBC-ENTMCNC: 31.9 G/DL — LOW (ref 32–37)
MCV RBC AUTO: 86.1 FL — SIGNIFICANT CHANGE UP (ref 80–94)
MCV RBC AUTO: 86.5 FL — SIGNIFICANT CHANGE UP (ref 80–94)
NRBC # BLD: 0 /100 WBCS — SIGNIFICANT CHANGE UP (ref 0–0)
NRBC # BLD: 0 /100 WBCS — SIGNIFICANT CHANGE UP (ref 0–0)
PLATELET # BLD AUTO: 191 K/UL — SIGNIFICANT CHANGE UP (ref 130–400)
PLATELET # BLD AUTO: 192 K/UL — SIGNIFICANT CHANGE UP (ref 130–400)
POTASSIUM SERPL-MCNC: 4.4 MMOL/L — SIGNIFICANT CHANGE UP (ref 3.5–5)
POTASSIUM SERPL-SCNC: 4.4 MMOL/L — SIGNIFICANT CHANGE UP (ref 3.5–5)
PROT SERPL-MCNC: 5.2 G/DL — LOW (ref 6–8.3)
PROT SERPL-MCNC: 5.2 G/DL — LOW (ref 6–8.3)
PROT SERPL-MCNC: 5.4 G/DL — LOW (ref 6–8)
RBC # BLD: 3.1 M/UL — LOW (ref 4.7–6.1)
RBC # BLD: 3.19 M/UL — LOW (ref 4.7–6.1)
RBC # FLD: 15 % — HIGH (ref 11.5–14.5)
RBC # FLD: 15.3 % — HIGH (ref 11.5–14.5)
SODIUM SERPL-SCNC: 138 MMOL/L — SIGNIFICANT CHANGE UP (ref 135–146)
WBC # BLD: 5.18 K/UL — SIGNIFICANT CHANGE UP (ref 4.8–10.8)
WBC # BLD: 5.55 K/UL — SIGNIFICANT CHANGE UP (ref 4.8–10.8)
WBC # FLD AUTO: 5.18 K/UL — SIGNIFICANT CHANGE UP (ref 4.8–10.8)
WBC # FLD AUTO: 5.55 K/UL — SIGNIFICANT CHANGE UP (ref 4.8–10.8)

## 2021-07-16 PROCEDURE — 99232 SBSQ HOSP IP/OBS MODERATE 35: CPT

## 2021-07-16 RX ORDER — MAGNESIUM SULFATE 500 MG/ML
2 VIAL (ML) INJECTION ONCE
Refills: 0 | Status: COMPLETED | OUTPATIENT
Start: 2021-07-16 | End: 2021-07-16

## 2021-07-16 RX ORDER — PANTOPRAZOLE SODIUM 20 MG/1
40 TABLET, DELAYED RELEASE ORAL
Refills: 0 | Status: DISCONTINUED | OUTPATIENT
Start: 2021-07-16 | End: 2021-07-20

## 2021-07-16 RX ORDER — ASPIRIN/CALCIUM CARB/MAGNESIUM 324 MG
81 TABLET ORAL DAILY
Refills: 0 | Status: DISCONTINUED | OUTPATIENT
Start: 2021-07-16 | End: 2021-07-20

## 2021-07-16 RX ADMIN — Medication 25 GRAM(S): at 07:10

## 2021-07-16 RX ADMIN — Medication 25 MILLIGRAM(S): at 17:41

## 2021-07-16 RX ADMIN — TAMSULOSIN HYDROCHLORIDE 0.4 MILLIGRAM(S): 0.4 CAPSULE ORAL at 22:38

## 2021-07-16 RX ADMIN — Medication 81 MILLIGRAM(S): at 11:58

## 2021-07-16 RX ADMIN — Medication 25 MILLIGRAM(S): at 05:20

## 2021-07-16 RX ADMIN — OXYCODONE HYDROCHLORIDE 10 MILLIGRAM(S): 5 TABLET ORAL at 02:25

## 2021-07-16 RX ADMIN — TACROLIMUS 3 MILLIGRAM(S): 5 CAPSULE ORAL at 17:42

## 2021-07-16 RX ADMIN — Medication 1 MILLIGRAM(S): at 11:02

## 2021-07-16 RX ADMIN — OXYCODONE HYDROCHLORIDE 10 MILLIGRAM(S): 5 TABLET ORAL at 11:58

## 2021-07-16 RX ADMIN — FLUDROCORTISONE ACETATE 0.1 MILLIGRAM(S): 0.1 TABLET ORAL at 05:20

## 2021-07-16 RX ADMIN — OXYCODONE HYDROCHLORIDE 10 MILLIGRAM(S): 5 TABLET ORAL at 02:24

## 2021-07-16 RX ADMIN — PANTOPRAZOLE SODIUM 40 MILLIGRAM(S): 20 TABLET, DELAYED RELEASE ORAL at 05:19

## 2021-07-16 RX ADMIN — CHLORHEXIDINE GLUCONATE 1 APPLICATION(S): 213 SOLUTION TOPICAL at 11:01

## 2021-07-16 RX ADMIN — PANTOPRAZOLE SODIUM 40 MILLIGRAM(S): 20 TABLET, DELAYED RELEASE ORAL at 09:14

## 2021-07-16 RX ADMIN — SENNA PLUS 2 TABLET(S): 8.6 TABLET ORAL at 22:38

## 2021-07-16 RX ADMIN — TACROLIMUS 3 MILLIGRAM(S): 5 CAPSULE ORAL at 05:20

## 2021-07-16 RX ADMIN — OXYCODONE HYDROCHLORIDE 10 MILLIGRAM(S): 5 TABLET ORAL at 12:11

## 2021-07-16 RX ADMIN — OXYCODONE HYDROCHLORIDE 10 MILLIGRAM(S): 5 TABLET ORAL at 22:42

## 2021-07-16 NOTE — PROGRESS NOTE ADULT - SUBJECTIVE AND OBJECTIVE BOX
PATIENT:  SOCORRO MANUEL  [id number]      CHIEF COMPLAINT:  Patient is a 71y old  Male who presents with a chief complaint of Right periprosthetic femur fracture (2021 05:43)      HPI:  71M w/PMHx of HTN, HLD, anemia, DM, Afib, JIMÉNEZ s/p liver transplant and right renal transplant at [facility] in 2020 (follows with his hepatologist Dr. Sunshine), CAD s/p stents (10 years ago) on ASA and Plavix, watchman procedure performed ~3 months ago by Dr. Burdick, history of bilateral knee replacements (21 years ago) presents after being sent in from outpatient office for finding of new right distal femur fracture. Patient tripped and fell while trying to get out of his car and felt right knee pain afterwards. He presented to an outpatient clinic for evaluation. XR at outpatient clinic demonstrated distal right femur fracture. He was placed in a knee immobilizer and instructed to present to the ED for further management. Patient is afebrile, HR 61, borderline BP of 93/52, saturating 99% on room air. On exam his right knee is immobilized, he has flexion and extension of the foot at the ankle joint, +DP and PT pulses, foot warm and well perfused. No other external signs of injury, denies pain anywhere else.      INTERVAL HISTORY/OVERNIGHT EVENTS:  Patient seen and evaluated at bedside. No events overnight. Patient has no new complaints. CTA RLE shows large hematoma in the region of vastus intermedius, no sign of extravasation (however imaging is limited d/t extensive artifact). Central line d/c'd yesterday      MEDICATIONS:  MEDICATIONS  (STANDING):  chlorhexidine 4% Liquid 1 Application(s) Topical daily  dextrose 40% Gel 15 Gram(s) Oral once  dextrose 50% Injectable 25 Gram(s) IV Push once  ferrous    sulfate 325 milliGRAM(s) Oral <User Schedule>  fludroCORTISONE 0.1 milliGRAM(s) Oral daily  folic acid 1 milliGRAM(s) Oral daily  glucagon  Injectable 1 milliGRAM(s) IntraMuscular once  insulin lispro (ADMELOG) corrective regimen sliding scale   SubCutaneous three times a day before meals  metoprolol tartrate 25 milliGRAM(s) Oral two times a day  pantoprazole  Injectable 40 milliGRAM(s) IV Push every 12 hours  senna 2 Tablet(s) Oral at bedtime  tacrolimus 3 milliGRAM(s) Oral every 12 hours  tamsulosin 0.4 milliGRAM(s) Oral at bedtime    MEDICATIONS  (PRN):  acetaminophen   Tablet .. 650 milliGRAM(s) Oral every 6 hours PRN Temp greater or equal to 38C (100.4F), Mild Pain (1 - 3)  morphine  - Injectable 2 milliGRAM(s) IV Push every 6 hours PRN Severe Pain (7 - 10)  oxyCODONE    IR 10 milliGRAM(s) Oral every 6 hours PRN Moderate Pain (4 - 6)  polyethylene glycol 3350 17 Gram(s) Oral daily PRN Constipation      ALLERGIES:  Allergies    No Known Allergies    Intolerances      OBJECTIVE:  ICU Vital Signs Last 24 Hrs  T(C): 37.1 (2021 04:00), Max: 37.3 (2021 00:00)  T(F): 98.8 (2021 04:00), Max: 99.1 (2021 00:00)  HR: 60 (2021 06:00) (60 - 106)  BP: 143/67 (2021 06:00) (99/84 - 163/76)  BP(mean): 103 (2021 06:00) (83 - 121)  ABP: --  ABP(mean): --  RR: 18 (2021 06:00) (17 - 22)  SpO2: 96% (2021 06:00) (95% - 97%)      CAPILLARY BLOOD GLUCOSE      POCT Blood Glucose.: 121 mg/dL (2021 07:39)  POCT Blood Glucose.: 121 mg/dL (15 Jul 2021 21:40)  POCT Blood Glucose.: 421 mg/dL (15 Jul 2021 21:39)  POCT Blood Glucose.: 112 mg/dL (15 Jul 2021 16:28)  POCT Blood Glucose.: 192 mg/dL (15 Jul 2021 11:34)  POCT Blood Glucose.: 113 mg/dL (15 Jul 2021 07:59)    CAPILLARY BLOOD GLUCOSE      POCT Blood Glucose.: 121 mg/dL (2021 07:39)    I&O's Summary    15 Jul 2021 07:01  -  2021 07:00  --------------------------------------------------------  IN: 2620 mL / OUT: 1425 mL / NET: 1195 mL      Daily     Daily Weight in k.1 (2021 06:00)      PHYSICAL EXAMINATION:  General: WN/WD NAD  HEENT: PERRLA, EOMI, moist mucous membranes  Neurology: A&Ox3, nonfocal, SANON x 4  Respiratory: CTA B/L, normal respiratory effort, no wheezes, crackles, rales  CV: RRR, S1S2, no murmurs, rubs or gallops  Abdominal: Soft, NT, ND +BS  Extremities: RLE in ace bandage    LABS:                        8.8    5.18  )-----------( 192      ( 2021 00:17 )             27.6     07-16    138  |  104  |  15  ----------------------------<  100<H>  4.4   |  26  |  1.0    Ca    8.0<L>      2021 05:10  Mg     1.7     -    TPro  5.4<L>  /  Alb  2.7<L>  /  TBili  2.2<H>  /  DBili  x   /  AST  27  /  ALT  23  /  AlkPhos  256<H>      LIVER FUNCTIONS - ( 2021 05:10 )  Alb: 2.7 g/dL / Pro: 5.4 g/dL / ALK PHOS: 256 U/L / ALT: 23 U/L / AST: 27 U/L / GGT: x             TELEMETRY:  No events    IMAGING:  < from: CT Angio Lower Extremity w/ IV Cont, Right (07.15.21 @ 15:51) >  IMPRESSION:    1. A large hematoma measuring 12.5 cm in length 4 cm in transverse diameter and 11.5 cm in AP diameter are seen lateral to the right femur in the region of the vastus intermedius.    2. Although no active extravasation is identified, this possibility cannot be excludedby this examination, due to the extensive streak artifact created by the orthopedic hardware.    < end of copied text >   PATIENT:  SOCORRO MANUEL  [id number]      CHIEF COMPLAINT:  Patient is a 71y old  Male who presents with a chief complaint of Right periprosthetic femur fracture (2021 07:40)      HPI:  71M w/PMHx of HTN, HLD, anemia, DM, Afib, JIMÉNEZ s/p liver transplant and right renal transplant at [facility] in 2020 (follows with his hepatologist Dr. Sunshine), CAD s/p stents (10 years ago) on ASA and Plavix, watchman procedure performed ~3 months ago by Dr. Burdick, history of bilateral knee replacements (21 years ago) presents after being sent in from outpatient office for finding of new right distal femur fracture. Patient tripped and fell while trying to get out of his car and felt right knee pain afterwards. He presented to an outpatient clinic for evaluation. XR at outpatient clinic demonstrated distal right femur fracture. He was placed in a knee immobilizer and instructed to present to the ED for further management. Patient is afebrile, HR 61, borderline BP of 93/52, saturating 99% on room air. On exam his right knee is immobilized, he has flexion and extension of the foot at the ankle joint, +DP and PT pulses, foot warm and well perfused. No other external signs of injury, denies pain anywhere else.      INTERVAL HISTORY/OVERNIGHT EVENTS:  Patient seen and evaluated at bedside. No events overnight. Patient has no new complaints. CTA RLE shows large hematoma in the region of vastus intermedius, no sign of extravasation (however imaging is limited d/t extensive artifact). Central line d/c'd yesterday      MEDICATIONS:  MEDICATIONS  (STANDING):  chlorhexidine 4% Liquid 1 Application(s) Topical daily  dextrose 40% Gel 15 Gram(s) Oral once  dextrose 50% Injectable 25 Gram(s) IV Push once  ferrous    sulfate 325 milliGRAM(s) Oral <User Schedule>  fludroCORTISONE 0.1 milliGRAM(s) Oral daily  folic acid 1 milliGRAM(s) Oral daily  glucagon  Injectable 1 milliGRAM(s) IntraMuscular once  insulin lispro (ADMELOG) corrective regimen sliding scale   SubCutaneous three times a day before meals  metoprolol tartrate 25 milliGRAM(s) Oral two times a day  pantoprazole    Tablet 40 milliGRAM(s) Oral before breakfast  senna 2 Tablet(s) Oral at bedtime  tacrolimus 3 milliGRAM(s) Oral every 12 hours  tamsulosin 0.4 milliGRAM(s) Oral at bedtime    MEDICATIONS  (PRN):  acetaminophen   Tablet .. 650 milliGRAM(s) Oral every 6 hours PRN Temp greater or equal to 38C (100.4F), Mild Pain (1 - 3)  morphine  - Injectable 2 milliGRAM(s) IV Push every 6 hours PRN Severe Pain (7 - 10)  oxyCODONE    IR 10 milliGRAM(s) Oral every 6 hours PRN Moderate Pain (4 - 6)  polyethylene glycol 3350 17 Gram(s) Oral daily PRN Constipation      ALLERGIES:  Allergies    No Known Allergies    Intolerances      OBJECTIVE:  ICU Vital Signs Last 24 Hrs  T(C): 37.1 (2021 04:00), Max: 37.3 (2021 00:00)  T(F): 98.8 (2021 04:00), Max: 99.1 (2021 00:00)  HR: 60 (2021 06:00) (60 - 106)  BP: 143/67 (2021 06:00) (99/84 - 163/76)  BP(mean): 103 (2021 06:00) (83 - 121)  ABP: --  ABP(mean): --  RR: 18 (2021 06:00) (17 - 22)  SpO2: 96% (2021 06:00) (95% - 97%)      CAPILLARY BLOOD GLUCOSE      POCT Blood Glucose.: 121 mg/dL (2021 07:39)  POCT Blood Glucose.: 121 mg/dL (15 Jul 2021 21:40)  POCT Blood Glucose.: 421 mg/dL (15 Jul 2021 21:39)  POCT Blood Glucose.: 112 mg/dL (15 Jul 2021 16:28)  POCT Blood Glucose.: 192 mg/dL (15 Jul 2021 11:34)  POCT Blood Glucose.: 113 mg/dL (15 Jul 2021 07:59)    CAPILLARY BLOOD GLUCOSE      POCT Blood Glucose.: 121 mg/dL (2021 07:39)    I&O's Summary    15 Jul 2021 07:01  -  2021 07:00  --------------------------------------------------------  IN: 2620 mL / OUT: 1425 mL / NET: 1195 mL      Daily     Daily Weight in k.1 (2021 06:00)    PHYSICAL EXAMINATION:  General: WN/WD NAD  HEENT: PERRLA, EOMI, moist mucous membranes  Neurology: A&Ox3, nonfocal, SANON x 4  Respiratory: CTA B/L, normal respiratory effort, no wheezes, crackles, rales  CV: RRR, S1S2, no murmurs, rubs or gallops  Abdominal: Soft, NT, ND +BS, Last BM  Extremities: No edema, + peripheral pulses      LABS:                          8.5    5.55  )-----------( 191      ( 2021 05:10 )             26.7     07-    138  |  104  |  15  ----------------------------<  100<H>  4.4   |  26  |  1.0    Ca    8.0<L>      2021 05:10  Mg     1.7         TPro  5.4<L>  /  Alb  2.7<L>  /  TBili  2.2<H>  /  DBili  x   /  AST  27  /  ALT  23  /  AlkPhos  256<H>      LIVER FUNCTIONS - ( 2021 05:10 )  Alb: 2.7 g/dL / Pro: 5.4 g/dL / ALK PHOS: 256 U/L / ALT: 23 U/L / AST: 27 U/L / GGT: x             TELEMETRY:  No events      IMAGING:  < from: CT Angio Lower Extremity w/ IV Cont, Right (07.15.21 @ 15:51) >  IMPRESSION:    1. A large hematoma measuring 12.5 cm in length 4 cm in transverse diameter and 11.5 cm in AP diameter are seen lateral to the right femur in the region of the vastus intermedius.    2. Although no active extravasation is identified, this possibility cannot be excludedby this examination, due to the extensive streak artifact created by the orthopedic hardware.    < end of copied text >

## 2021-07-16 NOTE — PROGRESS NOTE ADULT - SUBJECTIVE AND OBJECTIVE BOX
NEPHROLOGY FOLLOW UP NOTE    transferred to floor  cr same  h/h finally stable  CT LE noted      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      Allergies:  No Known Allergies    Home Medications Reviewed    SOCIAL HISTORY:  Denies ETOH,Smoking,   FAMILY HISTORY:  Family history of early CAD  mother    FH: ovarian cancer  mother      REVIEW OF SYSTEMS:  CONSTITUTIONAL: No weakness, fevers or chills  EYES/ENT: No visual changes;  No vertigo or throat pain   NECK: No pain or stiffness  RESPIRATORY: No cough, wheezing, hemoptysis; No shortness of breath  CARDIOVASCULAR: No chest pain or palpitations.  GASTROINTESTINAL: No abdominal or epigastric pain. No nausea, vomiting, or hematemesis; No diarrhea or constipation. No melena or hematochezia.  GENITOURINARY: No dysuria, frequency, foamy urine, urinary urgency, incontinence or hematuria  NEUROLOGICAL: No numbness or weakness  SKIN: No itching, burning, rashes, or lesions   VASCULAR: No bilateral lower extremity edema.   All other review of systems is negative unless indicated above.    PHYSICAL EXAM:  Constitutional: NAD  HEENT: anicteric sclera, oropharynx clear, dry mm  Neck: No JVD  Respiratory: CTAB, no wheezes, rales or rhonchi  Cardiovascular: S1, S2, RRR  Gastrointestinal: BS+, soft, NT/ND + scar  Extremities: No cyanosis or clubbing. No peripheral edema + RLE edema  Neurological: A/O x 3, no focal deficits  Psychiatric: Normal mood, normal affect  : No CVA tenderness. No mayen  Skin: No rashes      Hospital Medications:   MEDICATIONS  (STANDING):  aspirin  chewable 81 milliGRAM(s) Oral daily  chlorhexidine 4% Liquid 1 Application(s) Topical daily  dextrose 40% Gel 15 Gram(s) Oral once  dextrose 50% Injectable 25 Gram(s) IV Push once  ferrous    sulfate 325 milliGRAM(s) Oral <User Schedule>  fludroCORTISONE 0.1 milliGRAM(s) Oral daily  folic acid 1 milliGRAM(s) Oral daily  glucagon  Injectable 1 milliGRAM(s) IntraMuscular once  insulin lispro (ADMELOG) corrective regimen sliding scale   SubCutaneous three times a day before meals  metoprolol tartrate 25 milliGRAM(s) Oral two times a day  pantoprazole    Tablet 40 milliGRAM(s) Oral before breakfast  senna 2 Tablet(s) Oral at bedtime  tacrolimus 3 milliGRAM(s) Oral every 12 hours  tamsulosin 0.4 milliGRAM(s) Oral at bedtime        VITALS:  T(F): 98.9 (07-16-21 @ 13:17), Max: 99.1 (07-16-21 @ 00:00)  HR: 65 (07-16-21 @ 13:17)  BP: 140/67 (07-16-21 @ 13:17)  RR: 18 (07-16-21 @ 13:17)  SpO2: 98% (07-16-21 @ 13:17)  Wt(kg): --    07-14 @ 07:01  -  07-15 @ 07:00  --------------------------------------------------------  IN: 575 mL / OUT: 775 mL / NET: -200 mL    07-15 @ 07:01  -  07-16 @ 07:00  --------------------------------------------------------  IN: 2620 mL / OUT: 1425 mL / NET: 1195 mL    07-16 @ 07:01  -  07-16 @ 16:35  --------------------------------------------------------  IN: 250 mL / OUT: 400 mL / NET: -150 mL          LABS:  07-16    138  |  104  |  15  ----------------------------<  100<H>  4.4   |  26  |  1.0    Ca    8.0<L>      16 Jul 2021 05:10  Mg     1.7     07-16    TPro  5.4<L>  /  Alb  2.7<L>  /  TBili  2.2<H>  /  DBili      /  AST  27  /  ALT  23  /  AlkPhos  256<H>  07-16                          8.5    5.55  )-----------( 191      ( 16 Jul 2021 05:10 )             26.7       Urine Studies:        RADIOLOGY & ADDITIONAL STUDIES:

## 2021-07-16 NOTE — CHART NOTE - NSCHARTNOTEFT_GEN_A_CORE
CCU Transfer Note    Transfer from: CCU  Transfer to:  ( x ) Medicine    (  ) Telemetry    (  ) RCU    (  ) Palliative    (  ) Stroke Unit    (  ) _______________    HPI:  71M w/PMHx of HTN, HLD, anemia, DM, Afib, JIMÉNEZ s/p liver transplant and right renal transplant at [facility] in February 2020 (follows with his hepatologist Dr. Sunshine), CAD s/p stents (10 years ago) on ASA and Plavix, watchman procedure performed ~3 months ago by Dr. Burdick, history of bilateral knee replacements (21 years ago) presents after being sent in from outpatient office for finding of new right distal femur fracture. Patient tripped and fell while trying to get out of his car and felt right knee pain afterwards. He presented to an outpatient clinic for evaluation. XR at outpatient clinic demonstrated distal right femur fracture. He was placed in a knee immobilizer and instructed to present to the ED for further management. Patient is afebrile, HR 61, borderline BP of 93/52, saturating 99% on room air. On exam his right knee is immobilized, he has flexion and extension of the foot at the ankle joint, +DP and PT pulses, foot warm and well perfused. No other external signs of injury, denies pain anywhere else.    CCU COURSE:  70 y/o M with PMHx JIMÉNEZ s/p liver and R renal transplant (Feb 2020 [place]), HTN, HLD, DM, CAD (s/p stents 10 yrs ago on ASA and Plavix), AFib (s/p Watchman 3 months ago) admitted for R distal femur fx. Had R femur ORIF by ortho, afterwards Hgb dropped to 6.9. CT showed hematoma in R vastus intermedius. Developed hypotension and was on levo --> valerio --> weaned off pressors. Patient also went into AFib w/ RVR, treated with diltiazem, esmolol drip, and digoxin, weaned off, only on PO metoprolol currently. S/p 8U total pRBC for low H/H. CTA Abd/Pelvis: Incidental RLL subsegmental PE. Duplex BLE significant for DVT. IVC filter by vascular 7/14. Hem consulted, think anemia likely d/t blood loss anemia + chronic inflammation + effect from antirejection meds on bone marrow. B12, folate, iron studies ordered. Folic aicid and ferrous sulfate started. CTA RLE shows large hematoma lateral to R femur in region of vastus intermedius, no extravasation (study limited d/t extensive artifact).    H/H is stabilizing. No longer on pressors or drips. Central line d/c'd yesterday. Patient is stable for downgrade to medicine.    MEDICATIONS:  STANDING MEDICATIONS  chlorhexidine 4% Liquid 1 Application(s) Topical daily  dextrose 40% Gel 15 Gram(s) Oral once  dextrose 50% Injectable 25 Gram(s) IV Push once  ferrous    sulfate 325 milliGRAM(s) Oral <User Schedule>  fludroCORTISONE 0.1 milliGRAM(s) Oral daily  folic acid 1 milliGRAM(s) Oral daily  glucagon  Injectable 1 milliGRAM(s) IntraMuscular once  insulin lispro (ADMELOG) corrective regimen sliding scale   SubCutaneous three times a day before meals  metoprolol tartrate 25 milliGRAM(s) Oral two times a day  pantoprazole    Tablet 40 milliGRAM(s) Oral before breakfast  senna 2 Tablet(s) Oral at bedtime  tacrolimus 3 milliGRAM(s) Oral every 12 hours  tamsulosin 0.4 milliGRAM(s) Oral at bedtime    PRN MEDICATIONS  acetaminophen   Tablet .. 650 milliGRAM(s) Oral every 6 hours PRN  morphine  - Injectable 2 milliGRAM(s) IV Push every 6 hours PRN  oxyCODONE    IR 10 milliGRAM(s) Oral every 6 hours PRN  polyethylene glycol 3350 17 Gram(s) Oral daily PRN      VITAL SIGNS: Last 24 Hours  T(C): 37.1 (16 Jul 2021 04:00), Max: 37.3 (16 Jul 2021 00:00)  T(F): 98.8 (16 Jul 2021 04:00), Max: 99.1 (16 Jul 2021 00:00)  HR: 60 (16 Jul 2021 06:00) (60 - 106)  BP: 143/67 (16 Jul 2021 06:00) (99/84 - 163/76)  BP(mean): 103 (16 Jul 2021 06:00) (83 - 121)  RR: 18 (16 Jul 2021 06:00) (17 - 22)  SpO2: 96% (16 Jul 2021 06:00) (95% - 97%)    LABS:                        8.5    5.55  )-----------( 191      ( 16 Jul 2021 05:10 )             26.7     07-16    138  |  104  |  15  ----------------------------<  100<H>  4.4   |  26  |  1.0    Ca    8.0<L>      16 Jul 2021 05:10  Mg     1.7     07-16    TPro  5.4<L>  /  Alb  2.7<L>  /  TBili  2.2<H>  /  DBili  x   /  AST  27  /  ALT  23  /  AlkPhos  256<H>  07-16      RADIOLOGY:  < from: CT Angio Lower Extremity w/ IV Cont, Right (07.15.21 @ 15:51) >    IMPRESSION:    1. A large hematoma measuring 12.5 cm in length 4 cm in transverse diameter and 11.5 cm in AP diameter are seen lateral to the right femur in the region of the vastus intermedius.    2. Although no active extravasation is identified, this possibility cannot be excludedby this examination, due to the extensive streak artifact created by the orthopedic hardware.    < end of copied text >    < from: [facility] Duplex Lower Ext Vein Scan, Bilat (07.13.21 @ 19:07) >    IMPRESSION:  No evidence of deep venous thrombosis in the left lower extremity.      Acute right popliteal and posterior tibial vein DVT. Right gastrocnemius vein thrombosis.    < end of copied text >    < from: CT Angio Lower Extremity w/ IV Cont, Right (07.12.21 @ 16:24) >    IMPRESSION:    1. The pulmonary arteries are partially visualized. Right lower lobe pulmonary emboli. Full pulmonary CTA recommended for confirmation and to fully evaluate the full chest.    2. The lower extremity CTA portion of the study was canceled by vascular surgery due to the extensive metallic hardware that would produce extensive artifact. The  films show bilateral knee arthroplasties and a screw and plate prosthesis involving the shaft of the right femur extending to the femoral condyles.    3. No vascular abnormalities within the abdomen and pelvis.    4. Transplanted kidney in the right iliac fossa demonstrating no evidence of vascular abnormality or hydronephrosis.    < end of copied text >      ASSESSMENT & PLAN:   70 y/o M with PMHx JIMÉNEZ s/p liver and R renal transplant (Feb 2020 [place]), HTN, HLD, DM, CAD (s/p stents 10 yrs ago, on ASA and Plavix), AFib (s/p Watchman 3 months ago) admitted for R distal femur fx. Had R femur ORIF by ortho, afterwards Hgb dropped to 6.9. CT showed hematoma in R vastus intermedius. Developed hypotension and went into AFib with RVR. Placed on levo, switched to valerio, now off pressors. Septic workup sent, as patient was febrile, started on cefepime, now off abx. Started on diltiazem for AFib, switched to esmolol drip and digoxin, weaned off to PO metoprolol. S/p 8U pRBC in total. Ortho, nephro, ID, hem/onc all following.    Neuro: Currently AOx3. No acute issues.    CV: Afib RVR currently rate controlled. Off AC due to hematoma. Currently BP stable on esmolol gtt and off pressors.  #Afib s/p watchman now with RVR  - Likely d/t hemorrhagic shock  - S/p 45 days of Coumadin after Watchman 3 months ago  - Weaned off esmolol, d/c'd digoxin  - HR stable, still in AFib  - C/w metoprolol tartrate 25 mg PO BID    Pulm  #Incidental RLL subsegmental PE, R popliteal and posterior tibialis DVT  - Cannot be on AC d/t active bleeding  - S/p IVC filter placed 7/14    GI: Tolerating diet. Constipation.    Infectious:  #Fever: unknown source, possibly d/t hematoma -- resolved  - No longer febrile  - BCx 7/9: NGTD  - UCx 7/9: NGTD  - Procal 7/10 neg    MSK:  #Acute comminuted fracture of the distal rt femur  - s/p Fixation (OR done 07/09/2021)  - Ortho following for dressing changes   - PT/OT following  - RLE NWB  - CTA RLE showing large hematoma in region of R vastus intermedius    Renal:  #S/p renal transplant (rt sided at [facility] in 02/2020)  - Nephro on board, following   - C/w Prograf  - C/w Florinef 0.1 mg PO daily    Heme:  #Anemia likely 2/2 hematoma.  - Closely monitor Hb. Transfusion goal 7-8  - Hgb 8.8 yesterday night, Hgb 8.5 this AM  - Trend H/H  - Heme/onc recs      - Likely d/t blood loss anemia + chronic inflammation + antirejection meds effect on marrow      - Iron studies, B12, folate, SPEP/UPEP      - Start folic acid 1 mg PO daily, PO ferrous sulfate      - Hold DAPT    Endocrine  #Hx of DM  - A1c 7/8: 5.3  - Lispro SS  - Home Januvia 100mg, holding  - Home  Linagliptin 5 mg QD, holding    For Follow-Up:  [ ] Trend H/H CCU Transfer Note    Transfer from: CCU  Transfer to:  ( x ) Medicine    (  ) Telemetry    (  ) RCU    (  ) Palliative    (  ) Stroke Unit    (  ) _______________    HPI:  71M w/PMHx of HTN, HLD, anemia, DM, Afib, JIMÉNEZ s/p liver transplant and right renal transplant at [facility] in February 2020 (follows with his hepatologist Dr. Sunshine), CAD s/p stents (10 years ago) on ASA and Plavix, watchman procedure performed ~3 months ago by Dr. Burdick, history of bilateral knee replacements (21 years ago) presents after being sent in from outpatient office for finding of new right distal femur fracture. Patient tripped and fell while trying to get out of his car and felt right knee pain afterwards. He presented to an outpatient clinic for evaluation. XR at outpatient clinic demonstrated distal right femur fracture. He was placed in a knee immobilizer and instructed to present to the ED for further management. Patient is afebrile, HR 61, borderline BP of 93/52, saturating 99% on room air. On exam his right knee is immobilized, he has flexion and extension of the foot at the ankle joint, +DP and PT pulses, foot warm and well perfused. No other external signs of injury, denies pain anywhere else.    CCU COURSE:  72 y/o M with PMHx JIMÉNEZ s/p liver and R renal transplant (Feb 2020 [place]), HTN, HLD, DM, CAD (s/p stents 10 yrs ago on ASA and Plavix), AFib (s/p Watchman 3 months ago) admitted for R distal femur fx. Had R femur ORIF by ortho, afterwards Hgb dropped to 6.9. CT showed hematoma in R vastus intermedius. Developed hypotension and was on levo --> valerio --> weaned off pressors. Patient also went into AFib w/ RVR, treated with diltiazem, esmolol drip, and digoxin, weaned off, only on PO metoprolol currently. S/p 8U total pRBC for low H/H. CTA Abd/Pelvis: Incidental RLL subsegmental PE. Duplex BLE significant for DVT. IVC filter by vascular 7/14. Hem consulted, think anemia likely d/t blood loss anemia + chronic inflammation + effect from antirejection meds on bone marrow. B12, folate, iron studies ordered. Folic aicid and ferrous sulfate started. CTA RLE shows large hematoma lateral to R femur in region of vastus intermedius, no extravasation (study limited d/t extensive artifact).    H/H is stabilizing. No longer on pressors or drips. Central line d/c'd yesterday. Spoke with EP, patient can be on aspirin 81 mg PO (was previously on aspirin 325). Patient is stable for downgrade to medicine.    MEDICATIONS:  STANDING MEDICATIONS  chlorhexidine 4% Liquid 1 Application(s) Topical daily  dextrose 40% Gel 15 Gram(s) Oral once  dextrose 50% Injectable 25 Gram(s) IV Push once  ferrous    sulfate 325 milliGRAM(s) Oral <User Schedule>  fludroCORTISONE 0.1 milliGRAM(s) Oral daily  folic acid 1 milliGRAM(s) Oral daily  glucagon  Injectable 1 milliGRAM(s) IntraMuscular once  insulin lispro (ADMELOG) corrective regimen sliding scale   SubCutaneous three times a day before meals  metoprolol tartrate 25 milliGRAM(s) Oral two times a day  pantoprazole    Tablet 40 milliGRAM(s) Oral before breakfast  senna 2 Tablet(s) Oral at bedtime  tacrolimus 3 milliGRAM(s) Oral every 12 hours  tamsulosin 0.4 milliGRAM(s) Oral at bedtime    PRN MEDICATIONS  acetaminophen   Tablet .. 650 milliGRAM(s) Oral every 6 hours PRN  morphine  - Injectable 2 milliGRAM(s) IV Push every 6 hours PRN  oxyCODONE    IR 10 milliGRAM(s) Oral every 6 hours PRN  polyethylene glycol 3350 17 Gram(s) Oral daily PRN      VITAL SIGNS: Last 24 Hours  T(C): 37.1 (16 Jul 2021 04:00), Max: 37.3 (16 Jul 2021 00:00)  T(F): 98.8 (16 Jul 2021 04:00), Max: 99.1 (16 Jul 2021 00:00)  HR: 60 (16 Jul 2021 06:00) (60 - 106)  BP: 143/67 (16 Jul 2021 06:00) (99/84 - 163/76)  BP(mean): 103 (16 Jul 2021 06:00) (83 - 121)  RR: 18 (16 Jul 2021 06:00) (17 - 22)  SpO2: 96% (16 Jul 2021 06:00) (95% - 97%)    LABS:                        8.5    5.55  )-----------( 191      ( 16 Jul 2021 05:10 )             26.7     07-16    138  |  104  |  15  ----------------------------<  100<H>  4.4   |  26  |  1.0    Ca    8.0<L>      16 Jul 2021 05:10  Mg     1.7     07-16    TPro  5.4<L>  /  Alb  2.7<L>  /  TBili  2.2<H>  /  DBili  x   /  AST  27  /  ALT  23  /  AlkPhos  256<H>  07-16      RADIOLOGY:  < from: CT Angio Lower Extremity w/ IV Cont, Right (07.15.21 @ 15:51) >    IMPRESSION:    1. A large hematoma measuring 12.5 cm in length 4 cm in transverse diameter and 11.5 cm in AP diameter are seen lateral to the right femur in the region of the vastus intermedius.    2. Although no active extravasation is identified, this possibility cannot be excludedby this examination, due to the extensive streak artifact created by the orthopedic hardware.    < end of copied text >    < from: VA Duplex Lower Ext Vein Scan, Bilat (07.13.21 @ 19:07) >    IMPRESSION:  No evidence of deep venous thrombosis in the left lower extremity.      Acute right popliteal and posterior tibial vein DVT. Right gastrocnemius vein thrombosis.    < end of copied text >    < from: CT Angio Lower Extremity w/ IV Cont, Right (07.12.21 @ 16:24) >    IMPRESSION:    1. The pulmonary arteries are partially visualized. Right lower lobe pulmonary emboli. Full pulmonary CTA recommended for confirmation and to fully evaluate the full chest.    2. The lower extremity CTA portion of the study was canceled by vascular surgery due to the extensive metallic hardware that would produce extensive artifact. The  films show bilateral knee arthroplasties and a screw and plate prosthesis involving the shaft of the right femur extending to the femoral condyles.    3. No vascular abnormalities within the abdomen and pelvis.    4. Transplanted kidney in the right iliac fossa demonstrating no evidence of vascular abnormality or hydronephrosis.    < end of copied text >      ASSESSMENT & PLAN:   72 y/o M with PMHx JIMÉNEZ s/p liver and R renal transplant (Feb 2020 [place]), HTN, HLD, DM, CAD (s/p stents 10 yrs ago, on ASA and Plavix), AFib (s/p Watchman 3 months ago) admitted for R distal femur fx. Had R femur ORIF by ortho, afterwards Hgb dropped to 6.9. CT showed hematoma in R vastus intermedius. Developed hypotension and went into AFib with RVR. Placed on levo, switched to valerio, now off pressors. Septic workup sent, as patient was febrile, started on cefepime, now off abx. Started on diltiazem for AFib, switched to esmolol drip and digoxin, weaned off to PO metoprolol. S/p 8U pRBC in total. Ortho, nephro, ID, hem/onc all following.    Neuro: Currently AOx3. No acute issues.    CV: Afib RVR currently rate controlled. Off AC due to hematoma. Currently BP stable on esmolol gtt and off pressors.  #Afib s/p watchman now with RVR  - Likely d/t hemorrhagic shock  - S/p 45 days of Coumadin after Watchman 3 months ago  - Weaned off esmolol, d/c'd digoxin  - HR stable, still in AFib  - C/w metoprolol tartrate 25 mg PO BID    Pulm  #Incidental RLL subsegmental PE, R popliteal and posterior tibialis DVT  - Cannot be on AC d/t active bleeding  - S/p IVC filter placed 7/14    GI: Tolerating diet. Constipation.    Infectious:  #Fever: unknown source, possibly d/t hematoma -- resolved  - No longer febrile  - BCx 7/9: NGTD  - UCx 7/9: NGTD  - Procal 7/10 neg    MSK:  #Acute comminuted fracture of the distal rt femur  - s/p Fixation (OR done 07/09/2021)  - Ortho following for dressing changes   - PT/OT following  - RLE NWB  - CTA RLE showing large hematoma in region of R vastus intermedius    Renal:  #S/p renal transplant (rt sided at [facility] in 02/2020)  - Nephro on board, following   - C/w Prograf  - C/w Florinef 0.1 mg PO daily    Heme:  #Anemia likely 2/2 hematoma.  - Closely monitor Hb. Transfusion goal 7-8  - Hgb 8.8 yesterday night, Hgb 8.5 this AM  - Trend H/H  - Heme/onc recs      - Likely d/t blood loss anemia + chronic inflammation + antirejection meds effect on marrow      - Iron studies, B12, folate, SPEP/UPEP      - Start folic acid 1 mg PO daily, PO ferrous sulfate      - Hold plavix, can have aspirin 81 mg PO per EP    Endocrine  #Hx of DM  - A1c 7/8: 5.3  - Lispro SS  - Home Januvia 100mg, holding  - Home  Linagliptin 5 mg QD, holding    For Follow-Up:  [ ] Trend H/H CCU Transfer Note    Transfer from: CCU  Transfer to:  ( x ) Medicine    (  ) Telemetry    (  ) RCU    (  ) Palliative    (  ) Stroke Unit    (  ) _______________    HPI:  71M w/PMHx of HTN, HLD, anemia, DM, Afib, JIMÉNEZ s/p liver transplant and right renal transplant at [facility] in February 2020 (follows with his hepatologist Dr. Sunshine), CAD s/p stents (10 years ago) on ASA and Plavix, watchman procedure performed ~3 months ago by Dr. Burdick, history of bilateral knee replacements (21 years ago) presents after being sent in from outpatient office for finding of new right distal femur fracture. Patient tripped and fell while trying to get out of his car and felt right knee pain afterwards. He presented to an outpatient clinic for evaluation. XR at outpatient clinic demonstrated distal right femur fracture. He was placed in a knee immobilizer and instructed to present to the ED for further management. Patient is afebrile, HR 61, borderline BP of 93/52, saturating 99% on room air. On exam his right knee is immobilized, he has flexion and extension of the foot at the ankle joint, +DP and PT pulses, foot warm and well perfused. No other external signs of injury, denies pain anywhere else.    CCU COURSE:  70 y/o M with PMHx JIMÉNEZ s/p liver and R renal transplant (Feb 2020 [place]), HTN, HLD, DM, CAD (s/p stents 10 yrs ago on ASA and Plavix), AFib (s/p Watchman 3 months ago) admitted for R distal femur fx. Had R femur ORIF by ortho, afterwards Hgb dropped to 6.9. CT showed hematoma in R vastus intermedius. Developed hypotension and was on levo --> valerio --> weaned off pressors. Patient also went into AFib w/ RVR, treated with diltiazem, esmolol drip, and digoxin, weaned off, only on PO metoprolol currently. S/p 8U total pRBC for low H/H. CTA Abd/Pelvis performed to rule out intraabdominal bleeding, found to have incidental RLL subsegmental PE. Duplex of LE significant for acute DVT. IVC filter placed by vascular 7/14. Heme consulted, think anemia likely d/t blood loss anemia + chronic inflammation + effect from antirejection meds on bone marrow. B12, folate, iron studies ordered. Folic acid and ferrous sulfate started. CTA RLE shows large hematoma lateral to R femur in region of vastus intermedius, no extravasation (study limited d/t extensive artifact).    H/H is stabilizing. No longer on pressors or drips. Central line d/c'd yesterday. Spoke with EP, patient can be on aspirin 81 mg PO (was previously on aspirin 325, and PLavix remains on hold due to high risk of bleeding). Patient is stable for downgrade to medicine.    MEDICATIONS:  STANDING MEDICATIONS  chlorhexidine 4% Liquid 1 Application(s) Topical daily  dextrose 40% Gel 15 Gram(s) Oral once  dextrose 50% Injectable 25 Gram(s) IV Push once  ferrous    sulfate 325 milliGRAM(s) Oral <User Schedule>  fludroCORTISONE 0.1 milliGRAM(s) Oral daily  folic acid 1 milliGRAM(s) Oral daily  glucagon  Injectable 1 milliGRAM(s) IntraMuscular once  insulin lispro (ADMELOG) corrective regimen sliding scale   SubCutaneous three times a day before meals  metoprolol tartrate 25 milliGRAM(s) Oral two times a day  pantoprazole    Tablet 40 milliGRAM(s) Oral before breakfast  senna 2 Tablet(s) Oral at bedtime  tacrolimus 3 milliGRAM(s) Oral every 12 hours  tamsulosin 0.4 milliGRAM(s) Oral at bedtime    PRN MEDICATIONS  acetaminophen   Tablet .. 650 milliGRAM(s) Oral every 6 hours PRN  morphine  - Injectable 2 milliGRAM(s) IV Push every 6 hours PRN  oxyCODONE    IR 10 milliGRAM(s) Oral every 6 hours PRN  polyethylene glycol 3350 17 Gram(s) Oral daily PRN      VITAL SIGNS: Last 24 Hours  T(C): 37.1 (16 Jul 2021 04:00), Max: 37.3 (16 Jul 2021 00:00)  T(F): 98.8 (16 Jul 2021 04:00), Max: 99.1 (16 Jul 2021 00:00)  HR: 60 (16 Jul 2021 06:00) (60 - 106)  BP: 143/67 (16 Jul 2021 06:00) (99/84 - 163/76)  BP(mean): 103 (16 Jul 2021 06:00) (83 - 121)  RR: 18 (16 Jul 2021 06:00) (17 - 22)  SpO2: 96% (16 Jul 2021 06:00) (95% - 97%)    LABS:                        8.5    5.55  )-----------( 191      ( 16 Jul 2021 05:10 )             26.7     07-16    138  |  104  |  15  ----------------------------<  100<H>  4.4   |  26  |  1.0    Ca    8.0<L>      16 Jul 2021 05:10  Mg     1.7     07-16    TPro  5.4<L>  /  Alb  2.7<L>  /  TBili  2.2<H>  /  DBili  x   /  AST  27  /  ALT  23  /  AlkPhos  256<H>  07-16      RADIOLOGY:  < from: CT Angio Lower Extremity w/ IV Cont, Right (07.15.21 @ 15:51) >    IMPRESSION:    1. A large hematoma measuring 12.5 cm in length 4 cm in transverse diameter and 11.5 cm in AP diameter are seen lateral to the right femur in the region of the vastus intermedius.    2. Although no active extravasation is identified, this possibility cannot be excludedby this examination, due to the extensive streak artifact created by the orthopedic hardware.    < end of copied text >    < from: VA Duplex Lower Ext Vein Scan, Bilat (07.13.21 @ 19:07) >    IMPRESSION:  No evidence of deep venous thrombosis in the left lower extremity.      Acute right popliteal and posterior tibial vein DVT. Right gastrocnemius vein thrombosis.    < end of copied text >    < from: CT Angio Lower Extremity w/ IV Cont, Right (07.12.21 @ 16:24) >    IMPRESSION:    1. The pulmonary arteries are partially visualized. Right lower lobe pulmonary emboli. Full pulmonary CTA recommended for confirmation and to fully evaluate the full chest.    2. The lower extremity CTA portion of the study was canceled by vascular surgery due to the extensive metallic hardware that would produce extensive artifact. The  films show bilateral knee arthroplasties and a screw and plate prosthesis involving the shaft of the right femur extending to the femoral condyles.    3. No vascular abnormalities within the abdomen and pelvis.    4. Transplanted kidney in the right iliac fossa demonstrating no evidence of vascular abnormality or hydronephrosis.    < end of copied text >      ASSESSMENT & PLAN:   70 y/o M with PMHx JIMÉNEZ s/p liver and R renal transplant (Feb 2020 [place]), HTN, HLD, DM, CAD (s/p stents 10 yrs ago, on ASA and Plavix), AFib (s/p Watchman 3 months ago) admitted for R distal femur fx. Had R femur ORIF by ortho, afterwards Hgb dropped to 6.9. CT showed hematoma in R vastus intermedius. Developed hypotension and went into AFib with RVR. Placed on levo, switched to valerio, now off pressors. Septic workup sent, as patient was febrile, started on Cefepime, now off abx. Started on diltiazem for AFib, switched to esmolol drip and digoxin, weaned off to PO metoprolol. S/p 8U pRBC in total. Ortho, nephro, ID, hem/onc all following.    #Chronic AFib s/p Watchman 3 mos ago  #Hospital stay complicated by AFib with RVR  - S/p 45 days of Coumadin after Watchman 3 months ago  - Weaned off esmolol, d/c'd digoxin  - Remains in AFib, although rate controlled  - C/w metoprolol tartrate 25 mg PO BID  - Was on high dose ASA, Plavix, Plavix remains on hold; spoke to EP; can reduce  mg to ASA 81 mg given pt's recurrent low Hb, finding of large hematoma in RLE    #Incidental RLL subsegmental PE  #Acute R popliteal and posterior tibialis DVT  - Cannot be on AC d/t active bleeding  - S/p IVC filter placed 7/14    #Acute comminuted fracture of the distal rt femur  #S/p ORIF by Ortho 7/9  #Complicated by large hematoma (12.5x4x11.5cm)  #Acute blood loss anemia likely due to hematoma  - Ortho following for dressing changes, to remain NWB of RLE  - PT/OT following  - CTA RLE showing large hematoma in region of R vastus intermedius  - Remains off AC due to acute bleeding  - s/p 8U pRBCs in total due to recurrently low Hb  - Goal Hb > 8, maintain active T+S  - Hb stable this morning, repeat CBC at 8 PM, ensure remains stable  - Seen by Heme Onc, acute anemia likely due to surgery in setting of chronic inflammation and effect of antirejection medications on bone marrow  - Hemolysis ruled out, folate, B12 within normal limits  - C/w folate, iron supplementation  - Continue to hold Plavix, ASA decreased to 81 mg from high dose 325 mg    #S/p renal transplant (Rt sided at [facility] in 02/2020)  #Hx of liver transplant due to JIMÉNEZ  - Nephro on board, following   - C/w Prograf  - C/w Florinef 0.1 mg PO daily    #Hx of DM  - A1c 7/8: 5.3  - C/w SS, goal FS < 180  - Home Januvia 100mg, holding  - Home  Linagliptin 5 mg QD, holding    For Follow-Up:  [ ] Trend H/H, repeat CBC at 8 PM CCU Transfer Note    Transfer from: CCU  Transfer to:  ( x ) Medicine    (  ) Telemetry    (  ) RCU    (  ) Palliative    (  ) Stroke Unit    (  ) _______________    HPI:  71M w/PMHx of HTN, HLD, anemia, DM, Afib, JIMÉNEZ s/p liver transplant and right renal transplant at [facility] in February 2020 (follows with his hepatologist Dr. Sunshine), CAD s/p stents (10 years ago) on ASA and Plavix, watchman procedure performed ~3 months ago by Dr. Burdick, history of bilateral knee replacements (21 years ago) presents after being sent in from outpatient office for finding of new right distal femur fracture. Patient tripped and fell while trying to get out of his car and felt right knee pain afterwards. He presented to an outpatient clinic for evaluation. XR at outpatient clinic demonstrated distal right femur fracture. He was placed in a knee immobilizer and instructed to present to the ED for further management. Patient is afebrile, HR 61, borderline BP of 93/52, saturating 99% on room air. On exam his right knee is immobilized, he has flexion and extension of the foot at the ankle joint, +DP and PT pulses, foot warm and well perfused. No other external signs of injury, denies pain anywhere else.    CCU COURSE:  72 y/o M with PMHx JIMÉNEZ s/p liver and R renal transplant (Feb 2020 [place]), HTN, HLD, DM, CAD (s/p stents 10 yrs ago on ASA and Plavix), AFib (s/p Watchman 3 months ago) admitted for R distal femur fx. Had R femur ORIF by ortho, afterwards Hgb dropped to 6.9. CT showed hematoma in R vastus intermedius. Developed hypotension and was on levo --> valerio --> weaned off pressors. Patient also went into AFib w/ RVR, treated with diltiazem, esmolol drip, and digoxin, weaned off, only on PO metoprolol currently. S/p 8U total pRBC for low H/H. CTA Abd/Pelvis performed to rule out intraabdominal bleeding, found to have incidental RLL subsegmental PE. Duplex of LE significant for acute DVT. IVC filter placed by vascular 7/14. Heme consulted, think anemia likely d/t blood loss anemia + chronic inflammation + effect from antirejection meds on bone marrow. B12, folate, iron studies ordered. Folic acid and ferrous sulfate started. CTA RLE shows large hematoma lateral to R femur in region of vastus intermedius, no extravasation (study limited d/t extensive artifact).    H/H is stabilizing. No longer on pressors or drips. Central line d/c'd yesterday. Spoke with EP, patient can be on aspirin 81 mg PO (was previously on aspirin 325, and PLavix remains on hold due to high risk of bleeding). Patient is stable for downgrade to medicine.    MEDICATIONS:  STANDING MEDICATIONS  chlorhexidine 4% Liquid 1 Application(s) Topical daily  dextrose 40% Gel 15 Gram(s) Oral once  dextrose 50% Injectable 25 Gram(s) IV Push once  ferrous    sulfate 325 milliGRAM(s) Oral <User Schedule>  fludroCORTISONE 0.1 milliGRAM(s) Oral daily  folic acid 1 milliGRAM(s) Oral daily  glucagon  Injectable 1 milliGRAM(s) IntraMuscular once  insulin lispro (ADMELOG) corrective regimen sliding scale   SubCutaneous three times a day before meals  metoprolol tartrate 25 milliGRAM(s) Oral two times a day  pantoprazole    Tablet 40 milliGRAM(s) Oral before breakfast  senna 2 Tablet(s) Oral at bedtime  tacrolimus 3 milliGRAM(s) Oral every 12 hours  tamsulosin 0.4 milliGRAM(s) Oral at bedtime    PRN MEDICATIONS  acetaminophen   Tablet .. 650 milliGRAM(s) Oral every 6 hours PRN  morphine  - Injectable 2 milliGRAM(s) IV Push every 6 hours PRN  oxyCODONE    IR 10 milliGRAM(s) Oral every 6 hours PRN  polyethylene glycol 3350 17 Gram(s) Oral daily PRN      VITAL SIGNS: Last 24 Hours  T(C): 37.1 (16 Jul 2021 04:00), Max: 37.3 (16 Jul 2021 00:00)  T(F): 98.8 (16 Jul 2021 04:00), Max: 99.1 (16 Jul 2021 00:00)  HR: 60 (16 Jul 2021 06:00) (60 - 106)  BP: 143/67 (16 Jul 2021 06:00) (99/84 - 163/76)  BP(mean): 103 (16 Jul 2021 06:00) (83 - 121)  RR: 18 (16 Jul 2021 06:00) (17 - 22)  SpO2: 96% (16 Jul 2021 06:00) (95% - 97%)    LABS:                        8.5    5.55  )-----------( 191      ( 16 Jul 2021 05:10 )             26.7     07-16    138  |  104  |  15  ----------------------------<  100<H>  4.4   |  26  |  1.0    Ca    8.0<L>      16 Jul 2021 05:10  Mg     1.7     07-16    TPro  5.4<L>  /  Alb  2.7<L>  /  TBili  2.2<H>  /  DBili  x   /  AST  27  /  ALT  23  /  AlkPhos  256<H>  07-16      RADIOLOGY:  < from: CT Angio Lower Extremity w/ IV Cont, Right (07.15.21 @ 15:51) >    IMPRESSION:    1. A large hematoma measuring 12.5 cm in length 4 cm in transverse diameter and 11.5 cm in AP diameter are seen lateral to the right femur in the region of the vastus intermedius.    2. Although no active extravasation is identified, this possibility cannot be excludedby this examination, due to the extensive streak artifact created by the orthopedic hardware.    < end of copied text >    < from: [facility] Duplex Lower Ext Vein Scan, Bilat (07.13.21 @ 19:07) >    IMPRESSION:  No evidence of deep venous thrombosis in the left lower extremity.      Acute right popliteal and posterior tibial vein DVT. Right gastrocnemius vein thrombosis.    < end of copied text >    < from: CT Angio Lower Extremity w/ IV Cont, Right (07.12.21 @ 16:24) >    IMPRESSION:    1. The pulmonary arteries are partially visualized. Right lower lobe pulmonary emboli. Full pulmonary CTA recommended for confirmation and to fully evaluate the full chest.    2. The lower extremity CTA portion of the study was canceled by vascular surgery due to the extensive metallic hardware that would produce extensive artifact. The  films show bilateral knee arthroplasties and a screw and plate prosthesis involving the shaft of the right femur extending to the femoral condyles.    3. No vascular abnormalities within the abdomen and pelvis.    4. Transplanted kidney in the right iliac fossa demonstrating no evidence of vascular abnormality or hydronephrosis.    < end of copied text >      ASSESSMENT & PLAN:   72 y/o M with PMHx JIMÉNEZ s/p liver and R renal transplant (Feb 2020 [place]), HTN, HLD, DM, CAD (s/p stents 10 yrs ago, on ASA and Plavix), AFib (s/p Watchman 3 months ago) admitted for R distal femur fx. Had R femur ORIF by ortho, afterwards Hgb dropped to 6.9. CT showed hematoma in R vastus intermedius. Developed hypotension and went into AFib with RVR. Placed on levo, switched to valerio, now off pressors. Septic workup sent, as patient was febrile, started on Cefepime, now off abx. Started on diltiazem for AFib, switched to esmolol drip and digoxin, weaned off to PO metoprolol. S/p 8U pRBC in total. Ortho, nephro, ID, hem/onc all following.    #Chronic AFib s/p Watchman 3 mos ago  #Hospital stay complicated by AFib with RVR  - S/p 45 days of Coumadin after Watchman 3 months ago  - Weaned off esmolol, d/c'd digoxin  - Remains in AFib, although rate controlled  - C/w metoprolol tartrate 25 mg PO BID  - Was on high dose ASA, Plavix, Plavix remains on hold; spoke to EP; can reduce  mg to ASA 81 mg given pt's recurrent low Hb, finding of large hematoma in RLE    #Incidental RLL subsegmental PE  #Acute R popliteal and posterior tibialis DVT  - Cannot be on AC d/t active bleeding  - S/p IVC filter placed 7/14    #Acute comminuted fracture of the distal rt femur  #S/p ORIF by Ortho 7/9  #Complicated by large hematoma (12.5x4x11.5cm)  #Acute blood loss anemia likely due to hematoma  - Ortho following for dressing changes, to remain NWB of RLE  - PT/OT following  - CTA RLE showing large hematoma in region of R vastus intermedius  - Remains off AC due to acute bleeding  - s/p 8U pRBCs in total due to recurrently low Hb  - Goal Hb > 8, maintain active T+S  - Hb stable this morning, repeat CBC at 8 PM, ensure remains stable  - Seen by Heme Onc, acute anemia likely due to surgery in setting of chronic inflammation and effect of antirejection medications on bone marrow  - Hemolysis ruled out; folate, B12 within normal limits; FOBT negative  - C/w folate, iron supplementation  - Continue to hold Plavix, ASA decreased to 81 mg from high dose 325 mg per EP recs    #S/p renal transplant (Rt sided at [facility] in 02/2020)  #Hx of liver transplant due to JIMÉNEZ  - Nephro on board, following   - C/w Prograf  - C/w Florinef 0.1 mg PO daily    #Hx of DM  - A1c 7/8: 5.3  - C/w SS, goal FS < 180  - Home Januvia 100mg, holding  - Home  Linagliptin 5 mg QD, holding    For Follow-Up:  [ ] Trend H/H, repeat CBC at 8 PM

## 2021-07-16 NOTE — PROGRESS NOTE ADULT - SUBJECTIVE AND OBJECTIVE BOX
Patient is a 71y old  Male who presents with a chief complaint of Right periprosthetic femur fracture (15 Jul 2021 16:58)        HPI:  71M w/PMHx of HTN, HLD, anemia, DM, Afib, JIMÉNEZ s/p liver transplant and right renal transplant at [facility] in February 2020 (follows with his hepatologist Dr. Sunshine), CAD s/p stents (10 years ago) on ASA and Plavix, watchman procedure performed ~3 months ago by Dr. Burdick, history of bilateral knee replacements (21 years ago) presents after being sent in from outpatient office for finding of new right distal femur fracture. Patient tripped and fell while trying to get out of his car and felt right knee pain afterwards. He presented to an outpatient clinic for evaluation. XR at outpatient clinic demonstrated distal right femur fracture. He was placed in a knee immobilizer and instructed to present to the ED for further management. Patient is afebrile, HR 61, borderline BP of 93/52, saturating 99% on room air. On exam his right knee is immobilized, he has flexion and extension of the foot at the ankle joint, +DP and PT pulses, foot warm and well perfused. No other external signs of injury, denies pain anywhere else.    (07 Jul 2021 21:21)    Pt evaluated on AM rounds.  Interval Events: No overnight events.    REVIEW OF SYSTEMS:   see HPI      OBJECTIVE:  ICU Vital Signs Last 24 Hrs  T(C): 37.1 (16 Jul 2021 04:00), Max: 37.3 (16 Jul 2021 00:00)  T(F): 98.8 (16 Jul 2021 04:00), Max: 99.1 (16 Jul 2021 00:00)  HR: 66 (16 Jul 2021 04:00) (64 - 106)  BP: 108/58 (16 Jul 2021 04:00) (99/84 - 163/76)  BP(mean): 83 (16 Jul 2021 04:00) (83 - 121)  ABP: --  ABP(mean): --  RR: 19 (16 Jul 2021 04:00) (17 - 24)  SpO2: 95% (16 Jul 2021 04:00) (95% - 99%)        07-14 @ 07:01  -  07-15 @ 07:00  --------------------------------------------------------  IN: 575 mL / OUT: 775 mL / NET: -200 mL    07-15 @ 07:01  - 07-16 @ 05:43  --------------------------------------------------------  IN: 2620 mL / OUT: 1425 mL / NET: 1195 mL      CAPILLARY BLOOD GLUCOSE      POCT Blood Glucose.: 121 mg/dL (15 Jul 2021 21:40)        PHYSICAL EXAM:     · CONSTITUTIONAL:   not septic appearing,   well nourished,   NAD    · ENMT:   Airway patent,   Nasal mucosa clear.  Mouth with normal mucosa.   No thrush    · EYES:   Clear bilaterally,   pupils equal,   round and reactive to light.    · CARDIAC:   Normal rate,   regular rhythm.    Heart sounds S1, S2.   No murmurs, no rubs or gallops on auscultation  no edema        CAROTID:   normal systolic impulse  no bruits    · RESPIRATORY:   no w/r/r/,   normal chest expansion  no tachypnea,  no retractions or use of accessory muscles  palpation of chest is normal with no fremitus  percussion of chest demonstrates no hyperresonance or dullness    · GASTROINTESTINAL:  Abdomen soft,   non-tender,   + BS  liver/spleen not palpable    · MUSCULOSKELETAL:   no clubbing, cyanosis      · NEUROLOGICAL:   .sed      · SKIN:   Skin normal color for race,   warm, dry   No evidence of rash.    · PSYCHIATRIC:   .un      · HEME LYMPH:   no splenomegaly.  No cervical  lymphadenopathy.  no inguinal lymphadenopathy    HOSPITAL MEDICATIONS:  MEDICATIONS  (STANDING):  chlorhexidine 4% Liquid 1 Application(s) Topical daily  dextrose 40% Gel 15 Gram(s) Oral once  dextrose 50% Injectable 25 Gram(s) IV Push once  ferrous    sulfate 325 milliGRAM(s) Oral <User Schedule>  fludroCORTISONE 0.1 milliGRAM(s) Oral daily  folic acid 1 milliGRAM(s) Oral daily  glucagon  Injectable 1 milliGRAM(s) IntraMuscular once  insulin lispro (ADMELOG) corrective regimen sliding scale   SubCutaneous three times a day before meals  metoprolol tartrate 25 milliGRAM(s) Oral two times a day  pantoprazole  Injectable 40 milliGRAM(s) IV Push every 12 hours  senna 2 Tablet(s) Oral at bedtime  tacrolimus 3 milliGRAM(s) Oral every 12 hours  tamsulosin 0.4 milliGRAM(s) Oral at bedtime    MEDICATIONS  (PRN):  acetaminophen   Tablet .. 650 milliGRAM(s) Oral every 6 hours PRN Temp greater or equal to 38C (100.4F), Mild Pain (1 - 3)  morphine  - Injectable 2 milliGRAM(s) IV Push every 6 hours PRN Severe Pain (7 - 10)  oxyCODONE    IR 10 milliGRAM(s) Oral every 6 hours PRN Moderate Pain (4 - 6)  polyethylene glycol 3350 17 Gram(s) Oral daily PRN Constipation    lactated ringers.: Solution, 1000 milliLiter(s) infuse at 70 mL/Hr, Stop After 24 Hours  lactated ringers.: Solution, 1000 milliLiter(s) infuse at 75 mL/Hr  Special Instructions: May DC when patient ready for PACU discharge  sodium chloride 0.9%.: Solution, 1000 milliLiter(s) infuse at 75 mL/Hr  Provider's Contact #: [phone]  sodium chloride 0.9% Bolus:   500 milliLiter(s), IV Bolus, once, infuse over 30 Minute(s), Stop After 1 Doses  lactated ringers.: Solution, 1000 milliLiter(s) infuse at 500 mL/Hr  sodium chloride 0.9% Bolus:   1500 milliLiter(s), IV Bolus, once, infuse over 30 Minute(s), Stop After 1 Doses  lactated ringers Bolus:   1000 milliLiter(s), IV Bolus, once, infuse over 30 Minute(s), Stop After 1 Doses  sodium chloride 0.9% Bolus:   250 milliLiter(s), IV Bolus, once, infuse over 15 Minute(s), Stop After 1 Doses  lactated ringers Bolus:   500 milliLiter(s), IV Bolus, once, infuse over 60 Minute(s), Stop After 1 Doses  lactated ringers.: Solution, 1000 milliLiter(s) infuse at 100 mL/Hr  lactated ringers.: Solution, 1000 milliLiter(s) infuse at 75 mL/Hr  Provider's Contact #: [phone]      LABS:                        8.8    5.18  )-----------( 192      ( 16 Jul 2021 00:17 )             27.6     07-15    137  |  105  |  18  ----------------------------<  97  3.8   |  24  |  0.9    Ca    7.9<L>      15 Jul 2021 04:24  Mg     1.3     07-15    TPro  5.1<L>  /  Alb  2.6<L>  /  TBili  1.7<H>  /  DBili  x   /  AST  29  /  ALT  21  /  AlkPhos  234<H>  07-15                          RADIOLOGY: I personally reviewed latest CXR and other pertinent films.

## 2021-07-16 NOTE — PROGRESS NOTE ADULT - ASSESSMENT
72 y/o M with PMHx JIMÉNEZ s/p liver and R renal transplant (Feb 2020 [place]), HTN, HLD, DM, CAD (s/p stents 10 yrs ago, on ASA and Plavix), AFib (s/p Watchman 3 months ago) admitted for R distal femur fx. Had R femur ORIF by ortho, afterwards Hgb dropped to 6.9. CT showed hematoma in R vastus intermedius. Developed hypotension and went into AFib with RVR. Placed on levo, switched to valerio, now off pressors. Septic workup sent, as patient was febrile, started on cefepime, now off abx. Started on diltiazem for AFib, switched to esmolol drip and digoxin, weaned off to PO metoprolol. S/p 8U pRBC in total. Ortho, nephro, ID, hem/onc all following.    Neuro: Currently AOx3. No acute issues.    CV: Afib RVR currently rate controlled. Off AC due to hematoma. Currently BP stable on esmolol gtt and off pressors.  #Afib s/p watchman now with RVR  - Likely d/t hemorrhagic shock  - S/p 45 days of Coumadin after Watchman 3 months ago  - Weaned off esmolol, d/c'd digoxin  - HR stable, still in AFib  - C/w metoprolol tartrate 25 mg PO BID    Pulm  #Incidental RLL subsegmental PE, R popliteal and posterior tibialis DVT  - Cannot be on AC d/t active bleeding  - S/p IVC filter placed 7/14    GI: Tolerating diet. Constipation.    Infectious:  #Fever: unknown source, possibly d/t hematoma -- resolved  - No longer febrile  - BCx 7/9: NGTD  - UCx 7/9: NGTD  - Procal 7/10 neg    MSK:  #Acute comminuted fracture of the distal rt femur  - s/p Fixation (OR done 07/09/2021)  - Ortho following for dressing changes   - PT/OT following  - RLE NWB  - CTA RLE showing large hematoma in region of R vastus intermedius    Renal:  #S/p renal transplant (rt sided at [facility] in 02/2020)  - Nephro on board, following   - C/w Prograf  - C/w Florinef 0.1 mg PO daily    Heme:  #Anemia likely 2/2 hematoma.  - Closely monitor Hb. Transfusion goal 7-8  - Hgb 8.8 yesterday night, stable from the AM  - Morning H/H pending  - Trend H/H  - Heme/onc recs      - Likely d/t blood loss anemia + chronic inflammation + antirejection meds effect on marrow      - Iron studies, B12, folate, SPEP/UPEP      - Start folic acid 1 mg PO daily, PO ferrous sulfate      - Hold DAPT -- s/w cardio fellow    Endocrine  #Hx of DM  - A1c 7/8: 5.3   - Home Januvia 100mg, holding  - Home  Linagliptin 5 mg QD, holding 70 y/o M with PMHx JIMÉNEZ s/p liver and R renal transplant (Feb 2020 [place]), HTN, HLD, DM, CAD (s/p stents 10 yrs ago, on ASA and Plavix), AFib (s/p Watchman 3 months ago) admitted for R distal femur fx. Had R femur ORIF by ortho, afterwards Hgb dropped to 6.9. CT showed hematoma in R vastus intermedius. Developed hypotension and went into AFib with RVR. Placed on levo, switched to valerio, now off pressors. Septic workup sent, as patient was febrile, started on cefepime, now off abx. Started on diltiazem for AFib, switched to esmolol drip and digoxin, weaned off to PO metoprolol. S/p 8U pRBC in total. Ortho, nephro, ID, hem/onc all following.    Neuro: Currently AOx3. No acute issues.    CV: Afib RVR currently rate controlled. Off AC due to hematoma. Currently BP stable on esmolol gtt and off pressors.  #Afib s/p watchman now with RVR  - Likely d/t hemorrhagic shock  - S/p 45 days of Coumadin after Watchman 3 months ago  - Weaned off esmolol, d/c'd digoxin  - HR stable, still in AFib  - C/w metoprolol tartrate 25 mg PO BID    Pulm  #Incidental RLL subsegmental PE, R popliteal and posterior tibialis DVT  - Cannot be on AC d/t active bleeding  - S/p IVC filter placed 7/14    GI: Tolerating diet. Constipation.    Infectious:  #Fever: unknown source, possibly d/t hematoma -- resolved  - No longer febrile  - BCx 7/9: NGTD  - UCx 7/9: NGTD  - Procal 7/10 neg    MSK:  #Acute comminuted fracture of the distal rt femur  - s/p Fixation (OR done 07/09/2021)  - Ortho following for dressing changes   - PT/OT following  - RLE NWB  - CTA RLE showing large hematoma in region of R vastus intermedius    Renal:  #S/p renal transplant (rt sided at [facility] in 02/2020)  - Nephro on board, following   - C/w Prograf  - C/w Florinef 0.1 mg PO daily    Heme:  #Anemia likely 2/2 hematoma.  - Closely monitor Hb. Transfusion goal 7-8  - Hgb 8.8 yesterday night, Hgb 8.5 this AM  - Trend H/H  - Heme/onc recs      - Likely d/t blood loss anemia + chronic inflammation + antirejection meds effect on marrow      - Iron studies, B12, folate, SPEP/UPEP      - Start folic acid 1 mg PO daily, PO ferrous sulfate      - Hold DAPT -- s/w cardio fellow    Endocrine  #Hx of DM  - A1c 7/8: 5.3   - Home Januvia 100mg, holding  - Home  Linagliptin 5 mg QD, holding

## 2021-07-16 NOTE — PROGRESS NOTE ADULT - ASSESSMENT
s/p  donor liver / kidney transplant (RLQ) 2020 @ [facility]  stable allograft renal function   ESRD / ESLD due to JIMÉNEZ with HRS  Right distal femoral fracture s/p orif  post op anemia  PE on CTA / DVT s/p ivc filter  s/p iv contrast   s/p b/l remote TKR  DM2  CAD / PCI / Afib    borderline low BP's  hypomagnesemia     plan:    monitor Cr s/p iv contrast  cont prograf 0.3mg po bid  cont florinef 0.1mg po qd  off hydrocortisone   PRBC transfusion PRN  monitor h/h  f/u ortho / wound care  physical therapy

## 2021-07-16 NOTE — PROGRESS NOTE ADULT - SUBJECTIVE AND OBJECTIVE BOX
Orthopaedic Surgery Progress Note    SOCORRO MANUEL  [id number]    S&E at bedside this AM. Pain well controlled. Denies fever/chills/CP/SOB. No other complaints. Thigh swelling much improved, compressive ace wrap removed today.    acetaminophen   Tablet .. 650 milliGRAM(s) Oral every 6 hours PRN  chlorhexidine 4% Liquid 1 Application(s) Topical daily  dextrose 40% Gel 15 Gram(s) Oral once  dextrose 50% Injectable 25 Gram(s) IV Push once  ferrous    sulfate 325 milliGRAM(s) Oral <User Schedule>  fludroCORTISONE 0.1 milliGRAM(s) Oral daily  folic acid 1 milliGRAM(s) Oral daily  glucagon  Injectable 1 milliGRAM(s) IntraMuscular once  insulin lispro (ADMELOG) corrective regimen sliding scale   SubCutaneous three times a day before meals  metoprolol tartrate 25 milliGRAM(s) Oral two times a day  morphine  - Injectable 2 milliGRAM(s) IV Push every 6 hours PRN  oxyCODONE    IR 10 milliGRAM(s) Oral every 6 hours PRN  pantoprazole    Tablet 40 milliGRAM(s) Oral before breakfast  polyethylene glycol 3350 17 Gram(s) Oral daily PRN  senna 2 Tablet(s) Oral at bedtime  tacrolimus 3 milliGRAM(s) Oral every 12 hours  tamsulosin 0.4 milliGRAM(s) Oral at bedtime      T(C): 37.2 (07-16-21 @ 08:00), Max: 37.3 (07-16-21 @ 00:00)  HR: 58 (07-16-21 @ 08:00) (58 - 106)  BP: 140/73 (07-16-21 @ 08:00) (99/84 - 163/76)  RR: 18 (07-16-21 @ 08:00) (17 - 22)  SpO2: 96% (07-16-21 @ 08:00) (95% - 97%)    PE:   Dressing C/D/I   Compartments soft and compressible  Some ecchymosis posteriorly appreciated   Motor intact distally  SILT distally  CR<2sec  palpable pulses    Labs                        8.5    5.55  )-----------( 191      ( 16 Jul 2021 05:10 )             26.7     07-16    138  |  104  |  15  ----------------------------<  100<H>  4.4   |  26  |  1.0    Ca    8.0<L>      16 Jul 2021 05:10  Mg     1.7     07-16    TPro  5.4<L>  /  Alb  2.7<L>  /  TBili  2.2<H>  /  DBili  x   /  AST  27  /  ALT  23  /  AlkPhos  256<H>  07-16    LIVER FUNCTIONS - ( 16 Jul 2021 05:10 )  Alb: 2.7 g/dL / Pro: 5.4 g/dL / ALK PHOS: 256 U/L / ALT: 23 U/L / AST: 27 U/L / GGT: x             A/P: 71yMale S/P right femur ORIF on 7/9/2021, doing well    OOB to Chair   NWB RLE  PT/OT  Pain control   Ext icing/elevation  Incentive Spirometry   Trend H/H - transfuse PRN  DVT Prophylaxis per CCU - currently on ASA  Ortho following

## 2021-07-16 NOTE — PROGRESS NOTE ADULT - ASSESSMENT
IMPRESSION:    Sp fall/ R ORIF  Rapid a Fib/ hypotension/ Dec hb/ fever   Sp renal/ liver transplant/ immunosuppressed  CAD  blood loss anemia  rapid kailey b  DVT        PLAN:    CNS: Avoid CNS depressant    HEENT:  Oral care    PULMONARY:  HOB @ 45 degrees, aspiration precaution, NC keep SaO2 92 TO 96%  small PE seen on angiogram  DO not repeat pulm angiogram.  Pt sans resp symptoms and he cannot have anticoagulation  + DVT vasc s/p filter    CARDIOVASCULAR:   monitor I/O    GI: GI prophylaxis                                            Feeding  po    RENAL:  F/u  lytes.    Correct as needed.   accurate I/O, renal f/up    INFECTIOUS DISEASE:   ABX       HEMATOLOGICAL:    retic low  DVT prophylaxis.    transfuse, serial CBC  keep Hgb >8  CT angio leg confirms large hematoma 12/4 in the area of the distal femur vascular leak cannot be confirmed due to streak artifact  check B12, folate, iron stores  hemolysis w/u    ENDOCRINE:  Follow up FS.  Insulin protocol if needed.   hydrocortisone 100 q 8     MUSCULOSKELETAL: ortho f/up     CODE STATUS: FULL CODE    DISPOSITION: If HH stable this Am can go to [facility]

## 2021-07-16 NOTE — CHART NOTE - NSCHARTNOTESELECT_GEN_ALL_CORE
Transfer Note
Event Note
ICU/Transfer Note
PACU admission/Event Note
PCAU Admission/Event Note
Transfer Note
Transfer Note
to Medical Service/Transfer Note

## 2021-07-17 LAB
ALBUMIN SERPL ELPH-MCNC: 3.1 G/DL — LOW (ref 3.5–5.2)
ALP SERPL-CCNC: 297 U/L — HIGH (ref 30–115)
ALT FLD-CCNC: 21 U/L — SIGNIFICANT CHANGE UP (ref 0–41)
ANION GAP SERPL CALC-SCNC: 9 MMOL/L — SIGNIFICANT CHANGE UP (ref 7–14)
APPEARANCE UR: CLEAR — SIGNIFICANT CHANGE UP
AST SERPL-CCNC: 27 U/L — SIGNIFICANT CHANGE UP (ref 0–41)
BILIRUB SERPL-MCNC: 2.5 MG/DL — HIGH (ref 0.2–1.2)
BILIRUB UR-MCNC: NEGATIVE — SIGNIFICANT CHANGE UP
BLD GP AB SCN SERPL QL: SIGNIFICANT CHANGE UP
BUN SERPL-MCNC: 13 MG/DL — SIGNIFICANT CHANGE UP (ref 10–20)
CALCIUM SERPL-MCNC: 8.3 MG/DL — LOW (ref 8.5–10.1)
CHLORIDE SERPL-SCNC: 103 MMOL/L — SIGNIFICANT CHANGE UP (ref 98–110)
CO2 SERPL-SCNC: 26 MMOL/L — SIGNIFICANT CHANGE UP (ref 17–32)
COLOR SPEC: YELLOW — SIGNIFICANT CHANGE UP
CREAT SERPL-MCNC: 0.9 MG/DL — SIGNIFICANT CHANGE UP (ref 0.7–1.5)
DIFF PNL FLD: NEGATIVE — SIGNIFICANT CHANGE UP
GLUCOSE BLDC GLUCOMTR-MCNC: 106 MG/DL — HIGH (ref 70–99)
GLUCOSE BLDC GLUCOMTR-MCNC: 107 MG/DL — HIGH (ref 70–99)
GLUCOSE BLDC GLUCOMTR-MCNC: 110 MG/DL — HIGH (ref 70–99)
GLUCOSE BLDC GLUCOMTR-MCNC: 116 MG/DL — HIGH (ref 70–99)
GLUCOSE SERPL-MCNC: 101 MG/DL — HIGH (ref 70–99)
GLUCOSE UR QL: NEGATIVE — SIGNIFICANT CHANGE UP
HCT VFR BLD CALC: 27.9 % — LOW (ref 42–52)
HGB BLD-MCNC: 8.9 G/DL — LOW (ref 14–18)
KETONES UR-MCNC: NEGATIVE — SIGNIFICANT CHANGE UP
LEUKOCYTE ESTERASE UR-ACNC: NEGATIVE — SIGNIFICANT CHANGE UP
MAGNESIUM SERPL-MCNC: 1.6 MG/DL — LOW (ref 1.8–2.4)
MCHC RBC-ENTMCNC: 27.7 PG — SIGNIFICANT CHANGE UP (ref 27–31)
MCHC RBC-ENTMCNC: 31.9 G/DL — LOW (ref 32–37)
MCV RBC AUTO: 86.9 FL — SIGNIFICANT CHANGE UP (ref 80–94)
NITRITE UR-MCNC: NEGATIVE — SIGNIFICANT CHANGE UP
NRBC # BLD: 0 /100 WBCS — SIGNIFICANT CHANGE UP (ref 0–0)
PH UR: 6.5 — SIGNIFICANT CHANGE UP (ref 5–8)
PLATELET # BLD AUTO: 200 K/UL — SIGNIFICANT CHANGE UP (ref 130–400)
POTASSIUM SERPL-MCNC: 4.1 MMOL/L — SIGNIFICANT CHANGE UP (ref 3.5–5)
POTASSIUM SERPL-SCNC: 4.1 MMOL/L — SIGNIFICANT CHANGE UP (ref 3.5–5)
PROT SERPL-MCNC: 5.8 G/DL — LOW (ref 6–8)
PROT UR-MCNC: SIGNIFICANT CHANGE UP
RBC # BLD: 3.21 M/UL — LOW (ref 4.7–6.1)
RBC # FLD: 15.4 % — HIGH (ref 11.5–14.5)
SODIUM SERPL-SCNC: 138 MMOL/L — SIGNIFICANT CHANGE UP (ref 135–146)
SP GR SPEC: 1.01 — SIGNIFICANT CHANGE UP (ref 1.01–1.03)
UROBILINOGEN FLD QL: ABNORMAL
WBC # BLD: 5.7 K/UL — SIGNIFICANT CHANGE UP (ref 4.8–10.8)
WBC # FLD AUTO: 5.7 K/UL — SIGNIFICANT CHANGE UP (ref 4.8–10.8)

## 2021-07-17 PROCEDURE — 99233 SBSQ HOSP IP/OBS HIGH 50: CPT

## 2021-07-17 RX ORDER — MAGNESIUM SULFATE 500 MG/ML
2 VIAL (ML) INJECTION ONCE
Refills: 0 | Status: COMPLETED | OUTPATIENT
Start: 2021-07-17 | End: 2021-07-17

## 2021-07-17 RX ADMIN — OXYCODONE HYDROCHLORIDE 10 MILLIGRAM(S): 5 TABLET ORAL at 05:46

## 2021-07-17 RX ADMIN — MORPHINE SULFATE 2 MILLIGRAM(S): 50 CAPSULE, EXTENDED RELEASE ORAL at 14:25

## 2021-07-17 RX ADMIN — PANTOPRAZOLE SODIUM 40 MILLIGRAM(S): 20 TABLET, DELAYED RELEASE ORAL at 06:59

## 2021-07-17 RX ADMIN — POLYETHYLENE GLYCOL 3350 17 GRAM(S): 17 POWDER, FOR SOLUTION ORAL at 11:21

## 2021-07-17 RX ADMIN — Medication 81 MILLIGRAM(S): at 11:21

## 2021-07-17 RX ADMIN — Medication 50 GRAM(S): at 11:21

## 2021-07-17 RX ADMIN — Medication 25 MILLIGRAM(S): at 17:12

## 2021-07-17 RX ADMIN — OXYCODONE HYDROCHLORIDE 10 MILLIGRAM(S): 5 TABLET ORAL at 04:46

## 2021-07-17 RX ADMIN — Medication 25 MILLIGRAM(S): at 05:00

## 2021-07-17 RX ADMIN — FLUDROCORTISONE ACETATE 0.1 MILLIGRAM(S): 0.1 TABLET ORAL at 05:01

## 2021-07-17 RX ADMIN — TACROLIMUS 3 MILLIGRAM(S): 5 CAPSULE ORAL at 17:12

## 2021-07-17 RX ADMIN — SENNA PLUS 2 TABLET(S): 8.6 TABLET ORAL at 22:17

## 2021-07-17 RX ADMIN — OXYCODONE HYDROCHLORIDE 10 MILLIGRAM(S): 5 TABLET ORAL at 00:36

## 2021-07-17 RX ADMIN — MORPHINE SULFATE 2 MILLIGRAM(S): 50 CAPSULE, EXTENDED RELEASE ORAL at 14:03

## 2021-07-17 RX ADMIN — TACROLIMUS 3 MILLIGRAM(S): 5 CAPSULE ORAL at 05:01

## 2021-07-17 RX ADMIN — Medication 1 MILLIGRAM(S): at 11:21

## 2021-07-17 RX ADMIN — TAMSULOSIN HYDROCHLORIDE 0.4 MILLIGRAM(S): 0.4 CAPSULE ORAL at 22:17

## 2021-07-17 NOTE — PROGRESS NOTE ADULT - ASSESSMENT
s/p  donor liver / kidney transplant (RLQ) 2020 @ [facility]  stable allograft renal function   ESRD / ESLD due to JIMÉNEZ with HRS  Right distal femoral fracture s/p orif  post op anemia  PE on CTA / DVT s/p ivc filter  s/p iv contrast   s/p b/l remote TKR  DM2  CAD / PCI / Afib    borderline low BP's  hypomagnesemia     plan:    cont prograf 0.3mg po bid  cont florinef 0.1mg po qd  off hydrocortisone   cont lopressor  PRBC transfusion PRN  mg repletion  send UA, Uc/s  po iron  monitor h/h  f/u ortho / wound care  full code  physical therapy

## 2021-07-17 NOTE — PROGRESS NOTE ADULT - SUBJECTIVE AND OBJECTIVE BOX
Orthopaedic Surgery Progress Note    SOCORRO MANUEL  [id number]    S&E at bedside this AM. Pain well controlled. Denies fever/chills/CP/SOB. No other complaints.     acetaminophen   Tablet .. 650 milliGRAM(s) Oral every 6 hours PRN  chlorhexidine 4% Liquid 1 Application(s) Topical daily  dextrose 40% Gel 15 Gram(s) Oral once  dextrose 50% Injectable 25 Gram(s) IV Push once  ferrous    sulfate 325 milliGRAM(s) Oral <User Schedule>  fludroCORTISONE 0.1 milliGRAM(s) Oral daily  folic acid 1 milliGRAM(s) Oral daily  glucagon  Injectable 1 milliGRAM(s) IntraMuscular once  insulin lispro (ADMELOG) corrective regimen sliding scale   SubCutaneous three times a day before meals  metoprolol tartrate 25 milliGRAM(s) Oral two times a day  morphine  - Injectable 2 milliGRAM(s) IV Push every 6 hours PRN  oxyCODONE    IR 10 milliGRAM(s) Oral every 6 hours PRN  pantoprazole    Tablet 40 milliGRAM(s) Oral before breakfast  polyethylene glycol 3350 17 Gram(s) Oral daily PRN  senna 2 Tablet(s) Oral at bedtime  tacrolimus 3 milliGRAM(s) Oral every 12 hours  tamsulosin 0.4 milliGRAM(s) Oral at bedtime    Vital Signs Last 24 Hrs  T(C): 37.2 (17 Jul 2021 08:00), Max: 37.7 (17 Jul 2021 00:00)  T(F): 98.9 (17 Jul 2021 08:00), Max: 99.8 (17 Jul 2021 00:00)  HR: 98 (17 Jul 2021 08:00) (64 - 98)  BP: 134/81 (17 Jul 2021 08:00) (134/76 - 148/68)  BP(mean): --  RR: 18 (17 Jul 2021 08:00) (16 - 18)  SpO2: 98% (17 Jul 2021 04:00) (98% - 98%)    PE:   Dressing C/D/I   Compartments soft and compressible  Some ecchymosis posteriorly appreciated   Motor intact distally  SILT distally  CR<2sec  palpable pulses    Labs             8.5    5.55  )-----------( 191      ( 16 Jul 2021 05:10 )             26.7       A/P: 71yMale S/P right femur ORIF on 7/9/2021, doing well    OOB to Chair   NWB RLE  PT/OT  Pain control   Ext icing/elevation  Incentive Spirometry   Trend H/H  DVT Prophylaxis per primary team

## 2021-07-17 NOTE — PROGRESS NOTE ADULT - SUBJECTIVE AND OBJECTIVE BOX
NEPHROLOGY FOLLOW UP NOTE    transferred to floor  pt seen and examined  some pain to RLE  no sob, n/v, abd pain, fever  c/o frequent void      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      Allergies:  No Known Allergies    Home Medications Reviewed    SOCIAL HISTORY:  Denies ETOH,Smoking,   FAMILY HISTORY:  Family history of early CAD  mother    FH: ovarian cancer  mother      REVIEW OF SYSTEMS:  CONSTITUTIONAL: No weakness, fevers or chills  EYES/ENT: No visual changes;  No vertigo or throat pain   NECK: No pain or stiffness  RESPIRATORY: No cough, wheezing, hemoptysis; No shortness of breath  CARDIOVASCULAR: No chest pain or palpitations.  GASTROINTESTINAL: No abdominal or epigastric pain. No nausea, vomiting, or hematemesis; No diarrhea or constipation. No melena or hematochezia.  GENITOURINARY: No dysuria, frequency, foamy urine, urinary urgency, incontinence or hematuria  NEUROLOGICAL: No numbness or weakness  SKIN: No itching, burning, rashes, or lesions   VASCULAR: No bilateral lower extremity edema.   All other review of systems is negative unless indicated above.    advance care planning and advance care directives reviewed     PHYSICAL EXAM:  Constitutional: NAD  HEENT: anicteric sclera, oropharynx clear, dry mm  Neck: No JVD  Respiratory: CTAB, no wheezes, rales or rhonchi  Cardiovascular: S1, S2, RRR  Gastrointestinal: BS+, soft, NT/ND + scar  Extremities: No cyanosis or clubbing. No peripheral edema + RLE edema  Neurological: A/O x 3, no focal deficits  Psychiatric: Normal mood, normal affect  : No CVA tenderness. No mayen  Skin: No rashes      Hospital Medications:   MEDICATIONS  (STANDING):  aspirin  chewable 81 milliGRAM(s) Oral daily  chlorhexidine 4% Liquid 1 Application(s) Topical daily  dextrose 40% Gel 15 Gram(s) Oral once  dextrose 50% Injectable 25 Gram(s) IV Push once  ferrous    sulfate 325 milliGRAM(s) Oral <User Schedule>  fludroCORTISONE 0.1 milliGRAM(s) Oral daily  folic acid 1 milliGRAM(s) Oral daily  glucagon  Injectable 1 milliGRAM(s) IntraMuscular once  insulin lispro (ADMELOG) corrective regimen sliding scale   SubCutaneous three times a day before meals  magnesium sulfate  IVPB 2 Gram(s) IV Intermittent once  metoprolol tartrate 25 milliGRAM(s) Oral two times a day  pantoprazole    Tablet 40 milliGRAM(s) Oral before breakfast  senna 2 Tablet(s) Oral at bedtime  tacrolimus 3 milliGRAM(s) Oral every 12 hours  tamsulosin 0.4 milliGRAM(s) Oral at bedtime        VITALS:  T(F): 98.9 (07-17-21 @ 08:00), Max: 99.8 (07-17-21 @ 00:00)  HR: 98 (07-17-21 @ 08:00)  BP: 134/81 (07-17-21 @ 08:00)  RR: 18 (07-17-21 @ 08:00)  SpO2: 98% (07-17-21 @ 04:00)  Wt(kg): --    07-15 @ 07:01  -  07-16 @ 07:00  --------------------------------------------------------  IN: 2620 mL / OUT: 1425 mL / NET: 1195 mL    07-16 @ 07:01  -  07-17 @ 07:00  --------------------------------------------------------  IN: 250 mL / OUT: 700 mL / NET: -450 mL    07-17 @ 07:01  -  07-17 @ 11:20  --------------------------------------------------------  IN: 360 mL / OUT: 500 mL / NET: -140 mL          LABS:  07-17    138  |  103  |  13  ----------------------------<  101<H>  4.1   |  26  |  0.9    Ca    8.3<L>      17 Jul 2021 07:47  Mg     1.6     07-17    TPro  5.8<L>  /  Alb  3.1<L>  /  TBili  2.5<H>  /  DBili      /  AST  27  /  ALT  21  /  AlkPhos  297<H>  07-17                          8.9    5.70  )-----------( 200      ( 17 Jul 2021 07:47 )             27.9       Urine Studies:        RADIOLOGY & ADDITIONAL STUDIES:                            8.5    5.55  )-----------( 191      ( 16 Jul 2021 05:10 )             26.7       Urine Studies:        RADIOLOGY & ADDITIONAL STUDIES:

## 2021-07-17 NOTE — PROGRESS NOTE ADULT - SUBJECTIVE AND OBJECTIVE BOX
Progress Note:  Provider Speciality                            Hospitalist      SOCORRO MANUEL MRN-[id number] 71y Male     CHIEF PRESENTING COMPLAINT:  Patient is a 71y old  Male who presents with a chief complaint of Right periprosthetic femur fracture (2021 11:19)        SUBJECTIVE:  Patient was seen and examined at bedside. Reports improvement in  presenting complaint. No significant overnight events reported.     HISTORY OF PRESENTING ILLNESS:  HPI:  71M w/PMHx of HTN, HLD, anemia, DM, Afib, JIMÉNEZ s/p liver transplant and right renal transplant at [facility] in 2020 (follows with his hepatologist Dr. Sunshine), CAD s/p stents (10 years ago) on ASA and Plavix, watchman procedure performed ~3 months ago by Dr. Burdick, history of bilateral knee replacements (21 years ago) presents after being sent in from outpatient office for finding of new right distal femur fracture. Patient tripped and fell while trying to get out of his car and felt right knee pain afterwards. He presented to an outpatient clinic for evaluation. XR at outpatient clinic demonstrated distal right femur fracture. He was placed in a knee immobilizer and instructed to present to the ED for further management. Patient is afebrile, HR 61, borderline BP of 93/52, saturating 99% on room air. On exam his right knee is immobilized, he has flexion and extension of the foot at the ankle joint, +DP and PT pulses, foot warm and well perfused. No other external signs of injury, denies pain anywhere else.    (2021 21:21)        REVIEW OF SYSTEMS:  Patient denies any headache, any vision complaints, runny nose, fever, chills, sore throat. Denies chest pain, shortness of breath, palpitation. Denies nausea, vomiting, abdominal pain, diarrhoea, Denies urinary burning, urgency, frequency, dysuria. Denies weakness in any part of the body or numbness.   At least 10 systems were reviewed in ROS. All systems reviewed  are within normal limits except for the complaints as described in Subjective.    PAST MEDICAL & SURGICAL HISTORY:  PAST MEDICAL & SURGICAL HISTORY:  HTN (hypertension)    High cholesterol    Anemia    Diabetes    Afib    Cirrhosis of liver  JIMÉNEZ    Dyspnea on exertion    BPH (benign prostatic hyperplasia)    Presence of bilateral total knee joint prostheses  15 years ago    S/P angioplasty with stent  2 cardiac stents &gt; 10 years back    Encounter for screening colonoscopy  1 year ago    Organ transplant  liver, right kidney 2020            VITAL SIGNS:  Vital Signs Last 24 Hrs  T(C): 37.2 (2021 08:00), Max: 37.7 (2021 00:00)  T(F): 98.9 (2021 08:00), Max: 99.8 (2021 00:00)  HR: 98 (2021 08:00) (88 - 98)  BP: 134/81 (2021 08:00) (134/76 - 137/85)  BP(mean): --  RR: 18 (2021 08:00) (16 - 18)  SpO2: 98% (2021 04:00) (98% - 98%)          PHYSICAL EXAMINATION:  Not in acute distress  General: No pallor, no icterus  HEENT:   EOMI, no JVD.  Heart: S1+S2 audible  Lungs: bilateral  fair air entry, no wheezing, no crepitations.  Abdomen: Soft, non-tender, non-distended , no  rigidity or guarding.  CNS: Awake alert, CN  grossly intact.  Extremities:  No edema            CONSULTS:  Consultant(s) Notes Reviewed by me.   Care Discussed with Consultants/Other Providers where required.        MEDICATIONS:  MEDICATIONS  (STANDING):  aspirin  chewable 81 milliGRAM(s) Oral daily  chlorhexidine 4% Liquid 1 Application(s) Topical daily  dextrose 40% Gel 15 Gram(s) Oral once  dextrose 50% Injectable 25 Gram(s) IV Push once  ferrous    sulfate 325 milliGRAM(s) Oral <User Schedule>  fludroCORTISONE 0.1 milliGRAM(s) Oral daily  folic acid 1 milliGRAM(s) Oral daily  glucagon  Injectable 1 milliGRAM(s) IntraMuscular once  insulin lispro (ADMELOG) corrective regimen sliding scale   SubCutaneous three times a day before meals  metoprolol tartrate 25 milliGRAM(s) Oral two times a day  pantoprazole    Tablet 40 milliGRAM(s) Oral before breakfast  senna 2 Tablet(s) Oral at bedtime  tacrolimus 3 milliGRAM(s) Oral every 12 hours  tamsulosin 0.4 milliGRAM(s) Oral at bedtime    MEDICATIONS  (PRN):  acetaminophen   Tablet .. 650 milliGRAM(s) Oral every 6 hours PRN Temp greater or equal to 38C (100.4F), Mild Pain (1 - 3)  bisacodyl 5 milliGRAM(s) Oral every 12 hours PRN Constipation  morphine  - Injectable 2 milliGRAM(s) IV Push every 6 hours PRN Severe Pain (7 - 10)  oxyCODONE    IR 10 milliGRAM(s) Oral every 6 hours PRN Moderate Pain (4 - 6)  polyethylene glycol 3350 17 Gram(s) Oral daily PRN Constipation            ASSESSMENT:          72 y/o M with PMHx JIMÉNEZ s/p liver and R renal transplant (2020 [place]), HTN, HLD, DM, CAD (s/p stents 10 yrs ago, on ASA and Plavix), AFib (s/p Watchman 3 months ago) admitted for R distal femur fx. Had R femur ORIF by ortho, afterwards Hgb dropped to 6.9. CT showed hematoma in R vastus intermedius. Developed hypotension and went into AFib with RVR. Placed on levo, switched to valerio, now off pressors. Septic workup sent, as patient was febrile, started on Cefepime, now off abx. Started on diltiazem for AFib, switched to esmolol drip and digoxin, weaned off to PO metoprolol. S/p 8U pRBC in total. Ortho, nephro, ID, hem/onc all following.        Right distal femoral fracture s/p orif  Hemorrhagic shock  Post op anemia due to hematoma  PE on CTA / DVT s/p ivc filter  Hypomagnesemia   s/p  donor liver / kidney transplant  AFib with RVR      Downgraded from unit today  Off pressors  Weaned off esmolol, d/c'd digoxin  Acute 0n Chronic anemia-PRBC transfusion PRN.S/p 8 U pRBC in total  Hypomagnesemia -mg repleted  send UA, Uc/s  Supplement po iron  Monitor h/h  AFib with RVR- RVR resolved. Continue lopressor  EP-reduced  mg to ASA 81 mg given pt's recurrent low Hb, finding of large hematoma in RLE. Plavix held  Incidental RLL subsegmental PE  Acute R popliteal and posterior tibialis DVT- Cannot be on AC due to recent active bleeding  S/p IVC filter placed   Acute comminuted fracture of the distal rt femur-S/p ORIF by Ortho   S/p renal transplant (Rt sided at [facility] in 2020)cont prograf 0.3mg po bid, cont florinef 0.1mg po qd  Hx of liver transplant due to JIMÉNEZ  Get Physical therapy  Handoff: Acute inpatient management  required further , not ready for discharge yet

## 2021-07-18 LAB
ALBUMIN SERPL ELPH-MCNC: 3 G/DL — LOW (ref 3.5–5.2)
ALP SERPL-CCNC: 307 U/L — HIGH (ref 30–115)
ALT FLD-CCNC: 17 U/L — SIGNIFICANT CHANGE UP (ref 0–41)
ANION GAP SERPL CALC-SCNC: 10 MMOL/L — SIGNIFICANT CHANGE UP (ref 7–14)
AST SERPL-CCNC: 21 U/L — SIGNIFICANT CHANGE UP (ref 0–41)
BILIRUB SERPL-MCNC: 2.6 MG/DL — HIGH (ref 0.2–1.2)
BUN SERPL-MCNC: 16 MG/DL — SIGNIFICANT CHANGE UP (ref 10–20)
CALCIUM SERPL-MCNC: 8.2 MG/DL — LOW (ref 8.5–10.1)
CHLORIDE SERPL-SCNC: 102 MMOL/L — SIGNIFICANT CHANGE UP (ref 98–110)
CO2 SERPL-SCNC: 24 MMOL/L — SIGNIFICANT CHANGE UP (ref 17–32)
CREAT SERPL-MCNC: 0.9 MG/DL — SIGNIFICANT CHANGE UP (ref 0.7–1.5)
CULTURE RESULTS: NO GROWTH — SIGNIFICANT CHANGE UP
GLUCOSE BLDC GLUCOMTR-MCNC: 107 MG/DL — HIGH (ref 70–99)
GLUCOSE BLDC GLUCOMTR-MCNC: 124 MG/DL — HIGH (ref 70–99)
GLUCOSE BLDC GLUCOMTR-MCNC: 127 MG/DL — HIGH (ref 70–99)
GLUCOSE BLDC GLUCOMTR-MCNC: 137 MG/DL — HIGH (ref 70–99)
GLUCOSE SERPL-MCNC: 131 MG/DL — HIGH (ref 70–99)
HCT VFR BLD CALC: 27.8 % — LOW (ref 42–52)
HCT VFR BLD CALC: 28.4 % — LOW (ref 42–52)
HGB BLD-MCNC: 9 G/DL — LOW (ref 14–18)
HGB BLD-MCNC: 9.1 G/DL — LOW (ref 14–18)
MAGNESIUM SERPL-MCNC: 1.5 MG/DL — LOW (ref 1.8–2.4)
MCHC RBC-ENTMCNC: 27.6 PG — SIGNIFICANT CHANGE UP (ref 27–31)
MCHC RBC-ENTMCNC: 27.6 PG — SIGNIFICANT CHANGE UP (ref 27–31)
MCHC RBC-ENTMCNC: 32 G/DL — SIGNIFICANT CHANGE UP (ref 32–37)
MCHC RBC-ENTMCNC: 32.4 G/DL — SIGNIFICANT CHANGE UP (ref 32–37)
MCV RBC AUTO: 85.3 FL — SIGNIFICANT CHANGE UP (ref 80–94)
MCV RBC AUTO: 86.1 FL — SIGNIFICANT CHANGE UP (ref 80–94)
NRBC # BLD: 0 /100 WBCS — SIGNIFICANT CHANGE UP (ref 0–0)
NRBC # BLD: 0 /100 WBCS — SIGNIFICANT CHANGE UP (ref 0–0)
PLATELET # BLD AUTO: 201 K/UL — SIGNIFICANT CHANGE UP (ref 130–400)
PLATELET # BLD AUTO: 210 K/UL — SIGNIFICANT CHANGE UP (ref 130–400)
POTASSIUM SERPL-MCNC: 4 MMOL/L — SIGNIFICANT CHANGE UP (ref 3.5–5)
POTASSIUM SERPL-SCNC: 4 MMOL/L — SIGNIFICANT CHANGE UP (ref 3.5–5)
PROT SERPL-MCNC: 5.9 G/DL — LOW (ref 6–8)
RBC # BLD: 3.26 M/UL — LOW (ref 4.7–6.1)
RBC # BLD: 3.3 M/UL — LOW (ref 4.7–6.1)
RBC # FLD: 15 % — HIGH (ref 11.5–14.5)
RBC # FLD: 15 % — HIGH (ref 11.5–14.5)
SODIUM SERPL-SCNC: 136 MMOL/L — SIGNIFICANT CHANGE UP (ref 135–146)
SPECIMEN SOURCE: SIGNIFICANT CHANGE UP
WBC # BLD: 4.9 K/UL — SIGNIFICANT CHANGE UP (ref 4.8–10.8)
WBC # BLD: 4.95 K/UL — SIGNIFICANT CHANGE UP (ref 4.8–10.8)
WBC # FLD AUTO: 4.9 K/UL — SIGNIFICANT CHANGE UP (ref 4.8–10.8)
WBC # FLD AUTO: 4.95 K/UL — SIGNIFICANT CHANGE UP (ref 4.8–10.8)

## 2021-07-18 PROCEDURE — 99233 SBSQ HOSP IP/OBS HIGH 50: CPT

## 2021-07-18 RX ADMIN — Medication 25 MILLIGRAM(S): at 06:36

## 2021-07-18 RX ADMIN — Medication 81 MILLIGRAM(S): at 13:09

## 2021-07-18 RX ADMIN — TAMSULOSIN HYDROCHLORIDE 0.4 MILLIGRAM(S): 0.4 CAPSULE ORAL at 21:08

## 2021-07-18 RX ADMIN — TACROLIMUS 3 MILLIGRAM(S): 5 CAPSULE ORAL at 17:19

## 2021-07-18 RX ADMIN — SENNA PLUS 2 TABLET(S): 8.6 TABLET ORAL at 21:07

## 2021-07-18 RX ADMIN — PANTOPRAZOLE SODIUM 40 MILLIGRAM(S): 20 TABLET, DELAYED RELEASE ORAL at 06:36

## 2021-07-18 RX ADMIN — Medication 325 MILLIGRAM(S): at 06:35

## 2021-07-18 RX ADMIN — Medication 650 MILLIGRAM(S): at 21:12

## 2021-07-18 RX ADMIN — TACROLIMUS 3 MILLIGRAM(S): 5 CAPSULE ORAL at 06:36

## 2021-07-18 RX ADMIN — MORPHINE SULFATE 2 MILLIGRAM(S): 50 CAPSULE, EXTENDED RELEASE ORAL at 10:22

## 2021-07-18 RX ADMIN — FLUDROCORTISONE ACETATE 0.1 MILLIGRAM(S): 0.1 TABLET ORAL at 06:36

## 2021-07-18 RX ADMIN — Medication 1 MILLIGRAM(S): at 13:09

## 2021-07-18 RX ADMIN — MORPHINE SULFATE 2 MILLIGRAM(S): 50 CAPSULE, EXTENDED RELEASE ORAL at 11:00

## 2021-07-18 RX ADMIN — Medication 650 MILLIGRAM(S): at 13:07

## 2021-07-18 RX ADMIN — MORPHINE SULFATE 2 MILLIGRAM(S): 50 CAPSULE, EXTENDED RELEASE ORAL at 19:47

## 2021-07-18 RX ADMIN — Medication 650 MILLIGRAM(S): at 13:37

## 2021-07-18 RX ADMIN — Medication 25 MILLIGRAM(S): at 17:19

## 2021-07-18 NOTE — PROGRESS NOTE ADULT - SUBJECTIVE AND OBJECTIVE BOX
SOCORRO MANUEL 71y Male  MRN#: [id number]   Hospital Day: 11d    SUBJECTIVE  Patient is a 71y old Male who presents with a chief complaint of Right periprosthetic femur fracture (2021 13:55)  Currently admitted to medicine with the primary diagnosis of Femur fracture      INTERVAL HPI AND OVERNIGHT EVENTS:  Patient was examined and seen at bedside. This morning he is resting comfortably in bed and reports no issues or overnight events.      OBJECTIVE  PAST MEDICAL & SURGICAL HISTORY  HTN (hypertension)    High cholesterol    Anemia    Diabetes    Afib    Cirrhosis of liver  JIMÉNEZ    Dyspnea on exertion    BPH (benign prostatic hyperplasia)    Presence of bilateral total knee joint prostheses  15 years ago    S/P angioplasty with stent  2 cardiac stents &gt; 10 years back    Encounter for screening colonoscopy  1 year ago    Organ transplant  liver, right kidney 2020      ALLERGIES:  No Known Allergies    MEDICATIONS:  STANDING MEDICATIONS  aspirin  chewable 81 milliGRAM(s) Oral daily  chlorhexidine 4% Liquid 1 Application(s) Topical daily  dextrose 40% Gel 15 Gram(s) Oral once  dextrose 50% Injectable 25 Gram(s) IV Push once  ferrous    sulfate 325 milliGRAM(s) Oral <User Schedule>  fludroCORTISONE 0.1 milliGRAM(s) Oral daily  folic acid 1 milliGRAM(s) Oral daily  glucagon  Injectable 1 milliGRAM(s) IntraMuscular once  insulin lispro (ADMELOG) corrective regimen sliding scale   SubCutaneous three times a day before meals  metoprolol tartrate 25 milliGRAM(s) Oral two times a day  pantoprazole    Tablet 40 milliGRAM(s) Oral before breakfast  senna 2 Tablet(s) Oral at bedtime  tacrolimus 3 milliGRAM(s) Oral every 12 hours  tamsulosin 0.4 milliGRAM(s) Oral at bedtime    PRN MEDICATIONS  acetaminophen   Tablet .. 650 milliGRAM(s) Oral every 6 hours PRN  bisacodyl 5 milliGRAM(s) Oral every 12 hours PRN  morphine  - Injectable 2 milliGRAM(s) IV Push every 6 hours PRN  oxyCODONE    IR 10 milliGRAM(s) Oral every 6 hours PRN  polyethylene glycol 3350 17 Gram(s) Oral daily PRN      PHYSICAL EXAM:  Constitutional: pt is comfortable lying in bed  HEENT: Full ROM in bilateral eyes; pupils symmetrical and equal in size  Respiratory: (+) sounds in all lung fields; no crackles or wheezes appreciated  Cardiovascular: S1 S2 regular rate and rhythm; no murmurs or gallops appreciated  Gastrointestinal: (+) bowel sounds in all quadrants; abdomen is non-distended, non-tender to superficial and deep palpation  Extremities: RLE is swollen and edematous from knee to proximal thigh/groin; RLE warmer to palpation than LLE`; non-pitting. Scars from previous ORIF surgeries noted.   Vascular: Warm and Well perfused UE and LE; no mottling or dusky skin   Neurological: AxO x3 to self, place and year  Skin: no rashes or ulcerations noted; no scaling present    VITAL SIGNS: Last 24 Hours  T(C): 37.2 (2021 16:00), Max: 37.4 (2021 08:00)  T(F): 98.9 (2021 16:00), Max: 99.4 (2021 08:00)  HR: 65 (2021 16:00) (63 - 123)  BP: 127/65 (2021 16:00) (127/65 - 160/74)  BP(mean): --  RR: 18 (2021 16:00) (17 - 18)  SpO2: --    LABS:                        9.1    4.95  )-----------( 210      ( 2021 10:09 )             28.4     -18    136  |  102  |  16  ----------------------------<  131<H>  4.0   |  24  |  0.9    Ca    8.2<L>      2021 10:09  Mg     1.5     18    TPro  5.9<L>  /  Alb  3.0<L>  /  TBili  2.6<H>  /  DBili  x   /  AST  21  /  ALT  17  /  AlkPhos  307<H>  -18      Urinalysis Basic - ( 2021 12:43 )    Color: Yellow / Appearance: Clear / S.013 / pH: x  Gluc: x / Ketone: Negative  / Bili: Negative / Urobili: 3 mg/dL   Blood: x / Protein: Trace / Nitrite: Negative   Leuk Esterase: Negative / RBC: x / WBC x   Sq Epi: x / Non Sq Epi: x / Bacteria: x    Culture - Urine (collected 2021 12:42)  Source: .Urine Clean Catch (Midstream)  Final Report (2021 15:45):    No growth

## 2021-07-18 NOTE — PROGRESS NOTE ADULT - ASSESSMENT
HOSPITAL COURSE:  70 y/o M with PMHx JIMÉNEZ s/p liver and R renal transplant (Feb 2020 [place]), HTN, HLD, DM, CAD (s/p stents 10 yrs ago, on ASA and Plavix), AFib (s/p Watchman 3 months ago) admitted for R distal femur fx. Had R femur ORIF (on 7/9/21) by ortho, afterwards Hgb dropped to 6.9. CT showed hematoma in R vastus intermedius. Developed hypotension and went into AFib with RVR. Placed on levo, switched to valerio, pressors discontinued (7/10). Septic workup sent, as patient was febrile, started on Cefepime, now off abx. Started on diltiazem for AFib, switched to esmolol drip and digoxin, weaned off to PO metoprolol. S/p 8U pRBC in total. Ortho, nephro, ID, hem/onc all following.      ASSESSMENT & PLAN:  #Chronic AFib s/p Watchman (3 mos ago)  - S/p 45 days of Coumadin after Watchman 3 months ago  - Weaned off esmolol, d/c'd digoxin  - Remains in AFib, although rate controlled  - C/w metoprolol tartrate 25 mg PO BID  - Was on high dose ASA, Plavix, Plavix remains on hold  - ED recommendations: can reduce  mg to ASA 81 mg given pt's recurrent low Hb, finding of large hematoma in RLE    # RLL subsegmental PE - Incidental Finding   # Acute DVT - R popliteal and posterior tibialis   - Cannot be on AC d/t active bleeding  - S/p IVC filter placed 7/14    # Acute comminuted fracture of distal R Femur, s/p ORIF on 7/9  # Vastus Intermedius hematoma  # Acute Normocytic Anemia secondary to hematoma  - Ortho following for dressing changes, to remain NWB of RLE  - CTA RLE: large hematoma in region of R vastus intermedius  (12.5x4x11.5cm)  - PT/OT following  - Remains off AC due to acute bleeding  - s/p 8U pRBCs in total due to recurrently low Hb  - Goal Hb > 8, maintain active T+S  - Hb stable9.1 on 7/18; continue to trend CBC   - Seen by Heme Onc; acute anemia likely due to surgery in setting of chronic inflammation and effect of antirejection medications on bone marrow  - FOBT (-); B12 and folate wnl   - C/w folate, iron supplementation  - Continue to hold Plavix; c/w ASA 81mg daily as per EP recommendations   - incentive spirometry     #S/p renal transplant (Rt sided at [facility] in 02/2020)  #Hx of liver transplant due to JIMÉNEZ  - Nephro on board, following   - C/w Prograf  - C/w Florinef 0.1 mg PO daily  - f/u  trough on 7/19am    #Hx of DM  - A1c 7/8: 5.3  - C/w SS, goal FS < 180  - Home Januvia 100mg, holding  - Home  Linagliptin 5 mg QD, holding    DVT ppx: SCD's; AC held due to hematoma  GI ppx:Protonix  Diet: DASH/TLC diet   Code Status: full  Dispo:      HOSPITAL COURSE:  72 y/o M with PMHx JIMÉNEZ s/p liver and R renal transplant (Feb 2020 [place]), HTN, HLD, DM, CAD (s/p stents 10 yrs ago, on ASA and Plavix), AFib (s/p Watchman 3 months ago) admitted for R distal femur fx. Had R femur ORIF (on 7/9/21) by ortho, afterwards Hgb dropped to 6.9. CT showed hematoma in R vastus intermedius. Developed hypotension and went into AFib with RVR. Placed on levo, switched to valerio, pressors discontinued (7/10). Septic workup sent, as patient was febrile, started on Cefepime, now off abx. Started on diltiazem for AFib, switched to esmolol drip and digoxin, weaned off to PO metoprolol. S/p 8U pRBC in total. Ortho, nephro, ID, hem/onc all following.      ASSESSMENT & PLAN:  #Chronic AFib s/p Watchman (3 mos ago)  - S/p 45 days of Coumadin after Watchman 3 months ago  - Weaned off esmolol, d/c'd digoxin  - Remains in AFib, although rate controlled  - C/w metoprolol tartrate 25 mg PO BID  - Was on high dose ASA, Plavix, Plavix remains on hold  - EP recommendations: ASA 81 mg given pt's recurrent low Hb, finding of large hematoma in RLE    # RLL subsegmental PE - Incidental Finding   # Acute DVT - R popliteal and posterior tibialis   - Cannot be on AC d/t active bleeding  - S/p IVC filter placed 7/14    # Acute comminuted fracture of distal R Femur, s/p ORIF on 7/9  # Vastus Intermedius hematoma  # Acute Normocytic Anemia secondary to hematoma  - Ortho following for dressing changes, to remain NWB of RLE  - CTA RLE: large hematoma in region of R vastus intermedius  (12.5x4x11.5cm)  - PT/OT following  - Remains off AC due to acute bleeding  - s/p 8U pRBCs in total due to recurrently low Hb  - Goal Hb > 8, maintain active T+S  - Hb stable9.1 on 7/18; continue to trend CBC   - Seen by Heme Onc; acute anemia likely due to surgery in setting of chronic inflammation and effect of antirejection medications on bone marrow  - FOBT (-); B12 and folate wnl   - C/w folate, iron supplementation  - Continue to hold Plavix; c/w ASA 81mg daily as per EP recommendations   - incentive spirometry     #S/p renal transplant (Rt sided at [facility] in 02/2020)  #Hx of liver transplant due to JIMÉNEZ  - Nephro on board, following   - C/w Prograf  - C/w Florinef 0.1 mg PO daily  - f/u  trough on 7/19am    #Hx of DM  - A1c 7/8: 5.3  - C/w SS, goal FS < 180  - Home Januvia 100mg, holding  - Home  Linagliptin 5 mg QD, holding    DVT ppx: SCD's; AC held due to hematoma  GI ppx:Protonix  Diet: DASH/TLC diet   Code Status: full  Dispo: f/u  trough 7/19 am

## 2021-07-18 NOTE — PROGRESS NOTE ADULT - SUBJECTIVE AND OBJECTIVE BOX
NEPHROLOGY FOLLOW UP NOTE    no overnight events  ortho input noted  no sob, n/v, abd pain, fever  + void  h/h steady, no active bleeding    PAST MEDICAL & SURGICAL HISTORY:  HTN (hypertension)  High cholesterol  Anemia  Diabetes  Afib  Cirrhosis of liver  JIMÉNEZ  Dyspnea on exertion  BPH (benign prostatic hyperplasia)  Presence of bilateral total knee joint prostheses  15 years ago  S/P angioplasty with stent  2 cardiac stents &gt; 10 years back  Encounter for screening colonoscopy  1 year ago  Organ transplant  liver, right kidney 2020      Allergies:  No Known Allergies    Home Medications Reviewed    SOCIAL HISTORY:  Denies ETOH,Smoking,   FAMILY HISTORY:  Family history of early CAD  mother    FH: ovarian cancer  mother      REVIEW OF SYSTEMS:  CONSTITUTIONAL: No weakness, fevers or chills  EYES/ENT: No visual changes;  No vertigo or throat pain   NECK: No pain or stiffness  RESPIRATORY: No cough, wheezing, hemoptysis; No shortness of breath  CARDIOVASCULAR: No chest pain or palpitations.  GASTROINTESTINAL: No abdominal or epigastric pain. No nausea, vomiting, or hematemesis; No diarrhea or constipation. No melena or hematochezia.  GENITOURINARY: No dysuria, frequency, foamy urine, urinary urgency, incontinence or hematuria  NEUROLOGICAL: No numbness or weakness  SKIN: No itching, burning, rashes, or lesions   VASCULAR: No bilateral lower extremity edema.   All other review of systems is negative unless indicated above.      PHYSICAL EXAM:  Constitutional: NAD  HEENT: anicteric sclera, oropharynx clear, dry mm  Neck: No JVD  Respiratory: CTAB, no wheezes, rales or rhonchi  Cardiovascular: S1, S2, RRR  Gastrointestinal: BS+, soft, NT/ND + scar  Extremities: No cyanosis or clubbing. No peripheral edema + RLE edema  Neurological: A/O x 3, no focal deficits  Psychiatric: Normal mood, normal affect  : No CVA tenderness. No mayen  Skin: No rashes      Hospital Medications:   MEDICATIONS  (STANDING):  aspirin  chewable 81 milliGRAM(s) Oral daily  chlorhexidine 4% Liquid 1 Application(s) Topical daily  dextrose 40% Gel 15 Gram(s) Oral once  dextrose 50% Injectable 25 Gram(s) IV Push once  ferrous    sulfate 325 milliGRAM(s) Oral <User Schedule>  fludroCORTISONE 0.1 milliGRAM(s) Oral daily  folic acid 1 milliGRAM(s) Oral daily  glucagon  Injectable 1 milliGRAM(s) IntraMuscular once  insulin lispro (ADMELOG) corrective regimen sliding scale   SubCutaneous three times a day before meals  metoprolol tartrate 25 milliGRAM(s) Oral two times a day  pantoprazole    Tablet 40 milliGRAM(s) Oral before breakfast  senna 2 Tablet(s) Oral at bedtime  tacrolimus 3 milliGRAM(s) Oral every 12 hours  tamsulosin 0.4 milliGRAM(s) Oral at bedtime        VITALS:  T(F): 99.4 (21 @ 08:00), Max: 99.4 (21 @ 08:00)  HR: 63 (21 @ 08:00)  BP: 156/75 (21 @ 08:00)  RR: 17 (21 @ 08:00)  SpO2: --  Wt(kg): --     @ 07:01  -  17 @ 07:00  --------------------------------------------------------  IN: 250 mL / OUT: 700 mL / NET: -450 mL    17 @ 07:01  -  18 @ 07:00  --------------------------------------------------------  IN: 840 mL / OUT: 1100 mL / NET: -260 mL     @ 07:01  -   @ 12:55  --------------------------------------------------------  IN: 360 mL / OUT: 0 mL / NET: 360 mL          LABS:      136  |  102  |  16  ----------------------------<  131<H>  4.0   |  24  |  0.9    Ca    8.2<L>      2021 10:09  Mg     1.5         TPro  5.9<L>  /  Alb  3.0<L>  /  TBili  2.6<H>  /  DBili      /  AST  21  /  ALT  17  /  AlkPhos  307<H>                            9.1    4.95  )-----------( 210      ( 2021 10:09 )             28.4       Urine Studies:  Urinalysis Basic - ( 2021 12:43 )    Color: Yellow / Appearance: Clear / S.013 / pH:   Gluc:  / Ketone: Negative  / Bili: Negative / Urobili: 3 mg/dL   Blood:  / Protein: Trace / Nitrite: Negative   Leuk Esterase: Negative / RBC:  / WBC    Sq Epi:  / Non Sq Epi:  / Bacteria:           RADIOLOGY & ADDITIONAL STUDIES:

## 2021-07-18 NOTE — PROGRESS NOTE ADULT - SUBJECTIVE AND OBJECTIVE BOX
ORTHOPEDIC SURGERY PROGRESS NOTE    SUBJECTIVE: Patient seen and examined this morning. POD#9 s/p R femur ORIF. Pain controlled.  No acute events overnight.  No f/c/n/v/cp/sob.     OBJECTIVE:  NAD  Vital Signs Last 24 Hrs  T(C): 36.6 (18 Jul 2021 00:00), Max: 37.2 (17 Jul 2021 08:00)  T(F): 97.9 (18 Jul 2021 00:00), Max: 98.9 (17 Jul 2021 08:00)  HR: 66 (18 Jul 2021 06:40) (66 - 123)  BP: 160/74 (18 Jul 2021 06:40) (133/88 - 160/74)  BP(mean): --  RR: 18 (18 Jul 2021 06:40) (18 - 18)  SpO2: --    Affected extremity: Right LE         Dressing: clean/dry/intact            Sensation: SILT         Motor exam: 5/5 TA/GS/EHL         Compartments soft/compressible          warm well perfused         capillary refill <3 seconds                         8.9    5.70  )-----------( 200      ( 17 Jul 2021 07:47 )             27.9     07-17    138  |  103  |  13  ----------------------------<  101<H>  4.1   |  26  |  0.9    Ca    8.3<L>      17 Jul 2021 07:47  Mg     1.6     07-17    TPro  5.8<L>  /  Alb  3.1<L>  /  TBili  2.5<H>  /  DBili  x   /  AST  27  /  ALT  21  /  AlkPhos  297<H>  07-17    A/P :  Pt is a 72yo Male, POD#9 s/p R femur ORIF    OOB to Chair   NWB RLE  PT/OT  Pain control   Ext icing/elevation  Incentive Spirometry   Trend H/H  DVT Prophylaxis per primary team

## 2021-07-18 NOTE — PROGRESS NOTE ADULT - SUBJECTIVE AND OBJECTIVE BOX
Progress Note:  Provider Speciality                            Hospitalist      SOCORRO MANUEL MRN-[id number] 71y Male     CHIEF PRESENTING COMPLAINT:  Patient is a 71y old  Male who presents with a chief complaint of Right periprosthetic femur fracture (2021 11:19)        SUBJECTIVE:  Patient was seen and examined at bedside. Reports improvement in  presenting complaint. No significant overnight events reported.     HISTORY OF PRESENTING ILLNESS:  HPI:  71M w/PMHx of HTN, HLD, anemia, DM, Afib, JIMÉNEZ s/p liver transplant and right renal transplant at [facility] in 2020 (follows with his hepatologist Dr. Sunshine), CAD s/p stents (10 years ago) on ASA and Plavix, watchman procedure performed ~3 months ago by Dr. Burdick, history of bilateral knee replacements (21 years ago) presents after being sent in from outpatient office for finding of new right distal femur fracture. Patient tripped and fell while trying to get out of his car and felt right knee pain afterwards. He presented to an outpatient clinic for evaluation. XR at outpatient clinic demonstrated distal right femur fracture. He was placed in a knee immobilizer and instructed to present to the ED for further management. Patient is afebrile, HR 61, borderline BP of 93/52, saturating 99% on room air. On exam his right knee is immobilized, he has flexion and extension of the foot at the ankle joint, +DP and PT pulses, foot warm and well perfused. No other external signs of injury, denies pain anywhere else.    (2021 21:21)        REVIEW OF SYSTEMS:  Patient denies any headache, any vision complaints, runny nose, fever, chills, sore throat. Denies chest pain, shortness of breath, palpitation. Denies nausea, vomiting, abdominal pain, diarrhoea, Denies urinary burning, urgency, frequency, dysuria. At least 10 systems were reviewed in ROS. All systems reviewed  are within normal limits except for the complaints as described in Subjective.    PAST MEDICAL & SURGICAL HISTORY:  PAST MEDICAL & SURGICAL HISTORY:  HTN (hypertension)    High cholesterol    Anemia    Diabetes    Afib    Cirrhosis of liver  JIMÉNEZ    Dyspnea on exertion    BPH (benign prostatic hyperplasia)    Presence of bilateral total knee joint prostheses  15 years ago    S/P angioplasty with stent  2 cardiac stents &gt; 10 years back    Encounter for screening colonoscopy  1 year ago    Organ transplant  liver, right kidney 2020            VITAL SIGNS:  Vital Signs Last 24 Hrs  T(C): 37.4 (2021 08:00), Max: 37.4 (2021 08:00)  T(F): 99.4 (2021 08:00), Max: 99.4 (2021 08:00)  HR: 63 (2021 08:00) (63 - 123)  BP: 156/75 (2021 08:00) (133/88 - 160/74)  BP(mean): --  RR: 17 (2021 08:00) (17 - 18)  SpO2: --    PHYSICAL EXAMINATION:  Not in acute distress  General: No pallor, no icterus  HEENT:   EOMI, no JVD.  Heart: S1+S2 audible  Lungs: bilateral  fair air entry, no wheezing, no crepitations.  Abdomen: Soft, non-tender, non-distended , no  rigidity or guarding.  CNS: Awake alert, CN  grossly intact.  Extremities:  No edema            CONSULTS:  Consultant(s) Notes Reviewed by me.   Care Discussed with Consultants/Other Providers where required.        MEDICATIONS:  MEDICATIONS  (STANDING):  aspirin  chewable 81 milliGRAM(s) Oral daily  chlorhexidine 4% Liquid 1 Application(s) Topical daily  dextrose 40% Gel 15 Gram(s) Oral once  dextrose 50% Injectable 25 Gram(s) IV Push once  ferrous    sulfate 325 milliGRAM(s) Oral <User Schedule>  fludroCORTISONE 0.1 milliGRAM(s) Oral daily  folic acid 1 milliGRAM(s) Oral daily  glucagon  Injectable 1 milliGRAM(s) IntraMuscular once  insulin lispro (ADMELOG) corrective regimen sliding scale   SubCutaneous three times a day before meals  metoprolol tartrate 25 milliGRAM(s) Oral two times a day  pantoprazole    Tablet 40 milliGRAM(s) Oral before breakfast  senna 2 Tablet(s) Oral at bedtime  tacrolimus 3 milliGRAM(s) Oral every 12 hours  tamsulosin 0.4 milliGRAM(s) Oral at bedtime    MEDICATIONS  (PRN):  acetaminophen   Tablet .. 650 milliGRAM(s) Oral every 6 hours PRN Temp greater or equal to 38C (100.4F), Mild Pain (1 - 3)  bisacodyl 5 milliGRAM(s) Oral every 12 hours PRN Constipation  morphine  - Injectable 2 milliGRAM(s) IV Push every 6 hours PRN Severe Pain (7 - 10)  oxyCODONE    IR 10 milliGRAM(s) Oral every 6 hours PRN Moderate Pain (4 - 6)  polyethylene glycol 3350 17 Gram(s) Oral daily PRN Constipation            ASSESSMENT:          70 y/o M with PMHx JIMÉNEZ s/p liver and R renal transplant (2020 [facility]), HTN, HLD, DM, CAD (s/p stents 10 yrs ago, on ASA and Plavix), AFib (s/p Watchman 3 months ago) admitted for R distal femur fx. Had R femur ORIF by ortho, afterwards Hgb dropped to 6.9. CT showed hematoma in R vastus intermedius. Developed hypotension and went into AFib with RVR. Placed on levo, switched to valerio, now off pressors. Septic workup sent, as patient was febrile, started on Cefepime, now off abx. Started on diltiazem for AFib, switched to esmolol drip and digoxin, weaned off to PO metoprolol. S/p 8U pRBC in total. Ortho, nephro, ID, hem/onc all following.        Right distal femoral fracture s/p orif  Hemorrhagic shock  Post op anemia due to hematoma  PE on CTA / DVT s/p ivc filter  Hypomagnesemia   s/p  donor liver / kidney transplant  AFib with RVR      Downgraded from unit on   Off pressors  Weaned off esmolol, d/c'd digoxin  Acute 0n Chronic anemia-PRBC transfusion PRN.S/p 8 U pRBC in total  Hypomagnesemia -mg repleted  send UA, Uc/s  Supplement po iron  Monitor h/h  AFib with RVR- RVR resolved. Continue lopressor  EP-reduced  mg to ASA 81 mg given pt's recurrent low Hb, finding of large hematoma in RLE. Plavix held  Incidental RLL subsegmental PE  Acute R popliteal and posterior tibialis DVT- Cannot be on AC due to recent active bleeding  S/p IVC filter placed   Acute comminuted fracture of the distal rt femur-S/p ORIF by Ortho   S/p renal transplant (Rt sided at [facility] in 2020)cont prograf 0.3mg po bid, cont florinef 0.1mg po qd  Hx of liver transplant due to JIMÉNEZ  Physical therapy today  Handoff: Acute inpatient management  required further , not ready for discharge yet

## 2021-07-18 NOTE — PROGRESS NOTE ADULT - ASSESSMENT
s/p  donor liver / kidney transplant (RLQ) 2020 @ [facility]  stable allograft renal function   ESRD / ESLD due to JIMÉNEZ with HRS  Right distal femoral fracture s/p orif  post op anemia  PE on CTA / DVT s/p ivc filter  s/p iv contrast   s/p b/l remote TKR  DM2  CAD / PCI / Afib    borderline low BP's  hypomagnesemia     plan:    cont prograf 0.3mg po bid  cont florinef 0.1mg po qd  check  trough in AM  off hydrocortisone   cont lopressor  PRBC transfusion PRN  mg repletion  f/u UA, Uc/s  po iron  monitor h/h  f/u ortho / wound care  physical therapy

## 2021-07-19 LAB
ALBUMIN SERPL ELPH-MCNC: 3.3 G/DL — LOW (ref 3.5–5.2)
ALBUMIN SERPL ELPH-MCNC: 3.4 G/DL — LOW (ref 3.5–5.2)
ALP SERPL-CCNC: 298 U/L — HIGH (ref 30–115)
ALP SERPL-CCNC: 324 U/L — HIGH (ref 30–115)
ALT FLD-CCNC: 16 U/L — SIGNIFICANT CHANGE UP (ref 0–41)
ALT FLD-CCNC: 17 U/L — SIGNIFICANT CHANGE UP (ref 0–41)
ANION GAP SERPL CALC-SCNC: 7 MMOL/L — SIGNIFICANT CHANGE UP (ref 7–14)
ANION GAP SERPL CALC-SCNC: 8 MMOL/L — SIGNIFICANT CHANGE UP (ref 7–14)
AST SERPL-CCNC: 20 U/L — SIGNIFICANT CHANGE UP (ref 0–41)
AST SERPL-CCNC: 22 U/L — SIGNIFICANT CHANGE UP (ref 0–41)
BILIRUB SERPL-MCNC: 2.2 MG/DL — HIGH (ref 0.2–1.2)
BILIRUB SERPL-MCNC: 2.3 MG/DL — HIGH (ref 0.2–1.2)
BUN SERPL-MCNC: 14 MG/DL — SIGNIFICANT CHANGE UP (ref 10–20)
BUN SERPL-MCNC: 15 MG/DL — SIGNIFICANT CHANGE UP (ref 10–20)
CALCIUM SERPL-MCNC: 8.3 MG/DL — LOW (ref 8.5–10.1)
CALCIUM SERPL-MCNC: 8.5 MG/DL — SIGNIFICANT CHANGE UP (ref 8.5–10.1)
CHLORIDE SERPL-SCNC: 103 MMOL/L — SIGNIFICANT CHANGE UP (ref 98–110)
CHLORIDE SERPL-SCNC: 103 MMOL/L — SIGNIFICANT CHANGE UP (ref 98–110)
CO2 SERPL-SCNC: 27 MMOL/L — SIGNIFICANT CHANGE UP (ref 17–32)
CO2 SERPL-SCNC: 28 MMOL/L — SIGNIFICANT CHANGE UP (ref 17–32)
CREAT SERPL-MCNC: 1 MG/DL — SIGNIFICANT CHANGE UP (ref 0.7–1.5)
CREAT SERPL-MCNC: 1 MG/DL — SIGNIFICANT CHANGE UP (ref 0.7–1.5)
GLUCOSE BLDC GLUCOMTR-MCNC: 108 MG/DL — HIGH (ref 70–99)
GLUCOSE BLDC GLUCOMTR-MCNC: 110 MG/DL — HIGH (ref 70–99)
GLUCOSE BLDC GLUCOMTR-MCNC: 121 MG/DL — HIGH (ref 70–99)
GLUCOSE BLDC GLUCOMTR-MCNC: 199 MG/DL — HIGH (ref 70–99)
GLUCOSE SERPL-MCNC: 105 MG/DL — HIGH (ref 70–99)
GLUCOSE SERPL-MCNC: 130 MG/DL — HIGH (ref 70–99)
HCT VFR BLD CALC: 28.5 % — LOW (ref 42–52)
HCT VFR BLD CALC: 29.5 % — LOW (ref 42–52)
HGB BLD-MCNC: 8.9 G/DL — LOW (ref 14–18)
HGB BLD-MCNC: 9.4 G/DL — LOW (ref 14–18)
MAGNESIUM SERPL-MCNC: 1.6 MG/DL — LOW (ref 1.8–2.4)
MAGNESIUM SERPL-MCNC: 1.6 MG/DL — LOW (ref 1.8–2.4)
MCHC RBC-ENTMCNC: 27 PG — SIGNIFICANT CHANGE UP (ref 27–31)
MCHC RBC-ENTMCNC: 27.8 PG — SIGNIFICANT CHANGE UP (ref 27–31)
MCHC RBC-ENTMCNC: 31.2 G/DL — LOW (ref 32–37)
MCHC RBC-ENTMCNC: 31.9 G/DL — LOW (ref 32–37)
MCV RBC AUTO: 86.4 FL — SIGNIFICANT CHANGE UP (ref 80–94)
MCV RBC AUTO: 87.3 FL — SIGNIFICANT CHANGE UP (ref 80–94)
NRBC # BLD: 0 /100 WBCS — SIGNIFICANT CHANGE UP (ref 0–0)
NRBC # BLD: 0 /100 WBCS — SIGNIFICANT CHANGE UP (ref 0–0)
PLATELET # BLD AUTO: 214 K/UL — SIGNIFICANT CHANGE UP (ref 130–400)
PLATELET # BLD AUTO: 233 K/UL — SIGNIFICANT CHANGE UP (ref 130–400)
POTASSIUM SERPL-MCNC: 4.1 MMOL/L — SIGNIFICANT CHANGE UP (ref 3.5–5)
POTASSIUM SERPL-MCNC: 4.3 MMOL/L — SIGNIFICANT CHANGE UP (ref 3.5–5)
POTASSIUM SERPL-SCNC: 4.1 MMOL/L — SIGNIFICANT CHANGE UP (ref 3.5–5)
POTASSIUM SERPL-SCNC: 4.3 MMOL/L — SIGNIFICANT CHANGE UP (ref 3.5–5)
PROT SERPL-MCNC: 6.2 G/DL — SIGNIFICANT CHANGE UP (ref 6–8)
PROT SERPL-MCNC: 6.4 G/DL — SIGNIFICANT CHANGE UP (ref 6–8)
RBC # BLD: 3.3 M/UL — LOW (ref 4.7–6.1)
RBC # BLD: 3.38 M/UL — LOW (ref 4.7–6.1)
RBC # FLD: 15.1 % — HIGH (ref 11.5–14.5)
RBC # FLD: 15.2 % — HIGH (ref 11.5–14.5)
SODIUM SERPL-SCNC: 138 MMOL/L — SIGNIFICANT CHANGE UP (ref 135–146)
SODIUM SERPL-SCNC: 138 MMOL/L — SIGNIFICANT CHANGE UP (ref 135–146)
WBC # BLD: 3.71 K/UL — LOW (ref 4.8–10.8)
WBC # BLD: 4.38 K/UL — LOW (ref 4.8–10.8)
WBC # FLD AUTO: 3.71 K/UL — LOW (ref 4.8–10.8)
WBC # FLD AUTO: 4.38 K/UL — LOW (ref 4.8–10.8)

## 2021-07-19 PROCEDURE — 99233 SBSQ HOSP IP/OBS HIGH 50: CPT

## 2021-07-19 RX ORDER — MAGNESIUM OXIDE 400 MG ORAL TABLET 241.3 MG
400 TABLET ORAL DAILY
Refills: 0 | Status: DISCONTINUED | OUTPATIENT
Start: 2021-07-19 | End: 2021-07-20

## 2021-07-19 RX ORDER — MAGNESIUM SULFATE 500 MG/ML
1 VIAL (ML) INJECTION ONCE
Refills: 0 | Status: COMPLETED | OUTPATIENT
Start: 2021-07-19 | End: 2021-07-19

## 2021-07-19 RX ADMIN — Medication 650 MILLIGRAM(S): at 05:04

## 2021-07-19 RX ADMIN — OXYCODONE HYDROCHLORIDE 10 MILLIGRAM(S): 5 TABLET ORAL at 11:22

## 2021-07-19 RX ADMIN — Medication 650 MILLIGRAM(S): at 16:35

## 2021-07-19 RX ADMIN — Medication 650 MILLIGRAM(S): at 00:52

## 2021-07-19 RX ADMIN — TACROLIMUS 3 MILLIGRAM(S): 5 CAPSULE ORAL at 17:52

## 2021-07-19 RX ADMIN — MORPHINE SULFATE 2 MILLIGRAM(S): 50 CAPSULE, EXTENDED RELEASE ORAL at 06:01

## 2021-07-19 RX ADMIN — TACROLIMUS 3 MILLIGRAM(S): 5 CAPSULE ORAL at 05:05

## 2021-07-19 RX ADMIN — PANTOPRAZOLE SODIUM 40 MILLIGRAM(S): 20 TABLET, DELAYED RELEASE ORAL at 05:05

## 2021-07-19 RX ADMIN — Medication 1 MILLIGRAM(S): at 11:25

## 2021-07-19 RX ADMIN — OXYCODONE HYDROCHLORIDE 10 MILLIGRAM(S): 5 TABLET ORAL at 21:22

## 2021-07-19 RX ADMIN — Medication 25 MILLIGRAM(S): at 05:04

## 2021-07-19 RX ADMIN — Medication 81 MILLIGRAM(S): at 11:24

## 2021-07-19 RX ADMIN — Medication 650 MILLIGRAM(S): at 06:00

## 2021-07-19 RX ADMIN — FLUDROCORTISONE ACETATE 0.1 MILLIGRAM(S): 0.1 TABLET ORAL at 05:04

## 2021-07-19 RX ADMIN — MORPHINE SULFATE 2 MILLIGRAM(S): 50 CAPSULE, EXTENDED RELEASE ORAL at 01:52

## 2021-07-19 RX ADMIN — Medication 25 MILLIGRAM(S): at 17:52

## 2021-07-19 RX ADMIN — SENNA PLUS 2 TABLET(S): 8.6 TABLET ORAL at 21:23

## 2021-07-19 RX ADMIN — Medication 650 MILLIGRAM(S): at 17:00

## 2021-07-19 RX ADMIN — TAMSULOSIN HYDROCHLORIDE 0.4 MILLIGRAM(S): 0.4 CAPSULE ORAL at 21:23

## 2021-07-19 RX ADMIN — Medication 100 GRAM(S): at 16:33

## 2021-07-19 RX ADMIN — MORPHINE SULFATE 2 MILLIGRAM(S): 50 CAPSULE, EXTENDED RELEASE ORAL at 02:57

## 2021-07-19 RX ADMIN — OXYCODONE HYDROCHLORIDE 10 MILLIGRAM(S): 5 TABLET ORAL at 11:52

## 2021-07-19 NOTE — PROGRESS NOTE ADULT - SUBJECTIVE AND OBJECTIVE BOX
SOCORRO MANUEL 71y Male  MRN#: [id number]   Hospital Day: 12d    SUBJECTIVE  Patient is a 71y old Male who presents with a chief complaint of Right periprosthetic femur fracture (2021 07:12)  Currently admitted to medicine with the primary diagnosis of Femur fracture    INTERVAL HPI AND OVERNIGHT EVENTS:  Patient was examined and seen at bedside. This morning he is resting comfortably in bed and reports no issues or overnight events.     REVIEW OF SYMPTOMS:  CONSTITUTIONAL: No fevers or chills; No headaches  EYES: No visual changes  RESPIRATORY: No cough, wheezing, or hemoptysis; No shortness of breath  CARDIOVASCULAR: No chest pain or palpitations  GASTROINTESTINAL: No abdominal or epigastric pain; No nausea, vomiting, or hematemesis; No diarrhea or constipation  GENITOURINARY: No dysuria, frequency or hematuria  MUSCULOSKELETAL: No joint pain, no muscle pain, no weakness  NEUROLOGICAL: No numbness or weakness  SKIN: No itching or rashes      OBJECTIVE  PAST MEDICAL & SURGICAL HISTORY  HTN (hypertension)    High cholesterol    Anemia    Diabetes    Afib    Cirrhosis of liver  JIMÉNEZ    Dyspnea on exertion    BPH (benign prostatic hyperplasia)    Presence of bilateral total knee joint prostheses  15 years ago    S/P angioplasty with stent  2 cardiac stents &gt; 10 years back    Encounter for screening colonoscopy  1 year ago    Organ transplant  liver, right kidney 2020      ALLERGIES:  No Known Allergies    MEDICATIONS:  STANDING MEDICATIONS  aspirin  chewable 81 milliGRAM(s) Oral daily  chlorhexidine 4% Liquid 1 Application(s) Topical daily  dextrose 40% Gel 15 Gram(s) Oral once  dextrose 50% Injectable 25 Gram(s) IV Push once  ferrous    sulfate 325 milliGRAM(s) Oral <User Schedule>  fludroCORTISONE 0.1 milliGRAM(s) Oral daily  folic acid 1 milliGRAM(s) Oral daily  glucagon  Injectable 1 milliGRAM(s) IntraMuscular once  insulin lispro (ADMELOG) corrective regimen sliding scale   SubCutaneous three times a day before meals  metoprolol tartrate 25 milliGRAM(s) Oral two times a day  pantoprazole    Tablet 40 milliGRAM(s) Oral before breakfast  senna 2 Tablet(s) Oral at bedtime  tacrolimus 3 milliGRAM(s) Oral every 12 hours  tamsulosin 0.4 milliGRAM(s) Oral at bedtime    PRN MEDICATIONS  acetaminophen   Tablet .. 650 milliGRAM(s) Oral every 6 hours PRN  bisacodyl 5 milliGRAM(s) Oral every 12 hours PRN  morphine  - Injectable 2 milliGRAM(s) IV Push every 6 hours PRN  oxyCODONE    IR 10 milliGRAM(s) Oral every 6 hours PRN  polyethylene glycol 3350 17 Gram(s) Oral daily PRN      VITAL SIGNS: Last 24 Hours  T(C): 36.1 (2021 08:00), Max: 37.2 (2021 16:00)  T(F): 97 (2021 08:00), Max: 98.9 (2021 16:00)  HR: 51 (2021 08:00) (51 - 65)  BP: 147/76 (2021 08:00) (127/65 - 152/72)  BP(mean): --  RR: 19 (2021 08:00) (18 - 19)  SpO2: --    LABS:                        8.9    3.71  )-----------( 214      ( 2021 08:14 )             28.5         138  |  103  |  14  ----------------------------<  105<H>  4.1   |  28  |  1.0    Ca    8.5      2021 08:14  Mg     1.6         TPro  6.2  /  Alb  3.3<L>  /  TBili  2.3<H>  /  DBili  x   /  AST  20  /  ALT  16  /  AlkPhos  298<H>        Urinalysis Basic - ( 2021 12:43 )    Color: Yellow / Appearance: Clear / S.013 / pH: x  Gluc: x / Ketone: Negative  / Bili: Negative / Urobili: 3 mg/dL   Blood: x / Protein: Trace / Nitrite: Negative   Leuk Esterase: Negative / RBC: x / WBC x   Sq Epi: x / Non Sq Epi: x / Bacteria: x            Culture - Urine (collected 2021 12:42)  Source: .Urine Clean Catch (Midstream)  Final Report (2021 15:45):    No growth          RADIOLOGY:      PHYSICAL EXAM:  Constitutional: pt is comfortable lying in bed  HEENT: Full ROM in bilateral eyes; pupils symmetrical and equal in size  Respiratory: (+) sounds in all lung fields; no crackles or wheezes appreciated  Cardiovascular: S1 S2 regular rate and rhythm; no murmurs or gallops appreciated  Gastrointestinal: (+) bowel sounds in all quadrants; abdomen is non-distended, non-tender to superficial and deep palpation  Extremities: RLE mildly swollen and non-pitting edema from knee to proximal thigh/groin;  dressing over ORIF surgery noted- dry and clean without evidence of infection  Vascular: Warm and Well perfused UE and LE; no mottling or dusky skin   Neurological: AxO x3 to self, place and year  Skin: no rashes or ulcerations noted; no scaling present

## 2021-07-19 NOTE — PROGRESS NOTE ADULT - SUBJECTIVE AND OBJECTIVE BOX
Progress Note:  Provider Speciality                            Hospitalist      SOCORRO MANUEL MRN-[id number] 71y Male     CHIEF PRESENTING COMPLAINT:  Patient is a 71y old  Male who presents with a chief complaint of Right periprosthetic femur fracture (2021 11:19)        SUBJECTIVE:  Patient was seen and examined at bedside. Reports improvement in  presenting complaint. No significant overnight events reported.     HISTORY OF PRESENTING ILLNESS:  HPI:  71M w/PMHx of HTN, HLD, anemia, DM, Afib, JIMÉNEZ s/p liver transplant and right renal transplant at [facility] in 2020 (follows with his hepatologist Dr. Sunshine), CAD s/p stents (10 years ago) on ASA and Plavix, watchman procedure performed ~3 months ago by Dr. Burdick, history of bilateral knee replacements (21 years ago) presents after being sent in from outpatient office for finding of new right distal femur fracture. Patient tripped and fell while trying to get out of his car and felt right knee pain afterwards. He presented to an outpatient clinic for evaluation. XR at outpatient clinic demonstrated distal right femur fracture. He was placed in a knee immobilizer and instructed to present to the ED for further management. Patient is afebrile, HR 61, borderline BP of 93/52, saturating 99% on room air. On exam his right knee is immobilized, he has flexion and extension of the foot at the ankle joint, +DP and PT pulses, foot warm and well perfused. No other external signs of injury, denies pain anywhere else.    (2021 21:21)        REVIEW OF SYSTEMS:  Patient denies any headache, any vision complaints, runny nose, fever, chills, sore throat. Denies chest pain, shortness of breath, palpitation. Denies nausea, vomiting, abdominal pain, diarrhoea, Denies urinary burning, urgency, frequency, dysuria. At least 10 systems were reviewed in ROS. All systems reviewed  are within normal limits except for the complaints as described in Subjective.    PAST MEDICAL & SURGICAL HISTORY:  PAST MEDICAL & SURGICAL HISTORY:  HTN (hypertension)    High cholesterol    Anemia    Diabetes    Afib    Cirrhosis of liver  JIMÉNEZ    Dyspnea on exertion    BPH (benign prostatic hyperplasia)    Presence of bilateral total knee joint prostheses  15 years ago    S/P angioplasty with stent  2 cardiac stents &gt; 10 years back    Encounter for screening colonoscopy  1 year ago    Organ transplant  liver, right kidney 2020            VITAL SIGNS:  Vital Signs Last 24 Hrs  T(C): 36.1 (2021 08:00), Max: 37.2 (2021 16:00)  T(F): 97 (2021 08:00), Max: 98.9 (2021 16:00)  HR: 51 (2021 08:00) (51 - 65)  BP: 147/76 (2021 08:00) (127/65 - 152/72)  BP(mean): --  RR: 19 (2021 08:00) (18 - 19)  SpO2: --        PHYSICAL EXAMINATION:  Not in acute distress  General: No pallor, no icterus  HEENT:   EOMI, no JVD.  Heart: S1+S2 audible  Lungs: bilateral  fair air entry, no wheezing, no crepitations.  Abdomen: Soft, non-tender, non-distended , no  rigidity or guarding.  CNS: Awake alert, CN  grossly intact.  Extremities:  No edema            CONSULTS:  Consultant(s) Notes Reviewed by me.   Care Discussed with Consultants/Other Providers where required.        MEDICATIONS:  MEDICATIONS  (STANDING):  aspirin  chewable 81 milliGRAM(s) Oral daily  chlorhexidine 4% Liquid 1 Application(s) Topical daily  dextrose 40% Gel 15 Gram(s) Oral once  dextrose 50% Injectable 25 Gram(s) IV Push once  ferrous    sulfate 325 milliGRAM(s) Oral <User Schedule>  fludroCORTISONE 0.1 milliGRAM(s) Oral daily  folic acid 1 milliGRAM(s) Oral daily  glucagon  Injectable 1 milliGRAM(s) IntraMuscular once  insulin lispro (ADMELOG) corrective regimen sliding scale   SubCutaneous three times a day before meals  metoprolol tartrate 25 milliGRAM(s) Oral two times a day  pantoprazole    Tablet 40 milliGRAM(s) Oral before breakfast  senna 2 Tablet(s) Oral at bedtime  tacrolimus 3 milliGRAM(s) Oral every 12 hours  tamsulosin 0.4 milliGRAM(s) Oral at bedtime    MEDICATIONS  (PRN):  acetaminophen   Tablet .. 650 milliGRAM(s) Oral every 6 hours PRN Temp greater or equal to 38C (100.4F), Mild Pain (1 - 3)  bisacodyl 5 milliGRAM(s) Oral every 12 hours PRN Constipation  morphine  - Injectable 2 milliGRAM(s) IV Push every 6 hours PRN Severe Pain (7 - 10)  oxyCODONE    IR 10 milliGRAM(s) Oral every 6 hours PRN Moderate Pain (4 - 6)  polyethylene glycol 3350 17 Gram(s) Oral daily PRN Constipation            ASSESSMENT:          70 y/o M with PMHx JIMÉNEZ s/p liver and R renal transplant (2020 [place]), HTN, HLD, DM, CAD (s/p stents 10 yrs ago, on ASA and Plavix), AFib (s/p Watchman 3 months ago) admitted for R distal femur fx. Had R femur ORIF by ortho, afterwards Hgb dropped to 6.9. CT showed hematoma in R vastus intermedius. Developed hypotension and went into AFib with RVR. Placed on levo, switched to valerio, now off pressors. Septic workup sent, as patient was febrile, started on Cefepime, now off abx. Started on diltiazem for AFib, switched to esmolol drip and digoxin, weaned off to PO metoprolol. S/p 8U pRBC in total. Ortho, nephro, ID, hem/onc all following.        Right distal femoral fracture s/p orif  Hemorrhagic shock  Post op anemia due to hematoma  PE on CTA / DVT s/p ivc filter  Hypomagnesemia   s/p  donor liver / kidney transplant  AFib with RVR      Downgraded from unit on   Off pressors  Weaned off esmolol, d/c'd digoxin  Acute 0n Chronic anemia-PRBC transfusion PRN.S/p 8 U pRBC in total  Hypomagnesemia -mg repleted  send UA, Uc/s  Supplement po iron  Monitor h/h  AFib with RVR- RVR resolved. Continue lopressor  EP-reduced  mg to ASA 81 mg given pt's recurrent low Hb, finding of large hematoma in RLE. Plavix held  Incidental RLL subsegmental PE  Acute R popliteal and posterior tibialis DVT- Cannot be on AC due to recent active bleeding  S/p IVC filter placed   Acute comminuted fracture of the distal rt femur-S/p ORIF by Ortho   S/p renal transplant (Rt sided at [facility] in 2020)cont prograf 0.3mg po bid, cont florinef 0.1mg po qd  Hx of liver transplant due to JIMÉNEZ  Physical therapy today  Handoff: medically stable for discharge to STR         Progress Note:  Provider Speciality                            Hospitalist      SOCORRO MANUEL MRN-[id number] 71y Male     CHIEF PRESENTING COMPLAINT:  Patient is a 71y old  Male who presents with a chief complaint of Right periprosthetic femur fracture (2021 11:19)        SUBJECTIVE:  Patient was seen and examined at bedside. Reports improvement in  presenting complaint. No significant overnight events reported.     HISTORY OF PRESENTING ILLNESS:  HPI:  71M w/PMHx of HTN, HLD, anemia, DM, Afib, JIMÉNEZ s/p liver transplant and right renal transplant at [facility] in 2020 (follows with his hepatologist Dr. Sunshine), CAD s/p stents (10 years ago) on ASA and Plavix, watchman procedure performed ~3 months ago by Dr. Burdick, history of bilateral knee replacements (21 years ago) presents after being sent in from outpatient office for finding of new right distal femur fracture. Patient tripped and fell while trying to get out of his car and felt right knee pain afterwards. He presented to an outpatient clinic for evaluation. XR at outpatient clinic demonstrated distal right femur fracture. He was placed in a knee immobilizer and instructed to present to the ED for further management. Patient is afebrile, HR 61, borderline BP of 93/52, saturating 99% on room air. On exam his right knee is immobilized, he has flexion and extension of the foot at the ankle joint, +DP and PT pulses, foot warm and well perfused. No other external signs of injury, denies pain anywhere else.    (2021 21:21)        REVIEW OF SYSTEMS:  Patient denies any headache, any vision complaints, runny nose, fever, chills, sore throat. Denies chest pain, shortness of breath, palpitation. Denies nausea, vomiting, abdominal pain, diarrhoea, Denies urinary burning, urgency, frequency, dysuria. At least 10 systems were reviewed in ROS. All systems reviewed  are within normal limits except for the complaints as described in Subjective.    PAST MEDICAL & SURGICAL HISTORY:  PAST MEDICAL & SURGICAL HISTORY:  HTN (hypertension)    High cholesterol    Anemia    Diabetes    Afib    Cirrhosis of liver  JIMÉNEZ    Dyspnea on exertion    BPH (benign prostatic hyperplasia)    Presence of bilateral total knee joint prostheses  15 years ago    S/P angioplasty with stent  2 cardiac stents &gt; 10 years back    Encounter for screening colonoscopy  1 year ago    Organ transplant  liver, right kidney 2020            VITAL SIGNS:  Vital Signs Last 24 Hrs  T(C): 36.1 (2021 08:00), Max: 37.2 (2021 16:00)  T(F): 97 (2021 08:00), Max: 98.9 (2021 16:00)  HR: 51 (2021 08:00) (51 - 65)  BP: 147/76 (2021 08:00) (127/65 - 152/72)  BP(mean): --  RR: 19 (2021 08:00) (18 - 19)  SpO2: --        PHYSICAL EXAMINATION:  Not in acute distress  General: No pallor, no icterus  HEENT:   EOMI, no JVD.  Heart: S1+S2 audible  Lungs: bilateral  fair air entry, no wheezing, no crepitations.  Abdomen: Soft, non-tender, non-distended , no  rigidity or guarding.  CNS: Awake alert, CN  grossly intact.  Extremities:  No edema            CONSULTS:  Consultant(s) Notes Reviewed by me.   Care Discussed with Consultants/Other Providers where required.        MEDICATIONS:  MEDICATIONS  (STANDING):  aspirin  chewable 81 milliGRAM(s) Oral daily  chlorhexidine 4% Liquid 1 Application(s) Topical daily  dextrose 40% Gel 15 Gram(s) Oral once  dextrose 50% Injectable 25 Gram(s) IV Push once  ferrous    sulfate 325 milliGRAM(s) Oral <User Schedule>  fludroCORTISONE 0.1 milliGRAM(s) Oral daily  folic acid 1 milliGRAM(s) Oral daily  glucagon  Injectable 1 milliGRAM(s) IntraMuscular once  insulin lispro (ADMELOG) corrective regimen sliding scale   SubCutaneous three times a day before meals  metoprolol tartrate 25 milliGRAM(s) Oral two times a day  pantoprazole    Tablet 40 milliGRAM(s) Oral before breakfast  senna 2 Tablet(s) Oral at bedtime  tacrolimus 3 milliGRAM(s) Oral every 12 hours  tamsulosin 0.4 milliGRAM(s) Oral at bedtime    MEDICATIONS  (PRN):  acetaminophen   Tablet .. 650 milliGRAM(s) Oral every 6 hours PRN Temp greater or equal to 38C (100.4F), Mild Pain (1 - 3)  bisacodyl 5 milliGRAM(s) Oral every 12 hours PRN Constipation  morphine  - Injectable 2 milliGRAM(s) IV Push every 6 hours PRN Severe Pain (7 - 10)  oxyCODONE    IR 10 milliGRAM(s) Oral every 6 hours PRN Moderate Pain (4 - 6)  polyethylene glycol 3350 17 Gram(s) Oral daily PRN Constipation            ASSESSMENT:          70 y/o M with PMHx JIMÉNEZ s/p liver and R renal transplant (2020 [place]), HTN, HLD, DM, CAD (s/p stents 10 yrs ago, on ASA and Plavix), AFib (s/p Watchman 3 months ago) admitted for R distal femur fx. Had R femur ORIF by ortho, afterwards Hgb dropped to 6.9. CT showed hematoma in R vastus intermedius. Developed hypotension and went into AFib with RVR. Placed on levo, switched to valerio, now off pressors. Septic workup sent, as patient was febrile, started on Cefepime, now off abx. Started on diltiazem for AFib, switched to esmolol drip and digoxin, weaned off to PO metoprolol. S/p 8U pRBC in total. Ortho, nephro, ID, hem/onc all following.        Right distal femoral fracture s/p orif  Hemorrhagic shock  Post op anemia due to hematoma  PE on CTA / DVT s/p ivc filter  Hypomagnesemia   s/p  donor liver / kidney transplant  AFib with RVR      Downgraded from unit on   Off pressors  Weaned off esmolol, d/c'd digoxin  Acute 0n Chronic anemia-PRBC transfusion PRN.S/p 8 U pRBC in total  Hypomagnesemia -mg repleted  send UA, Uc/s  Supplement po iron  Monitor h/h  AFib with RVR- RVR resolved. Continue lopressor  EP-reduced  mg to ASA 81 mg given pt's recurrent low Hb, finding of large hematoma in RLE. Plavix held  Incidental RLL subsegmental PE  Acute R popliteal and posterior tibialis DVT- resume coumadin as hemoglobin/hematocrit is stable. Orho oK with resuming anticoagulation   S/p IVC filter placed   Acute comminuted fracture of the distal rt femur-S/p ORIF by Ortho   S/p renal transplant (Rt sided at [facility] in 2020)cont prograf 0.3mg po bid, cont florinef 0.1mg po qd  Hx of liver transplant due to JIMÉNEZ  Physical therapy today  Handoff: medically stable for discharge to STR

## 2021-07-19 NOTE — PROGRESS NOTE ADULT - ASSESSMENT
s/p  donor liver / kidney transplant (RLQ) 2020 @ [facility]  stable allograft renal function   ESRD / ESLD due to JIMÉNEZ with HRS  Right distal femoral fracture s/p orif  post op anemia  PE on CTA / DVT s/p ivc filter  s/p iv contrast   s/p b/l remote TKR  DM2  CAD / PCI / Afib    borderline low BP's  hypomagnesemia     plan:    cont prograf 0.3mg po bid  cont florinef 0.1mg po qd  check  trough in AM  off hydrocortisone   cont lopressor  PRBC transfusion PRN  mg repletion  po iron  monitor h/h  f/u ortho / wound care  physical therapy

## 2021-07-19 NOTE — PROGRESS NOTE ADULT - ASSESSMENT
HOSPITAL COURSE:  72 y/o M with PMHx JIMÉNEZ s/p liver and R renal transplant (Feb 2020 [place]), HTN, HLD, DM, CAD (s/p stents 10 yrs ago, on ASA and Plavix), AFib (s/p Watchman 3 months ago) admitted for R distal femur fx. Had R femur ORIF (on 7/9/21) by ortho, afterwards Hgb dropped to 6.9. CT showed hematoma in R vastus intermedius. Developed hypotension and went into AFib with RVR. Placed on levo, switched to valerio, pressors discontinued (7/10). Septic workup sent, as patient was febrile, started on Cefepime, now off abx. Started on diltiazem for AFib, switched to esmolol drip and digoxin, weaned off to PO metoprolol. S/p 8U pRBC in total. Ortho, nephro, ID, hem/onc all following.    ASSESSMENT & PLAN:  #Chronic AFib s/p Watchman (3 mos ago)  - S/p 45 days of Coumadin after Watchman 3 months ago  - Weaned off esmolol, d/c'd digoxin  - Remains in AFib, although rate controlled  - C/w metoprolol tartrate 25 mg PO BID  - Was on high dose ASA, Plavix, Plavix remains on hold  - EP recommendations: ASA 81 mg given pt's recurrent low Hb, finding of large hematoma in RLE    # RLL subsegmental PE - Incidental Finding   # Acute DVT - R popliteal and posterior tibialis   - Cannot be on AC d/t active bleeding  - S/p IVC filter placed 7/14    # Acute comminuted fracture of distal R Femur, s/p ORIF on 7/9  # Vastus Intermedius hematoma  # Acute Normocytic Anemia secondary to hematoma  - Ortho following for dressing changes, to remain NWB of RLE  - CTA RLE: large hematoma in region of R vastus intermedius  (12.5x4x11.5cm)  - PT/OT following  - Remains off AC due to acute bleeding  - s/p 8U pRBCs in total due to recurrently low Hb  - Goal Hb > 8, maintain active T+S  - Hb stable 8.9 on 7/19; continue to trend CBC   - Seen by Heme Onc; acute anemia likely due to surgery in setting of chronic inflammation and effect of antirejection medications on bone marrow  - FOBT (-); B12 and folate wnl   - C/w folate, iron supplementation  - Continue to hold Plavix; c/w ASA 81mg daily as per EP recommendations   - incentive spirometry   - spoke to ortho: agreeable to restarting AC if needed    #S/p renal transplant (Rt sided at [facility] in 02/2020)  #Hx of liver transplant due to JIMÉNEZ  - Nephro on board, following   - C/w Prograf  - C/w Florinef 0.1 mg PO daily  -  trough 7/19- 10.8    #Hx of DM  - A1c 7/8: 5.3  - C/w SS, goal FS < 180  - Home Januvia 100mg, holding  - Home  Linagliptin 5 mg QD, holding    DVT ppx: SCD's; AC held due to hematoma  GI ppx: Protonix  Diet: DASH/TLC diet   Activity: OOBTC  Code Status: full  Dispo: placement planning, will likely need to go to rehab    HOSPITAL COURSE:  72 y/o M with PMHx JIMÉNEZ s/p liver and R renal transplant (Feb 2020 [place]), HTN, HLD, DM, CAD (s/p stents 10 yrs ago, on ASA and Plavix), AFib (s/p Watchman 3 months ago) admitted for R distal femur fx. Had R femur ORIF (on 7/9/21) by ortho, afterwards Hgb dropped to 6.9. CT showed hematoma in R vastus intermedius. Developed hypotension and went into AFib with RVR. Placed on levo, switched to valerio, pressors discontinued (7/10). Septic workup sent, as patient was febrile, started on Cefepime, now off abx. Started on diltiazem for AFib, switched to esmolol drip and digoxin, weaned off to PO metoprolol. S/p 8U pRBC in total. Ortho, nephro, ID, hem/onc all following.    ASSESSMENT & PLAN:  #Chronic AFib s/p Watchman (3 mos ago)  - S/p 45 days of Coumadin after Watchman 3 months ago  - Weaned off esmolol, d/c'd digoxin  - Remains in AFib, although rate controlled  - C/w metoprolol tartrate 25 mg PO BID  - Was on high dose ASA, Plavix, Plavix remains on hold  - EP recommendations: ASA 81 mg given pt's recurrent low Hb, finding of large hematoma in RLE    # RLL subsegmental PE - Incidental Finding   # Acute DVT - R popliteal and posterior tibialis   - Cannot be on AC d/t active bleeding  - S/p IVC filter placed 7/14    # Acute comminuted fracture of distal R Femur, s/p ORIF on 7/9  # Vastus Intermedius hematoma  # Acute Normocytic Anemia secondary to hematoma  - Ortho following for dressing changes, to remain NWB of RLE  - CTA RLE: large hematoma in region of R vastus intermedius  (12.5x4x11.5cm)  - PT/OT following  - Remains off AC due to acute bleeding  - s/p 8U pRBCs in total due to recurrently low Hb  - Goal Hb > 8, maintain active T+S  - Hb stable 8.9 on 7/19; continue to trend CBC   - Seen by Heme Onc; acute anemia likely due to surgery in setting of chronic inflammation and effect of antirejection medications on bone marrow  - FOBT (-); B12 and folate wnl   - C/w folate, iron supplementation  - Continue to hold Plavix; c/w ASA 81mg daily as per EP recommendations   - incentive spirometry   - spoke to ortho: agreeable to restarting AC if needed. will continue with ASA 81 for now and reassess tomorrow    #S/p renal transplant (Rt sided at [facility] in 02/2020)  #Hx of liver transplant due to JIMÉNEZ  - Nephro on board, following   - C/w Prograf  - C/w Florinef 0.1 mg PO daily  -  trough 7/19- 10.8    #Hx of DM  - A1c 7/8: 5.3  - C/w SS, goal FS < 180  - Home Januvia 100mg, holding  - Home  Linagliptin 5 mg QD, holding    DVT ppx: SCD's; AC held due to hematoma  GI ppx: Protonix  Diet: DASH/TLC diet   Activity: OOBTC  Code Status: full  Dispo: placement planning, will likely need to go to rehab    HOSPITAL COURSE:  70 y/o M with PMHx JIMÉNEZ s/p liver and R renal transplant (Feb 2020 [place]), HTN, HLD, DM, CAD (s/p stents 10 yrs ago, on ASA and Plavix), AFib (s/p Watchman 3 months ago) admitted for R distal femur fx. Had R femur ORIF (on 7/9/21) by ortho, afterwards Hgb dropped to 6.9. CT showed hematoma in R vastus intermedius. Developed hypotension and went into AFib with RVR. Placed on levo, switched to valerio, pressors discontinued (7/10). Septic workup sent, as patient was febrile, started on Cefepime, now off abx. Started on diltiazem for AFib, switched to esmolol drip and digoxin, weaned off to PO metoprolol. S/p 8U pRBC in total. Ortho, nephro, ID, hem/onc all following.    ASSESSMENT & PLAN:  #Chronic AFib s/p Watchman (3 mos ago)  - S/p 45 days of Coumadin after Watchman 3 months ago  - Weaned off esmolol, d/c'd digoxin  - Remains in AFib, although rate controlled  - C/w metoprolol tartrate 25 mg PO BID  - Was on high dose ASA, Plavix, Plavix remains on hold  - EP recommendations: ASA 81 mg given pt's recurrent low Hb, finding of large hematoma in RLE    # RLL subsegmental PE - Incidental Finding   # Acute DVT - R popliteal and posterior tibialis   - Cannot be on AC d/t active bleeding  - S/p IVC filter placed 7/14    # Acute comminuted fracture of distal R Femur, s/p ORIF on 7/9  # Vastus Intermedius hematoma  # Acute Normocytic Anemia secondary to hematoma  - Ortho following for dressing changes, to remain NWB of RLE  - CTA RLE: large hematoma in region of R vastus intermedius  (12.5x4x11.5cm)  - PT/OT following  - Remains off AC due to acute bleeding  - s/p 8U pRBCs in total due to recurrently low Hb  - Goal Hb > 8, maintain active T+S  - Hb stable 8.9 on 7/19; continue to trend CBC   - Seen by Heme Onc; acute anemia likely due to surgery in setting of chronic inflammation and effect of antirejection medications on bone marrow  - FOBT (-); B12 and folate wnl   - C/w folate, iron supplementation  - Continue to hold Plavix; c/w ASA 81mg daily as per EP recommendations   - incentive spirometry   - spoke to ortho: agreeable to restarting AC if needed. will continue with ASA 81 for now and reassess tomorrow    #S/p renal transplant (Rt sided at [facility] in 02/2020)  #Hx of liver transplant due to JIMÉNEZ  - Nephro on board, following   - C/w Prograf  - C/w Florinef 0.1 mg PO daily  - f/u  trough 7/19    #Hx of DM  - A1c 7/8: 5.3  - C/w SS, goal FS < 180  - Home Januvia 100mg, holding  - Home  Linagliptin 5 mg QD, holding    DVT ppx: SCD's; AC held due to hematoma  GI ppx: Protonix  Diet: DASH/TLC diet   Activity: OOBTC  Code Status: full  Dispo: placement planning, will likely need to go to rehab

## 2021-07-19 NOTE — PROGRESS NOTE ADULT - SUBJECTIVE AND OBJECTIVE BOX
SUBJECTIVE: Patient seen and examined this morning.  POD#10 s/p R femur ORIF Pain well controlled.  No acute events overnight.  No f/c/n/v/cp/sob.     OBJECTIVE:  NAD  Vital Signs Last 24 Hrs  T(C): 36.3 (19 Jul 2021 04:00), Max: 37.4 (18 Jul 2021 08:00)  T(F): 97.4 (19 Jul 2021 04:00), Max: 99.4 (18 Jul 2021 08:00)  HR: 64 (19 Jul 2021 06:00) (57 - 65)  BP: 152/72 (19 Jul 2021 04:00) (127/65 - 156/75)  BP(mean): --  RR: 18 (19 Jul 2021 04:00) (17 - 18)  SpO2: --    Affected extremity: Right LE         Dressing: clean/dry/intact            Sensation: SILT         Compartments soft/compressible         Motor exam: 5/5 TA/GS/EHL         warm well perfused; capillary refill <3 seconds                         9.1    4.95  )-----------( 210      ( 18 Jul 2021 10:09 )             28.4     07-18    136  |  102  |  16  ----------------------------<  131<H>  4.0   |  24  |  0.9    Ca    8.2<L>      18 Jul 2021 10:09  Mg     1.5     07-18    TPro  5.9<L>  /  Alb  3.0<L>  /  TBili  2.6<H>  /  DBili  x   /  AST  21  /  ALT  17  /  AlkPhos  307<H>  07-18    A/P :  Pt is a 70yo Male, POD#10 s/p R femur ORIF    OOB to Chair   NWB RLE  PT/OT  Pain control   Ext icing/elevation  Incentive Spirometry   DVT Prophylaxis per primary team  Once discharged, please f/u in Dr. Kirby clinic.  Call [phone] to make any appointment.

## 2021-07-19 NOTE — PROGRESS NOTE ADULT - SUBJECTIVE AND OBJECTIVE BOX
NEPHROLOGY FOLLOW UP NOTE    no overnight events  ortho input noted  no sob, n/v, abd pain, fever  h/h better, no active bleeding    PAST MEDICAL & SURGICAL HISTORY:  HTN (hypertension)  High cholesterol  Anemia  Diabetes  Afib  Cirrhosis of liver  JIMÉNEZ  Dyspnea on exertion  BPH (benign prostatic hyperplasia)  Presence of bilateral total knee joint prostheses  15 years ago  S/P angioplasty with stent  2 cardiac stents &gt; 10 years back  Encounter for screening colonoscopy  1 year ago  Organ transplant  liver, right kidney 2020      Allergies:  No Known Allergies    Home Medications Reviewed    SOCIAL HISTORY:  Denies ETOH,Smoking,   FAMILY HISTORY:  Family history of early CAD  mother    FH: ovarian cancer  mother      REVIEW OF SYSTEMS:  CONSTITUTIONAL: No weakness, fevers or chills  EYES/ENT: No visual changes;  No vertigo or throat pain   NECK: No pain or stiffness  RESPIRATORY: No cough, wheezing, hemoptysis; No shortness of breath  CARDIOVASCULAR: No chest pain or palpitations.  GASTROINTESTINAL: No abdominal or epigastric pain. No nausea, vomiting, or hematemesis; No diarrhea or constipation. No melena or hematochezia.  GENITOURINARY: No dysuria, frequency, foamy urine, urinary urgency, incontinence or hematuria  NEUROLOGICAL: No numbness or weakness  SKIN: No itching, burning, rashes, or lesions   VASCULAR: No bilateral lower extremity edema.   All other review of systems is negative unless indicated above.      PHYSICAL EXAM:  Constitutional: NAD  HEENT: anicteric sclera, oropharynx clear, dry mm  Neck: No JVD  Respiratory: CTAB, no wheezes, rales or rhonchi  Cardiovascular: S1, S2, RRR  Gastrointestinal: BS+, soft, NT/ND + scar  Extremities: No cyanosis or clubbing. No peripheral edema + RLE edema  Neurological: A/O x 3, no focal deficits  Psychiatric: Normal mood, normal affect  : No CVA tenderness. No mayen  Skin: No rashes    Hospital Medications:   MEDICATIONS  (STANDING):  aspirin  chewable 81 milliGRAM(s) Oral daily  chlorhexidine 4% Liquid 1 Application(s) Topical daily  dextrose 40% Gel 15 Gram(s) Oral once  dextrose 50% Injectable 25 Gram(s) IV Push once  ferrous    sulfate 325 milliGRAM(s) Oral <User Schedule>  fludroCORTISONE 0.1 milliGRAM(s) Oral daily  folic acid 1 milliGRAM(s) Oral daily  glucagon  Injectable 1 milliGRAM(s) IntraMuscular once  insulin lispro (ADMELOG) corrective regimen sliding scale   SubCutaneous three times a day before meals  magnesium oxide 400 milliGRAM(s) Oral daily  metoprolol tartrate 25 milliGRAM(s) Oral two times a day  pantoprazole    Tablet 40 milliGRAM(s) Oral before breakfast  senna 2 Tablet(s) Oral at bedtime  tacrolimus 3 milliGRAM(s) Oral every 12 hours  tamsulosin 0.4 milliGRAM(s) Oral at bedtime        VITALS:  T(F): 99.3 (21 @ 15:37), Max: 99.3 (21 @ 15:37)  HR: 59 (21 @ 15:37)  BP: 155/72 (21 @ 15:37)  RR: 18 (21 @ 15:37)  SpO2: --  Wt(kg): --     @ 07:01  -   @ 07:00  --------------------------------------------------------  IN: 840 mL / OUT: 1100 mL / NET: -260 mL     @ 07:  -   @ 07:00  --------------------------------------------------------  IN: 720 mL / OUT: 1050 mL / NET: -330 mL     @ 07:01  -   @ 17:17  --------------------------------------------------------  IN: 840 mL / OUT: 200 mL / NET: 640 mL          LABS:      138  |  103  |  15  ----------------------------<  130<H>  4.3   |  27  |  1.0    Ca    8.3<L>      2021 12:11  Mg     1.6         TPro  6.4  /  Alb  3.4<L>  /  TBili  2.2<H>  /  DBili      /  AST  22  /  ALT  17  /  AlkPhos  324<H>                            9.4    4.38  )-----------( 233      ( 2021 12:11 )             29.5       Urine Studies:  Urinalysis Basic - ( 2021 12:43 )    Color: Yellow / Appearance: Clear / S.013 / pH:   Gluc:  / Ketone: Negative  / Bili: Negative / Urobili: 3 mg/dL   Blood:  / Protein: Trace / Nitrite: Negative   Leuk Esterase: Negative / RBC:  / WBC    Sq Epi:  / Non Sq Epi:  / Bacteria:           RADIOLOGY & ADDITIONAL STUDIES:

## 2021-07-20 ENCOUNTER — TRANSCRIPTION ENCOUNTER (OUTPATIENT)
Age: 71
End: 2021-07-20

## 2021-07-20 VITALS — SYSTOLIC BLOOD PRESSURE: 132 MMHG | TEMPERATURE: 97 F | HEART RATE: 96 BPM | DIASTOLIC BLOOD PRESSURE: 93 MMHG

## 2021-07-20 LAB
% GAMMA, URINE: 11.3 % — SIGNIFICANT CHANGE UP
% M SPIKE, URINE: 6.2 % — SIGNIFICANT CHANGE UP
ALBUMIN 24H MFR UR ELPH: 11.9 % — SIGNIFICANT CHANGE UP
ALBUMIN SERPL ELPH-MCNC: 3.3 G/DL — LOW (ref 3.5–5.2)
ALP SERPL-CCNC: 285 U/L — HIGH (ref 30–115)
ALPHA1 GLOB 24H MFR UR ELPH: 43.6 % — SIGNIFICANT CHANGE UP
ALPHA2 GLOB 24H MFR UR ELPH: 21.7 % — SIGNIFICANT CHANGE UP
ALT FLD-CCNC: 15 U/L — SIGNIFICANT CHANGE UP (ref 0–41)
ANION GAP SERPL CALC-SCNC: 10 MMOL/L — SIGNIFICANT CHANGE UP (ref 7–14)
AST SERPL-CCNC: 23 U/L — SIGNIFICANT CHANGE UP (ref 0–41)
B-GLOBULIN 24H MFR UR ELPH: 11.5 % — SIGNIFICANT CHANGE UP
BILIRUB SERPL-MCNC: 1.9 MG/DL — HIGH (ref 0.2–1.2)
BLD GP AB SCN SERPL QL: SIGNIFICANT CHANGE UP
BUN SERPL-MCNC: 13 MG/DL — SIGNIFICANT CHANGE UP (ref 10–20)
CALCIUM SERPL-MCNC: 8.4 MG/DL — LOW (ref 8.5–10.1)
CHLORIDE SERPL-SCNC: 104 MMOL/L — SIGNIFICANT CHANGE UP (ref 98–110)
CO2 SERPL-SCNC: 25 MMOL/L — SIGNIFICANT CHANGE UP (ref 17–32)
CREAT SERPL-MCNC: 1 MG/DL — SIGNIFICANT CHANGE UP (ref 0.7–1.5)
GLUCOSE BLDC GLUCOMTR-MCNC: 116 MG/DL — HIGH (ref 70–99)
GLUCOSE BLDC GLUCOMTR-MCNC: 95 MG/DL — SIGNIFICANT CHANGE UP (ref 70–99)
GLUCOSE SERPL-MCNC: 92 MG/DL — SIGNIFICANT CHANGE UP (ref 70–99)
HCT VFR BLD CALC: 27.4 % — LOW (ref 42–52)
HGB BLD-MCNC: 8.6 G/DL — LOW (ref 14–18)
INTERPRETATION 24H UR IFE-IMP: SIGNIFICANT CHANGE UP
M PROTEIN 24H UR ELPH-MRATE: PRESENT
MAGNESIUM SERPL-MCNC: 1.6 MG/DL — LOW (ref 1.8–2.4)
MCHC RBC-ENTMCNC: 27.5 PG — SIGNIFICANT CHANGE UP (ref 27–31)
MCHC RBC-ENTMCNC: 31.4 G/DL — LOW (ref 32–37)
MCV RBC AUTO: 87.5 FL — SIGNIFICANT CHANGE UP (ref 80–94)
NRBC # BLD: 0 /100 WBCS — SIGNIFICANT CHANGE UP (ref 0–0)
PLATELET # BLD AUTO: 219 K/UL — SIGNIFICANT CHANGE UP (ref 130–400)
POTASSIUM SERPL-MCNC: 4.2 MMOL/L — SIGNIFICANT CHANGE UP (ref 3.5–5)
POTASSIUM SERPL-SCNC: 4.2 MMOL/L — SIGNIFICANT CHANGE UP (ref 3.5–5)
PROT ?TM UR-MCNC: 27 MG/DL — HIGH (ref 0–12)
PROT PATTERN 24H UR ELPH-IMP: SIGNIFICANT CHANGE UP
PROT SERPL-MCNC: 6 G/DL — SIGNIFICANT CHANGE UP (ref 6–8)
RBC # BLD: 3.13 M/UL — LOW (ref 4.7–6.1)
RBC # FLD: 15.2 % — HIGH (ref 11.5–14.5)
SODIUM SERPL-SCNC: 139 MMOL/L — SIGNIFICANT CHANGE UP (ref 135–146)
TACROLIMUS SERPL-MCNC: 26.4 NG/ML — SIGNIFICANT CHANGE UP
WBC # BLD: 4.06 K/UL — LOW (ref 4.8–10.8)
WBC # FLD AUTO: 4.06 K/UL — LOW (ref 4.8–10.8)

## 2021-07-20 PROCEDURE — 99239 HOSP IP/OBS DSCHRG MGMT >30: CPT

## 2021-07-20 RX ORDER — AMLODIPINE BESYLATE 2.5 MG/1
5 TABLET ORAL DAILY
Refills: 0 | Status: DISCONTINUED | OUTPATIENT
Start: 2021-07-20 | End: 2021-07-20

## 2021-07-20 RX ORDER — CLOPIDOGREL BISULFATE 75 MG/1
1 TABLET, FILM COATED ORAL
Qty: 0 | Refills: 0 | DISCHARGE

## 2021-07-20 RX ORDER — AMLODIPINE BESYLATE 2.5 MG/1
5 TABLET ORAL ONCE
Refills: 0 | Status: COMPLETED | OUTPATIENT
Start: 2021-07-20 | End: 2021-07-20

## 2021-07-20 RX ORDER — WARFARIN SODIUM 2.5 MG/1
1 TABLET ORAL
Qty: 30 | Refills: 0
Start: 2021-07-20 | End: 2021-08-18

## 2021-07-20 RX ORDER — OXYCODONE AND ACETAMINOPHEN 5; 325 MG/1; MG/1
1 TABLET ORAL
Qty: 0 | Refills: 0 | DISCHARGE

## 2021-07-20 RX ORDER — FERROUS SULFATE 325(65) MG
1 TABLET ORAL
Qty: 13 | Refills: 0
Start: 2021-07-20 | End: 2021-08-18

## 2021-07-20 RX ORDER — ASPIRIN/CALCIUM CARB/MAGNESIUM 324 MG
1 TABLET ORAL
Qty: 30 | Refills: 0
Start: 2021-07-20 | End: 2021-08-18

## 2021-07-20 RX ORDER — ASPIRIN/CALCIUM CARB/MAGNESIUM 324 MG
1 TABLET ORAL
Qty: 0 | Refills: 0 | DISCHARGE

## 2021-07-20 RX ADMIN — Medication 325 MILLIGRAM(S): at 05:16

## 2021-07-20 RX ADMIN — Medication 650 MILLIGRAM(S): at 01:01

## 2021-07-20 RX ADMIN — Medication 81 MILLIGRAM(S): at 11:42

## 2021-07-20 RX ADMIN — AMLODIPINE BESYLATE 5 MILLIGRAM(S): 2.5 TABLET ORAL at 10:33

## 2021-07-20 RX ADMIN — Medication 1 MILLIGRAM(S): at 11:43

## 2021-07-20 RX ADMIN — MAGNESIUM OXIDE 400 MG ORAL TABLET 400 MILLIGRAM(S): 241.3 TABLET ORAL at 11:43

## 2021-07-20 RX ADMIN — Medication 25 MILLIGRAM(S): at 05:17

## 2021-07-20 RX ADMIN — OXYCODONE HYDROCHLORIDE 10 MILLIGRAM(S): 5 TABLET ORAL at 11:40

## 2021-07-20 RX ADMIN — FLUDROCORTISONE ACETATE 0.1 MILLIGRAM(S): 0.1 TABLET ORAL at 05:16

## 2021-07-20 RX ADMIN — TACROLIMUS 3 MILLIGRAM(S): 5 CAPSULE ORAL at 05:17

## 2021-07-20 RX ADMIN — PANTOPRAZOLE SODIUM 40 MILLIGRAM(S): 20 TABLET, DELAYED RELEASE ORAL at 05:54

## 2021-07-20 NOTE — DISCHARGE NOTE PROVIDER - NSDCCPCAREPLAN_GEN_ALL_CORE_FT
PRINCIPAL DISCHARGE DIAGNOSIS  Diagnosis: Femur fracture  Assessment and Plan of Treatment: You were found to have a right distal femur fracture when you came to the hospital. You got surgery on 7/9 with femur plate placed. After surgery, you had a big drop in your hemoglobin (blood count). You were transfused with a total of 8 units of red blood cells. Imaging showed that you have a large hematoma in your right leg. Your blood pressure dropped really low because of the blood loss so you went into Afib for which multiple medications were given to help control and has since been under control. Since then, you have been improving clinically and medically. You were cleared for discharge to short term rehab in [place].       PRINCIPAL DISCHARGE DIAGNOSIS  Diagnosis: Femur fracture  Assessment and Plan of Treatment: You were found to have a right distal femur fracture when you came to the hospital. You got surgery on 7/9 with femur plate placed. After surgery, you had a big drop in your hemoglobin (blood count). You were transfused with a total of 8 units of red blood cells. Imaging showed that you have a large hematoma in your right leg. Your blood pressure dropped really low because of the blood loss so you went into atrial fibrillation for which multiple medications were given to help control and has since been under control. Since then, you have been improving clinically and medically. You were cleared for discharge to short term rehab in [place].      SECONDARY DISCHARGE DIAGNOSES  Diagnosis: DVT, lower extremity  Assessment and Plan of Treatment: You were found to have a deep venous thrombosis in 2 veins on your right leg during your hospital stay. Because you had the hematoma post operation, you were not a candidate to continue anticoagulation so you had an IVC filter placed in your right leg on 7/14. You were given aspirin 81mg throughout your hospital stay. Your home aspirin 325mg and plavix 75mg were stopped/changed because of the bleeding. You have been improving since.

## 2021-07-20 NOTE — DISCHARGE NOTE PROVIDER - HOSPITAL COURSE
70 y/o M with PMHx JIMÉNEZ s/p liver and R renal transplant (Feb 2020 [place]), HTN, HLD, DM, CAD (s/p stents 10 yrs ago, on ASA and Plavix), AFib (s/p Watchman 3 months ago) admitted for R distal femur fx. Had R femur ORIF (on 7/9/21) by ortho, afterwards Hgb dropped to 6.9. CT showed hematoma in R vastus intermedius. Developed hypotension and went into AFib with RVR. Placed on levo, switched to valerio, pressors discontinued (7/10). Septic workup sent, as patient was febrile, started on Cefepime, now off abx. Started on diltiazem for AFib, switched to esmolol drip and digoxin, weaned off to PO metoprolol, controlled. S/p 8U pRBC in total. Incidental RLL susegmenta PE found and because of acute bleed at the time, patient is s/p IVC filter placed 7/14.s/p CCU downgrade 7/16.    Since downgrade to the floors, patient has been hemodynamically and clinically improving. Hemoglobin has been stable, continuing with iron supplementation. Because of large hematoma in R vastus intermedius, patient's aspirin has been changed from 325 to 81mg and plavix stopped. As per ortho, patient is still NBW in RLE and cleared on their end. PT/OT has been following with the patient. After discharge, patient will need to follow up with Dr. Kirby outpatient. Patient is medically cleared to be discharged to [facility] facility in [place]. 70 y/o M with PMHx JIMÉNEZ s/p liver and R renal transplant (Feb 2020 [place]), HTN, HLD, DM, CAD (s/p stents 10 yrs ago, on ASA and Plavix), AFib (s/p Watchman 3 months ago) admitted for R distal femur fx. Had R femur ORIF (on 7/9/21) by ortho, afterwards Hgb dropped to 6.9. CT showed hematoma in R vastus intermedius. Developed hypotension and went into AFib with RVR. Placed on levo, switched to valerio, pressors discontinued (7/10). Septic workup sent, as patient was febrile, started on Cefepime, now off abx. Started on diltiazem for AFib, switched to esmolol drip and digoxin, weaned off to PO metoprolol, controlled. S/p 8U pRBC in total. Incidental RLL susegmenta PE found and because of acute bleed at the time, patient is s/p IVC filter placed 7/14.s/p CCU downgrade 7/16.    Since downgrade to the floors, patient has been hemodynamically and clinically improving. Hemoglobin has been stable, continuing with iron supplementation. Because of large hematoma in R vastus intermedius, patient's aspirin has been changed from 325 to 81mg and plavix stopped. As per ortho, patient will be NBW on RLE for 6-10 weeks. PT/OT has been following with the patient. After discharge, patient will need to follow up with Dr. Kirby outpatient. Patient is medically cleared to be discharged to [facility] facility in [place]. 70 y/o M with PMHx JIMÉNEZ s/p liver and R renal transplant (Feb 2020 [place]), HTN, HLD, DM, CAD (s/p stents 10 yrs ago, on ASA and Plavix), AFib (s/p Watchman 3 months ago) admitted for R distal femur fx. Had R femur ORIF (on 7/9/21) by ortho, afterwards Hgb dropped to 6.9. CT showed hematoma in R vastus intermedius. Developed hypotension and went into AFib with RVR. Placed on levo, switched to valerio, pressors discontinued (7/10). Septic workup sent, as patient was febrile, started on Cefepime, now off abx. Started on diltiazem for AFib, switched to esmolol drip and digoxin, weaned off to PO metoprolol, controlled. S/p 8U pRBC in total. Incidental RLL susegmenta PE found and because of acute bleed at the time, patient is s/p IVC filter placed 7/14.s/p CCU downgrade 7/16.    Since downgrade to the floors, patient has been hemodynamically and clinically improving. Hemoglobin has been stable, continuing with iron supplementation. Because of large hematoma in R vastus intermedius, patient's aspirin has been changed from 325 to 81mg and plavix stopped. As his hemoglobin/hematocrit is stable , after getting clearance from vascular & ortho anticoagulation has been resumed with coumadin because of recent PE & DVT. He will follow up with cardio as outpatient if plavix needs to be resumed.  As per ortho, patient will be NBW on RLE for 6-10 weeks. PT/OT has been following with the patient. After discharge, patient will need to follow up with Dr. Kirby outpatient. Patient is medically cleared to be discharged to [facility] facility in [place].   Attending Attestation:  Patient was seen & examined independently. At least 10 systems were reviewed in ROS. All systems reviewed  are within normal limits. Latest vital signs and labs were reviewed today. Case was discussed with house staff in morning rounds for assessment and plan.  Patient is medically stable for discharge . About 42 mins spent on discharge disposition.

## 2021-07-20 NOTE — PROGRESS NOTE ADULT - SUBJECTIVE AND OBJECTIVE BOX
NEPHROLOGY FOLLOW UP NOTE    no overnight events  c/o urinary frequency  renal function stabel  no bleeding   bp's fair    PAST MEDICAL & SURGICAL HISTORY:  HTN (hypertension)  High cholesterol  Anemia  Diabetes  Afib  Cirrhosis of liver  JIMÉNEZ  Dyspnea on exertion  BPH (benign prostatic hyperplasia)  Presence of bilateral total knee joint prostheses  15 years ago  S/P angioplasty with stent  2 cardiac stents &gt; 10 years back  Encounter for screening colonoscopy  1 year ago  Organ transplant  liver, right kidney 2020      Allergies:  No Known Allergies    Home Medications Reviewed    SOCIAL HISTORY:  Denies ETOH,Smoking,   FAMILY HISTORY:  Family history of early CAD  mother    FH: ovarian cancer  mother      REVIEW OF SYSTEMS:  CONSTITUTIONAL: No weakness, fevers or chills  EYES/ENT: No visual changes;  No vertigo or throat pain   NECK: No pain or stiffness  RESPIRATORY: No cough, wheezing, hemoptysis; No shortness of breath  CARDIOVASCULAR: No chest pain or palpitations.  GASTROINTESTINAL: No abdominal or epigastric pain. No nausea, vomiting, or hematemesis; No diarrhea or constipation. No melena or hematochezia.  GENITOURINARY: No dysuria, frequency, foamy urine, urinary urgency, incontinence or hematuria  NEUROLOGICAL: No numbness or weakness  SKIN: No itching, burning, rashes, or lesions   VASCULAR: No bilateral lower extremity edema.   All other review of systems is negative unless indicated above.      PHYSICAL EXAM:  Constitutional: NAD  HEENT: anicteric sclera, oropharynx clear, dry mm  Neck: No JVD  Respiratory: CTAB, no wheezes, rales or rhonchi  Cardiovascular: S1, S2, RRR  Gastrointestinal: BS+, soft, NT/ND + scar  Extremities: No cyanosis or clubbing. No peripheral edema + RLE edema  Neurological: A/O x 3, no focal deficits  Psychiatric: Normal mood, normal affect  : No CVA tenderness. No mayen  Skin: No rashes    Hospital Medications:   MEDICATIONS  (STANDING):  aspirin  chewable 81 milliGRAM(s) Oral daily  chlorhexidine 4% Liquid 1 Application(s) Topical daily  dextrose 40% Gel 15 Gram(s) Oral once  dextrose 50% Injectable 25 Gram(s) IV Push once  ferrous    sulfate 325 milliGRAM(s) Oral <User Schedule>  fludroCORTISONE 0.1 milliGRAM(s) Oral daily  folic acid 1 milliGRAM(s) Oral daily  glucagon  Injectable 1 milliGRAM(s) IntraMuscular once  insulin lispro (ADMELOG) corrective regimen sliding scale   SubCutaneous three times a day before meals  magnesium oxide 400 milliGRAM(s) Oral daily  metoprolol tartrate 25 milliGRAM(s) Oral two times a day  pantoprazole    Tablet 40 milliGRAM(s) Oral before breakfast  senna 2 Tablet(s) Oral at bedtime  tacrolimus 3 milliGRAM(s) Oral every 12 hours  tamsulosin 0.4 milliGRAM(s) Oral at bedtime        VITALS:  T(F): 97.1 (21 @ 13:03), Max: 97.1 (21 @ 23:51)  HR: 96 (21 @ 13:03)  BP: 132/93 (21 @ 13:03)  RR: 18 (21 @ 08:40)  SpO2: --  Wt(kg): --     @ 07:01  -   @ 07:00  --------------------------------------------------------  IN: 720 mL / OUT: 1050 mL / NET: -330 mL     @ 07:01  -   @ 07:00  --------------------------------------------------------  IN: 840 mL / OUT: 200 mL / NET: 640 mL     @ 07:01  -   @ 15:52  --------------------------------------------------------  IN: 0 mL / OUT: 320 mL / NET: -320 mL          LABS:      139  |  104  |  13  ----------------------------<  92  4.2   |  25  |  1.0    Ca    8.4<L>      2021 06:39  Mg     1.6         TPro  6.0  /  Alb  3.3<L>  /  TBili  1.9<H>  /  DBili      /  AST  23  /  ALT  15  /  AlkPhos  285<H>                            8.6    4.06  )-----------( 219      ( 2021 06:39 )             27.4       Urine Studies:  Urinalysis Basic - ( 2021 12:43 )    Color: Yellow / Appearance: Clear / S.013 / pH:   Gluc:  / Ketone: Negative  / Bili: Negative / Urobili: 3 mg/dL   Blood:  / Protein: Trace / Nitrite: Negative   Leuk Esterase: Negative / RBC:  / WBC    Sq Epi:  / Non Sq Epi:  / Bacteria:           RADIOLOGY & ADDITIONAL STUDIES:

## 2021-07-20 NOTE — PROGRESS NOTE ADULT - PROVIDER SPECIALTY LIST ADULT
Orthopedics
CCU
CCU
Critical Care
Orthopedics
Orthopedics
Physiatry
SICU
CCU
CCU
Critical Care
Critical Care
Electrophysiology
Electrophysiology
Hospitalist
Hospitalist
Infectious Disease
Nephrology
Orthopedics
Vascular Surgery
CCU
Critical Care
Hospitalist
Internal Medicine
Internal Medicine
Nephrology
Nephrology
Orthopedics
CCU
Critical Care
Internal Medicine
Nephrology

## 2021-07-20 NOTE — PROGRESS NOTE ADULT - ASSESSMENT
s/p  donor liver / kidney transplant (RLQ) 2020 @ [facility]  stable allograft renal function   ESRD / ESLD due to JIMÉNEZ with HRS  Right distal femoral fracture s/p orif  post op anemia  PE on CTA / DVT s/p ivc filter  s/p iv contrast   s/p b/l remote TKR  DM2  CAD / PCI / Afib    borderline low BP's  hypomagnesemia     plan:    cont prograf 0.3mg po bid  cont florinef 0.1mg po qd  cont lopressor  po iron   wound care  physical therapy  dc planning

## 2021-07-20 NOTE — DISCHARGE NOTE PROVIDER - NSDCMRMEDTOKEN_GEN_ALL_CORE_FT
amLODIPine 5 mg oral tablet: 1 tab(s) orally once a day  aspirin 325 mg oral delayed release tablet: 1 tab(s) orally once a day  fludrocortisone 0.1 mg oral tablet: 1 tab(s) orally once a day  Januvia 100 mg oral tablet: 1 tab(s) orally once a day  linagliptin 5 mg oral tablet: 1 tab(s) orally once a day  magnesium chloride: 800 milligram(s) orally 2 times a day  metoprolol succinate 25 mg oral tablet, extended release: 1 tab(s) orally once a day  Plavix 75 mg oral tablet: 1 tab(s) orally once a day  Prograf 1 mg oral capsule: 1 cap(s) orally every 12 hours  Protonix 40 mg oral delayed release tablet: 1 tab(s) orally once a day  Senokot 8.6 mg oral tablet: 1 tab(s) orally once a day (at bedtime)  tamsulosin 0.4 mg oral capsule: 1 cap(s) orally once a day   amLODIPine 5 mg oral tablet: 1 tab(s) orally once a day  fludrocortisone 0.1 mg oral tablet: 1 tab(s) orally once a day  Januvia 100 mg oral tablet: 1 tab(s) orally once a day  linagliptin 5 mg oral tablet: 1 tab(s) orally once a day  magnesium chloride: 800 milligram(s) orally 2 times a day  metoprolol succinate 25 mg oral tablet, extended release: 1 tab(s) orally once a day  Plavix 75 mg oral tablet: 1 tab(s) orally once a day  Prograf 1 mg oral capsule: 1 cap(s) orally every 12 hours  Protonix 40 mg oral delayed release tablet: 1 tab(s) orally once a day  Senokot 8.6 mg oral tablet: 1 tab(s) orally once a day (at bedtime)  tamsulosin 0.4 mg oral capsule: 1 cap(s) orally once a day   amLODIPine 5 mg oral tablet: 1 tab(s) orally once a day  ferrous sulfate 325 mg (65 mg elemental iron) oral tablet: 1 tab(s) orally Tuesday, Thursday, Sunday   fludrocortisone 0.1 mg oral tablet: 1 tab(s) orally once a day  Januvia 100 mg oral tablet: 1 tab(s) orally once a day  linagliptin 5 mg oral tablet: 1 tab(s) orally once a day  magnesium chloride: 800 milligram(s) orally 2 times a day  metoprolol succinate 25 mg oral tablet, extended release: 1 tab(s) orally once a day  Plavix 75 mg oral tablet: 1 tab(s) orally once a day  Prograf 1 mg oral capsule: 1 cap(s) orally every 12 hours  Protonix 40 mg oral delayed release tablet: 1 tab(s) orally once a day  Senokot 8.6 mg oral tablet: 1 tab(s) orally once a day (at bedtime)  tamsulosin 0.4 mg oral capsule: 1 cap(s) orally once a day  warfarin 2.5 mg oral tablet: 1 tab(s) orally once a day

## 2021-07-20 NOTE — DISCHARGE NOTE PROVIDER - NPI NUMBER (FOR SYSADMIN USE ONLY) :
[[id number]] [[id number]],[[id number]] [[id number]],[[id number]],[UNKNOWN],[[id number]] [[id number]],[[id number]],[[id number]],[UNKNOWN]

## 2021-07-20 NOTE — DISCHARGE NOTE PROVIDER - NSDCFUADDINST_GEN_ALL_CORE_FT
Please follow up with Dr. Kirby outpatient. Please call the number provided to set up an appointment with them.     Please also follow up with your nephrologist 1-2 weeks upon discharge.  Please follow up with Dr. Kirby outpatient. Please call the number provided to set up an appointment with them.     Please also follow up with your primary care physician and nephrologist in 1-2 weeks upon discharge.  Please follow up with Dr. Kirby, your orthopedic surgeon outpatient. Please call the number provided to set up an appointment with them.     You had an IVC filter placed on this admission. You will need to follow up with Dr. Madden, the vascular surgeon in 2-3 weeks. Please call them with number provided to make an appointment.      Because your aspirin and plavix was changed/stopped on this admission, you will need to follow up with your cardiologist, Dr. De Souza after discharge about resuming your plavix.     Please also follow up with your primary care physician and nephrologist in 1-2 weeks upon discharge.      Please follow up with Dr. Kirby, your orthopedic surgeon outpatient in 1 week. Please call the number provided to set up an appointment with them. You are allowed to shower, just cover your right leg/dressing area with a bag to keep the dressing dry. No bathing. Remember to keep the dressing clean and dry. When you follow up with Dr. Kirby, they will remove the dressing and will give you further instructions for care.    You had an IVC filter placed on this admission. You will need to follow up with Dr. Madden, the vascular surgeon in 2-3 weeks. Please call them with number provided to make an appointment.      Because your aspirin was changed from 325 to 81mg and plavix stopped on this admission due to hematoma, you will need to follow up with your cardiologist, Dr. De Souza in 1-2 weeks after discharge about resuming your plavix.     Please also follow up with your primary care physician and nephrologist in 1-2 weeks upon discharge.       Note for rehab center: patient was given coumadin for discharge as per recommendation from the vascular team. Please check INR in 3 days (Friday, 7/23).

## 2021-07-20 NOTE — DISCHARGE NOTE PROVIDER - CARE PROVIDERS DIRECT ADDRESSES
,DirectAddress_Unknown ,DirectAddress_Unknown,[email or web address],DirectAddress_Unknown,DirectAddress_[email or web address],DirectAddress_Unknown,[email or web address],DirectAddress_Unknown

## 2021-07-20 NOTE — DISCHARGE NOTE PROVIDER - PROVIDER TOKENS
FREE:[LAST:[Gross],FIRST:[Alejandro],PHONE:[phone]:[(   )    -],ADDRESS:[Orthopaedic Surgery  [address] [place] [address]],FOLLOWUP:[1-3 days]] FREE:[LAST:[Gross],FIRST:[Alejandro],PHONE:[phone]:[(   )    -],ADDRESS:[Orthopaedic Surgery  [address] [place] [address]],FOLLOWUP:[1-3 days]],PROVIDER:[TOKEN:[[id number]],FOLLOWUP:[2 weeks]] PROVIDER:[TOKEN:[[id number]],FOLLOWUP:[2 weeks]],PROVIDER:[TOKEN:[[id number]],FOLLOWUP:[2 weeks]],FREE:[LAST:[Gross],FIRST:[Alejandro],PHONE:[phone]:[(   )    -],ADDRESS:[Orthopaedic Surgery  [address] [place] [address]],FOLLOWUP:[1-3 days]],PROVIDER:[TOKEN:[[id number]],FOLLOWUP:[1 week]] PROVIDER:[TOKEN:[[id number]],FOLLOWUP:[2 weeks]],PROVIDER:[TOKEN:[[id number]],FOLLOWUP:[2 weeks]],PROVIDER:[TOKEN:[[id number]],FOLLOWUP:[1 week]],FREE:[LAST:[Gross],FIRST:[Alejandro],PHONE:[phone]:[(   )    -],ADDRESS:[Orthopaedic Surgery  [address] [place] [address]],FOLLOWUP:[1 week]]

## 2021-07-20 NOTE — PROGRESS NOTE ADULT - NSICDXPILOT_GEN_ALL_CORE
[place]

## 2021-07-20 NOTE — DISCHARGE NOTE NURSING/CASE MANAGEMENT/SOCIAL WORK - NSDCVIVACCINE_GEN_ALL_CORE_FT
influenza, injectable, quadrivalent, preservative free; 02-Dec-2018 14:26; Magdalene Gonzalez (RN); BeatSwitch; t57ea (Exp. Date: 02-Jan-2019); IntraMuscular; Deltoid Right.; 0.5 milliLiter(s); VIS (VIS Published: 07-Aug-2015, VIS Presented: 02-Dec-2018);

## 2021-07-20 NOTE — PROGRESS NOTE ADULT - REASON FOR ADMISSION
Right periprosthetic femur fracture

## 2021-07-20 NOTE — DISCHARGE NOTE PROVIDER - CARE PROVIDER_API CALL
Alejandro Kirby  Orthopaedic Surgery  [address] [facility] [place] [address]  Phone: [phone]  Fax: (   )    -  Follow Up Time: 1-3 days   Alejandro Kirby  Orthopaedic Surgery  [address] [place] [address]  Phone: [phone]  Fax: (   )    -  Follow Up Time: 1-3 days    Chano Durant  Gastroenterology  [address] [place] [address]  Phone: [phone]  Fax: [phone]  Follow Up Time: 2 weeks   Chano Durant  Gastroenterology  [address] [place] [address]  Phone: [phone]  Fax: [phone]  Follow Up Time: 2 weeks    Pau Madden)  Surgery  [address] [place] [address]  Phone: [phone]  Fax: [phone]  Follow Up Time: 2 weeks    Alejandro Kirby  Orthopaedic Surgery  [address] [place] [address]  Phone: [phone]  Fax: (   )    -  Follow Up Time: 1-3 days    Sung De Souza  Internal Medicine  [address] [place] [address]  Phone: [phone]  Fax: [phone]  Follow Up Time: 1 week   Chano Durant  Gastroenterology  [address] [place] [address]  Phone: [phone]  Fax: [phone]  Follow Up Time: 2 weeks    Pau Madden)  Surgery  [address] [place] [address]  Phone: [phone]  Fax: [phone]  Follow Up Time: 2 weeks    Sung De Souza  Internal Medicine  [address] [place] [address]  Phone: [phone]  Fax: [phone]  Follow Up Time: 1 week    Alejandro Kirby  Orthopaedic Surgery  [address] [place] [address]  Phone: [phone]  Fax: (   )    -  Follow Up Time: 1 week

## 2021-07-20 NOTE — DISCHARGE NOTE NURSING/CASE MANAGEMENT/SOCIAL WORK - PATIENT PORTAL LINK FT
You can access the FollowMyHealth Patient Portal offered by [facility] by registering at the following website: [email or web address]. By joining [employer]’s FollowMyHealth portal, you will also be able to view your health information using other applications (apps) compatible with our system.

## 2021-07-22 LAB
% ALBUMIN: 46.7 % — SIGNIFICANT CHANGE UP
% ALPHA 1: 8.6 % — SIGNIFICANT CHANGE UP
% ALPHA 2: 13.7 % — SIGNIFICANT CHANGE UP
% BETA: 11.1 % — SIGNIFICANT CHANGE UP
% GAMMA: 19.9 % — SIGNIFICANT CHANGE UP
% M SPIKE: SIGNIFICANT CHANGE UP
ALBUMIN SERPL ELPH-MCNC: 2.4 G/DL — LOW (ref 3.6–5.5)
ALBUMIN/GLOB SERPL ELPH: 0.9 RATIO — SIGNIFICANT CHANGE UP
ALPHA1 GLOB SERPL ELPH-MCNC: 0.4 G/DL — SIGNIFICANT CHANGE UP (ref 0.1–0.4)
ALPHA2 GLOB SERPL ELPH-MCNC: 0.7 G/DL — SIGNIFICANT CHANGE UP (ref 0.5–1)
B-GLOBULIN SERPL ELPH-MCNC: 0.6 G/DL — SIGNIFICANT CHANGE UP (ref 0.5–1)
GAMMA GLOBULIN: 1 G/DL — SIGNIFICANT CHANGE UP (ref 0.6–1.6)
INTERPRETATION SERPL IFE-IMP: SIGNIFICANT CHANGE UP
M-SPIKE: SIGNIFICANT CHANGE UP (ref 0–0)
PROT PATTERN SERPL ELPH-IMP: SIGNIFICANT CHANGE UP

## 2021-07-29 DIAGNOSIS — I82.441 ACUTE EMBOLISM AND THROMBOSIS OF RIGHT TIBIAL VEIN: ICD-10-CM

## 2021-07-29 DIAGNOSIS — E83.42 HYPOMAGNESEMIA: ICD-10-CM

## 2021-07-29 DIAGNOSIS — E78.5 HYPERLIPIDEMIA, UNSPECIFIED: ICD-10-CM

## 2021-07-29 DIAGNOSIS — N18.30 CHRONIC KIDNEY DISEASE, STAGE 3 UNSPECIFIED: ICD-10-CM

## 2021-07-29 DIAGNOSIS — E11.22 TYPE 2 DIABETES MELLITUS WITH DIABETIC CHRONIC KIDNEY DISEASE: ICD-10-CM

## 2021-07-29 DIAGNOSIS — I26.93 SINGLE SUBSEGMENTAL PULMONARY EMBOLISM WITHOUT ACUTE COR PULMONALE: ICD-10-CM

## 2021-07-29 DIAGNOSIS — S72.451A DISPLACED SUPRACONDYLAR FRACTURE WITHOUT INTRACONDYLAR EXTENSION OF LOWER END OF RIGHT FEMUR, INITIAL ENCOUNTER FOR CLOSED FRACTURE: ICD-10-CM

## 2021-07-29 DIAGNOSIS — M97.8XXA PERIPROSTHETIC FRACTURE AROUND OTHER INTERNAL PROSTHETIC JOINT, INITIAL ENCOUNTER: ICD-10-CM
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
## 2021-07-29 DIAGNOSIS — Z96.698 PRESENCE OF OTHER ORTHOPEDIC JOINT IMPLANTS: ICD-10-CM

## 2021-07-29 DIAGNOSIS — M97.11XA PERIPROSTHETIC FRACTURE AROUND INTERNAL PROSTHETIC RIGHT KNEE JOINT, INITIAL ENCOUNTER: ICD-10-CM

## 2021-07-29 DIAGNOSIS — E87.1 HYPO-OSMOLALITY AND HYPONATREMIA: ICD-10-CM

## 2021-07-29 DIAGNOSIS — R57.8 OTHER SHOCK: ICD-10-CM

## 2021-07-29 DIAGNOSIS — D69.6 THROMBOCYTOPENIA, UNSPECIFIED: ICD-10-CM

## 2021-07-29 DIAGNOSIS — Z94.0 KIDNEY TRANSPLANT STATUS: ICD-10-CM

## 2021-07-29 DIAGNOSIS — I82.431 ACUTE EMBOLISM AND THROMBOSIS OF RIGHT POPLITEAL VEIN: ICD-10-CM

## 2021-07-29 DIAGNOSIS — Z95.5 PRESENCE OF CORONARY ANGIOPLASTY IMPLANT AND GRAFT: ICD-10-CM

## 2021-07-29 DIAGNOSIS — I12.9 HYPERTENSIVE CHRONIC KIDNEY DISEASE WITH STAGE 1 THROUGH STAGE 4 CHRONIC KIDNEY DISEASE, OR UNSPECIFIED CHRONIC KIDNEY DISEASE: ICD-10-CM

## 2021-07-29 DIAGNOSIS — N40.0 BENIGN PROSTATIC HYPERPLASIA WITHOUT LOWER URINARY TRACT SYMPTOMS: ICD-10-CM

## 2021-07-29 DIAGNOSIS — D62 ACUTE POSTHEMORRHAGIC ANEMIA: ICD-10-CM

## 2021-07-29 DIAGNOSIS — I48.20 CHRONIC ATRIAL FIBRILLATION, UNSPECIFIED: ICD-10-CM

## 2021-07-29 DIAGNOSIS — I95.9 HYPOTENSION, UNSPECIFIED: ICD-10-CM

## 2021-07-29 DIAGNOSIS — I25.10 ATHEROSCLEROTIC HEART DISEASE OF NATIVE CORONARY ARTERY WITHOUT ANGINA PECTORIS: ICD-10-CM

## 2021-07-29 DIAGNOSIS — Z94.4 LIVER TRANSPLANT STATUS: ICD-10-CM

## 2021-07-29 DIAGNOSIS — D63.8 ANEMIA IN OTHER CHRONIC DISEASES CLASSIFIED ELSEWHERE: ICD-10-CM

## 2021-09-26 NOTE — PATIENT PROFILE ADULT - INFORMATION PROVIDED TO:
patient/caregiver
You can access the FollowMyHealth Patient Portal offered by [facility] by registering at the following website: [email or web address]. By joining [employer]’s FollowMyHealth portal, you will also be able to view your health information using other applications (apps) compatible with our system.

## 2021-11-22 ENCOUNTER — APPOINTMENT (OUTPATIENT)
Dept: CARDIOLOGY | Facility: CLINIC | Age: 71
End: 2021-11-22

## 2021-12-14 RX ORDER — METOPROLOL TARTRATE 25 MG/1
25 TABLET, FILM COATED ORAL DAILY
Qty: 90 | Refills: 3 | Status: ACTIVE | COMMUNITY
Start: 2020-12-28 | End: 1900-01-01

## 2021-12-29 NOTE — PATIENT PROFILE ADULT - STATED REASON FOR ADMISSION
sob, LE swelling 33 y/o R handed M presents to ED c/o  R arm injury. Pt  states he hit his arm on a door knob 2 hours ago. Denies taking anything for pain. States he applied ice

## 2022-01-14 ENCOUNTER — APPOINTMENT (OUTPATIENT)
Dept: CARDIOLOGY | Facility: CLINIC | Age: 72
End: 2022-01-14

## 2022-01-20 NOTE — H&P ADULT - NSHPSOURCEINFORD_GEN_ALL_CORE
Patient Consent 1/Introductory Paragraph: The rationale for Mohs was explained to the patient and consent was obtained. The risks, benefits and alternatives to therapy were discussed in detail. Specifically, the risks of infection, scarring, bleeding, prolonged wound healing, incomplete removal, allergy to anesthesia, nerve injury and recurrence were addressed. Prior to the procedure, the treatment site was clearly identified and confirmed by the patient. All components of Universal Protocol/PAUSE Rule completed.

## 2022-03-25 NOTE — PATIENT PROFILE ADULT - NSPROREFERSVCHOMEDIABETES_GEN_A_NUR
----- Message from Shyam Dutton MD sent at 3/25/2022  8:09 AM CDT -----  Lipids good overall good  blood sugars very good A1 c 5.2 encourage Mediterranean diet regular physical activity  
Pt aware of labs and notes.   
no

## 2022-05-03 NOTE — ED ADULT NURSE NOTE - NS ED NURSE RECORD ANOTHER HT AND WT
Sprain ankle  Rest, ice, elevate  Follow up with PCP in 3-5 days  Proceed to  ER if symptoms worsen  Sprain   WHAT YOU NEED TO KNOW:   A sprain is a stretched or torn ligament  Ligaments support your joints and keep your bones in place  They allow you to lift, lower, or rotate your arms and legs  A sprain may involve one or more ligaments  DISCHARGE INSTRUCTIONS:   Return to the emergency department if:   · You have numbness or tingling below the injury, such as in your fingers or toes  · The skin over your sprained area is blue or pale  · Your pain has increased or returned, even after you take pain medicine  Call your doctor if:   · Your symptoms do not better  · Your swelling has increased or returned  · Your joint becomes more weak or unstable  · You have questions or concerns about your condition or care  Medicines: You may need any of the following:  · Prescription pain medicine  may be given  Ask your healthcare provider how to take this medicine safely  Some prescription pain medicines contain acetaminophen  Do not take other medicines that contain acetaminophen without talking to your healthcare provider  Too much acetaminophen may cause liver damage  Prescription pain medicine may cause constipation  Ask your healthcare provider how to prevent or treat constipation  · Acetaminophen  decreases pain and fever  It is available without a doctor's order  Ask how much to take and how often to take it  Follow directions  Read the labels of all other medicines you are using to see if they also contain acetaminophen, or ask your doctor or pharmacist  Acetaminophen can cause liver damage if not taken correctly  Do not use more than 4 grams (4,000 milligrams) total of acetaminophen in one day  · NSAIDs , such as ibuprofen, help decrease swelling, pain, and fever  This medicine is available with or without a doctor's order   NSAIDs can cause stomach bleeding or kidney problems in certain
people  If you take blood thinner medicine, always ask your healthcare provider if NSAIDs are safe for you  Always read the medicine label and follow directions  · Take your medicine as directed  Contact your healthcare provider if you think your medicine is not helping or if you have side effects  Tell him or her if you are allergic to any medicine  Keep a list of the medicines, vitamins, and herbs you take  Include the amounts, and when and why you take them  Bring the list or the pill bottles to follow-up visits  Carry your medicine list with you in case of an emergency  A support device  such as an elastic bandage, splint, brace, or cast may be needed  These devices limit movement and protect the joint  You may need to use crutches if the sprain is in your leg  This will help decrease your pain as you move around  Self-care:   · Rest  your joint so that it can heal  Return to normal activities as directed  · Apply ice  on your injury for 15 to 20 minutes every hour or as directed  Use an ice pack, or put crushed ice in a plastic bag  Cover the bag with a towel before you apply it  Ice helps prevent tissue damage and decreases swelling and pain  · Compress  the injured area as directed  Ask your healthcare provider if you should wrap an elastic bandage around your injured ligament  An elastic bandage provides support and helps decrease swelling and movement so your joint can heal          · Elevate  the injured area above the level of your heart as often as you can  This will help decrease swelling and pain  Prop the injured area on pillows or blankets to keep it elevated comfortably  Physical therapy:  A physical therapist teaches you exercises to help improve movement and strength, and to decrease pain  Prevent another sprain:  Regular exercise can strengthen your muscles and help prevent another injury   Do the following before you begin or return to regular exercise or sports training:  · Ask
Detail Level: Generalized
your healthcare provider about the activities you can do  Find out how long your ligament needs to heal  Do not do any physical activity until your healthcare provider says it is okay  If you start activity too soon, you may develop a more serious injury  · Always warm up and stretch  before exercise, sport, or physical activity  · Go slowly  Slowly increase how often and how long you exercise or train  Sudden increases in how often you train may cause you to overstretch or tear your ligament  · Use the right equipment  Always wear shoes that fit well and are made for the activity that you are doing  You may also use ankle supports, elbow and knee pads, or braces  Follow up with your doctor as directed:  Write down your questions so you remember to ask them during your visits  © Copyright [employer] 2022 Information is for End User's use only and may not be sold, redistributed or otherwise used for commercial purposes  All illustrations and images included in CareNotes® are the copyrighted property of [employer] A [employer] Inc  or [facility] [employer] [employer]   The above information is an  only  It is not intended as medical advice for individual conditions or treatments  Talk to your doctor, nurse or pharmacist before following any medical regimen to see if it is safe and effective for you 
Yes

## 2022-05-21 NOTE — ED ADULT NURSE NOTE - PSH
[facility]. [facility] PROGRESS NOTE      Patient: Kali Wen  Room #: 3F-15/505-W            YOB: 1959  Age: 61 y.o. Gender: female            Admit Date & Time: 2022 11:40 AM    Assessment:  Scott Merino is a 62 yo  and remarried mom. She reports is in hospital due to having a knee replacement and then shortly after, breaking her leg. She states she has had limited mobility for about two years. She has been admitted for 11 days and is struggling with being cooped up. She is hoping to be able to get outside tomorrow for some fresh air if that would be allowed. Patient tearfully shared that her son Vinod Diaz  46 in an industrial accident while working in [place]. She is not only still in the throes of grief over this tragic loss, but also dealing with settling his estate ( and the possibility of a lawsuit) long distance while trying to get back on her feet. Interventions:   provided empathetic listening and supportive presence. Offered patient opportunity to talk about Vinod Diaz and his personality and life. Presented Care notes on coping with death of adult child, coping with life situations, and cherishing memories of a loved one. Outcomes:  Patient appreciated the visit from . She is open to OP SC     Plan:    1. EEntered OP  consult.      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

## 2022-06-14 NOTE — ED PROVIDER NOTE - OBJECTIVE STATEMENT
Care Management Follow Up    Length of Stay (days): 7    Expected Discharge Date:       Concerns to be Addressed:       Patient plan of care discussed at interdisciplinary rounds: Yes    Anticipated Discharge Disposition: Home     Anticipated Discharge Services:    Anticipated Discharge DME:      Patient/family educated on Medicare website which has current facility and service quality ratings:    Education Provided on the Discharge Plan:    Patient/Family in Agreement with the Plan:      Referrals Placed by CM/GORDON:    Private pay costs discussed: Not applicable    Additional Information:    Per conversation with pt yesterday, 6/13/22, pt strongly prefers ARU at [facility] given that she goes to [facility] and finds their equipment beneficial.  Sw sent referrals to [facility] and will continue to follow for discharge planning needs.      Leah Lara, HAVENSW       67yo M history HTN DL DM anemia CHF JIMÉNEZ cirrhosis varices recently dc-d 4/11 for sepsis, had paracentesis removing 6L fluids, presenting with cough, shortness of breath, generalized weakness since yesterday getting progressively worse to today- difficulty getting out of bed 2/2 weakness. Tm 101 2hrs ago, didn't take anything PTA. No chest pain, nausea/vomiting/diarrhea, abdominal pain, worsening LE swelling, numbness/focal weakness, back pain. Cough non productive. +KIM, no orthopnea.

## 2022-07-14 NOTE — ED ADULT NURSE NOTE - NS ED NURSE LEVEL OF CONSCIOUSNESS ORIENTATION
M Health Beverly [email or web address]    LILLY Cash [facility] 7/14/2022    ______________________________________________________________________    Individual Treatment Plan    Patient's Name: Saira Shane  YOB: 2006    Date of Creation: 9/20/Oriented - self; Oriented - place; Oriented - time Supervisor, Timothy Eddy  [facility]  7/15/2022

## 2022-08-29 NOTE — CHART NOTE - NSCHARTNOTESELECT_GEN_ALL_CORE
post carlos procedure note [Spouse] : spouse [de-identified] : [facility] trial [de-identified] : 8/22/22

## 2022-09-01 NOTE — ED PROVIDER NOTE - PSH
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 It is also excreted in breast milk and should not be used if you are breast feeding.

## 2022-09-13 ENCOUNTER — APPOINTMENT (OUTPATIENT)
Dept: ORTHOPEDIC SURGERY | Facility: CLINIC | Age: 72
End: 2022-09-13

## 2022-09-13 DIAGNOSIS — S72.451D DISPLACED SUPRACONDYLAR FRACTURE W/OUT INTRACONDYLAR EXTENSION OF LOWER END OF RIGHT FEMUR, SUBSEQUENT ENCOUNTER FOR CLOSED FRACTURE WITH ROUTINE HEALING: ICD-10-CM

## 2022-09-13 PROCEDURE — 73560 X-RAY EXAM OF KNEE 1 OR 2: CPT | Mod: RT

## 2022-09-13 NOTE — ASSESSMENT
[FreeTextEntry1] :   72-year-old gentleman now 2 years status post open reduction internal fixation of a right periprosthetic supracondylar femur fracture which is healed for the most part 82° of alignment.  There is really no change in his hardware or his fracture alignment over time suggesting this fracture is healed.  His pain is only a 1 to 2/10.  We talked a little bit about correction of the deformity but I am not sure this is functionally going to improve his results.  Will have him check in with us in 6 months just to make sure he is doing okay.  Repeat radiographs only if he is having worsening symptoms.  All questions were answered to his satisfaction and he agrees with this plan.

## 2022-09-13 NOTE — HISTORY OF PRESENT ILLNESS
[de-identified] :  72-year-old gentleman returns for follow-up status post open reduction internal fixation of a right supracondylar femur fracture on July 9, 2021.  Patient is ambulating with a cane.  His insurance has continued to improve since his last visit in February.  He has mild discomfort still in the area of his fracture site and thigh.  Does not feel like he has had any worsening deformity.  Does not routinely require any pain medication.  Returns accompanied by his wife.  Limited to a couple blocks of walking with a cane.

## 2022-09-13 NOTE — IMAGING
[de-identified] :   Tamara late middle-aged gentleman sits and stands comfortably my office.  He walks with mild antalgia.  Slight varus alignment to his leg as he walks.\par \par Physical examination:\par Right knee:  Surgical incisions have healed.  No swelling erythema induration or fluctuance.  No focal tenderness palpation near the fracture site.  Knee motion 0-110 degrees.  No geniculate lymph nodes or masses.  No varus valgus instability.\par \par Radiographs:\par Right knee (AP, lateral):  Periprosthetic right supracondylar femur fracture.  Fracture alignment remains unchanged with 82° alignment.  Callus formation is noted posteriorly and medially which is unchanged from prior radiographs.  Fracture line is still seen to the callus.

## 2022-09-27 ENCOUNTER — TRANSCRIPTION ENCOUNTER (OUTPATIENT)
Age: 72
End: 2022-09-27

## 2022-10-06 ENCOUNTER — APPOINTMENT (OUTPATIENT)
Dept: SPEECH THERAPY | Facility: CLINIC | Age: 72
End: 2022-10-06

## 2022-10-06 ENCOUNTER — OUTPATIENT (OUTPATIENT)
Dept: OUTPATIENT SERVICES | Facility: HOSPITAL | Age: 72
LOS: 1 days | Discharge: HOME | End: 2022-10-06

## 2022-10-06 DIAGNOSIS — Z12.11 ENCOUNTER FOR SCREENING FOR MALIGNANT NEOPLASM OF COLON: Chronic | ICD-10-CM

## 2022-10-06 DIAGNOSIS — H91.23 SUDDEN IDIOPATHIC HEARING LOSS, BILATERAL: ICD-10-CM

## 2022-10-06 DIAGNOSIS — Z95.9 PRESENCE OF CARDIAC AND VASCULAR IMPLANT AND GRAFT, UNSPECIFIED: Chronic | ICD-10-CM

## 2022-10-06 DIAGNOSIS — Z94.9 TRANSPLANTED ORGAN AND TISSUE STATUS, UNSPECIFIED: Chronic | ICD-10-CM

## 2022-10-06 DIAGNOSIS — Z96.653 PRESENCE OF ARTIFICIAL KNEE JOINT, BILATERAL: Chronic | ICD-10-CM

## 2023-01-01 NOTE — PROGRESS NOTE ADULT - ASSESSMENT
influenza  ascites  ty with cirrhosis/varices    hold lasix  encourage po fluids  continue spirinolactone  continue tamilu  dc home  fu as outpt Statement Selected

## 2023-01-12 NOTE — ED PROVIDER NOTE - WR ORDER ID 3
[id number] Sarecycline Counseling: Patient advised regarding possible photosensitivity and discoloration of the teeth, skin, lips, tongue and gums.  Patient instructed to avoid sunlight, if possible.  When exposed to sunlight, patients should wear protective clothing, sunglasses, and sunscreen.  The patient was instructed to call the office immediately if the following severe adverse effects occur:  hearing changes, easy bruising/bleeding, severe headache, or vision changes.  The patient verbalized understanding of the proper use and possible adverse effects of sarecycline.  All of the patient's questions and concerns were addressed.

## 2023-02-09 NOTE — DISCHARGE NOTE ADULT - CARE PLAN
Female
Principal Discharge DX:	Dyspnea on exertion  Goal:	to be healthy  Assessment and plan of treatment:	continue all medication  home care

## 2023-02-17 ENCOUNTER — OUTPATIENT (OUTPATIENT)
Dept: OUTPATIENT SERVICES | Facility: HOSPITAL | Age: 73
LOS: 1 days | End: 2023-02-17

## 2023-02-17 ENCOUNTER — APPOINTMENT (OUTPATIENT)
Dept: SPEECH THERAPY | Facility: CLINIC | Age: 73
End: 2023-02-17

## 2023-02-17 DIAGNOSIS — H90.3 SENSORINEURAL HEARING LOSS, BILATERAL: ICD-10-CM

## 2023-02-17 DIAGNOSIS — Z96.653 PRESENCE OF ARTIFICIAL KNEE JOINT, BILATERAL: Chronic | ICD-10-CM

## 2023-02-17 DIAGNOSIS — Z94.9 TRANSPLANTED ORGAN AND TISSUE STATUS, UNSPECIFIED: Chronic | ICD-10-CM

## 2023-02-17 DIAGNOSIS — Z12.11 ENCOUNTER FOR SCREENING FOR MALIGNANT NEOPLASM OF COLON: Chronic | ICD-10-CM

## 2023-02-17 DIAGNOSIS — Z00.8 ENCOUNTER FOR OTHER GENERAL EXAMINATION: ICD-10-CM

## 2023-02-17 DIAGNOSIS — Z95.9 PRESENCE OF CARDIAC AND VASCULAR IMPLANT AND GRAFT, UNSPECIFIED: Chronic | ICD-10-CM

## 2023-03-06 NOTE — ED PROVIDER NOTE - TOBACCO USE
Never smoker
1. C/w current PO diet (consistent CHO diet) w/ regular texture as per SLP (consult appreciated)  2. Provide Glucerna TID (provides 220 kcal, 10 g protein/ shake) to optimize nutritional needs and promote wound healing  3. Encourage protein-rich foods, maximize food preferences. Provide meal encouragement and provide assistance w/ meals prn.   4. Closely monitor bowel movements; maintain adequate hydration and if no BM for >3 days, consider implementing bowel regimen.   5. Recommend to add MVI w/minerals, Vit C 500 mg BID, add Zinc Sulfate 220 mg x 10 days to promote wound healing.   6. Consider adding thiamine 100 mg daily 2/2 poor PO intake/ malnutrition   7. Maintain aspiration precautions, back of bed >45 degrees.   8. Confirm goals of care regarding nutrition support   RD will continue to monitor PO intake, labs, hydration, and wt prn.

## 2023-03-10 ENCOUNTER — OUTPATIENT (OUTPATIENT)
Dept: OUTPATIENT SERVICES | Facility: HOSPITAL | Age: 73
LOS: 1 days | End: 2023-03-10
Payer: SELF-PAY

## 2023-03-10 ENCOUNTER — APPOINTMENT (OUTPATIENT)
Dept: SPEECH THERAPY | Facility: CLINIC | Age: 73
End: 2023-03-10

## 2023-03-10 DIAGNOSIS — Z96.653 PRESENCE OF ARTIFICIAL KNEE JOINT, BILATERAL: Chronic | ICD-10-CM

## 2023-03-10 DIAGNOSIS — Z95.9 PRESENCE OF CARDIAC AND VASCULAR IMPLANT AND GRAFT, UNSPECIFIED: Chronic | ICD-10-CM

## 2023-03-10 DIAGNOSIS — H90.3 SENSORINEURAL HEARING LOSS, BILATERAL: ICD-10-CM

## 2023-03-10 DIAGNOSIS — Z12.11 ENCOUNTER FOR SCREENING FOR MALIGNANT NEOPLASM OF COLON: Chronic | ICD-10-CM

## 2023-03-10 DIAGNOSIS — Z94.9 TRANSPLANTED ORGAN AND TISSUE STATUS, UNSPECIFIED: Chronic | ICD-10-CM

## 2023-03-10 PROCEDURE — V5014: CPT

## 2023-03-10 PROCEDURE — 92590: CPT

## 2023-03-16 ENCOUNTER — APPOINTMENT (OUTPATIENT)
Dept: ORTHOPEDIC SURGERY | Facility: CLINIC | Age: 73
End: 2023-03-16

## 2023-05-01 NOTE — DISCHARGE NOTE NURSING/CASE MANAGEMENT/SOCIAL WORK - NSFLUVACAGEDISCH_IMM_ALL_CORE
Pediatric H&P Note    History obtained from: Patient and mother   Live video/phone  service used? No    History Of Present Illness:    Paige is a 9 year old female hx of mild, persistent asthma presenting with acute onset chest pain and shortness of breath in the setting of recent viral URI. Last week patient had a \"head cold\" with headache, congestion and rhinorrhea. During this time she had some increased work of breathing requiring albuterol 4 puffs 3-4 times a day without significant improvement. On Friday (4/28) patient spent some time outside which is when she began experiencing intermittent chest pain and shortness of breath which has continued to today. Mom reports treating her with Duonebs overnight from Saturday to Sunday without significant improvement. She also tried giving the patient Nyquil Sunday night which did not help. Patient continued to take albuterol 4 puffs q6 during this time without significant relief from symptoms. Beginning on Saturday patient also reports cough a/w \"phlegm\" that is bright green. Following between 3-6 coughing fits patient reports vomiting bright green fluid with streaks of blood. Per patient this occurs when she tastes the mucus and becomes nauseated, states that it is a small amount of vomit and she is mostly \"retching/gagging.\" She is not nauseous between episodes and has been tolerating PO fluids.    Patient reports no fever, chills, diarrhea.    Of note patient was discharged on 1/24, for asthma exacerbation in context of viral infection/allergies. Patient was hospitalized on the 23rd and was spaced from q2h to q4h treatments. Patient was discharged on 5 day course of oral steroids. She did not require ICU stay or intubation.     Patient reports using albuterol as maintenance and Flovent for exacerbations. Per mom patients asthma is triggered by URI, allergies, seasons/weather. Family has 2 dogs, patient does sneeze when playing with them, they do not sleep  in her room. She reports no carpets or curtains. Downstairs neighbor is a smoker. Do not follow with pulmonology at this time.    ED Course:  Patient was [facility] [place]"" as a rapid response d/t chest pain and difficulty breathing. Given duonebs and albuterol inhaler 4 puffs. Received 1 dose of decadron. Patient also received 1 dose of Tylenol 650 mg and 1 dose of ibuprofen 600 mg. EKG reassuring, NSR. Viral quad screen negative.     Floor Course:  Patient was admitted to general medical floor. Patient tolerating RA well. Will continue to closely monitor respiratory status and administer albuterol q 2.     Past Medical History   has a past medical history of Asthma.  Food and environmental allergies (refer below for details)    Birth History  No birth history on file.     Surgical History   has no past surgical history on file.     Social History  Lives at home with mom and dad   Pets: 2 dogs   Smoke exposure: Smoke from [place] apartment  Recent travel: Denies   Sick contacts: Denies    Family History  Asthma: maternal uncle, grandparents     Allergies  ALLERGIES:  Peanut   (food or med), Soy allergy   (food or med), and Wheat   (food or med)    Medications  Medications Prior to Admission   Medication Sig Dispense Refill    albuterol 108 (90 Base) MCG/ACT inhaler Inhale 4 puffs into the lungs every 4 hours. 1 each 12    acetaminophen (TYLENOL) 160 MG/5ML solution Take 30 mLs by mouth every 6 hours as needed for Fever.      ibuprofen (CHILDRENS ADVIL) 100 MG/5ML suspension Take 10 mLs by mouth every 6 hours as needed for Fever.      diphenhydrAMINE-Phenylephrine (Benadryl Allergy Childrens) 12.5-5 MG/5ML Solution Take 5 mLs by mouth daily as needed (allergies).      fluticasone propionate (FLOVENT HFA) 110 MCG/ACT inhaler Inhale 1 puff into the lungs in the morning and 1 puff in the evening. 1 each 0    Loratadine 10 MG Chew Tab Chew 10 mg by mouth daily as needed for Allergies.          Immunizations  UTD with immunizations     Outpatient Pediatrician  Pcp, No      ROS  Review of Systems   Constitutional: Negative for fever.   HENT: Positive for congestion, postnasal drip, rhinorrhea, sinus pressure and sore throat.    Respiratory: Positive for cough, chest tightness, shortness of breath and wheezing.         Cough productive of green sputum   Cardiovascular: Positive for chest pain. Negative for palpitations.   Gastrointestinal: Positive for nausea and vomiting. Negative for abdominal pain, constipation and diarrhea.        Green vomit with streak of blood following coughing attacks (likely sputum), described as very low volume mostly retching/gagging   Musculoskeletal: Positive for myalgias.        Physical Exam  Visit Vitals  BP (!) 127/75 (BP Location: RUE - Right upper extremity, Patient Position: Semi-Watson's)   Pulse 120   Temp 97.5 °F (36.4 °C) (Oral)   Resp 20   Ht 4' 9.48\" (1.46 m)   Wt (!) 65.1 kg (143 lb 8.3 oz)   SpO2 96%   BMI 30.54 kg/m²        No intake or output data in the 24 hours ending 05/01/23 1627      Physical Exam  Constitutional:       General: She is not in acute distress.     Appearance: Normal appearance. She is not toxic-appearing.   HENT:      Head: Normocephalic and atraumatic.      Right Ear: External ear normal.      Left Ear: External ear normal.      Nose: Congestion and rhinorrhea present.      Mouth/Throat:      Mouth: Mucous membranes are moist.      Pharynx: Oropharynx is clear. No oropharyngeal exudate or posterior oropharyngeal erythema.      Neck: Normal range of motion.   Eyes:      Conjunctiva/sclera: Conjunctivae normal.      Pupils: Pupils are equal, round, and reactive to light.   Cardiovascular:      Rate and Rhythm: Normal rate and regular rhythm.      Pulses: Normal pulses.      Heart sounds: Normal heart sounds. No murmur heard.  Pulmonary:      Effort: No respiratory distress, nasal flaring or retractions.      Breath sounds: Wheezing  present.   Abdominal:      General: Abdomen is flat. Bowel sounds are normal. There is no distension.      Palpations: There is no mass.      Tenderness: There is no abdominal tenderness. There is no guarding.   Musculoskeletal:         General: Normal range of motion.   Skin:     General: Skin is warm and dry.      Capillary Refill: Capillary refill takes less than 2 seconds.      Coloration: Skin is not cyanotic or pale.      Findings: No rash.   Neurological:      General: No focal deficit present.      Mental Status: She is alert.   Psychiatric:         Mood and Affect: Mood normal.         Behavior: Behavior normal.            LABORATORY DATA:    Admission on 05/01/2023   Component Date Value Ref Range Status    Ventricular Rate EKG/Min (BPM) 05/01/2023 123   Preliminary    Atrial Rate (BPM) 05/01/2023 123   Preliminary    VT-Interval (MSEC) 05/01/2023 110   Preliminary    QRS-Interval (MSEC) 05/01/2023 68   Preliminary    QT-Interval (MSEC) 05/01/2023 308   Preliminary    QTc 05/01/2023 440   Preliminary    P Axis (Degrees) 05/01/2023 40   Preliminary    R Axis (Degrees) 05/01/2023 46   Preliminary    T Axis (Degrees) 05/01/2023 35   Preliminary    REPORT TEXT 05/01/2023    Preliminary                    Value: Pediatric ECG analysis   Normal sinus rhythm  Nonspecific T wave abnormality  No previous ECGs available      Rapid SARS-COV-2 by PCR 05/01/2023 Not Detected  Not Detected / Detected / Presumptive Positive / Inhibitors present Final    Influenza A by PCR 05/01/2023 Not Detected  Not Detected Final    Influenza B by PCR 05/01/2023 Not Detected  Not Detected Final    RSV BY PCR 05/01/2023 Not Detected  Not Detected Final    Isolation Guidelines 05/01/2023    Final    Do not use this test result as the sole decision-maker for discontinuation of isolation.   Clinical evaluation should be considered for other respiratory illness requiring transmission-based isolation.    -    No fever (<99.0 F/37.2 C) for at  least 24 hours without the use of fever-reducing medications    AND  -    Respiratory symptoms have improved or resolved (e.g. cough, shortness of breath)     AND  -    COVID-19 negative test    See COVID-19 Deisolation Resource Guide    Procedural Comment 05/01/2023    Final    SARS-COV-2 nucleic acid has not been detected indicating the absence of COVID-19.    This test was performed using the [employer] Xpert Xpress SARS-CoV-2/Flu/RSV RT-PCR test that has been given Emergency Use Authorization (EUA) by the [place] Food and Drug Administration (FDA). These tests are considered definitive and do not need to be confirmed by another method.         IMAGING STUDIES:    No orders to display        ASSESSMENT:  Principal Problem:    Asthma with acute exacerbation    9 year old female hx of mild persistent asthma presenting with acute onset chest pain and shortness of breath in the setting of recent viral URI likely asthma exacerbation. Patient is currently stable on room air, saturating well. Will plan to start albuterol treatments q2h and space as tolerated. At this time patient meets criteria for inpatient hospitalization.    Active Problems:  1.) asthma exacerbation  2.) URI    PLAN:  Resp:  -Stable on room air   -q2h albuterol treatments space as tolerated per BDP protocol   -s/p decadron 5/1. Patient will likely need flovent daily for discharge.   -AAP to be done prior to discharge  -follow up with pulmonology as outpatient     CV:  - Monitor HR and BP     FEN/GI:  - General ped diet    ID:  - viral quad screen negative     Lines:  no line   Dispo: Pending clinical course    Patient and plan discussed with family, nursing staff, resident physician and attending physician    Tonya Burgess, MS3  5/1/2023       Javier DaS ilva DO  PGY1  5/1/23    I have seen and examined the patient independently. Agree with the note by Dr. Da Silva and student doctor Jenifer. Patient's chart, labs, and imaging were reviewed.   In  Paige stevens is a 8 yo F with PMHx of mild persistent asthma who is presenting for chest pain associated with respiratory distress in the setting of viral URI. Symptoms did not improve with home albuterol. Patient also had multiple episodes of post-tissuve NBNB emesis, yet reports good appetite and PO intake overall. Patient has history of hospital admission in january for similar concerns. Patient was prescribed with flovent, albuterol, and steroid yet was not taking controller medicine at home. Was not aware of recommendations to follow up with pulmonary clinic. Triggers include season changes and viral URI. Does have some sneezing after playing with her dog, but denies asthma exacerbation associated with dogs. In the ED, patient receivd two hour-long albuterol and decadron. Tolerated q2 albuterol before admission to the floor. Patient is in stable condition on RA without respiratory distress. Diffuse expiratory wheezing and prolonged expiratory phase noted without retraction. Aerating well. Eating and drinking well without nausea/vomiting. Plan is continue weaning albuterol therapy as tolerated while closely monitoring her respiratory status.   Lona Green DO  PGY-3 Pediatric Resident, [facility]      Mild persisetnt asthma with an acute exacerbation, AR.  Rescue: Albuterol HFA/MDI 4 puffs q4-6 hours as needed and 20 mins prior to activity.  Controller: Flovent 44 mcg 2 puffs BID.   Equipment Education: Use with spacer and mask.  Asthma Education: Significant time spent with family reviewing diagnosis, treatment and prognosis. Discussed labelling meds to prevent confusion.  Asthma Action Plan: To be filled and reviewed.  Allergic Rhinitis: Claritin/Zyrtec daily as needed; Flonase nasal spray 1 spray each nostril daily.  Finish 5 day course of steroids.    I have personally interviewed, seen and examined the patient, discussed with resident and agree with the findings and plan as documented. Counseled the patient and  family  in regards to next steps. Plan was discussed with the team in detail.    Tomasz Cameron MD  5/2/2023 1:48 PM     Adult

## 2023-09-29 NOTE — DISCHARGE NOTE PROVIDER - HOSPITAL COURSE
The patient is a 69 year-old male with a PMH of AFIB on Coumadin, HTN, hyperlipidemia, CAD s/p 2 stents, BPH, DM type II, JIMÉNEZ cirrhosis complicated by ascites with multiple admissions for AMS from encephalopathy, (follows up at [place] and pending liver transplant), who presented to the ED with worsening SOB and LE swelling. Per the patient he was recently advised not to take his water pills given abnormalities in his renal function. He started to get shortness of breath and it progressively worsened. It was associated with increased lower extremity swelling. His symptoms worsened with physical activity especially when climbing stairs. He denied any history of fever, chills, nausea, vomiting, diarrhea, constipation, melena, pain in abdomen, chest pain, cough, increased urinary frequency, dysuria, headache, lightheadedness, dizziness, vertigo, localized weakness or numbness/tingling. In the ED he was given IV Lasix which was continued for 3 days and then transitioned to PO. An abdominal ultrasound showed moderate ascites. The patient symptomatically improved over the course of his admission, able to walk to the hallway without being very short of breath. Per GI, no paracentesis was indicated given his improvement. Due to supratherapeutic INR, his Coumadin was held temporarily. His labwork and imaging were faxed at his request to [facility]. The patient was stable for discharge on 10/4/19 to follow up outpatient with Dr. Durant and with [facility] for eventual liver transplant. The patient is a 69 year-old male with a PMH of AFIB on Coumadin, HTN, hyperlipidemia, CAD s/p 2 stents, BPH, DM type II, JIMÉNEZ cirrhosis complicated by ascites with multiple admissions for AMS from encephalopathy, (follows up at [facility] and pending liver transplant), who presented to the ED with worsening SOB and LE swelling. Per the patient he was recently advised not to take his water pills given abnormalities in his renal function. He started to get shortness of breath and it progressively worsened. It was associated with increased lower extremity swelling. His symptoms worsened with physical activity especially when climbing stairs. He denied any history of fever, chills, nausea, vomiting, diarrhea, constipation, melena, pain in abdomen, chest pain, cough, increased urinary frequency, dysuria, headache, lightheadedness, dizziness, vertigo, localized weakness or numbness/tingling. In the ED he was given IV Lasix which was continued for 3 days and then transitioned to PO. An abdominal ultrasound showed moderate ascites. The patient symptomatically improved over the course of his admission, able to walk to the hallway without being very short of breath. Per GI, no paracentesis was indicated given his improvement. Due to supratherapeutic INR, his Coumadin was held temporarily. He was transfused 1 unit of pRBCs for anemia just below transfusing threshold given his history of need for transfusions. His labwork and imaging were faxed at his request to [facility]. The patient was stable for discharge on 10/4/19 to follow up outpatient with Dr. Durant, the Coumadin Clinic at the [facility] for monitoring of the levels, and with [facility] for eventual liver transplant. The patient is a 69 year-old male with a PMH of AFIB on Coumadin, HTN, hyperlipidemia, CAD s/p 2 stents, BPH, DM type II, JIMÉNEZ cirrhosis complicated by ascites with multiple admissions for AMS from encephalopathy, (follows up at [place] and pending liver transplant), who presented to the ED with worsening SOB and LE swelling. Per the patient he was recently advised not to take his water pills given abnormalities in his renal function. He started to get shortness of breath and it progressively worsened. It was associated with increased lower extremity swelling. His symptoms worsened with physical activity especially when climbing stairs. He denied any history of fever, chills, nausea, vomiting, diarrhea, constipation, melena, pain in abdomen, chest pain, cough, increased urinary frequency, dysuria, headache, lightheadedness, dizziness, vertigo, localized weakness or numbness/tingling. In the ED he was given IV Lasix which was continued for 3 days and then transitioned to PO. An abdominal ultrasound showed moderate ascites. The patient symptomatically improved over the course of his admission, able to walk to the hallway without dyspnea. Per GI, no paracentesis was indicated given his improvement. Due to supratherapeutic INR, his Coumadin was held temporarily. He was transfused 1 unit of pRBCs for acute on chronic anemia (hemoglobin 6.9). His labwork and imaging were faxed at his request to [facility]. The patient was stable for discharge on 10/4/19 to follow up outpatient with Dr. Durant, the Coumadin Clinic at the [facility] for monitoring of the levels, and with [facility] for eventual liver transplant. Family

## 2023-10-12 NOTE — H&P PST ADULT - AS BP NONINV METHOD
auscultated w/doppler Olumiant Pregnancy And Lactation Text: Based on animal studies, Olumiant may cause embryo-fetal harm when administered to pregnant women.  The medication should not be used in pregnancy.  Breastfeeding is not recommended during treatment.

## 2023-10-25 ENCOUNTER — APPOINTMENT (OUTPATIENT)
Dept: SPEECH THERAPY | Facility: CLINIC | Age: 73
End: 2023-10-25

## 2023-10-31 NOTE — PHYSICAL THERAPY INITIAL EVALUATION ADULT - IMPAIRMENTS CONTRIBUTING TO GAIT DEVIATIONS, PT EVAL
Patient Quality Outreach    Patient is due for the following:   Depression  -  PHQ-9 needed    Next Steps:   No follow up needed at this time.    Type of outreach:    Sent [employer] message.      Questions for provider review:    None           Yoselin Vegas [facility]  Chart routed to Care Team.       decreased flexibility/decreased strength

## 2023-11-22 NOTE — OCCUPATIONAL THERAPY INITIAL EVALUATION ADULT - PLANNED THERAPY INTERVENTIONS, OT EVAL
Anesthesia Type: 1% lidocaine with epinephrine ADL retraining/balance training/bed mobility training/ROM/strengthening/stretching/transfer training

## 2023-12-20 NOTE — REASON FOR VISIT
short bursts/sustained/swallows [Follow-Up - Clinic] : a clinic follow-up of [Atrial Fibrillation] : atrial fibrillation [Heart Failure] : congestive heart failure [Cardiac Failure] : cardiac failure [Arrhythmia/ECG Abnorrmalities] : arrhythmia/ECG abnormalities [Hyperlipidemia] : hyperlipidemia [Hypertension] : hypertension [Coronary Artery Disease] : coronary artery disease

## 2024-04-03 ENCOUNTER — APPOINTMENT (OUTPATIENT)
Dept: SPEECH THERAPY | Facility: CLINIC | Age: 74
End: 2024-04-03

## 2024-09-18 ENCOUNTER — APPOINTMENT (OUTPATIENT)
Dept: SPEECH THERAPY | Facility: CLINIC | Age: 74
End: 2024-09-18

## 2024-09-18 ENCOUNTER — OUTPATIENT (OUTPATIENT)
Dept: OUTPATIENT SERVICES | Facility: HOSPITAL | Age: 74
LOS: 1 days | End: 2024-09-18
Payer: MEDICARE

## 2024-09-18 ENCOUNTER — OUTPATIENT (OUTPATIENT)
Dept: OUTPATIENT SERVICES | Facility: HOSPITAL | Age: 74
LOS: 1 days | End: 2024-09-18

## 2024-09-18 DIAGNOSIS — Z12.11 ENCOUNTER FOR SCREENING FOR MALIGNANT NEOPLASM OF COLON: Chronic | ICD-10-CM

## 2024-09-18 DIAGNOSIS — Z95.9 PRESENCE OF CARDIAC AND VASCULAR IMPLANT AND GRAFT, UNSPECIFIED: Chronic | ICD-10-CM

## 2024-09-18 DIAGNOSIS — H90.3 SENSORINEURAL HEARING LOSS, BILATERAL: ICD-10-CM

## 2024-09-18 DIAGNOSIS — Z94.9 TRANSPLANTED ORGAN AND TISSUE STATUS, UNSPECIFIED: Chronic | ICD-10-CM

## 2024-09-18 DIAGNOSIS — H90.8 MIXED CONDUCTIVE AND SENSORINEURAL HEARING LOSS, UNSPECIFIED: ICD-10-CM

## 2024-09-18 PROCEDURE — 92557 COMPREHENSIVE HEARING TEST: CPT

## 2024-09-18 PROCEDURE — 92550 TYMPANOMETRY & REFLEX THRESH: CPT

## 2024-10-01 ENCOUNTER — APPOINTMENT (OUTPATIENT)
Dept: SPEECH THERAPY | Facility: CLINIC | Age: 74
End: 2024-10-01

## 2024-10-01 ENCOUNTER — OUTPATIENT (OUTPATIENT)
Dept: OUTPATIENT SERVICES | Facility: HOSPITAL | Age: 74
LOS: 1 days | End: 2024-10-01
Payer: SELF-PAY

## 2024-10-01 DIAGNOSIS — Z95.9 PRESENCE OF CARDIAC AND VASCULAR IMPLANT AND GRAFT, UNSPECIFIED: Chronic | ICD-10-CM

## 2024-10-01 DIAGNOSIS — Z12.11 ENCOUNTER FOR SCREENING FOR MALIGNANT NEOPLASM OF COLON: Chronic | ICD-10-CM

## 2024-10-01 DIAGNOSIS — H90.3 SENSORINEURAL HEARING LOSS, BILATERAL: ICD-10-CM

## 2024-10-01 DIAGNOSIS — Z96.653 PRESENCE OF ARTIFICIAL KNEE JOINT, BILATERAL: Chronic | ICD-10-CM

## 2024-10-01 PROCEDURE — V5160: CPT

## 2024-10-01 PROCEDURE — V5261: CPT

## 2024-10-16 NOTE — ED ADULT TRIAGE NOTE - PRO INTERPRETER NEED 2
Pt tearful reports ran out of hydrocodone 4 days ago after an injury at work. C/O low back and L inguinal pain       Triage Assessment (Adult)       Row Name 10/16/24 1223          Triage Assessment    Airway WDL WDL        Respiratory WDL    Respiratory WDL WDL        Skin Circulation/Temperature WDL    Skin Circulation/Temperature WDL WDL        Cardiac WDL    Cardiac WDL WDL        Peripheral/Neurovascular WDL    Peripheral Neurovascular WDL WDL        Cognitive/Neuro/Behavioral WDL    Cognitive/Neuro/Behavioral WDL WDL                     
English

## 2024-10-22 ENCOUNTER — OUTPATIENT (OUTPATIENT)
Dept: OUTPATIENT SERVICES | Facility: HOSPITAL | Age: 74
LOS: 1 days | End: 2024-10-22

## 2024-10-22 ENCOUNTER — APPOINTMENT (OUTPATIENT)
Dept: SPEECH THERAPY | Facility: CLINIC | Age: 74
End: 2024-10-22

## 2024-10-22 DIAGNOSIS — Z96.653 PRESENCE OF ARTIFICIAL KNEE JOINT, BILATERAL: Chronic | ICD-10-CM

## 2024-10-22 DIAGNOSIS — H90.3 SENSORINEURAL HEARING LOSS, BILATERAL: ICD-10-CM

## 2024-10-22 DIAGNOSIS — Z94.9 TRANSPLANTED ORGAN AND TISSUE STATUS, UNSPECIFIED: Chronic | ICD-10-CM

## 2024-10-22 DIAGNOSIS — Z95.9 PRESENCE OF CARDIAC AND VASCULAR IMPLANT AND GRAFT, UNSPECIFIED: Chronic | ICD-10-CM

## 2024-10-22 DIAGNOSIS — Z12.11 ENCOUNTER FOR SCREENING FOR MALIGNANT NEOPLASM OF COLON: Chronic | ICD-10-CM

## 2024-10-27 NOTE — PATIENT PROFILE ADULT - DO YOU FEEL UNSAFE AT HOME, WORK, OR SCHOOL?
Video-Visit Details    Type of service:  Video Visit  Video Start Time: 0725  Video End Time: 0736  Originating Location (pt. Location): Home, [place]  Distant Location (provider location):  Home  Platform used for Video Visit: Rochelle Wallace MD      HISTORY OF PRESENT ILLNESS  Jody Ch is a 82 year old female with h/o breast cancer, chronic cough, pharyngeal dysphagia, MDD who is here for FU s/p hemithyroidectomy for Afirma suspicious thyroid nodule.    Interval History  Had UTI after surgery, resolved  No changes in voice after surgery, improve with breathing exercise  Patho benign  No problem swallowing/ breathing.    Initial visit  Pt has breast cancer and had a scan and incidentally found of thyroid nodule.  FNA 2018.  Pt moved and re-established  care with PCP, got US, nodule has grown and got a repeat FNA.  New hoarsness for >1 year, comes and goes  Choking with swallowing, got test at [facility]. Swallowing problem with liquid, and solid food. Eg. Hard boil egg, feeling like food stuck and need to drink water to follow  No changes in breathing    Brother s/p thyroid surgery  Cousin with thyroid problems    Currently breast cancer, 5 years in remission    REVIEW OF SYSTEMS  10 point negative except as mentioned in HPI    Past Medical/Surgical History:  Past Medical History:   Diagnosis Date    Anxiety state 01/31/2017    Arthritis     Atrophic vaginitis 05/08/2014    Overview:  UPDATE: Followed-up with Dr. Cassie Arteaga on 5-8-14. Noted that patient did not want to pay for the estrogen cream. Impression female stress incontinence and atrophic vaginitis.    Bilateral leg paresthesia 05/13/2014    Overview:  US FRANCISCO W EXERCISE BILATERAL 5-14-14: IMPRESSION: RIGHT LOWER EXTREMITY: FRANCISCO at rest is normal with a normal response to exercise. These findings would not be consistent with symptoms of arterial claudication. LEFT LOWER EXTREMITY: FRANCISCO at rest is normal with a normal response to exercise.  These findings would not be consistent with symptoms of arterial claudication. US VENOUS LOWER EXTREM    Bone pain 07/24/2018    Breast cancer (H)     Breast cancer, stage 2, right (H) 05/16/2018    Overview:  Added automatically from request for surgery [id number]    Chronic cough 02/12/2018    Overview:  XR CHEST 2 VIEWS PA AND LATERAL 12-4-17: Normal heart size and pulmonary vascularity. Tiny granulomas with some fibrosis in the right lung base. The left lung is clear. Slight deviation of the upper trachea from left to right presumably due to thyroid enlargement stable. No change from previous. No acute process is identified. No focal pulmonary infiltrates.    Chronic diarrhea 05/12/2017    Overview:  UPDATE: COLONOSCOPY DIAGNOSTIC 7-25-17: Aphthous ulcer of ileum. Diverticulosis of colon without diverticulitis. Pathology Results: NEOTERMINAL ILEUM,  BIOPSY: Nonspecific chronic active ileitis. No dysplasia or malignancy. COLON, RANDOM, BIOPSY: Normal colonic mucosa STOOL TESTS on 5-12-17 for culture, Clostridium dificile toxin, WBC, Giardia antigen, Campylobacter, Shiga toxin, Stool f    Chronic pain of right knee 10/23/2017    Overview:  XR KNEE 3 VIEWS RIGHT 10-23-17: Negative knee. No fracture or dislocation. No joint effusion.    Chronic rhinitis 06/08/2017    Chronic right hip pain 10/23/2017    Overview:  XR HIP 2 OR 3 VIEWS W PELVIS RIGHT 10-23-17: Arthritic change right hip. Pelvis otherwise negative.    Decreased range of motion of right shoulder 10/08/2018    Depressive disorder     Diarrhea 07/24/2018    Difficult or painful urination 12/08/2009    Overview:  UPDATE: Followed-up with Dr. Cassie Arteaga on 5-8-14. Noted that patient did not want to pay for the estrogen cream. Impression female stress incontinence and atrophic vaginitis. Exacerbation of her dysuria symptoms. She has not used her estrace vaginal cream due to cost noted 9-10-13. Urinalysis negative on 9-10-13. Urinalysis and urine microscopy  showed some signs of urinary tract infecti    Drug-induced nausea and vomiting 07/17/2018    Dry mouth 05/24/2016    Gallbladder polyp 04/14/2012    Overview:  UPDATE:US ABDOMEN LIMITED RUQ 4-18-16: 5 mm stone versus polyp in the gallbladder, decreased in size since comparison study where measured 7 mm. The gallbladder is contracted despite being NPO, which limits evaluation. Benign parapelvic cyst in the lower pole of the right kidney measuring 2.8 cm, is unchanged. US ABDOMEN LIMITED RUQ on 2-3-14:  Stable appearance of a 8 mm cholesterol tristin    Ganglion cyst of flexor tendon sheath of finger of right hand 05/24/2016    History of breast cancer 11/21/2006    Overview:  UPDATE: XR MAMMO UNI SCREEN FFDM RIGHT 4-14-14: ACR 1 Negative. Followed-up with oncologist Dr. Phyllis Colon on 4-29-14. Stable.    XR MAMMO UNI SCREEN FFDM RIGHT: 4-4-12, 4-5-13: ACR 2 Benign Finding. Followed-up with Dr. Phyllis Colon 5-23-13. CBC and CMP normal except for low glucose of 55 . CA 27-29 normal.   Followed-up with Dr. Phyllis Colon 11-16-12. CBC and CMP normal exce    Hypercholesteremia 12/08/2014    Overview:  UPDATE: Rx atorvastatin (Lipitor) 1-22-15. She sent medical message on 3-18-15 requesting to take atorvastatin (Lipitor) 20 mg tablet every other day. This would not work for atorvastatin (Lipitor) . I advised to take half tablet (10 mg) daily rather than taking one tablet every other day. ASCVD Risk  10 year risk 7.9 % calculated on 12/8/2014.  Recommendation moderate to high -    IC (interstitial cystitis) 12/02/2010    Overview:  UPDATE: UPDATE: Followed-up with Dr. Cassie Arteaga on 5-8-14. Noted that patient did not want to pay for the estrogen cream. Impression female stress incontinence and atrophic vaginitis. Exacerbation of her dysuria symptoms. She has not used her estrace vaginal cream due to cost noted 9-10-13. Urinalysis negative on 9-10-13. Urinalysis and urine microscopy showed some signs of urinary  tract    Ileitis 07/25/2017    Overview:  COLONOSCOPY DIAGNOSTIC 7-25-17: Aphthous ulcer of ileum. Diverticulosis of colon without diverticulitis. Pathology Results: NEOTERMINAL ILEUM,  BIOPSY: Nonspecific chronic active ileitis. No dysplasia or malignancy. COLON, RANDOM, BIOPSY: Normal colonic mucosa . Advised to follow-up with [facility] regarding chronic active ileitis.    Impaired fasting glucose 12/01/2006    Insomnia secondary to depression with anxiety 01/31/2017    Left leg swelling 05/13/2014    Overview:  US VENOUS LOWER EXTREMITY LEFT 5-14-14: Left leg veins are negative for DVT.    Left thyroid nodule 04/30/2018    Lymphedema of extremity 10/08/2018    Major depression, recurrent, full remission (H) 01/31/2017    Overview:  Inpatient treatment for depression after divorce in 1983. Major depression diagnosed due to financial worries diagnosed 11-23-10 by oncologist, Louise Burns. Citalopram 20 mg daily started. Citalopram discontinued on 5-19-11 per patient request.    Malignant neoplasm of upper-inner quadrant of right breast in female, estrogen receptor negative (H) 10/08/2018    Mixed stress and urge urinary incontinence 07/24/2006    Overview:  UPDATE: Followed-up with Dr. Cassie Arteaga on 5-8-14. Noted that patient did not want to pay for the estrogen cream. Impression female stress incontinence and atrophic vaginitis.  Exacerbation of her dysuria symptoms. She has not used her estrace vaginal cream due to cost noted 9-10-13. Urinalysis negative on 9-10-13. Urinalysis and urine microscopy showed some signs of urinary tract infect    Nasal congestion 06/05/2014    Osteopenia 11/01/2006    Overview:  UPDATE:  XR DXA BONE DENSITY 2 SITES AXIAL 5/16/2018: Osteopenia. Lumbar Spine L1-L4 T-score -1.8 . Mean Bilateral Femoral Neck T-score -1.4 . FRAX Results: 10-year probability of major osteoporotic fracture is 10.8%, and of hip fracture is 2%, based on left femoral neck BMD. Basic bone health recommended.  XR  DXA BONE DENSITY 2 SITES AXIAL 4-18-16: T score AP spine -2.1, Left femoral ne    Other emphysema (H) 04/30/2018    Other specified disorders of nose and nasal sinuses 07/24/2018    Peripheral axonal neuropathy 11/22/2011    Overview:  UPDATE:  She thinks gabapentin (Neurontin) has been helpful without side effect of leg swelling discussed on 11-20-14. She agreed to increase dose to 300 mg at bedtime. She called on 10-30-14 requesting for gabapentin (Neurontin) as nortriptyline not helpful. Reminded her that she complained of leg swelling with gabapentin (Neurontin) in the past.  EMG 5-19-14: Borderline abnormal EMG du    Personal history of tobacco use, presenting hazards to health 10/22/2018    Overview:  20 pack years    Pharyngeal dysphagia 12/08/2009    Recurrent UTI 09/12/2013    Overview:  UPDATE: Followed-up with Dr. Cassie Arteaga on 5-8-14. Noted that patient did not want to pay for the estrogen cream. Impression female stress incontinence and atrophic vaginitis.  Exacerbation of her dysuria symptoms. She has not used her estrace vaginal cream due to cost noted 9-10-13. Urinalysis negative on 9-10-13. Urinalysis and urine microscopy showed some signs of urinary tract infect    Salivation excessive 12/17/2008    Overview:  may be due to postnasal drip causing excessive salivation as she tends to swallow the postnasal drainage.She is not candidate for tricyclic antidepressant like nortriptyline (can cause dry mouth) to lessen salivary secretions as this would excerbate her dry lips.    SOB (shortness of breath) 05/13/2014    Overview:  XR CHEST 2 VIEWS PA AND LATERAL 5-13-14: Impression: On the lateral view, a small patchy opacity is again visualized and has a few linear markings. This may be chronic, it is suggested on the prior exam slightly more prominent but this is probably due to technique, could represent chronic areas of subsegmental volume loss or previous consolidation. It is not well visualized on the  JEFF jacobo    Tremor, essential 11/22/2011    Overview:  Neurology consult Dr. Luther Armenta on 3-26-12. Impression essential tremor. MRI brain. Neurontin 100 mg bid started to help tremors and bilateral lower extremity paresthesia thought due to impaired FG or prediabetes. Normal EMG of lower extremity on 4-11-12 but compound motor action potentials low which can be seen in early axonal neuropathy. Developed swelling with gabapentin (Neurontin    Vitamin D insufficiency 12/03/2012    Overview:  Vitamin D was low at 28.1 on 12-3-12. Take vitamin D 3000 units daily for 8 weeks, then take vitamin D 2000 units indefinitely. Vitamin D was normal at 40.8 on 3-6-13. Continue vitamin D 2000 units indefinitely.    Vitreous degeneration 10/22/2018    Overview:  Right eye     Past Surgical History:   Procedure Laterality Date    ABDOMEN SURGERY      APPENDECTOMY      BLEPHAROPLASTY Bilateral 11/07/2013    EXCISE NODULE THYROID Left 7/24/2024    Procedure: Left thyroid lobectomy and isthmusectomy, Vascular Port Removal;  Surgeon: Eryn Jose MD;  [place] [facility] [place]    FINGER GANGLION CYST EXCISION Right 06/23/2016    right ring finger    LUMPECTOMY BREAST  11/21/2006    Infiltrating ductal carcinoma, micropapillary variant, Janey    MASTECTOMY MODIFIED RADICAL Left 12/06/2006    OTHER SURGICAL HISTORY Right 11/07/2013    OK REMOVE EYELID LESN (NOT CHALAZION)    OTHER SURGICAL HISTORY Right 05/18/2018    US BIOPSY BREAST NEEDLE W BIBIANA W GUIDE RIGHT    OTHER SURGICAL HISTORY Right 06/01/2018    RIGHT SIMPLE MASTECTOMY WITH RIGHT SENTINAL LYMPH NODE [id number] AND RIGHT AXILLARY NODE DISSECTION    REMOVE PORT VASCULAR ACCESS Left 7/24/2024    Procedure: Vascular Port Removal  (Left);  Surgeon: Eryn Jose MD;  [place] [facility] [place]       Medications  Current Outpatient Medications   Medication Sig Dispense Refill    albuterol (ACCUNEB) 1.25 MG/3ML neb solution Take 1.25 mg by nebulization 2 times daily as needed for  shortness of breath, wheezing or cough      alendronate (FOSAMAX) 70 MG tablet Take 1 tablet (70 mg) by mouth every 7 days. 12 tablet 3    calcium 600 MG tablet Take 1 tablet (600 mg) by mouth 2 times daily 180 tablet 3    Cobalamin Combinations (VITAMIN B12-FOLIC ACID) 500-400 MCG TABS 1000iu 1/day      Cyanocobalamin (B-12) 1000 MCG TBCR Take 1,000 mcg by mouth daily      escitalopram (LEXAPRO) 10 MG tablet Take 1 tablet (10 mg) by mouth daily. 90 tablet 3    famotidine (PEPCID) 20 MG tablet Take 1 tablet (20 mg) by mouth 2 times daily. 90 tablet 1    Fluticasone-Umeclidin-Vilant (TRELEGY ELLIPTA) 100-62.5-25 MCG/ACT oral inhaler Inhale 1 puff into the lungs daily      ipratropium (ATROVENT) 0.06 % nasal spray Spray 2 sprays into both nostrils 4 times daily 15 mL 2    omeprazole (PRILOSEC) 20 MG DR capsule       tiotropium-olodaterol (STIOLTO RESPIMAT) 2.5-2.5 MCG/ACT AERS Inhale 2 puffs into the lungs daily. 4 g 11     No current facility-administered medications for this visit.       Allergies  Allergies   Allergen Reactions    Cephalexin Shortness Of Breath     Bladder burning    Amitriptyline Unknown    Gabapentin Swelling     Shortness of breath    Latex Swelling and Other (See Comments)    Sulfa Antibiotics Unknown    Tetracyclines & Related Unknown         Family History  family history includes Alcoholism in her mother; Allergic rhinitis in her sister; Arthritis in her maternal grandmother; Asthma in her sister; Breast Cancer in her maternal cousin; Cancer in her maternal grandmother; Diabetes in her brother and brother; Hypertension in her brother, brother, maternal grandmother, and mother; Kidney Disease in her father; Osteoporosis in her maternal grandmother; Prostate Cancer in her father; Seizure Disorder in her father; Substance Abuse in her mother; Thyroid Disease in her brother; Ulcers in her mother.    Social History  Social History     Tobacco Use    Smoking status: Former     Current packs/day:  0.00     Average packs/day: 0.5 packs/day for 40.0 years (20.0 ttl pk-yrs)     Types: Cigarettes     Start date: 1965     Quit date: 2005     Years since quittin.7     Passive exposure: Past (Dad was a smoker)    Smokeless tobacco: Never   Substance Use Topics    Alcohol use: No     Comment: Alcoholic Drinks/day: occassionaly        Physical Exam  LMP  (LMP Unknown)   There is no height or weight on file to calculate BMI.  GENERAL :  In no apparent distress  NEURO: awake, alert, responds appropriately to questions.      DATA REVIEW  Labs/Imaging    TSH   Date Value Ref Range Status   2024 3.46 0.30 - 4.20 uIU/mL Final   2024 1.58 0.30 - 4.20 uIU/mL Final   10/13/2023 0.81 0.30 - 4.20 uIU/mL Final     EXAM: US THYROID  LOCATION: [facility]  DATE: 2024     INDICATION: FU thyroid nodule  COMPARISON: 10/27/2023  TECHNIQUE: Thyroid ultrasound.      FINDINGS:  RIGHT lobe: 3.9 x 1.2 x 1.4 cm. Homogeneous echotexture.  Isthmus: 5 mm.  LEFT lobe: 5.4 x 4.2 x 4.4 cm. Homogeneous echotexture.     NECK: No cervical lymphadenopathy.     NODULES:     Nodule 1: Right superior thyroid nodule, 0.7 x 0.7 x 0.8 cm, previously 0.7 x 0.5 x 0.4 cm   Composition: Solid or almost completely solid, 2 points   Echogenicity: Hyperechoic or isoechoic, 1 point   Shape: Wider-than-tall, 0 points   Margin: Ill-defined, 0 points   Echogenic Foci: None, or large comet-tail artifacts, 0 points   Point Total: 3 points. TI-RADS 3. If 2.5 cm or larger, recommend FNA; if 1.5 cm or larger, recommend follow up US at 1, 3, and 5 years.      Nodule 2: Left mid thyroid nodule, 4.7 x 4.1 x 4.0 cm, previously 5.1 x 4.1 x 4.1 cm.  Composition: Solid or almost completely solid, 2 points   Echogenicity: Hypoechoic, 2 points   Shape: Wider-than-tall, 0 points   Margin: Smooth, 0 points   Echogenic Foci: None, or large comet-tail artifacts, 0 points   Point Total: 4-6 points. TI-RADS 4. If 1.5 cm or larger,  recommend FNA; if 1 cm or larger, follow up US (annually for 5 years).                                                                      IMPRESSION:  Essentially unchanged size of the 4.7 cm left thyroid nodule. ACR TI-RADS 4. This nodule was previously aspirated in December 2023.         7/24/24  A. THYROID, LEFT LOBE AND ISTHMUS, LOBECTOMY:  - Follicular adenoma, 4.9 cm  - Background thyroid with multinodular follicular  hyperplasia and focal lymphocytic thyroiditis    ASSESSMENT/PLAN:   ## Subcm right thyroid nodule  ## s/p left lobectomy Dr. Jose 7/2024 for 5 cm  Left Thyroid nodules, FNA AUS 12/2023, Afirma suspicious, ROM 50%, repeat FNA FLUS   1 mo lab after hemithyroidectomy TSH normal. We discussed about 50% patient s/p hemothyroidectomy requires LT4. Patient would like to recheck. Otherwise, no issues. Regarding hoarse voice, no changes, improve with breathing exercise, patient will contact ENT clinic if ongoing.  -- add on TSH with reflex from last week labs  -- Follow-up US 5/2025    Follow-up 5/2025 to review US    The longitudinal plan of care for the diagnosis(es)/condition(s) as documented were addressed during this visit. Due to the added complexity in care, I will continue to support Mikala in the subsequent management and with ongoing continuity of care.      Jamison Wallace MD     no

## 2024-12-11 NOTE — PRE-ANESTHESIA EVALUATION ADULT - NSANTHTOBACCOSD_GEN_ALL_CORE
Hub staff attempted to follow warm transfer process and was unsuccessful     Caller: Jose Hurtado    Relationship to patient: Self    Best call back number: [phone]    Patient is needing: PT HAS EGD 12.12.24. PT WOULD LIKE TO KNOW IF HE HAS ANY PREP INSTRUCTIONS. PLEASE CONTACT PT TO ASSIST. URGENT         No

## 2025-04-03 ENCOUNTER — APPOINTMENT (OUTPATIENT)
Dept: PULMONOLOGY | Facility: CLINIC | Age: 75
End: 2025-04-03
Payer: MEDICARE

## 2025-04-03 VITALS
BODY MASS INDEX: 30.81 KG/M2 | WEIGHT: 208 LBS | RESPIRATION RATE: 14 BRPM | SYSTOLIC BLOOD PRESSURE: 128 MMHG | DIASTOLIC BLOOD PRESSURE: 78 MMHG | HEART RATE: 69 BPM | HEIGHT: 69 IN | OXYGEN SATURATION: 97 %

## 2025-04-03 DIAGNOSIS — J84.9 INTERSTITIAL PULMONARY DISEASE, UNSPECIFIED: ICD-10-CM

## 2025-04-03 PROCEDURE — 99204 OFFICE O/P NEW MOD 45 MIN: CPT

## 2025-04-03 PROCEDURE — G2211 COMPLEX E/M VISIT ADD ON: CPT

## 2025-04-09 ENCOUNTER — OUTPATIENT (OUTPATIENT)
Dept: OUTPATIENT SERVICES | Facility: HOSPITAL | Age: 75
LOS: 1 days | End: 2025-04-09
Payer: MEDICARE

## 2025-04-09 ENCOUNTER — APPOINTMENT (OUTPATIENT)
Dept: PULMONOLOGY | Facility: HOSPITAL | Age: 75
End: 2025-04-09

## 2025-04-09 DIAGNOSIS — R06.02 SHORTNESS OF BREATH: ICD-10-CM

## 2025-04-09 DIAGNOSIS — Z96.653 PRESENCE OF ARTIFICIAL KNEE JOINT, BILATERAL: Chronic | ICD-10-CM

## 2025-04-09 DIAGNOSIS — Z95.9 PRESENCE OF CARDIAC AND VASCULAR IMPLANT AND GRAFT, UNSPECIFIED: Chronic | ICD-10-CM

## 2025-04-09 DIAGNOSIS — Z12.11 ENCOUNTER FOR SCREENING FOR MALIGNANT NEOPLASM OF COLON: Chronic | ICD-10-CM

## 2025-04-09 DIAGNOSIS — Z94.9 TRANSPLANTED ORGAN AND TISSUE STATUS, UNSPECIFIED: Chronic | ICD-10-CM

## 2025-04-09 PROCEDURE — 94060 EVALUATION OF WHEEZING: CPT | Mod: 26

## 2025-04-09 PROCEDURE — 94727 GAS DIL/WSHOT DETER LNG VOL: CPT | Mod: 26

## 2025-04-09 PROCEDURE — 94729 DIFFUSING CAPACITY: CPT

## 2025-04-09 PROCEDURE — 94729 DIFFUSING CAPACITY: CPT | Mod: 26

## 2025-04-09 PROCEDURE — 94070 EVALUATION OF WHEEZING: CPT

## 2025-04-09 PROCEDURE — 94727 GAS DIL/WSHOT DETER LNG VOL: CPT

## 2025-04-09 PROCEDURE — 94664 DEMO&/EVAL PT USE INHALER: CPT

## 2025-04-10 DIAGNOSIS — R06.02 SHORTNESS OF BREATH: ICD-10-CM

## 2025-04-25 NOTE — DISCHARGE NOTE PROVIDER - NSDCMRMEDTOKEN_GEN_ALL_CORE_FT
Yes... amLODIPine 5 mg oral tablet: 1 tab(s) orally once a day  Januvia 100 mg oral tablet: 1 tab(s) orally once a day  magnesium chloride: 800 milligram(s) orally 2 times a day  metoprolol succinate 25 mg oral tablet, extended release: 1 tab(s) orally once a day  predniSONE 5 mg oral tablet: 1 tab(s) orally once a day  Prograf 1 mg oral capsule: 1 cap(s) orally every 12 hours  Protonix 40 mg oral delayed release tablet: 1 tab(s) orally once a day  Senokot 8.6 mg oral tablet: 1 tab(s) orally once a day (at bedtime)  tamsulosin 0.4 mg oral capsule: 1 cap(s) orally once a day  warfarin 2.5 mg oral tablet: 1 tab(s) orally once a day

## 2025-05-22 ENCOUNTER — APPOINTMENT (OUTPATIENT)
Dept: SPEECH THERAPY | Facility: CLINIC | Age: 75
End: 2025-05-22

## 2025-05-22 ENCOUNTER — OUTPATIENT (OUTPATIENT)
Dept: OUTPATIENT SERVICES | Facility: HOSPITAL | Age: 75
LOS: 1 days | End: 2025-05-22

## 2025-05-22 DIAGNOSIS — Z94.9 TRANSPLANTED ORGAN AND TISSUE STATUS, UNSPECIFIED: Chronic | ICD-10-CM

## 2025-05-22 DIAGNOSIS — Z12.11 ENCOUNTER FOR SCREENING FOR MALIGNANT NEOPLASM OF COLON: Chronic | ICD-10-CM

## 2025-05-22 DIAGNOSIS — H90.3 SENSORINEURAL HEARING LOSS, BILATERAL: ICD-10-CM

## 2025-05-22 DIAGNOSIS — Z96.653 PRESENCE OF ARTIFICIAL KNEE JOINT, BILATERAL: Chronic | ICD-10-CM

## 2025-05-22 DIAGNOSIS — Z95.9 PRESENCE OF CARDIAC AND VASCULAR IMPLANT AND GRAFT, UNSPECIFIED: Chronic | ICD-10-CM

## 2025-06-05 ENCOUNTER — OUTPATIENT (OUTPATIENT)
Dept: OUTPATIENT SERVICES | Facility: HOSPITAL | Age: 75
LOS: 1 days | End: 2025-06-05

## 2025-06-05 ENCOUNTER — APPOINTMENT (OUTPATIENT)
Dept: SPEECH THERAPY | Facility: CLINIC | Age: 75
End: 2025-06-05

## 2025-06-05 DIAGNOSIS — H90.3 SENSORINEURAL HEARING LOSS, BILATERAL: ICD-10-CM

## 2025-06-05 DIAGNOSIS — Z95.9 PRESENCE OF CARDIAC AND VASCULAR IMPLANT AND GRAFT, UNSPECIFIED: Chronic | ICD-10-CM

## 2025-06-05 DIAGNOSIS — Z12.11 ENCOUNTER FOR SCREENING FOR MALIGNANT NEOPLASM OF COLON: Chronic | ICD-10-CM

## 2025-06-05 DIAGNOSIS — Z96.653 PRESENCE OF ARTIFICIAL KNEE JOINT, BILATERAL: Chronic | ICD-10-CM

## 2025-06-05 DIAGNOSIS — Z94.9 TRANSPLANTED ORGAN AND TISSUE STATUS, UNSPECIFIED: Chronic | ICD-10-CM

## 2025-07-02 ENCOUNTER — APPOINTMENT (OUTPATIENT)
Dept: PULMONOLOGY | Facility: CLINIC | Age: 75
End: 2025-07-02
Payer: MEDICARE

## 2025-07-02 ENCOUNTER — LABORATORY RESULT (OUTPATIENT)
Age: 75
End: 2025-07-02

## 2025-07-02 VITALS
RESPIRATION RATE: 14 BRPM | SYSTOLIC BLOOD PRESSURE: 124 MMHG | HEIGHT: 69 IN | OXYGEN SATURATION: 96 % | DIASTOLIC BLOOD PRESSURE: 72 MMHG | HEART RATE: 75 BPM | WEIGHT: 208 LBS | BODY MASS INDEX: 30.81 KG/M2

## 2025-07-02 PROCEDURE — G2211 COMPLEX E/M VISIT ADD ON: CPT

## 2025-07-02 PROCEDURE — 99214 OFFICE O/P EST MOD 30 MIN: CPT

## 2025-07-03 LAB
BASOPHILS # BLD AUTO: 0.05 K/UL
BASOPHILS NFR BLD AUTO: 0.9 %
CCP AB SER IA-ACNC: <8 U/ML
CCP AB SER QL: NEGATIVE
EOSINOPHIL # BLD AUTO: 0.29 K/UL
EOSINOPHIL NFR BLD AUTO: 5 %
HCT VFR BLD CALC: 38.5 %
HGB BLD-MCNC: 12.2 G/DL
IMM GRANULOCYTES NFR BLD AUTO: 0.9 %
LYMPHOCYTES # BLD AUTO: 1.18 K/UL
LYMPHOCYTES NFR BLD AUTO: 20.4 %
MAN DIFF?: NORMAL
MCHC RBC-ENTMCNC: 29.3 PG
MCHC RBC-ENTMCNC: 31.7 G/DL
MCV RBC AUTO: 92.5 FL
MONOCYTES # BLD AUTO: 0.52 K/UL
MONOCYTES NFR BLD AUTO: 9 %
NEUTROPHILS # BLD AUTO: 3.69 K/UL
NEUTROPHILS NFR BLD AUTO: 63.8 %
PLATELET # BLD AUTO: 176 K/UL
PMV BLD AUTO: 0 /100 WBCS
PMV BLD: 10.7 FL
RBC # BLD: 4.16 M/UL
RBC # FLD: 13.3 %
RHEUMATOID FACT SERPL-ACNC: 20 IU/ML
WBC # FLD AUTO: 5.78 K/UL

## 2025-07-10 LAB
ANA TITR SER: ABNORMAL
ANACR T: ABNORMAL
CENTROMERE B AB SER-ACNC: <0.2 AL
DEPRECATED KAPPA LC FREE/LAMBDA SER: 1.29 RATIO
DSDNA AB SER-ACNC: 3 IU/ML
ENA JO1 AB SER-ACNC: <0.2 AL
ENA SCL70 AB SER-ACNC: <0.2 AL
ENA SS-A AB SER-ACNC: <0.2 AL
ENA SS-B AB SER-ACNC: <0.2 AL
IGA SERPL-MCNC: 216 MG/DL
IGG SERPL-MCNC: 1191 MG/DL
IGM SERPL-MCNC: 38 MG/DL
KAPPA LC CSF-MCNC: 2.64 MG/DL
KAPPA LC SERPL-MCNC: 3.4 MG/DL

## 2025-08-27 ENCOUNTER — OUTPATIENT (OUTPATIENT)
Dept: OUTPATIENT SERVICES | Facility: HOSPITAL | Age: 75
LOS: 1 days | End: 2025-08-27

## 2025-08-27 ENCOUNTER — APPOINTMENT (OUTPATIENT)
Dept: SPEECH THERAPY | Facility: CLINIC | Age: 75
End: 2025-08-27

## 2025-08-27 DIAGNOSIS — H90.3 SENSORINEURAL HEARING LOSS, BILATERAL: ICD-10-CM

## 2025-08-27 DIAGNOSIS — Z12.11 ENCOUNTER FOR SCREENING FOR MALIGNANT NEOPLASM OF COLON: Chronic | ICD-10-CM

## 2025-08-27 DIAGNOSIS — Z96.653 PRESENCE OF ARTIFICIAL KNEE JOINT, BILATERAL: Chronic | ICD-10-CM

## 2025-08-27 DIAGNOSIS — Z95.9 PRESENCE OF CARDIAC AND VASCULAR IMPLANT AND GRAFT, UNSPECIFIED: Chronic | ICD-10-CM

## 2025-09-11 ENCOUNTER — APPOINTMENT (OUTPATIENT)
Dept: SPEECH THERAPY | Facility: CLINIC | Age: 75
End: 2025-09-11